# Patient Record
Sex: MALE | Race: WHITE | Employment: OTHER | URBAN - NONMETROPOLITAN AREA
[De-identification: names, ages, dates, MRNs, and addresses within clinical notes are randomized per-mention and may not be internally consistent; named-entity substitution may affect disease eponyms.]

---

## 2017-02-23 ENCOUNTER — PROCEDURE VISIT (OUTPATIENT)
Dept: CARDIOLOGY | Age: 57
End: 2017-02-23

## 2017-02-23 DIAGNOSIS — Z95.0 PACEMAKER: Primary | ICD-10-CM

## 2017-02-23 PROCEDURE — 93294 REM INTERROG EVL PM/LDLS PM: CPT | Performed by: INTERNAL MEDICINE

## 2017-02-23 PROCEDURE — 93296 REM INTERROG EVL PM/IDS: CPT | Performed by: INTERNAL MEDICINE

## 2017-05-26 ENCOUNTER — PROCEDURE VISIT (OUTPATIENT)
Dept: CARDIOLOGY | Age: 57
End: 2017-05-26

## 2017-05-26 DIAGNOSIS — Z95.0 PACEMAKER: Primary | ICD-10-CM

## 2017-05-26 PROCEDURE — 93294 REM INTERROG EVL PM/LDLS PM: CPT | Performed by: INTERNAL MEDICINE

## 2017-05-26 PROCEDURE — 93296 REM INTERROG EVL PM/IDS: CPT | Performed by: INTERNAL MEDICINE

## 2017-09-07 ENCOUNTER — PROCEDURE VISIT (OUTPATIENT)
Dept: CARDIOLOGY CLINIC | Age: 57
End: 2017-09-07
Payer: MEDICARE

## 2017-09-07 DIAGNOSIS — Z95.0 PACEMAKER: Primary | ICD-10-CM

## 2017-09-07 PROCEDURE — 93296 REM INTERROG EVL PM/IDS: CPT | Performed by: INTERNAL MEDICINE

## 2017-09-07 PROCEDURE — 93294 REM INTERROG EVL PM/LDLS PM: CPT | Performed by: INTERNAL MEDICINE

## 2017-09-21 ENCOUNTER — TELEPHONE (OUTPATIENT)
Dept: CARDIOLOGY CLINIC | Age: 57
End: 2017-09-21

## 2017-12-07 ENCOUNTER — TELEPHONE (OUTPATIENT)
Dept: CARDIOLOGY CLINIC | Age: 57
End: 2017-12-07

## 2017-12-07 NOTE — LETTER
[facility] [address] [facility] [address]nathy [address] [address]  Phone: [phone]  Fax: [phone]    Governor Toni DO        December 8, 2017    [address] [facility] [address] [address]      Dear Melissa De: Our records show that you missed your pacer apt scheduled for 12. 7.2017. I have rescheduled your apt to 12.28.2017 at 10am.   If this time does not work for you,  Please contact our office to reschedule. If you have any questions or concerns, please don't hesitate to call.     Sincerely,        Governor Toni DO/ liat

## 2017-12-28 ENCOUNTER — TELEPHONE (OUTPATIENT)
Dept: CARDIOLOGY CLINIC | Age: 57
End: 2017-12-28

## 2017-12-28 NOTE — TELEPHONE ENCOUNTER
Patient missed pacer apt on 12.28.2017 at 10am.   [facility] for patient to call back and reschedule.

## 2018-01-16 ENCOUNTER — TELEPHONE (OUTPATIENT)
Dept: CARDIOLOGY CLINIC | Age: 58
End: 2018-01-16

## 2018-01-16 NOTE — LETTER
[facility] [address] [facility] Nicole Abernathy [address]  [facility] [address]  Phone: [phone]  Fax: [phone]    Belia Casiano DO        January 22, 2018    [address] [facility],  [address] [address]      Dear Kye Griffin: Our records indicate that you have missed your appointment for your [facility] Scientific Device check on 1.16.2018. I have rescheduled this apt to follow your appointment with Dr. Bridger Pedro on 2. 1.2018    Dr. Bridger Pedro 2. 1.2018 at 1:15pm  [address] 2. 1.2018 at 2:00pm    If you have any questions or concerns, please don't hesitate to call.     Sincerely,    Belia Casiano DO/ ZOFIA

## 2018-02-01 ENCOUNTER — OFFICE VISIT (OUTPATIENT)
Dept: CARDIOLOGY CLINIC | Age: 58
End: 2018-02-01
Payer: MEDICARE

## 2018-02-01 ENCOUNTER — NURSE ONLY (OUTPATIENT)
Dept: CARDIOLOGY CLINIC | Age: 58
End: 2018-02-01
Payer: MEDICARE

## 2018-02-01 VITALS
BODY MASS INDEX: 33.77 KG/M2 | SYSTOLIC BLOOD PRESSURE: 132 MMHG | HEIGHT: 69 IN | HEART RATE: 60 BPM | WEIGHT: 228 LBS | DIASTOLIC BLOOD PRESSURE: 74 MMHG

## 2018-02-01 DIAGNOSIS — Z98.890 HX OF TRICUSPID VALVE REPAIR: Primary | ICD-10-CM

## 2018-02-01 DIAGNOSIS — Z95.0 PACEMAKER: Primary | ICD-10-CM

## 2018-02-01 PROCEDURE — 93281 PM DEVICE PROGR EVAL MULTI: CPT | Performed by: INTERNAL MEDICINE

## 2018-02-01 PROCEDURE — 4004F PT TOBACCO SCREEN RCVD TLK: CPT | Performed by: INTERNAL MEDICINE

## 2018-02-01 PROCEDURE — 93000 ELECTROCARDIOGRAM COMPLETE: CPT | Performed by: INTERNAL MEDICINE

## 2018-02-01 PROCEDURE — G8427 DOCREV CUR MEDS BY ELIG CLIN: HCPCS | Performed by: INTERNAL MEDICINE

## 2018-02-01 PROCEDURE — G8484 FLU IMMUNIZE NO ADMIN: HCPCS | Performed by: INTERNAL MEDICINE

## 2018-02-01 PROCEDURE — 99213 OFFICE O/P EST LOW 20 MIN: CPT | Performed by: INTERNAL MEDICINE

## 2018-02-01 PROCEDURE — G8417 CALC BMI ABV UP PARAM F/U: HCPCS | Performed by: INTERNAL MEDICINE

## 2018-02-01 PROCEDURE — 3017F COLORECTAL CA SCREEN DOC REV: CPT | Performed by: INTERNAL MEDICINE

## 2018-02-01 RX ORDER — POTASSIUM CHLORIDE 1.5 G/1.77G
20 POWDER, FOR SOLUTION ORAL 2 TIMES DAILY
Qty: 30 EACH | Refills: 3 | Status: SHIPPED | OUTPATIENT
Start: 2018-02-01 | End: 2020-01-06

## 2018-02-01 RX ORDER — BUMETANIDE 1 MG/1
1 TABLET ORAL 2 TIMES DAILY
Qty: 60 TABLET | Refills: 3 | Status: SHIPPED | OUTPATIENT
Start: 2018-02-01 | End: 2018-05-29 | Stop reason: SDUPTHER

## 2018-02-23 ENCOUNTER — HOSPITAL ENCOUNTER (OUTPATIENT)
Dept: NON INVASIVE DIAGNOSTICS | Age: 58
Discharge: HOME | End: 2018-02-23
Payer: MEDICARE

## 2018-02-23 DIAGNOSIS — Z98.890 HX OF TRICUSPID VALVE REPAIR: ICD-10-CM

## 2018-02-23 LAB
LV EF: 60 %
LVEF MODALITY: NORMAL

## 2018-02-23 PROCEDURE — 93306 TTE W/DOPPLER COMPLETE: CPT

## 2018-03-14 ENCOUNTER — PROCEDURE VISIT (OUTPATIENT)
Dept: CARDIOLOGY CLINIC | Age: 58
End: 2018-03-14

## 2018-03-14 DIAGNOSIS — Z95.0 PACEMAKER: Primary | ICD-10-CM

## 2018-03-16 ENCOUNTER — HOSPITAL ENCOUNTER (OUTPATIENT)
Age: 58
Discharge: HOME | End: 2018-03-16
Payer: MEDICARE

## 2018-03-16 ENCOUNTER — OFFICE VISIT (OUTPATIENT)
Dept: CARDIOLOGY CLINIC | Age: 58
End: 2018-03-16
Payer: MEDICARE

## 2018-03-16 VITALS
HEIGHT: 69 IN | DIASTOLIC BLOOD PRESSURE: 88 MMHG | SYSTOLIC BLOOD PRESSURE: 118 MMHG | HEART RATE: 64 BPM | WEIGHT: 231.6 LBS | BODY MASS INDEX: 34.3 KG/M2

## 2018-03-16 DIAGNOSIS — I50.32 CHRONIC DIASTOLIC CONGESTIVE HEART FAILURE (HCC): Primary | ICD-10-CM

## 2018-03-16 DIAGNOSIS — I50.32 CHRONIC DIASTOLIC CONGESTIVE HEART FAILURE (HCC): ICD-10-CM

## 2018-03-16 DIAGNOSIS — Z95.3 HISTORY OF TRICUSPID VALVE REPLACEMENT WITH BIOPROSTHETIC VALVE: ICD-10-CM

## 2018-03-16 DIAGNOSIS — Z95.0 PACEMAKER: ICD-10-CM

## 2018-03-16 LAB
ANION GAP SERPL CALCULATED.3IONS-SCNC: 13 MEQ/L (ref 8–16)
BUN BLDV-MCNC: 10 MG/DL (ref 7–22)
CALCIUM SERPL-MCNC: 9.6 MG/DL (ref 8.5–10.5)
CHLORIDE BLD-SCNC: 97 MEQ/L (ref 98–111)
CO2: 30 MEQ/L (ref 23–33)
CREAT SERPL-MCNC: 1.1 MG/DL (ref 0.4–1.2)
GFR SERPL CREATININE-BSD FRML MDRD: 69 ML/MIN/1.73M2
GLUCOSE BLD-MCNC: 104 MG/DL (ref 70–108)
MAGNESIUM: 2.2 MG/DL (ref 1.6–2.4)
POTASSIUM SERPL-SCNC: 4.1 MEQ/L (ref 3.5–5.2)
SODIUM BLD-SCNC: 140 MEQ/L (ref 135–145)

## 2018-03-16 PROCEDURE — 36415 COLL VENOUS BLD VENIPUNCTURE: CPT

## 2018-03-16 PROCEDURE — 4004F PT TOBACCO SCREEN RCVD TLK: CPT | Performed by: INTERNAL MEDICINE

## 2018-03-16 PROCEDURE — G8427 DOCREV CUR MEDS BY ELIG CLIN: HCPCS | Performed by: INTERNAL MEDICINE

## 2018-03-16 PROCEDURE — 83735 ASSAY OF MAGNESIUM: CPT

## 2018-03-16 PROCEDURE — G8417 CALC BMI ABV UP PARAM F/U: HCPCS | Performed by: INTERNAL MEDICINE

## 2018-03-16 PROCEDURE — 99214 OFFICE O/P EST MOD 30 MIN: CPT | Performed by: INTERNAL MEDICINE

## 2018-03-16 PROCEDURE — 3017F COLORECTAL CA SCREEN DOC REV: CPT | Performed by: INTERNAL MEDICINE

## 2018-03-16 PROCEDURE — G8484 FLU IMMUNIZE NO ADMIN: HCPCS | Performed by: INTERNAL MEDICINE

## 2018-03-16 PROCEDURE — 80048 BASIC METABOLIC PNL TOTAL CA: CPT

## 2018-03-16 NOTE — PROGRESS NOTES
Family History   Problem Relation Age of Onset    Diabetes Mother     Depression Mother     Arthritis Father     Heart Disease Father     Diabetes Brother     Depression Daughter         Social History     Social History    Marital status:      Spouse name: N/A    Number of children: 3    Years of education: 5     Occupational History    on disability      Social History Main Topics    Smoking status: Current Every Day Smoker     Packs/day: 1.00     Years: 37.00     Types: Cigarettes     Start date: 10/22/1976    Smokeless tobacco: Former User      Comment: pt has smoked 3 packs in last week    Alcohol use No      Comment: quit 9 years ago    Drug use: No    Sexual activity: Yes     Partners: Female     Other Topics Concern    Not on file     Social History Narrative    No narrative on file       Current Outpatient Prescriptions   Medication Sig Dispense Refill    metoprolol tartrate (LOPRESSOR) 25 MG tablet take 1 tablet by mouth twice a day 180 tablet 0    bumetanide (BUMEX) 1 MG tablet Take 1 tablet by mouth 2 times daily 60 tablet 3    potassium chloride (KLOR-CON) 20 MEQ packet Take 20 mEq by mouth 2 times daily 30 each 3    pantoprazole (PROTONIX) 40 MG tablet Take 40 mg by mouth daily      warfarin (COUMADIN) 5 MG tablet Take 5 mg by mouth LMH adjust coumadin      busPIRone (BUSPAR) 15 MG tablet Take 60 mg by mouth daily       vilazodone HCl (VIIBRYD) 40 MG TABS Take 40 mg by mouth daily       No current facility-administered medications for this visit. Review of Systems -     General ROS: negative  Psychological ROS: negative  Hematological and Lymphatic ROS: No history of blood clots or bleeding disorder.    Respiratory ROS: no cough, shortness of breath, or wheezing  Cardiovascular ROS: no chest pain or dyspnea on exertion  Gastrointestinal ROS: negative  Genito-Urinary ROS: no dysuria, trouble voiding, or hematuria  Musculoskeletal ROS: negative  Neurological ROS: PTINRWAR  HgBA1c:    Lab Results   Component Value Date    LABA1C 5.6 12/23/2012     FLP:    Lab Results   Component Value Date    TRIG 101 12/21/2012    HDL 20 12/21/2012    LDLCALC 74 12/21/2012     TSH:    Lab Results   Component Value Date    TSH 1.740 10/13/2016       Conclusions      Summary   Normal left ventricle size and systolic function. Ejection fraction was   estimated at 60 %. There were no regional left ventricular wall motion   abnormalities and wall thickness was within normal limits.   The left atrium is Mild to moderate dilated.   The aortic valve leaflets were not well visualized.   DOPPLER: Transaortic velocity was within the normal range with no evidence   of aortic stenosis. There was no evidence of aortic regurgitation.   Mild tricuspid regurgitation visualized.   Probable Normal functioning Bioprosthetic Tricuspid Valve Or Tricuspid   Valve repair   Mild tricuspid regurgitation visualized.   Right ventricular systolic pressure measures 45 mmhg.      Signature      ----------------------------------------------------------------   Electronically signed by Maria Teresa Crain MD (Interpreting   physician) on 02/23/2018 at 06:52 PM    Assessment  1. Chronic diastolic congestive heart failure ([address]     2. History of tricuspid valve replacement with bioprosthetic valve 2012     3. [place] [employer] BiV pacemaker           Plan   Continue the current treatment and with constant vigilance to changes in symptoms and also any potential side effects. Return for care or seek medical attention immediately if symptoms got worse and/or develop new symptoms.     Pacer interrogation reviewed and look good    Congestive heart failure: no evidence of fluid overload today, no recent hospitalization for CHF  On bumex  BMP and mg    RTC in 6 months        [facility]

## 2018-05-30 RX ORDER — BUMETANIDE 1 MG/1
1 TABLET ORAL 2 TIMES DAILY
Qty: 60 TABLET | Refills: 3 | Status: ON HOLD | OUTPATIENT
Start: 2018-05-30 | End: 2019-10-19 | Stop reason: HOSPADM

## 2018-06-21 ENCOUNTER — TELEPHONE (OUTPATIENT)
Dept: FAMILY MEDICINE CLINIC | Age: 58
End: 2018-06-21

## 2018-06-26 ENCOUNTER — PROCEDURE VISIT (OUTPATIENT)
Dept: CARDIOLOGY CLINIC | Age: 58
End: 2018-06-26
Payer: MEDICARE

## 2018-06-26 DIAGNOSIS — Z95.0 PACEMAKER: Primary | ICD-10-CM

## 2018-06-26 PROCEDURE — 93294 REM INTERROG EVL PM/LDLS PM: CPT | Performed by: INTERNAL MEDICINE

## 2018-06-26 PROCEDURE — 93296 REM INTERROG EVL PM/IDS: CPT | Performed by: INTERNAL MEDICINE

## 2018-09-24 ENCOUNTER — TELEPHONE (OUTPATIENT)
Dept: CARDIOLOGY CLINIC | Age: 58
End: 2018-09-24

## 2018-09-26 ENCOUNTER — PROCEDURE VISIT (OUTPATIENT)
Dept: CARDIOLOGY CLINIC | Age: 58
End: 2018-09-26

## 2018-09-26 DIAGNOSIS — Z95.0 PACEMAKER: Primary | ICD-10-CM

## 2018-09-26 NOTE — PROGRESS NOTES
DR MUÑOZ PT  [place] SCI BIV ICD   BATTERY 3.5 YRS REMAINING  ATRIAL IMPEDENCE 537  RV IMPEDENCE 483  LV IMPEDENCE 340  P WAVES 7.5  A PACED 15%  LV AND RV PACED 100%

## 2018-10-03 ENCOUNTER — OFFICE VISIT (OUTPATIENT)
Dept: CARDIOLOGY CLINIC | Age: 58
End: 2018-10-03
Payer: MEDICARE

## 2018-10-03 VITALS
BODY MASS INDEX: 35.76 KG/M2 | HEART RATE: 94 BPM | SYSTOLIC BLOOD PRESSURE: 148 MMHG | WEIGHT: 241.4 LBS | DIASTOLIC BLOOD PRESSURE: 70 MMHG | HEIGHT: 69 IN

## 2018-10-03 DIAGNOSIS — Z95.3 HISTORY OF TRICUSPID VALVE REPLACEMENT WITH BIOPROSTHETIC VALVE: ICD-10-CM

## 2018-10-03 DIAGNOSIS — R06.02 SOB (SHORTNESS OF BREATH): Primary | ICD-10-CM

## 2018-10-03 DIAGNOSIS — Z95.0 PACEMAKER: ICD-10-CM

## 2018-10-03 DIAGNOSIS — I50.32 CHRONIC DIASTOLIC CONGESTIVE HEART FAILURE (HCC): ICD-10-CM

## 2018-10-03 PROCEDURE — 4004F PT TOBACCO SCREEN RCVD TLK: CPT | Performed by: PHYSICIAN ASSISTANT

## 2018-10-03 PROCEDURE — G8484 FLU IMMUNIZE NO ADMIN: HCPCS | Performed by: PHYSICIAN ASSISTANT

## 2018-10-03 PROCEDURE — 99213 OFFICE O/P EST LOW 20 MIN: CPT | Performed by: PHYSICIAN ASSISTANT

## 2018-10-03 PROCEDURE — 3017F COLORECTAL CA SCREEN DOC REV: CPT | Performed by: PHYSICIAN ASSISTANT

## 2018-10-03 PROCEDURE — G8417 CALC BMI ABV UP PARAM F/U: HCPCS | Performed by: PHYSICIAN ASSISTANT

## 2018-10-03 PROCEDURE — G8427 DOCREV CUR MEDS BY ELIG CLIN: HCPCS | Performed by: PHYSICIAN ASSISTANT

## 2018-10-03 RX ORDER — PRAZOSIN HYDROCHLORIDE 5 MG/1
5 CAPSULE ORAL NIGHTLY
COMMUNITY
End: 2019-11-22 | Stop reason: ALTCHOICE

## 2018-10-03 RX ORDER — QUETIAPINE FUMARATE 300 MG/1
300 TABLET, FILM COATED ORAL NIGHTLY
Status: ON HOLD | COMMUNITY
End: 2019-10-19 | Stop reason: HOSPADM

## 2018-10-03 RX ORDER — BUPRENORPHINE HYDROCHLORIDE, NALOXONE HYDROCHLORIDE 8; 2 MG/1; MG/1
1 FILM, SOLUBLE BUCCAL; SUBLINGUAL DAILY
Status: ON HOLD | COMMUNITY
End: 2020-04-25 | Stop reason: SDUPTHER

## 2018-10-03 RX ORDER — AMITRIPTYLINE HYDROCHLORIDE 100 MG/1
100 TABLET, FILM COATED ORAL NIGHTLY
COMMUNITY
End: 2018-11-06 | Stop reason: ALTCHOICE

## 2018-10-03 RX ORDER — QUETIAPINE FUMARATE 50 MG/1
50 TABLET, EXTENDED RELEASE ORAL DAILY
Status: ON HOLD | COMMUNITY
End: 2019-10-19 | Stop reason: HOSPADM

## 2018-10-03 NOTE — PROGRESS NOTES
capsule Take 5 mg by mouth nightly      QUEtiapine (SEROQUEL) 300 MG tablet Take 300 mg by mouth 2 times daily      QUEtiapine (SEROQUEL XR) 50 MG extended release tablet Take 50 mg by mouth nightly      buprenorphine-naloxone (SUBOXONE) 8-2 MG FILM SL film Place 1 Film under the tongue daily. Abdulaziz Rowley metoprolol tartrate (LOPRESSOR) 25 MG tablet take 1 tablet by mouth twice a day 180 tablet 1    bumetanide (BUMEX) 1 MG tablet Take 1 tablet by mouth 2 times daily (Patient taking differently: Take 1 mg by mouth as needed ) 60 tablet 3    potassium chloride (KLOR-CON) 20 MEQ packet Take 20 mEq by mouth 2 times daily (Patient taking differently: Take 20 mEq by mouth as needed ) 30 each 3    pantoprazole (PROTONIX) 40 MG tablet Take 40 mg by mouth daily      warfarin (COUMADIN) 5 MG tablet Take 5 mg by mouth LMH adjust coumadin      busPIRone (BUSPAR) 15 MG tablet Take 60 mg by mouth daily       vilazodone HCl (VIIBRYD) 40 MG TABS Take 40 mg by mouth daily       No current facility-administered medications for this visit.         Social History     Social History    Marital status:      Spouse name: N/A    Number of children: 3    Years of education: 9     Occupational History    on disability      Social History Main Topics    Smoking status: Current Every Day Smoker     Packs/day: 1.00     Years: 37.00     Types: Cigarettes     Start date: 10/22/1976    Smokeless tobacco: Former User      Comment: pt has smoked 3 packs in last week    Alcohol use No      Comment: quit 9 years ago    Drug use: No    Sexual activity: Yes     Partners: Female     Other Topics Concern    None     Social History Narrative    None       Family History   Problem Relation Age of Onset    Diabetes Mother     Depression Mother     Arthritis Father     Heart Disease Father     Diabetes Brother     Depression Daughter        Blood pressure (!) 148/70, pulse 94, height 5' 9\" (1.753 m), weight 241 lb 6.4 oz (109.5 kg).    General:   Well developed, well nourished  Lungs:   Clear to auscultation  Heart:    Normal S1 S2, No murmur, rubs, or gallops  Abdomen:   Soft, non tender, no organomegalies, positive bowel sounds  Extremities:   No edema, no cyanosis, good peripheral pulses  Neurological:   Awake, alert, oriented. No obvious focal deficits  Musculoskeletal:  No obvious deformities         Diagnosis Orders   1. SOB (shortness of breath)  Stress test, lexiscan   2. [place] [employer] BiV pacemaker     3. Chronic diastolic congestive heart failure ([address]     4. History of tricuspid valve replacement with bioprosthetic valve 2012         Orders Placed This Encounter   Procedures    Stress test, lexiscan     To be done by Dr Colt Vivas     Standing Status:   Future     Standing Expiration Date:   10/3/2019     Order Specific Question:   Reason for Exam?     Answer:   Shortness of breath     Continue Dr Suzette Dennis current treatment plan    We'll check a Lexiscan stress test    Continue same current medications and with constant vigilance to changes in symptoms and also any potential side effects. Return for care if become worse or seek medical attention immediately. The patient is educated on symptoms of heart disease that include chest pain and passing out, dizziness, etc and to report them if there is any change or go to emergency room.        Follow up with Dr Colt Vivas as scheduled or sooner if needed  (Please note that portions of this note were completed with a voice recognition program.  Efforts were made to edit the dictation but occasionally words are mis-transcribed.)      Pedro Pablo Piña PA-C

## 2018-10-07 ENCOUNTER — APPOINTMENT (OUTPATIENT)
Dept: GENERAL RADIOLOGY | Age: 58
End: 2018-10-07
Payer: MEDICARE

## 2018-10-07 ENCOUNTER — APPOINTMENT (OUTPATIENT)
Dept: CT IMAGING | Age: 58
End: 2018-10-07
Payer: MEDICARE

## 2018-10-07 ENCOUNTER — HOSPITAL ENCOUNTER (EMERGENCY)
Age: 58
Discharge: HOME | End: 2018-10-08
Payer: MEDICARE

## 2018-10-07 VITALS
DIASTOLIC BLOOD PRESSURE: 88 MMHG | HEIGHT: 69 IN | OXYGEN SATURATION: 97 % | TEMPERATURE: 97.9 F | BODY MASS INDEX: 36.14 KG/M2 | RESPIRATION RATE: 18 BRPM | HEART RATE: 60 BPM | WEIGHT: 244 LBS | SYSTOLIC BLOOD PRESSURE: 149 MMHG

## 2018-10-07 DIAGNOSIS — R07.9 CHEST PAIN, UNSPECIFIED TYPE: Primary | ICD-10-CM

## 2018-10-07 LAB
ALBUMIN SERPL-MCNC: 4.2 G/DL (ref 3.5–5.1)
ALP BLD-CCNC: 69 U/L (ref 38–126)
ALT SERPL-CCNC: 26 U/L (ref 11–66)
AMPHETAMINE+METHAMPHETAMINE URINE SCREEN: NEGATIVE
ANION GAP SERPL CALCULATED.3IONS-SCNC: 13 MEQ/L (ref 8–16)
AST SERPL-CCNC: 29 U/L (ref 5–40)
BACTERIA: ABNORMAL /HPF
BARBITURATE QUANTITATIVE URINE: NEGATIVE
BASOPHILS # BLD: 0.3 %
BASOPHILS ABSOLUTE: 0 THOU/MM3 (ref 0–0.1)
BENZODIAZEPINE QUANTITATIVE URINE: NEGATIVE
BILIRUB SERPL-MCNC: 0.6 MG/DL (ref 0.3–1.2)
BILIRUBIN DIRECT: < 0.2 MG/DL (ref 0–0.3)
BILIRUBIN URINE: NEGATIVE
BLOOD, URINE: ABNORMAL
BUN BLDV-MCNC: 11 MG/DL (ref 7–22)
CALCIUM SERPL-MCNC: 9.1 MG/DL (ref 8.5–10.5)
CANNABINOID QUANTITATIVE URINE: POSITIVE
CASTS 2: ABNORMAL /LPF
CASTS UA: ABNORMAL /LPF
CHARACTER, URINE: CLEAR
CHLORIDE BLD-SCNC: 99 MEQ/L (ref 98–111)
CO2: 24 MEQ/L (ref 23–33)
COCAINE METABOLITE QUANTITATIVE URINE: NEGATIVE
COLOR: YELLOW
CREAT SERPL-MCNC: 0.9 MG/DL (ref 0.4–1.2)
CRYSTALS, UA: ABNORMAL
EKG ATRIAL RATE: 61 BPM
EKG P AXIS: 43 DEGREES
EKG P-R INTERVAL: 134 MS
EKG Q-T INTERVAL: 432 MS
EKG QRS DURATION: 106 MS
EKG QTC CALCULATION (BAZETT): 434 MS
EKG R AXIS: -40 DEGREES
EKG T AXIS: -56 DEGREES
EKG VENTRICULAR RATE: 61 BPM
EOSINOPHIL # BLD: 0.7 %
EOSINOPHILS ABSOLUTE: 0.1 THOU/MM3 (ref 0–0.4)
EPITHELIAL CELLS, UA: ABNORMAL /HPF
ERYTHROCYTE [DISTWIDTH] IN BLOOD BY AUTOMATED COUNT: 14.5 % (ref 11.5–14.5)
ERYTHROCYTE [DISTWIDTH] IN BLOOD BY AUTOMATED COUNT: 48.7 FL (ref 35–45)
GFR SERPL CREATININE-BSD FRML MDRD: 87 ML/MIN/1.73M2
GLUCOSE BLD-MCNC: 126 MG/DL (ref 70–108)
GLUCOSE URINE: NEGATIVE MG/DL
HCT VFR BLD CALC: 48.7 % (ref 42–52)
HEMOGLOBIN: 16.5 GM/DL (ref 14–18)
IMMATURE GRANS (ABS): 0.03 THOU/MM3 (ref 0–0.07)
IMMATURE GRANULOCYTES: 0.4 %
KETONES, URINE: NEGATIVE
LEUKOCYTE ESTERASE, URINE: NEGATIVE
LYMPHOCYTES # BLD: 30.9 %
LYMPHOCYTES ABSOLUTE: 2.3 THOU/MM3 (ref 1–4.8)
MCH RBC QN AUTO: 31 PG (ref 26–33)
MCHC RBC AUTO-ENTMCNC: 33.9 GM/DL (ref 32.2–35.5)
MCV RBC AUTO: 91.4 FL (ref 80–94)
MISCELLANEOUS 2: ABNORMAL
MONOCYTES # BLD: 12.8 %
MONOCYTES ABSOLUTE: 1 THOU/MM3 (ref 0.4–1.3)
NITRITE, URINE: NEGATIVE
NUCLEATED RED BLOOD CELLS: 0 /100 WBC
OPIATES, URINE: NEGATIVE
OSMOLALITY CALCULATION: 272.9 MOSMOL/KG (ref 275–300)
OXYCODONE: NEGATIVE
PH UA: 6.5
PHENCYCLIDINE QUANTITATIVE URINE: NEGATIVE
PLATELET # BLD: 162 THOU/MM3 (ref 130–400)
PMV BLD AUTO: 10.1 FL (ref 9.4–12.4)
POTASSIUM SERPL-SCNC: 3.9 MEQ/L (ref 3.5–5.2)
PRO-BNP: 703.2 PG/ML (ref 0–900)
PROTEIN UA: ABNORMAL
RBC # BLD: 5.33 MILL/MM3 (ref 4.7–6.1)
RBC URINE: ABNORMAL /HPF
RENAL EPITHELIAL, UA: ABNORMAL
SEG NEUTROPHILS: 54.9 %
SEGMENTED NEUTROPHILS ABSOLUTE COUNT: 4.2 THOU/MM3 (ref 1.8–7.7)
SODIUM BLD-SCNC: 136 MEQ/L (ref 135–145)
SPECIFIC GRAVITY, URINE: 1 (ref 1–1.03)
TOTAL PROTEIN: 8.6 G/DL (ref 6.1–8)
TROPONIN T: < 0.01 NG/ML
TROPONIN T: < 0.01 NG/ML
UROBILINOGEN, URINE: 1 EU/DL
WBC # BLD: 7.6 THOU/MM3 (ref 4.8–10.8)
WBC UA: ABNORMAL /HPF
YEAST: ABNORMAL

## 2018-10-07 PROCEDURE — 6370000000 HC RX 637 (ALT 250 FOR IP): Performed by: PHYSICIAN ASSISTANT

## 2018-10-07 PROCEDURE — 94640 AIRWAY INHALATION TREATMENT: CPT

## 2018-10-07 PROCEDURE — 82248 BILIRUBIN DIRECT: CPT

## 2018-10-07 PROCEDURE — 80307 DRUG TEST PRSMV CHEM ANLYZR: CPT

## 2018-10-07 PROCEDURE — 71275 CT ANGIOGRAPHY CHEST: CPT

## 2018-10-07 PROCEDURE — 93005 ELECTROCARDIOGRAM TRACING: CPT | Performed by: EMERGENCY MEDICINE

## 2018-10-07 PROCEDURE — 81001 URINALYSIS AUTO W/SCOPE: CPT

## 2018-10-07 PROCEDURE — 83880 ASSAY OF NATRIURETIC PEPTIDE: CPT

## 2018-10-07 PROCEDURE — 71045 X-RAY EXAM CHEST 1 VIEW: CPT

## 2018-10-07 PROCEDURE — 36415 COLL VENOUS BLD VENIPUNCTURE: CPT

## 2018-10-07 PROCEDURE — 2709999900 HC NON-CHARGEABLE SUPPLY

## 2018-10-07 PROCEDURE — 84484 ASSAY OF TROPONIN QUANT: CPT

## 2018-10-07 PROCEDURE — 6360000004 HC RX CONTRAST MEDICATION: Performed by: PHYSICIAN ASSISTANT

## 2018-10-07 PROCEDURE — 80053 COMPREHEN METABOLIC PANEL: CPT

## 2018-10-07 PROCEDURE — 85025 COMPLETE CBC W/AUTO DIFF WBC: CPT

## 2018-10-07 PROCEDURE — 99285 EMERGENCY DEPT VISIT HI MDM: CPT

## 2018-10-07 RX ORDER — IPRATROPIUM BROMIDE AND ALBUTEROL SULFATE 2.5; .5 MG/3ML; MG/3ML
1 SOLUTION RESPIRATORY (INHALATION) ONCE
Status: COMPLETED | OUTPATIENT
Start: 2018-10-07 | End: 2018-10-07

## 2018-10-07 RX ORDER — NITROGLYCERIN 0.4 MG/1
0.4 TABLET SUBLINGUAL EVERY 5 MIN PRN
Status: DISCONTINUED | OUTPATIENT
Start: 2018-10-07 | End: 2018-10-08 | Stop reason: HOSPADM

## 2018-10-07 RX ORDER — ASPIRIN 81 MG/1
324 TABLET, CHEWABLE ORAL ONCE
Status: COMPLETED | OUTPATIENT
Start: 2018-10-07 | End: 2018-10-07

## 2018-10-07 RX ADMIN — IPRATROPIUM BROMIDE AND ALBUTEROL SULFATE 1 AMPULE: .5; 3 SOLUTION RESPIRATORY (INHALATION) at 21:29

## 2018-10-07 RX ADMIN — ASPIRIN 81 MG CHEWABLE TABLET 324 MG: 81 TABLET CHEWABLE at 21:22

## 2018-10-07 RX ADMIN — IOPAMIDOL 80 ML: 755 INJECTION, SOLUTION INTRAVENOUS at 22:38

## 2018-10-07 ASSESSMENT — ENCOUNTER SYMPTOMS
ABDOMINAL PAIN: 0
WHEEZING: 0
BACK PAIN: 0
SHORTNESS OF BREATH: 1
SORE THROAT: 0
VOMITING: 0
RHINORRHEA: 0
NAUSEA: 0
EYE DISCHARGE: 0
COUGH: 1
COLOR CHANGE: 0
CONSTIPATION: 0
DIARRHEA: 0
EYE REDNESS: 0

## 2018-10-07 ASSESSMENT — PAIN SCALES - GENERAL
PAINLEVEL_OUTOF10: 1
PAINLEVEL_OUTOF10: 3
PAINLEVEL_OUTOF10: 7

## 2018-10-07 ASSESSMENT — PAIN DESCRIPTION - ORIENTATION
ORIENTATION: LEFT;MID

## 2018-10-07 ASSESSMENT — PAIN DESCRIPTION - PAIN TYPE
TYPE: ACUTE PAIN

## 2018-10-07 ASSESSMENT — PAIN DESCRIPTION - LOCATION
LOCATION: CHEST
LOCATION: HEAD

## 2018-10-07 ASSESSMENT — HEART SCORE: ECG: 1

## 2018-10-07 ASSESSMENT — PAIN DESCRIPTION - DESCRIPTORS
DESCRIPTORS: SHARP
DESCRIPTORS: DISCOMFORT
DESCRIPTORS: HEADACHE
DESCRIPTORS: DISCOMFORT

## 2018-10-07 ASSESSMENT — PAIN DESCRIPTION - FREQUENCY
FREQUENCY: INTERMITTENT
FREQUENCY: INTERMITTENT

## 2018-10-08 PROCEDURE — 93010 ELECTROCARDIOGRAM REPORT: CPT | Performed by: INTERNAL MEDICINE

## 2018-10-08 NOTE — ED PROVIDER NOTES
dailyHistorical Med      QUEtiapine (SEROQUEL XR) 50 MG extended release tablet Take 50 mg by mouth nightlyHistorical Med      buprenorphine-naloxone (SUBOXONE) 8-2 MG FILM SL film Place 1 Film under the tongue daily. Duncan Even Historical Med      metoprolol tartrate (LOPRESSOR) 25 MG tablet take 1 tablet by mouth twice a day, Disp-180 tablet, R-1Normal      bumetanide (BUMEX) 1 MG tablet Take 1 tablet by mouth 2 times daily, Disp-60 tablet, R-3Normal      potassium chloride (KLOR-CON) 20 MEQ packet Take 20 mEq by mouth 2 times daily, Disp-30 each, R-3Normal      pantoprazole (PROTONIX) 40 MG tablet Take 40 mg by mouth daily      warfarin (COUMADIN) 5 MG tablet Take 5 mg by mouth LMH adjust coumadin      busPIRone (BUSPAR) 15 MG tablet Take 60 mg by mouth daily Historical Med      vilazodone HCl (VIIBRYD) 40 MG TABS Take 40 mg by mouth daily             ALLERGIES     has No Known Allergies. FAMILY HISTORY     indicated that his mother is alive. He indicated that his father is alive. He indicated that the status of his brother is unknown. He indicated that the status of his daughter is unknown.    family history includes Arthritis in his father; Depression in his daughter and mother; Diabetes in his brother and mother; Heart Disease in his father. SOCIAL HISTORY      reports that he has been smoking Cigarettes. He started smoking about 41 years ago. He has a 37.00 pack-year smoking history. He has quit using smokeless tobacco. He reports that he does not drink alcohol or use drugs. PHYSICAL EXAM     INITIAL VITALS:  height is 5' 9\" (1.753 m) and weight is 244 lb (110.7 kg). His temperature is 97.9 °F (36.6 °C). His blood pressure is 149/88 (abnormal) and his pulse is 60. His respiration is 18 and oxygen saturation is 97%. Physical Exam   Constitutional: He is oriented to person, place, and time. He appears well-developed and well-nourished. No distress. HENT:   Head: Normocephalic and atraumatic.    Right Ear: External ear normal.   Left Ear: External ear normal.   Nose: Nose normal.   Mouth/Throat: Oropharynx is clear and moist.   Eyes: Pupils are equal, round, and reactive to light. Conjunctivae and EOM are normal. Right eye exhibits no discharge. Left eye exhibits no discharge. No scleral icterus. Neck: Normal range of motion. Neck supple. Cardiovascular: Normal rate, regular rhythm and normal heart sounds. Exam reveals no gallop and no friction rub. No murmur heard. Pulmonary/Chest: Effort normal. No respiratory distress. He has wheezes. He has no rales. He exhibits no tenderness. Abdominal: Soft. Bowel sounds are normal. He exhibits no distension and no mass. There is no tenderness. There is no rebound and no guarding. Musculoskeletal: Normal range of motion. He exhibits no edema. Lymphadenopathy:     He has no cervical adenopathy. Neurological: He is alert and oriented to person, place, and time. He exhibits normal muscle tone. Coordination normal. GCS eye subscore is 4. GCS verbal subscore is 5. GCS motor subscore is 6. Skin: Skin is warm and dry. No rash noted. He is not diaphoretic. No erythema. No pallor. Psychiatric: He has a normal mood and affect. His behavior is normal. Thought content normal.   Nursing note and vitals reviewed.     Heart Score for chest pain patients  History: Slightly Suspicious  ECG: Non-Specifc repolarization disturbance/LBTB/PM  Patient Age: > 39 and < 65 years  *Risk factors for Atherosclerotic disease: Hypertension, Coronary Artery Disease, Cigarette smoking  Risk Factors: > 3 Risk factors or history of atherosclerotic disease*  Troponin: < 1X normal limit  Heart Score Total: 4       DIFFERENTIAL DIAGNOSIS:   Includes but not limited to: Costochondritis, musculoskeletal pain, ACS, PE, anxiety, pneumonia, pleuritis, CHF exacerbation, pulmonary edema     DIAGNOSTIC RESULTS     EKG: All EKG's are interpreted by the Emergency Department Physician who either signs or software. It may contain minor errors which are inherent in voice recognition technology. **      Final report electronically signed by Dr. Nikos Carrasquillo on 10/7/2018 8:47 PM          LABS:   Labs Reviewed   CBC WITH AUTO DIFFERENTIAL - Abnormal; Notable for the following:        Result Value    RDW-SD 48.7 (*)     All other components within normal limits   BASIC METABOLIC PANEL - Abnormal; Notable for the following:     Glucose 126 (*)     All other components within normal limits   HEPATIC FUNCTION PANEL - Abnormal; Notable for the following: Total Protein 8.6 (*)     All other components within normal limits   GLOMERULAR FILTRATION RATE, ESTIMATED - Abnormal; Notable for the following:     Est, Glom Filt Rate 87 (*)     All other components within normal limits   OSMOLALITY - Abnormal; Notable for the following:     Osmolality Calc 272.9 (*)     All other components within normal limits   URINE WITH REFLEXED MICRO - Abnormal; Notable for the following:     Blood, Urine TRACE (*)     Protein, UA TRACE (*)     All other components within normal limits   BRAIN NATRIURETIC PEPTIDE   TROPONIN   URINE DRUG SCREEN   ANION GAP   TROPONIN       EMERGENCY DEPARTMENT COURSE:   Vitals:    Vitals:    10/07/18 2123 10/07/18 2129 10/07/18 2227 10/07/18 2351   BP: (!) 162/81  (!) 151/99 (!) 149/88   Pulse: 60  62 60   Resp: 15 12 15 18   Temp:       SpO2: 95% 93% 95% 97%   Weight:       Height:         The patient was seen and evaluated within the ED today with chest pain, SOB, and sweats. Within the department, I observed the patient's vital signs to be within acceptable range, and he was afebrile. On exam, I appreciated wheezing. Radiologic studies within the department revealed no acute intrapulmonary process, infiltrations, effusions, or consolidations. CTA of the chest revealed no PEs. Laboratory work was reassuring. BNP was 703.2. Serial troponin was negative. UDS was positive for cannabis.  Within the department, the patient was treated with 324 mg ASA and nitro. I observed the patient's condition to improve during the duration of the stay. I explained my proposed course of treatment to the patient and his family, who were amenable to my and Dr. Abiola Gallego treatment and discharge decisions. He was discharged home in stable condition, and the patient will return to the ED if the symptoms become more severe in nature or otherwise change. He will follow up with Cardiology on 10/11/18 at 1000. CRITICAL CARE:   None    CONSULTS:  Discussed the case at length with my attending physician in the Emergency Department, who agreed with my workup, treatment, and disposition decisions. He believes that because the patient has no EKG changes, negative serial troponin, and a HEART score of 4, he should be discharged to home with outpatient follow up. He does not advise admission at this time. PROCEDURES:  None    FINAL IMPRESSION      1. Chest pain, unspecified type          DISPOSITION/PLAN     1. Chest pain, unspecified type       I have given the patient strict written and verbal instructions about care at home, follow-up, and signs and symptoms of worsening of condition, and the patient did verbalize understanding of these instructions. Patient was discharged in stable condition. Will return if symptoms change or worsen, or for any sign or symptom deemed emergent by the patient or family members. Follow up as an outpatient or sooner if symptoms warrant. PATIENT REFERRED TO:  TAMMI Richey - CNP  [address] [place] [address] [address] [facility]  [phone]    Call in 1 day  As needed, If symptoms worsen    [facility] [address] [place] [address] [address]  Go on 10/11/2018  For chest pain re-check at 10:00 AM    [facility] EMERGENCY DEPT  hospitals [address] [place] [address],[address]  Go today  If symptoms worsen      DISCHARGE MEDICATIONS:  Discharge Medication List as of 10/8/2018 12:20

## 2018-10-08 NOTE — ED NOTES
In to reassess pt and medicate per order. No significant changes. Family at bedside. Will continue to monitor.      Ming Aguero RN  10/07/18 [id number]

## 2018-10-11 ENCOUNTER — OFFICE VISIT (OUTPATIENT)
Dept: CARDIOLOGY CLINIC | Age: 58
End: 2018-10-11
Payer: MEDICARE

## 2018-10-11 VITALS
HEART RATE: 79 BPM | WEIGHT: 239.6 LBS | HEIGHT: 69 IN | BODY MASS INDEX: 35.49 KG/M2 | DIASTOLIC BLOOD PRESSURE: 68 MMHG | SYSTOLIC BLOOD PRESSURE: 144 MMHG

## 2018-10-11 DIAGNOSIS — Z95.0 PACEMAKER: ICD-10-CM

## 2018-10-11 DIAGNOSIS — I50.32 CHRONIC DIASTOLIC CONGESTIVE HEART FAILURE (HCC): ICD-10-CM

## 2018-10-11 DIAGNOSIS — R07.89 CHEST PAIN, ATYPICAL: Primary | ICD-10-CM

## 2018-10-11 DIAGNOSIS — Z95.3 HISTORY OF TRICUSPID VALVE REPLACEMENT WITH BIOPROSTHETIC VALVE: ICD-10-CM

## 2018-10-11 DIAGNOSIS — Z98.890 HX OF TRICUSPID VALVE REPAIR: ICD-10-CM

## 2018-10-11 PROCEDURE — G8427 DOCREV CUR MEDS BY ELIG CLIN: HCPCS | Performed by: INTERNAL MEDICINE

## 2018-10-11 PROCEDURE — G8417 CALC BMI ABV UP PARAM F/U: HCPCS | Performed by: INTERNAL MEDICINE

## 2018-10-11 PROCEDURE — 99214 OFFICE O/P EST MOD 30 MIN: CPT | Performed by: INTERNAL MEDICINE

## 2018-10-11 PROCEDURE — 4004F PT TOBACCO SCREEN RCVD TLK: CPT | Performed by: INTERNAL MEDICINE

## 2018-10-11 PROCEDURE — 3017F COLORECTAL CA SCREEN DOC REV: CPT | Performed by: INTERNAL MEDICINE

## 2018-10-11 PROCEDURE — G8484 FLU IMMUNIZE NO ADMIN: HCPCS | Performed by: INTERNAL MEDICINE

## 2018-10-11 RX ORDER — ASPIRIN 81 MG/1
81 TABLET ORAL DAILY
Qty: 30 TABLET | Refills: 3 | Status: ON HOLD | COMMUNITY
Start: 2018-10-11 | End: 2020-09-19 | Stop reason: SDUPTHER

## 2018-10-15 ENCOUNTER — HOSPITAL ENCOUNTER (OUTPATIENT)
Dept: NON INVASIVE DIAGNOSTICS | Age: 58
Discharge: HOME | End: 2018-10-15
Payer: MEDICARE

## 2018-10-15 DIAGNOSIS — R06.02 SOB (SHORTNESS OF BREATH): ICD-10-CM

## 2018-10-15 PROCEDURE — A9500 TC99M SESTAMIBI: HCPCS | Performed by: PHYSICIAN ASSISTANT

## 2018-10-15 PROCEDURE — 3430000000 HC RX DIAGNOSTIC RADIOPHARMACEUTICAL: Performed by: PHYSICIAN ASSISTANT

## 2018-10-15 PROCEDURE — 78452 HT MUSCLE IMAGE SPECT MULT: CPT

## 2018-10-15 PROCEDURE — 6360000002 HC RX W HCPCS

## 2018-10-15 PROCEDURE — 93017 CV STRESS TEST TRACING ONLY: CPT | Performed by: INTERNAL MEDICINE

## 2018-10-15 PROCEDURE — 2709999900 HC NON-CHARGEABLE SUPPLY

## 2018-10-15 RX ADMIN — Medication 35 MILLICURIE: at 13:20

## 2018-10-15 RX ADMIN — Medication 9.6 MILLICURIE: at 12:30

## 2018-10-22 ENCOUNTER — TELEPHONE (OUTPATIENT)
Dept: CARDIOLOGY CLINIC | Age: 58
End: 2018-10-22

## 2018-11-01 ENCOUNTER — TELEPHONE (OUTPATIENT)
Dept: CARDIOLOGY CLINIC | Age: 58
End: 2018-11-01

## 2018-11-01 ENCOUNTER — OFFICE VISIT (OUTPATIENT)
Dept: CARDIOLOGY CLINIC | Age: 58
End: 2018-11-01
Payer: MEDICARE

## 2018-11-01 VITALS
HEART RATE: 80 BPM | HEIGHT: 69 IN | BODY MASS INDEX: 36.08 KG/M2 | SYSTOLIC BLOOD PRESSURE: 164 MMHG | DIASTOLIC BLOOD PRESSURE: 94 MMHG | WEIGHT: 243.6 LBS

## 2018-11-01 DIAGNOSIS — Z95.3 HISTORY OF TRICUSPID VALVE REPLACEMENT WITH BIOPROSTHETIC VALVE: ICD-10-CM

## 2018-11-01 DIAGNOSIS — Z95.0 PACEMAKER: ICD-10-CM

## 2018-11-01 DIAGNOSIS — J44.9 CHRONIC OBSTRUCTIVE PULMONARY DISEASE, UNSPECIFIED COPD TYPE (HCC): ICD-10-CM

## 2018-11-01 DIAGNOSIS — R07.9 CHEST PAIN IN ADULT: Primary | ICD-10-CM

## 2018-11-01 DIAGNOSIS — R06.02 SOB (SHORTNESS OF BREATH) ON EXERTION: ICD-10-CM

## 2018-11-01 DIAGNOSIS — Z98.890 HX OF TRICUSPID VALVE REPAIR: ICD-10-CM

## 2018-11-01 PROCEDURE — G8484 FLU IMMUNIZE NO ADMIN: HCPCS | Performed by: INTERNAL MEDICINE

## 2018-11-01 PROCEDURE — G8926 SPIRO NO PERF OR DOC: HCPCS | Performed by: INTERNAL MEDICINE

## 2018-11-01 PROCEDURE — 99214 OFFICE O/P EST MOD 30 MIN: CPT | Performed by: INTERNAL MEDICINE

## 2018-11-01 PROCEDURE — 3017F COLORECTAL CA SCREEN DOC REV: CPT | Performed by: INTERNAL MEDICINE

## 2018-11-01 PROCEDURE — G8417 CALC BMI ABV UP PARAM F/U: HCPCS | Performed by: INTERNAL MEDICINE

## 2018-11-01 PROCEDURE — G8427 DOCREV CUR MEDS BY ELIG CLIN: HCPCS | Performed by: INTERNAL MEDICINE

## 2018-11-01 PROCEDURE — 3023F SPIROM DOC REV: CPT | Performed by: INTERNAL MEDICINE

## 2018-11-01 PROCEDURE — 4004F PT TOBACCO SCREEN RCVD TLK: CPT | Performed by: INTERNAL MEDICINE

## 2018-11-01 RX ORDER — AMLODIPINE BESYLATE 5 MG/1
5 TABLET ORAL DAILY
Qty: 30 TABLET | Refills: 5 | Status: SHIPPED | OUTPATIENT
Start: 2018-11-01 | End: 2019-04-26 | Stop reason: SDUPTHER

## 2018-11-01 NOTE — TELEPHONE ENCOUNTER
Patient in to see Dr. June Barrera 11/1/2018. Dr. June Barrera is wondering why patient is on Coumadin? Dr. June Barrera has ordered a cath for patient. LM for Coumadin Clinic to call our office back:  -Why is patient on Coumadin  -Dr. June Barrera is wanting patient to stop Coumadin 4 days prior to cath and is wanting Coumadin Clinic to bridge with Lovenox. Per Dr. June Barrera no Lovenox the evening before or morning of procedure.

## 2018-11-05 NOTE — TELEPHONE ENCOUNTER
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  They understand  recommendations pertaining to holding Coumadin    As stated by coumadin clinic- reason for coumadin not related directly to cardiology  Pcp need to follow the coumadin RX      Pat need to be bridged with Lovenox when stop coumadin

## 2018-11-06 RX ORDER — BUSPIRONE HYDROCHLORIDE 30 MG/1
30 TABLET ORAL 2 TIMES DAILY
Status: ON HOLD | COMMUNITY
End: 2018-11-14 | Stop reason: SDUPTHER

## 2018-11-07 NOTE — TELEPHONE ENCOUNTER
Pat already with coumadin clinic. Meaning already referred there by someone before    Nonetheless -the [id number] his taking coumadin not purly cardiac and already under their care- I do not want take over care of coumadin. If the coumadin clinic want the order for lovenox and bridging - yes I can take care of that.  But do not stretch it more than that

## 2018-11-08 NOTE — TELEPHONE ENCOUNTER
Charles CALLED BACK AND THE PATIENT'S PCP IS GOING TO MANAGE THE COUMADIN. THE COUMADIN CLINIC WILL HAVE THE PATIENT HOLD COUMADIN FOR 4 DAYS AND BRIDGE WITH LOVANOX.

## 2018-11-13 ENCOUNTER — PREP FOR PROCEDURE (OUTPATIENT)
Dept: CARDIOLOGY | Age: 58
End: 2018-11-13

## 2018-11-13 RX ORDER — SODIUM CHLORIDE 0.9 % (FLUSH) 0.9 %
10 SYRINGE (ML) INJECTION EVERY 12 HOURS SCHEDULED
Status: CANCELLED | OUTPATIENT
Start: 2018-11-13

## 2018-11-13 RX ORDER — SODIUM CHLORIDE 0.9 % (FLUSH) 0.9 %
10 SYRINGE (ML) INJECTION PRN
Status: CANCELLED | OUTPATIENT
Start: 2018-11-13

## 2018-11-13 RX ORDER — SODIUM CHLORIDE 9 MG/ML
INJECTION, SOLUTION INTRAVENOUS CONTINUOUS
Status: CANCELLED | OUTPATIENT
Start: 2018-11-13

## 2018-11-13 RX ORDER — ASPIRIN 325 MG
325 TABLET ORAL ONCE
Status: CANCELLED | OUTPATIENT
Start: 2018-11-13 | End: 2018-11-13

## 2018-11-13 RX ORDER — NITROGLYCERIN 0.4 MG/1
0.4 TABLET SUBLINGUAL EVERY 5 MIN PRN
Status: CANCELLED | OUTPATIENT
Start: 2018-11-13

## 2018-11-14 ENCOUNTER — HOSPITAL ENCOUNTER (OUTPATIENT)
Dept: INPATIENT UNIT | Age: 58
Discharge: HOME | End: 2018-11-14
Attending: INTERNAL MEDICINE | Admitting: INTERNAL MEDICINE
Payer: MEDICARE

## 2018-11-14 VITALS
HEIGHT: 69 IN | BODY MASS INDEX: 37.03 KG/M2 | OXYGEN SATURATION: 96 % | WEIGHT: 250 LBS | DIASTOLIC BLOOD PRESSURE: 75 MMHG | TEMPERATURE: 98.2 F | RESPIRATION RATE: 16 BRPM | HEART RATE: 73 BPM | SYSTOLIC BLOOD PRESSURE: 136 MMHG

## 2018-11-14 LAB
ABO: NORMAL
ANION GAP SERPL CALCULATED.3IONS-SCNC: 10 MEQ/L (ref 8–16)
ANTIBODY SCREEN: NORMAL
APTT: 36.3 SECONDS (ref 22–38)
BUN BLDV-MCNC: 11 MG/DL (ref 7–22)
CALCIUM SERPL-MCNC: 9.3 MG/DL (ref 8.5–10.5)
CHLORIDE BLD-SCNC: 102 MEQ/L (ref 98–111)
CO2: 25 MEQ/L (ref 23–33)
CREAT SERPL-MCNC: 0.8 MG/DL (ref 0.4–1.2)
EKG ATRIAL RATE: 72 BPM
EKG P AXIS: 54 DEGREES
EKG P-R INTERVAL: 142 MS
EKG Q-T INTERVAL: 422 MS
EKG QRS DURATION: 116 MS
EKG QTC CALCULATION (BAZETT): 462 MS
EKG R AXIS: -75 DEGREES
EKG T AXIS: 38 DEGREES
EKG VENTRICULAR RATE: 72 BPM
ERYTHROCYTE [DISTWIDTH] IN BLOOD BY AUTOMATED COUNT: 14.3 % (ref 11.5–14.5)
ERYTHROCYTE [DISTWIDTH] IN BLOOD BY AUTOMATED COUNT: 49.6 FL (ref 35–45)
GFR SERPL CREATININE-BSD FRML MDRD: > 90 ML/MIN/1.73M2
GLUCOSE BLD-MCNC: 105 MG/DL (ref 70–108)
HCT VFR BLD CALC: 50.3 % (ref 42–52)
HEMOGLOBIN: 16.4 GM/DL (ref 14–18)
INR BLD: 1 (ref 0.85–1.13)
MCH RBC QN AUTO: 30.9 PG (ref 26–33)
MCHC RBC AUTO-ENTMCNC: 32.6 GM/DL (ref 32.2–35.5)
MCV RBC AUTO: 94.7 FL (ref 80–94)
PLATELET # BLD: 143 THOU/MM3 (ref 130–400)
PMV BLD AUTO: 10.1 FL (ref 9.4–12.4)
POTASSIUM REFLEX MAGNESIUM: 4.1 MEQ/L (ref 3.5–5.2)
RBC # BLD: 5.31 MILL/MM3 (ref 4.7–6.1)
RH FACTOR: NORMAL
SODIUM BLD-SCNC: 137 MEQ/L (ref 135–145)
WBC # BLD: 6.5 THOU/MM3 (ref 4.8–10.8)

## 2018-11-14 PROCEDURE — 85027 COMPLETE CBC AUTOMATED: CPT

## 2018-11-14 PROCEDURE — 86901 BLOOD TYPING SEROLOGIC RH(D): CPT

## 2018-11-14 PROCEDURE — 36415 COLL VENOUS BLD VENIPUNCTURE: CPT

## 2018-11-14 PROCEDURE — 86850 RBC ANTIBODY SCREEN: CPT

## 2018-11-14 PROCEDURE — 2580000003 HC RX 258: Performed by: PHYSICIAN ASSISTANT

## 2018-11-14 PROCEDURE — 85730 THROMBOPLASTIN TIME PARTIAL: CPT

## 2018-11-14 PROCEDURE — 2709999900 HC NON-CHARGEABLE SUPPLY

## 2018-11-14 PROCEDURE — 85610 PROTHROMBIN TIME: CPT

## 2018-11-14 PROCEDURE — 86900 BLOOD TYPING SEROLOGIC ABO: CPT

## 2018-11-14 PROCEDURE — 93005 ELECTROCARDIOGRAM TRACING: CPT | Performed by: PHYSICIAN ASSISTANT

## 2018-11-14 PROCEDURE — 80048 BASIC METABOLIC PNL TOTAL CA: CPT

## 2018-11-14 RX ORDER — DOXYCYCLINE HYCLATE 100 MG
100 TABLET ORAL EVERY 12 HOURS SCHEDULED
Qty: 20 TABLET | Refills: 0
Start: 2018-11-14 | End: 2018-11-24

## 2018-11-14 RX ORDER — ASPIRIN 325 MG
325 TABLET ORAL ONCE
Status: DISCONTINUED | OUTPATIENT
Start: 2018-11-14 | End: 2018-11-14 | Stop reason: HOSPADM

## 2018-11-14 RX ORDER — NITROGLYCERIN 0.4 MG/1
0.4 TABLET SUBLINGUAL EVERY 5 MIN PRN
Status: DISCONTINUED | OUTPATIENT
Start: 2018-11-14 | End: 2018-11-14 | Stop reason: HOSPADM

## 2018-11-14 RX ORDER — DOXYCYCLINE HYCLATE 100 MG
100 TABLET ORAL EVERY 12 HOURS SCHEDULED
Status: DISCONTINUED | OUTPATIENT
Start: 2018-11-14 | End: 2018-11-14 | Stop reason: HOSPADM

## 2018-11-14 RX ORDER — SODIUM CHLORIDE 9 MG/ML
INJECTION, SOLUTION INTRAVENOUS CONTINUOUS
Status: DISCONTINUED | OUTPATIENT
Start: 2018-11-14 | End: 2018-11-14 | Stop reason: HOSPADM

## 2018-11-14 RX ORDER — SODIUM CHLORIDE 0.9 % (FLUSH) 0.9 %
10 SYRINGE (ML) INJECTION PRN
Status: DISCONTINUED | OUTPATIENT
Start: 2018-11-14 | End: 2018-11-14 | Stop reason: HOSPADM

## 2018-11-14 RX ORDER — BUSPIRONE HYDROCHLORIDE 30 MG/1
30 TABLET ORAL 2 TIMES DAILY
Status: ON HOLD | COMMUNITY
End: 2020-09-15

## 2018-11-14 RX ORDER — SODIUM CHLORIDE 0.9 % (FLUSH) 0.9 %
10 SYRINGE (ML) INJECTION EVERY 12 HOURS SCHEDULED
Status: DISCONTINUED | OUTPATIENT
Start: 2018-11-14 | End: 2018-11-14 | Stop reason: HOSPADM

## 2018-11-14 RX ADMIN — SODIUM CHLORIDE: 9 INJECTION, SOLUTION INTRAVENOUS at 13:48

## 2018-11-14 ASSESSMENT — PAIN SCALES - GENERAL: PAINLEVEL_OUTOF10: 0

## 2018-11-14 NOTE — PROGRESS NOTES
Pt admitted to  2E15 ambulatory for heart cath. Pt NPO. Patient accompanied by his brother. Vital signs obtained. Assessment and data collection initiated. Oriented to room. Policies and procedures for 2E explained   All questions answered with no further questions at this time. Fall prevention and safety precautions discussed with patient.

## 2018-11-14 NOTE — PROGRESS NOTES
Home-rey given and discussed with patient and his brother. Both verbalized understanding of new medication (doxycycline, paper script given to patient) and of follow-up appointment next Wednesday at the [address] [place] [address] with Dr Sheryle Sax. Pt given Hibaclens soap and anti-fungal powder, as prescribed by Dr Sheryle Sax, for home use. Pt also aware that his heart cath will be resceduled once he is cleared with Infectious disease. Pt's IV removed. Pt ambulated out of unit accompanied by his brother.

## 2018-11-14 NOTE — CONSULTS
TOE AMPUTATION Bilateral 3/13/2013    1,2,3,4 on left and 2nd on right    UPPER GASTROINTESTINAL ENDOSCOPY  2012         MEDICATIONS PRIOR TO ADMISSION:       Scheduled Meds:   aspirin  325 mg Oral Once    sodium chloride flush  10 mL Intravenous 2 times per day    miconazole   Topical BID    doxycycline hyclate  100 mg Oral 2 times per day     Continuous Infusions:   sodium chloride 75 mL/hr at 11/14/18 1348     PRN Meds:nitroGLYCERIN, sodium chloride flush  Allergies:   ALLERGIES: Pcn [penicillins]           SOCIAL HISTORY:     TOBACCO:   reports that he has been smoking Cigarettes. He started smoking about 42 years ago. He has a 37.00 pack-year smoking history. He has quit using smokeless tobacco.     ETOH:   reports that he does not drink alcohol. FAMILY HISTORY:     Family History   Problem Relation Age of Onset    Diabetes Mother     Depression Mother     Arthritis Father     Heart Disease Father     Diabetes Brother     Depression Daughter        REVIEW OF SYSTEMS:     Constitutional: no fever, no night sweats, no fatigue. Head: no head ache , no head injury, no migranes. Eye: no blurring of vision, no double vision.   Ears: no hearing difficulty, no tinnitus  Mouth/throat: no ulceration, dental caries , dysphagia  Lungs: no cough no chest pain  CVS: no palpitation, no chest pain, no shortness of breath  GI: no abdominal pain, no nausea , no vomiting, no constipation  HONG: as noted in HPI  Musculoskeletal: no joint pain, swelling , stiffness,  Endocrine: no polyuria, polydipsia, no cold or heat intolerance  Hematology: no anemia, no easy brusing or bleeding, no hx of clotting disorder  Dermatology: no skin rash, no eczema, no pruritis,  Psychiatry: no depression, no anxiety,no panic attacks, no suicide ideation    PHYSICAL EXAM:     /75   Pulse 73   Temp 98.2 °F (36.8 °C) (Oral)   Resp 16   Ht 5' 9\" (1.753 m)   Wt 250 lb (113.4 kg)   SpO2 96%   BMI 36.92 kg/m²   General: K17.436    History of colonic polyps Z86.010    Second degree hemorrhoids K64.1    History of tricuspid valve replacement with bioprosthetic valve 2012 Z95.3    Chronic diastolic congestive heart failure (HCC) I50.32    Chest pain, atypical WORSE WITH COUGH R07.89    Chest pain in adult- recurrent R07.9    SOB (shortness of breath) on exertion R06.02           ASSESSMENT AND PLAN   folliculites of the scrotum and groin area: will place patient on oral doxycyline, hibiclens and antifungal powder  Follow up will be scheduled in one wk at the wound clinic  CAD    Thank you Pedro Pablo Bunn MD for allowing me to participate in this patient's care.     Kush Law MD,FACP 11/14/2018 3:12 PM

## 2018-11-15 ENCOUNTER — TELEPHONE (OUTPATIENT)
Dept: CARDIOLOGY CLINIC | Age: 58
End: 2018-11-15

## 2018-11-15 NOTE — TELEPHONE ENCOUNTER
Per eleazar, pt returned call, date time and instructons given. Instructions mailed to pt    Information given to roma to review coumadin/lovenox bridge.

## 2018-11-21 ENCOUNTER — HOSPITAL ENCOUNTER (OUTPATIENT)
Dept: WOUND CARE | Age: 58
Discharge: HOME | End: 2018-11-21
Payer: MEDICARE

## 2018-11-21 VITALS
BODY MASS INDEX: 36.58 KG/M2 | HEART RATE: 78 BPM | RESPIRATION RATE: 16 BRPM | DIASTOLIC BLOOD PRESSURE: 80 MMHG | SYSTOLIC BLOOD PRESSURE: 133 MMHG | OXYGEN SATURATION: 94 % | WEIGHT: 247.7 LBS | TEMPERATURE: 98.1 F

## 2018-11-21 DIAGNOSIS — L73.9 FOLLICULITIS OF PERINEUM: ICD-10-CM

## 2018-11-21 PROCEDURE — 99211 OFF/OP EST MAY X REQ PHY/QHP: CPT

## 2018-11-21 PROCEDURE — 99203 OFFICE O/P NEW LOW 30 MIN: CPT | Performed by: NURSE PRACTITIONER

## 2018-11-21 NOTE — PROGRESS NOTES
obstructive pulmonary disease) ([facility] [address] J44.9    Community acquired pneumonia J18.9    Hyponatremia E87.1    Suicidal ideation R45.851    SIRS (systemic inflammatory response syndrome) ([facility]) R65.10    Sepsis due to methicillin susceptible Staphylococcus aureus ([facility]) A41.01    Severe sepsis ([facility]) A41.9, R65.20    HCAP (healthcare-associated pneumonia) J18.9    Hyponatremia E87.1    Lung nodules R91.8    History of intravenous drug use in remission Z87.898    Malnutrition ([facility]) E46    Coagulopathy ([facility]) D68.9    Acute blood loss anemia D62    Complete heart block, post-surgical ([facility]) I97.89, I44.2    Endocarditis, bacterial, acute/subacute I33.0    WILMA (acute kidney injury) ([facility]) N17.9    Leukocytosis D72.829    Physical deconditioning R53.81    Cellulitis L03.90    Abdominal pain, acute R10.9    Microscopic hematuria R31.29    [employer] Bi[employer] pacemaker Z95.0    Abnormal CT of the abdomen R93.5    Generalized abdominal pain R10.84    GERD (gastroesophageal reflux disease) K21.9    Non-intractable vomiting with nausea R11.2    Hx of tricuspid valve repair Z98.890    History of colonic polyps Z86.010    Second degree hemorrhoids K64.1    History of tricuspid valve replacement with bioprosthetic valve 2012 Z95.3    Chronic diastolic congestive heart failure ([facility]) I50.32    Chest pain, atypical WORSE WITH COUGH R07.89    Chest pain in adult- recurrent R07.9    SOB (shortness of breath) on exertion A34.06    Folliculitis of perineum L73.9       Procedure Note  Indications:  Based on my examination of this patient's wound(s)/ulcer(s) today, debridement is not required to promote healing and evaluate the extent healing. Wound/Ulcer Descriptions are listed under Physical Exam above. Plan:     Patient examined and evaluated. Patient's folliculitis to the bilateral groin and inner thighs is resolving only a few small areas remain which are healing.   No signs of infection or pustules

## 2018-11-21 NOTE — PLAN OF CARE
Problem: Wound:  Goal: Will show signs of wound healing; wound closure and no evidence of infection  Will show signs of wound healing; wound closure and no evidence of infection  Outcome: Completed Date Met: 11/21/18  Pt presents to clinic f/u from hospital as seen by Dr. Maria Esther Rushing for perineal open areas. Scabbed/dry areas noted to right inner thigh. Pt to continue to wash with Heiba-clens wash. Continue oral doxycycline as prescribed by Dr. Maria Esther Rushing until completed. Pt d/c from wound clinic. Call as needed for any issues. Comments: Care plan reviewed with patient . Patient verbalize understanding of the plan of care and contribute to goal setting.

## 2018-12-04 ENCOUNTER — PREP FOR PROCEDURE (OUTPATIENT)
Dept: CARDIOLOGY | Age: 58
End: 2018-12-04

## 2018-12-04 RX ORDER — SODIUM CHLORIDE 0.9 % (FLUSH) 0.9 %
10 SYRINGE (ML) INJECTION EVERY 12 HOURS SCHEDULED
Status: CANCELLED | OUTPATIENT
Start: 2018-12-04

## 2018-12-04 RX ORDER — ASPIRIN 325 MG
325 TABLET ORAL ONCE
Status: CANCELLED | OUTPATIENT
Start: 2018-12-04 | End: 2018-12-04

## 2018-12-04 RX ORDER — SODIUM CHLORIDE 0.9 % (FLUSH) 0.9 %
10 SYRINGE (ML) INJECTION PRN
Status: CANCELLED | OUTPATIENT
Start: 2018-12-04

## 2018-12-04 RX ORDER — NITROGLYCERIN 0.4 MG/1
0.4 TABLET SUBLINGUAL EVERY 5 MIN PRN
Status: CANCELLED | OUTPATIENT
Start: 2018-12-04

## 2018-12-04 RX ORDER — SODIUM CHLORIDE 9 MG/ML
INJECTION, SOLUTION INTRAVENOUS CONTINUOUS
Status: CANCELLED | OUTPATIENT
Start: 2018-12-04

## 2018-12-04 RX ORDER — DIPHENHYDRAMINE HYDROCHLORIDE 50 MG/ML
50 INJECTION INTRAMUSCULAR; INTRAVENOUS ONCE
Status: CANCELLED | OUTPATIENT
Start: 2018-12-04 | End: 2018-12-04

## 2018-12-05 ENCOUNTER — HOSPITAL ENCOUNTER (OUTPATIENT)
Dept: INPATIENT UNIT | Age: 58
Discharge: HOME | End: 2018-12-05
Attending: INTERNAL MEDICINE | Admitting: INTERNAL MEDICINE
Payer: MEDICARE

## 2018-12-05 VITALS
RESPIRATION RATE: 12 BRPM | OXYGEN SATURATION: 91 % | SYSTOLIC BLOOD PRESSURE: 129 MMHG | BODY MASS INDEX: 35.99 KG/M2 | TEMPERATURE: 98 F | DIASTOLIC BLOOD PRESSURE: 92 MMHG | WEIGHT: 243 LBS | HEIGHT: 69 IN | HEART RATE: 71 BPM

## 2018-12-05 PROBLEM — Z98.890 S/P CARDIAC CATH: Status: ACTIVE | Noted: 2018-12-05

## 2018-12-05 LAB
ABO: NORMAL
ANION GAP SERPL CALCULATED.3IONS-SCNC: 12 MEQ/L (ref 8–16)
ANTIBODY SCREEN: NORMAL
APTT: 34 SECONDS (ref 22–38)
BUN BLDV-MCNC: 14 MG/DL (ref 7–22)
CALCIUM SERPL-MCNC: 9.3 MG/DL (ref 8.5–10.5)
CHLORIDE BLD-SCNC: 103 MEQ/L (ref 98–111)
CO2: 26 MEQ/L (ref 23–33)
CREAT SERPL-MCNC: 0.9 MG/DL (ref 0.4–1.2)
EKG ATRIAL RATE: 69 BPM
EKG P AXIS: 59 DEGREES
EKG P-R INTERVAL: 142 MS
EKG Q-T INTERVAL: 436 MS
EKG QRS DURATION: 114 MS
EKG QTC CALCULATION (BAZETT): 467 MS
EKG R AXIS: -76 DEGREES
EKG T AXIS: 45 DEGREES
EKG VENTRICULAR RATE: 69 BPM
ERYTHROCYTE [DISTWIDTH] IN BLOOD BY AUTOMATED COUNT: 14.1 % (ref 11.5–14.5)
ERYTHROCYTE [DISTWIDTH] IN BLOOD BY AUTOMATED COUNT: 48.6 FL (ref 35–45)
GFR SERPL CREATININE-BSD FRML MDRD: 87 ML/MIN/1.73M2
GLUCOSE BLD-MCNC: 95 MG/DL (ref 70–108)
HCT VFR BLD CALC: 48.3 % (ref 42–52)
HEMOGLOBIN: 15.8 GM/DL (ref 14–18)
INR BLD: 0.98 (ref 0.85–1.13)
MCH RBC QN AUTO: 30.8 PG (ref 26–33)
MCHC RBC AUTO-ENTMCNC: 32.7 GM/DL (ref 32.2–35.5)
MCV RBC AUTO: 94.2 FL (ref 80–94)
PLATELET # BLD: 163 THOU/MM3 (ref 130–400)
PMV BLD AUTO: 10.2 FL (ref 9.4–12.4)
POTASSIUM REFLEX MAGNESIUM: 4.1 MEQ/L (ref 3.5–5.2)
RBC # BLD: 5.13 MILL/MM3 (ref 4.7–6.1)
RH FACTOR: NORMAL
SODIUM BLD-SCNC: 141 MEQ/L (ref 135–145)
WBC # BLD: 6.2 THOU/MM3 (ref 4.8–10.8)

## 2018-12-05 PROCEDURE — 2580000003 HC RX 258: Performed by: NURSE PRACTITIONER

## 2018-12-05 PROCEDURE — 86901 BLOOD TYPING SEROLOGIC RH(D): CPT

## 2018-12-05 PROCEDURE — 93005 ELECTROCARDIOGRAM TRACING: CPT | Performed by: NURSE PRACTITIONER

## 2018-12-05 PROCEDURE — 6360000004 HC RX CONTRAST MEDICATION: Performed by: INTERNAL MEDICINE

## 2018-12-05 PROCEDURE — 85610 PROTHROMBIN TIME: CPT

## 2018-12-05 PROCEDURE — 93458 L HRT ARTERY/VENTRICLE ANGIO: CPT | Performed by: INTERNAL MEDICINE

## 2018-12-05 PROCEDURE — 80048 BASIC METABOLIC PNL TOTAL CA: CPT

## 2018-12-05 PROCEDURE — 6360000002 HC RX W HCPCS

## 2018-12-05 PROCEDURE — 2709999900 HC NON-CHARGEABLE SUPPLY

## 2018-12-05 PROCEDURE — 85730 THROMBOPLASTIN TIME PARTIAL: CPT

## 2018-12-05 PROCEDURE — C1894 INTRO/SHEATH, NON-LASER: HCPCS

## 2018-12-05 PROCEDURE — C1769 GUIDE WIRE: HCPCS

## 2018-12-05 PROCEDURE — 36415 COLL VENOUS BLD VENIPUNCTURE: CPT

## 2018-12-05 PROCEDURE — 86900 BLOOD TYPING SEROLOGIC ABO: CPT

## 2018-12-05 PROCEDURE — 85027 COMPLETE CBC AUTOMATED: CPT

## 2018-12-05 PROCEDURE — 86850 RBC ANTIBODY SCREEN: CPT

## 2018-12-05 PROCEDURE — 93010 ELECTROCARDIOGRAM REPORT: CPT | Performed by: NUCLEAR MEDICINE

## 2018-12-05 RX ORDER — ONDANSETRON 2 MG/ML
4 INJECTION INTRAMUSCULAR; INTRAVENOUS EVERY 6 HOURS PRN
Status: DISCONTINUED | OUTPATIENT
Start: 2018-12-05 | End: 2018-12-05 | Stop reason: HOSPADM

## 2018-12-05 RX ORDER — SODIUM CHLORIDE 0.9 % (FLUSH) 0.9 %
10 SYRINGE (ML) INJECTION PRN
Status: DISCONTINUED | OUTPATIENT
Start: 2018-12-05 | End: 2018-12-05

## 2018-12-05 RX ORDER — ACETAMINOPHEN 325 MG/1
650 TABLET ORAL EVERY 4 HOURS PRN
Status: DISCONTINUED | OUTPATIENT
Start: 2018-12-05 | End: 2018-12-05 | Stop reason: HOSPADM

## 2018-12-05 RX ORDER — DIPHENHYDRAMINE HYDROCHLORIDE 50 MG/ML
50 INJECTION INTRAMUSCULAR; INTRAVENOUS ONCE
Status: DISCONTINUED | OUTPATIENT
Start: 2018-12-05 | End: 2018-12-05 | Stop reason: HOSPADM

## 2018-12-05 RX ORDER — SODIUM CHLORIDE 0.9 % (FLUSH) 0.9 %
10 SYRINGE (ML) INJECTION EVERY 12 HOURS SCHEDULED
Status: DISCONTINUED | OUTPATIENT
Start: 2018-12-05 | End: 2018-12-05 | Stop reason: HOSPADM

## 2018-12-05 RX ORDER — SODIUM CHLORIDE 0.9 % (FLUSH) 0.9 %
10 SYRINGE (ML) INJECTION PRN
Status: DISCONTINUED | OUTPATIENT
Start: 2018-12-05 | End: 2018-12-05 | Stop reason: HOSPADM

## 2018-12-05 RX ORDER — NITROGLYCERIN 0.4 MG/1
0.4 TABLET SUBLINGUAL EVERY 5 MIN PRN
Status: DISCONTINUED | OUTPATIENT
Start: 2018-12-05 | End: 2018-12-05 | Stop reason: HOSPADM

## 2018-12-05 RX ORDER — SODIUM CHLORIDE 9 MG/ML
INJECTION, SOLUTION INTRAVENOUS CONTINUOUS
Status: DISCONTINUED | OUTPATIENT
Start: 2018-12-05 | End: 2018-12-05 | Stop reason: HOSPADM

## 2018-12-05 RX ORDER — ASPIRIN 325 MG
325 TABLET ORAL ONCE
Status: DISCONTINUED | OUTPATIENT
Start: 2018-12-05 | End: 2018-12-05 | Stop reason: HOSPADM

## 2018-12-05 RX ORDER — SODIUM CHLORIDE 0.9 % (FLUSH) 0.9 %
10 SYRINGE (ML) INJECTION EVERY 12 HOURS SCHEDULED
Status: DISCONTINUED | OUTPATIENT
Start: 2018-12-05 | End: 2018-12-05

## 2018-12-05 RX ADMIN — IOPAMIDOL 50 ML: 755 INJECTION, SOLUTION INTRAVENOUS at 14:34

## 2018-12-05 RX ADMIN — SODIUM CHLORIDE: 9 INJECTION, SOLUTION INTRAVENOUS at 11:30

## 2018-12-05 NOTE — PRE SEDATION
Use Last 5 Days [x]No []Yes  Antiplatelet drug therapy use last 5 days  []No [x]Yes  Other anticoagulant use last 5 days  [x]No []Yes    Current Facility-Administered Medications:     0.9 % sodium chloride infusion, , Intravenous, Continuous, TAMMI Hoff CNP, Last Rate: 75 mL/hr at 12/05/18 1130    aspirin tablet 325 mg, 325 mg, Oral, Once, TAMMI Robert CNP    diphenhydrAMINE (BENADRYL) injection 50 mg, 50 mg, Intravenous, Once, TAMMI Robert CNP    hydrocortisone sodium succinate PF (SOLU-CORTEF) injection 200 mg, 200 mg, Intravenous, Once, TAMMI Robert CNP    nitroGLYCERIN (NITROSTAT) SL tablet 0.4 mg, 0.4 mg, Sublingual, Q5 Min PRN, TAMMI Hoff CNP    sodium chloride flush 0.9 % injection 10 mL, 10 mL, Intravenous, 2 times per day, TAMMI Hoff CNP    sodium chloride flush 0.9 % injection 10 mL, 10 mL, Intravenous, PRN, TAMMI Hoff CNP  Prior to Admission medications    Medication Sig Start Date End Date Taking? Authorizing Provider   enoxaparin (LOVENOX) 120 MG/0.8ML injection Inject 1 mg/kg into the skin every 12 hours   Yes Historical Provider, MD   busPIRone (BUSPAR) 30 MG tablet Take 60 mg by mouth 2 times daily   Yes Historical Provider, MD   amLODIPine (NORVASC) 5 MG tablet Take 1 tablet by mouth daily 11/1/18  Yes Leonardo Greco MD   aspirin EC 81 MG EC tablet Take 1 tablet by mouth daily 10/11/18  Yes Leonardo Greco MD   prazosin (MINIPRESS) 5 MG capsule Take 5 mg by mouth nightly   Yes Historical Provider, MD   QUEtiapine (SEROQUEL) 300 MG tablet Take 300 mg by mouth nightly    Yes Historical Provider, MD   QUEtiapine (SEROQUEL XR) 50 MG extended release tablet Take 50 mg by mouth daily    Yes Historical Provider, MD   buprenorphine-naloxone (SUBOXONE) 8-2 MG FILM SL film Place 1 Film under the tongue daily. .   Yes Historical Provider, MD   metoprolol tartrate (LOPRESSOR) 25 MG

## 2018-12-14 ENCOUNTER — OFFICE VISIT (OUTPATIENT)
Dept: CARDIOLOGY CLINIC | Age: 58
End: 2018-12-14
Payer: MEDICARE

## 2018-12-14 VITALS
WEIGHT: 248.5 LBS | HEIGHT: 69 IN | BODY MASS INDEX: 36.81 KG/M2 | HEART RATE: 79 BPM | DIASTOLIC BLOOD PRESSURE: 76 MMHG | SYSTOLIC BLOOD PRESSURE: 120 MMHG

## 2018-12-14 DIAGNOSIS — J44.9 CHRONIC OBSTRUCTIVE PULMONARY DISEASE, UNSPECIFIED COPD TYPE (HCC): ICD-10-CM

## 2018-12-14 DIAGNOSIS — Z98.890 S/P CARDIAC CATH: Primary | ICD-10-CM

## 2018-12-14 DIAGNOSIS — I10 ESSENTIAL HYPERTENSION: ICD-10-CM

## 2018-12-14 DIAGNOSIS — Z95.0 PACEMAKER: ICD-10-CM

## 2018-12-14 DIAGNOSIS — R06.02 SOB (SHORTNESS OF BREATH) ON EXERTION: ICD-10-CM

## 2018-12-14 DIAGNOSIS — Z95.3 HISTORY OF TRICUSPID VALVE REPLACEMENT WITH BIOPROSTHETIC VALVE: ICD-10-CM

## 2018-12-14 DIAGNOSIS — I50.32 CHRONIC DIASTOLIC CONGESTIVE HEART FAILURE (HCC): ICD-10-CM

## 2018-12-14 PROBLEM — R07.9 CHEST PAIN IN ADULT: Status: RESOLVED | Noted: 2018-11-01 | Resolved: 2018-12-14

## 2018-12-14 PROCEDURE — 99213 OFFICE O/P EST LOW 20 MIN: CPT | Performed by: INTERNAL MEDICINE

## 2018-12-14 PROCEDURE — 3017F COLORECTAL CA SCREEN DOC REV: CPT | Performed by: INTERNAL MEDICINE

## 2018-12-14 PROCEDURE — G8484 FLU IMMUNIZE NO ADMIN: HCPCS | Performed by: INTERNAL MEDICINE

## 2018-12-14 PROCEDURE — 4004F PT TOBACCO SCREEN RCVD TLK: CPT | Performed by: INTERNAL MEDICINE

## 2018-12-14 PROCEDURE — 3023F SPIROM DOC REV: CPT | Performed by: INTERNAL MEDICINE

## 2018-12-14 PROCEDURE — G8926 SPIRO NO PERF OR DOC: HCPCS | Performed by: INTERNAL MEDICINE

## 2018-12-14 PROCEDURE — G8417 CALC BMI ABV UP PARAM F/U: HCPCS | Performed by: INTERNAL MEDICINE

## 2018-12-14 PROCEDURE — G8427 DOCREV CUR MEDS BY ELIG CLIN: HCPCS | Performed by: INTERNAL MEDICINE

## 2018-12-14 NOTE — PROGRESS NOTES
last 72 hours. CBC:   Lab Results   Component Value Date    WBC 6.2 12/05/2018    RBC 5.13 12/05/2018    HGB 15.8 12/05/2018    HCT 48.3 12/05/2018    MCV 94.2 12/05/2018    MCH 30.8 12/05/2018    MCHC 32.7 12/05/2018    RDW 15.2 10/13/2016     12/05/2018    MPV 10.2 12/05/2018     BMP:    Lab Results   Component Value Date     12/05/2018    K 4.1 12/05/2018     12/05/2018    CO2 26 12/05/2018    BUN 14 12/05/2018    LABALBU 4.2 10/07/2018    CREATININE 0.9 12/05/2018    CALCIUM 9.3 12/05/2018    LABGLOM 87 12/05/2018    GLUCOSE 95 12/05/2018     Hepatic Function Panel:    Lab Results   Component Value Date    ALKPHOS 69 10/07/2018    ALT 26 10/07/2018    AST 29 10/07/2018    PROT 8.6 10/07/2018    BILITOT 0.6 10/07/2018    BILIDIR <0.2 10/07/2018    LABALBU 4.2 10/07/2018     Magnesium:    Lab Results   Component Value Date    MG 2.2 03/16/2018     Warfarin PT/INR:  No components found for: PTPATWAR, PTINRWAR  HgBA1c:    Lab Results   Component Value Date    LABA1C 5.6 12/23/2012     FLP:    Lab Results   Component Value Date    TRIG 101 12/21/2012    HDL 20 12/21/2012    LDLCALC 74 12/21/2012     TSH:    Lab Results   Component Value Date    TSH 1.740 10/13/2016       Jan 2013  Procedures 1 : Tricuspid valve replacement with a 33 mm Dequan-Miles   ThermaFix pericardial valve. Insertion of 2 epicardial ventricular leads and 1 epicardial atrial   lead.     Procedures 2: Urgent mediastinal exploration and evacuation of the hematoma.    Insertion of a right femoral temporary dialysis catheter.     SURGEON: Dr. 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 There were no regional left ventricular wall motion   abnormalities and wall thickness was within normal limits.   The left atrium is Mild to moderate dilated.   The aortic valve leaflets were not well visualized.   DOPPLER: Transaortic velocity was within the normal range with no evidence   of aortic stenosis. There was no evidence of aortic regurgitation.   Mild tricuspid regurgitation visualized.   Probable Normal functioning Bioprosthetic Tricuspid Valve Or Tricuspid   Valve repair   Mild tricuspid regurgitation visualized.   Right ventricular systolic pressure measures 45 mmhg.      Signature      ----------------------------------------------------------------   Electronically signed by Chon Tran MD (Interpreting   physician) on 02/23/2018 at 06:52 PM    CONCLUSION:       1. Essentially there is no obstructions lesion noted in the large             caliber vessels such as the circumflex, a large caliber vessel             that is widely patent. 2.   OM 1 large caliber vessel and was widely patent. 3.   The left main is a large caliber widely patent. 4.   The left anterior descending artery is a large caliber vessel             widely patent. 5.   The right coronary artery is a moderately large caliber vessel and             is widely patent. 6.   The dominance is through the circumflex system. 7    Left ventriculography was not performed. However, we obtained             hemodynamics due to the need to decrease the quantity of the dye             since the patient has had right heart failure also. IMPRESSION:  There is no evidence of occlusive noted on all the large cardiac  vessels.      Ever Klein D.O.        D: 12/21/2012 17:53                                    T: 12/21/2012 22:27  js     Conclusions      Summary   Nonspecific ST-T wave changes.   Lexiscan EKG stress test is not suggestive for ischemia.   The nuclear images is not

## 2018-12-19 ENCOUNTER — TELEPHONE (OUTPATIENT)
Dept: CARDIOLOGY CLINIC | Age: 58
End: 2018-12-19

## 2018-12-28 ENCOUNTER — PROCEDURE VISIT (OUTPATIENT)
Dept: CARDIOLOGY CLINIC | Age: 58
End: 2018-12-28
Payer: MEDICARE

## 2018-12-28 DIAGNOSIS — Z95.0 PACEMAKER: Primary | ICD-10-CM

## 2018-12-28 PROCEDURE — 93296 REM INTERROG EVL PM/IDS: CPT | Performed by: INTERNAL MEDICINE

## 2018-12-28 PROCEDURE — 93294 REM INTERROG EVL PM/LDLS PM: CPT | Performed by: INTERNAL MEDICINE

## 2019-02-14 ENCOUNTER — NURSE ONLY (OUTPATIENT)
Dept: CARDIOLOGY CLINIC | Age: 59
End: 2019-02-14
Payer: MEDICARE

## 2019-02-14 DIAGNOSIS — Z95.0 PACEMAKER: Primary | ICD-10-CM

## 2019-02-14 PROCEDURE — 93281 PM DEVICE PROGR EVAL MULTI: CPT | Performed by: INTERNAL MEDICINE

## 2019-04-26 RX ORDER — AMLODIPINE BESYLATE 5 MG/1
5 TABLET ORAL DAILY
Qty: 30 TABLET | Refills: 2 | Status: SHIPPED | OUTPATIENT
Start: 2019-04-26 | End: 2019-08-16 | Stop reason: SDUPTHER

## 2019-05-28 ENCOUNTER — PROCEDURE VISIT (OUTPATIENT)
Dept: CARDIOLOGY CLINIC | Age: 59
End: 2019-05-28
Payer: MEDICARE

## 2019-05-28 DIAGNOSIS — Z95.0 PACEMAKER: Primary | ICD-10-CM

## 2019-05-28 PROCEDURE — 93294 REM INTERROG EVL PM/LDLS PM: CPT | Performed by: INTERNAL MEDICINE

## 2019-05-28 PROCEDURE — 93296 REM INTERROG EVL PM/IDS: CPT | Performed by: INTERNAL MEDICINE

## 2019-05-28 NOTE — PROGRESS NOTES
Gradient X Bi V  Pacemaker  Battery 2.5 yrs  A paced 13%  V paced 100%  p wave 6.9mV  r wave not recorded  Atrial impedance 546 ohms  Ventricle impedance 483 ohms  Left ventricle impedance 344 ohms  A fib burden <1%  Episodes of v tach fastest being on May 19th 183 bpm, 6 beats

## 2019-06-11 ENCOUNTER — TELEPHONE (OUTPATIENT)
Dept: CARDIOLOGY CLINIC | Age: 59
End: 2019-06-11

## 2019-08-16 NOTE — TELEPHONE ENCOUNTER
Yohan Davis called requesting a refill on the following medications:  Requested Prescriptions     Pending Prescriptions Disp Refills    amLODIPine (NORVASC) 5 MG tablet 30 tablet 2     Sig: Take 1 tablet by mouth daily     [facility]      Date of last visit:  12/14/2018  Date of next visit (if applicable): 0/58/0571

## 2019-08-17 RX ORDER — AMLODIPINE BESYLATE 5 MG/1
5 TABLET ORAL DAILY
Qty: 30 TABLET | Refills: 0 | Status: SHIPPED | OUTPATIENT
Start: 2019-08-17 | End: 2019-09-23 | Stop reason: SDUPTHER

## 2019-09-12 ENCOUNTER — PROCEDURE VISIT (OUTPATIENT)
Dept: CARDIOLOGY CLINIC | Age: 59
End: 2019-09-12
Payer: MEDICARE

## 2019-09-12 DIAGNOSIS — Z95.0 PACEMAKER: Primary | ICD-10-CM

## 2019-09-12 PROCEDURE — 93296 REM INTERROG EVL PM/IDS: CPT | Performed by: INTERNAL MEDICINE

## 2019-09-12 PROCEDURE — 93294 REM INTERROG EVL PM/LDLS PM: CPT | Performed by: INTERNAL MEDICINE

## 2019-09-12 NOTE — PROGRESS NOTES
sarah sci biv pacer     . Jyothi Vaughn Battery longevity:2  Atrial impedence 524  RV impedance 449  LM impedance  329    P wave sensing not measured  R wave sensing not measured  LV sensing not measured     19 % atrial paced  100 % RV paced   100% LV paced     6 mode switches, all under 1 minute

## 2019-09-13 ENCOUNTER — TELEPHONE (OUTPATIENT)
Dept: CARDIOLOGY CLINIC | Age: 59
End: 2019-09-13

## 2019-09-23 RX ORDER — AMLODIPINE BESYLATE 5 MG/1
5 TABLET ORAL DAILY
Qty: 30 TABLET | Refills: 3 | Status: ON HOLD | OUTPATIENT
Start: 2019-09-23 | End: 2019-10-19 | Stop reason: HOSPADM

## 2019-09-23 NOTE — TELEPHONE ENCOUNTER
Tien Groves called requesting a refill on the following medications:  Requested Prescriptions      No prescriptions requested or ordered in this encounter     Pharmacy verified:  [facility]      Date of last visit: 12/14/18  Date of next visit (if applicable): 75/2/4032

## 2019-09-27 ENCOUNTER — HOSPITAL ENCOUNTER (INPATIENT)
Age: 59
LOS: 22 days | Discharge: NURSING, REHAB OR LONG-TERM CARE | DRG: 004 | End: 2019-10-19
Attending: FAMILY MEDICINE | Admitting: INTERNAL MEDICINE
Payer: MEDICARE

## 2019-09-27 ENCOUNTER — APPOINTMENT (OUTPATIENT)
Dept: CT IMAGING | Age: 59
DRG: 004 | End: 2019-09-27
Payer: MEDICARE

## 2019-09-27 ENCOUNTER — APPOINTMENT (OUTPATIENT)
Dept: GENERAL RADIOLOGY | Age: 59
DRG: 004 | End: 2019-09-27
Payer: MEDICARE

## 2019-09-27 DIAGNOSIS — J44.1 COPD EXACERBATION (HCC): ICD-10-CM

## 2019-09-27 DIAGNOSIS — J96.02 ACUTE RESPIRATORY FAILURE WITH HYPOXIA AND HYPERCAPNIA (HCC): Primary | ICD-10-CM

## 2019-09-27 DIAGNOSIS — J96.01 ACUTE RESPIRATORY FAILURE WITH HYPOXIA AND HYPERCAPNIA (HCC): Primary | ICD-10-CM

## 2019-09-27 DIAGNOSIS — R04.2 MASSIVE HEMOPTYSIS: ICD-10-CM

## 2019-09-27 LAB
ALBUMIN SERPL-MCNC: 4.2 G/DL (ref 3.5–5.1)
ALLEN TEST: POSITIVE
ALP BLD-CCNC: 67 U/L (ref 38–126)
ALT SERPL-CCNC: 60 U/L (ref 11–66)
ANION GAP SERPL CALCULATED.3IONS-SCNC: 12 MEQ/L (ref 8–16)
AST SERPL-CCNC: 66 U/L (ref 5–40)
BASE EXCESS (CALCULATED): 2.5 MMOL/L (ref -2.5–2.5)
BASOPHILS # BLD: 0.5 %
BASOPHILS ABSOLUTE: 0 THOU/MM3 (ref 0–0.1)
BILIRUB SERPL-MCNC: 0.6 MG/DL (ref 0.3–1.2)
BUN BLDV-MCNC: 11 MG/DL (ref 7–22)
CALCIUM SERPL-MCNC: 9.3 MG/DL (ref 8.5–10.5)
CHLORIDE BLD-SCNC: 99 MEQ/L (ref 98–111)
CO2: 25 MEQ/L (ref 23–33)
COLLECTED BY:: ABNORMAL
CREAT SERPL-MCNC: 1 MG/DL (ref 0.4–1.2)
DEVICE: ABNORMAL
EKG ATRIAL RATE: 75 BPM
EKG P AXIS: 55 DEGREES
EKG P-R INTERVAL: 152 MS
EKG Q-T INTERVAL: 414 MS
EKG QRS DURATION: 122 MS
EKG QTC CALCULATION (BAZETT): 462 MS
EKG R AXIS: -81 DEGREES
EKG T AXIS: 70 DEGREES
EKG VENTRICULAR RATE: 75 BPM
EOSINOPHIL # BLD: 1.4 %
EOSINOPHILS ABSOLUTE: 0.1 THOU/MM3 (ref 0–0.4)
ERYTHROCYTE [DISTWIDTH] IN BLOOD BY AUTOMATED COUNT: 15.2 % (ref 11.5–14.5)
ERYTHROCYTE [DISTWIDTH] IN BLOOD BY AUTOMATED COUNT: 53.7 FL (ref 35–45)
FLU A ANTIGEN: NEGATIVE
FLU B ANTIGEN: NEGATIVE
GFR SERPL CREATININE-BSD FRML MDRD: 77 ML/MIN/1.73M2
GLUCOSE BLD-MCNC: 115 MG/DL (ref 70–108)
HCO3: 31 MMOL/L (ref 23–28)
HCT VFR BLD CALC: 52.1 % (ref 42–52)
HEMOGLOBIN: 16.4 GM/DL (ref 14–18)
IFIO2: 6
IMMATURE GRANS (ABS): 0.02 THOU/MM3 (ref 0–0.07)
IMMATURE GRANULOCYTES: 0.3 %
INR BLD: 3.72 (ref 0.85–1.13)
LYMPHOCYTES # BLD: 40.6 %
LYMPHOCYTES ABSOLUTE: 2.3 THOU/MM3 (ref 1–4.8)
MCH RBC QN AUTO: 29.9 PG (ref 26–33)
MCHC RBC AUTO-ENTMCNC: 31.5 GM/DL (ref 32.2–35.5)
MCV RBC AUTO: 95.1 FL (ref 80–94)
MONOCYTES # BLD: 11.1 %
MONOCYTES ABSOLUTE: 0.6 THOU/MM3 (ref 0.4–1.3)
NUCLEATED RED BLOOD CELLS: 0 /100 WBC
O2 SATURATION: 89 %
OSMOLALITY CALCULATION: 272.3 MOSMOL/KG (ref 275–300)
PCO2: 61 MMHG (ref 35–45)
PH BLOOD GAS: 7.31 (ref 7.35–7.45)
PLATELET # BLD: 135 THOU/MM3 (ref 130–400)
PMV BLD AUTO: 9.9 FL (ref 9.4–12.4)
PO2: 63 MMHG (ref 71–104)
POTASSIUM SERPL-SCNC: 4 MEQ/L (ref 3.5–5.2)
PRO-BNP: 334.2 PG/ML (ref 0–900)
RBC # BLD: 5.48 MILL/MM3 (ref 4.7–6.1)
SEG NEUTROPHILS: 46.1 %
SEGMENTED NEUTROPHILS ABSOLUTE COUNT: 2.6 THOU/MM3 (ref 1.8–7.7)
SODIUM BLD-SCNC: 136 MEQ/L (ref 135–145)
SOURCE, BLOOD GAS: ABNORMAL
TOTAL PROTEIN: 9.2 G/DL (ref 6.1–8)
TROPONIN T: < 0.01 NG/ML
WBC # BLD: 5.7 THOU/MM3 (ref 4.8–10.8)

## 2019-09-27 PROCEDURE — 93005 ELECTROCARDIOGRAM TRACING: CPT | Performed by: FAMILY MEDICINE

## 2019-09-27 PROCEDURE — 2709999900 HC NON-CHARGEABLE SUPPLY

## 2019-09-27 PROCEDURE — 2060000000 HC ICU INTERMEDIATE R&B

## 2019-09-27 PROCEDURE — 2700000000 HC OXYGEN THERAPY PER DAY

## 2019-09-27 PROCEDURE — 6360000004 HC RX CONTRAST MEDICATION: Performed by: FAMILY MEDICINE

## 2019-09-27 PROCEDURE — 6360000002 HC RX W HCPCS: Performed by: FAMILY MEDICINE

## 2019-09-27 PROCEDURE — 85610 PROTHROMBIN TIME: CPT

## 2019-09-27 PROCEDURE — 96374 THER/PROPH/DIAG INJ IV PUSH: CPT

## 2019-09-27 PROCEDURE — 99285 EMERGENCY DEPT VISIT HI MDM: CPT

## 2019-09-27 PROCEDURE — 84484 ASSAY OF TROPONIN QUANT: CPT

## 2019-09-27 PROCEDURE — 94640 AIRWAY INHALATION TREATMENT: CPT

## 2019-09-27 PROCEDURE — 6370000000 HC RX 637 (ALT 250 FOR IP): Performed by: FAMILY MEDICINE

## 2019-09-27 PROCEDURE — 36415 COLL VENOUS BLD VENIPUNCTURE: CPT

## 2019-09-27 PROCEDURE — 71275 CT ANGIOGRAPHY CHEST: CPT

## 2019-09-27 PROCEDURE — 82803 BLOOD GASES ANY COMBINATION: CPT

## 2019-09-27 PROCEDURE — 83880 ASSAY OF NATRIURETIC PEPTIDE: CPT

## 2019-09-27 PROCEDURE — 80053 COMPREHEN METABOLIC PANEL: CPT

## 2019-09-27 PROCEDURE — 87804 INFLUENZA ASSAY W/OPTIC: CPT

## 2019-09-27 PROCEDURE — 94761 N-INVAS EAR/PLS OXIMETRY MLT: CPT

## 2019-09-27 PROCEDURE — 36600 WITHDRAWAL OF ARTERIAL BLOOD: CPT

## 2019-09-27 PROCEDURE — 2500000003 HC RX 250 WO HCPCS

## 2019-09-27 PROCEDURE — 6370000000 HC RX 637 (ALT 250 FOR IP): Performed by: INTERNAL MEDICINE

## 2019-09-27 PROCEDURE — 85025 COMPLETE CBC W/AUTO DIFF WBC: CPT

## 2019-09-27 RX ORDER — AZITHROMYCIN 250 MG/1
500 TABLET, FILM COATED ORAL NIGHTLY
Status: COMPLETED | OUTPATIENT
Start: 2019-09-27 | End: 2019-09-27

## 2019-09-27 RX ORDER — PANTOPRAZOLE SODIUM 40 MG/1
40 TABLET, DELAYED RELEASE ORAL
Status: DISCONTINUED | OUTPATIENT
Start: 2019-09-28 | End: 2019-09-28

## 2019-09-27 RX ORDER — VILAZODONE HYDROCHLORIDE 40 MG/1
40 TABLET ORAL DAILY
Status: DISCONTINUED | OUTPATIENT
Start: 2019-09-28 | End: 2019-09-27 | Stop reason: ALTCHOICE

## 2019-09-27 RX ORDER — ACETAMINOPHEN 325 MG/1
650 TABLET ORAL EVERY 4 HOURS PRN
Status: DISCONTINUED | OUTPATIENT
Start: 2019-09-27 | End: 2019-10-10

## 2019-09-27 RX ORDER — PRAZOSIN HYDROCHLORIDE 5 MG/1
5 CAPSULE ORAL NIGHTLY
Status: DISCONTINUED | OUTPATIENT
Start: 2019-09-27 | End: 2019-09-27 | Stop reason: ALTCHOICE

## 2019-09-27 RX ORDER — WARFARIN SODIUM 5 MG/1
5 TABLET ORAL DAILY
Status: CANCELLED | OUTPATIENT
Start: 2019-09-27

## 2019-09-27 RX ORDER — METHYLPREDNISOLONE SODIUM SUCCINATE 125 MG/2ML
125 INJECTION, POWDER, LYOPHILIZED, FOR SOLUTION INTRAMUSCULAR; INTRAVENOUS ONCE
Status: COMPLETED | OUTPATIENT
Start: 2019-09-27 | End: 2019-09-27

## 2019-09-27 RX ORDER — BUSPIRONE HYDROCHLORIDE 30 MG/1
60 TABLET ORAL 2 TIMES DAILY
Status: DISCONTINUED | OUTPATIENT
Start: 2019-09-27 | End: 2019-09-27

## 2019-09-27 RX ORDER — QUETIAPINE FUMARATE 100 MG/1
300 TABLET, FILM COATED ORAL NIGHTLY
Status: DISCONTINUED | OUTPATIENT
Start: 2019-09-27 | End: 2019-09-27

## 2019-09-27 RX ORDER — IPRATROPIUM BROMIDE AND ALBUTEROL SULFATE 2.5; .5 MG/3ML; MG/3ML
1 SOLUTION RESPIRATORY (INHALATION)
Status: DISCONTINUED | OUTPATIENT
Start: 2019-09-28 | End: 2019-09-28

## 2019-09-27 RX ORDER — ONDANSETRON 2 MG/ML
4 INJECTION INTRAMUSCULAR; INTRAVENOUS EVERY 6 HOURS PRN
Status: DISCONTINUED | OUTPATIENT
Start: 2019-09-27 | End: 2019-10-19 | Stop reason: HOSPADM

## 2019-09-27 RX ORDER — BUSPIRONE HYDROCHLORIDE 10 MG/1
30 TABLET ORAL 2 TIMES DAILY
Status: DISCONTINUED | OUTPATIENT
Start: 2019-09-28 | End: 2019-10-02

## 2019-09-27 RX ORDER — SODIUM CHLORIDE 0.9 % (FLUSH) 0.9 %
10 SYRINGE (ML) INJECTION PRN
Status: DISCONTINUED | OUTPATIENT
Start: 2019-09-27 | End: 2019-10-09

## 2019-09-27 RX ORDER — IPRATROPIUM BROMIDE AND ALBUTEROL SULFATE 2.5; .5 MG/3ML; MG/3ML
1 SOLUTION RESPIRATORY (INHALATION) ONCE
Status: COMPLETED | OUTPATIENT
Start: 2019-09-27 | End: 2019-09-27

## 2019-09-27 RX ORDER — PREDNISONE 20 MG/1
40 TABLET ORAL DAILY
Status: DISCONTINUED | OUTPATIENT
Start: 2019-09-28 | End: 2019-09-28

## 2019-09-27 RX ORDER — BUMETANIDE 1 MG/1
1 TABLET ORAL DAILY
Status: DISCONTINUED | OUTPATIENT
Start: 2019-09-28 | End: 2019-09-27 | Stop reason: ALTCHOICE

## 2019-09-27 RX ORDER — AZITHROMYCIN 250 MG/1
250 TABLET, FILM COATED ORAL NIGHTLY
Status: DISCONTINUED | OUTPATIENT
Start: 2019-09-28 | End: 2019-09-28

## 2019-09-27 RX ORDER — SODIUM CHLORIDE 0.9 % (FLUSH) 0.9 %
10 SYRINGE (ML) INJECTION EVERY 12 HOURS SCHEDULED
Status: DISCONTINUED | OUTPATIENT
Start: 2019-09-27 | End: 2019-10-02

## 2019-09-27 RX ORDER — QUETIAPINE FUMARATE 50 MG/1
50 TABLET, EXTENDED RELEASE ORAL DAILY
Status: DISCONTINUED | OUTPATIENT
Start: 2019-09-28 | End: 2019-09-27 | Stop reason: ALTCHOICE

## 2019-09-27 RX ORDER — ASPIRIN 81 MG/1
81 TABLET ORAL DAILY
Status: DISCONTINUED | OUTPATIENT
Start: 2019-09-28 | End: 2019-09-28

## 2019-09-27 RX ORDER — AMLODIPINE BESYLATE 5 MG/1
5 TABLET ORAL DAILY
Status: DISCONTINUED | OUTPATIENT
Start: 2019-09-28 | End: 2019-09-28

## 2019-09-27 RX ORDER — BUPRENORPHINE AND NALOXONE 8; 2 MG/1; MG/1
1 FILM, SOLUBLE BUCCAL; SUBLINGUAL DAILY
Status: DISCONTINUED | OUTPATIENT
Start: 2019-09-28 | End: 2019-09-28

## 2019-09-27 RX ORDER — POTASSIUM CHLORIDE 1.5 G/1.77G
20 POWDER, FOR SOLUTION ORAL DAILY
Status: DISCONTINUED | OUTPATIENT
Start: 2019-09-28 | End: 2019-09-27 | Stop reason: ALTCHOICE

## 2019-09-27 RX ADMIN — BUSPIRONE HYDROCHLORIDE 30 MG: 10 TABLET ORAL at 23:47

## 2019-09-27 RX ADMIN — AZITHROMYCIN MONOHYDRATE 500 MG: 250 TABLET ORAL at 23:34

## 2019-09-27 RX ADMIN — METOPROLOL TARTRATE 25 MG: 25 TABLET ORAL at 23:34

## 2019-09-27 RX ADMIN — IOPAMIDOL 80 ML: 755 INJECTION, SOLUTION INTRAVENOUS at 19:21

## 2019-09-27 RX ADMIN — METHYLPREDNISOLONE SODIUM SUCCINATE 125 MG: 125 INJECTION, POWDER, FOR SOLUTION INTRAMUSCULAR; INTRAVENOUS at 21:04

## 2019-09-27 RX ADMIN — IPRATROPIUM BROMIDE AND ALBUTEROL SULFATE 1 AMPULE: .5; 3 SOLUTION RESPIRATORY (INHALATION) at 20:29

## 2019-09-27 ASSESSMENT — ENCOUNTER SYMPTOMS
RHINORRHEA: 0
ABDOMINAL PAIN: 0
NAUSEA: 0
EYE REDNESS: 0
WHEEZING: 0
VOMITING: 0
SORE THROAT: 0
SHORTNESS OF BREATH: 1
BACK PAIN: 0
DIARRHEA: 0
COUGH: 1
EYE DISCHARGE: 0

## 2019-09-27 ASSESSMENT — PAIN SCALES - GENERAL: PAINLEVEL_OUTOF10: 0

## 2019-09-28 ENCOUNTER — APPOINTMENT (OUTPATIENT)
Dept: GENERAL RADIOLOGY | Age: 59
DRG: 004 | End: 2019-09-28
Payer: MEDICARE

## 2019-09-28 ENCOUNTER — APPOINTMENT (OUTPATIENT)
Dept: CT IMAGING | Age: 59
DRG: 004 | End: 2019-09-28
Payer: MEDICARE

## 2019-09-28 ENCOUNTER — APPOINTMENT (OUTPATIENT)
Dept: INTERVENTIONAL RADIOLOGY/VASCULAR | Age: 59
DRG: 004 | End: 2019-09-28
Payer: MEDICARE

## 2019-09-28 LAB
ABO: NORMAL
ALBUMIN SERPL-MCNC: 4 G/DL (ref 3.5–5.1)
ALP BLD-CCNC: 55 U/L (ref 38–126)
ALT SERPL-CCNC: 46 U/L (ref 11–66)
ANION GAP SERPL CALCULATED.3IONS-SCNC: 11 MEQ/L (ref 8–16)
ANION GAP SERPL CALCULATED.3IONS-SCNC: 12 MEQ/L (ref 8–16)
ANTIBODY SCREEN: NORMAL
APTT: 37.8 SECONDS (ref 22–38)
AST SERPL-CCNC: 39 U/L (ref 5–40)
BASE EXCESS (CALCULATED): 1.2 MMOL/L (ref -2.5–2.5)
BASOPHILS # BLD: 0.1 %
BASOPHILS ABSOLUTE: 0 THOU/MM3 (ref 0–0.1)
BILIRUB SERPL-MCNC: 1.2 MG/DL (ref 0.3–1.2)
BUN BLDV-MCNC: 11 MG/DL (ref 7–22)
BUN BLDV-MCNC: 13 MG/DL (ref 7–22)
CALCIUM SERPL-MCNC: 8.5 MG/DL (ref 8.5–10.5)
CALCIUM SERPL-MCNC: 8.7 MG/DL (ref 8.5–10.5)
CHLORIDE BLD-SCNC: 101 MEQ/L (ref 98–111)
CHLORIDE BLD-SCNC: 96 MEQ/L (ref 98–111)
CO2: 26 MEQ/L (ref 23–33)
CO2: 27 MEQ/L (ref 23–33)
COLLECTED BY:: ABNORMAL
CREAT SERPL-MCNC: 0.8 MG/DL (ref 0.4–1.2)
CREAT SERPL-MCNC: 0.8 MG/DL (ref 0.4–1.2)
DEVICE: ABNORMAL
EOSINOPHIL # BLD: 0 %
EOSINOPHILS ABSOLUTE: 0 THOU/MM3 (ref 0–0.4)
ERYTHROCYTE [DISTWIDTH] IN BLOOD BY AUTOMATED COUNT: 15.1 % (ref 11.5–14.5)
ERYTHROCYTE [DISTWIDTH] IN BLOOD BY AUTOMATED COUNT: 15.3 % (ref 11.5–14.5)
ERYTHROCYTE [DISTWIDTH] IN BLOOD BY AUTOMATED COUNT: 52.5 FL (ref 35–45)
ERYTHROCYTE [DISTWIDTH] IN BLOOD BY AUTOMATED COUNT: 54.3 FL (ref 35–45)
GFR SERPL CREATININE-BSD FRML MDRD: > 90 ML/MIN/1.73M2
GFR SERPL CREATININE-BSD FRML MDRD: > 90 ML/MIN/1.73M2
GLUCOSE BLD-MCNC: 139 MG/DL (ref 70–108)
GLUCOSE BLD-MCNC: 174 MG/DL (ref 70–108)
HCO3: 30 MMOL/L (ref 23–28)
HCT VFR BLD CALC: 45.9 % (ref 42–52)
HCT VFR BLD CALC: 46.9 % (ref 42–52)
HCT VFR BLD CALC: 49.3 % (ref 42–52)
HCT VFR BLD CALC: 49.6 % (ref 42–52)
HCT VFR BLD CALC: 52.1 % (ref 42–52)
HEMOGLOBIN: 14.1 GM/DL (ref 14–18)
HEMOGLOBIN: 14.5 GM/DL (ref 14–18)
HEMOGLOBIN: 15.3 GM/DL (ref 14–18)
HEMOGLOBIN: 15.4 GM/DL (ref 14–18)
HEMOGLOBIN: 16.1 GM/DL (ref 14–18)
IFIO2: 100
IMMATURE GRANS (ABS): 0.06 THOU/MM3 (ref 0–0.07)
IMMATURE GRANULOCYTES: 0.6 %
INR BLD: 1.32 (ref 0.85–1.13)
INR BLD: 3.23 (ref 0.85–1.13)
LV EF: 53 %
LVEF MODALITY: NORMAL
LYMPHOCYTES # BLD: 9.2 %
LYMPHOCYTES ABSOLUTE: 0.9 THOU/MM3 (ref 1–4.8)
MAGNESIUM: 2 MG/DL (ref 1.6–2.4)
MCH RBC QN AUTO: 29.6 PG (ref 26–33)
MCH RBC QN AUTO: 29.7 PG (ref 26–33)
MCHC RBC AUTO-ENTMCNC: 30.9 GM/DL (ref 32.2–35.5)
MCHC RBC AUTO-ENTMCNC: 31 GM/DL (ref 32.2–35.5)
MCV RBC AUTO: 95.2 FL (ref 80–94)
MCV RBC AUTO: 96.1 FL (ref 80–94)
MODE: AC
MONOCYTES # BLD: 2.3 %
MONOCYTES ABSOLUTE: 0.2 THOU/MM3 (ref 0.4–1.3)
MRSA SCREEN RT-PCR: NEGATIVE
NUCLEATED RED BLOOD CELLS: 0 /100 WBC
O2 SATURATION: 86 %
PCO2: 62 MMHG (ref 35–45)
PH BLOOD GAS: 7.29 (ref 7.35–7.45)
PHOSPHORUS: 2.1 MG/DL (ref 2.4–4.7)
PLATELET # BLD: 131 THOU/MM3 (ref 130–400)
PLATELET # BLD: 133 THOU/MM3 (ref 130–400)
PMV BLD AUTO: 10.1 FL (ref 9.4–12.4)
PMV BLD AUTO: 10.2 FL (ref 9.4–12.4)
PO2: 60 MMHG (ref 71–104)
POTASSIUM REFLEX MAGNESIUM: 4.1 MEQ/L (ref 3.5–5.2)
POTASSIUM SERPL-SCNC: 4.3 MEQ/L (ref 3.5–5.2)
RBC # BLD: 4.88 MILL/MM3 (ref 4.7–6.1)
RBC # BLD: 5.21 MILL/MM3 (ref 4.7–6.1)
RH FACTOR: NORMAL
SEG NEUTROPHILS: 87.8 %
SEGMENTED NEUTROPHILS ABSOLUTE COUNT: 8.3 THOU/MM3 (ref 1.8–7.7)
SET PEEP: 7 MMHG
SET RESPIRATORY RATE: 24 BPM
SET TIDAL VOLUME: 350 ML
SODIUM BLD-SCNC: 134 MEQ/L (ref 135–145)
SODIUM BLD-SCNC: 139 MEQ/L (ref 135–145)
SOURCE, BLOOD GAS: ABNORMAL
TOTAL PROTEIN: 8.6 G/DL (ref 6.1–8)
VANCOMYCIN RESISTANT ENTEROCOCCUS: NEGATIVE
WBC # BLD: 7 THOU/MM3 (ref 4.8–10.8)
WBC # BLD: 9.5 THOU/MM3 (ref 4.8–10.8)

## 2019-09-28 PROCEDURE — 6360000002 HC RX W HCPCS: Performed by: INTERNAL MEDICINE

## 2019-09-28 PROCEDURE — C1894 INTRO/SHEATH, NON-LASER: HCPCS

## 2019-09-28 PROCEDURE — 93010 ELECTROCARDIOGRAM REPORT: CPT | Performed by: INTERNAL MEDICINE

## 2019-09-28 PROCEDURE — 84100 ASSAY OF PHOSPHORUS: CPT

## 2019-09-28 PROCEDURE — 2000000000 HC ICU R&B

## 2019-09-28 PROCEDURE — 99223 1ST HOSP IP/OBS HIGH 75: CPT | Performed by: INTERNAL MEDICINE

## 2019-09-28 PROCEDURE — 85018 HEMOGLOBIN: CPT

## 2019-09-28 PROCEDURE — 2709999900 HC NON-CHARGEABLE SUPPLY

## 2019-09-28 PROCEDURE — 83735 ASSAY OF MAGNESIUM: CPT

## 2019-09-28 PROCEDURE — 87899 AGENT NOS ASSAY W/OPTIC: CPT

## 2019-09-28 PROCEDURE — 6370000000 HC RX 637 (ALT 250 FOR IP): Performed by: INTERNAL MEDICINE

## 2019-09-28 PROCEDURE — 99233 SBSQ HOSP IP/OBS HIGH 50: CPT | Performed by: FAMILY MEDICINE

## 2019-09-28 PROCEDURE — 85027 COMPLETE CBC AUTOMATED: CPT

## 2019-09-28 PROCEDURE — 36556 INSERT NON-TUNNEL CV CATH: CPT

## 2019-09-28 PROCEDURE — 85610 PROTHROMBIN TIME: CPT

## 2019-09-28 PROCEDURE — 2500000003 HC RX 250 WO HCPCS: Performed by: INTERNAL MEDICINE

## 2019-09-28 PROCEDURE — 89220 SPUTUM SPECIMEN COLLECTION: CPT

## 2019-09-28 PROCEDURE — 85025 COMPLETE CBC W/AUTO DIFF WBC: CPT

## 2019-09-28 PROCEDURE — 30283B1 TRANSFUSION OF NONAUTOLOGOUS 4-FACTOR PROTHROMBIN COMPLEX CONCENTRATE INTO VEIN, PERCUTANEOUS APPROACH: ICD-10-PCS | Performed by: INTERNAL MEDICINE

## 2019-09-28 PROCEDURE — 5A1945Z RESPIRATORY VENTILATION, 24-96 CONSECUTIVE HOURS: ICD-10-PCS | Performed by: INTERNAL MEDICINE

## 2019-09-28 PROCEDURE — 0B9J8ZX DRAINAGE OF LEFT LOWER LUNG LOBE, VIA NATURAL OR ARTIFICIAL OPENING ENDOSCOPIC, DIAGNOSTIC: ICD-10-PCS | Performed by: INTERNAL MEDICINE

## 2019-09-28 PROCEDURE — 82803 BLOOD GASES ANY COMBINATION: CPT

## 2019-09-28 PROCEDURE — 94760 N-INVAS EAR/PLS OXIMETRY 1: CPT

## 2019-09-28 PROCEDURE — 99291 CRITICAL CARE FIRST HOUR: CPT | Performed by: INTERNAL MEDICINE

## 2019-09-28 PROCEDURE — 05HM33Z INSERTION OF INFUSION DEVICE INTO RIGHT INTERNAL JUGULAR VEIN, PERCUTANEOUS APPROACH: ICD-10-PCS | Performed by: INTERNAL MEDICINE

## 2019-09-28 PROCEDURE — 86850 RBC ANTIBODY SCREEN: CPT

## 2019-09-28 PROCEDURE — 87641 MR-STAPH DNA AMP PROBE: CPT

## 2019-09-28 PROCEDURE — 0BH17EZ INSERTION OF ENDOTRACHEAL AIRWAY INTO TRACHEA, VIA NATURAL OR ARTIFICIAL OPENING: ICD-10-PCS | Performed by: INTERNAL MEDICINE

## 2019-09-28 PROCEDURE — 6360000002 HC RX W HCPCS

## 2019-09-28 PROCEDURE — 99292 CRITICAL CARE ADDL 30 MIN: CPT | Performed by: INTERNAL MEDICINE

## 2019-09-28 PROCEDURE — 87640 STAPH A DNA AMP PROBE: CPT

## 2019-09-28 PROCEDURE — 3609027000 HC BRONCHOSCOPY: Performed by: INTERNAL MEDICINE

## 2019-09-28 PROCEDURE — 2580000003 HC RX 258: Performed by: INTERNAL MEDICINE

## 2019-09-28 PROCEDURE — 80048 BASIC METABOLIC PNL TOTAL CA: CPT

## 2019-09-28 PROCEDURE — 87081 CULTURE SCREEN ONLY: CPT

## 2019-09-28 PROCEDURE — 85014 HEMATOCRIT: CPT

## 2019-09-28 PROCEDURE — 94002 VENT MGMT INPAT INIT DAY: CPT

## 2019-09-28 PROCEDURE — 87070 CULTURE OTHR SPECIMN AEROBIC: CPT

## 2019-09-28 PROCEDURE — 80053 COMPREHEN METABOLIC PANEL: CPT

## 2019-09-28 PROCEDURE — 85730 THROMBOPLASTIN TIME PARTIAL: CPT

## 2019-09-28 PROCEDURE — 87086 URINE CULTURE/COLONY COUNT: CPT

## 2019-09-28 PROCEDURE — 86038 ANTINUCLEAR ANTIBODIES: CPT

## 2019-09-28 PROCEDURE — 86900 BLOOD TYPING SEROLOGIC ABO: CPT

## 2019-09-28 PROCEDURE — 2700000000 HC OXYGEN THERAPY PER DAY

## 2019-09-28 PROCEDURE — 93970 EXTREMITY STUDY: CPT

## 2019-09-28 PROCEDURE — 2709999900 HC NON-CHARGEABLE SUPPLY: Performed by: INTERNAL MEDICINE

## 2019-09-28 PROCEDURE — 93306 TTE W/DOPPLER COMPLETE: CPT

## 2019-09-28 PROCEDURE — 86901 BLOOD TYPING SEROLOGIC RH(D): CPT

## 2019-09-28 PROCEDURE — C9132 KCENTRA, PER I.U.: HCPCS | Performed by: INTERNAL MEDICINE

## 2019-09-28 PROCEDURE — 87500 VANOMYCIN DNA AMP PROBE: CPT

## 2019-09-28 PROCEDURE — 6370000000 HC RX 637 (ALT 250 FOR IP): Performed by: PHYSICIAN ASSISTANT

## 2019-09-28 PROCEDURE — 36415 COLL VENOUS BLD VENIPUNCTURE: CPT

## 2019-09-28 PROCEDURE — C9113 INJ PANTOPRAZOLE SODIUM, VIA: HCPCS | Performed by: FAMILY MEDICINE

## 2019-09-28 PROCEDURE — 87205 SMEAR GRAM STAIN: CPT

## 2019-09-28 PROCEDURE — 6360000002 HC RX W HCPCS: Performed by: FAMILY MEDICINE

## 2019-09-28 PROCEDURE — 94640 AIRWAY INHALATION TREATMENT: CPT

## 2019-09-28 PROCEDURE — L4396 STATIC OR DYNAMI AFO PRE CST: HCPCS

## 2019-09-28 PROCEDURE — 71045 X-RAY EXAM CHEST 1 VIEW: CPT

## 2019-09-28 PROCEDURE — 87449 NOS EACH ORGANISM AG IA: CPT

## 2019-09-28 PROCEDURE — 36600 WITHDRAWAL OF ARTERIAL BLOOD: CPT

## 2019-09-28 PROCEDURE — 71250 CT THORAX DX C-: CPT

## 2019-09-28 PROCEDURE — C1751 CATH, INF, PER/CENT/MIDLINE: HCPCS

## 2019-09-28 RX ORDER — HYDRALAZINE HYDROCHLORIDE 20 MG/ML
10 INJECTION INTRAMUSCULAR; INTRAVENOUS EVERY 4 HOURS PRN
Status: DISCONTINUED | OUTPATIENT
Start: 2019-09-28 | End: 2019-10-19 | Stop reason: HOSPADM

## 2019-09-28 RX ORDER — METHYLPREDNISOLONE SODIUM SUCCINATE 40 MG/ML
40 INJECTION, POWDER, LYOPHILIZED, FOR SOLUTION INTRAMUSCULAR; INTRAVENOUS EVERY 12 HOURS
Status: DISCONTINUED | OUTPATIENT
Start: 2019-09-28 | End: 2019-09-28

## 2019-09-28 RX ORDER — METHYLPREDNISOLONE SODIUM SUCCINATE 125 MG/2ML
125 INJECTION, POWDER, LYOPHILIZED, FOR SOLUTION INTRAMUSCULAR; INTRAVENOUS EVERY 8 HOURS
Status: DISCONTINUED | OUTPATIENT
Start: 2019-09-28 | End: 2019-09-28

## 2019-09-28 RX ORDER — FENTANYL CITRATE 50 UG/ML
INJECTION, SOLUTION INTRAMUSCULAR; INTRAVENOUS
Status: COMPLETED
Start: 2019-09-28 | End: 2019-09-28

## 2019-09-28 RX ORDER — METHYLPREDNISOLONE SODIUM SUCCINATE 125 MG/2ML
125 INJECTION, POWDER, LYOPHILIZED, FOR SOLUTION INTRAMUSCULAR; INTRAVENOUS ONCE
Status: COMPLETED | OUTPATIENT
Start: 2019-09-28 | End: 2019-09-28

## 2019-09-28 RX ORDER — PROPOFOL 10 MG/ML
10 INJECTION, EMULSION INTRAVENOUS
Status: DISCONTINUED | OUTPATIENT
Start: 2019-09-28 | End: 2019-10-02

## 2019-09-28 RX ORDER — SODIUM CHLORIDE 9 MG/ML
INJECTION, SOLUTION INTRAVENOUS CONTINUOUS
Status: DISCONTINUED | OUTPATIENT
Start: 2019-09-28 | End: 2019-10-03

## 2019-09-28 RX ORDER — PROPOFOL 10 MG/ML
INJECTION, EMULSION INTRAVENOUS
Status: COMPLETED
Start: 2019-09-28 | End: 2019-09-28

## 2019-09-28 RX ORDER — MIDAZOLAM HYDROCHLORIDE 1 MG/ML
INJECTION INTRAMUSCULAR; INTRAVENOUS
Status: COMPLETED
Start: 2019-09-28 | End: 2019-09-28

## 2019-09-28 RX ORDER — NICOTINE 21 MG/24HR
1 PATCH, TRANSDERMAL 24 HOURS TRANSDERMAL DAILY
Status: DISCONTINUED | OUTPATIENT
Start: 2019-09-28 | End: 2019-09-30

## 2019-09-28 RX ORDER — FENTANYL CITRATE 50 UG/ML
INJECTION, SOLUTION INTRAMUSCULAR; INTRAVENOUS
Status: DISPENSED
Start: 2019-09-28 | End: 2019-09-29

## 2019-09-28 RX ORDER — PANTOPRAZOLE SODIUM 40 MG/10ML
40 INJECTION, POWDER, LYOPHILIZED, FOR SOLUTION INTRAVENOUS DAILY
Status: DISCONTINUED | OUTPATIENT
Start: 2019-09-28 | End: 2019-10-08

## 2019-09-28 RX ORDER — VECURONIUM BROMIDE 1 MG/ML
10 INJECTION, POWDER, LYOPHILIZED, FOR SOLUTION INTRAVENOUS ONCE
Status: COMPLETED | OUTPATIENT
Start: 2019-09-28 | End: 2019-09-28

## 2019-09-28 RX ORDER — METHYLPREDNISOLONE SODIUM SUCCINATE 125 MG/2ML
60 INJECTION, POWDER, LYOPHILIZED, FOR SOLUTION INTRAMUSCULAR; INTRAVENOUS EVERY 6 HOURS
Status: DISCONTINUED | OUTPATIENT
Start: 2019-09-28 | End: 2019-10-01

## 2019-09-28 RX ADMIN — Medication 10 ML: at 21:05

## 2019-09-28 RX ADMIN — SODIUM CHLORIDE 2 MCG/KG/MIN: 9 INJECTION, SOLUTION INTRAVENOUS at 18:15

## 2019-09-28 RX ADMIN — ACETAMINOPHEN 650 MG: 325 TABLET ORAL at 08:46

## 2019-09-28 RX ADMIN — CEFEPIME HYDROCHLORIDE 2 G: 2 INJECTION, POWDER, FOR SOLUTION INTRAVENOUS at 15:26

## 2019-09-28 RX ADMIN — IPRATROPIUM BROMIDE AND ALBUTEROL SULFATE 1 AMPULE: .5; 3 SOLUTION RESPIRATORY (INHALATION) at 09:27

## 2019-09-28 RX ADMIN — FENTANYL CITRATE 100 MCG: 50 INJECTION INTRAMUSCULAR; INTRAVENOUS at 15:50

## 2019-09-28 RX ADMIN — PROPOFOL 20 MCG/KG/MIN: 10 INJECTION, EMULSION INTRAVENOUS at 11:30

## 2019-09-28 RX ADMIN — METHYLPREDNISOLONE SODIUM SUCCINATE 60 MG: 125 INJECTION, POWDER, FOR SOLUTION INTRAMUSCULAR; INTRAVENOUS at 21:05

## 2019-09-28 RX ADMIN — MIDAZOLAM 4 MG: 1 INJECTION INTRAMUSCULAR; INTRAVENOUS at 15:51

## 2019-09-28 RX ADMIN — VANCOMYCIN HYDROCHLORIDE 1750 MG: 5 INJECTION, POWDER, LYOPHILIZED, FOR SOLUTION INTRAVENOUS at 15:32

## 2019-09-28 RX ADMIN — Medication 10 ML: at 08:47

## 2019-09-28 RX ADMIN — AMLODIPINE BESYLATE 5 MG: 5 TABLET ORAL at 08:47

## 2019-09-28 RX ADMIN — SODIUM CHLORIDE: 9 INJECTION, SOLUTION INTRAVENOUS at 21:19

## 2019-09-28 RX ADMIN — PROPOFOL 20 MCG/KG/MIN: 10 INJECTION, EMULSION INTRAVENOUS at 20:59

## 2019-09-28 RX ADMIN — BUSPIRONE HYDROCHLORIDE 30 MG: 10 TABLET ORAL at 21:04

## 2019-09-28 RX ADMIN — PROTHROMBIN, COAGULATION FACTOR VII HUMAN, COAGULATION FACTOR IX HUMAN, COAGULATION FACTOR X HUMAN, PROTEIN C, PROTEIN S HUMAN, AND WATER 2500 UNITS: KIT at 12:15

## 2019-09-28 RX ADMIN — METOPROLOL TARTRATE 25 MG: 25 TABLET ORAL at 08:47

## 2019-09-28 RX ADMIN — VECURONIUM BROMIDE 10 MG: 1 INJECTION, POWDER, LYOPHILIZED, FOR SOLUTION INTRAVENOUS at 15:44

## 2019-09-28 RX ADMIN — PROPOFOL 1000 MG: 10 INJECTION, EMULSION INTRAVENOUS at 15:39

## 2019-09-28 RX ADMIN — PREDNISONE 40 MG: 20 TABLET ORAL at 08:47

## 2019-09-28 RX ADMIN — HYDRALAZINE HYDROCHLORIDE 10 MG: 20 INJECTION INTRAMUSCULAR; INTRAVENOUS at 22:26

## 2019-09-28 RX ADMIN — BUPRENORPHINE HYDROCHLORIDE, NALOXONE HYDROCHLORIDE 1 FILM: 8; 2 FILM, SOLUBLE BUCCAL; SUBLINGUAL at 08:47

## 2019-09-28 RX ADMIN — METHYLPREDNISOLONE SODIUM SUCCINATE 125 MG: 125 INJECTION, POWDER, FOR SOLUTION INTRAMUSCULAR; INTRAVENOUS at 15:29

## 2019-09-28 RX ADMIN — PANTOPRAZOLE SODIUM 40 MG: 40 INJECTION, POWDER, FOR SOLUTION INTRAVENOUS at 15:41

## 2019-09-28 RX ADMIN — ASPIRIN 81 MG: 81 TABLET ORAL at 08:47

## 2019-09-28 RX ADMIN — BUSPIRONE HYDROCHLORIDE 30 MG: 10 TABLET ORAL at 08:47

## 2019-09-28 RX ADMIN — FENTANYL CITRATE 50 MCG/HR: 50 INJECTION INTRAMUSCULAR; INTRAVENOUS at 15:41

## 2019-09-28 RX ADMIN — PANTOPRAZOLE SODIUM 40 MG: 40 TABLET, DELAYED RELEASE ORAL at 05:23

## 2019-09-28 RX ADMIN — PHYTONADIONE 10 MG: 10 INJECTION, EMULSION INTRAMUSCULAR; INTRAVENOUS; SUBCUTANEOUS at 09:42

## 2019-09-28 ASSESSMENT — PULMONARY FUNCTION TESTS
PIF_VALUE: 27
PIF_VALUE: 36
PIF_VALUE: 37
PIF_VALUE: 30
PIF_VALUE: 29

## 2019-09-28 ASSESSMENT — PAIN SCALES - GENERAL
PAINLEVEL_OUTOF10: 0
PAINLEVEL_OUTOF10: 3

## 2019-09-29 ENCOUNTER — APPOINTMENT (OUTPATIENT)
Dept: GENERAL RADIOLOGY | Age: 59
DRG: 004 | End: 2019-09-29
Payer: MEDICARE

## 2019-09-29 LAB
ALBUMIN SERPL-MCNC: 3.6 G/DL (ref 3.5–5.1)
ALLEN TEST: POSITIVE
ALP BLD-CCNC: 49 U/L (ref 38–126)
ALT SERPL-CCNC: 38 U/L (ref 11–66)
ANION GAP SERPL CALCULATED.3IONS-SCNC: 10 MEQ/L (ref 8–16)
AST SERPL-CCNC: 28 U/L (ref 5–40)
BASE EXCESS (CALCULATED): -0.2 MMOL/L (ref -2.5–2.5)
BILIRUB SERPL-MCNC: 1 MG/DL (ref 0.3–1.2)
BUN BLDV-MCNC: 13 MG/DL (ref 7–22)
CALCIUM SERPL-MCNC: 8.3 MG/DL (ref 8.5–10.5)
CHLORIDE BLD-SCNC: 97 MEQ/L (ref 98–111)
CO2: 27 MEQ/L (ref 23–33)
COLLECTED BY:: ABNORMAL
CREAT SERPL-MCNC: 0.7 MG/DL (ref 0.4–1.2)
DEVICE: ABNORMAL
ERYTHROCYTE [DISTWIDTH] IN BLOOD BY AUTOMATED COUNT: 15.2 % (ref 11.5–14.5)
ERYTHROCYTE [DISTWIDTH] IN BLOOD BY AUTOMATED COUNT: 52.7 FL (ref 35–45)
GFR SERPL CREATININE-BSD FRML MDRD: > 90 ML/MIN/1.73M2
GLUCOSE BLD-MCNC: 193 MG/DL (ref 70–108)
GLUCOSE BLD-MCNC: 213 MG/DL (ref 70–108)
HCO3: 29 MMOL/L (ref 23–28)
HCT VFR BLD CALC: 46 % (ref 42–52)
HCT VFR BLD CALC: 46.6 % (ref 42–52)
HEMOGLOBIN: 14.5 GM/DL (ref 14–18)
HEMOGLOBIN: 14.6 GM/DL (ref 14–18)
IFIO2: 80
INR BLD: 1.28 (ref 0.85–1.13)
LACTIC ACID: 1.4 MMOL/L (ref 0.5–2.2)
MAGNESIUM: 1.9 MG/DL (ref 1.6–2.4)
MCH RBC QN AUTO: 29.5 PG (ref 26–33)
MCHC RBC AUTO-ENTMCNC: 31.3 GM/DL (ref 32.2–35.5)
MCV RBC AUTO: 94.1 FL (ref 80–94)
MODE: AC
MRSA NASAL SCREEN RT-PCR: NEGATIVE
O2 SATURATION: 96 %
PCO2: 67 MMHG (ref 35–45)
PH BLOOD GAS: 7.25 (ref 7.35–7.45)
PHOSPHORUS: 1.8 MG/DL (ref 2.4–4.7)
PLATELET # BLD: 141 THOU/MM3 (ref 130–400)
PMV BLD AUTO: 10.7 FL (ref 9.4–12.4)
PO2: 101 MMHG (ref 71–104)
POTASSIUM REFLEX MAGNESIUM: 4.2 MEQ/L (ref 3.5–5.2)
POTASSIUM SERPL-SCNC: 4.2 MEQ/L (ref 3.5–5.2)
RBC # BLD: 4.95 MILL/MM3 (ref 4.7–6.1)
RSV COMMENT: NORMAL
SET PEEP: 10 MMHG
SET RESPIRATORY RATE: 24 BPM
SET TIDAL VOLUME: 350 ML
SODIUM BLD-SCNC: 134 MEQ/L (ref 135–145)
SOURCE, BLOOD GAS: ABNORMAL
STAPH AUREUS SCREEN RT-PCR: NEGATIVE
TOTAL PROTEIN: 7.8 G/DL (ref 6.1–8)
WBC # BLD: 10.1 THOU/MM3 (ref 4.8–10.8)

## 2019-09-29 PROCEDURE — 2709999900 HC NON-CHARGEABLE SUPPLY

## 2019-09-29 PROCEDURE — 94640 AIRWAY INHALATION TREATMENT: CPT

## 2019-09-29 PROCEDURE — 87077 CULTURE AEROBIC IDENTIFY: CPT

## 2019-09-29 PROCEDURE — 2000000000 HC ICU R&B

## 2019-09-29 PROCEDURE — 36415 COLL VENOUS BLD VENIPUNCTURE: CPT

## 2019-09-29 PROCEDURE — 83735 ASSAY OF MAGNESIUM: CPT

## 2019-09-29 PROCEDURE — 89051 BODY FLUID CELL COUNT: CPT

## 2019-09-29 PROCEDURE — 87070 CULTURE OTHR SPECIMN AEROBIC: CPT

## 2019-09-29 PROCEDURE — 3609027000 HC BRONCHOSCOPY: Performed by: INTERNAL MEDICINE

## 2019-09-29 PROCEDURE — 87186 SC STD MICRODIL/AGAR DIL: CPT

## 2019-09-29 PROCEDURE — 2709999900 HC NON-CHARGEABLE SUPPLY: Performed by: INTERNAL MEDICINE

## 2019-09-29 PROCEDURE — 80053 COMPREHEN METABOLIC PANEL: CPT

## 2019-09-29 PROCEDURE — 84100 ASSAY OF PHOSPHORUS: CPT

## 2019-09-29 PROCEDURE — 87081 CULTURE SCREEN ONLY: CPT

## 2019-09-29 PROCEDURE — 85610 PROTHROMBIN TIME: CPT

## 2019-09-29 PROCEDURE — 83605 ASSAY OF LACTIC ACID: CPT

## 2019-09-29 PROCEDURE — 71045 X-RAY EXAM CHEST 1 VIEW: CPT

## 2019-09-29 PROCEDURE — 3609011300 HC BRONCHOSCOPY BRONCHIAL/ENDOBRNCL BX 1+ SITES: Performed by: INTERNAL MEDICINE

## 2019-09-29 PROCEDURE — 2580000003 HC RX 258: Performed by: PHYSICIAN ASSISTANT

## 2019-09-29 PROCEDURE — 87116 MYCOBACTERIA CULTURE: CPT

## 2019-09-29 PROCEDURE — 6360000002 HC RX W HCPCS: Performed by: INTERNAL MEDICINE

## 2019-09-29 PROCEDURE — 6370000000 HC RX 637 (ALT 250 FOR IP): Performed by: INTERNAL MEDICINE

## 2019-09-29 PROCEDURE — 0B9J7ZX DRAINAGE OF LEFT LOWER LUNG LOBE, VIA NATURAL OR ARTIFICIAL OPENING, DIAGNOSTIC: ICD-10-PCS | Performed by: INTERNAL MEDICINE

## 2019-09-29 PROCEDURE — 88112 CYTOPATH CELL ENHANCE TECH: CPT

## 2019-09-29 PROCEDURE — 0B9G7ZX DRAINAGE OF LEFT UPPER LUNG LOBE, VIA NATURAL OR ARTIFICIAL OPENING, DIAGNOSTIC: ICD-10-PCS | Performed by: INTERNAL MEDICINE

## 2019-09-29 PROCEDURE — 99232 SBSQ HOSP IP/OBS MODERATE 35: CPT | Performed by: NURSE PRACTITIONER

## 2019-09-29 PROCEDURE — 87102 FUNGUS ISOLATION CULTURE: CPT

## 2019-09-29 PROCEDURE — 2580000003 HC RX 258: Performed by: INTERNAL MEDICINE

## 2019-09-29 PROCEDURE — 6370000000 HC RX 637 (ALT 250 FOR IP): Performed by: PHYSICIAN ASSISTANT

## 2019-09-29 PROCEDURE — 2500000003 HC RX 250 WO HCPCS: Performed by: PHYSICIAN ASSISTANT

## 2019-09-29 PROCEDURE — 94003 VENT MGMT INPAT SUBQ DAY: CPT

## 2019-09-29 PROCEDURE — 36600 WITHDRAWAL OF ARTERIAL BLOOD: CPT

## 2019-09-29 PROCEDURE — 80048 BASIC METABOLIC PNL TOTAL CA: CPT

## 2019-09-29 PROCEDURE — 94761 N-INVAS EAR/PLS OXIMETRY MLT: CPT

## 2019-09-29 PROCEDURE — 85027 COMPLETE CBC AUTOMATED: CPT

## 2019-09-29 PROCEDURE — 82948 REAGENT STRIP/BLOOD GLUCOSE: CPT

## 2019-09-29 PROCEDURE — 2700000000 HC OXYGEN THERAPY PER DAY

## 2019-09-29 PROCEDURE — 2500000003 HC RX 250 WO HCPCS: Performed by: INTERNAL MEDICINE

## 2019-09-29 PROCEDURE — 36592 COLLECT BLOOD FROM PICC: CPT

## 2019-09-29 PROCEDURE — 87255 GENET VIRUS ISOLATE HSV: CPT

## 2019-09-29 PROCEDURE — 6360000002 HC RX W HCPCS: Performed by: FAMILY MEDICINE

## 2019-09-29 PROCEDURE — C9113 INJ PANTOPRAZOLE SODIUM, VIA: HCPCS | Performed by: FAMILY MEDICINE

## 2019-09-29 PROCEDURE — C1751 CATH, INF, PER/CENT/MIDLINE: HCPCS

## 2019-09-29 PROCEDURE — 82803 BLOOD GASES ANY COMBINATION: CPT

## 2019-09-29 PROCEDURE — 99291 CRITICAL CARE FIRST HOUR: CPT | Performed by: INTERNAL MEDICINE

## 2019-09-29 PROCEDURE — 85018 HEMOGLOBIN: CPT

## 2019-09-29 PROCEDURE — 86356 MONONUCLEAR CELL ANTIGEN: CPT

## 2019-09-29 PROCEDURE — 85014 HEMATOCRIT: CPT

## 2019-09-29 PROCEDURE — 88305 TISSUE EXAM BY PATHOLOGIST: CPT

## 2019-09-29 PROCEDURE — 87205 SMEAR GRAM STAIN: CPT

## 2019-09-29 RX ORDER — SODIUM CHLORIDE 0.9 % (FLUSH) 0.9 %
10 SYRINGE (ML) INJECTION EVERY 12 HOURS SCHEDULED
Status: DISCONTINUED | OUTPATIENT
Start: 2019-09-29 | End: 2019-10-09

## 2019-09-29 RX ORDER — ACETYLCYSTEINE 200 MG/ML
600 SOLUTION ORAL; RESPIRATORY (INHALATION) ONCE
Status: COMPLETED | OUTPATIENT
Start: 2019-09-29 | End: 2019-09-29

## 2019-09-29 RX ORDER — SODIUM CHLORIDE 0.9 % (FLUSH) 0.9 %
10 SYRINGE (ML) INJECTION EVERY 12 HOURS SCHEDULED
Status: DISCONTINUED | OUTPATIENT
Start: 2019-09-29 | End: 2019-10-02

## 2019-09-29 RX ORDER — IPRATROPIUM BROMIDE AND ALBUTEROL SULFATE 2.5; .5 MG/3ML; MG/3ML
1 SOLUTION RESPIRATORY (INHALATION) EVERY 4 HOURS
Status: DISCONTINUED | OUTPATIENT
Start: 2019-09-29 | End: 2019-10-02

## 2019-09-29 RX ORDER — ACETYLCYSTEINE 200 MG/ML
400 SOLUTION ORAL; RESPIRATORY (INHALATION) EVERY 4 HOURS
Status: DISCONTINUED | OUTPATIENT
Start: 2019-09-29 | End: 2019-10-01

## 2019-09-29 RX ORDER — 0.9 % SODIUM CHLORIDE 0.9 %
250 INTRAVENOUS SOLUTION INTRAVENOUS ONCE
Status: COMPLETED | OUTPATIENT
Start: 2019-09-29 | End: 2019-09-29

## 2019-09-29 RX ORDER — ACETYLCYSTEINE 200 MG/ML
600 SOLUTION ORAL; RESPIRATORY (INHALATION) 2 TIMES DAILY
Status: DISCONTINUED | OUTPATIENT
Start: 2019-09-29 | End: 2019-09-29

## 2019-09-29 RX ORDER — SODIUM CHLORIDE 0.9 % (FLUSH) 0.9 %
10 SYRINGE (ML) INJECTION PRN
Status: DISCONTINUED | OUTPATIENT
Start: 2019-09-29 | End: 2019-10-02

## 2019-09-29 RX ORDER — ACETYLCYSTEINE 200 MG/ML
600 SOLUTION ORAL; RESPIRATORY (INHALATION) PRN
Status: DISCONTINUED | OUTPATIENT
Start: 2019-09-29 | End: 2019-10-01

## 2019-09-29 RX ADMIN — PROPOFOL 40 MCG/KG/MIN: 10 INJECTION, EMULSION INTRAVENOUS at 12:12

## 2019-09-29 RX ADMIN — ACETYLCYSTEINE 400 MG: 200 SOLUTION ORAL; RESPIRATORY (INHALATION) at 13:20

## 2019-09-29 RX ADMIN — PROPOFOL 40 MCG/KG/MIN: 10 INJECTION, EMULSION INTRAVENOUS at 04:36

## 2019-09-29 RX ADMIN — PANTOPRAZOLE SODIUM 40 MG: 40 INJECTION, POWDER, FOR SOLUTION INTRAVENOUS at 08:59

## 2019-09-29 RX ADMIN — IPRATROPIUM BROMIDE AND ALBUTEROL SULFATE 1 AMPULE: .5; 3 SOLUTION RESPIRATORY (INHALATION) at 21:22

## 2019-09-29 RX ADMIN — METHYLPREDNISOLONE SODIUM SUCCINATE 60 MG: 125 INJECTION, POWDER, FOR SOLUTION INTRAMUSCULAR; INTRAVENOUS at 20:11

## 2019-09-29 RX ADMIN — PROPOFOL 50 MCG/KG/MIN: 10 INJECTION, EMULSION INTRAVENOUS at 18:45

## 2019-09-29 RX ADMIN — SODIUM PHOSPHATE, MONOBASIC, MONOHYDRATE 18 MMOL: 276; 142 INJECTION, SOLUTION INTRAVENOUS at 14:57

## 2019-09-29 RX ADMIN — VANCOMYCIN HYDROCHLORIDE 1750 MG: 5 INJECTION, POWDER, LYOPHILIZED, FOR SOLUTION INTRAVENOUS at 04:37

## 2019-09-29 RX ADMIN — SODIUM CHLORIDE 1.5 MCG/KG/MIN: 9 INJECTION, SOLUTION INTRAVENOUS at 12:12

## 2019-09-29 RX ADMIN — PROPOFOL 50 MCG/KG/MIN: 10 INJECTION, EMULSION INTRAVENOUS at 09:28

## 2019-09-29 RX ADMIN — SODIUM CHLORIDE: 9 INJECTION, SOLUTION INTRAVENOUS at 02:55

## 2019-09-29 RX ADMIN — SODIUM CHLORIDE 250 ML: 9 INJECTION, SOLUTION INTRAVENOUS at 19:57

## 2019-09-29 RX ADMIN — ENOXAPARIN SODIUM 40 MG: 40 INJECTION SUBCUTANEOUS at 17:17

## 2019-09-29 RX ADMIN — Medication 10 ML: at 20:11

## 2019-09-29 RX ADMIN — FENTANYL CITRATE 125 MCG/HR: 50 INJECTION INTRAMUSCULAR; INTRAVENOUS at 09:03

## 2019-09-29 RX ADMIN — ACETYLCYSTEINE 400 MG: 200 SOLUTION ORAL; RESPIRATORY (INHALATION) at 21:26

## 2019-09-29 RX ADMIN — PROPOFOL 40 MCG/KG/MIN: 10 INJECTION, EMULSION INTRAVENOUS at 01:00

## 2019-09-29 RX ADMIN — ACETYLCYSTEINE 400 MG: 200 SOLUTION ORAL; RESPIRATORY (INHALATION) at 16:10

## 2019-09-29 RX ADMIN — METHYLPREDNISOLONE SODIUM SUCCINATE 60 MG: 125 INJECTION, POWDER, FOR SOLUTION INTRAMUSCULAR; INTRAVENOUS at 14:58

## 2019-09-29 RX ADMIN — METHYLPREDNISOLONE SODIUM SUCCINATE 60 MG: 125 INJECTION, POWDER, FOR SOLUTION INTRAMUSCULAR; INTRAVENOUS at 08:58

## 2019-09-29 RX ADMIN — PROPOFOL 35 MCG/KG/MIN: 10 INJECTION, EMULSION INTRAVENOUS at 15:51

## 2019-09-29 RX ADMIN — Medication 2 MCG/MIN: at 21:24

## 2019-09-29 RX ADMIN — CEFEPIME HYDROCHLORIDE 2 G: 2 INJECTION, POWDER, FOR SOLUTION INTRAVENOUS at 14:58

## 2019-09-29 RX ADMIN — ACETYLCYSTEINE 600 MG: 200 INHALANT RESPIRATORY (INHALATION) at 11:26

## 2019-09-29 RX ADMIN — FENTANYL CITRATE 100 MCG/HR: 50 INJECTION INTRAMUSCULAR; INTRAVENOUS at 00:19

## 2019-09-29 RX ADMIN — Medication 1 DROP: at 08:58

## 2019-09-29 RX ADMIN — CEFEPIME HYDROCHLORIDE 2 G: 2 INJECTION, POWDER, FOR SOLUTION INTRAVENOUS at 02:57

## 2019-09-29 RX ADMIN — Medication 10 ML: at 08:58

## 2019-09-29 RX ADMIN — PROPOFOL 40 MCG/KG/MIN: 10 INJECTION, EMULSION INTRAVENOUS at 21:33

## 2019-09-29 RX ADMIN — FENTANYL CITRATE 125 MCG/HR: 50 INJECTION INTRAMUSCULAR; INTRAVENOUS at 04:34

## 2019-09-29 RX ADMIN — ACETYLCYSTEINE 600 MG: 200 SOLUTION ORAL; RESPIRATORY (INHALATION) at 10:38

## 2019-09-29 RX ADMIN — ACETYLCYSTEINE 600 MG: 200 SOLUTION ORAL; RESPIRATORY (INHALATION) at 10:14

## 2019-09-29 RX ADMIN — Medication 1 DROP: at 04:37

## 2019-09-29 RX ADMIN — BUSPIRONE HYDROCHLORIDE 30 MG: 10 TABLET ORAL at 08:57

## 2019-09-29 RX ADMIN — VANCOMYCIN HYDROCHLORIDE 1750 MG: 5 INJECTION, POWDER, LYOPHILIZED, FOR SOLUTION INTRAVENOUS at 16:33

## 2019-09-29 RX ADMIN — METHYLPREDNISOLONE SODIUM SUCCINATE 60 MG: 125 INJECTION, POWDER, FOR SOLUTION INTRAMUSCULAR; INTRAVENOUS at 02:59

## 2019-09-29 RX ADMIN — BUSPIRONE HYDROCHLORIDE 30 MG: 10 TABLET ORAL at 20:11

## 2019-09-29 RX ADMIN — FENTANYL CITRATE 125 MCG/HR: 50 INJECTION INTRAMUSCULAR; INTRAVENOUS at 13:16

## 2019-09-29 RX ADMIN — FENTANYL CITRATE 125 MCG/HR: 50 INJECTION INTRAMUSCULAR; INTRAVENOUS at 17:17

## 2019-09-29 RX ADMIN — FENTANYL CITRATE 125 MCG/HR: 50 INJECTION INTRAMUSCULAR; INTRAVENOUS at 21:28

## 2019-09-29 ASSESSMENT — PULMONARY FUNCTION TESTS
PIF_VALUE: 26
PIF_VALUE: 27
PIF_VALUE: 31
PIF_VALUE: 26
PIF_VALUE: 26
PIF_VALUE: 31

## 2019-09-30 ENCOUNTER — APPOINTMENT (OUTPATIENT)
Dept: GENERAL RADIOLOGY | Age: 59
DRG: 004 | End: 2019-09-30
Payer: MEDICARE

## 2019-09-30 LAB
ALBUMIN SERPL-MCNC: 3.4 G/DL (ref 3.5–5.1)
ALP BLD-CCNC: 39 U/L (ref 38–126)
ALT SERPL-CCNC: 27 U/L (ref 11–66)
ANA SCREEN: NORMAL
ANION GAP SERPL CALCULATED.3IONS-SCNC: 14 MEQ/L (ref 8–16)
APTT: 27.6 SECONDS (ref 22–38)
APTT: 31.5 SECONDS (ref 22–38)
AST SERPL-CCNC: 14 U/L (ref 5–40)
BAL CHARACTER: ABNORMAL
BAL COLLECTION SITE: ABNORMAL
BAL COLOR: ABNORMAL
BASOPHILS # BLD: 0.1 %
BASOPHILS ABSOLUTE: 0 THOU/MM3 (ref 0–0.1)
BILIRUB SERPL-MCNC: 0.6 MG/DL (ref 0.3–1.2)
BUN BLDV-MCNC: 26 MG/DL (ref 7–22)
CALCIUM SERPL-MCNC: 7.8 MG/DL (ref 8.5–10.5)
CHLORIDE BLD-SCNC: 98 MEQ/L (ref 98–111)
CILIATED/EPITHELIAL CELLS BAL: 11 % (ref 0–5)
CO2: 21 MEQ/L (ref 23–33)
CREAT SERPL-MCNC: 1.2 MG/DL (ref 0.4–1.2)
EOSINOPHIL # BLD: 0 %
EOSINOPHILS ABSOLUTE: 0 THOU/MM3 (ref 0–0.4)
ERYTHROCYTE [DISTWIDTH] IN BLOOD BY AUTOMATED COUNT: 15.1 % (ref 11.5–14.5)
ERYTHROCYTE [DISTWIDTH] IN BLOOD BY AUTOMATED COUNT: 15.2 % (ref 11.5–14.5)
ERYTHROCYTE [DISTWIDTH] IN BLOOD BY AUTOMATED COUNT: 52.2 FL (ref 35–45)
ERYTHROCYTE [DISTWIDTH] IN BLOOD BY AUTOMATED COUNT: 52.6 FL (ref 35–45)
GFR SERPL CREATININE-BSD FRML MDRD: 62 ML/MIN/1.73M2
GLUCOSE BLD-MCNC: 210 MG/DL (ref 70–108)
GRAM STAIN RESULT: NORMAL
HCT VFR BLD CALC: 40.4 % (ref 42–52)
HCT VFR BLD CALC: 42 % (ref 42–52)
HEMOGLOBIN: 12.5 GM/DL (ref 14–18)
HEMOGLOBIN: 12.9 GM/DL (ref 14–18)
IMMATURE GRANS (ABS): 0.1 THOU/MM3 (ref 0–0.07)
IMMATURE GRANULOCYTES: 0.9 %
INR BLD: 1.17 (ref 0.85–1.13)
LYMPHOCYTES # BLD: 4.3 %
LYMPHOCYTES ABSOLUTE: 0.5 THOU/MM3 (ref 1–4.8)
LYMPHOCYTES, BAL: 67 % (ref 10–15)
MACROPHAGE/MONOCYTE BAL: 14 % (ref 86–100)
MAGNESIUM: 1.8 MG/DL (ref 1.6–2.4)
MCH RBC QN AUTO: 29.1 PG (ref 26–33)
MCH RBC QN AUTO: 29.3 PG (ref 26–33)
MCHC RBC AUTO-ENTMCNC: 30.7 GM/DL (ref 32.2–35.5)
MCHC RBC AUTO-ENTMCNC: 30.9 GM/DL (ref 32.2–35.5)
MCV RBC AUTO: 94.6 FL (ref 80–94)
MCV RBC AUTO: 94.8 FL (ref 80–94)
MONOCYTES # BLD: 5.8 %
MONOCYTES ABSOLUTE: 0.6 THOU/MM3 (ref 0.4–1.3)
MRSA SCREEN: NORMAL
NUCLEATED RED BLOOD CELLS: 0 /100 WBC
PATHOLOGIST REVIEW: ABNORMAL
PHOSPHORUS: 2.7 MG/DL (ref 2.4–4.7)
PLATELET # BLD: 125 THOU/MM3 (ref 130–400)
PLATELET # BLD: 126 THOU/MM3 (ref 130–400)
PMV BLD AUTO: 10.3 FL (ref 9.4–12.4)
PMV BLD AUTO: 10.7 FL (ref 9.4–12.4)
POTASSIUM REFLEX MAGNESIUM: 3.8 MEQ/L (ref 3.5–5.2)
POTASSIUM SERPL-SCNC: 3.8 MEQ/L (ref 3.5–5.2)
RBC # BLD: 4.27 MILL/MM3 (ref 4.7–6.1)
RBC # BLD: 4.43 MILL/MM3 (ref 4.7–6.1)
RBC BAL: 4 /CUMM
RESPIRATORY CULTURE: NORMAL
SEG NEUTROPHILS: 88.9 %
SEGMENTED NEUTROPHILS ABSOLUTE COUNT: 9.9 THOU/MM3 (ref 1.8–7.7)
SEGMENTED NEUTROPHILS, BAL: 8 % (ref 0–3)
SODIUM BLD-SCNC: 133 MEQ/L (ref 135–145)
TOTAL NUCLEATED CELLS BAL: 259 /CUMM
TOTAL PROTEIN: 7 G/DL (ref 6.1–8)
TOTAL VOLUME RECEIVED BAL: 90 ML
URINE CULTURE, ROUTINE: NORMAL
VANCOMYCIN TROUGH: 11.7 UG/ML (ref 5–15)
VANCOMYCIN TROUGH: 23.9 UG/ML (ref 5–15)
WBC # BLD: 11.1 THOU/MM3 (ref 4.8–10.8)
WBC # BLD: 9 THOU/MM3 (ref 4.8–10.8)

## 2019-09-30 PROCEDURE — 80048 BASIC METABOLIC PNL TOTAL CA: CPT

## 2019-09-30 PROCEDURE — 0B948ZZ DRAINAGE OF RIGHT UPPER LOBE BRONCHUS, VIA NATURAL OR ARTIFICIAL OPENING ENDOSCOPIC: ICD-10-PCS | Performed by: INTERNAL MEDICINE

## 2019-09-30 PROCEDURE — 36415 COLL VENOUS BLD VENIPUNCTURE: CPT

## 2019-09-30 PROCEDURE — 0B9B8ZZ DRAINAGE OF LEFT LOWER LOBE BRONCHUS, VIA NATURAL OR ARTIFICIAL OPENING ENDOSCOPIC: ICD-10-PCS | Performed by: INTERNAL MEDICINE

## 2019-09-30 PROCEDURE — 80202 ASSAY OF VANCOMYCIN: CPT

## 2019-09-30 PROCEDURE — 71045 X-RAY EXAM CHEST 1 VIEW: CPT

## 2019-09-30 PROCEDURE — 6360000002 HC RX W HCPCS: Performed by: INTERNAL MEDICINE

## 2019-09-30 PROCEDURE — 99233 SBSQ HOSP IP/OBS HIGH 50: CPT | Performed by: INTERNAL MEDICINE

## 2019-09-30 PROCEDURE — 85610 PROTHROMBIN TIME: CPT

## 2019-09-30 PROCEDURE — 83735 ASSAY OF MAGNESIUM: CPT

## 2019-09-30 PROCEDURE — 94640 AIRWAY INHALATION TREATMENT: CPT

## 2019-09-30 PROCEDURE — 2709999900 HC NON-CHARGEABLE SUPPLY

## 2019-09-30 PROCEDURE — 85027 COMPLETE CBC AUTOMATED: CPT

## 2019-09-30 PROCEDURE — 2700000000 HC OXYGEN THERAPY PER DAY

## 2019-09-30 PROCEDURE — 36592 COLLECT BLOOD FROM PICC: CPT

## 2019-09-30 PROCEDURE — 2000000000 HC ICU R&B

## 2019-09-30 PROCEDURE — 31622 DX BRONCHOSCOPE/WASH: CPT | Performed by: INTERNAL MEDICINE

## 2019-09-30 PROCEDURE — 0B968ZZ DRAINAGE OF RIGHT LOWER LOBE BRONCHUS, VIA NATURAL OR ARTIFICIAL OPENING ENDOSCOPIC: ICD-10-PCS | Performed by: INTERNAL MEDICINE

## 2019-09-30 PROCEDURE — 0B988ZZ DRAINAGE OF LEFT UPPER LOBE BRONCHUS, VIA NATURAL OR ARTIFICIAL OPENING ENDOSCOPIC: ICD-10-PCS | Performed by: INTERNAL MEDICINE

## 2019-09-30 PROCEDURE — 6370000000 HC RX 637 (ALT 250 FOR IP): Performed by: INTERNAL MEDICINE

## 2019-09-30 PROCEDURE — 0BBG8ZX EXCISION OF LEFT UPPER LUNG LOBE, VIA NATURAL OR ARTIFICIAL OPENING ENDOSCOPIC, DIAGNOSTIC: ICD-10-PCS | Performed by: INTERNAL MEDICINE

## 2019-09-30 PROCEDURE — 6370000000 HC RX 637 (ALT 250 FOR IP): Performed by: PHYSICIAN ASSISTANT

## 2019-09-30 PROCEDURE — 85730 THROMBOPLASTIN TIME PARTIAL: CPT

## 2019-09-30 PROCEDURE — 3609010800 HC BRONCHOSCOPY ALVEOLAR LAVAGE: Performed by: INTERNAL MEDICINE

## 2019-09-30 PROCEDURE — 87205 SMEAR GRAM STAIN: CPT

## 2019-09-30 PROCEDURE — C9113 INJ PANTOPRAZOLE SODIUM, VIA: HCPCS | Performed by: FAMILY MEDICINE

## 2019-09-30 PROCEDURE — 99232 SBSQ HOSP IP/OBS MODERATE 35: CPT | Performed by: NURSE PRACTITIONER

## 2019-09-30 PROCEDURE — 2500000003 HC RX 250 WO HCPCS

## 2019-09-30 PROCEDURE — 94003 VENT MGMT INPAT SUBQ DAY: CPT

## 2019-09-30 PROCEDURE — 2580000003 HC RX 258: Performed by: INTERNAL MEDICINE

## 2019-09-30 PROCEDURE — 87070 CULTURE OTHR SPECIMN AEROBIC: CPT

## 2019-09-30 PROCEDURE — 0B9G8ZX DRAINAGE OF LEFT UPPER LUNG LOBE, VIA NATURAL OR ARTIFICIAL OPENING ENDOSCOPIC, DIAGNOSTIC: ICD-10-PCS | Performed by: INTERNAL MEDICINE

## 2019-09-30 PROCEDURE — 80053 COMPREHEN METABOLIC PANEL: CPT

## 2019-09-30 PROCEDURE — 6360000002 HC RX W HCPCS: Performed by: FAMILY MEDICINE

## 2019-09-30 PROCEDURE — 84100 ASSAY OF PHOSPHORUS: CPT

## 2019-09-30 PROCEDURE — 94761 N-INVAS EAR/PLS OXIMETRY MLT: CPT

## 2019-09-30 PROCEDURE — 85025 COMPLETE CBC W/AUTO DIFF WBC: CPT

## 2019-09-30 PROCEDURE — 94770 HC ETCO2 MONITOR DAILY: CPT

## 2019-09-30 PROCEDURE — 2709999900 HC NON-CHARGEABLE SUPPLY: Performed by: INTERNAL MEDICINE

## 2019-09-30 RX ORDER — KETAMINE HCL IN NACL, ISO-OSM 100MG/10ML
SYRINGE (ML) INJECTION
Status: COMPLETED
Start: 2019-09-30 | End: 2019-09-30

## 2019-09-30 RX ORDER — WARFARIN SODIUM 5 MG/1
5 TABLET ORAL ONCE
Status: COMPLETED | OUTPATIENT
Start: 2019-09-30 | End: 2019-09-30

## 2019-09-30 RX ADMIN — PROPOFOL 50 MCG/KG/MIN: 10 INJECTION, EMULSION INTRAVENOUS at 00:47

## 2019-09-30 RX ADMIN — ACETYLCYSTEINE 400 MG: 200 SOLUTION ORAL; RESPIRATORY (INHALATION) at 15:42

## 2019-09-30 RX ADMIN — IPRATROPIUM BROMIDE AND ALBUTEROL SULFATE 1 AMPULE: .5; 3 SOLUTION RESPIRATORY (INHALATION) at 08:38

## 2019-09-30 RX ADMIN — PROPOFOL 50 MCG/KG/MIN: 10 INJECTION, EMULSION INTRAVENOUS at 18:04

## 2019-09-30 RX ADMIN — ENOXAPARIN SODIUM 120 MG: 120 INJECTION SUBCUTANEOUS at 16:22

## 2019-09-30 RX ADMIN — CEFEPIME HYDROCHLORIDE 2 G: 2 INJECTION, POWDER, FOR SOLUTION INTRAVENOUS at 02:09

## 2019-09-30 RX ADMIN — FENTANYL CITRATE 175 MCG/HR: 50 INJECTION INTRAMUSCULAR; INTRAVENOUS at 05:30

## 2019-09-30 RX ADMIN — CEFEPIME HYDROCHLORIDE 2 G: 2 INJECTION, POWDER, FOR SOLUTION INTRAVENOUS at 14:08

## 2019-09-30 RX ADMIN — FENTANYL CITRATE 150 MCG/HR: 50 INJECTION INTRAMUSCULAR; INTRAVENOUS at 18:39

## 2019-09-30 RX ADMIN — METHYLPREDNISOLONE SODIUM SUCCINATE 60 MG: 125 INJECTION, POWDER, FOR SOLUTION INTRAMUSCULAR; INTRAVENOUS at 07:59

## 2019-09-30 RX ADMIN — PROPOFOL 50 MCG/KG/MIN: 10 INJECTION, EMULSION INTRAVENOUS at 03:22

## 2019-09-30 RX ADMIN — FENTANYL CITRATE 150 MCG/HR: 50 INJECTION INTRAMUSCULAR; INTRAVENOUS at 15:40

## 2019-09-30 RX ADMIN — METHYLPREDNISOLONE SODIUM SUCCINATE 60 MG: 125 INJECTION, POWDER, FOR SOLUTION INTRAMUSCULAR; INTRAVENOUS at 02:13

## 2019-09-30 RX ADMIN — VANCOMYCIN HYDROCHLORIDE 1750 MG: 5 INJECTION, POWDER, LYOPHILIZED, FOR SOLUTION INTRAVENOUS at 04:12

## 2019-09-30 RX ADMIN — BUSPIRONE HYDROCHLORIDE 30 MG: 10 TABLET ORAL at 20:16

## 2019-09-30 RX ADMIN — Medication 10 ML: at 08:06

## 2019-09-30 RX ADMIN — IPRATROPIUM BROMIDE AND ALBUTEROL SULFATE 1 AMPULE: .5; 3 SOLUTION RESPIRATORY (INHALATION) at 05:43

## 2019-09-30 RX ADMIN — BUSPIRONE HYDROCHLORIDE 30 MG: 10 TABLET ORAL at 08:13

## 2019-09-30 RX ADMIN — PROPOFOL 45 MCG/KG/MIN: 10 INJECTION, EMULSION INTRAVENOUS at 06:09

## 2019-09-30 RX ADMIN — ACETYLCYSTEINE 400 MG: 200 SOLUTION ORAL; RESPIRATORY (INHALATION) at 19:33

## 2019-09-30 RX ADMIN — FENTANYL CITRATE 125 MCG/HR: 50 INJECTION INTRAMUSCULAR; INTRAVENOUS at 01:47

## 2019-09-30 RX ADMIN — IPRATROPIUM BROMIDE AND ALBUTEROL SULFATE 1 AMPULE: .5; 3 SOLUTION RESPIRATORY (INHALATION) at 19:33

## 2019-09-30 RX ADMIN — ACETYLCYSTEINE 400 MG: 200 SOLUTION ORAL; RESPIRATORY (INHALATION) at 08:38

## 2019-09-30 RX ADMIN — ACETYLCYSTEINE 400 MG: 200 SOLUTION ORAL; RESPIRATORY (INHALATION) at 01:00

## 2019-09-30 RX ADMIN — Medication 10 ML: at 20:17

## 2019-09-30 RX ADMIN — FENTANYL CITRATE 150 MCG/HR: 50 INJECTION INTRAMUSCULAR; INTRAVENOUS at 08:27

## 2019-09-30 RX ADMIN — Medication 10 ML: at 08:14

## 2019-09-30 RX ADMIN — IPRATROPIUM BROMIDE AND ALBUTEROL SULFATE 1 AMPULE: .5; 3 SOLUTION RESPIRATORY (INHALATION) at 15:41

## 2019-09-30 RX ADMIN — PROPOFOL 50 MCG/KG/MIN: 10 INJECTION, EMULSION INTRAVENOUS at 23:50

## 2019-09-30 RX ADMIN — METHYLPREDNISOLONE SODIUM SUCCINATE 60 MG: 125 INJECTION, POWDER, FOR SOLUTION INTRAMUSCULAR; INTRAVENOUS at 20:16

## 2019-09-30 RX ADMIN — FENTANYL CITRATE 150 MCG/HR: 50 INJECTION INTRAMUSCULAR; INTRAVENOUS at 12:16

## 2019-09-30 RX ADMIN — PANTOPRAZOLE SODIUM 40 MG: 40 INJECTION, POWDER, FOR SOLUTION INTRAVENOUS at 08:08

## 2019-09-30 RX ADMIN — PROPOFOL 50 MCG/KG/MIN: 10 INJECTION, EMULSION INTRAVENOUS at 20:49

## 2019-09-30 RX ADMIN — SODIUM CHLORIDE: 9 INJECTION, SOLUTION INTRAVENOUS at 04:31

## 2019-09-30 RX ADMIN — SODIUM CHLORIDE: 9 INJECTION, SOLUTION INTRAVENOUS at 20:16

## 2019-09-30 RX ADMIN — Medication 10 ML: at 08:08

## 2019-09-30 RX ADMIN — IPRATROPIUM BROMIDE AND ALBUTEROL SULFATE 1 AMPULE: .5; 3 SOLUTION RESPIRATORY (INHALATION) at 00:55

## 2019-09-30 RX ADMIN — VANCOMYCIN HYDROCHLORIDE 1750 MG: 5 INJECTION, POWDER, LYOPHILIZED, FOR SOLUTION INTRAVENOUS at 23:08

## 2019-09-30 RX ADMIN — METHYLPREDNISOLONE SODIUM SUCCINATE 60 MG: 125 INJECTION, POWDER, FOR SOLUTION INTRAMUSCULAR; INTRAVENOUS at 14:08

## 2019-09-30 RX ADMIN — ACETYLCYSTEINE 400 MG: 200 SOLUTION ORAL; RESPIRATORY (INHALATION) at 11:24

## 2019-09-30 RX ADMIN — PROPOFOL 45 MCG/KG/MIN: 10 INJECTION, EMULSION INTRAVENOUS at 15:12

## 2019-09-30 RX ADMIN — ACETYLCYSTEINE 400 MG: 200 SOLUTION ORAL; RESPIRATORY (INHALATION) at 05:54

## 2019-09-30 RX ADMIN — Medication 100 MG: at 14:29

## 2019-09-30 RX ADMIN — WARFARIN SODIUM 5 MG: 5 TABLET ORAL at 16:22

## 2019-09-30 RX ADMIN — IPRATROPIUM BROMIDE AND ALBUTEROL SULFATE 1 AMPULE: .5; 3 SOLUTION RESPIRATORY (INHALATION) at 11:24

## 2019-09-30 RX ADMIN — FENTANYL CITRATE 175 MCG/HR: 50 INJECTION INTRAMUSCULAR; INTRAVENOUS at 22:01

## 2019-09-30 ASSESSMENT — PULMONARY FUNCTION TESTS
PEFR_L/MIN: 0
PIF_VALUE: 22
PIF_VALUE: 22
PIF_VALUE: 26
PIF_VALUE: 23
PIF_VALUE: 23
PIF_VALUE: 30

## 2019-09-30 ASSESSMENT — PAIN SCALES - GENERAL: PAINLEVEL_OUTOF10: 0

## 2019-10-01 ENCOUNTER — APPOINTMENT (OUTPATIENT)
Dept: GENERAL RADIOLOGY | Age: 59
DRG: 004 | End: 2019-10-01
Payer: MEDICARE

## 2019-10-01 LAB
ALBUMIN SERPL-MCNC: 3.4 G/DL (ref 3.5–5.1)
ALP BLD-CCNC: 31 U/L (ref 38–126)
ALT SERPL-CCNC: 20 U/L (ref 11–66)
ANION GAP SERPL CALCULATED.3IONS-SCNC: 11 MEQ/L (ref 8–16)
APTT: 29.2 SECONDS (ref 22–38)
APTT: 35 SECONDS (ref 22–38)
AST SERPL-CCNC: 12 U/L (ref 5–40)
BASOPHILS # BLD: 0 %
BASOPHILS ABSOLUTE: 0 THOU/MM3 (ref 0–0.1)
BILIRUB SERPL-MCNC: 0.6 MG/DL (ref 0.3–1.2)
BUN BLDV-MCNC: 32 MG/DL (ref 7–22)
CALCIUM IONIZED: 1.07 MMOL/L (ref 1.12–1.32)
CALCIUM SERPL-MCNC: 7.8 MG/DL (ref 8.5–10.5)
CHLORIDE BLD-SCNC: 102 MEQ/L (ref 98–111)
CO2: 22 MEQ/L (ref 23–33)
CREAT SERPL-MCNC: 0.9 MG/DL (ref 0.4–1.2)
EKG ATRIAL RATE: 214 BPM
EKG Q-T INTERVAL: 328 MS
EKG QRS DURATION: 102 MS
EKG QTC CALCULATION (BAZETT): 519 MS
EKG R AXIS: -26 DEGREES
EKG T AXIS: -50 DEGREES
EKG VENTRICULAR RATE: 151 BPM
EOSINOPHIL # BLD: 0 %
EOSINOPHILS ABSOLUTE: 0 THOU/MM3 (ref 0–0.4)
ERYTHROCYTE [DISTWIDTH] IN BLOOD BY AUTOMATED COUNT: 15.4 % (ref 11.5–14.5)
ERYTHROCYTE [DISTWIDTH] IN BLOOD BY AUTOMATED COUNT: 53.7 FL (ref 35–45)
GFR SERPL CREATININE-BSD FRML MDRD: 87 ML/MIN/1.73M2
GLUCOSE BLD-MCNC: 243 MG/DL (ref 70–108)
GRAM STAIN RESULT: ABNORMAL
HCT VFR BLD CALC: 38.1 % (ref 42–52)
HEMOGLOBIN: 12.2 GM/DL (ref 14–18)
IMMATURE GRANS (ABS): 0.05 THOU/MM3 (ref 0–0.07)
IMMATURE GRANULOCYTES: 0.9 %
INR BLD: 1.17 (ref 0.85–1.13)
LEGIONELLA URINARY AG: NEGATIVE
LYMPHOCYTES # BLD: 7 %
LYMPHOCYTES ABSOLUTE: 0.4 THOU/MM3 (ref 1–4.8)
MAGNESIUM: 1.9 MG/DL (ref 1.6–2.4)
MCH RBC QN AUTO: 30.2 PG (ref 26–33)
MCHC RBC AUTO-ENTMCNC: 32 GM/DL (ref 32.2–35.5)
MCV RBC AUTO: 94.3 FL (ref 80–94)
MONOCYTES # BLD: 5.6 %
MONOCYTES ABSOLUTE: 0.3 THOU/MM3 (ref 0.4–1.3)
NUCLEATED RED BLOOD CELLS: 0 /100 WBC
ORGANISM: ABNORMAL
PHOSPHORUS: 2.6 MG/DL (ref 2.4–4.7)
PLATELET # BLD: 117 THOU/MM3 (ref 130–400)
PMV BLD AUTO: 10.2 FL (ref 9.4–12.4)
POTASSIUM SERPL-SCNC: 3.7 MEQ/L (ref 3.5–5.2)
RBC # BLD: 4.04 MILL/MM3 (ref 4.7–6.1)
RESPIRATORY CULTURE: ABNORMAL
RESPIRATORY CULTURE: ABNORMAL
SEG NEUTROPHILS: 86.5 %
SEGMENTED NEUTROPHILS ABSOLUTE COUNT: 5.1 THOU/MM3 (ref 1.8–7.7)
SODIUM BLD-SCNC: 135 MEQ/L (ref 135–145)
STREP PNEUMO AG, UR: NEGATIVE
TOTAL PROTEIN: 6.9 G/DL (ref 6.1–8)
WBC # BLD: 5.9 THOU/MM3 (ref 4.8–10.8)

## 2019-10-01 PROCEDURE — 2709999900 HC NON-CHARGEABLE SUPPLY

## 2019-10-01 PROCEDURE — 94761 N-INVAS EAR/PLS OXIMETRY MLT: CPT

## 2019-10-01 PROCEDURE — 82330 ASSAY OF CALCIUM: CPT

## 2019-10-01 PROCEDURE — 2700000000 HC OXYGEN THERAPY PER DAY

## 2019-10-01 PROCEDURE — 6360000002 HC RX W HCPCS: Performed by: INTERNAL MEDICINE

## 2019-10-01 PROCEDURE — 85730 THROMBOPLASTIN TIME PARTIAL: CPT

## 2019-10-01 PROCEDURE — 84100 ASSAY OF PHOSPHORUS: CPT

## 2019-10-01 PROCEDURE — 2580000003 HC RX 258: Performed by: NURSE PRACTITIONER

## 2019-10-01 PROCEDURE — 6370000000 HC RX 637 (ALT 250 FOR IP): Performed by: PHARMACIST

## 2019-10-01 PROCEDURE — 94770 HC ETCO2 MONITOR DAILY: CPT

## 2019-10-01 PROCEDURE — 80053 COMPREHEN METABOLIC PANEL: CPT

## 2019-10-01 PROCEDURE — 83735 ASSAY OF MAGNESIUM: CPT

## 2019-10-01 PROCEDURE — C9113 INJ PANTOPRAZOLE SODIUM, VIA: HCPCS | Performed by: FAMILY MEDICINE

## 2019-10-01 PROCEDURE — 6360000002 HC RX W HCPCS: Performed by: STUDENT IN AN ORGANIZED HEALTH CARE EDUCATION/TRAINING PROGRAM

## 2019-10-01 PROCEDURE — 94003 VENT MGMT INPAT SUBQ DAY: CPT

## 2019-10-01 PROCEDURE — 93005 ELECTROCARDIOGRAM TRACING: CPT | Performed by: INTERNAL MEDICINE

## 2019-10-01 PROCEDURE — 6370000000 HC RX 637 (ALT 250 FOR IP): Performed by: INTERNAL MEDICINE

## 2019-10-01 PROCEDURE — 2500000003 HC RX 250 WO HCPCS: Performed by: INTERNAL MEDICINE

## 2019-10-01 PROCEDURE — 85610 PROTHROMBIN TIME: CPT

## 2019-10-01 PROCEDURE — 36415 COLL VENOUS BLD VENIPUNCTURE: CPT

## 2019-10-01 PROCEDURE — 2000000000 HC ICU R&B

## 2019-10-01 PROCEDURE — 2500000003 HC RX 250 WO HCPCS: Performed by: NURSE PRACTITIONER

## 2019-10-01 PROCEDURE — 93010 ELECTROCARDIOGRAM REPORT: CPT | Performed by: INTERNAL MEDICINE

## 2019-10-01 PROCEDURE — 2580000003 HC RX 258: Performed by: STUDENT IN AN ORGANIZED HEALTH CARE EDUCATION/TRAINING PROGRAM

## 2019-10-01 PROCEDURE — 2580000003 HC RX 258: Performed by: INTERNAL MEDICINE

## 2019-10-01 PROCEDURE — 94640 AIRWAY INHALATION TREATMENT: CPT

## 2019-10-01 PROCEDURE — 85025 COMPLETE CBC W/AUTO DIFF WBC: CPT

## 2019-10-01 PROCEDURE — 99291 CRITICAL CARE FIRST HOUR: CPT | Performed by: INTERNAL MEDICINE

## 2019-10-01 PROCEDURE — 6360000002 HC RX W HCPCS: Performed by: FAMILY MEDICINE

## 2019-10-01 PROCEDURE — 71045 X-RAY EXAM CHEST 1 VIEW: CPT

## 2019-10-01 RX ORDER — METOPROLOL TARTRATE 5 MG/5ML
5 INJECTION INTRAVENOUS ONCE
Status: COMPLETED | OUTPATIENT
Start: 2019-10-01 | End: 2019-10-01

## 2019-10-01 RX ORDER — DILTIAZEM HYDROCHLORIDE 5 MG/ML
10 INJECTION INTRAVENOUS ONCE
Status: COMPLETED | OUTPATIENT
Start: 2019-10-01 | End: 2019-10-01

## 2019-10-01 RX ORDER — DILTIAZEM HYDROCHLORIDE 5 MG/ML
INJECTION INTRAVENOUS
Status: DISPENSED
Start: 2019-10-01 | End: 2019-10-01

## 2019-10-01 RX ORDER — METOPROLOL TARTRATE 5 MG/5ML
INJECTION INTRAVENOUS
Status: DISPENSED
Start: 2019-10-01 | End: 2019-10-01

## 2019-10-01 RX ORDER — WARFARIN SODIUM 5 MG/1
5 TABLET ORAL ONCE
Status: COMPLETED | OUTPATIENT
Start: 2019-10-01 | End: 2019-10-01

## 2019-10-01 RX ORDER — METHYLPREDNISOLONE SODIUM SUCCINATE 125 MG/2ML
60 INJECTION, POWDER, LYOPHILIZED, FOR SOLUTION INTRAMUSCULAR; INTRAVENOUS EVERY 12 HOURS
Status: DISCONTINUED | OUTPATIENT
Start: 2019-10-01 | End: 2019-10-02

## 2019-10-01 RX ORDER — KETAMINE HCL IN NACL, ISO-OSM 100MG/10ML
50 SYRINGE (ML) INJECTION ONCE
Status: COMPLETED | OUTPATIENT
Start: 2019-10-01 | End: 2019-10-01

## 2019-10-01 RX ORDER — KETAMINE HCL IN NACL, ISO-OSM 100MG/10ML
SYRINGE (ML) INJECTION
Status: DISCONTINUED
Start: 2019-10-01 | End: 2019-10-02

## 2019-10-01 RX ORDER — 0.9 % SODIUM CHLORIDE 0.9 %
1000 INTRAVENOUS SOLUTION INTRAVENOUS ONCE
Status: COMPLETED | OUTPATIENT
Start: 2019-10-01 | End: 2019-10-01

## 2019-10-01 RX ADMIN — FENTANYL CITRATE 175 MCG/HR: 50 INJECTION, SOLUTION INTRAMUSCULAR; INTRAVENOUS at 17:46

## 2019-10-01 RX ADMIN — ACETYLCYSTEINE 400 MG: 200 SOLUTION ORAL; RESPIRATORY (INHALATION) at 04:51

## 2019-10-01 RX ADMIN — FENTANYL CITRATE 175 MCG/HR: 50 INJECTION INTRAMUSCULAR; INTRAVENOUS at 06:28

## 2019-10-01 RX ADMIN — PROPOFOL 50 MCG/KG/MIN: 10 INJECTION, EMULSION INTRAVENOUS at 14:22

## 2019-10-01 RX ADMIN — PROPOFOL 50 MCG/KG/MIN: 10 INJECTION, EMULSION INTRAVENOUS at 19:35

## 2019-10-01 RX ADMIN — IPRATROPIUM BROMIDE AND ALBUTEROL SULFATE 1 AMPULE: .5; 3 SOLUTION RESPIRATORY (INHALATION) at 13:05

## 2019-10-01 RX ADMIN — CEFEPIME HYDROCHLORIDE 2 G: 2 INJECTION, POWDER, FOR SOLUTION INTRAVENOUS at 02:26

## 2019-10-01 RX ADMIN — FENTANYL CITRATE 175 MCG/HR: 50 INJECTION INTRAMUSCULAR; INTRAVENOUS at 12:03

## 2019-10-01 RX ADMIN — CEFEPIME HYDROCHLORIDE 2 G: 2 INJECTION, POWDER, FOR SOLUTION INTRAVENOUS at 14:28

## 2019-10-01 RX ADMIN — AMIODARONE HYDROCHLORIDE 0.5 MG/MIN: 1.8 INJECTION, SOLUTION INTRAVENOUS at 16:04

## 2019-10-01 RX ADMIN — Medication 10 ML: at 21:02

## 2019-10-01 RX ADMIN — BUSPIRONE HYDROCHLORIDE 30 MG: 10 TABLET ORAL at 08:02

## 2019-10-01 RX ADMIN — PANTOPRAZOLE SODIUM 40 MG: 40 INJECTION, POWDER, FOR SOLUTION INTRAVENOUS at 08:02

## 2019-10-01 RX ADMIN — SODIUM CHLORIDE: 9 INJECTION, SOLUTION INTRAVENOUS at 07:50

## 2019-10-01 RX ADMIN — ENOXAPARIN SODIUM 120 MG: 120 INJECTION SUBCUTANEOUS at 16:06

## 2019-10-01 RX ADMIN — ENOXAPARIN SODIUM 120 MG: 120 INJECTION SUBCUTANEOUS at 03:49

## 2019-10-01 RX ADMIN — AMIODARONE HYDROCHLORIDE 1 MG/MIN: 1.8 INJECTION, SOLUTION INTRAVENOUS at 10:36

## 2019-10-01 RX ADMIN — PROPOFOL 50 MCG/KG/MIN: 10 INJECTION, EMULSION INTRAVENOUS at 04:54

## 2019-10-01 RX ADMIN — PROPOFOL 50 MCG/KG/MIN: 10 INJECTION, EMULSION INTRAVENOUS at 12:01

## 2019-10-01 RX ADMIN — Medication 50 MG: at 14:42

## 2019-10-01 RX ADMIN — METOPROLOL TARTRATE 5 MG: 5 INJECTION INTRAVENOUS at 09:43

## 2019-10-01 RX ADMIN — DILTIAZEM HYDROCHLORIDE 10 MG/HR: 5 INJECTION INTRAVENOUS at 21:30

## 2019-10-01 RX ADMIN — SODIUM CHLORIDE 1000 ML: 9 INJECTION, SOLUTION INTRAVENOUS at 13:10

## 2019-10-01 RX ADMIN — FENTANYL CITRATE 175 MCG/HR: 50 INJECTION INTRAMUSCULAR; INTRAVENOUS at 14:43

## 2019-10-01 RX ADMIN — DEXMEDETOMIDINE 0.2 MCG/KG/HR: 100 INJECTION, SOLUTION, CONCENTRATE INTRAVENOUS at 20:48

## 2019-10-01 RX ADMIN — PROPOFOL 50 MCG/KG/MIN: 10 INJECTION, EMULSION INTRAVENOUS at 06:28

## 2019-10-01 RX ADMIN — IPRATROPIUM BROMIDE AND ALBUTEROL SULFATE 1 AMPULE: .5; 3 SOLUTION RESPIRATORY (INHALATION) at 00:47

## 2019-10-01 RX ADMIN — PROPOFOL 50 MCG/KG/MIN: 10 INJECTION, EMULSION INTRAVENOUS at 02:26

## 2019-10-01 RX ADMIN — IPRATROPIUM BROMIDE AND ALBUTEROL SULFATE 1 AMPULE: .5; 3 SOLUTION RESPIRATORY (INHALATION) at 08:06

## 2019-10-01 RX ADMIN — IPRATROPIUM BROMIDE AND ALBUTEROL SULFATE 1 AMPULE: .5; 3 SOLUTION RESPIRATORY (INHALATION) at 04:51

## 2019-10-01 RX ADMIN — AMIODARONE HYDROCHLORIDE 300 MG: 50 INJECTION, SOLUTION INTRAVENOUS at 10:23

## 2019-10-01 RX ADMIN — METOPROLOL TARTRATE 5 MG: 5 INJECTION INTRAVENOUS at 09:38

## 2019-10-01 RX ADMIN — METHYLPREDNISOLONE SODIUM SUCCINATE 60 MG: 125 INJECTION, POWDER, FOR SOLUTION INTRAMUSCULAR; INTRAVENOUS at 02:30

## 2019-10-01 RX ADMIN — WARFARIN SODIUM 5 MG: 5 TABLET ORAL at 18:07

## 2019-10-01 RX ADMIN — BUSPIRONE HYDROCHLORIDE 30 MG: 10 TABLET ORAL at 20:58

## 2019-10-01 RX ADMIN — METHYLPREDNISOLONE SODIUM SUCCINATE 60 MG: 125 INJECTION, POWDER, FOR SOLUTION INTRAMUSCULAR; INTRAVENOUS at 08:01

## 2019-10-01 RX ADMIN — IPRATROPIUM BROMIDE AND ALBUTEROL SULFATE 1 AMPULE: .5; 3 SOLUTION RESPIRATORY (INHALATION) at 16:27

## 2019-10-01 RX ADMIN — METHYLPREDNISOLONE SODIUM SUCCINATE 60 MG: 125 INJECTION, POWDER, FOR SOLUTION INTRAMUSCULAR; INTRAVENOUS at 20:58

## 2019-10-01 RX ADMIN — ACETYLCYSTEINE 400 MG: 200 SOLUTION ORAL; RESPIRATORY (INHALATION) at 00:48

## 2019-10-01 RX ADMIN — IPRATROPIUM BROMIDE AND ALBUTEROL SULFATE 1 AMPULE: .5; 3 SOLUTION RESPIRATORY (INHALATION) at 21:03

## 2019-10-01 RX ADMIN — DILTIAZEM HYDROCHLORIDE 10 MG: 5 INJECTION INTRAVENOUS at 09:47

## 2019-10-01 RX ADMIN — FENTANYL CITRATE 175 MCG/HR: 50 INJECTION INTRAMUSCULAR; INTRAVENOUS at 09:25

## 2019-10-01 RX ADMIN — DILTIAZEM HYDROCHLORIDE 10 MG: 5 INJECTION INTRAVENOUS at 09:52

## 2019-10-01 RX ADMIN — Medication 10 ML: at 08:02

## 2019-10-01 RX ADMIN — PROPOFOL 50 MCG/KG/MIN: 10 INJECTION, EMULSION INTRAVENOUS at 22:35

## 2019-10-01 RX ADMIN — FENTANYL CITRATE 175 MCG/HR: 50 INJECTION INTRAMUSCULAR; INTRAVENOUS at 01:05

## 2019-10-01 RX ADMIN — FENTANYL CITRATE 175 MCG/HR: 50 INJECTION INTRAMUSCULAR; INTRAVENOUS at 03:49

## 2019-10-01 RX ADMIN — DILTIAZEM HYDROCHLORIDE 5 MG/HR: 5 INJECTION INTRAVENOUS at 13:09

## 2019-10-01 RX ADMIN — PROPOFOL 50 MCG/KG/MIN: 10 INJECTION, EMULSION INTRAVENOUS at 09:28

## 2019-10-01 RX ADMIN — PROPOFOL 50 MCG/KG/MIN: 10 INJECTION, EMULSION INTRAVENOUS at 16:42

## 2019-10-01 ASSESSMENT — PULMONARY FUNCTION TESTS
PIF_VALUE: 20
PIF_VALUE: 24
PIF_VALUE: 22
PIF_VALUE: 22

## 2019-10-02 ENCOUNTER — APPOINTMENT (OUTPATIENT)
Dept: GENERAL RADIOLOGY | Age: 59
DRG: 004 | End: 2019-10-02
Payer: MEDICARE

## 2019-10-02 LAB
ALBUMIN SERPL-MCNC: 3.3 G/DL (ref 3.5–5.1)
ALP BLD-CCNC: 29 U/L (ref 38–126)
ALT SERPL-CCNC: 18 U/L (ref 11–66)
ANION GAP SERPL CALCULATED.3IONS-SCNC: 10 MEQ/L (ref 8–16)
ANION GAP SERPL CALCULATED.3IONS-SCNC: 12 MEQ/L (ref 8–16)
AST SERPL-CCNC: 14 U/L (ref 5–40)
BASOPHILS # BLD: 0 %
BASOPHILS # BLD: 0.2 %
BASOPHILS ABSOLUTE: 0 THOU/MM3 (ref 0–0.1)
BASOPHILS ABSOLUTE: 0 THOU/MM3 (ref 0–0.1)
BILIRUB SERPL-MCNC: 0.7 MG/DL (ref 0.3–1.2)
BUN BLDV-MCNC: 32 MG/DL (ref 7–22)
BUN BLDV-MCNC: 33 MG/DL (ref 7–22)
CALCIUM SERPL-MCNC: 7.9 MG/DL (ref 8.5–10.5)
CALCIUM SERPL-MCNC: 8.1 MG/DL (ref 8.5–10.5)
CHLORIDE BLD-SCNC: 106 MEQ/L (ref 98–111)
CHLORIDE BLD-SCNC: 107 MEQ/L (ref 98–111)
CO2: 21 MEQ/L (ref 23–33)
CO2: 23 MEQ/L (ref 23–33)
CREAT SERPL-MCNC: 0.8 MG/DL (ref 0.4–1.2)
CREAT SERPL-MCNC: 0.9 MG/DL (ref 0.4–1.2)
EOSINOPHIL # BLD: 0 %
EOSINOPHIL # BLD: 0 %
EOSINOPHILS ABSOLUTE: 0 THOU/MM3 (ref 0–0.4)
EOSINOPHILS ABSOLUTE: 0 THOU/MM3 (ref 0–0.4)
ERYTHROCYTE [DISTWIDTH] IN BLOOD BY AUTOMATED COUNT: 15.8 % (ref 11.5–14.5)
ERYTHROCYTE [DISTWIDTH] IN BLOOD BY AUTOMATED COUNT: 15.9 % (ref 11.5–14.5)
ERYTHROCYTE [DISTWIDTH] IN BLOOD BY AUTOMATED COUNT: 53.5 FL (ref 35–45)
ERYTHROCYTE [DISTWIDTH] IN BLOOD BY AUTOMATED COUNT: 54 FL (ref 35–45)
GFR SERPL CREATININE-BSD FRML MDRD: 87 ML/MIN/1.73M2
GFR SERPL CREATININE-BSD FRML MDRD: > 90 ML/MIN/1.73M2
GLUCOSE BLD-MCNC: 200 MG/DL (ref 70–108)
GLUCOSE BLD-MCNC: 204 MG/DL (ref 70–108)
GRAM STAIN RESULT: NORMAL
HCT VFR BLD CALC: 40.2 % (ref 42–52)
HCT VFR BLD CALC: 42.5 % (ref 42–52)
HEMOGLOBIN: 12.7 GM/DL (ref 14–18)
HEMOGLOBIN: 13.6 GM/DL (ref 14–18)
IMMATURE GRANS (ABS): 0.04 THOU/MM3 (ref 0–0.07)
IMMATURE GRANS (ABS): 0.04 THOU/MM3 (ref 0–0.07)
IMMATURE GRANULOCYTES: 0.8 %
IMMATURE GRANULOCYTES: 0.8 %
INR BLD: 1.26 (ref 0.85–1.13)
LYMPHOCYTES # BLD: 6.1 %
LYMPHOCYTES # BLD: 6.6 %
LYMPHOCYTES ABSOLUTE: 0.3 THOU/MM3 (ref 1–4.8)
LYMPHOCYTES ABSOLUTE: 0.3 THOU/MM3 (ref 1–4.8)
MAGNESIUM: 2.3 MG/DL (ref 1.6–2.4)
MCH RBC QN AUTO: 29.5 PG (ref 26–33)
MCH RBC QN AUTO: 29.6 PG (ref 26–33)
MCHC RBC AUTO-ENTMCNC: 31.6 GM/DL (ref 32.2–35.5)
MCHC RBC AUTO-ENTMCNC: 32 GM/DL (ref 32.2–35.5)
MCV RBC AUTO: 92.6 FL (ref 80–94)
MCV RBC AUTO: 93.5 FL (ref 80–94)
MONOCYTES # BLD: 6.8 %
MONOCYTES # BLD: 7.1 %
MONOCYTES ABSOLUTE: 0.4 THOU/MM3 (ref 0.4–1.3)
MONOCYTES ABSOLUTE: 0.4 THOU/MM3 (ref 0.4–1.3)
NUCLEATED RED BLOOD CELLS: 0 /100 WBC
NUCLEATED RED BLOOD CELLS: 0 /100 WBC
PHOSPHORUS: 2.4 MG/DL (ref 2.4–4.7)
PLATELET # BLD: 127 THOU/MM3 (ref 130–400)
PLATELET # BLD: 136 THOU/MM3 (ref 130–400)
PLATELET ESTIMATE: ADEQUATE
PMV BLD AUTO: 9.6 FL (ref 9.4–12.4)
PMV BLD AUTO: 9.8 FL (ref 9.4–12.4)
POTASSIUM SERPL-SCNC: 3.5 MEQ/L (ref 3.5–5.2)
POTASSIUM SERPL-SCNC: 3.6 MEQ/L (ref 3.5–5.2)
RBC # BLD: 4.3 MILL/MM3 (ref 4.7–6.1)
RBC # BLD: 4.59 MILL/MM3 (ref 4.7–6.1)
RESPIRATORY CULTURE: NORMAL
SCAN OF BLOOD SMEAR: NORMAL
SEG NEUTROPHILS: 85.8 %
SEG NEUTROPHILS: 85.8 %
SEGMENTED NEUTROPHILS ABSOLUTE COUNT: 4.4 THOU/MM3 (ref 1.8–7.7)
SEGMENTED NEUTROPHILS ABSOLUTE COUNT: 4.5 THOU/MM3 (ref 1.8–7.7)
SODIUM BLD-SCNC: 139 MEQ/L (ref 135–145)
SODIUM BLD-SCNC: 140 MEQ/L (ref 135–145)
TOTAL PROTEIN: 6.5 G/DL (ref 6.1–8)
WBC # BLD: 5.1 THOU/MM3 (ref 4.8–10.8)
WBC # BLD: 5.3 THOU/MM3 (ref 4.8–10.8)

## 2019-10-02 PROCEDURE — 6360000002 HC RX W HCPCS: Performed by: INTERNAL MEDICINE

## 2019-10-02 PROCEDURE — 99292 CRITICAL CARE ADDL 30 MIN: CPT | Performed by: INTERNAL MEDICINE

## 2019-10-02 PROCEDURE — 36415 COLL VENOUS BLD VENIPUNCTURE: CPT

## 2019-10-02 PROCEDURE — 83735 ASSAY OF MAGNESIUM: CPT

## 2019-10-02 PROCEDURE — 2500000003 HC RX 250 WO HCPCS: Performed by: NURSE PRACTITIONER

## 2019-10-02 PROCEDURE — 80053 COMPREHEN METABOLIC PANEL: CPT

## 2019-10-02 PROCEDURE — 2000000000 HC ICU R&B

## 2019-10-02 PROCEDURE — 6360000002 HC RX W HCPCS: Performed by: STUDENT IN AN ORGANIZED HEALTH CARE EDUCATION/TRAINING PROGRAM

## 2019-10-02 PROCEDURE — 2580000003 HC RX 258: Performed by: STUDENT IN AN ORGANIZED HEALTH CARE EDUCATION/TRAINING PROGRAM

## 2019-10-02 PROCEDURE — 94003 VENT MGMT INPAT SUBQ DAY: CPT

## 2019-10-02 PROCEDURE — C9113 INJ PANTOPRAZOLE SODIUM, VIA: HCPCS | Performed by: FAMILY MEDICINE

## 2019-10-02 PROCEDURE — 6360000002 HC RX W HCPCS

## 2019-10-02 PROCEDURE — 71045 X-RAY EXAM CHEST 1 VIEW: CPT

## 2019-10-02 PROCEDURE — 2580000003 HC RX 258: Performed by: INTERNAL MEDICINE

## 2019-10-02 PROCEDURE — 2580000003 HC RX 258: Performed by: NURSE PRACTITIONER

## 2019-10-02 PROCEDURE — 93005 ELECTROCARDIOGRAM TRACING: CPT | Performed by: NURSE PRACTITIONER

## 2019-10-02 PROCEDURE — 85610 PROTHROMBIN TIME: CPT

## 2019-10-02 PROCEDURE — 2500000003 HC RX 250 WO HCPCS: Performed by: INTERNAL MEDICINE

## 2019-10-02 PROCEDURE — 2709999900 HC NON-CHARGEABLE SUPPLY

## 2019-10-02 PROCEDURE — 6360000002 HC RX W HCPCS: Performed by: FAMILY MEDICINE

## 2019-10-02 PROCEDURE — 80048 BASIC METABOLIC PNL TOTAL CA: CPT

## 2019-10-02 PROCEDURE — 85025 COMPLETE CBC W/AUTO DIFF WBC: CPT

## 2019-10-02 PROCEDURE — 94770 HC ETCO2 MONITOR DAILY: CPT

## 2019-10-02 PROCEDURE — 6370000000 HC RX 637 (ALT 250 FOR IP): Performed by: INTERNAL MEDICINE

## 2019-10-02 PROCEDURE — 74018 RADEX ABDOMEN 1 VIEW: CPT

## 2019-10-02 PROCEDURE — 84100 ASSAY OF PHOSPHORUS: CPT

## 2019-10-02 PROCEDURE — 94761 N-INVAS EAR/PLS OXIMETRY MLT: CPT

## 2019-10-02 PROCEDURE — 94640 AIRWAY INHALATION TREATMENT: CPT

## 2019-10-02 PROCEDURE — 2700000000 HC OXYGEN THERAPY PER DAY

## 2019-10-02 PROCEDURE — 99291 CRITICAL CARE FIRST HOUR: CPT | Performed by: INTERNAL MEDICINE

## 2019-10-02 RX ORDER — FORMOTEROL FUMARATE 20 UG/2ML
20 SOLUTION RESPIRATORY (INHALATION) 2 TIMES DAILY
Status: DISCONTINUED | OUTPATIENT
Start: 2019-10-02 | End: 2019-10-11

## 2019-10-02 RX ORDER — ZIPRASIDONE MESYLATE 20 MG/ML
20 INJECTION, POWDER, LYOPHILIZED, FOR SOLUTION INTRAMUSCULAR EVERY 12 HOURS PRN
Status: DISCONTINUED | OUTPATIENT
Start: 2019-10-02 | End: 2019-10-10

## 2019-10-02 RX ORDER — ZIPRASIDONE MESYLATE 20 MG/ML
INJECTION, POWDER, LYOPHILIZED, FOR SOLUTION INTRAMUSCULAR
Status: COMPLETED
Start: 2019-10-02 | End: 2019-10-02

## 2019-10-02 RX ORDER — PHENOBARBITAL 32.4 MG/1
32.4 TABLET ORAL 2 TIMES DAILY
Status: DISCONTINUED | OUTPATIENT
Start: 2019-10-04 | End: 2019-10-05

## 2019-10-02 RX ORDER — DEXAMETHASONE SODIUM PHOSPHATE 4 MG/ML
10 INJECTION, SOLUTION INTRA-ARTICULAR; INTRALESIONAL; INTRAMUSCULAR; INTRAVENOUS; SOFT TISSUE ONCE
Status: COMPLETED | OUTPATIENT
Start: 2019-10-02 | End: 2019-10-02

## 2019-10-02 RX ORDER — ALBUTEROL SULFATE 2.5 MG/3ML
5 SOLUTION RESPIRATORY (INHALATION) EVERY 4 HOURS PRN
Status: DISCONTINUED | OUTPATIENT
Start: 2019-10-02 | End: 2019-10-19 | Stop reason: HOSPADM

## 2019-10-02 RX ORDER — ZIPRASIDONE MESYLATE 20 MG/ML
10 INJECTION, POWDER, LYOPHILIZED, FOR SOLUTION INTRAMUSCULAR ONCE
Status: COMPLETED | OUTPATIENT
Start: 2019-10-02 | End: 2019-10-02

## 2019-10-02 RX ORDER — PHENOBARBITAL 32.4 MG/1
64.8 TABLET ORAL 2 TIMES DAILY
Status: COMPLETED | OUTPATIENT
Start: 2019-10-03 | End: 2019-10-03

## 2019-10-02 RX ORDER — BUPRENORPHINE AND NALOXONE 8; 2 MG/1; MG/1
1 FILM, SOLUBLE BUCCAL; SUBLINGUAL DAILY
Status: DISCONTINUED | OUTPATIENT
Start: 2019-10-02 | End: 2019-10-02

## 2019-10-02 RX ADMIN — DEXMEDETOMIDINE 0.8 MCG/KG/HR: 100 INJECTION, SOLUTION, CONCENTRATE INTRAVENOUS at 16:44

## 2019-10-02 RX ADMIN — PANTOPRAZOLE SODIUM 40 MG: 40 INJECTION, POWDER, FOR SOLUTION INTRAVENOUS at 08:00

## 2019-10-02 RX ADMIN — DEXMEDETOMIDINE 60 MCG: 100 INJECTION, SOLUTION, CONCENTRATE INTRAVENOUS at 01:31

## 2019-10-02 RX ADMIN — DEXMEDETOMIDINE 0.9 MCG/KG/HR: 100 INJECTION, SOLUTION, CONCENTRATE INTRAVENOUS at 21:05

## 2019-10-02 RX ADMIN — PROPOFOL 40 MCG/KG/MIN: 10 INJECTION, EMULSION INTRAVENOUS at 06:10

## 2019-10-02 RX ADMIN — AMIODARONE HYDROCHLORIDE 0.5 MG/MIN: 1.8 INJECTION, SOLUTION INTRAVENOUS at 04:09

## 2019-10-02 RX ADMIN — WATER 1.2 ML: 1 INJECTION INTRAMUSCULAR; INTRAVENOUS; SUBCUTANEOUS at 20:59

## 2019-10-02 RX ADMIN — FENTANYL CITRATE 75 MCG/HR: 50 INJECTION, SOLUTION INTRAMUSCULAR; INTRAVENOUS at 01:44

## 2019-10-02 RX ADMIN — DEXAMETHASONE SODIUM PHOSPHATE 10 MG: 4 INJECTION, SOLUTION INTRAMUSCULAR; INTRAVENOUS at 08:51

## 2019-10-02 RX ADMIN — IPRATROPIUM BROMIDE AND ALBUTEROL SULFATE 1 AMPULE: .5; 3 SOLUTION RESPIRATORY (INHALATION) at 00:30

## 2019-10-02 RX ADMIN — PHENOBARBITAL SODIUM 338.65 MG: 65 INJECTION INTRAMUSCULAR; INTRAVENOUS at 14:44

## 2019-10-02 RX ADMIN — METHYLPREDNISOLONE SODIUM SUCCINATE 60 MG: 125 INJECTION, POWDER, FOR SOLUTION INTRAMUSCULAR; INTRAVENOUS at 08:00

## 2019-10-02 RX ADMIN — ENOXAPARIN SODIUM 120 MG: 120 INJECTION SUBCUTANEOUS at 04:40

## 2019-10-02 RX ADMIN — FORMOTEROL FUMARATE DIHYDRATE 20 MCG: 20 SOLUTION RESPIRATORY (INHALATION) at 17:20

## 2019-10-02 RX ADMIN — BUPRENORPHINE HYDROCHLORIDE, NALOXONE HYDROCHLORIDE 1 FILM: 8; 2 FILM, SOLUBLE BUCCAL; SUBLINGUAL at 12:43

## 2019-10-02 RX ADMIN — DILTIAZEM HYDROCHLORIDE 15 MG/HR: 5 INJECTION INTRAVENOUS at 09:40

## 2019-10-02 RX ADMIN — PHENOBARBITAL SODIUM 338.65 MG: 65 INJECTION INTRAMUSCULAR; INTRAVENOUS at 19:22

## 2019-10-02 RX ADMIN — DEXMEDETOMIDINE 0.8 MCG/KG/HR: 100 INJECTION, SOLUTION, CONCENTRATE INTRAVENOUS at 11:52

## 2019-10-02 RX ADMIN — Medication 10 ML: at 08:00

## 2019-10-02 RX ADMIN — DILTIAZEM HYDROCHLORIDE 15 MG/HR: 5 INJECTION INTRAVENOUS at 17:54

## 2019-10-02 RX ADMIN — ZIPRASIDONE MESYLATE 10 MG: 20 INJECTION, POWDER, LYOPHILIZED, FOR SOLUTION INTRAMUSCULAR at 08:51

## 2019-10-02 RX ADMIN — Medication 10 ML: at 20:41

## 2019-10-02 RX ADMIN — PHENOBARBITAL SODIUM 338.65 MG: 65 INJECTION INTRAMUSCULAR; INTRAVENOUS at 10:58

## 2019-10-02 RX ADMIN — SODIUM CHLORIDE: 9 INJECTION, SOLUTION INTRAVENOUS at 23:54

## 2019-10-02 RX ADMIN — CEFEPIME HYDROCHLORIDE 2 G: 2 INJECTION, POWDER, FOR SOLUTION INTRAVENOUS at 03:30

## 2019-10-02 RX ADMIN — IPRATROPIUM BROMIDE AND ALBUTEROL SULFATE 1 AMPULE: .5; 3 SOLUTION RESPIRATORY (INHALATION) at 05:00

## 2019-10-02 RX ADMIN — DEXMEDETOMIDINE 0.4 MCG/KG/HR: 100 INJECTION, SOLUTION, CONCENTRATE INTRAVENOUS at 08:09

## 2019-10-02 RX ADMIN — PROPOFOL 40 MCG/KG/MIN: 10 INJECTION, EMULSION INTRAVENOUS at 02:06

## 2019-10-02 RX ADMIN — BUSPIRONE HYDROCHLORIDE 30 MG: 10 TABLET ORAL at 08:08

## 2019-10-02 RX ADMIN — WATER 1.2 ML: 1 INJECTION INTRAMUSCULAR; INTRAVENOUS; SUBCUTANEOUS at 08:52

## 2019-10-02 RX ADMIN — AMIODARONE HYDROCHLORIDE 0.5 MG/MIN: 1.8 INJECTION, SOLUTION INTRAVENOUS at 16:06

## 2019-10-02 RX ADMIN — SODIUM CHLORIDE: 9 INJECTION, SOLUTION INTRAVENOUS at 10:10

## 2019-10-02 RX ADMIN — ZIPRASIDONE MESYLATE 20 MG: 20 INJECTION, POWDER, LYOPHILIZED, FOR SOLUTION INTRAMUSCULAR at 20:58

## 2019-10-02 ASSESSMENT — PAIN SCALES - WONG BAKER
WONGBAKER_NUMERICALRESPONSE: 8
WONGBAKER_NUMERICALRESPONSE: 4
WONGBAKER_NUMERICALRESPONSE: 2
WONGBAKER_NUMERICALRESPONSE: 2
WONGBAKER_NUMERICALRESPONSE: 4
WONGBAKER_NUMERICALRESPONSE: 0
WONGBAKER_NUMERICALRESPONSE: 2
WONGBAKER_NUMERICALRESPONSE: 0
WONGBAKER_NUMERICALRESPONSE: 0
WONGBAKER_NUMERICALRESPONSE: 4
WONGBAKER_NUMERICALRESPONSE: 0
WONGBAKER_NUMERICALRESPONSE: 0

## 2019-10-02 ASSESSMENT — PULMONARY FUNCTION TESTS
PIF_VALUE: 25
PIF_VALUE: 25

## 2019-10-03 ENCOUNTER — APPOINTMENT (OUTPATIENT)
Dept: GENERAL RADIOLOGY | Age: 59
DRG: 004 | End: 2019-10-03
Payer: MEDICARE

## 2019-10-03 LAB
EKG ATRIAL RATE: 312 BPM
EKG P AXIS: 58 DEGREES
EKG P-R INTERVAL: 196 MS
EKG Q-T INTERVAL: 428 MS
EKG QRS DURATION: 106 MS
EKG QTC CALCULATION (BAZETT): 477 MS
EKG R AXIS: 70 DEGREES
EKG T AXIS: 46 DEGREES
EKG VENTRICULAR RATE: 75 BPM
MISC. #1 REFERENCE GROUP TEST: NORMAL

## 2019-10-03 PROCEDURE — 2000000000 HC ICU R&B

## 2019-10-03 PROCEDURE — 93010 ELECTROCARDIOGRAM REPORT: CPT | Performed by: INTERNAL MEDICINE

## 2019-10-03 PROCEDURE — 6360000002 HC RX W HCPCS: Performed by: NURSE PRACTITIONER

## 2019-10-03 PROCEDURE — 2580000003 HC RX 258: Performed by: STUDENT IN AN ORGANIZED HEALTH CARE EDUCATION/TRAINING PROGRAM

## 2019-10-03 PROCEDURE — 2700000000 HC OXYGEN THERAPY PER DAY

## 2019-10-03 PROCEDURE — 6360000002 HC RX W HCPCS: Performed by: INTERNAL MEDICINE

## 2019-10-03 PROCEDURE — 2580000003 HC RX 258: Performed by: NURSE PRACTITIONER

## 2019-10-03 PROCEDURE — 71045 X-RAY EXAM CHEST 1 VIEW: CPT

## 2019-10-03 PROCEDURE — 2580000003 HC RX 258: Performed by: INTERNAL MEDICINE

## 2019-10-03 PROCEDURE — 2500000003 HC RX 250 WO HCPCS: Performed by: INTERNAL MEDICINE

## 2019-10-03 PROCEDURE — 6370000000 HC RX 637 (ALT 250 FOR IP): Performed by: INTERNAL MEDICINE

## 2019-10-03 PROCEDURE — 2709999900 HC NON-CHARGEABLE SUPPLY

## 2019-10-03 PROCEDURE — 99291 CRITICAL CARE FIRST HOUR: CPT | Performed by: INTERNAL MEDICINE

## 2019-10-03 PROCEDURE — 6360000002 HC RX W HCPCS: Performed by: FAMILY MEDICINE

## 2019-10-03 PROCEDURE — 94761 N-INVAS EAR/PLS OXIMETRY MLT: CPT

## 2019-10-03 PROCEDURE — 2500000003 HC RX 250 WO HCPCS: Performed by: NURSE PRACTITIONER

## 2019-10-03 PROCEDURE — C9113 INJ PANTOPRAZOLE SODIUM, VIA: HCPCS | Performed by: FAMILY MEDICINE

## 2019-10-03 PROCEDURE — 94640 AIRWAY INHALATION TREATMENT: CPT

## 2019-10-03 RX ORDER — ZIPRASIDONE MESYLATE 20 MG/ML
10 INJECTION, POWDER, LYOPHILIZED, FOR SOLUTION INTRAMUSCULAR ONCE
Status: COMPLETED | OUTPATIENT
Start: 2019-10-04 | End: 2019-10-03

## 2019-10-03 RX ORDER — POTASSIUM CHLORIDE 29.8 MG/ML
20 INJECTION INTRAVENOUS DAILY
Status: DISCONTINUED | OUTPATIENT
Start: 2019-10-03 | End: 2019-10-08

## 2019-10-03 RX ORDER — ZIPRASIDONE MESYLATE 20 MG/ML
10 INJECTION, POWDER, LYOPHILIZED, FOR SOLUTION INTRAMUSCULAR ONCE
Status: COMPLETED | OUTPATIENT
Start: 2019-10-03 | End: 2019-10-03

## 2019-10-03 RX ORDER — QUETIAPINE FUMARATE 25 MG/1
50 TABLET, FILM COATED ORAL ONCE
Status: COMPLETED | OUTPATIENT
Start: 2019-10-03 | End: 2019-10-03

## 2019-10-03 RX ORDER — FUROSEMIDE 10 MG/ML
40 INJECTION INTRAMUSCULAR; INTRAVENOUS ONCE
Status: COMPLETED | OUTPATIENT
Start: 2019-10-03 | End: 2019-10-03

## 2019-10-03 RX ADMIN — POTASSIUM CHLORIDE 20 MEQ: 29.8 INJECTION, SOLUTION INTRAVENOUS at 16:33

## 2019-10-03 RX ADMIN — FORMOTEROL FUMARATE DIHYDRATE 20 MCG: 20 SOLUTION RESPIRATORY (INHALATION) at 08:25

## 2019-10-03 RX ADMIN — QUETIAPINE FUMARATE 50 MG: 25 TABLET ORAL at 20:14

## 2019-10-03 RX ADMIN — PHENOBARBITAL 64.8 MG: 32.4 TABLET ORAL at 09:34

## 2019-10-03 RX ADMIN — ZIPRASIDONE MESYLATE 10 MG: 20 INJECTION, POWDER, LYOPHILIZED, FOR SOLUTION INTRAMUSCULAR at 20:08

## 2019-10-03 RX ADMIN — DEXMEDETOMIDINE 1.4 MCG/KG/HR: 100 INJECTION, SOLUTION, CONCENTRATE INTRAVENOUS at 23:13

## 2019-10-03 RX ADMIN — DEXMEDETOMIDINE 0.9 MCG/KG/HR: 100 INJECTION, SOLUTION, CONCENTRATE INTRAVENOUS at 04:05

## 2019-10-03 RX ADMIN — DEXMEDETOMIDINE 1.2 MCG/KG/HR: 100 INJECTION, SOLUTION, CONCENTRATE INTRAVENOUS at 13:38

## 2019-10-03 RX ADMIN — DEXMEDETOMIDINE 1.2 MCG/KG/HR: 100 INJECTION, SOLUTION, CONCENTRATE INTRAVENOUS at 07:25

## 2019-10-03 RX ADMIN — DILTIAZEM HYDROCHLORIDE 12.5 MG/HR: 5 INJECTION INTRAVENOUS at 13:42

## 2019-10-03 RX ADMIN — AMIODARONE HYDROCHLORIDE 0.5 MG/MIN: 1.8 INJECTION, SOLUTION INTRAVENOUS at 14:03

## 2019-10-03 RX ADMIN — WATER 1.2 ML: 1 INJECTION INTRAMUSCULAR; INTRAVENOUS; SUBCUTANEOUS at 11:17

## 2019-10-03 RX ADMIN — ENOXAPARIN SODIUM 120 MG: 120 INJECTION SUBCUTANEOUS at 04:11

## 2019-10-03 RX ADMIN — PHENOBARBITAL 64.8 MG: 32.4 TABLET ORAL at 20:12

## 2019-10-03 RX ADMIN — FUROSEMIDE 40 MG: 10 INJECTION, SOLUTION INTRAMUSCULAR; INTRAVENOUS at 16:27

## 2019-10-03 RX ADMIN — DEXMEDETOMIDINE 119 MCG: 100 INJECTION, SOLUTION, CONCENTRATE INTRAVENOUS at 17:48

## 2019-10-03 RX ADMIN — Medication 10 ML: at 09:40

## 2019-10-03 RX ADMIN — DEXMEDETOMIDINE 1.2 MCG/KG/HR: 100 INJECTION, SOLUTION, CONCENTRATE INTRAVENOUS at 10:39

## 2019-10-03 RX ADMIN — PANTOPRAZOLE SODIUM 40 MG: 40 INJECTION, POWDER, FOR SOLUTION INTRAVENOUS at 09:40

## 2019-10-03 RX ADMIN — Medication 10 ML: at 20:21

## 2019-10-03 RX ADMIN — WATER 1.2 ML: 1 INJECTION INTRAMUSCULAR; INTRAVENOUS; SUBCUTANEOUS at 20:08

## 2019-10-03 RX ADMIN — TIOTROPIUM BROMIDE 18 MCG: 18 CAPSULE ORAL; RESPIRATORY (INHALATION) at 08:25

## 2019-10-03 RX ADMIN — ZIPRASIDONE MESYLATE 20 MG: 20 INJECTION, POWDER, LYOPHILIZED, FOR SOLUTION INTRAMUSCULAR at 11:17

## 2019-10-03 RX ADMIN — ZIPRASIDONE MESYLATE 10 MG: 20 INJECTION, POWDER, LYOPHILIZED, FOR SOLUTION INTRAMUSCULAR at 23:58

## 2019-10-03 RX ADMIN — DILTIAZEM HYDROCHLORIDE 12.5 MG/HR: 5 INJECTION INTRAVENOUS at 23:15

## 2019-10-03 RX ADMIN — DILTIAZEM HYDROCHLORIDE 12.5 MG/HR: 5 INJECTION INTRAVENOUS at 03:11

## 2019-10-03 RX ADMIN — DEXMEDETOMIDINE 0.8 MCG/KG/HR: 100 INJECTION, SOLUTION, CONCENTRATE INTRAVENOUS at 00:53

## 2019-10-03 RX ADMIN — DEXMEDETOMIDINE 1.4 MCG/KG/HR: 100 INJECTION, SOLUTION, CONCENTRATE INTRAVENOUS at 21:47

## 2019-10-03 RX ADMIN — SODIUM CHLORIDE: 9 INJECTION, SOLUTION INTRAVENOUS at 13:44

## 2019-10-03 RX ADMIN — AMIODARONE HYDROCHLORIDE 0.5 MG/MIN: 1.8 INJECTION, SOLUTION INTRAVENOUS at 03:13

## 2019-10-03 RX ADMIN — DEXMEDETOMIDINE 1.4 MCG/KG/HR: 100 INJECTION, SOLUTION, CONCENTRATE INTRAVENOUS at 18:53

## 2019-10-03 RX ADMIN — DEXMEDETOMIDINE 1.2 MCG/KG/HR: 100 INJECTION, SOLUTION, CONCENTRATE INTRAVENOUS at 16:22

## 2019-10-03 RX ADMIN — WATER 1.2 ML: 1 INJECTION INTRAMUSCULAR; INTRAVENOUS; SUBCUTANEOUS at 23:58

## 2019-10-03 ASSESSMENT — PAIN SCALES - GENERAL
PAINLEVEL_OUTOF10: 0
PAINLEVEL_OUTOF10: 0

## 2019-10-03 ASSESSMENT — PAIN SCALES - WONG BAKER
WONGBAKER_NUMERICALRESPONSE: 0

## 2019-10-04 ENCOUNTER — APPOINTMENT (OUTPATIENT)
Dept: GENERAL RADIOLOGY | Age: 59
DRG: 004 | End: 2019-10-04
Payer: MEDICARE

## 2019-10-04 LAB
ANION GAP SERPL CALCULATED.3IONS-SCNC: 10 MEQ/L (ref 8–16)
BASOPHILS # BLD: 0.2 %
BASOPHILS ABSOLUTE: 0 THOU/MM3 (ref 0–0.1)
BUN BLDV-MCNC: 38 MG/DL (ref 7–22)
CALCIUM SERPL-MCNC: 8 MG/DL (ref 8.5–10.5)
CHLORIDE BLD-SCNC: 109 MEQ/L (ref 98–111)
CO2: 27 MEQ/L (ref 23–33)
CREAT SERPL-MCNC: 0.7 MG/DL (ref 0.4–1.2)
EOSINOPHIL # BLD: 0 %
EOSINOPHILS ABSOLUTE: 0 THOU/MM3 (ref 0–0.4)
ERYTHROCYTE [DISTWIDTH] IN BLOOD BY AUTOMATED COUNT: 15.6 % (ref 11.5–14.5)
ERYTHROCYTE [DISTWIDTH] IN BLOOD BY AUTOMATED COUNT: 53.3 FL (ref 35–45)
GFR SERPL CREATININE-BSD FRML MDRD: > 90 ML/MIN/1.73M2
GLUCOSE BLD-MCNC: 169 MG/DL (ref 70–108)
HCT VFR BLD CALC: 42.2 % (ref 42–52)
HEMOGLOBIN: 13.5 GM/DL (ref 14–18)
IMMATURE GRANS (ABS): 0.06 THOU/MM3 (ref 0–0.07)
IMMATURE GRANULOCYTES: 1.1 %
LYMPHOCYTES # BLD: 16.1 %
LYMPHOCYTES ABSOLUTE: 0.9 THOU/MM3 (ref 1–4.8)
MCH RBC QN AUTO: 29.8 PG (ref 26–33)
MCHC RBC AUTO-ENTMCNC: 32 GM/DL (ref 32.2–35.5)
MCV RBC AUTO: 93.2 FL (ref 80–94)
MONOCYTES # BLD: 12.1 %
MONOCYTES ABSOLUTE: 0.7 THOU/MM3 (ref 0.4–1.3)
NUCLEATED RED BLOOD CELLS: 0 /100 WBC
PLATELET # BLD: 150 THOU/MM3 (ref 130–400)
PMV BLD AUTO: 9.7 FL (ref 9.4–12.4)
POTASSIUM SERPL-SCNC: 3.7 MEQ/L (ref 3.5–5.2)
RBC # BLD: 4.53 MILL/MM3 (ref 4.7–6.1)
SCAN OF BLOOD SMEAR: NORMAL
SEG NEUTROPHILS: 70.5 %
SEGMENTED NEUTROPHILS ABSOLUTE COUNT: 3.9 THOU/MM3 (ref 1.8–7.7)
SODIUM BLD-SCNC: 146 MEQ/L (ref 135–145)
WBC # BLD: 5.5 THOU/MM3 (ref 4.8–10.8)

## 2019-10-04 PROCEDURE — 2000000000 HC ICU R&B

## 2019-10-04 PROCEDURE — 6360000002 HC RX W HCPCS: Performed by: INTERNAL MEDICINE

## 2019-10-04 PROCEDURE — 71045 X-RAY EXAM CHEST 1 VIEW: CPT

## 2019-10-04 PROCEDURE — 6360000002 HC RX W HCPCS: Performed by: FAMILY MEDICINE

## 2019-10-04 PROCEDURE — C9113 INJ PANTOPRAZOLE SODIUM, VIA: HCPCS | Performed by: FAMILY MEDICINE

## 2019-10-04 PROCEDURE — 6370000000 HC RX 637 (ALT 250 FOR IP): Performed by: INTERNAL MEDICINE

## 2019-10-04 PROCEDURE — 85025 COMPLETE CBC W/AUTO DIFF WBC: CPT

## 2019-10-04 PROCEDURE — 2580000003 HC RX 258: Performed by: INTERNAL MEDICINE

## 2019-10-04 PROCEDURE — 2580000003 HC RX 258: Performed by: NURSE PRACTITIONER

## 2019-10-04 PROCEDURE — 36415 COLL VENOUS BLD VENIPUNCTURE: CPT

## 2019-10-04 PROCEDURE — 99291 CRITICAL CARE FIRST HOUR: CPT | Performed by: INTERNAL MEDICINE

## 2019-10-04 PROCEDURE — 94761 N-INVAS EAR/PLS OXIMETRY MLT: CPT

## 2019-10-04 PROCEDURE — 2500000003 HC RX 250 WO HCPCS: Performed by: INTERNAL MEDICINE

## 2019-10-04 PROCEDURE — 2709999900 HC NON-CHARGEABLE SUPPLY

## 2019-10-04 PROCEDURE — 2700000000 HC OXYGEN THERAPY PER DAY

## 2019-10-04 PROCEDURE — 2500000003 HC RX 250 WO HCPCS: Performed by: NURSE PRACTITIONER

## 2019-10-04 PROCEDURE — 94640 AIRWAY INHALATION TREATMENT: CPT

## 2019-10-04 PROCEDURE — 80048 BASIC METABOLIC PNL TOTAL CA: CPT

## 2019-10-04 RX ORDER — DEXTROSE MONOHYDRATE 50 MG/ML
INJECTION, SOLUTION INTRAVENOUS CONTINUOUS
Status: DISCONTINUED | OUTPATIENT
Start: 2019-10-04 | End: 2019-10-06

## 2019-10-04 RX ORDER — QUETIAPINE FUMARATE 25 MG/1
75 TABLET, FILM COATED ORAL NIGHTLY
Status: DISCONTINUED | OUTPATIENT
Start: 2019-10-04 | End: 2019-10-07

## 2019-10-04 RX ADMIN — DEXMEDETOMIDINE 1.4 MCG/KG/HR: 100 INJECTION, SOLUTION, CONCENTRATE INTRAVENOUS at 06:47

## 2019-10-04 RX ADMIN — QUETIAPINE FUMARATE 75 MG: 25 TABLET ORAL at 20:00

## 2019-10-04 RX ADMIN — FORMOTEROL FUMARATE DIHYDRATE 20 MCG: 20 SOLUTION RESPIRATORY (INHALATION) at 08:15

## 2019-10-04 RX ADMIN — DEXMEDETOMIDINE 1.4 MCG/KG/HR: 100 INJECTION, SOLUTION, CONCENTRATE INTRAVENOUS at 02:02

## 2019-10-04 RX ADMIN — PHENOBARBITAL 32.4 MG: 32.4 TABLET ORAL at 20:00

## 2019-10-04 RX ADMIN — ZIPRASIDONE MESYLATE 20 MG: 20 INJECTION, POWDER, LYOPHILIZED, FOR SOLUTION INTRAMUSCULAR at 06:31

## 2019-10-04 RX ADMIN — PANTOPRAZOLE SODIUM 40 MG: 40 INJECTION, POWDER, FOR SOLUTION INTRAVENOUS at 09:48

## 2019-10-04 RX ADMIN — DEXMEDETOMIDINE 1.3 MCG/KG/HR: 100 INJECTION, SOLUTION, CONCENTRATE INTRAVENOUS at 15:51

## 2019-10-04 RX ADMIN — DEXTROSE MONOHYDRATE: 50 INJECTION, SOLUTION INTRAVENOUS at 17:04

## 2019-10-04 RX ADMIN — PHENOBARBITAL SODIUM 130 MG: 65 INJECTION INTRAMUSCULAR; INTRAVENOUS at 22:46

## 2019-10-04 RX ADMIN — DEXMEDETOMIDINE 1.4 MCG/KG/HR: 100 INJECTION, SOLUTION, CONCENTRATE INTRAVENOUS at 04:07

## 2019-10-04 RX ADMIN — Medication 10 ML: at 09:49

## 2019-10-04 RX ADMIN — PHENOBARBITAL 32.4 MG: 32.4 TABLET ORAL at 10:49

## 2019-10-04 RX ADMIN — ZIPRASIDONE MESYLATE 20 MG: 20 INJECTION, POWDER, LYOPHILIZED, FOR SOLUTION INTRAMUSCULAR at 22:58

## 2019-10-04 RX ADMIN — DEXMEDETOMIDINE 1.3 MCG/KG/HR: 100 INJECTION, SOLUTION, CONCENTRATE INTRAVENOUS at 12:55

## 2019-10-04 RX ADMIN — TIOTROPIUM BROMIDE 18 MCG: 18 CAPSULE ORAL; RESPIRATORY (INHALATION) at 08:14

## 2019-10-04 RX ADMIN — Medication 10 ML: at 20:00

## 2019-10-04 RX ADMIN — WATER 1.2 ML: 1 INJECTION INTRAMUSCULAR; INTRAVENOUS; SUBCUTANEOUS at 06:31

## 2019-10-04 RX ADMIN — DEXMEDETOMIDINE 1.4 MCG/KG/HR: 100 INJECTION, SOLUTION, CONCENTRATE INTRAVENOUS at 09:45

## 2019-10-04 RX ADMIN — DILTIAZEM HYDROCHLORIDE 12.5 MG/HR: 5 INJECTION INTRAVENOUS at 20:41

## 2019-10-04 RX ADMIN — Medication 10 ML: at 09:50

## 2019-10-04 RX ADMIN — AMIODARONE HYDROCHLORIDE 0.5 MG/MIN: 1.8 INJECTION, SOLUTION INTRAVENOUS at 23:05

## 2019-10-04 RX ADMIN — DEXMEDETOMIDINE 1.4 MCG/KG/HR: 100 INJECTION, SOLUTION, CONCENTRATE INTRAVENOUS at 21:31

## 2019-10-04 RX ADMIN — DEXMEDETOMIDINE 1.3 MCG/KG/HR: 100 INJECTION, SOLUTION, CONCENTRATE INTRAVENOUS at 18:33

## 2019-10-04 RX ADMIN — AMIODARONE HYDROCHLORIDE 0.5 MG/MIN: 1.8 INJECTION, SOLUTION INTRAVENOUS at 12:23

## 2019-10-04 RX ADMIN — POTASSIUM CHLORIDE 20 MEQ: 29.8 INJECTION, SOLUTION INTRAVENOUS at 09:52

## 2019-10-04 RX ADMIN — PHENOBARBITAL SODIUM 130 MG: 65 INJECTION INTRAMUSCULAR; INTRAVENOUS at 17:20

## 2019-10-04 RX ADMIN — AMIODARONE HYDROCHLORIDE 0.5 MG/MIN: 1.8 INJECTION, SOLUTION INTRAVENOUS at 00:05

## 2019-10-04 RX ADMIN — PHENOBARBITAL SODIUM 130 MG: 65 INJECTION INTRAMUSCULAR; INTRAVENOUS at 18:35

## 2019-10-04 RX ADMIN — DILTIAZEM HYDROCHLORIDE 12.5 MG/HR: 5 INJECTION INTRAVENOUS at 09:48

## 2019-10-04 ASSESSMENT — PAIN SCALES - WONG BAKER
WONGBAKER_NUMERICALRESPONSE: 0
WONGBAKER_NUMERICALRESPONSE: 0

## 2019-10-04 ASSESSMENT — PAIN SCALES - GENERAL
PAINLEVEL_OUTOF10: 0

## 2019-10-05 ENCOUNTER — APPOINTMENT (OUTPATIENT)
Dept: GENERAL RADIOLOGY | Age: 59
DRG: 004 | End: 2019-10-05
Payer: MEDICARE

## 2019-10-05 LAB
ANION GAP SERPL CALCULATED.3IONS-SCNC: 8 MEQ/L (ref 8–16)
BASOPHILS # BLD: 0.2 %
BASOPHILS ABSOLUTE: 0 THOU/MM3 (ref 0–0.1)
BUN BLDV-MCNC: 29 MG/DL (ref 7–22)
CALCIUM SERPL-MCNC: 8 MG/DL (ref 8.5–10.5)
CHLORIDE BLD-SCNC: 103 MEQ/L (ref 98–111)
CO2: 27 MEQ/L (ref 23–33)
CREAT SERPL-MCNC: 0.6 MG/DL (ref 0.4–1.2)
EOSINOPHIL # BLD: 0.6 %
EOSINOPHILS ABSOLUTE: 0 THOU/MM3 (ref 0–0.4)
ERYTHROCYTE [DISTWIDTH] IN BLOOD BY AUTOMATED COUNT: 15.4 % (ref 11.5–14.5)
ERYTHROCYTE [DISTWIDTH] IN BLOOD BY AUTOMATED COUNT: 53.1 FL (ref 35–45)
GFR SERPL CREATININE-BSD FRML MDRD: > 90 ML/MIN/1.73M2
GLUCOSE BLD-MCNC: 162 MG/DL (ref 70–108)
GLUCOSE BLD-MCNC: 174 MG/DL (ref 70–108)
HCT VFR BLD CALC: 40.3 % (ref 42–52)
HEMOGLOBIN: 12.7 GM/DL (ref 14–18)
IMMATURE GRANS (ABS): 0.05 THOU/MM3 (ref 0–0.07)
IMMATURE GRANULOCYTES: 0.8 %
LYMPHOCYTES # BLD: 16 %
LYMPHOCYTES ABSOLUTE: 1 THOU/MM3 (ref 1–4.8)
MCH RBC QN AUTO: 29.3 PG (ref 26–33)
MCHC RBC AUTO-ENTMCNC: 31.5 GM/DL (ref 32.2–35.5)
MCV RBC AUTO: 93.1 FL (ref 80–94)
MONOCYTES # BLD: 9.8 %
MONOCYTES ABSOLUTE: 0.6 THOU/MM3 (ref 0.4–1.3)
NUCLEATED RED BLOOD CELLS: 0 /100 WBC
PLATELET # BLD: 140 THOU/MM3 (ref 130–400)
PMV BLD AUTO: 9.9 FL (ref 9.4–12.4)
POTASSIUM SERPL-SCNC: 3.6 MEQ/L (ref 3.5–5.2)
RBC # BLD: 4.33 MILL/MM3 (ref 4.7–6.1)
SEG NEUTROPHILS: 72.6 %
SEGMENTED NEUTROPHILS ABSOLUTE COUNT: 4.7 THOU/MM3 (ref 1.8–7.7)
SODIUM BLD-SCNC: 138 MEQ/L (ref 135–145)
WBC # BLD: 6.5 THOU/MM3 (ref 4.8–10.8)

## 2019-10-05 PROCEDURE — 94761 N-INVAS EAR/PLS OXIMETRY MLT: CPT

## 2019-10-05 PROCEDURE — 6360000002 HC RX W HCPCS: Performed by: FAMILY MEDICINE

## 2019-10-05 PROCEDURE — 82948 REAGENT STRIP/BLOOD GLUCOSE: CPT

## 2019-10-05 PROCEDURE — 2500000003 HC RX 250 WO HCPCS: Performed by: INTERNAL MEDICINE

## 2019-10-05 PROCEDURE — 6360000002 HC RX W HCPCS: Performed by: INTERNAL MEDICINE

## 2019-10-05 PROCEDURE — 2709999900 HC NON-CHARGEABLE SUPPLY

## 2019-10-05 PROCEDURE — 85025 COMPLETE CBC W/AUTO DIFF WBC: CPT

## 2019-10-05 PROCEDURE — 36415 COLL VENOUS BLD VENIPUNCTURE: CPT

## 2019-10-05 PROCEDURE — 2580000003 HC RX 258: Performed by: INTERNAL MEDICINE

## 2019-10-05 PROCEDURE — 71045 X-RAY EXAM CHEST 1 VIEW: CPT

## 2019-10-05 PROCEDURE — C9113 INJ PANTOPRAZOLE SODIUM, VIA: HCPCS | Performed by: FAMILY MEDICINE

## 2019-10-05 PROCEDURE — 99233 SBSQ HOSP IP/OBS HIGH 50: CPT | Performed by: INTERNAL MEDICINE

## 2019-10-05 PROCEDURE — G0008 ADMIN INFLUENZA VIRUS VAC: HCPCS | Performed by: INTERNAL MEDICINE

## 2019-10-05 PROCEDURE — 94640 AIRWAY INHALATION TREATMENT: CPT

## 2019-10-05 PROCEDURE — 2700000000 HC OXYGEN THERAPY PER DAY

## 2019-10-05 PROCEDURE — 6370000000 HC RX 637 (ALT 250 FOR IP): Performed by: INTERNAL MEDICINE

## 2019-10-05 PROCEDURE — 90686 IIV4 VACC NO PRSV 0.5 ML IM: CPT | Performed by: INTERNAL MEDICINE

## 2019-10-05 PROCEDURE — 2000000000 HC ICU R&B

## 2019-10-05 PROCEDURE — 80048 BASIC METABOLIC PNL TOTAL CA: CPT

## 2019-10-05 PROCEDURE — 2580000003 HC RX 258: Performed by: NURSE PRACTITIONER

## 2019-10-05 PROCEDURE — 2500000003 HC RX 250 WO HCPCS: Performed by: NURSE PRACTITIONER

## 2019-10-05 RX ORDER — DILTIAZEM HYDROCHLORIDE 5 MG/ML
10 INJECTION INTRAVENOUS ONCE
Status: COMPLETED | OUTPATIENT
Start: 2019-10-05 | End: 2019-10-05

## 2019-10-05 RX ADMIN — DILTIAZEM HYDROCHLORIDE 10 MG: 5 INJECTION INTRAVENOUS at 15:47

## 2019-10-05 RX ADMIN — POTASSIUM CHLORIDE 20 MEQ: 29.8 INJECTION, SOLUTION INTRAVENOUS at 08:47

## 2019-10-05 RX ADMIN — DEXTROSE MONOHYDRATE: 50 INJECTION, SOLUTION INTRAVENOUS at 15:26

## 2019-10-05 RX ADMIN — Medication 10 ML: at 20:49

## 2019-10-05 RX ADMIN — DEXMEDETOMIDINE 1.3 MCG/KG/HR: 100 INJECTION, SOLUTION, CONCENTRATE INTRAVENOUS at 22:08

## 2019-10-05 RX ADMIN — AMIODARONE HYDROCHLORIDE 0.5 MG/MIN: 1.8 INJECTION, SOLUTION INTRAVENOUS at 12:02

## 2019-10-05 RX ADMIN — DEXMEDETOMIDINE 1.3 MCG/KG/HR: 100 INJECTION, SOLUTION, CONCENTRATE INTRAVENOUS at 06:27

## 2019-10-05 RX ADMIN — Medication 10 ML: at 08:49

## 2019-10-05 RX ADMIN — FORMOTEROL FUMARATE DIHYDRATE 20 MCG: 20 SOLUTION RESPIRATORY (INHALATION) at 09:15

## 2019-10-05 RX ADMIN — DEXMEDETOMIDINE 0.9 MCG/KG/HR: 100 INJECTION, SOLUTION, CONCENTRATE INTRAVENOUS at 13:08

## 2019-10-05 RX ADMIN — QUETIAPINE FUMARATE 75 MG: 25 TABLET ORAL at 20:49

## 2019-10-05 RX ADMIN — DEXTROSE MONOHYDRATE: 50 INJECTION, SOLUTION INTRAVENOUS at 03:33

## 2019-10-05 RX ADMIN — INFLUENZA A VIRUS A/BRISBANE/02/2018 IVR-190 (H1N1) ANTIGEN (PROPIOLACTONE INACTIVATED), INFLUENZA A VIRUS A/KANSAS/14/2017 X-327 (H3N2) ANTIGEN (PROPIOLACTONE INACTIVATED), INFLUENZA B VIRUS B/MARYLAND/15/2016 ANTIGEN (PROPIOLACTONE INACTIVATED), INFLUENZA B VIRUS B/PHUKET/3073/2013 BVR-1B ANTIGEN (PROPIOLACTONE INACTIVATED) 0.5 ML: 15; 15; 15; 15 INJECTION, SUSPENSION INTRAMUSCULAR at 08:48

## 2019-10-05 RX ADMIN — DEXMEDETOMIDINE 1.3 MCG/KG/HR: 100 INJECTION, SOLUTION, CONCENTRATE INTRAVENOUS at 16:01

## 2019-10-05 RX ADMIN — AMIODARONE HYDROCHLORIDE 0.5 MG/MIN: 1.8 INJECTION, SOLUTION INTRAVENOUS at 21:12

## 2019-10-05 RX ADMIN — PANTOPRAZOLE SODIUM 40 MG: 40 INJECTION, POWDER, FOR SOLUTION INTRAVENOUS at 08:47

## 2019-10-05 RX ADMIN — ZIPRASIDONE MESYLATE 20 MG: 20 INJECTION, POWDER, LYOPHILIZED, FOR SOLUTION INTRAMUSCULAR at 14:44

## 2019-10-05 RX ADMIN — DEXMEDETOMIDINE 1.2 MCG/KG/HR: 100 INJECTION, SOLUTION, CONCENTRATE INTRAVENOUS at 09:08

## 2019-10-05 RX ADMIN — DEXMEDETOMIDINE 1.3 MCG/KG/HR: 100 INJECTION, SOLUTION, CONCENTRATE INTRAVENOUS at 00:22

## 2019-10-05 RX ADMIN — DEXMEDETOMIDINE 1.3 MCG/KG/HR: 100 INJECTION, SOLUTION, CONCENTRATE INTRAVENOUS at 03:18

## 2019-10-05 RX ADMIN — DILTIAZEM HYDROCHLORIDE 10 MG/ML: 5 INJECTION INTRAVENOUS at 16:11

## 2019-10-05 RX ADMIN — DEXMEDETOMIDINE 1.3 MCG/KG/HR: 100 INJECTION, SOLUTION, CONCENTRATE INTRAVENOUS at 19:10

## 2019-10-05 RX ADMIN — WATER 1.2 ML: 1 INJECTION INTRAMUSCULAR; INTRAVENOUS; SUBCUTANEOUS at 14:44

## 2019-10-05 RX ADMIN — FORMOTEROL FUMARATE DIHYDRATE 20 MCG: 20 SOLUTION RESPIRATORY (INHALATION) at 18:32

## 2019-10-05 ASSESSMENT — PAIN SCALES - GENERAL
PAINLEVEL_OUTOF10: 0

## 2019-10-06 ENCOUNTER — APPOINTMENT (OUTPATIENT)
Dept: GENERAL RADIOLOGY | Age: 59
DRG: 004 | End: 2019-10-06
Payer: MEDICARE

## 2019-10-06 LAB
PLATELET # BLD: 128 THOU/MM3 (ref 130–400)
VIRAL CULTURE,RAPID,RESPIRATOR: NORMAL

## 2019-10-06 PROCEDURE — 6360000002 HC RX W HCPCS: Performed by: FAMILY MEDICINE

## 2019-10-06 PROCEDURE — 99233 SBSQ HOSP IP/OBS HIGH 50: CPT | Performed by: INTERNAL MEDICINE

## 2019-10-06 PROCEDURE — 2580000003 HC RX 258: Performed by: INTERNAL MEDICINE

## 2019-10-06 PROCEDURE — 2500000003 HC RX 250 WO HCPCS: Performed by: NURSE PRACTITIONER

## 2019-10-06 PROCEDURE — 2000000000 HC ICU R&B

## 2019-10-06 PROCEDURE — 94640 AIRWAY INHALATION TREATMENT: CPT

## 2019-10-06 PROCEDURE — 2500000003 HC RX 250 WO HCPCS: Performed by: INTERNAL MEDICINE

## 2019-10-06 PROCEDURE — 2700000000 HC OXYGEN THERAPY PER DAY

## 2019-10-06 PROCEDURE — 36592 COLLECT BLOOD FROM PICC: CPT

## 2019-10-06 PROCEDURE — 94761 N-INVAS EAR/PLS OXIMETRY MLT: CPT

## 2019-10-06 PROCEDURE — 6370000000 HC RX 637 (ALT 250 FOR IP): Performed by: INTERNAL MEDICINE

## 2019-10-06 PROCEDURE — 36415 COLL VENOUS BLD VENIPUNCTURE: CPT

## 2019-10-06 PROCEDURE — 71045 X-RAY EXAM CHEST 1 VIEW: CPT

## 2019-10-06 PROCEDURE — C9113 INJ PANTOPRAZOLE SODIUM, VIA: HCPCS | Performed by: FAMILY MEDICINE

## 2019-10-06 PROCEDURE — 6360000002 HC RX W HCPCS: Performed by: INTERNAL MEDICINE

## 2019-10-06 PROCEDURE — 2580000003 HC RX 258: Performed by: NURSE PRACTITIONER

## 2019-10-06 PROCEDURE — 85049 AUTOMATED PLATELET COUNT: CPT

## 2019-10-06 PROCEDURE — 2709999900 HC NON-CHARGEABLE SUPPLY

## 2019-10-06 RX ORDER — FENTANYL CITRATE 50 UG/ML
50 INJECTION, SOLUTION INTRAMUSCULAR; INTRAVENOUS
Status: DISCONTINUED | OUTPATIENT
Start: 2019-10-06 | End: 2019-10-07

## 2019-10-06 RX ORDER — FUROSEMIDE 10 MG/ML
40 INJECTION INTRAMUSCULAR; INTRAVENOUS ONCE
Status: COMPLETED | OUTPATIENT
Start: 2019-10-06 | End: 2019-10-06

## 2019-10-06 RX ORDER — AMIODARONE HYDROCHLORIDE 200 MG/1
400 TABLET ORAL 2 TIMES DAILY
Status: DISCONTINUED | OUTPATIENT
Start: 2019-10-06 | End: 2019-10-10

## 2019-10-06 RX ORDER — CHLORDIAZEPOXIDE HYDROCHLORIDE 25 MG/1
50 CAPSULE, GELATIN COATED ORAL 4 TIMES DAILY
Status: DISCONTINUED | OUTPATIENT
Start: 2019-10-06 | End: 2019-10-07

## 2019-10-06 RX ORDER — CHLORDIAZEPOXIDE HYDROCHLORIDE 25 MG/1
25 CAPSULE, GELATIN COATED ORAL 4 TIMES DAILY
Status: DISCONTINUED | OUTPATIENT
Start: 2019-10-06 | End: 2019-10-06

## 2019-10-06 RX ADMIN — AMIODARONE HYDROCHLORIDE 400 MG: 200 TABLET ORAL at 13:05

## 2019-10-06 RX ADMIN — FORMOTEROL FUMARATE DIHYDRATE 20 MCG: 20 SOLUTION RESPIRATORY (INHALATION) at 08:27

## 2019-10-06 RX ADMIN — DEXMEDETOMIDINE 1.2 MCG/KG/HR: 100 INJECTION, SOLUTION, CONCENTRATE INTRAVENOUS at 13:00

## 2019-10-06 RX ADMIN — CHLORDIAZEPOXIDE HYDROCHLORIDE 50 MG: 25 CAPSULE ORAL at 18:27

## 2019-10-06 RX ADMIN — TIOTROPIUM BROMIDE 18 MCG: 18 CAPSULE ORAL; RESPIRATORY (INHALATION) at 08:27

## 2019-10-06 RX ADMIN — DEXMEDETOMIDINE 1.3 MCG/KG/HR: 100 INJECTION, SOLUTION, CONCENTRATE INTRAVENOUS at 08:34

## 2019-10-06 RX ADMIN — DEXMEDETOMIDINE 1.3 MCG/KG/HR: 100 INJECTION, SOLUTION, CONCENTRATE INTRAVENOUS at 19:23

## 2019-10-06 RX ADMIN — CHLORDIAZEPOXIDE HYDROCHLORIDE 25 MG: 25 CAPSULE ORAL at 13:05

## 2019-10-06 RX ADMIN — DEXMEDETOMIDINE 1.3 MCG/KG/HR: 100 INJECTION, SOLUTION, CONCENTRATE INTRAVENOUS at 22:13

## 2019-10-06 RX ADMIN — QUETIAPINE FUMARATE 75 MG: 25 TABLET ORAL at 19:40

## 2019-10-06 RX ADMIN — POTASSIUM CHLORIDE 20 MEQ: 29.8 INJECTION, SOLUTION INTRAVENOUS at 08:34

## 2019-10-06 RX ADMIN — Medication 10 ML: at 08:35

## 2019-10-06 RX ADMIN — ZIPRASIDONE MESYLATE 20 MG: 20 INJECTION, POWDER, LYOPHILIZED, FOR SOLUTION INTRAMUSCULAR at 18:45

## 2019-10-06 RX ADMIN — AMIODARONE HYDROCHLORIDE 400 MG: 200 TABLET ORAL at 19:40

## 2019-10-06 RX ADMIN — DEXMEDETOMIDINE 1.2 MCG/HR: 100 INJECTION, SOLUTION, CONCENTRATE INTRAVENOUS at 16:17

## 2019-10-06 RX ADMIN — FENTANYL CITRATE 50 MCG: 50 INJECTION INTRAMUSCULAR; INTRAVENOUS at 18:27

## 2019-10-06 RX ADMIN — DEXMEDETOMIDINE 1.3 MCG/KG/HR: 100 INJECTION, SOLUTION, CONCENTRATE INTRAVENOUS at 06:54

## 2019-10-06 RX ADMIN — Medication 10 ML: at 19:40

## 2019-10-06 RX ADMIN — FUROSEMIDE 40 MG: 10 INJECTION, SOLUTION INTRAMUSCULAR; INTRAVENOUS at 13:05

## 2019-10-06 RX ADMIN — DEXMEDETOMIDINE 1.3 MCG/KG/HR: 100 INJECTION, SOLUTION, CONCENTRATE INTRAVENOUS at 04:00

## 2019-10-06 RX ADMIN — AMIODARONE HYDROCHLORIDE 0.5 MG/MIN: 1.8 INJECTION, SOLUTION INTRAVENOUS at 09:28

## 2019-10-06 RX ADMIN — PANTOPRAZOLE SODIUM 40 MG: 40 INJECTION, POWDER, FOR SOLUTION INTRAVENOUS at 08:34

## 2019-10-06 RX ADMIN — DILTIAZEM HYDROCHLORIDE 7.5 MG/HR: 5 INJECTION INTRAVENOUS at 20:25

## 2019-10-06 ASSESSMENT — PAIN SCALES - GENERAL
PAINLEVEL_OUTOF10: 0
PAINLEVEL_OUTOF10: 0
PAINLEVEL_OUTOF10: 5
PAINLEVEL_OUTOF10: 0

## 2019-10-07 LAB
ANION GAP SERPL CALCULATED.3IONS-SCNC: 9 MEQ/L (ref 8–16)
APTT: 122.4 SECONDS (ref 22–38)
APTT: 29.1 SECONDS (ref 22–38)
BUN BLDV-MCNC: 13 MG/DL (ref 7–22)
CALCIUM IONIZED: 1.09 MMOL/L (ref 1.12–1.32)
CALCIUM SERPL-MCNC: 8.8 MG/DL (ref 8.5–10.5)
CHLORIDE BLD-SCNC: 95 MEQ/L (ref 98–111)
CO2: 33 MEQ/L (ref 23–33)
CREAT SERPL-MCNC: 0.6 MG/DL (ref 0.4–1.2)
ERYTHROCYTE [DISTWIDTH] IN BLOOD BY AUTOMATED COUNT: 15.2 % (ref 11.5–14.5)
ERYTHROCYTE [DISTWIDTH] IN BLOOD BY AUTOMATED COUNT: 52.3 FL (ref 35–45)
GFR SERPL CREATININE-BSD FRML MDRD: > 90 ML/MIN/1.73M2
GLUCOSE BLD-MCNC: 187 MG/DL (ref 70–108)
HCT VFR BLD CALC: 38.9 % (ref 42–52)
HCT VFR BLD CALC: 40.6 % (ref 42–52)
HEMOGLOBIN: 12.3 GM/DL (ref 14–18)
HEMOGLOBIN: 13.2 GM/DL (ref 14–18)
MAGNESIUM: 1.8 MG/DL (ref 1.6–2.4)
MCH RBC QN AUTO: 29.5 PG (ref 26–33)
MCHC RBC AUTO-ENTMCNC: 31.6 GM/DL (ref 32.2–35.5)
MCV RBC AUTO: 93.3 FL (ref 80–94)
PLATELET # BLD: 129 THOU/MM3 (ref 130–400)
PMV BLD AUTO: 10.2 FL (ref 9.4–12.4)
POTASSIUM SERPL-SCNC: 3.4 MEQ/L (ref 3.5–5.2)
POTASSIUM SERPL-SCNC: 3.9 MEQ/L (ref 3.5–5.2)
RBC # BLD: 4.17 MILL/MM3 (ref 4.7–6.1)
SODIUM BLD-SCNC: 137 MEQ/L (ref 135–145)
WBC # BLD: 6.3 THOU/MM3 (ref 4.8–10.8)

## 2019-10-07 PROCEDURE — 85018 HEMOGLOBIN: CPT

## 2019-10-07 PROCEDURE — 2000000000 HC ICU R&B

## 2019-10-07 PROCEDURE — 36415 COLL VENOUS BLD VENIPUNCTURE: CPT

## 2019-10-07 PROCEDURE — 2500000003 HC RX 250 WO HCPCS: Performed by: NURSE PRACTITIONER

## 2019-10-07 PROCEDURE — 2580000003 HC RX 258: Performed by: NURSE PRACTITIONER

## 2019-10-07 PROCEDURE — C9113 INJ PANTOPRAZOLE SODIUM, VIA: HCPCS | Performed by: FAMILY MEDICINE

## 2019-10-07 PROCEDURE — 85027 COMPLETE CBC AUTOMATED: CPT

## 2019-10-07 PROCEDURE — 83735 ASSAY OF MAGNESIUM: CPT

## 2019-10-07 PROCEDURE — 82330 ASSAY OF CALCIUM: CPT

## 2019-10-07 PROCEDURE — 6360000002 HC RX W HCPCS: Performed by: FAMILY MEDICINE

## 2019-10-07 PROCEDURE — 93005 ELECTROCARDIOGRAM TRACING: CPT | Performed by: NURSE PRACTITIONER

## 2019-10-07 PROCEDURE — 80048 BASIC METABOLIC PNL TOTAL CA: CPT

## 2019-10-07 PROCEDURE — 2580000003 HC RX 258: Performed by: INTERNAL MEDICINE

## 2019-10-07 PROCEDURE — 84132 ASSAY OF SERUM POTASSIUM: CPT

## 2019-10-07 PROCEDURE — 99233 SBSQ HOSP IP/OBS HIGH 50: CPT | Performed by: INTERNAL MEDICINE

## 2019-10-07 PROCEDURE — 6360000002 HC RX W HCPCS: Performed by: INTERNAL MEDICINE

## 2019-10-07 PROCEDURE — 2709999900 HC NON-CHARGEABLE SUPPLY

## 2019-10-07 PROCEDURE — 85014 HEMATOCRIT: CPT

## 2019-10-07 PROCEDURE — 2700000000 HC OXYGEN THERAPY PER DAY

## 2019-10-07 PROCEDURE — 85730 THROMBOPLASTIN TIME PARTIAL: CPT

## 2019-10-07 PROCEDURE — 94640 AIRWAY INHALATION TREATMENT: CPT

## 2019-10-07 PROCEDURE — 94761 N-INVAS EAR/PLS OXIMETRY MLT: CPT

## 2019-10-07 PROCEDURE — 6370000000 HC RX 637 (ALT 250 FOR IP): Performed by: INTERNAL MEDICINE

## 2019-10-07 RX ORDER — HEPARIN SODIUM 1000 [USP'U]/ML
78 INJECTION, SOLUTION INTRAVENOUS; SUBCUTANEOUS PRN
Status: DISCONTINUED | OUTPATIENT
Start: 2019-10-07 | End: 2019-10-10

## 2019-10-07 RX ORDER — HEPARIN SODIUM 1000 [USP'U]/ML
39 INJECTION, SOLUTION INTRAVENOUS; SUBCUTANEOUS PRN
Status: DISCONTINUED | OUTPATIENT
Start: 2019-10-07 | End: 2019-10-10

## 2019-10-07 RX ORDER — BUPRENORPHINE AND NALOXONE 8; 2 MG/1; MG/1
1 FILM, SOLUBLE BUCCAL; SUBLINGUAL DAILY
Status: DISCONTINUED | OUTPATIENT
Start: 2019-10-07 | End: 2019-10-10

## 2019-10-07 RX ORDER — FUROSEMIDE 10 MG/ML
40 INJECTION INTRAMUSCULAR; INTRAVENOUS ONCE
Status: COMPLETED | OUTPATIENT
Start: 2019-10-07 | End: 2019-10-07

## 2019-10-07 RX ORDER — POTASSIUM CHLORIDE 29.8 MG/ML
20 INJECTION INTRAVENOUS
Status: COMPLETED | OUTPATIENT
Start: 2019-10-08 | End: 2019-10-08

## 2019-10-07 RX ORDER — METOPROLOL TARTRATE 5 MG/5ML
5 INJECTION INTRAVENOUS EVERY 6 HOURS
Status: DISCONTINUED | OUTPATIENT
Start: 2019-10-07 | End: 2019-10-08

## 2019-10-07 RX ORDER — DILTIAZEM HYDROCHLORIDE 60 MG/1
60 TABLET, FILM COATED ORAL EVERY 6 HOURS SCHEDULED
Status: DISCONTINUED | OUTPATIENT
Start: 2019-10-07 | End: 2019-10-10

## 2019-10-07 RX ORDER — CHLORDIAZEPOXIDE HYDROCHLORIDE 25 MG/1
100 CAPSULE, GELATIN COATED ORAL 3 TIMES DAILY
Status: DISCONTINUED | OUTPATIENT
Start: 2019-10-07 | End: 2019-10-09

## 2019-10-07 RX ORDER — HEPARIN SODIUM 10000 [USP'U]/100ML
13 INJECTION, SOLUTION INTRAVENOUS CONTINUOUS
Status: DISCONTINUED | OUTPATIENT
Start: 2019-10-07 | End: 2019-10-15

## 2019-10-07 RX ORDER — METOPROLOL TARTRATE 5 MG/5ML
2.5 INJECTION INTRAVENOUS ONCE
Status: COMPLETED | OUTPATIENT
Start: 2019-10-07 | End: 2019-10-07

## 2019-10-07 RX ADMIN — AMIODARONE HYDROCHLORIDE 400 MG: 200 TABLET ORAL at 09:03

## 2019-10-07 RX ADMIN — FORMOTEROL FUMARATE DIHYDRATE 20 MCG: 20 SOLUTION RESPIRATORY (INHALATION) at 08:48

## 2019-10-07 RX ADMIN — HEPARIN SODIUM 16 UNITS/KG/HR: 10000 INJECTION, SOLUTION INTRAVENOUS at 13:11

## 2019-10-07 RX ADMIN — DILTIAZEM HYDROCHLORIDE 60 MG: 60 TABLET, FILM COATED ORAL at 18:05

## 2019-10-07 RX ADMIN — AMIODARONE HYDROCHLORIDE 400 MG: 200 TABLET ORAL at 20:11

## 2019-10-07 RX ADMIN — DEXMEDETOMIDINE 1.3 MCG/KG/HR: 100 INJECTION, SOLUTION, CONCENTRATE INTRAVENOUS at 03:41

## 2019-10-07 RX ADMIN — METOPROLOL TARTRATE 5 MG: 5 INJECTION INTRAVENOUS at 20:58

## 2019-10-07 RX ADMIN — CHLORDIAZEPOXIDE HYDROCHLORIDE 50 MG: 25 CAPSULE ORAL at 09:09

## 2019-10-07 RX ADMIN — DEXMEDETOMIDINE 0.4 MCG/KG/HR: 100 INJECTION, SOLUTION, CONCENTRATE INTRAVENOUS at 20:18

## 2019-10-07 RX ADMIN — PANTOPRAZOLE SODIUM 40 MG: 40 INJECTION, POWDER, FOR SOLUTION INTRAVENOUS at 08:25

## 2019-10-07 RX ADMIN — BUPRENORPHINE HYDROCHLORIDE, NALOXONE HYDROCHLORIDE 1 FILM: 8; 2 FILM, SOLUBLE BUCCAL; SUBLINGUAL at 13:32

## 2019-10-07 RX ADMIN — FUROSEMIDE 40 MG: 10 INJECTION, SOLUTION INTRAMUSCULAR; INTRAVENOUS at 12:02

## 2019-10-07 RX ADMIN — QUETIAPINE FUMARATE 150 MG: 100 TABLET ORAL at 20:11

## 2019-10-07 RX ADMIN — CHLORDIAZEPOXIDE HYDROCHLORIDE 100 MG: 25 CAPSULE ORAL at 14:39

## 2019-10-07 RX ADMIN — DEXMEDETOMIDINE 0.8 MCG/KG/HR: 100 INJECTION, SOLUTION, CONCENTRATE INTRAVENOUS at 13:14

## 2019-10-07 RX ADMIN — DILTIAZEM HYDROCHLORIDE 30 MG: 30 TABLET, FILM COATED ORAL at 12:10

## 2019-10-07 RX ADMIN — METOPROLOL TARTRATE 2.5 MG: 5 INJECTION INTRAVENOUS at 23:03

## 2019-10-07 RX ADMIN — Medication 10 ML: at 08:25

## 2019-10-07 RX ADMIN — TIOTROPIUM BROMIDE 18 MCG: 18 CAPSULE ORAL; RESPIRATORY (INHALATION) at 08:58

## 2019-10-07 RX ADMIN — DEXMEDETOMIDINE 1.3 MCG/KG/HR: 100 INJECTION, SOLUTION, CONCENTRATE INTRAVENOUS at 01:07

## 2019-10-07 RX ADMIN — CHLORDIAZEPOXIDE HYDROCHLORIDE 100 MG: 25 CAPSULE ORAL at 20:11

## 2019-10-07 RX ADMIN — POTASSIUM CHLORIDE 20 MEQ: 29.8 INJECTION, SOLUTION INTRAVENOUS at 08:35

## 2019-10-07 RX ADMIN — DEXMEDETOMIDINE 1.2 MCG/KG/HR: 100 INJECTION, SOLUTION, CONCENTRATE INTRAVENOUS at 09:36

## 2019-10-07 RX ADMIN — FORMOTEROL FUMARATE DIHYDRATE 20 MCG: 20 SOLUTION RESPIRATORY (INHALATION) at 20:33

## 2019-10-07 RX ADMIN — DEXMEDETOMIDINE 1.3 MCG/KG/HR: 100 INJECTION, SOLUTION, CONCENTRATE INTRAVENOUS at 06:32

## 2019-10-07 RX ADMIN — DILTIAZEM HYDROCHLORIDE 30 MG: 30 TABLET, FILM COATED ORAL at 13:45

## 2019-10-07 RX ADMIN — Medication 10 ML: at 20:11

## 2019-10-07 ASSESSMENT — PAIN SCALES - GENERAL
PAINLEVEL_OUTOF10: 0

## 2019-10-07 ASSESSMENT — PAIN SCALES - WONG BAKER
WONGBAKER_NUMERICALRESPONSE: 0

## 2019-10-08 LAB
APTT: 103.4 SECONDS (ref 22–38)
APTT: 74.6 SECONDS (ref 22–38)
APTT: 77.1 SECONDS (ref 22–38)
EKG ATRIAL RATE: 312 BPM
EKG Q-T INTERVAL: 284 MS
EKG QRS DURATION: 90 MS
EKG QTC CALCULATION (BAZETT): 454 MS
EKG R AXIS: -168 DEGREES
EKG T AXIS: -10 DEGREES
EKG VENTRICULAR RATE: 154 BPM
PLATELET # BLD: 128 THOU/MM3 (ref 130–400)
POTASSIUM SERPL-SCNC: 3.9 MEQ/L (ref 3.5–5.2)

## 2019-10-08 PROCEDURE — 2500000003 HC RX 250 WO HCPCS: Performed by: NURSE PRACTITIONER

## 2019-10-08 PROCEDURE — 97110 THERAPEUTIC EXERCISES: CPT

## 2019-10-08 PROCEDURE — 84132 ASSAY OF SERUM POTASSIUM: CPT

## 2019-10-08 PROCEDURE — C9113 INJ PANTOPRAZOLE SODIUM, VIA: HCPCS | Performed by: FAMILY MEDICINE

## 2019-10-08 PROCEDURE — 2580000003 HC RX 258: Performed by: NURSE PRACTITIONER

## 2019-10-08 PROCEDURE — 6360000002 HC RX W HCPCS: Performed by: INTERNAL MEDICINE

## 2019-10-08 PROCEDURE — 6360000002 HC RX W HCPCS: Performed by: FAMILY MEDICINE

## 2019-10-08 PROCEDURE — 6370000000 HC RX 637 (ALT 250 FOR IP): Performed by: NURSE PRACTITIONER

## 2019-10-08 PROCEDURE — 36592 COLLECT BLOOD FROM PICC: CPT

## 2019-10-08 PROCEDURE — 94761 N-INVAS EAR/PLS OXIMETRY MLT: CPT

## 2019-10-08 PROCEDURE — 2709999900 HC NON-CHARGEABLE SUPPLY

## 2019-10-08 PROCEDURE — 6370000000 HC RX 637 (ALT 250 FOR IP): Performed by: INTERNAL MEDICINE

## 2019-10-08 PROCEDURE — 85049 AUTOMATED PLATELET COUNT: CPT

## 2019-10-08 PROCEDURE — 94640 AIRWAY INHALATION TREATMENT: CPT

## 2019-10-08 PROCEDURE — 97167 OT EVAL HIGH COMPLEX 60 MIN: CPT

## 2019-10-08 PROCEDURE — 2060000000 HC ICU INTERMEDIATE R&B

## 2019-10-08 PROCEDURE — 85730 THROMBOPLASTIN TIME PARTIAL: CPT

## 2019-10-08 PROCEDURE — 36415 COLL VENOUS BLD VENIPUNCTURE: CPT

## 2019-10-08 PROCEDURE — 2580000003 HC RX 258: Performed by: INTERNAL MEDICINE

## 2019-10-08 PROCEDURE — 6360000002 HC RX W HCPCS: Performed by: NURSE PRACTITIONER

## 2019-10-08 PROCEDURE — 93010 ELECTROCARDIOGRAM REPORT: CPT | Performed by: NUCLEAR MEDICINE

## 2019-10-08 PROCEDURE — 99233 SBSQ HOSP IP/OBS HIGH 50: CPT | Performed by: INTERNAL MEDICINE

## 2019-10-08 RX ORDER — QUETIAPINE FUMARATE 100 MG/1
300 TABLET, FILM COATED ORAL NIGHTLY
Status: DISCONTINUED | OUTPATIENT
Start: 2019-10-08 | End: 2019-10-10

## 2019-10-08 RX ORDER — METOPROLOL TARTRATE 50 MG/1
50 TABLET, FILM COATED ORAL 2 TIMES DAILY
Status: DISCONTINUED | OUTPATIENT
Start: 2019-10-08 | End: 2019-10-10

## 2019-10-08 RX ORDER — POTASSIUM CHLORIDE 750 MG/1
20 TABLET, FILM COATED, EXTENDED RELEASE ORAL DAILY
Status: DISCONTINUED | OUTPATIENT
Start: 2019-10-08 | End: 2019-10-12 | Stop reason: ALTCHOICE

## 2019-10-08 RX ORDER — PANTOPRAZOLE SODIUM 40 MG/1
40 TABLET, DELAYED RELEASE ORAL
Status: DISCONTINUED | OUTPATIENT
Start: 2019-10-09 | End: 2019-10-10

## 2019-10-08 RX ADMIN — METOPROLOL TARTRATE 5 MG: 5 INJECTION INTRAVENOUS at 08:28

## 2019-10-08 RX ADMIN — ACETAMINOPHEN 650 MG: 325 TABLET ORAL at 21:38

## 2019-10-08 RX ADMIN — POTASSIUM CHLORIDE 20 MEQ: 400 INJECTION, SOLUTION INTRAVENOUS at 01:14

## 2019-10-08 RX ADMIN — POTASSIUM CHLORIDE 20 MEQ: 29.8 INJECTION, SOLUTION INTRAVENOUS at 02:16

## 2019-10-08 RX ADMIN — Medication 10 ML: at 08:29

## 2019-10-08 RX ADMIN — CHLORDIAZEPOXIDE HYDROCHLORIDE 100 MG: 25 CAPSULE ORAL at 08:28

## 2019-10-08 RX ADMIN — CHLORDIAZEPOXIDE HYDROCHLORIDE 100 MG: 25 CAPSULE ORAL at 21:38

## 2019-10-08 RX ADMIN — DILTIAZEM HYDROCHLORIDE 60 MG: 60 TABLET, FILM COATED ORAL at 05:58

## 2019-10-08 RX ADMIN — PANTOPRAZOLE SODIUM 40 MG: 40 INJECTION, POWDER, FOR SOLUTION INTRAVENOUS at 08:28

## 2019-10-08 RX ADMIN — AMIODARONE HYDROCHLORIDE 400 MG: 200 TABLET ORAL at 08:28

## 2019-10-08 RX ADMIN — DILTIAZEM HYDROCHLORIDE 60 MG: 60 TABLET, FILM COATED ORAL at 01:15

## 2019-10-08 RX ADMIN — BUPRENORPHINE HYDROCHLORIDE, NALOXONE HYDROCHLORIDE 1 FILM: 8; 2 FILM, SOLUBLE BUCCAL; SUBLINGUAL at 08:28

## 2019-10-08 RX ADMIN — Medication 10 ML: at 21:38

## 2019-10-08 RX ADMIN — POTASSIUM CHLORIDE 20 MEQ: 400 INJECTION, SOLUTION INTRAVENOUS at 02:17

## 2019-10-08 RX ADMIN — METOPROLOL TARTRATE 5 MG: 5 INJECTION INTRAVENOUS at 03:36

## 2019-10-08 RX ADMIN — FORMOTEROL FUMARATE DIHYDRATE 20 MCG: 20 SOLUTION RESPIRATORY (INHALATION) at 22:03

## 2019-10-08 RX ADMIN — TIOTROPIUM BROMIDE 18 MCG: 18 CAPSULE ORAL; RESPIRATORY (INHALATION) at 09:17

## 2019-10-08 RX ADMIN — FORMOTEROL FUMARATE DIHYDRATE 20 MCG: 20 SOLUTION RESPIRATORY (INHALATION) at 09:19

## 2019-10-08 RX ADMIN — HEPARIN SODIUM 13 UNITS/KG/HR: 10000 INJECTION, SOLUTION INTRAVENOUS at 02:52

## 2019-10-08 RX ADMIN — DILTIAZEM HYDROCHLORIDE 60 MG: 60 TABLET, FILM COATED ORAL at 21:38

## 2019-10-08 RX ADMIN — METOPROLOL TARTRATE 50 MG: 50 TABLET, FILM COATED ORAL at 12:40

## 2019-10-08 RX ADMIN — AMIODARONE HYDROCHLORIDE 400 MG: 200 TABLET ORAL at 21:38

## 2019-10-08 RX ADMIN — POTASSIUM CHLORIDE 20 MEQ: 750 TABLET, FILM COATED, EXTENDED RELEASE ORAL at 16:35

## 2019-10-08 RX ADMIN — CHLORDIAZEPOXIDE HYDROCHLORIDE 100 MG: 25 CAPSULE ORAL at 13:42

## 2019-10-08 RX ADMIN — DEXMEDETOMIDINE 0.4 MCG/KG/HR: 100 INJECTION, SOLUTION, CONCENTRATE INTRAVENOUS at 05:54

## 2019-10-08 RX ADMIN — DILTIAZEM HYDROCHLORIDE 60 MG: 60 TABLET, FILM COATED ORAL at 12:40

## 2019-10-08 RX ADMIN — HEPARIN SODIUM 11 UNITS/KG/HR: 10000 INJECTION, SOLUTION INTRAVENOUS at 19:38

## 2019-10-08 RX ADMIN — POTASSIUM CHLORIDE 20 MEQ: 29.8 INJECTION, SOLUTION INTRAVENOUS at 09:39

## 2019-10-08 ASSESSMENT — PAIN SCALES - GENERAL
PAINLEVEL_OUTOF10: 0

## 2019-10-08 ASSESSMENT — PAIN SCALES - WONG BAKER: WONGBAKER_NUMERICALRESPONSE: 0

## 2019-10-09 LAB
ANION GAP SERPL CALCULATED.3IONS-SCNC: 9 MEQ/L (ref 8–16)
APTT: 65.7 SECONDS (ref 22–38)
APTT: 74.4 SECONDS (ref 22–38)
BUN BLDV-MCNC: 10 MG/DL (ref 7–22)
CALCIUM SERPL-MCNC: 8.8 MG/DL (ref 8.5–10.5)
CHLORIDE BLD-SCNC: 98 MEQ/L (ref 98–111)
CO2: 34 MEQ/L (ref 23–33)
CREAT SERPL-MCNC: 0.7 MG/DL (ref 0.4–1.2)
ERYTHROCYTE [DISTWIDTH] IN BLOOD BY AUTOMATED COUNT: 15.3 % (ref 11.5–14.5)
ERYTHROCYTE [DISTWIDTH] IN BLOOD BY AUTOMATED COUNT: 52.6 FL (ref 35–45)
GFR SERPL CREATININE-BSD FRML MDRD: > 90 ML/MIN/1.73M2
GLUCOSE BLD-MCNC: 142 MG/DL (ref 70–108)
HCT VFR BLD CALC: 38.8 % (ref 42–52)
HEMOGLOBIN: 12.2 GM/DL (ref 14–18)
LEGIONELLA SPECIES CULTURE: NORMAL
MCH RBC QN AUTO: 29.8 PG (ref 26–33)
MCHC RBC AUTO-ENTMCNC: 31.4 GM/DL (ref 32.2–35.5)
MCV RBC AUTO: 94.6 FL (ref 80–94)
PLATELET # BLD: 142 THOU/MM3 (ref 130–400)
PMV BLD AUTO: 9.7 FL (ref 9.4–12.4)
POTASSIUM SERPL-SCNC: 3.9 MEQ/L (ref 3.5–5.2)
RBC # BLD: 4.1 MILL/MM3 (ref 4.7–6.1)
SODIUM BLD-SCNC: 141 MEQ/L (ref 135–145)
WBC # BLD: 9.7 THOU/MM3 (ref 4.8–10.8)

## 2019-10-09 PROCEDURE — 6370000000 HC RX 637 (ALT 250 FOR IP): Performed by: NURSE PRACTITIONER

## 2019-10-09 PROCEDURE — 6370000000 HC RX 637 (ALT 250 FOR IP): Performed by: PHARMACIST

## 2019-10-09 PROCEDURE — 94640 AIRWAY INHALATION TREATMENT: CPT

## 2019-10-09 PROCEDURE — 36415 COLL VENOUS BLD VENIPUNCTURE: CPT

## 2019-10-09 PROCEDURE — 2580000003 HC RX 258: Performed by: NURSE PRACTITIONER

## 2019-10-09 PROCEDURE — 2700000000 HC OXYGEN THERAPY PER DAY

## 2019-10-09 PROCEDURE — 6370000000 HC RX 637 (ALT 250 FOR IP): Performed by: INTERNAL MEDICINE

## 2019-10-09 PROCEDURE — 6360000002 HC RX W HCPCS: Performed by: INTERNAL MEDICINE

## 2019-10-09 PROCEDURE — 97162 PT EVAL MOD COMPLEX 30 MIN: CPT

## 2019-10-09 PROCEDURE — 99221 1ST HOSP IP/OBS SF/LOW 40: CPT | Performed by: INTERNAL MEDICINE

## 2019-10-09 PROCEDURE — APPSS30 APP SPLIT SHARED TIME 16-30 MINUTES: Performed by: NURSE PRACTITIONER

## 2019-10-09 PROCEDURE — 6360000002 HC RX W HCPCS: Performed by: NURSE PRACTITIONER

## 2019-10-09 PROCEDURE — 80048 BASIC METABOLIC PNL TOTAL CA: CPT

## 2019-10-09 PROCEDURE — 85730 THROMBOPLASTIN TIME PARTIAL: CPT

## 2019-10-09 PROCEDURE — 99233 SBSQ HOSP IP/OBS HIGH 50: CPT | Performed by: INTERNAL MEDICINE

## 2019-10-09 PROCEDURE — 99233 SBSQ HOSP IP/OBS HIGH 50: CPT | Performed by: NURSE PRACTITIONER

## 2019-10-09 PROCEDURE — 94760 N-INVAS EAR/PLS OXIMETRY 1: CPT

## 2019-10-09 PROCEDURE — 2060000000 HC ICU INTERMEDIATE R&B

## 2019-10-09 PROCEDURE — 2709999900 HC NON-CHARGEABLE SUPPLY

## 2019-10-09 PROCEDURE — 85027 COMPLETE CBC AUTOMATED: CPT

## 2019-10-09 RX ORDER — BUSPIRONE HYDROCHLORIDE 10 MG/1
30 TABLET ORAL 2 TIMES DAILY
Status: DISCONTINUED | OUTPATIENT
Start: 2019-10-09 | End: 2019-10-10

## 2019-10-09 RX ORDER — FUROSEMIDE 10 MG/ML
40 INJECTION INTRAMUSCULAR; INTRAVENOUS ONCE
Status: COMPLETED | OUTPATIENT
Start: 2019-10-09 | End: 2019-10-09

## 2019-10-09 RX ORDER — SODIUM CHLORIDE 0.9 % (FLUSH) 0.9 %
10 SYRINGE (ML) INJECTION PRN
Status: DISCONTINUED | OUTPATIENT
Start: 2019-10-09 | End: 2019-10-16 | Stop reason: SDUPTHER

## 2019-10-09 RX ORDER — SODIUM CHLORIDE 0.9 % (FLUSH) 0.9 %
10 SYRINGE (ML) INJECTION EVERY 12 HOURS SCHEDULED
Status: DISCONTINUED | OUTPATIENT
Start: 2019-10-09 | End: 2019-10-16 | Stop reason: SDUPTHER

## 2019-10-09 RX ORDER — QUETIAPINE FUMARATE 50 MG/1
50 TABLET, EXTENDED RELEASE ORAL DAILY
Status: DISCONTINUED | OUTPATIENT
Start: 2019-10-10 | End: 2019-10-10

## 2019-10-09 RX ORDER — CHLORDIAZEPOXIDE HYDROCHLORIDE 25 MG/1
25 CAPSULE, GELATIN COATED ORAL 3 TIMES DAILY
Status: DISCONTINUED | OUTPATIENT
Start: 2019-10-09 | End: 2019-10-09

## 2019-10-09 RX ORDER — LIDOCAINE HYDROCHLORIDE 10 MG/ML
5 INJECTION, SOLUTION EPIDURAL; INFILTRATION; INTRACAUDAL; PERINEURAL ONCE
Status: DISCONTINUED | OUTPATIENT
Start: 2019-10-09 | End: 2019-10-19 | Stop reason: HOSPADM

## 2019-10-09 RX ORDER — VILAZODONE HYDROCHLORIDE 40 MG/1
40 TABLET ORAL DAILY
Status: DISCONTINUED | OUTPATIENT
Start: 2019-10-10 | End: 2019-10-10

## 2019-10-09 RX ADMIN — ALBUTEROL SULFATE 5 MG: 2.5 SOLUTION RESPIRATORY (INHALATION) at 07:55

## 2019-10-09 RX ADMIN — CHLORDIAZEPOXIDE HYDROCHLORIDE 25 MG: 25 CAPSULE ORAL at 15:17

## 2019-10-09 RX ADMIN — CHLORDIAZEPOXIDE HYDROCHLORIDE 100 MG: 25 CAPSULE ORAL at 10:14

## 2019-10-09 RX ADMIN — AMIODARONE HYDROCHLORIDE 400 MG: 200 TABLET ORAL at 22:51

## 2019-10-09 RX ADMIN — IPRATROPIUM BROMIDE 0.5 MG: 0.5 SOLUTION RESPIRATORY (INHALATION) at 13:07

## 2019-10-09 RX ADMIN — TIOTROPIUM BROMIDE 18 MCG: 18 CAPSULE ORAL; RESPIRATORY (INHALATION) at 08:42

## 2019-10-09 RX ADMIN — DILTIAZEM HYDROCHLORIDE 60 MG: 60 TABLET, FILM COATED ORAL at 16:30

## 2019-10-09 RX ADMIN — METOPROLOL TARTRATE 50 MG: 50 TABLET, FILM COATED ORAL at 22:49

## 2019-10-09 RX ADMIN — FUROSEMIDE 40 MG: 10 INJECTION, SOLUTION INTRAMUSCULAR; INTRAVENOUS at 13:07

## 2019-10-09 RX ADMIN — Medication 10 ML: at 22:52

## 2019-10-09 RX ADMIN — DILTIAZEM HYDROCHLORIDE 60 MG: 60 TABLET, FILM COATED ORAL at 03:38

## 2019-10-09 RX ADMIN — AMIODARONE HYDROCHLORIDE 400 MG: 200 TABLET ORAL at 10:14

## 2019-10-09 RX ADMIN — PANTOPRAZOLE SODIUM 40 MG: 40 TABLET, DELAYED RELEASE ORAL at 06:45

## 2019-10-09 RX ADMIN — FORMOTEROL FUMARATE DIHYDRATE 20 MCG: 20 SOLUTION RESPIRATORY (INHALATION) at 07:43

## 2019-10-09 RX ADMIN — BUPRENORPHINE HYDROCHLORIDE, NALOXONE HYDROCHLORIDE 1 FILM: 8; 2 FILM, SOLUBLE BUCCAL; SUBLINGUAL at 10:16

## 2019-10-09 RX ADMIN — POTASSIUM CHLORIDE 20 MEQ: 750 TABLET, FILM COATED, EXTENDED RELEASE ORAL at 17:22

## 2019-10-09 RX ADMIN — FORMOTEROL FUMARATE DIHYDRATE 20 MCG: 20 SOLUTION RESPIRATORY (INHALATION) at 16:45

## 2019-10-09 RX ADMIN — HEPARIN SODIUM 11 UNITS/KG/HR: 10000 INJECTION, SOLUTION INTRAVENOUS at 14:19

## 2019-10-09 RX ADMIN — QUETIAPINE FUMARATE 300 MG: 100 TABLET ORAL at 22:50

## 2019-10-09 RX ADMIN — DILTIAZEM HYDROCHLORIDE 60 MG: 60 TABLET, FILM COATED ORAL at 22:50

## 2019-10-09 RX ADMIN — METOPROLOL TARTRATE 50 MG: 50 TABLET, FILM COATED ORAL at 10:16

## 2019-10-09 RX ADMIN — DILTIAZEM HYDROCHLORIDE 60 MG: 60 TABLET, FILM COATED ORAL at 10:16

## 2019-10-09 RX ADMIN — METOPROLOL TARTRATE 50 MG: 50 TABLET, FILM COATED ORAL at 00:23

## 2019-10-09 RX ADMIN — BUSPIRONE HYDROCHLORIDE 30 MG: 10 TABLET ORAL at 22:51

## 2019-10-09 ASSESSMENT — PAIN SCALES - WONG BAKER
WONGBAKER_NUMERICALRESPONSE: 0

## 2019-10-09 ASSESSMENT — PAIN SCALES - GENERAL
PAINLEVEL_OUTOF10: 0

## 2019-10-10 ENCOUNTER — APPOINTMENT (OUTPATIENT)
Dept: CT IMAGING | Age: 59
DRG: 004 | End: 2019-10-10
Payer: MEDICARE

## 2019-10-10 ENCOUNTER — APPOINTMENT (OUTPATIENT)
Dept: GENERAL RADIOLOGY | Age: 59
DRG: 004 | End: 2019-10-10
Payer: MEDICARE

## 2019-10-10 LAB
ALLEN TEST: ABNORMAL
ANION GAP SERPL CALCULATED.3IONS-SCNC: 7 MEQ/L (ref 8–16)
ANION GAP SERPL CALCULATED.3IONS-SCNC: 9 MEQ/L (ref 8–16)
APTT: 56.3 SECONDS (ref 22–38)
APTT: 73.2 SECONDS (ref 22–38)
APTT: 97.7 SECONDS (ref 22–38)
BASE EXCESS (CALCULATED): 10.2 MMOL/L (ref -2.5–2.5)
BASE EXCESS (CALCULATED): 8.2 MMOL/L (ref -2.5–2.5)
BUN BLDV-MCNC: 13 MG/DL (ref 7–22)
BUN BLDV-MCNC: 14 MG/DL (ref 7–22)
CALCIUM IONIZED: 1.09 MMOL/L (ref 1.12–1.32)
CALCIUM SERPL-MCNC: 8.9 MG/DL (ref 8.5–10.5)
CALCIUM SERPL-MCNC: 8.9 MG/DL (ref 8.5–10.5)
CHLORIDE BLD-SCNC: 95 MEQ/L (ref 98–111)
CHLORIDE BLD-SCNC: 95 MEQ/L (ref 98–111)
CO2: 36 MEQ/L (ref 23–33)
CO2: 38 MEQ/L (ref 23–33)
COLLECTED BY:: ABNORMAL
COLLECTED BY:: ABNORMAL
CREAT SERPL-MCNC: 1 MG/DL (ref 0.4–1.2)
CREAT SERPL-MCNC: 1.1 MG/DL (ref 0.4–1.2)
DEVICE: ABNORMAL
DEVICE: ABNORMAL
EKG ATRIAL RATE: 314 BPM
EKG P AXIS: -135 DEGREES
EKG Q-T INTERVAL: 296 MS
EKG QRS DURATION: 90 MS
EKG QTC CALCULATION (BAZETT): 478 MS
EKG R AXIS: -146 DEGREES
EKG T AXIS: -59 DEGREES
EKG VENTRICULAR RATE: 157 BPM
ERYTHROCYTE [DISTWIDTH] IN BLOOD BY AUTOMATED COUNT: 15.7 % (ref 11.5–14.5)
ERYTHROCYTE [DISTWIDTH] IN BLOOD BY AUTOMATED COUNT: 55.2 FL (ref 35–45)
GFR SERPL CREATININE-BSD FRML MDRD: 69 ML/MIN/1.73M2
GFR SERPL CREATININE-BSD FRML MDRD: 77 ML/MIN/1.73M2
GLUCOSE BLD-MCNC: 143 MG/DL (ref 70–108)
GLUCOSE BLD-MCNC: 162 MG/DL (ref 70–108)
HCO3: 36 MMOL/L (ref 23–28)
HCO3: 38 MMOL/L (ref 23–28)
HCT VFR BLD CALC: 36.8 % (ref 42–52)
HCT VFR BLD CALC: 38.4 % (ref 42–52)
HEMOGLOBIN: 11.3 GM/DL (ref 14–18)
HEMOGLOBIN: 11.7 GM/DL (ref 14–18)
IFIO2: 2
IFIO2: 60
MAGNESIUM: 2 MG/DL (ref 1.6–2.4)
MCH RBC QN AUTO: 29.7 PG (ref 26–33)
MCHC RBC AUTO-ENTMCNC: 30.7 GM/DL (ref 32.2–35.5)
MCV RBC AUTO: 96.8 FL (ref 80–94)
O2 SATURATION: 90 %
O2 SATURATION: 94 %
PCO2: 57 MMHG (ref 35–45)
PCO2: 64 MMHG (ref 35–45)
PH BLOOD GAS: 7.38 (ref 7.35–7.45)
PH BLOOD GAS: 7.41 (ref 7.35–7.45)
PLATELET # BLD: 129 THOU/MM3 (ref 130–400)
PMV BLD AUTO: 10 FL (ref 9.4–12.4)
PO2: 63 MMHG (ref 71–104)
PO2: 70 MMHG (ref 71–104)
POTASSIUM SERPL-SCNC: 3.9 MEQ/L (ref 3.5–5.2)
POTASSIUM SERPL-SCNC: 4.2 MEQ/L (ref 3.5–5.2)
PROLACTIN: 14.1 NG/ML
RBC # BLD: 3.8 MILL/MM3 (ref 4.7–6.1)
SET PEEP: 6 MMHG
SET PRESS SUPP: 18 CMH2O
SET RESPIRATORY RATE: 12 BPM
SODIUM BLD-SCNC: 140 MEQ/L (ref 135–145)
SODIUM BLD-SCNC: 140 MEQ/L (ref 135–145)
SOURCE, BLOOD GAS: ABNORMAL
SOURCE, BLOOD GAS: ABNORMAL
WBC # BLD: 9 THOU/MM3 (ref 4.8–10.8)

## 2019-10-10 PROCEDURE — 82330 ASSAY OF CALCIUM: CPT

## 2019-10-10 PROCEDURE — 6360000002 HC RX W HCPCS: Performed by: NURSE PRACTITIONER

## 2019-10-10 PROCEDURE — 82803 BLOOD GASES ANY COMBINATION: CPT

## 2019-10-10 PROCEDURE — 94640 AIRWAY INHALATION TREATMENT: CPT

## 2019-10-10 PROCEDURE — 2709999900 HC NON-CHARGEABLE SUPPLY

## 2019-10-10 PROCEDURE — 93010 ELECTROCARDIOGRAM REPORT: CPT | Performed by: NUCLEAR MEDICINE

## 2019-10-10 PROCEDURE — 99233 SBSQ HOSP IP/OBS HIGH 50: CPT | Performed by: NURSE PRACTITIONER

## 2019-10-10 PROCEDURE — 2500000003 HC RX 250 WO HCPCS: Performed by: NURSE PRACTITIONER

## 2019-10-10 PROCEDURE — 85018 HEMOGLOBIN: CPT

## 2019-10-10 PROCEDURE — 85027 COMPLETE CBC AUTOMATED: CPT

## 2019-10-10 PROCEDURE — 6360000002 HC RX W HCPCS

## 2019-10-10 PROCEDURE — 87205 SMEAR GRAM STAIN: CPT

## 2019-10-10 PROCEDURE — 87077 CULTURE AEROBIC IDENTIFY: CPT

## 2019-10-10 PROCEDURE — 94003 VENT MGMT INPAT SUBQ DAY: CPT

## 2019-10-10 PROCEDURE — 2580000003 HC RX 258: Performed by: NURSE PRACTITIONER

## 2019-10-10 PROCEDURE — 83735 ASSAY OF MAGNESIUM: CPT

## 2019-10-10 PROCEDURE — 80048 BASIC METABOLIC PNL TOTAL CA: CPT

## 2019-10-10 PROCEDURE — 0BH17EZ INSERTION OF ENDOTRACHEAL AIRWAY INTO TRACHEA, VIA NATURAL OR ARTIFICIAL OPENING: ICD-10-PCS | Performed by: INTERNAL MEDICINE

## 2019-10-10 PROCEDURE — 71045 X-RAY EXAM CHEST 1 VIEW: CPT

## 2019-10-10 PROCEDURE — 36415 COLL VENOUS BLD VENIPUNCTURE: CPT

## 2019-10-10 PROCEDURE — 84146 ASSAY OF PROLACTIN: CPT

## 2019-10-10 PROCEDURE — 87086 URINE CULTURE/COLONY COUNT: CPT

## 2019-10-10 PROCEDURE — 2700000000 HC OXYGEN THERAPY PER DAY

## 2019-10-10 PROCEDURE — 94760 N-INVAS EAR/PLS OXIMETRY 1: CPT

## 2019-10-10 PROCEDURE — 87070 CULTURE OTHR SPECIMN AEROBIC: CPT

## 2019-10-10 PROCEDURE — APPSS30 APP SPLIT SHARED TIME 16-30 MINUTES: Performed by: NURSE PRACTITIONER

## 2019-10-10 PROCEDURE — 36600 WITHDRAWAL OF ARTERIAL BLOOD: CPT

## 2019-10-10 PROCEDURE — 99232 SBSQ HOSP IP/OBS MODERATE 35: CPT | Performed by: INTERNAL MEDICINE

## 2019-10-10 PROCEDURE — 31500 INSERT EMERGENCY AIRWAY: CPT

## 2019-10-10 PROCEDURE — 93005 ELECTROCARDIOGRAM TRACING: CPT | Performed by: NURSE PRACTITIONER

## 2019-10-10 PROCEDURE — 5A1945Z RESPIRATORY VENTILATION, 24-96 CONSECUTIVE HOURS: ICD-10-PCS | Performed by: INTERNAL MEDICINE

## 2019-10-10 PROCEDURE — 6360000002 HC RX W HCPCS: Performed by: INTERNAL MEDICINE

## 2019-10-10 PROCEDURE — 85730 THROMBOPLASTIN TIME PARTIAL: CPT

## 2019-10-10 PROCEDURE — 87186 SC STD MICRODIL/AGAR DIL: CPT

## 2019-10-10 PROCEDURE — 85014 HEMATOCRIT: CPT

## 2019-10-10 PROCEDURE — 2000000000 HC ICU R&B

## 2019-10-10 PROCEDURE — 70450 CT HEAD/BRAIN W/O DYE: CPT

## 2019-10-10 PROCEDURE — 2500000003 HC RX 250 WO HCPCS

## 2019-10-10 PROCEDURE — 94770 HC ETCO2 MONITOR DAILY: CPT

## 2019-10-10 PROCEDURE — 6370000000 HC RX 637 (ALT 250 FOR IP): Performed by: NURSE PRACTITIONER

## 2019-10-10 RX ORDER — METOPROLOL TARTRATE 50 MG/1
50 TABLET, FILM COATED ORAL EVERY 8 HOURS
Status: DISCONTINUED | OUTPATIENT
Start: 2019-10-11 | End: 2019-10-11

## 2019-10-10 RX ORDER — FUROSEMIDE 10 MG/ML
40 INJECTION INTRAMUSCULAR; INTRAVENOUS 2 TIMES DAILY
Status: DISCONTINUED | OUTPATIENT
Start: 2019-10-10 | End: 2019-10-10

## 2019-10-10 RX ORDER — ACETAMINOPHEN 325 MG/1
650 TABLET ORAL EVERY 4 HOURS PRN
Status: DISCONTINUED | OUTPATIENT
Start: 2019-10-10 | End: 2019-10-19 | Stop reason: HOSPADM

## 2019-10-10 RX ORDER — NALOXONE HYDROCHLORIDE 0.4 MG/ML
0.8 INJECTION, SOLUTION INTRAMUSCULAR; INTRAVENOUS; SUBCUTANEOUS ONCE
Status: DISCONTINUED | OUTPATIENT
Start: 2019-10-10 | End: 2019-10-10

## 2019-10-10 RX ORDER — DILTIAZEM HYDROCHLORIDE 5 MG/ML
10 INJECTION INTRAVENOUS ONCE
Status: COMPLETED | OUTPATIENT
Start: 2019-10-10 | End: 2019-10-10

## 2019-10-10 RX ORDER — ACETAMINOPHEN 650 MG/1
650 SUPPOSITORY RECTAL EVERY 4 HOURS PRN
Status: DISCONTINUED | OUTPATIENT
Start: 2019-10-10 | End: 2019-10-19 | Stop reason: HOSPADM

## 2019-10-10 RX ORDER — FAMOTIDINE 20 MG/1
20 TABLET, FILM COATED ORAL 2 TIMES DAILY
Status: DISCONTINUED | OUTPATIENT
Start: 2019-10-10 | End: 2019-10-15

## 2019-10-10 RX ORDER — NALOXONE HYDROCHLORIDE 0.4 MG/ML
INJECTION, SOLUTION INTRAMUSCULAR; INTRAVENOUS; SUBCUTANEOUS
Status: COMPLETED
Start: 2019-10-10 | End: 2019-10-10

## 2019-10-10 RX ORDER — METOPROLOL TARTRATE 50 MG/1
50 TABLET, FILM COATED ORAL EVERY 8 HOURS
Status: DISCONTINUED | OUTPATIENT
Start: 2019-10-10 | End: 2019-10-10

## 2019-10-10 RX ORDER — QUETIAPINE FUMARATE 100 MG/1
200 TABLET, FILM COATED ORAL NIGHTLY
Status: DISCONTINUED | OUTPATIENT
Start: 2019-10-10 | End: 2019-10-15

## 2019-10-10 RX ORDER — DIGOXIN 0.25 MG/ML
INJECTION INTRAMUSCULAR; INTRAVENOUS
Status: COMPLETED
Start: 2019-10-10 | End: 2019-10-10

## 2019-10-10 RX ORDER — TOBRAMYCIN INHALATION SOLUTION 300 MG/5ML
300 INHALANT RESPIRATORY (INHALATION) EVERY 12 HOURS
Status: DISCONTINUED | OUTPATIENT
Start: 2019-10-10 | End: 2019-10-10

## 2019-10-10 RX ORDER — BUSPIRONE HYDROCHLORIDE 5 MG/1
15 TABLET ORAL 2 TIMES DAILY
Status: DISCONTINUED | OUTPATIENT
Start: 2019-10-10 | End: 2019-10-11

## 2019-10-10 RX ORDER — KETAMINE HCL IN NACL, ISO-OSM 100MG/10ML
SYRINGE (ML) INJECTION
Status: COMPLETED
Start: 2019-10-10 | End: 2019-10-10

## 2019-10-10 RX ORDER — QUETIAPINE FUMARATE 100 MG/1
200 TABLET, FILM COATED ORAL NIGHTLY
Status: DISCONTINUED | OUTPATIENT
Start: 2019-10-10 | End: 2019-10-10

## 2019-10-10 RX ORDER — PROPOFOL 10 MG/ML
10 INJECTION, EMULSION INTRAVENOUS
Status: DISCONTINUED | OUTPATIENT
Start: 2019-10-10 | End: 2019-10-14

## 2019-10-10 RX ORDER — FENTANYL CITRATE 50 UG/ML
25 INJECTION, SOLUTION INTRAMUSCULAR; INTRAVENOUS
Status: DISCONTINUED | OUTPATIENT
Start: 2019-10-10 | End: 2019-10-15

## 2019-10-10 RX ORDER — CHLORHEXIDINE GLUCONATE 0.12 MG/ML
15 RINSE ORAL 2 TIMES DAILY
Status: DISCONTINUED | OUTPATIENT
Start: 2019-10-10 | End: 2019-10-13

## 2019-10-10 RX ORDER — DIGOXIN 0.25 MG/ML
125 INJECTION INTRAMUSCULAR; INTRAVENOUS ONCE
Status: DISCONTINUED | OUTPATIENT
Start: 2019-10-10 | End: 2019-10-14

## 2019-10-10 RX ORDER — METOPROLOL TARTRATE 5 MG/5ML
5 INJECTION INTRAVENOUS ONCE
Status: COMPLETED | OUTPATIENT
Start: 2019-10-10 | End: 2019-10-10

## 2019-10-10 RX ORDER — PROPOFOL 10 MG/ML
INJECTION, EMULSION INTRAVENOUS
Status: COMPLETED
Start: 2019-10-10 | End: 2019-10-10

## 2019-10-10 RX ADMIN — AMIODARONE HYDROCHLORIDE 1 MG/MIN: 1.8 INJECTION, SOLUTION INTRAVENOUS at 21:45

## 2019-10-10 RX ADMIN — Medication 15 ML: at 20:36

## 2019-10-10 RX ADMIN — NALOXONE HYDROCHLORIDE 0.8 MG: 0.4 INJECTION, SOLUTION INTRAMUSCULAR; INTRAVENOUS; SUBCUTANEOUS at 17:48

## 2019-10-10 RX ADMIN — FORMOTEROL FUMARATE DIHYDRATE 20 MCG: 20 SOLUTION RESPIRATORY (INHALATION) at 18:32

## 2019-10-10 RX ADMIN — Medication 10 ML: at 10:25

## 2019-10-10 RX ADMIN — Medication 10 ML: at 20:36

## 2019-10-10 RX ADMIN — MEROPENEM 1 G: 1 INJECTION, POWDER, FOR SOLUTION INTRAVENOUS at 20:36

## 2019-10-10 RX ADMIN — BUSPIRONE HYDROCHLORIDE 15 MG: 10 TABLET ORAL at 20:35

## 2019-10-10 RX ADMIN — METOPROLOL TARTRATE 5 MG: 5 INJECTION INTRAVENOUS at 21:53

## 2019-10-10 RX ADMIN — QUETIAPINE FUMARATE 200 MG: 100 TABLET ORAL at 20:35

## 2019-10-10 RX ADMIN — DILTIAZEM HYDROCHLORIDE 5 MG/HR: 5 INJECTION INTRAVENOUS at 10:13

## 2019-10-10 RX ADMIN — DIGOXIN: 0.25 INJECTION INTRAMUSCULAR; INTRAVENOUS at 10:39

## 2019-10-10 RX ADMIN — FAMOTIDINE 20 MG: 20 TABLET ORAL at 20:35

## 2019-10-10 RX ADMIN — IPRATROPIUM BROMIDE 0.5 MG: 0.5 SOLUTION RESPIRATORY (INHALATION) at 09:15

## 2019-10-10 RX ADMIN — IPRATROPIUM BROMIDE 0.5 MG: 0.5 SOLUTION RESPIRATORY (INHALATION) at 18:32

## 2019-10-10 RX ADMIN — IPRATROPIUM BROMIDE 0.5 MG: 0.5 SOLUTION RESPIRATORY (INHALATION) at 13:28

## 2019-10-10 RX ADMIN — PROPOFOL 1000 MG: 10 INJECTION, EMULSION INTRAVENOUS at 18:12

## 2019-10-10 RX ADMIN — AMIODARONE HYDROCHLORIDE 150 MG: 1.5 INJECTION, SOLUTION INTRAVENOUS at 21:33

## 2019-10-10 RX ADMIN — FORMOTEROL FUMARATE DIHYDRATE 20 MCG: 20 SOLUTION RESPIRATORY (INHALATION) at 09:15

## 2019-10-10 RX ADMIN — HEPARIN SODIUM 13 UNITS/KG/HR: 10000 INJECTION, SOLUTION INTRAVENOUS at 09:50

## 2019-10-10 RX ADMIN — Medication 100 MG: at 18:09

## 2019-10-10 RX ADMIN — DILTIAZEM HYDROCHLORIDE 10 MG: 5 INJECTION INTRAVENOUS at 10:06

## 2019-10-10 RX ADMIN — PROPOFOL 10 MCG/KG/MIN: 10 INJECTION, EMULSION INTRAVENOUS at 23:49

## 2019-10-10 RX ADMIN — ACETAMINOPHEN 650 MG: 325 TABLET ORAL at 23:48

## 2019-10-10 RX ADMIN — DILTIAZEM HYDROCHLORIDE 15 MG/HR: 5 INJECTION INTRAVENOUS at 23:53

## 2019-10-10 ASSESSMENT — PAIN SCALES - WONG BAKER
WONGBAKER_NUMERICALRESPONSE: 0

## 2019-10-10 ASSESSMENT — PAIN SCALES - GENERAL
PAINLEVEL_OUTOF10: 0

## 2019-10-10 ASSESSMENT — PULMONARY FUNCTION TESTS: PIF_VALUE: 26

## 2019-10-11 ENCOUNTER — APPOINTMENT (OUTPATIENT)
Dept: GENERAL RADIOLOGY | Age: 59
DRG: 004 | End: 2019-10-11
Payer: MEDICARE

## 2019-10-11 LAB
ALBUMIN SERPL-MCNC: 3.2 G/DL (ref 3.5–5.1)
ALP BLD-CCNC: 36 U/L (ref 38–126)
ALT SERPL-CCNC: 24 U/L (ref 11–66)
AMMONIA: 31 UMOL/L (ref 11–60)
ANION GAP SERPL CALCULATED.3IONS-SCNC: 11 MEQ/L (ref 8–16)
APTT: 59.2 SECONDS (ref 22–38)
APTT: 69.3 SECONDS (ref 22–38)
APTT: 69.7 SECONDS (ref 22–38)
APTT: 76.9 SECONDS (ref 22–38)
AST SERPL-CCNC: 20 U/L (ref 5–40)
BILIRUB SERPL-MCNC: 0.5 MG/DL (ref 0.3–1.2)
BILIRUBIN DIRECT: < 0.2 MG/DL (ref 0–0.3)
BUN BLDV-MCNC: 13 MG/DL (ref 7–22)
CALCIUM SERPL-MCNC: 8.9 MG/DL (ref 8.5–10.5)
CHLORIDE BLD-SCNC: 99 MEQ/L (ref 98–111)
CO2: 34 MEQ/L (ref 23–33)
CREAT SERPL-MCNC: 1.1 MG/DL (ref 0.4–1.2)
EKG ATRIAL RATE: 296 BPM
EKG P AXIS: 21 DEGREES
EKG Q-T INTERVAL: 404 MS
EKG QRS DURATION: 96 MS
EKG QTC CALCULATION (BAZETT): 448 MS
EKG R AXIS: -11 DEGREES
EKG T AXIS: -80 DEGREES
EKG VENTRICULAR RATE: 74 BPM
ERYTHROCYTE [DISTWIDTH] IN BLOOD BY AUTOMATED COUNT: 15.8 % (ref 11.5–14.5)
ERYTHROCYTE [DISTWIDTH] IN BLOOD BY AUTOMATED COUNT: 55.4 FL (ref 35–45)
GFR SERPL CREATININE-BSD FRML MDRD: 69 ML/MIN/1.73M2
GLUCOSE BLD-MCNC: 125 MG/DL (ref 70–108)
HCT VFR BLD CALC: 37.4 % (ref 42–52)
HEMOGLOBIN: 11.5 GM/DL (ref 14–18)
MCH RBC QN AUTO: 29.9 PG (ref 26–33)
MCHC RBC AUTO-ENTMCNC: 30.7 GM/DL (ref 32.2–35.5)
MCV RBC AUTO: 97.1 FL (ref 80–94)
PLATELET # BLD: 127 THOU/MM3 (ref 130–400)
PMV BLD AUTO: 9.7 FL (ref 9.4–12.4)
POTASSIUM SERPL-SCNC: 3.9 MEQ/L (ref 3.5–5.2)
PROCALCITONIN: 0.07 NG/ML (ref 0.01–0.09)
RBC # BLD: 3.85 MILL/MM3 (ref 4.7–6.1)
SODIUM BLD-SCNC: 144 MEQ/L (ref 135–145)
T4 FREE: 1.03 NG/DL (ref 0.93–1.76)
TOTAL PROTEIN: 6.2 G/DL (ref 6.1–8)
TSH SERPL DL<=0.05 MIU/L-ACNC: 8.87 UIU/ML (ref 0.4–4.2)
WBC # BLD: 10.2 THOU/MM3 (ref 4.8–10.8)

## 2019-10-11 PROCEDURE — 6370000000 HC RX 637 (ALT 250 FOR IP): Performed by: NURSE PRACTITIONER

## 2019-10-11 PROCEDURE — 85730 THROMBOPLASTIN TIME PARTIAL: CPT

## 2019-10-11 PROCEDURE — 82248 BILIRUBIN DIRECT: CPT

## 2019-10-11 PROCEDURE — 6360000002 HC RX W HCPCS: Performed by: NURSE PRACTITIONER

## 2019-10-11 PROCEDURE — 94003 VENT MGMT INPAT SUBQ DAY: CPT

## 2019-10-11 PROCEDURE — 93005 ELECTROCARDIOGRAM TRACING: CPT | Performed by: PHYSICIAN ASSISTANT

## 2019-10-11 PROCEDURE — 93010 ELECTROCARDIOGRAM REPORT: CPT | Performed by: NUCLEAR MEDICINE

## 2019-10-11 PROCEDURE — 94770 HC ETCO2 MONITOR DAILY: CPT

## 2019-10-11 PROCEDURE — 85027 COMPLETE CBC AUTOMATED: CPT

## 2019-10-11 PROCEDURE — 2580000003 HC RX 258: Performed by: NURSE PRACTITIONER

## 2019-10-11 PROCEDURE — 36556 INSERT NON-TUNNEL CV CATH: CPT | Performed by: INTERNAL MEDICINE

## 2019-10-11 PROCEDURE — 94761 N-INVAS EAR/PLS OXIMETRY MLT: CPT

## 2019-10-11 PROCEDURE — C1751 CATH, INF, PER/CENT/MIDLINE: HCPCS

## 2019-10-11 PROCEDURE — 95819 EEG AWAKE AND ASLEEP: CPT

## 2019-10-11 PROCEDURE — 2500000003 HC RX 250 WO HCPCS: Performed by: NURSE PRACTITIONER

## 2019-10-11 PROCEDURE — 99232 SBSQ HOSP IP/OBS MODERATE 35: CPT | Performed by: PHYSICIAN ASSISTANT

## 2019-10-11 PROCEDURE — 6370000000 HC RX 637 (ALT 250 FOR IP): Performed by: PHYSICIAN ASSISTANT

## 2019-10-11 PROCEDURE — 84145 PROCALCITONIN (PCT): CPT

## 2019-10-11 PROCEDURE — 84439 ASSAY OF FREE THYROXINE: CPT

## 2019-10-11 PROCEDURE — 6360000002 HC RX W HCPCS: Performed by: INTERNAL MEDICINE

## 2019-10-11 PROCEDURE — 99291 CRITICAL CARE FIRST HOUR: CPT | Performed by: INTERNAL MEDICINE

## 2019-10-11 PROCEDURE — 2709999900 HC NON-CHARGEABLE SUPPLY

## 2019-10-11 PROCEDURE — 2700000000 HC OXYGEN THERAPY PER DAY

## 2019-10-11 PROCEDURE — 84443 ASSAY THYROID STIM HORMONE: CPT

## 2019-10-11 PROCEDURE — 36592 COLLECT BLOOD FROM PICC: CPT

## 2019-10-11 PROCEDURE — 05H533Z INSERTION OF INFUSION DEVICE INTO RIGHT SUBCLAVIAN VEIN, PERCUTANEOUS APPROACH: ICD-10-PCS | Performed by: INTERNAL MEDICINE

## 2019-10-11 PROCEDURE — 71045 X-RAY EXAM CHEST 1 VIEW: CPT

## 2019-10-11 PROCEDURE — 6370000000 HC RX 637 (ALT 250 FOR IP): Performed by: INTERNAL MEDICINE

## 2019-10-11 PROCEDURE — 94640 AIRWAY INHALATION TREATMENT: CPT

## 2019-10-11 PROCEDURE — 95816 EEG AWAKE AND DROWSY: CPT | Performed by: PSYCHIATRY & NEUROLOGY

## 2019-10-11 PROCEDURE — 80053 COMPREHEN METABOLIC PANEL: CPT

## 2019-10-11 PROCEDURE — 82140 ASSAY OF AMMONIA: CPT

## 2019-10-11 PROCEDURE — 2000000000 HC ICU R&B

## 2019-10-11 PROCEDURE — 36415 COLL VENOUS BLD VENIPUNCTURE: CPT

## 2019-10-11 RX ORDER — HEPARIN SODIUM 1000 [USP'U]/ML
39 INJECTION, SOLUTION INTRAVENOUS; SUBCUTANEOUS PRN
Status: DISCONTINUED | OUTPATIENT
Start: 2019-10-11 | End: 2019-10-16 | Stop reason: ALTCHOICE

## 2019-10-11 RX ORDER — HEPARIN SODIUM 1000 [USP'U]/ML
78 INJECTION, SOLUTION INTRAVENOUS; SUBCUTANEOUS PRN
Status: DISCONTINUED | OUTPATIENT
Start: 2019-10-11 | End: 2019-10-16 | Stop reason: ALTCHOICE

## 2019-10-11 RX ORDER — THIAMINE HYDROCHLORIDE 100 MG/ML
100 INJECTION, SOLUTION INTRAMUSCULAR; INTRAVENOUS 2 TIMES DAILY
Status: DISCONTINUED | OUTPATIENT
Start: 2019-10-11 | End: 2019-10-11

## 2019-10-11 RX ADMIN — DEXMEDETOMIDINE HYDROCHLORIDE 1 MCG/KG/HR: 100 INJECTION, SOLUTION INTRAVENOUS at 18:38

## 2019-10-11 RX ADMIN — MEROPENEM 1 G: 1 INJECTION, POWDER, FOR SOLUTION INTRAVENOUS at 10:46

## 2019-10-11 RX ADMIN — AMIODARONE HYDROCHLORIDE 0.5 MG/MIN: 1.8 INJECTION, SOLUTION INTRAVENOUS at 02:50

## 2019-10-11 RX ADMIN — DILTIAZEM HYDROCHLORIDE 12.5 MG/HR: 5 INJECTION INTRAVENOUS at 16:10

## 2019-10-11 RX ADMIN — IPRATROPIUM BROMIDE 0.5 MG: 0.5 SOLUTION RESPIRATORY (INHALATION) at 11:11

## 2019-10-11 RX ADMIN — Medication 10 ML: at 20:27

## 2019-10-11 RX ADMIN — GLYCOPYRROLATE AND FORMOTEROL FUMARATE 2 PUFF: 9; 4.8 AEROSOL, METERED RESPIRATORY (INHALATION) at 18:10

## 2019-10-11 RX ADMIN — Medication 15 ML: at 20:26

## 2019-10-11 RX ADMIN — AMIODARONE HYDROCHLORIDE 0.5 MG/MIN: 1.8 INJECTION, SOLUTION INTRAVENOUS at 15:30

## 2019-10-11 RX ADMIN — DEXMEDETOMIDINE HYDROCHLORIDE 1 MCG/KG/HR: 100 INJECTION, SOLUTION INTRAVENOUS at 15:30

## 2019-10-11 RX ADMIN — Medication 15 ML: at 08:40

## 2019-10-11 RX ADMIN — FAMOTIDINE 20 MG: 20 TABLET ORAL at 20:26

## 2019-10-11 RX ADMIN — MEROPENEM 1 G: 1 INJECTION, POWDER, FOR SOLUTION INTRAVENOUS at 20:26

## 2019-10-11 RX ADMIN — DEXMEDETOMIDINE HYDROCHLORIDE 1 MCG/KG/HR: 100 INJECTION, SOLUTION INTRAVENOUS at 22:18

## 2019-10-11 RX ADMIN — AMIODARONE HYDROCHLORIDE 0.5 MG/MIN: 1.8 INJECTION, SOLUTION INTRAVENOUS at 13:20

## 2019-10-11 RX ADMIN — BUSPIRONE HYDROCHLORIDE 15 MG: 10 TABLET ORAL at 08:41

## 2019-10-11 RX ADMIN — DEXMEDETOMIDINE HYDROCHLORIDE 0.2 MCG/KG/HR: 100 INJECTION, SOLUTION INTRAVENOUS at 05:03

## 2019-10-11 RX ADMIN — FAMOTIDINE 20 MG: 20 TABLET ORAL at 08:40

## 2019-10-11 RX ADMIN — IPRATROPIUM BROMIDE 0.5 MG: 0.5 SOLUTION RESPIRATORY (INHALATION) at 07:40

## 2019-10-11 RX ADMIN — METOPROLOL TARTRATE 50 MG: 50 TABLET, FILM COATED ORAL at 08:41

## 2019-10-11 RX ADMIN — DILTIAZEM HYDROCHLORIDE 15 MG/HR: 5 INJECTION INTRAVENOUS at 08:36

## 2019-10-11 RX ADMIN — FENTANYL CITRATE 25 MCG: 50 INJECTION INTRAMUSCULAR; INTRAVENOUS at 11:43

## 2019-10-11 RX ADMIN — FORMOTEROL FUMARATE DIHYDRATE 20 MCG: 20 SOLUTION RESPIRATORY (INHALATION) at 07:40

## 2019-10-11 RX ADMIN — HEPARIN SODIUM 11 UNITS/KG/HR: 10000 INJECTION, SOLUTION INTRAVENOUS at 02:53

## 2019-10-11 RX ADMIN — METOPROLOL TARTRATE 25 MG: 25 TABLET ORAL at 15:15

## 2019-10-11 RX ADMIN — Medication 10 ML: at 08:42

## 2019-10-11 RX ADMIN — QUETIAPINE FUMARATE 200 MG: 100 TABLET ORAL at 20:27

## 2019-10-11 RX ADMIN — HEPARIN SODIUM 13 UNITS/KG/HR: 10000 INJECTION, SOLUTION INTRAVENOUS at 17:37

## 2019-10-11 RX ADMIN — DEXMEDETOMIDINE HYDROCHLORIDE 0.7 MCG/KG/HR: 100 INJECTION, SOLUTION INTRAVENOUS at 13:46

## 2019-10-11 RX ADMIN — MEROPENEM 1 G: 1 INJECTION, POWDER, FOR SOLUTION INTRAVENOUS at 02:49

## 2019-10-11 RX ADMIN — PHENYLEPHRINE HYDROCHLORIDE 50 MCG/MIN: 10 INJECTION INTRAVENOUS at 00:19

## 2019-10-11 ASSESSMENT — PULMONARY FUNCTION TESTS
PIF_VALUE: 29
PIF_VALUE: 29
PIF_VALUE: 28
PIF_VALUE: 28
PIF_VALUE: 29
PIF_VALUE: 27
PIF_VALUE: 29
PIF_VALUE: 27

## 2019-10-11 ASSESSMENT — PAIN SCALES - GENERAL
PAINLEVEL_OUTOF10: 0
PAINLEVEL_OUTOF10: 0
PAINLEVEL_OUTOF10: 10

## 2019-10-12 LAB
ANION GAP SERPL CALCULATED.3IONS-SCNC: 10 MEQ/L (ref 8–16)
APTT: 81 SECONDS (ref 22–38)
APTT: 87.9 SECONDS (ref 22–38)
BUN BLDV-MCNC: 12 MG/DL (ref 7–22)
CALCIUM SERPL-MCNC: 8.6 MG/DL (ref 8.5–10.5)
CHLORIDE BLD-SCNC: 97 MEQ/L (ref 98–111)
CO2: 33 MEQ/L (ref 23–33)
CREAT SERPL-MCNC: 0.9 MG/DL (ref 0.4–1.2)
ERYTHROCYTE [DISTWIDTH] IN BLOOD BY AUTOMATED COUNT: 15.3 % (ref 11.5–14.5)
ERYTHROCYTE [DISTWIDTH] IN BLOOD BY AUTOMATED COUNT: 52.4 FL (ref 35–45)
GFR SERPL CREATININE-BSD FRML MDRD: 87 ML/MIN/1.73M2
GLUCOSE BLD-MCNC: 149 MG/DL (ref 70–108)
HCT VFR BLD CALC: 33.6 % (ref 42–52)
HEMOGLOBIN: 10.7 GM/DL (ref 14–18)
MCH RBC QN AUTO: 29.9 PG (ref 26–33)
MCHC RBC AUTO-ENTMCNC: 31.8 GM/DL (ref 32.2–35.5)
MCV RBC AUTO: 93.9 FL (ref 80–94)
PLATELET # BLD: 111 THOU/MM3 (ref 130–400)
PMV BLD AUTO: 10.2 FL (ref 9.4–12.4)
POTASSIUM SERPL-SCNC: 3.4 MEQ/L (ref 3.5–5.2)
POTASSIUM SERPL-SCNC: 4 MEQ/L (ref 3.5–5.2)
RBC # BLD: 3.58 MILL/MM3 (ref 4.7–6.1)
SODIUM BLD-SCNC: 140 MEQ/L (ref 135–145)
WBC # BLD: 7.1 THOU/MM3 (ref 4.8–10.8)

## 2019-10-12 PROCEDURE — 2500000003 HC RX 250 WO HCPCS: Performed by: NURSE PRACTITIONER

## 2019-10-12 PROCEDURE — 6360000002 HC RX W HCPCS: Performed by: NURSE PRACTITIONER

## 2019-10-12 PROCEDURE — 2580000003 HC RX 258: Performed by: NURSE PRACTITIONER

## 2019-10-12 PROCEDURE — 84132 ASSAY OF SERUM POTASSIUM: CPT

## 2019-10-12 PROCEDURE — 2700000000 HC OXYGEN THERAPY PER DAY

## 2019-10-12 PROCEDURE — 2000000000 HC ICU R&B

## 2019-10-12 PROCEDURE — 6360000002 HC RX W HCPCS: Performed by: INTERNAL MEDICINE

## 2019-10-12 PROCEDURE — 85027 COMPLETE CBC AUTOMATED: CPT

## 2019-10-12 PROCEDURE — 94003 VENT MGMT INPAT SUBQ DAY: CPT

## 2019-10-12 PROCEDURE — 99291 CRITICAL CARE FIRST HOUR: CPT | Performed by: INTERNAL MEDICINE

## 2019-10-12 PROCEDURE — 85730 THROMBOPLASTIN TIME PARTIAL: CPT

## 2019-10-12 PROCEDURE — 94761 N-INVAS EAR/PLS OXIMETRY MLT: CPT

## 2019-10-12 PROCEDURE — 80048 BASIC METABOLIC PNL TOTAL CA: CPT

## 2019-10-12 PROCEDURE — 94640 AIRWAY INHALATION TREATMENT: CPT

## 2019-10-12 PROCEDURE — 6370000000 HC RX 637 (ALT 250 FOR IP): Performed by: NURSE PRACTITIONER

## 2019-10-12 PROCEDURE — 36592 COLLECT BLOOD FROM PICC: CPT

## 2019-10-12 PROCEDURE — 94770 HC ETCO2 MONITOR DAILY: CPT

## 2019-10-12 PROCEDURE — 36415 COLL VENOUS BLD VENIPUNCTURE: CPT

## 2019-10-12 PROCEDURE — 2709999900 HC NON-CHARGEABLE SUPPLY

## 2019-10-12 RX ORDER — POTASSIUM CHLORIDE 29.8 MG/ML
40 INJECTION INTRAVENOUS ONCE
Status: COMPLETED | OUTPATIENT
Start: 2019-10-12 | End: 2019-10-12

## 2019-10-12 RX ORDER — FUROSEMIDE 10 MG/ML
40 INJECTION INTRAMUSCULAR; INTRAVENOUS 2 TIMES DAILY
Status: DISPENSED | OUTPATIENT
Start: 2019-10-12 | End: 2019-10-13

## 2019-10-12 RX ADMIN — DEXMEDETOMIDINE HYDROCHLORIDE 1 MCG/KG/HR: 100 INJECTION, SOLUTION INTRAVENOUS at 02:11

## 2019-10-12 RX ADMIN — DEXMEDETOMIDINE HYDROCHLORIDE 1 MCG/KG/HR: 100 INJECTION, SOLUTION INTRAVENOUS at 06:16

## 2019-10-12 RX ADMIN — MEROPENEM 1 G: 1 INJECTION, POWDER, FOR SOLUTION INTRAVENOUS at 03:08

## 2019-10-12 RX ADMIN — GLYCOPYRROLATE AND FORMOTEROL FUMARATE 2 PUFF: 9; 4.8 AEROSOL, METERED RESPIRATORY (INHALATION) at 21:37

## 2019-10-12 RX ADMIN — MEROPENEM 1 G: 1 INJECTION, POWDER, FOR SOLUTION INTRAVENOUS at 10:46

## 2019-10-12 RX ADMIN — Medication 10 ML: at 20:12

## 2019-10-12 RX ADMIN — PHENYLEPHRINE HYDROCHLORIDE 50 MCG/MIN: 10 INJECTION INTRAVENOUS at 20:55

## 2019-10-12 RX ADMIN — POTASSIUM CHLORIDE 40 MEQ: 29.8 INJECTION, SOLUTION INTRAVENOUS at 14:57

## 2019-10-12 RX ADMIN — DEXMEDETOMIDINE HYDROCHLORIDE 1 MCG/KG/HR: 100 INJECTION, SOLUTION INTRAVENOUS at 13:49

## 2019-10-12 RX ADMIN — DEXMEDETOMIDINE HYDROCHLORIDE 1 MCG/KG/HR: 100 INJECTION, SOLUTION INTRAVENOUS at 18:25

## 2019-10-12 RX ADMIN — FAMOTIDINE 20 MG: 20 TABLET ORAL at 20:12

## 2019-10-12 RX ADMIN — DEXMEDETOMIDINE HYDROCHLORIDE 1 MCG/KG/HR: 100 INJECTION, SOLUTION INTRAVENOUS at 10:08

## 2019-10-12 RX ADMIN — AMIODARONE HYDROCHLORIDE 0.5 MG/MIN: 1.8 INJECTION, SOLUTION INTRAVENOUS at 02:11

## 2019-10-12 RX ADMIN — AMIODARONE HYDROCHLORIDE 0.5 MG/MIN: 1.8 INJECTION, SOLUTION INTRAVENOUS at 14:43

## 2019-10-12 RX ADMIN — QUETIAPINE FUMARATE 200 MG: 100 TABLET ORAL at 20:12

## 2019-10-12 RX ADMIN — FAMOTIDINE 20 MG: 20 TABLET ORAL at 09:29

## 2019-10-12 RX ADMIN — HEPARIN SODIUM 13 UNITS/KG/HR: 10000 INJECTION, SOLUTION INTRAVENOUS at 10:11

## 2019-10-12 RX ADMIN — METHYLNALTREXONE BROMIDE 12 MG: 12 INJECTION, SOLUTION SUBCUTANEOUS at 09:29

## 2019-10-12 RX ADMIN — Medication 10 ML: at 09:30

## 2019-10-12 RX ADMIN — GLYCOPYRROLATE AND FORMOTEROL FUMARATE 2 PUFF: 9; 4.8 AEROSOL, METERED RESPIRATORY (INHALATION) at 10:49

## 2019-10-12 RX ADMIN — MEROPENEM 1 G: 1 INJECTION, POWDER, FOR SOLUTION INTRAVENOUS at 18:31

## 2019-10-12 RX ADMIN — DEXMEDETOMIDINE HYDROCHLORIDE 1 MCG/KG/HR: 100 INJECTION, SOLUTION INTRAVENOUS at 22:14

## 2019-10-12 RX ADMIN — Medication 15 ML: at 09:29

## 2019-10-12 RX ADMIN — DILTIAZEM HYDROCHLORIDE 2.5 MG/HR: 5 INJECTION INTRAVENOUS at 10:14

## 2019-10-12 RX ADMIN — Medication 15 ML: at 20:12

## 2019-10-12 ASSESSMENT — PULMONARY FUNCTION TESTS
PIF_VALUE: 25
PIF_VALUE: 23
PIF_VALUE: 24
PIF_VALUE: 23

## 2019-10-12 ASSESSMENT — PAIN SCALES - GENERAL
PAINLEVEL_OUTOF10: 0

## 2019-10-12 ASSESSMENT — PAIN SCALES - WONG BAKER: WONGBAKER_NUMERICALRESPONSE: 0

## 2019-10-13 LAB
APTT: 74.7 SECONDS (ref 22–38)
APTT: 83.9 SECONDS (ref 22–38)
GRAM STAIN RESULT: ABNORMAL
ORGANISM: ABNORMAL
PLATELET # BLD: 118 THOU/MM3 (ref 130–400)
RESPIRATORY CULTURE: ABNORMAL
URINE CULTURE, ROUTINE: ABNORMAL
URINE CULTURE, ROUTINE: ABNORMAL

## 2019-10-13 PROCEDURE — 94640 AIRWAY INHALATION TREATMENT: CPT

## 2019-10-13 PROCEDURE — 6360000002 HC RX W HCPCS: Performed by: NURSE PRACTITIONER

## 2019-10-13 PROCEDURE — 94003 VENT MGMT INPAT SUBQ DAY: CPT

## 2019-10-13 PROCEDURE — 6370000000 HC RX 637 (ALT 250 FOR IP): Performed by: NURSE PRACTITIONER

## 2019-10-13 PROCEDURE — 85730 THROMBOPLASTIN TIME PARTIAL: CPT

## 2019-10-13 PROCEDURE — 36415 COLL VENOUS BLD VENIPUNCTURE: CPT

## 2019-10-13 PROCEDURE — 94770 HC ETCO2 MONITOR DAILY: CPT

## 2019-10-13 PROCEDURE — 2000000000 HC ICU R&B

## 2019-10-13 PROCEDURE — 6360000002 HC RX W HCPCS: Performed by: INTERNAL MEDICINE

## 2019-10-13 PROCEDURE — 2580000003 HC RX 258: Performed by: NURSE PRACTITIONER

## 2019-10-13 PROCEDURE — 2700000000 HC OXYGEN THERAPY PER DAY

## 2019-10-13 PROCEDURE — 99291 CRITICAL CARE FIRST HOUR: CPT | Performed by: INTERNAL MEDICINE

## 2019-10-13 PROCEDURE — 36592 COLLECT BLOOD FROM PICC: CPT

## 2019-10-13 PROCEDURE — 2500000003 HC RX 250 WO HCPCS: Performed by: NURSE PRACTITIONER

## 2019-10-13 PROCEDURE — 94761 N-INVAS EAR/PLS OXIMETRY MLT: CPT

## 2019-10-13 PROCEDURE — 85049 AUTOMATED PLATELET COUNT: CPT

## 2019-10-13 PROCEDURE — 2709999900 HC NON-CHARGEABLE SUPPLY

## 2019-10-13 RX ADMIN — FUROSEMIDE 40 MG: 10 INJECTION, SOLUTION INTRAMUSCULAR; INTRAVENOUS at 08:10

## 2019-10-13 RX ADMIN — GLYCOPYRROLATE AND FORMOTEROL FUMARATE 2 PUFF: 9; 4.8 AEROSOL, METERED RESPIRATORY (INHALATION) at 09:22

## 2019-10-13 RX ADMIN — DEXMEDETOMIDINE HYDROCHLORIDE 1 MCG/KG/HR: 100 INJECTION, SOLUTION INTRAVENOUS at 17:35

## 2019-10-13 RX ADMIN — FAMOTIDINE 20 MG: 20 TABLET ORAL at 08:10

## 2019-10-13 RX ADMIN — DEXMEDETOMIDINE HYDROCHLORIDE 1 MCG/KG/HR: 100 INJECTION, SOLUTION INTRAVENOUS at 21:16

## 2019-10-13 RX ADMIN — DEXMEDETOMIDINE HYDROCHLORIDE 1 MCG/KG/HR: 100 INJECTION, SOLUTION INTRAVENOUS at 09:31

## 2019-10-13 RX ADMIN — GLYCOPYRROLATE AND FORMOTEROL FUMARATE 2 PUFF: 9; 4.8 AEROSOL, METERED RESPIRATORY (INHALATION) at 21:25

## 2019-10-13 RX ADMIN — MEROPENEM 1 G: 1 INJECTION, POWDER, FOR SOLUTION INTRAVENOUS at 18:26

## 2019-10-13 RX ADMIN — QUETIAPINE FUMARATE 200 MG: 100 TABLET ORAL at 20:16

## 2019-10-13 RX ADMIN — Medication 15 ML: at 08:10

## 2019-10-13 RX ADMIN — Medication 10 ML: at 08:10

## 2019-10-13 RX ADMIN — FAMOTIDINE 20 MG: 20 TABLET ORAL at 20:16

## 2019-10-13 RX ADMIN — HEPARIN SODIUM 13 UNITS/KG/HR: 10000 INJECTION, SOLUTION INTRAVENOUS at 19:16

## 2019-10-13 RX ADMIN — MEROPENEM 1 G: 1 INJECTION, POWDER, FOR SOLUTION INTRAVENOUS at 10:46

## 2019-10-13 RX ADMIN — AMIODARONE HYDROCHLORIDE 0.5 MG/MIN: 1.8 INJECTION, SOLUTION INTRAVENOUS at 02:09

## 2019-10-13 RX ADMIN — Medication 10 ML: at 20:16

## 2019-10-13 RX ADMIN — DEXMEDETOMIDINE HYDROCHLORIDE 1 MCG/KG/HR: 100 INJECTION, SOLUTION INTRAVENOUS at 01:55

## 2019-10-13 RX ADMIN — DEXMEDETOMIDINE HYDROCHLORIDE 1 MCG/KG/HR: 100 INJECTION, SOLUTION INTRAVENOUS at 13:38

## 2019-10-13 RX ADMIN — HEPARIN SODIUM 13 UNITS/KG/HR: 10000 INJECTION, SOLUTION INTRAVENOUS at 02:44

## 2019-10-13 RX ADMIN — DEXMEDETOMIDINE HYDROCHLORIDE 1 MCG/KG/HR: 100 INJECTION, SOLUTION INTRAVENOUS at 05:41

## 2019-10-13 RX ADMIN — MEROPENEM 1 G: 1 INJECTION, POWDER, FOR SOLUTION INTRAVENOUS at 03:07

## 2019-10-13 RX ADMIN — AMIODARONE HYDROCHLORIDE 0.5 MG/MIN: 1.8 INJECTION, SOLUTION INTRAVENOUS at 15:20

## 2019-10-13 RX ADMIN — POTASSIUM BICARBONATE 20 MEQ: 782 TABLET, EFFERVESCENT ORAL at 08:10

## 2019-10-13 ASSESSMENT — PAIN SCALES - GENERAL
PAINLEVEL_OUTOF10: 0

## 2019-10-13 ASSESSMENT — PULMONARY FUNCTION TESTS
PIF_VALUE: 23
PIF_VALUE: 23
PIF_VALUE: 20

## 2019-10-14 ENCOUNTER — APPOINTMENT (OUTPATIENT)
Dept: GENERAL RADIOLOGY | Age: 59
DRG: 004 | End: 2019-10-14
Payer: MEDICARE

## 2019-10-14 LAB
APTT: 113.3 SECONDS (ref 22–38)
APTT: 71.4 SECONDS (ref 22–38)
APTT: 76.1 SECONDS (ref 22–38)
INR BLD: 1.2 (ref 0.85–1.13)
POTASSIUM SERPL-SCNC: 3.4 MEQ/L (ref 3.5–5.2)
REASON FOR REJECTION: NORMAL
REJECTED TEST: NORMAL

## 2019-10-14 PROCEDURE — 2500000003 HC RX 250 WO HCPCS: Performed by: INTERNAL MEDICINE

## 2019-10-14 PROCEDURE — 2000000000 HC ICU R&B

## 2019-10-14 PROCEDURE — 6370000000 HC RX 637 (ALT 250 FOR IP): Performed by: NURSE PRACTITIONER

## 2019-10-14 PROCEDURE — 6360000002 HC RX W HCPCS: Performed by: INTERNAL MEDICINE

## 2019-10-14 PROCEDURE — 94640 AIRWAY INHALATION TREATMENT: CPT

## 2019-10-14 PROCEDURE — 94761 N-INVAS EAR/PLS OXIMETRY MLT: CPT

## 2019-10-14 PROCEDURE — 74018 RADEX ABDOMEN 1 VIEW: CPT

## 2019-10-14 PROCEDURE — 36415 COLL VENOUS BLD VENIPUNCTURE: CPT

## 2019-10-14 PROCEDURE — 51701 INSERT BLADDER CATHETER: CPT

## 2019-10-14 PROCEDURE — 85730 THROMBOPLASTIN TIME PARTIAL: CPT

## 2019-10-14 PROCEDURE — 99233 SBSQ HOSP IP/OBS HIGH 50: CPT | Performed by: INTERNAL MEDICINE

## 2019-10-14 PROCEDURE — 2500000003 HC RX 250 WO HCPCS: Performed by: NURSE PRACTITIONER

## 2019-10-14 PROCEDURE — 6360000002 HC RX W HCPCS: Performed by: NURSE PRACTITIONER

## 2019-10-14 PROCEDURE — 2580000003 HC RX 258: Performed by: NURSE PRACTITIONER

## 2019-10-14 PROCEDURE — 51798 US URINE CAPACITY MEASURE: CPT

## 2019-10-14 PROCEDURE — 99232 SBSQ HOSP IP/OBS MODERATE 35: CPT | Performed by: NURSE PRACTITIONER

## 2019-10-14 PROCEDURE — 93005 ELECTROCARDIOGRAM TRACING: CPT | Performed by: NURSE PRACTITIONER

## 2019-10-14 PROCEDURE — 85610 PROTHROMBIN TIME: CPT

## 2019-10-14 PROCEDURE — 84132 ASSAY OF SERUM POTASSIUM: CPT

## 2019-10-14 PROCEDURE — 2709999900 HC NON-CHARGEABLE SUPPLY

## 2019-10-14 PROCEDURE — 2700000000 HC OXYGEN THERAPY PER DAY

## 2019-10-14 PROCEDURE — 2580000003 HC RX 258: Performed by: INTERNAL MEDICINE

## 2019-10-14 PROCEDURE — 6370000000 HC RX 637 (ALT 250 FOR IP): Performed by: PHYSICIAN ASSISTANT

## 2019-10-14 PROCEDURE — 6370000000 HC RX 637 (ALT 250 FOR IP): Performed by: INTERNAL MEDICINE

## 2019-10-14 RX ORDER — WARFARIN SODIUM 5 MG/1
5 TABLET ORAL DAILY
Status: DISCONTINUED | OUTPATIENT
Start: 2019-10-14 | End: 2019-10-14 | Stop reason: DRUGHIGH

## 2019-10-14 RX ORDER — POTASSIUM CHLORIDE 20 MEQ/1
15 TABLET, EXTENDED RELEASE ORAL 2 TIMES DAILY WITH MEALS
Status: DISCONTINUED | OUTPATIENT
Start: 2019-10-14 | End: 2019-10-14

## 2019-10-14 RX ORDER — AMIODARONE HYDROCHLORIDE 200 MG/1
200 TABLET ORAL DAILY
Status: DISCONTINUED | OUTPATIENT
Start: 2019-10-14 | End: 2019-10-19 | Stop reason: HOSPADM

## 2019-10-14 RX ORDER — POTASSIUM CHLORIDE 20 MEQ/1
40 TABLET, EXTENDED RELEASE ORAL ONCE
Status: COMPLETED | OUTPATIENT
Start: 2019-10-14 | End: 2019-10-14

## 2019-10-14 RX ORDER — METOPROLOL TARTRATE 50 MG/1
50 TABLET, FILM COATED ORAL EVERY 6 HOURS
Status: DISCONTINUED | OUTPATIENT
Start: 2019-10-14 | End: 2019-10-19 | Stop reason: HOSPADM

## 2019-10-14 RX ORDER — SENNA PLUS 8.6 MG/1
1 TABLET ORAL NIGHTLY PRN
Status: DISCONTINUED | OUTPATIENT
Start: 2019-10-14 | End: 2019-10-19 | Stop reason: HOSPADM

## 2019-10-14 RX ORDER — METOPROLOL TARTRATE 5 MG/5ML
5 INJECTION INTRAVENOUS ONCE
Status: COMPLETED | OUTPATIENT
Start: 2019-10-14 | End: 2019-10-14

## 2019-10-14 RX ORDER — DOCUSATE SODIUM 100 MG/1
100 CAPSULE, LIQUID FILLED ORAL 2 TIMES DAILY PRN
Status: DISCONTINUED | OUTPATIENT
Start: 2019-10-14 | End: 2019-10-19 | Stop reason: HOSPADM

## 2019-10-14 RX ORDER — DILTIAZEM HYDROCHLORIDE 5 MG/ML
20 INJECTION INTRAVENOUS ONCE
Status: COMPLETED | OUTPATIENT
Start: 2019-10-14 | End: 2019-10-14

## 2019-10-14 RX ORDER — FUROSEMIDE 40 MG/1
40 TABLET ORAL DAILY
Status: DISCONTINUED | OUTPATIENT
Start: 2019-10-14 | End: 2019-10-19 | Stop reason: HOSPADM

## 2019-10-14 RX ORDER — POLYETHYLENE GLYCOL 3350 17 G/17G
17 POWDER, FOR SOLUTION ORAL DAILY
Status: DISCONTINUED | OUTPATIENT
Start: 2019-10-14 | End: 2019-10-19 | Stop reason: HOSPADM

## 2019-10-14 RX ORDER — BISACODYL 10 MG
10 SUPPOSITORY, RECTAL RECTAL PRN
Status: DISCONTINUED | OUTPATIENT
Start: 2019-10-14 | End: 2019-10-19 | Stop reason: HOSPADM

## 2019-10-14 RX ORDER — POTASSIUM CHLORIDE 600 MG/1
16 TABLET, FILM COATED, EXTENDED RELEASE ORAL 2 TIMES DAILY WITH MEALS
Status: DISCONTINUED | OUTPATIENT
Start: 2019-10-14 | End: 2019-10-16

## 2019-10-14 RX ORDER — WARFARIN SODIUM 5 MG/1
5 TABLET ORAL ONCE
Status: COMPLETED | OUTPATIENT
Start: 2019-10-14 | End: 2019-10-14

## 2019-10-14 RX ORDER — BUPRENORPHINE AND NALOXONE 8; 2 MG/1; MG/1
1 FILM, SOLUBLE BUCCAL; SUBLINGUAL DAILY
Status: DISCONTINUED | OUTPATIENT
Start: 2019-10-14 | End: 2019-10-19 | Stop reason: HOSPADM

## 2019-10-14 RX ADMIN — WARFARIN SODIUM 5 MG: 5 TABLET ORAL at 17:24

## 2019-10-14 RX ADMIN — QUETIAPINE FUMARATE 200 MG: 100 TABLET ORAL at 20:31

## 2019-10-14 RX ADMIN — FUROSEMIDE 40 MG: 40 TABLET ORAL at 09:51

## 2019-10-14 RX ADMIN — METOPROLOL TARTRATE 5 MG: 5 INJECTION INTRAVENOUS at 14:09

## 2019-10-14 RX ADMIN — ONDANSETRON 4 MG: 2 INJECTION INTRAMUSCULAR; INTRAVENOUS at 20:23

## 2019-10-14 RX ADMIN — ALBUTEROL SULFATE 5 MG: 2.5 SOLUTION RESPIRATORY (INHALATION) at 20:29

## 2019-10-14 RX ADMIN — MEROPENEM 1 G: 1 INJECTION, POWDER, FOR SOLUTION INTRAVENOUS at 02:54

## 2019-10-14 RX ADMIN — GLYCOPYRROLATE AND FORMOTEROL FUMARATE 2 PUFF: 9; 4.8 AEROSOL, METERED RESPIRATORY (INHALATION) at 18:16

## 2019-10-14 RX ADMIN — DOCUSATE SODIUM 100 MG: 100 CAPSULE, LIQUID FILLED ORAL at 20:31

## 2019-10-14 RX ADMIN — FAMOTIDINE 20 MG: 20 TABLET ORAL at 09:51

## 2019-10-14 RX ADMIN — AMIODARONE HYDROCHLORIDE 0.5 MG/MIN: 1.8 INJECTION, SOLUTION INTRAVENOUS at 02:55

## 2019-10-14 RX ADMIN — BUPRENORPHINE HYDROCHLORIDE, NALOXONE HYDROCHLORIDE 1 FILM: 8; 2 FILM, SOLUBLE BUCCAL; SUBLINGUAL at 17:24

## 2019-10-14 RX ADMIN — GLYCOPYRROLATE AND FORMOTEROL FUMARATE 2 PUFF: 9; 4.8 AEROSOL, METERED RESPIRATORY (INHALATION) at 08:20

## 2019-10-14 RX ADMIN — AMIODARONE HYDROCHLORIDE 200 MG: 200 TABLET ORAL at 11:23

## 2019-10-14 RX ADMIN — POTASSIUM CHLORIDE 16 MEQ: 600 TABLET, FILM COATED, EXTENDED RELEASE ORAL at 09:51

## 2019-10-14 RX ADMIN — FAMOTIDINE 20 MG: 20 TABLET ORAL at 20:31

## 2019-10-14 RX ADMIN — PIPERACILLIN AND TAZOBACTAM 3.38 G: 3; .375 INJECTION, POWDER, LYOPHILIZED, FOR SOLUTION INTRAVENOUS at 11:26

## 2019-10-14 RX ADMIN — DEXMEDETOMIDINE HYDROCHLORIDE 1 MCG/KG/HR: 100 INJECTION, SOLUTION INTRAVENOUS at 01:19

## 2019-10-14 RX ADMIN — Medication 10 ML: at 09:53

## 2019-10-14 RX ADMIN — HEPARIN SODIUM 11 UNITS/KG/HR: 10000 INJECTION, SOLUTION INTRAVENOUS at 13:18

## 2019-10-14 RX ADMIN — POTASSIUM CHLORIDE 16 MEQ: 600 TABLET, FILM COATED, EXTENDED RELEASE ORAL at 17:24

## 2019-10-14 RX ADMIN — METOPROLOL TARTRATE 25 MG: 25 TABLET ORAL at 13:32

## 2019-10-14 RX ADMIN — METOPROLOL TARTRATE 25 MG: 25 TABLET ORAL at 15:15

## 2019-10-14 RX ADMIN — BISACODYL 10 MG: 10 SUPPOSITORY RECTAL at 21:18

## 2019-10-14 RX ADMIN — PIPERACILLIN AND TAZOBACTAM 3.38 G: 3; .375 INJECTION, POWDER, LYOPHILIZED, FOR SOLUTION INTRAVENOUS at 20:26

## 2019-10-14 RX ADMIN — DILTIAZEM HYDROCHLORIDE 20 MG: 5 INJECTION INTRAVENOUS at 16:25

## 2019-10-14 RX ADMIN — POTASSIUM CHLORIDE 40 MEQ: 1500 TABLET, EXTENDED RELEASE ORAL at 17:24

## 2019-10-14 RX ADMIN — DEXMEDETOMIDINE HYDROCHLORIDE 0.9 MCG/KG/HR: 100 INJECTION, SOLUTION INTRAVENOUS at 04:59

## 2019-10-14 RX ADMIN — METOPROLOL TARTRATE 50 MG: 50 TABLET, FILM COATED ORAL at 20:30

## 2019-10-14 ASSESSMENT — PAIN SCALES - GENERAL: PAINLEVEL_OUTOF10: 0

## 2019-10-15 ENCOUNTER — APPOINTMENT (OUTPATIENT)
Dept: GENERAL RADIOLOGY | Age: 59
DRG: 004 | End: 2019-10-15
Payer: MEDICARE

## 2019-10-15 ENCOUNTER — APPOINTMENT (OUTPATIENT)
Dept: CT IMAGING | Age: 59
DRG: 004 | End: 2019-10-15
Payer: MEDICARE

## 2019-10-15 LAB
APTT: 82.9 SECONDS (ref 22–38)
EKG ATRIAL RATE: 61 BPM
EKG P AXIS: 30 DEGREES
EKG P-R INTERVAL: 88 MS
EKG Q-T INTERVAL: 410 MS
EKG QRS DURATION: 128 MS
EKG QTC CALCULATION (BAZETT): 412 MS
EKG R AXIS: -71 DEGREES
EKG T AXIS: 38 DEGREES
EKG VENTRICULAR RATE: 61 BPM
FIBRINOGEN: 555 MG/100ML (ref 155–475)
HCT VFR BLD CALC: 36.2 % (ref 42–52)
HCT VFR BLD CALC: 36.6 % (ref 42–52)
HEMOGLOBIN: 10.9 GM/DL (ref 14–18)
HEMOGLOBIN: 11.2 GM/DL (ref 14–18)
INR BLD: 1.16 (ref 0.85–1.13)
PLATELET # BLD: 120 THOU/MM3 (ref 130–400)
POTASSIUM SERPL-SCNC: 4.5 MEQ/L (ref 3.5–5.2)

## 2019-10-15 PROCEDURE — 85014 HEMATOCRIT: CPT

## 2019-10-15 PROCEDURE — 2500000003 HC RX 250 WO HCPCS

## 2019-10-15 PROCEDURE — 94770 HC ETCO2 MONITOR DAILY: CPT

## 2019-10-15 PROCEDURE — 87070 CULTURE OTHR SPECIMN AEROBIC: CPT

## 2019-10-15 PROCEDURE — 92610 EVALUATE SWALLOWING FUNCTION: CPT

## 2019-10-15 PROCEDURE — 97168 OT RE-EVAL EST PLAN CARE: CPT

## 2019-10-15 PROCEDURE — 85018 HEMOGLOBIN: CPT

## 2019-10-15 PROCEDURE — 94003 VENT MGMT INPAT SUBQ DAY: CPT

## 2019-10-15 PROCEDURE — 71045 X-RAY EXAM CHEST 1 VIEW: CPT

## 2019-10-15 PROCEDURE — 0BH17EZ INSERTION OF ENDOTRACHEAL AIRWAY INTO TRACHEA, VIA NATURAL OR ARTIFICIAL OPENING: ICD-10-PCS | Performed by: INTERNAL MEDICINE

## 2019-10-15 PROCEDURE — 6360000002 HC RX W HCPCS

## 2019-10-15 PROCEDURE — 6360000002 HC RX W HCPCS: Performed by: INTERNAL MEDICINE

## 2019-10-15 PROCEDURE — 87147 CULTURE TYPE IMMUNOLOGIC: CPT

## 2019-10-15 PROCEDURE — 6370000000 HC RX 637 (ALT 250 FOR IP): Performed by: NURSE PRACTITIONER

## 2019-10-15 PROCEDURE — 93010 ELECTROCARDIOGRAM REPORT: CPT | Performed by: INTERNAL MEDICINE

## 2019-10-15 PROCEDURE — 6370000000 HC RX 637 (ALT 250 FOR IP): Performed by: INTERNAL MEDICINE

## 2019-10-15 PROCEDURE — 71260 CT THORAX DX C+: CPT

## 2019-10-15 PROCEDURE — 2580000003 HC RX 258: Performed by: NURSE PRACTITIONER

## 2019-10-15 PROCEDURE — 94761 N-INVAS EAR/PLS OXIMETRY MLT: CPT

## 2019-10-15 PROCEDURE — 6360000002 HC RX W HCPCS: Performed by: NURSE PRACTITIONER

## 2019-10-15 PROCEDURE — 2709999900 HC NON-CHARGEABLE SUPPLY

## 2019-10-15 PROCEDURE — 99232 SBSQ HOSP IP/OBS MODERATE 35: CPT | Performed by: NURSE PRACTITIONER

## 2019-10-15 PROCEDURE — 87077 CULTURE AEROBIC IDENTIFY: CPT

## 2019-10-15 PROCEDURE — 2700000000 HC OXYGEN THERAPY PER DAY

## 2019-10-15 PROCEDURE — 94640 AIRWAY INHALATION TREATMENT: CPT

## 2019-10-15 PROCEDURE — 5A1945Z RESPIRATORY VENTILATION, 24-96 CONSECUTIVE HOURS: ICD-10-PCS | Performed by: INTERNAL MEDICINE

## 2019-10-15 PROCEDURE — 2000000000 HC ICU R&B

## 2019-10-15 PROCEDURE — 97530 THERAPEUTIC ACTIVITIES: CPT

## 2019-10-15 PROCEDURE — 2580000003 HC RX 258: Performed by: INTERNAL MEDICINE

## 2019-10-15 PROCEDURE — 84132 ASSAY OF SERUM POTASSIUM: CPT

## 2019-10-15 PROCEDURE — C9113 INJ PANTOPRAZOLE SODIUM, VIA: HCPCS | Performed by: INTERNAL MEDICINE

## 2019-10-15 PROCEDURE — 36415 COLL VENOUS BLD VENIPUNCTURE: CPT

## 2019-10-15 PROCEDURE — 85049 AUTOMATED PLATELET COUNT: CPT

## 2019-10-15 PROCEDURE — 6360000004 HC RX CONTRAST MEDICATION: Performed by: INTERNAL MEDICINE

## 2019-10-15 PROCEDURE — 89220 SPUTUM SPECIMEN COLLECTION: CPT

## 2019-10-15 PROCEDURE — 31500 INSERT EMERGENCY AIRWAY: CPT | Performed by: PHYSICIAN ASSISTANT

## 2019-10-15 PROCEDURE — 85730 THROMBOPLASTIN TIME PARTIAL: CPT

## 2019-10-15 PROCEDURE — 87186 SC STD MICRODIL/AGAR DIL: CPT

## 2019-10-15 PROCEDURE — 99291 CRITICAL CARE FIRST HOUR: CPT | Performed by: PHYSICIAN ASSISTANT

## 2019-10-15 PROCEDURE — 36600 WITHDRAWAL OF ARTERIAL BLOOD: CPT

## 2019-10-15 PROCEDURE — 2500000003 HC RX 250 WO HCPCS: Performed by: NURSE PRACTITIONER

## 2019-10-15 PROCEDURE — 85385 FIBRINOGEN ANTIGEN: CPT

## 2019-10-15 PROCEDURE — 36592 COLLECT BLOOD FROM PICC: CPT

## 2019-10-15 PROCEDURE — 87205 SMEAR GRAM STAIN: CPT

## 2019-10-15 PROCEDURE — C9113 INJ PANTOPRAZOLE SODIUM, VIA: HCPCS | Performed by: NURSE PRACTITIONER

## 2019-10-15 PROCEDURE — 85610 PROTHROMBIN TIME: CPT

## 2019-10-15 PROCEDURE — 99233 SBSQ HOSP IP/OBS HIGH 50: CPT | Performed by: INTERNAL MEDICINE

## 2019-10-15 RX ORDER — PROPOFOL 10 MG/ML
INJECTION, EMULSION INTRAVENOUS
Status: COMPLETED
Start: 2019-10-15 | End: 2019-10-15

## 2019-10-15 RX ORDER — PROPOFOL 10 MG/ML
10 INJECTION, EMULSION INTRAVENOUS
Status: DISPENSED | OUTPATIENT
Start: 2019-10-15 | End: 2019-10-18

## 2019-10-15 RX ORDER — MIDAZOLAM HYDROCHLORIDE 1 MG/ML
1 INJECTION INTRAMUSCULAR; INTRAVENOUS ONCE
Status: COMPLETED | OUTPATIENT
Start: 2019-10-15 | End: 2019-10-15

## 2019-10-15 RX ORDER — PANTOPRAZOLE SODIUM 40 MG/10ML
40 INJECTION, POWDER, LYOPHILIZED, FOR SOLUTION INTRAVENOUS 2 TIMES DAILY
Status: DISCONTINUED | OUTPATIENT
Start: 2019-10-15 | End: 2019-10-15

## 2019-10-15 RX ORDER — QUETIAPINE FUMARATE 100 MG/1
100 TABLET, FILM COATED ORAL NIGHTLY
Status: DISCONTINUED | OUTPATIENT
Start: 2019-10-15 | End: 2019-10-19 | Stop reason: HOSPADM

## 2019-10-15 RX ORDER — ETOMIDATE 2 MG/ML
INJECTION INTRAVENOUS
Status: COMPLETED | OUTPATIENT
Start: 2019-10-15 | End: 2019-10-15

## 2019-10-15 RX ADMIN — METOPROLOL TARTRATE 50 MG: 50 TABLET, FILM COATED ORAL at 04:20

## 2019-10-15 RX ADMIN — PROPOFOL INJECTABLE EMULSION 10 MCG/KG/MIN: 10 INJECTION, EMULSION INTRAVENOUS at 20:45

## 2019-10-15 RX ADMIN — SODIUM CHLORIDE 8 MG/HR: 9 INJECTION, SOLUTION INTRAVENOUS at 12:25

## 2019-10-15 RX ADMIN — SODIUM CHLORIDE 8 MG/HR: 9 INJECTION, SOLUTION INTRAVENOUS at 21:04

## 2019-10-15 RX ADMIN — GLYCOPYRROLATE AND FORMOTEROL FUMARATE 2 PUFF: 9; 4.8 AEROSOL, METERED RESPIRATORY (INHALATION) at 08:45

## 2019-10-15 RX ADMIN — PIPERACILLIN AND TAZOBACTAM 3.38 G: 3; .375 INJECTION, POWDER, LYOPHILIZED, FOR SOLUTION INTRAVENOUS at 04:21

## 2019-10-15 RX ADMIN — MIDAZOLAM 1 MG: 1 INJECTION INTRAMUSCULAR; INTRAVENOUS at 20:15

## 2019-10-15 RX ADMIN — PIPERACILLIN AND TAZOBACTAM 3.38 G: 3; .375 INJECTION, POWDER, LYOPHILIZED, FOR SOLUTION INTRAVENOUS at 10:37

## 2019-10-15 RX ADMIN — PIPERACILLIN AND TAZOBACTAM 3.38 G: 3; .375 INJECTION, POWDER, LYOPHILIZED, FOR SOLUTION INTRAVENOUS at 21:46

## 2019-10-15 RX ADMIN — PANTOPRAZOLE SODIUM 40 MG: 40 INJECTION, POWDER, FOR SOLUTION INTRAVENOUS at 08:36

## 2019-10-15 RX ADMIN — GLYCOPYRROLATE AND FORMOTEROL FUMARATE 2 PUFF: 9; 4.8 AEROSOL, METERED RESPIRATORY (INHALATION) at 20:26

## 2019-10-15 RX ADMIN — POTASSIUM CHLORIDE 16 MEQ: 600 TABLET, FILM COATED, EXTENDED RELEASE ORAL at 08:35

## 2019-10-15 RX ADMIN — FUROSEMIDE 40 MG: 40 TABLET ORAL at 08:37

## 2019-10-15 RX ADMIN — DEXMEDETOMIDINE HYDROCHLORIDE 0.2 MCG/KG/HR: 100 INJECTION, SOLUTION INTRAVENOUS at 20:18

## 2019-10-15 RX ADMIN — ETOMIDATE 20 MG: 2 INJECTION INTRAVENOUS at 19:55

## 2019-10-15 RX ADMIN — METOPROLOL TARTRATE 50 MG: 50 TABLET, FILM COATED ORAL at 08:35

## 2019-10-15 RX ADMIN — IOPAMIDOL 80 ML: 755 INJECTION, SOLUTION INTRAVENOUS at 04:40

## 2019-10-15 RX ADMIN — BUPRENORPHINE HYDROCHLORIDE, NALOXONE HYDROCHLORIDE 1 FILM: 8; 2 FILM, SOLUBLE BUCCAL; SUBLINGUAL at 08:35

## 2019-10-15 RX ADMIN — PROPOFOL 10 MCG/KG/MIN: 10 INJECTION, EMULSION INTRAVENOUS at 20:45

## 2019-10-15 RX ADMIN — AMIODARONE HYDROCHLORIDE 200 MG: 200 TABLET ORAL at 08:35

## 2019-10-15 RX ADMIN — ACETAMINOPHEN 650 MG: 650 SUPPOSITORY RECTAL at 01:49

## 2019-10-15 RX ADMIN — Medication 10 ML: at 08:36

## 2019-10-15 RX ADMIN — SODIUM CHLORIDE 80 MG: 9 INJECTION, SOLUTION INTRAVENOUS at 11:49

## 2019-10-15 ASSESSMENT — PAIN SCALES - WONG BAKER

## 2019-10-15 ASSESSMENT — PAIN SCALES - GENERAL
PAINLEVEL_OUTOF10: 0

## 2019-10-15 ASSESSMENT — PULMONARY FUNCTION TESTS: PIF_VALUE: 28

## 2019-10-16 LAB
ANION GAP SERPL CALCULATED.3IONS-SCNC: 13 MEQ/L (ref 8–16)
BAL CHARACTER: ABNORMAL
BAL COLLECTION SITE: ABNORMAL
BAL COLOR: ABNORMAL
BASOPHILS, BAL FLUID: 1 % (ref 0–1)
BUN BLDV-MCNC: 12 MG/DL (ref 7–22)
CALCIUM IONIZED: 1.16 MMOL/L (ref 1.12–1.32)
CALCIUM SERPL-MCNC: 9.5 MG/DL (ref 8.5–10.5)
CHLORIDE BLD-SCNC: 101 MEQ/L (ref 98–111)
CO2: 33 MEQ/L (ref 23–33)
CREAT SERPL-MCNC: 0.8 MG/DL (ref 0.4–1.2)
EOSINOPHILS BAL: 1 % (ref 0–1)
ERYTHROCYTE [DISTWIDTH] IN BLOOD BY AUTOMATED COUNT: 15.2 % (ref 11.5–14.5)
ERYTHROCYTE [DISTWIDTH] IN BLOOD BY AUTOMATED COUNT: 55.5 FL (ref 35–45)
GFR SERPL CREATININE-BSD FRML MDRD: > 90 ML/MIN/1.73M2
GLUCOSE BLD-MCNC: 88 MG/DL (ref 70–108)
HCT VFR BLD CALC: 35.2 % (ref 42–52)
HEMOGLOBIN: 10.7 GM/DL (ref 14–18)
INR BLD: 1.39 (ref 0.85–1.13)
LYMPHOCYTES, BAL: 2 % (ref 10–15)
MACROPHAGE/MONOCYTE BAL: 1 % (ref 86–100)
MAGNESIUM: 1.9 MG/DL (ref 1.6–2.4)
MCH RBC QN AUTO: 30.2 PG (ref 26–33)
MCHC RBC AUTO-ENTMCNC: 30.4 GM/DL (ref 32.2–35.5)
MCV RBC AUTO: 99.4 FL (ref 80–94)
PATHOLOGIST REVIEW: ABNORMAL
PLATELET # BLD: 132 THOU/MM3 (ref 130–400)
PMV BLD AUTO: 10.3 FL (ref 9.4–12.4)
POTASSIUM SERPL-SCNC: 3.7 MEQ/L (ref 3.5–5.2)
RBC # BLD: 3.54 MILL/MM3 (ref 4.7–6.1)
RBC BAL: 6900 /CUMM
SEGMENTED NEUTROPHILS, BAL: 95 % (ref 0–3)
SODIUM BLD-SCNC: 147 MEQ/L (ref 135–145)
TOTAL NUCLEATED CELLS BAL: 1238 /CUMM
TOTAL VOLUME RECEIVED BAL: 20 ML
WBC # BLD: 6.4 THOU/MM3 (ref 4.8–10.8)

## 2019-10-16 PROCEDURE — 87147 CULTURE TYPE IMMUNOLOGIC: CPT

## 2019-10-16 PROCEDURE — 6360000002 HC RX W HCPCS: Performed by: INTERNAL MEDICINE

## 2019-10-16 PROCEDURE — 87070 CULTURE OTHR SPECIMN AEROBIC: CPT

## 2019-10-16 PROCEDURE — C9113 INJ PANTOPRAZOLE SODIUM, VIA: HCPCS | Performed by: INTERNAL MEDICINE

## 2019-10-16 PROCEDURE — 94761 N-INVAS EAR/PLS OXIMETRY MLT: CPT

## 2019-10-16 PROCEDURE — 87651 STREP A DNA AMP PROBE: CPT

## 2019-10-16 PROCEDURE — 82330 ASSAY OF CALCIUM: CPT

## 2019-10-16 PROCEDURE — 2580000003 HC RX 258: Performed by: NURSE PRACTITIONER

## 2019-10-16 PROCEDURE — 2709999900 HC NON-CHARGEABLE SUPPLY: Performed by: INTERNAL MEDICINE

## 2019-10-16 PROCEDURE — 80048 BASIC METABOLIC PNL TOTAL CA: CPT

## 2019-10-16 PROCEDURE — 85027 COMPLETE CBC AUTOMATED: CPT

## 2019-10-16 PROCEDURE — 36415 COLL VENOUS BLD VENIPUNCTURE: CPT

## 2019-10-16 PROCEDURE — 2500000003 HC RX 250 WO HCPCS: Performed by: NURSE PRACTITIONER

## 2019-10-16 PROCEDURE — 94003 VENT MGMT INPAT SUBQ DAY: CPT

## 2019-10-16 PROCEDURE — 36592 COLLECT BLOOD FROM PICC: CPT

## 2019-10-16 PROCEDURE — 87077 CULTURE AEROBIC IDENTIFY: CPT

## 2019-10-16 PROCEDURE — 83735 ASSAY OF MAGNESIUM: CPT

## 2019-10-16 PROCEDURE — 87633 RESP VIRUS 12-25 TARGETS: CPT

## 2019-10-16 PROCEDURE — 87541 LEGION PNEUMO DNA AMP PROB: CPT

## 2019-10-16 PROCEDURE — 87581 M.PNEUMON DNA AMP PROBE: CPT

## 2019-10-16 PROCEDURE — 6370000000 HC RX 637 (ALT 250 FOR IP): Performed by: NURSE PRACTITIONER

## 2019-10-16 PROCEDURE — 87641 MR-STAPH DNA AMP PROBE: CPT

## 2019-10-16 PROCEDURE — 6370000000 HC RX 637 (ALT 250 FOR IP): Performed by: INTERNAL MEDICINE

## 2019-10-16 PROCEDURE — 94770 HC ETCO2 MONITOR DAILY: CPT

## 2019-10-16 PROCEDURE — 2580000003 HC RX 258: Performed by: INTERNAL MEDICINE

## 2019-10-16 PROCEDURE — 87486 CHLMYD PNEUM DNA AMP PROBE: CPT

## 2019-10-16 PROCEDURE — 6360000002 HC RX W HCPCS: Performed by: NURSE PRACTITIONER

## 2019-10-16 PROCEDURE — 89051 BODY FLUID CELL COUNT: CPT

## 2019-10-16 PROCEDURE — 2000000000 HC ICU R&B

## 2019-10-16 PROCEDURE — 87205 SMEAR GRAM STAIN: CPT

## 2019-10-16 PROCEDURE — C9113 INJ PANTOPRAZOLE SODIUM, VIA: HCPCS | Performed by: NURSE PRACTITIONER

## 2019-10-16 PROCEDURE — 3609010800 HC BRONCHOSCOPY ALVEOLAR LAVAGE: Performed by: INTERNAL MEDICINE

## 2019-10-16 PROCEDURE — 31624 DX BRONCHOSCOPE/LAVAGE: CPT | Performed by: INTERNAL MEDICINE

## 2019-10-16 PROCEDURE — 0B9J8ZX DRAINAGE OF LEFT LOWER LUNG LOBE, VIA NATURAL OR ARTIFICIAL OPENING ENDOSCOPIC, DIAGNOSTIC: ICD-10-PCS | Performed by: INTERNAL MEDICINE

## 2019-10-16 PROCEDURE — 87116 MYCOBACTERIA CULTURE: CPT

## 2019-10-16 PROCEDURE — 87186 SC STD MICRODIL/AGAR DIL: CPT

## 2019-10-16 PROCEDURE — 94640 AIRWAY INHALATION TREATMENT: CPT

## 2019-10-16 PROCEDURE — 99291 CRITICAL CARE FIRST HOUR: CPT | Performed by: INTERNAL MEDICINE

## 2019-10-16 PROCEDURE — 2700000000 HC OXYGEN THERAPY PER DAY

## 2019-10-16 PROCEDURE — 2500000003 HC RX 250 WO HCPCS: Performed by: INTERNAL MEDICINE

## 2019-10-16 PROCEDURE — 2709999900 HC NON-CHARGEABLE SUPPLY

## 2019-10-16 PROCEDURE — 85610 PROTHROMBIN TIME: CPT

## 2019-10-16 PROCEDURE — 87798 DETECT AGENT NOS DNA AMP: CPT

## 2019-10-16 RX ORDER — PANTOPRAZOLE SODIUM 40 MG/10ML
40 INJECTION, POWDER, LYOPHILIZED, FOR SOLUTION INTRAVENOUS 2 TIMES DAILY
Status: DISCONTINUED | OUTPATIENT
Start: 2019-10-16 | End: 2019-10-19 | Stop reason: HOSPADM

## 2019-10-16 RX ORDER — 0.9 % SODIUM CHLORIDE 0.9 %
10 VIAL (ML) INJECTION 2 TIMES DAILY
Status: DISCONTINUED | OUTPATIENT
Start: 2019-10-16 | End: 2019-10-19 | Stop reason: HOSPADM

## 2019-10-16 RX ORDER — SODIUM CHLORIDE 0.9 % (FLUSH) 0.9 %
10 SYRINGE (ML) INJECTION EVERY 12 HOURS SCHEDULED
Status: DISCONTINUED | OUTPATIENT
Start: 2019-10-16 | End: 2019-10-19 | Stop reason: HOSPADM

## 2019-10-16 RX ORDER — KETAMINE HCL IN NACL, ISO-OSM 100MG/10ML
100 SYRINGE (ML) INJECTION ONCE
Status: COMPLETED | OUTPATIENT
Start: 2019-10-16 | End: 2019-10-16

## 2019-10-16 RX ORDER — LORAZEPAM 2 MG/ML
2 INJECTION INTRAMUSCULAR EVERY 4 HOURS PRN
Status: DISCONTINUED | OUTPATIENT
Start: 2019-10-16 | End: 2019-10-18

## 2019-10-16 RX ORDER — KETAMINE HYDROCHLORIDE 50 MG/ML
100 INJECTION, SOLUTION, CONCENTRATE INTRAMUSCULAR; INTRAVENOUS ONCE
Status: DISCONTINUED | OUTPATIENT
Start: 2019-10-16 | End: 2019-10-16 | Stop reason: SDUPTHER

## 2019-10-16 RX ORDER — SODIUM CHLORIDE 0.9 % (FLUSH) 0.9 %
10 SYRINGE (ML) INJECTION PRN
Status: DISCONTINUED | OUTPATIENT
Start: 2019-10-16 | End: 2019-10-19 | Stop reason: HOSPADM

## 2019-10-16 RX ADMIN — POTASSIUM BICARBONATE 20 MEQ: 782 TABLET, EFFERVESCENT ORAL at 20:09

## 2019-10-16 RX ADMIN — ALBUTEROL SULFATE 5 MG: 2.5 SOLUTION RESPIRATORY (INHALATION) at 13:50

## 2019-10-16 RX ADMIN — PIPERACILLIN AND TAZOBACTAM 3.38 G: 3; .375 INJECTION, POWDER, LYOPHILIZED, FOR SOLUTION INTRAVENOUS at 18:49

## 2019-10-16 RX ADMIN — POTASSIUM BICARBONATE 20 MEQ: 782 TABLET, EFFERVESCENT ORAL at 08:29

## 2019-10-16 RX ADMIN — GLYCOPYRROLATE AND FORMOTEROL FUMARATE 2 PUFF: 9; 4.8 AEROSOL, METERED RESPIRATORY (INHALATION) at 09:44

## 2019-10-16 RX ADMIN — ALBUTEROL SULFATE 5 MG: 2.5 SOLUTION RESPIRATORY (INHALATION) at 09:44

## 2019-10-16 RX ADMIN — QUETIAPINE FUMARATE 100 MG: 100 TABLET ORAL at 20:09

## 2019-10-16 RX ADMIN — ENOXAPARIN SODIUM 40 MG: 40 INJECTION SUBCUTANEOUS at 10:19

## 2019-10-16 RX ADMIN — GLYCOPYRROLATE AND FORMOTEROL FUMARATE 2 PUFF: 9; 4.8 AEROSOL, METERED RESPIRATORY (INHALATION) at 20:56

## 2019-10-16 RX ADMIN — Medication 10 ML: at 20:11

## 2019-10-16 RX ADMIN — PANTOPRAZOLE SODIUM 40 MG: 40 INJECTION, POWDER, FOR SOLUTION INTRAVENOUS at 20:10

## 2019-10-16 RX ADMIN — PROPOFOL 20 MCG/KG/MIN: 10 INJECTION, EMULSION INTRAVENOUS at 01:30

## 2019-10-16 RX ADMIN — PIPERACILLIN AND TAZOBACTAM 3.38 G: 3; .375 INJECTION, POWDER, LYOPHILIZED, FOR SOLUTION INTRAVENOUS at 10:18

## 2019-10-16 RX ADMIN — Medication 10 ML: at 08:29

## 2019-10-16 RX ADMIN — AMIODARONE HYDROCHLORIDE 200 MG: 200 TABLET ORAL at 08:29

## 2019-10-16 RX ADMIN — Medication 50 MG: at 09:19

## 2019-10-16 RX ADMIN — Medication 10 ML: at 10:30

## 2019-10-16 RX ADMIN — PROPOFOL 5 MCG/KG/MIN: 10 INJECTION, EMULSION INTRAVENOUS at 14:40

## 2019-10-16 RX ADMIN — SODIUM CHLORIDE 8 MG/HR: 9 INJECTION, SOLUTION INTRAVENOUS at 08:39

## 2019-10-16 RX ADMIN — METOPROLOL TARTRATE 50 MG: 50 TABLET, FILM COATED ORAL at 21:35

## 2019-10-16 RX ADMIN — DEXMEDETOMIDINE HYDROCHLORIDE 0.3 MCG/KG/HR: 100 INJECTION, SOLUTION INTRAVENOUS at 06:36

## 2019-10-16 RX ADMIN — Medication 10 ML: at 14:40

## 2019-10-16 RX ADMIN — PANTOPRAZOLE SODIUM 40 MG: 40 INJECTION, POWDER, FOR SOLUTION INTRAVENOUS at 14:40

## 2019-10-16 RX ADMIN — PIPERACILLIN AND TAZOBACTAM 3.38 G: 3; .375 INJECTION, POWDER, LYOPHILIZED, FOR SOLUTION INTRAVENOUS at 02:48

## 2019-10-16 RX ADMIN — ACETAMINOPHEN 650 MG: 325 TABLET ORAL at 00:23

## 2019-10-16 RX ADMIN — PROPOFOL 20 MCG/KG/MIN: 10 INJECTION, EMULSION INTRAVENOUS at 22:07

## 2019-10-16 RX ADMIN — METOPROLOL TARTRATE 50 MG: 50 TABLET, FILM COATED ORAL at 08:29

## 2019-10-16 RX ADMIN — FUROSEMIDE 40 MG: 40 TABLET ORAL at 08:29

## 2019-10-16 ASSESSMENT — PULMONARY FUNCTION TESTS
PIF_VALUE: 23
PIF_VALUE: 22
PIF_VALUE: 30
PIF_VALUE: 25
PIF_VALUE: 26
PIF_VALUE: 27

## 2019-10-17 LAB — INR BLD: 1.25 (ref 0.85–1.13)

## 2019-10-17 PROCEDURE — 6370000000 HC RX 637 (ALT 250 FOR IP): Performed by: INTERNAL MEDICINE

## 2019-10-17 PROCEDURE — 6360000002 HC RX W HCPCS: Performed by: NURSE PRACTITIONER

## 2019-10-17 PROCEDURE — 6360000002 HC RX W HCPCS: Performed by: INTERNAL MEDICINE

## 2019-10-17 PROCEDURE — 2580000003 HC RX 258: Performed by: NURSE PRACTITIONER

## 2019-10-17 PROCEDURE — C9113 INJ PANTOPRAZOLE SODIUM, VIA: HCPCS | Performed by: NURSE PRACTITIONER

## 2019-10-17 PROCEDURE — 85610 PROTHROMBIN TIME: CPT

## 2019-10-17 PROCEDURE — 94640 AIRWAY INHALATION TREATMENT: CPT

## 2019-10-17 PROCEDURE — 6370000000 HC RX 637 (ALT 250 FOR IP): Performed by: NURSE PRACTITIONER

## 2019-10-17 PROCEDURE — 2000000000 HC ICU R&B

## 2019-10-17 PROCEDURE — 2700000000 HC OXYGEN THERAPY PER DAY

## 2019-10-17 PROCEDURE — 2580000003 HC RX 258: Performed by: INTERNAL MEDICINE

## 2019-10-17 PROCEDURE — 36415 COLL VENOUS BLD VENIPUNCTURE: CPT

## 2019-10-17 PROCEDURE — 2709999900 HC NON-CHARGEABLE SUPPLY

## 2019-10-17 PROCEDURE — 99291 CRITICAL CARE FIRST HOUR: CPT | Performed by: INTERNAL MEDICINE

## 2019-10-17 PROCEDURE — 94761 N-INVAS EAR/PLS OXIMETRY MLT: CPT

## 2019-10-17 PROCEDURE — 94003 VENT MGMT INPAT SUBQ DAY: CPT

## 2019-10-17 PROCEDURE — 94770 HC ETCO2 MONITOR DAILY: CPT

## 2019-10-17 RX ORDER — 0.9 % SODIUM CHLORIDE 0.9 %
500 INTRAVENOUS SOLUTION INTRAVENOUS ONCE
Status: COMPLETED | OUTPATIENT
Start: 2019-10-17 | End: 2019-10-17

## 2019-10-17 RX ADMIN — ENOXAPARIN SODIUM 40 MG: 40 INJECTION SUBCUTANEOUS at 09:47

## 2019-10-17 RX ADMIN — POLYETHYLENE GLYCOL 3350 17 G: 17 POWDER, FOR SOLUTION ORAL at 09:51

## 2019-10-17 RX ADMIN — SODIUM CHLORIDE 500 ML: 9 INJECTION, SOLUTION INTRAVENOUS at 01:42

## 2019-10-17 RX ADMIN — METOPROLOL TARTRATE 50 MG: 50 TABLET, FILM COATED ORAL at 21:03

## 2019-10-17 RX ADMIN — PIPERACILLIN AND TAZOBACTAM 3.38 G: 3; .375 INJECTION, POWDER, LYOPHILIZED, FOR SOLUTION INTRAVENOUS at 05:21

## 2019-10-17 RX ADMIN — ACETAMINOPHEN 650 MG: 325 TABLET ORAL at 22:07

## 2019-10-17 RX ADMIN — PROPOFOL 15 MCG/KG/MIN: 10 INJECTION, EMULSION INTRAVENOUS at 20:44

## 2019-10-17 RX ADMIN — PROPOFOL 20 MCG/KG/MIN: 10 INJECTION, EMULSION INTRAVENOUS at 10:48

## 2019-10-17 RX ADMIN — METOPROLOL TARTRATE 50 MG: 50 TABLET, FILM COATED ORAL at 09:47

## 2019-10-17 RX ADMIN — GLYCOPYRROLATE AND FORMOTEROL FUMARATE 2 PUFF: 9; 4.8 AEROSOL, METERED RESPIRATORY (INHALATION) at 08:44

## 2019-10-17 RX ADMIN — VANCOMYCIN HYDROCHLORIDE 1750 MG: 5 INJECTION, POWDER, LYOPHILIZED, FOR SOLUTION INTRAVENOUS at 20:52

## 2019-10-17 RX ADMIN — GLYCOPYRROLATE AND FORMOTEROL FUMARATE 2 PUFF: 9; 4.8 AEROSOL, METERED RESPIRATORY (INHALATION) at 17:55

## 2019-10-17 RX ADMIN — AMIODARONE HYDROCHLORIDE 200 MG: 200 TABLET ORAL at 09:47

## 2019-10-17 RX ADMIN — QUETIAPINE FUMARATE 100 MG: 100 TABLET ORAL at 21:04

## 2019-10-17 RX ADMIN — PIPERACILLIN AND TAZOBACTAM 3.38 G: 3; .375 INJECTION, POWDER, LYOPHILIZED, FOR SOLUTION INTRAVENOUS at 14:03

## 2019-10-17 RX ADMIN — Medication 10 ML: at 09:58

## 2019-10-17 RX ADMIN — Medication 10 ML: at 21:05

## 2019-10-17 RX ADMIN — BUPRENORPHINE HYDROCHLORIDE, NALOXONE HYDROCHLORIDE 1 FILM: 8; 2 FILM, SOLUBLE BUCCAL; SUBLINGUAL at 10:06

## 2019-10-17 RX ADMIN — Medication 10 ML: at 09:46

## 2019-10-17 RX ADMIN — PIPERACILLIN AND TAZOBACTAM 3.38 G: 3; .375 INJECTION, POWDER, LYOPHILIZED, FOR SOLUTION INTRAVENOUS at 23:04

## 2019-10-17 RX ADMIN — POTASSIUM BICARBONATE 20 MEQ: 782 TABLET, EFFERVESCENT ORAL at 21:03

## 2019-10-17 RX ADMIN — PROPOFOL 20 MCG/KG/MIN: 10 INJECTION, EMULSION INTRAVENOUS at 03:29

## 2019-10-17 RX ADMIN — POTASSIUM BICARBONATE 20 MEQ: 782 TABLET, EFFERVESCENT ORAL at 09:47

## 2019-10-17 RX ADMIN — FUROSEMIDE 40 MG: 40 TABLET ORAL at 09:47

## 2019-10-17 RX ADMIN — PANTOPRAZOLE SODIUM 40 MG: 40 INJECTION, POWDER, FOR SOLUTION INTRAVENOUS at 21:04

## 2019-10-17 RX ADMIN — Medication 10 ML: at 21:04

## 2019-10-17 RX ADMIN — PANTOPRAZOLE SODIUM 40 MG: 40 INJECTION, POWDER, FOR SOLUTION INTRAVENOUS at 09:46

## 2019-10-17 ASSESSMENT — PULMONARY FUNCTION TESTS
PIF_VALUE: 23
PIF_VALUE: 24
PIF_VALUE: 22
PIF_VALUE: 20
PIF_VALUE: 19

## 2019-10-18 ENCOUNTER — APPOINTMENT (OUTPATIENT)
Dept: GENERAL RADIOLOGY | Age: 59
DRG: 004 | End: 2019-10-18
Payer: MEDICARE

## 2019-10-18 LAB
ANION GAP SERPL CALCULATED.3IONS-SCNC: 8 MEQ/L (ref 8–16)
BUN BLDV-MCNC: 15 MG/DL (ref 7–22)
CALCIUM SERPL-MCNC: 8.9 MG/DL (ref 8.5–10.5)
CHLORIDE BLD-SCNC: 95 MEQ/L (ref 98–111)
CO2: 36 MEQ/L (ref 23–33)
CREAT SERPL-MCNC: 0.9 MG/DL (ref 0.4–1.2)
EKG ATRIAL RATE: 111 BPM
EKG Q-T INTERVAL: 356 MS
EKG QRS DURATION: 104 MS
EKG QTC CALCULATION (BAZETT): 513 MS
EKG R AXIS: 44 DEGREES
EKG T AXIS: -56 DEGREES
EKG VENTRICULAR RATE: 125 BPM
ERYTHROCYTE [DISTWIDTH] IN BLOOD BY AUTOMATED COUNT: 15.3 % (ref 11.5–14.5)
ERYTHROCYTE [DISTWIDTH] IN BLOOD BY AUTOMATED COUNT: 54.4 FL (ref 35–45)
GFR SERPL CREATININE-BSD FRML MDRD: 87 ML/MIN/1.73M2
GLUCOSE BLD-MCNC: 147 MG/DL (ref 70–108)
GRAM STAIN RESULT: ABNORMAL
HCT VFR BLD CALC: 32.5 % (ref 42–52)
HEMOGLOBIN: 10 GM/DL (ref 14–18)
MCH RBC QN AUTO: 29.9 PG (ref 26–33)
MCHC RBC AUTO-ENTMCNC: 30.8 GM/DL (ref 32.2–35.5)
MCV RBC AUTO: 97 FL (ref 80–94)
MISC REFERENCE: NORMAL
ORGANISM: ABNORMAL
PLATELET # BLD: 170 THOU/MM3 (ref 130–400)
PMV BLD AUTO: 11.1 FL (ref 9.4–12.4)
POTASSIUM SERPL-SCNC: 3.6 MEQ/L (ref 3.5–5.2)
PROCALCITONIN: 0.09 NG/ML (ref 0.01–0.09)
RBC # BLD: 3.35 MILL/MM3 (ref 4.7–6.1)
RESPIRATORY CULTURE: ABNORMAL
RESPIRATORY CULTURE: ABNORMAL
SODIUM BLD-SCNC: 139 MEQ/L (ref 135–145)
WBC # BLD: 4.2 THOU/MM3 (ref 4.8–10.8)

## 2019-10-18 PROCEDURE — 2709999900 HC NON-CHARGEABLE SUPPLY: Performed by: INTERNAL MEDICINE

## 2019-10-18 PROCEDURE — 2000000000 HC ICU R&B

## 2019-10-18 PROCEDURE — 2580000003 HC RX 258: Performed by: NURSE PRACTITIONER

## 2019-10-18 PROCEDURE — 2709999900 HC NON-CHARGEABLE SUPPLY

## 2019-10-18 PROCEDURE — 2500000003 HC RX 250 WO HCPCS

## 2019-10-18 PROCEDURE — 2700000000 HC OXYGEN THERAPY PER DAY

## 2019-10-18 PROCEDURE — 0B113F4 BYPASS TRACHEA TO CUTANEOUS WITH TRACHEOSTOMY DEVICE, PERCUTANEOUS APPROACH: ICD-10-PCS | Performed by: INTERNAL MEDICINE

## 2019-10-18 PROCEDURE — 94003 VENT MGMT INPAT SUBQ DAY: CPT

## 2019-10-18 PROCEDURE — 31600 PLANNED TRACHEOSTOMY: CPT | Performed by: INTERNAL MEDICINE

## 2019-10-18 PROCEDURE — 6370000000 HC RX 637 (ALT 250 FOR IP): Performed by: INTERNAL MEDICINE

## 2019-10-18 PROCEDURE — C9113 INJ PANTOPRAZOLE SODIUM, VIA: HCPCS | Performed by: NURSE PRACTITIONER

## 2019-10-18 PROCEDURE — 84145 PROCALCITONIN (PCT): CPT

## 2019-10-18 PROCEDURE — 31600 PLANNED TRACHEOSTOMY: CPT

## 2019-10-18 PROCEDURE — 31622 DX BRONCHOSCOPE/WASH: CPT | Performed by: INTERNAL MEDICINE

## 2019-10-18 PROCEDURE — 99291 CRITICAL CARE FIRST HOUR: CPT | Performed by: INTERNAL MEDICINE

## 2019-10-18 PROCEDURE — 2500000003 HC RX 250 WO HCPCS: Performed by: PHYSICIAN ASSISTANT

## 2019-10-18 PROCEDURE — 71045 X-RAY EXAM CHEST 1 VIEW: CPT

## 2019-10-18 PROCEDURE — 6360000002 HC RX W HCPCS: Performed by: NURSE PRACTITIONER

## 2019-10-18 PROCEDURE — 2500000003 HC RX 250 WO HCPCS: Performed by: NURSE PRACTITIONER

## 2019-10-18 PROCEDURE — 94761 N-INVAS EAR/PLS OXIMETRY MLT: CPT

## 2019-10-18 PROCEDURE — 6360000002 HC RX W HCPCS: Performed by: INTERNAL MEDICINE

## 2019-10-18 PROCEDURE — 2580000003 HC RX 258: Performed by: PHYSICIAN ASSISTANT

## 2019-10-18 PROCEDURE — 0BJ08ZZ INSPECTION OF TRACHEOBRONCHIAL TREE, VIA NATURAL OR ARTIFICIAL OPENING ENDOSCOPIC: ICD-10-PCS | Performed by: INTERNAL MEDICINE

## 2019-10-18 PROCEDURE — 93005 ELECTROCARDIOGRAM TRACING: CPT | Performed by: INTERNAL MEDICINE

## 2019-10-18 PROCEDURE — 2580000003 HC RX 258: Performed by: INTERNAL MEDICINE

## 2019-10-18 PROCEDURE — 80048 BASIC METABOLIC PNL TOTAL CA: CPT

## 2019-10-18 PROCEDURE — 93010 ELECTROCARDIOGRAM REPORT: CPT | Performed by: INTERNAL MEDICINE

## 2019-10-18 PROCEDURE — 94770 HC ETCO2 MONITOR DAILY: CPT

## 2019-10-18 PROCEDURE — 85027 COMPLETE CBC AUTOMATED: CPT

## 2019-10-18 PROCEDURE — 6370000000 HC RX 637 (ALT 250 FOR IP): Performed by: NURSE PRACTITIONER

## 2019-10-18 PROCEDURE — 36415 COLL VENOUS BLD VENIPUNCTURE: CPT

## 2019-10-18 RX ORDER — CISATRACURIUM BESYLATE 2 MG/ML
10 INJECTION, SOLUTION INTRAVENOUS ONCE
Status: COMPLETED | OUTPATIENT
Start: 2019-10-18 | End: 2019-10-18

## 2019-10-18 RX ORDER — LORAZEPAM 2 MG/ML
4 INJECTION INTRAMUSCULAR ONCE
Status: COMPLETED | OUTPATIENT
Start: 2019-10-18 | End: 2019-10-18

## 2019-10-18 RX ORDER — 0.9 % SODIUM CHLORIDE 0.9 %
500 INTRAVENOUS SOLUTION INTRAVENOUS ONCE
Status: COMPLETED | OUTPATIENT
Start: 2019-10-18 | End: 2019-10-18

## 2019-10-18 RX ORDER — LIDOCAINE HYDROCHLORIDE AND EPINEPHRINE 10; 10 MG/ML; UG/ML
INJECTION, SOLUTION INFILTRATION; PERINEURAL
Status: COMPLETED
Start: 2019-10-18 | End: 2019-10-18

## 2019-10-18 RX ORDER — GABAPENTIN 100 MG/1
200 CAPSULE ORAL 3 TIMES DAILY
Status: DISCONTINUED | OUTPATIENT
Start: 2019-10-18 | End: 2019-10-19 | Stop reason: HOSPADM

## 2019-10-18 RX ORDER — LIDOCAINE HYDROCHLORIDE AND EPINEPHRINE 10; 10 MG/ML; UG/ML
20 INJECTION, SOLUTION INFILTRATION; PERINEURAL ONCE
Status: DISCONTINUED | OUTPATIENT
Start: 2019-10-18 | End: 2019-10-19 | Stop reason: HOSPADM

## 2019-10-18 RX ORDER — KETAMINE HCL IN NACL, ISO-OSM 100MG/10ML
100 SYRINGE (ML) INJECTION ONCE
Status: COMPLETED | OUTPATIENT
Start: 2019-10-18 | End: 2019-10-18

## 2019-10-18 RX ORDER — FENTANYL CITRATE 50 UG/ML
100 INJECTION, SOLUTION INTRAMUSCULAR; INTRAVENOUS ONCE
Status: COMPLETED | OUTPATIENT
Start: 2019-10-18 | End: 2019-10-18

## 2019-10-18 RX ORDER — HYDROCODONE BITARTRATE AND ACETAMINOPHEN 5; 325 MG/1; MG/1
1 TABLET ORAL EVERY 4 HOURS PRN
Status: DISCONTINUED | OUTPATIENT
Start: 2019-10-18 | End: 2019-10-19 | Stop reason: HOSPADM

## 2019-10-18 RX ADMIN — POTASSIUM BICARBONATE 20 MEQ: 782 TABLET, EFFERVESCENT ORAL at 19:58

## 2019-10-18 RX ADMIN — Medication 10 ML: at 08:33

## 2019-10-18 RX ADMIN — AMIODARONE HYDROCHLORIDE 200 MG: 200 TABLET ORAL at 08:31

## 2019-10-18 RX ADMIN — Medication 10 ML: at 19:59

## 2019-10-18 RX ADMIN — PANTOPRAZOLE SODIUM 40 MG: 40 INJECTION, POWDER, FOR SOLUTION INTRAVENOUS at 19:57

## 2019-10-18 RX ADMIN — PANTOPRAZOLE SODIUM 40 MG: 40 INJECTION, POWDER, FOR SOLUTION INTRAVENOUS at 08:32

## 2019-10-18 RX ADMIN — POTASSIUM BICARBONATE 20 MEQ: 782 TABLET, EFFERVESCENT ORAL at 08:32

## 2019-10-18 RX ADMIN — ENOXAPARIN SODIUM 40 MG: 40 INJECTION SUBCUTANEOUS at 16:29

## 2019-10-18 RX ADMIN — Medication 10 ML: at 19:58

## 2019-10-18 RX ADMIN — SODIUM CHLORIDE 500 ML: 9 INJECTION, SOLUTION INTRAVENOUS at 01:03

## 2019-10-18 RX ADMIN — VANCOMYCIN HYDROCHLORIDE 1750 MG: 5 INJECTION, POWDER, LYOPHILIZED, FOR SOLUTION INTRAVENOUS at 23:22

## 2019-10-18 RX ADMIN — QUETIAPINE FUMARATE 100 MG: 100 TABLET ORAL at 19:58

## 2019-10-18 RX ADMIN — VANCOMYCIN HYDROCHLORIDE 1750 MG: 5 INJECTION, POWDER, LYOPHILIZED, FOR SOLUTION INTRAVENOUS at 11:25

## 2019-10-18 RX ADMIN — Medication 2 MCG/MIN: at 02:48

## 2019-10-18 RX ADMIN — POLYETHYLENE GLYCOL 3350 17 G: 17 POWDER, FOR SOLUTION ORAL at 08:32

## 2019-10-18 RX ADMIN — FENTANYL CITRATE 100 MCG: 50 INJECTION INTRAMUSCULAR; INTRAVENOUS at 10:20

## 2019-10-18 RX ADMIN — LIDOCAINE HYDROCHLORIDE,EPINEPHRINE BITARTRATE 20 ML: 10; .01 INJECTION, SOLUTION INFILTRATION; PERINEURAL at 10:34

## 2019-10-18 RX ADMIN — GLYCOPYRROLATE AND FORMOTEROL FUMARATE 2 PUFF: 9; 4.8 AEROSOL, METERED RESPIRATORY (INHALATION) at 18:13

## 2019-10-18 RX ADMIN — PIPERACILLIN AND TAZOBACTAM 3.38 G: 3; .375 INJECTION, POWDER, LYOPHILIZED, FOR SOLUTION INTRAVENOUS at 05:21

## 2019-10-18 RX ADMIN — ACETAMINOPHEN 650 MG: 325 TABLET ORAL at 19:57

## 2019-10-18 RX ADMIN — Medication 100 MG: at 10:18

## 2019-10-18 RX ADMIN — FUROSEMIDE 40 MG: 40 TABLET ORAL at 08:32

## 2019-10-18 RX ADMIN — GABAPENTIN 200 MG: 100 CAPSULE ORAL at 18:25

## 2019-10-18 RX ADMIN — GABAPENTIN 200 MG: 100 CAPSULE ORAL at 19:58

## 2019-10-18 RX ADMIN — CISATRACURIUM BESYLATE 10 MG: 2 INJECTION INTRAVENOUS at 10:21

## 2019-10-18 RX ADMIN — LORAZEPAM 4 MG: 2 INJECTION, SOLUTION INTRAMUSCULAR; INTRAVENOUS at 10:20

## 2019-10-18 RX ADMIN — PROPOFOL 15 MCG/KG/MIN: 10 INJECTION, EMULSION INTRAVENOUS at 04:36

## 2019-10-18 RX ADMIN — SODIUM CHLORIDE 500 ML: 9 INJECTION, SOLUTION INTRAVENOUS at 02:17

## 2019-10-18 RX ADMIN — BUPRENORPHINE HYDROCHLORIDE, NALOXONE HYDROCHLORIDE 1 FILM: 8; 2 FILM, SOLUBLE BUCCAL; SUBLINGUAL at 08:32

## 2019-10-18 ASSESSMENT — PAIN DESCRIPTION - DESCRIPTORS: DESCRIPTORS: ACHING

## 2019-10-18 ASSESSMENT — PULMONARY FUNCTION TESTS
PIF_VALUE: 21
PIF_VALUE: 20
PIF_VALUE: 22
PIF_VALUE: 22
PIF_VALUE: 23
PIF_VALUE: 20

## 2019-10-18 ASSESSMENT — PAIN DESCRIPTION - LOCATION: LOCATION: THROAT

## 2019-10-18 ASSESSMENT — PAIN DESCRIPTION - ONSET: ONSET: ON-GOING

## 2019-10-18 ASSESSMENT — PAIN SCALES - GENERAL
PAINLEVEL_OUTOF10: 5
PAINLEVEL_OUTOF10: 0
PAINLEVEL_OUTOF10: 0

## 2019-10-18 ASSESSMENT — PAIN DESCRIPTION - ORIENTATION: ORIENTATION: MID

## 2019-10-18 ASSESSMENT — PAIN - FUNCTIONAL ASSESSMENT: PAIN_FUNCTIONAL_ASSESSMENT: PREVENTS OR INTERFERES SOME ACTIVE ACTIVITIES AND ADLS

## 2019-10-18 ASSESSMENT — PAIN DESCRIPTION - PROGRESSION: CLINICAL_PROGRESSION: GRADUALLY WORSENING

## 2019-10-18 ASSESSMENT — PAIN DESCRIPTION - FREQUENCY: FREQUENCY: CONTINUOUS

## 2019-10-18 ASSESSMENT — PAIN SCALES - WONG BAKER: WONGBAKER_NUMERICALRESPONSE: 6

## 2019-10-18 ASSESSMENT — PAIN DESCRIPTION - PAIN TYPE: TYPE: SURGICAL PAIN

## 2019-10-19 ENCOUNTER — HOSPITAL ENCOUNTER (OUTPATIENT)
Age: 59
Discharge: HOME | End: 2019-11-15
Attending: INTERNAL MEDICINE | Admitting: INTERNAL MEDICINE
Payer: COMMERCIAL

## 2019-10-19 VITALS
DIASTOLIC BLOOD PRESSURE: 68 MMHG | HEIGHT: 69 IN | TEMPERATURE: 98.8 F | WEIGHT: 253.53 LBS | SYSTOLIC BLOOD PRESSURE: 92 MMHG | RESPIRATION RATE: 17 BRPM | HEART RATE: 69 BPM | BODY MASS INDEX: 37.55 KG/M2 | OXYGEN SATURATION: 93 %

## 2019-10-19 DIAGNOSIS — R33.9 URINARY RETENTION: ICD-10-CM

## 2019-10-19 LAB
ANION GAP SERPL CALCULATED.3IONS-SCNC: 8 MEQ/L (ref 8–16)
BASOPHILS # BLD: 0.5 %
BASOPHILS ABSOLUTE: 0 THOU/MM3 (ref 0–0.1)
BUN BLDV-MCNC: 8 MG/DL (ref 7–22)
CALCIUM SERPL-MCNC: 9 MG/DL (ref 8.5–10.5)
CHLORIDE BLD-SCNC: 94 MEQ/L (ref 98–111)
CO2: 35 MEQ/L (ref 23–33)
CREAT SERPL-MCNC: 0.7 MG/DL (ref 0.4–1.2)
EOSINOPHIL # BLD: 4.6 %
EOSINOPHILS ABSOLUTE: 0.2 THOU/MM3 (ref 0–0.4)
ERYTHROCYTE [DISTWIDTH] IN BLOOD BY AUTOMATED COUNT: 15.2 % (ref 11.5–14.5)
ERYTHROCYTE [DISTWIDTH] IN BLOOD BY AUTOMATED COUNT: 54.4 FL (ref 35–45)
GFR SERPL CREATININE-BSD FRML MDRD: > 90 ML/MIN/1.73M2
GLUCOSE BLD-MCNC: 93 MG/DL (ref 70–108)
HCT VFR BLD CALC: 32.9 % (ref 42–52)
HEMOGLOBIN: 10.2 GM/DL (ref 14–18)
IMMATURE GRANS (ABS): 0.03 THOU/MM3 (ref 0–0.07)
IMMATURE GRANULOCYTES: 0.7 %
LYMPHOCYTES # BLD: 27.1 %
LYMPHOCYTES ABSOLUTE: 1.2 THOU/MM3 (ref 1–4.8)
MCH RBC QN AUTO: 30.2 PG (ref 26–33)
MCHC RBC AUTO-ENTMCNC: 31 GM/DL (ref 32.2–35.5)
MCV RBC AUTO: 97.3 FL (ref 80–94)
MONOCYTES # BLD: 13.3 %
MONOCYTES ABSOLUTE: 0.6 THOU/MM3 (ref 0.4–1.3)
NUCLEATED RED BLOOD CELLS: 0 /100 WBC
PLATELET # BLD: 175 THOU/MM3 (ref 130–400)
PMV BLD AUTO: 10.6 FL (ref 9.4–12.4)
POTASSIUM SERPL-SCNC: 3.6 MEQ/L (ref 3.5–5.2)
RBC # BLD: 3.38 MILL/MM3 (ref 4.7–6.1)
SEG NEUTROPHILS: 53.8 %
SEGMENTED NEUTROPHILS ABSOLUTE COUNT: 2.4 THOU/MM3 (ref 1.8–7.7)
SODIUM BLD-SCNC: 137 MEQ/L (ref 135–145)
VANCOMYCIN TROUGH: 20.2 UG/ML (ref 5–15)
WBC # BLD: 4.4 THOU/MM3 (ref 4.8–10.8)

## 2019-10-19 PROCEDURE — 94003 VENT MGMT INPAT SUBQ DAY: CPT

## 2019-10-19 PROCEDURE — 6370000000 HC RX 637 (ALT 250 FOR IP): Performed by: NURSE PRACTITIONER

## 2019-10-19 PROCEDURE — 2700000000 HC OXYGEN THERAPY PER DAY

## 2019-10-19 PROCEDURE — 85025 COMPLETE CBC W/AUTO DIFF WBC: CPT

## 2019-10-19 PROCEDURE — 6370000000 HC RX 637 (ALT 250 FOR IP): Performed by: INTERNAL MEDICINE

## 2019-10-19 PROCEDURE — 36592 COLLECT BLOOD FROM PICC: CPT

## 2019-10-19 PROCEDURE — 36415 COLL VENOUS BLD VENIPUNCTURE: CPT

## 2019-10-19 PROCEDURE — 80048 BASIC METABOLIC PNL TOTAL CA: CPT

## 2019-10-19 PROCEDURE — 92610 EVALUATE SWALLOWING FUNCTION: CPT

## 2019-10-19 PROCEDURE — 80202 ASSAY OF VANCOMYCIN: CPT

## 2019-10-19 PROCEDURE — 2580000003 HC RX 258: Performed by: NURSE PRACTITIONER

## 2019-10-19 PROCEDURE — C9113 INJ PANTOPRAZOLE SODIUM, VIA: HCPCS | Performed by: NURSE PRACTITIONER

## 2019-10-19 PROCEDURE — 94770 HC ETCO2 MONITOR DAILY: CPT

## 2019-10-19 PROCEDURE — 6360000002 HC RX W HCPCS: Performed by: NURSE PRACTITIONER

## 2019-10-19 PROCEDURE — 2580000003 HC RX 258: Performed by: INTERNAL MEDICINE

## 2019-10-19 PROCEDURE — 99239 HOSP IP/OBS DSCHRG MGMT >30: CPT | Performed by: INTERNAL MEDICINE

## 2019-10-19 PROCEDURE — 6360000002 HC RX W HCPCS: Performed by: INTERNAL MEDICINE

## 2019-10-19 PROCEDURE — 2709999900 HC NON-CHARGEABLE SUPPLY

## 2019-10-19 PROCEDURE — 94761 N-INVAS EAR/PLS OXIMETRY MLT: CPT

## 2019-10-19 PROCEDURE — 94640 AIRWAY INHALATION TREATMENT: CPT

## 2019-10-19 RX ORDER — QUETIAPINE FUMARATE 100 MG/1
100 TABLET, FILM COATED ORAL NIGHTLY
Qty: 60 TABLET | Refills: 3 | Status: ON HOLD | OUTPATIENT
Start: 2019-10-19 | End: 2020-09-15

## 2019-10-19 RX ORDER — HYDROCODONE BITARTRATE AND ACETAMINOPHEN 5; 325 MG/1; MG/1
1 TABLET ORAL EVERY 6 HOURS PRN
Qty: 12 TABLET | Refills: 0 | Status: CANCELLED | OUTPATIENT
Start: 2019-10-19 | End: 2019-10-22

## 2019-10-19 RX ORDER — HYDRALAZINE HYDROCHLORIDE 20 MG/ML
10 INJECTION INTRAMUSCULAR; INTRAVENOUS EVERY 4 HOURS PRN
Qty: 9 ML | Refills: 0 | Status: ON HOLD | OUTPATIENT
Start: 2019-10-19 | End: 2019-11-24 | Stop reason: HOSPADM

## 2019-10-19 RX ORDER — POLYETHYLENE GLYCOL 3350 17 G/17G
17 POWDER, FOR SOLUTION ORAL DAILY
Qty: 527 G | Refills: 1 | Status: SHIPPED | OUTPATIENT
Start: 2019-10-20 | End: 2019-11-19

## 2019-10-19 RX ORDER — GABAPENTIN 100 MG/1
200 CAPSULE ORAL 3 TIMES DAILY
Qty: 24 CAPSULE | Refills: 0 | Status: ON HOLD | OUTPATIENT
Start: 2019-10-19 | End: 2020-02-23 | Stop reason: HOSPADM

## 2019-10-19 RX ORDER — METOPROLOL TARTRATE 50 MG/1
50 TABLET, FILM COATED ORAL EVERY 6 HOURS
Qty: 60 TABLET | Refills: 3 | Status: ON HOLD | OUTPATIENT
Start: 2019-10-19 | End: 2019-11-24 | Stop reason: HOSPADM

## 2019-10-19 RX ORDER — AMIODARONE HYDROCHLORIDE 200 MG/1
200 TABLET ORAL DAILY
Qty: 30 TABLET | Refills: 3 | Status: SHIPPED | OUTPATIENT
Start: 2019-10-20 | End: 2019-12-03

## 2019-10-19 RX ORDER — PSEUDOEPHEDRINE HCL 30 MG
100 TABLET ORAL 2 TIMES DAILY PRN
Qty: 20 CAPSULE | Refills: 0 | Status: SHIPPED | OUTPATIENT
Start: 2019-10-19 | End: 2019-10-29

## 2019-10-19 RX ORDER — ALBUTEROL SULFATE 2.5 MG/3ML
5 SOLUTION RESPIRATORY (INHALATION) EVERY 4 HOURS PRN
Qty: 120 EACH | Refills: 3 | Status: SHIPPED | OUTPATIENT
Start: 2019-10-19 | End: 2021-04-26

## 2019-10-19 RX ORDER — BISACODYL 10 MG
10 SUPPOSITORY, RECTAL RECTAL DAILY
Qty: 30 SUPPOSITORY | Refills: 0 | Status: SHIPPED | OUTPATIENT
Start: 2019-10-19 | End: 2019-11-18

## 2019-10-19 RX ORDER — FUROSEMIDE 40 MG/1
40 TABLET ORAL DAILY
Qty: 60 TABLET | Refills: 3 | Status: SHIPPED | OUTPATIENT
Start: 2019-10-20

## 2019-10-19 RX ORDER — PANTOPRAZOLE SODIUM 40 MG/10ML
40 INJECTION, POWDER, LYOPHILIZED, FOR SOLUTION INTRAVENOUS 2 TIMES DAILY
Qty: 10 EACH | Refills: 0 | Status: SHIPPED | OUTPATIENT
Start: 2019-10-19 | End: 2019-11-25

## 2019-10-19 RX ADMIN — ACETAMINOPHEN 650 MG: 325 TABLET ORAL at 10:36

## 2019-10-19 RX ADMIN — METOPROLOL TARTRATE 50 MG: 50 TABLET, FILM COATED ORAL at 08:22

## 2019-10-19 RX ADMIN — GABAPENTIN 200 MG: 100 CAPSULE ORAL at 08:22

## 2019-10-19 RX ADMIN — POTASSIUM BICARBONATE 20 MEQ: 782 TABLET, EFFERVESCENT ORAL at 08:21

## 2019-10-19 RX ADMIN — ACETAMINOPHEN 650 MG: 325 TABLET ORAL at 15:09

## 2019-10-19 RX ADMIN — Medication 10 ML: at 08:23

## 2019-10-19 RX ADMIN — ACETAMINOPHEN 650 MG: 325 TABLET ORAL at 04:42

## 2019-10-19 RX ADMIN — VANCOMYCIN HYDROCHLORIDE 1750 MG: 5 INJECTION, POWDER, LYOPHILIZED, FOR SOLUTION INTRAVENOUS at 11:13

## 2019-10-19 RX ADMIN — GABAPENTIN 200 MG: 100 CAPSULE ORAL at 13:53

## 2019-10-19 RX ADMIN — BUPRENORPHINE HYDROCHLORIDE, NALOXONE HYDROCHLORIDE 1 FILM: 8; 2 FILM, SOLUBLE BUCCAL; SUBLINGUAL at 08:22

## 2019-10-19 RX ADMIN — DOCUSATE SODIUM 100 MG: 100 CAPSULE, LIQUID FILLED ORAL at 11:14

## 2019-10-19 RX ADMIN — GLYCOPYRROLATE AND FORMOTEROL FUMARATE 2 PUFF: 9; 4.8 AEROSOL, METERED RESPIRATORY (INHALATION) at 08:34

## 2019-10-19 RX ADMIN — Medication 10 ML: at 08:22

## 2019-10-19 RX ADMIN — AMIODARONE HYDROCHLORIDE 200 MG: 200 TABLET ORAL at 08:22

## 2019-10-19 RX ADMIN — FUROSEMIDE 40 MG: 40 TABLET ORAL at 08:22

## 2019-10-19 RX ADMIN — PANTOPRAZOLE SODIUM 40 MG: 40 INJECTION, POWDER, FOR SOLUTION INTRAVENOUS at 08:22

## 2019-10-19 RX ADMIN — ALBUTEROL SULFATE 5 MG: 2.5 SOLUTION RESPIRATORY (INHALATION) at 08:34

## 2019-10-19 RX ADMIN — ENOXAPARIN SODIUM 40 MG: 40 INJECTION SUBCUTANEOUS at 08:22

## 2019-10-19 RX ADMIN — POLYETHYLENE GLYCOL 3350 17 G: 17 POWDER, FOR SOLUTION ORAL at 08:21

## 2019-10-19 ASSESSMENT — PAIN DESCRIPTION - LOCATION
LOCATION: THROAT
LOCATION: THROAT

## 2019-10-19 ASSESSMENT — PAIN DESCRIPTION - PAIN TYPE
TYPE: SURGICAL PAIN
TYPE: SURGICAL PAIN

## 2019-10-19 ASSESSMENT — PULMONARY FUNCTION TESTS
PIF_VALUE: 22
PIF_VALUE: 23
PIF_VALUE: 23

## 2019-10-19 ASSESSMENT — PAIN SCALES - WONG BAKER
WONGBAKER_NUMERICALRESPONSE: 4
WONGBAKER_NUMERICALRESPONSE: 0

## 2019-10-19 ASSESSMENT — PAIN SCALES - GENERAL
PAINLEVEL_OUTOF10: 8
PAINLEVEL_OUTOF10: 8
PAINLEVEL_OUTOF10: 4
PAINLEVEL_OUTOF10: 3
PAINLEVEL_OUTOF10: 6

## 2019-10-20 ENCOUNTER — CARE COORDINATION (OUTPATIENT)
Dept: CASE MANAGEMENT | Age: 59
End: 2019-10-20

## 2019-10-20 LAB
ABSOLUTE BASO #: 0 /CMM (ref 0–200)
ABSOLUTE EOS #: 200 /CMM (ref 0–500)
ABSOLUTE LYMPH #: 900 /CMM (ref 1000–4800)
ABSOLUTE MONO #: 500 /CMM (ref 0–800)
ABSOLUTE NEUT #: 2100 /CMM (ref 1800–7700)
ALBUMIN SERPL-MCNC: 3.3 GM/DL (ref 3.5–5)
ANION GAP SERPL CALCULATED.3IONS-SCNC: 7 MMOL/L (ref 4–12)
BASOPHILS RELATIVE PERCENT: 0.7 % (ref 0–2)
BUN BLDV-MCNC: 9 MG/DL (ref 7–20)
CALCIUM SERPL-MCNC: 9.3 MG/DL (ref 8.8–10.5)
CHLORIDE BLD-SCNC: 98 MEQ/L (ref 101–111)
CO2: 35 MEQ/L (ref 21–32)
CREAT SERPL-MCNC: 0.75 MG/DL (ref 0.6–1.3)
CREATININE CLEARANCE: >60
EOSINOPHILS RELATIVE PERCENT: 4.7 % (ref 0–6)
GLUCOSE: 93 MG/DL (ref 70–110)
GRAM STAIN RESULT: ABNORMAL
HCT VFR BLD CALC: 32.3 % (ref 40–49)
HEMOGLOBIN: 10.5 GM/DL (ref 13.5–16.5)
LYMPHOCYTES RELATIVE PERCENT: 24.9 % (ref 15–45)
MCH RBC QN AUTO: 30 PG (ref 27.5–33)
MCHC RBC AUTO-ENTMCNC: 32.5 GM/DL (ref 33–36)
MCV RBC AUTO: 92.4 CU MIC (ref 80–97)
MONOCYTES RELATIVE PERCENT: 12.7 % (ref 2–10)
NEUTROPHILS RELATIVE PERCENT: 57 % (ref 40–70)
NUCLEATED RBCS: 0.1 /100 WBC
ORGANISM: ABNORMAL
PDW BLD-RTO: 15.9 % (ref 12–16)
PLATELET # BLD: 218 TH/CMM (ref 150–400)
POTASSIUM SERPL-SCNC: 3.9 MEQ/L (ref 3.6–5)
PREALBUMIN: 8 MG/DL (ref 16–38)
RBC # BLD: 3.5 MIL/CMM (ref 4.5–6)
RESPIRATORY CULTURE: ABNORMAL
RESPIRATORY CULTURE: ABNORMAL
SODIUM BLD-SCNC: 140 MEQ/L (ref 135–145)
TOTAL PROTEIN: 6.6 G/DL (ref 6.2–8)
VANCOMYCIN: 20.8 MCG/ML
WBC # BLD: 3.7 TH/CMM (ref 4.4–10.5)

## 2019-10-21 ENCOUNTER — APPOINTMENT (OUTPATIENT)
Dept: GENERAL RADIOLOGY | Age: 59
End: 2019-10-21
Attending: INTERNAL MEDICINE
Payer: COMMERCIAL

## 2019-10-21 ENCOUNTER — CARE COORDINATION (OUTPATIENT)
Dept: CASE MANAGEMENT | Age: 59
End: 2019-10-21

## 2019-10-21 PROCEDURE — 71045 X-RAY EXAM CHEST 1 VIEW: CPT

## 2019-10-22 ENCOUNTER — CARE COORDINATION (OUTPATIENT)
Dept: CASE MANAGEMENT | Age: 59
End: 2019-10-22

## 2019-10-22 ENCOUNTER — APPOINTMENT (OUTPATIENT)
Dept: GENERAL RADIOLOGY | Age: 59
End: 2019-10-22
Attending: INTERNAL MEDICINE
Payer: COMMERCIAL

## 2019-10-23 ENCOUNTER — APPOINTMENT (OUTPATIENT)
Dept: GENERAL RADIOLOGY | Age: 59
End: 2019-10-23
Attending: INTERNAL MEDICINE
Payer: COMMERCIAL

## 2019-10-23 PROCEDURE — 71045 X-RAY EXAM CHEST 1 VIEW: CPT

## 2019-10-24 ENCOUNTER — APPOINTMENT (OUTPATIENT)
Dept: GENERAL RADIOLOGY | Age: 59
End: 2019-10-24
Attending: INTERNAL MEDICINE
Payer: COMMERCIAL

## 2019-10-24 PROCEDURE — 2500000003 HC RX 250 WO HCPCS: Performed by: INTERNAL MEDICINE

## 2019-10-24 PROCEDURE — 92611 MOTION FLUOROSCOPY/SWALLOW: CPT

## 2019-10-24 PROCEDURE — 74230 X-RAY XM SWLNG FUNCJ C+: CPT

## 2019-10-24 RX ADMIN — BARIUM SULFATE 20 ML: 400 SUSPENSION ORAL at 09:20

## 2019-10-24 RX ADMIN — BARIUM SULFATE 60 ML: 0.81 POWDER, FOR SUSPENSION ORAL at 09:19

## 2019-10-24 RX ADMIN — BARIUM SULFATE 20 ML: 400 PASTE ORAL at 09:20

## 2019-10-30 ENCOUNTER — APPOINTMENT (OUTPATIENT)
Dept: ULTRASOUND IMAGING | Age: 59
End: 2019-10-30
Attending: INTERNAL MEDICINE
Payer: COMMERCIAL

## 2019-10-30 PROCEDURE — 76857 US EXAM PELVIC LIMITED: CPT

## 2019-11-04 LAB
FUNGUS IDENTIFIED: NORMAL
FUNGUS SMEAR: NORMAL

## 2019-11-18 ENCOUNTER — TELEPHONE (OUTPATIENT)
Dept: CARDIOLOGY CLINIC | Age: 59
End: 2019-11-18

## 2019-11-18 LAB — AFB CULTURE & SMEAR: NORMAL

## 2019-11-22 ENCOUNTER — OFFICE VISIT (OUTPATIENT)
Dept: CARDIOLOGY CLINIC | Age: 59
End: 2019-11-22
Payer: MEDICARE

## 2019-11-22 VITALS
BODY MASS INDEX: 35.96 KG/M2 | HEART RATE: 62 BPM | HEIGHT: 69 IN | DIASTOLIC BLOOD PRESSURE: 80 MMHG | SYSTOLIC BLOOD PRESSURE: 136 MMHG | WEIGHT: 242.8 LBS

## 2019-11-22 DIAGNOSIS — Z95.0 PACEMAKER: ICD-10-CM

## 2019-11-22 DIAGNOSIS — R06.02 SOB (SHORTNESS OF BREATH) ON EXERTION: ICD-10-CM

## 2019-11-22 DIAGNOSIS — I48.0 PAF (PAROXYSMAL ATRIAL FIBRILLATION) (HCC): Primary | ICD-10-CM

## 2019-11-22 DIAGNOSIS — I10 ESSENTIAL HYPERTENSION: ICD-10-CM

## 2019-11-22 DIAGNOSIS — Z98.890 S/P CARDIAC CATH: ICD-10-CM

## 2019-11-22 DIAGNOSIS — I50.32 CHRONIC DIASTOLIC CONGESTIVE HEART FAILURE (HCC): ICD-10-CM

## 2019-11-22 DIAGNOSIS — Z95.3 HISTORY OF TRICUSPID VALVE REPLACEMENT WITH BIOPROSTHETIC VALVE: ICD-10-CM

## 2019-11-22 PROCEDURE — 99214 OFFICE O/P EST MOD 30 MIN: CPT | Performed by: INTERNAL MEDICINE

## 2019-11-22 PROCEDURE — 93000 ELECTROCARDIOGRAM COMPLETE: CPT | Performed by: INTERNAL MEDICINE

## 2019-11-22 PROCEDURE — G8427 DOCREV CUR MEDS BY ELIG CLIN: HCPCS | Performed by: INTERNAL MEDICINE

## 2019-11-22 PROCEDURE — 1036F TOBACCO NON-USER: CPT | Performed by: INTERNAL MEDICINE

## 2019-11-22 PROCEDURE — 3017F COLORECTAL CA SCREEN DOC REV: CPT | Performed by: INTERNAL MEDICINE

## 2019-11-22 PROCEDURE — G8482 FLU IMMUNIZE ORDER/ADMIN: HCPCS | Performed by: INTERNAL MEDICINE

## 2019-11-22 PROCEDURE — G8417 CALC BMI ABV UP PARAM F/U: HCPCS | Performed by: INTERNAL MEDICINE

## 2019-11-22 RX ORDER — TAMSULOSIN HYDROCHLORIDE 0.4 MG/1
0.4 CAPSULE ORAL DAILY
COMMUNITY
End: 2020-10-12

## 2019-11-22 RX ORDER — BUMETANIDE 1 MG/1
1 TABLET ORAL DAILY
Status: ON HOLD | COMMUNITY
End: 2019-11-24 | Stop reason: ALTCHOICE

## 2019-11-22 RX ORDER — WARFARIN SODIUM 5 MG/1
TABLET ORAL
Status: ON HOLD | COMMUNITY
End: 2020-09-19 | Stop reason: SDUPTHER

## 2019-11-22 RX ORDER — PANTOPRAZOLE SODIUM 20 MG/1
20 TABLET, DELAYED RELEASE ORAL
COMMUNITY

## 2019-11-22 RX ORDER — VILAZODONE HYDROCHLORIDE 10 MG/1
40 TABLET ORAL DAILY
Status: ON HOLD | COMMUNITY
Start: 2019-11-22 | End: 2020-09-15

## 2019-11-23 ENCOUNTER — APPOINTMENT (OUTPATIENT)
Dept: GENERAL RADIOLOGY | Age: 59
End: 2019-11-23
Payer: MEDICARE

## 2019-11-23 ENCOUNTER — APPOINTMENT (OUTPATIENT)
Dept: CT IMAGING | Age: 59
End: 2019-11-23
Payer: MEDICARE

## 2019-11-23 ENCOUNTER — HOSPITAL ENCOUNTER (OUTPATIENT)
Age: 59
Setting detail: OBSERVATION
Discharge: HOME | End: 2019-11-24
Attending: EMERGENCY MEDICINE | Admitting: HOSPITALIST
Payer: MEDICARE

## 2019-11-23 DIAGNOSIS — M79.601 PARESTHESIA AND PAIN OF BOTH UPPER EXTREMITIES: ICD-10-CM

## 2019-11-23 DIAGNOSIS — R20.2 PARESTHESIA AND PAIN OF BOTH UPPER EXTREMITIES: ICD-10-CM

## 2019-11-23 DIAGNOSIS — M79.602 PARESTHESIA AND PAIN OF BOTH UPPER EXTREMITIES: ICD-10-CM

## 2019-11-23 DIAGNOSIS — R07.9 CHEST PAIN, UNSPECIFIED TYPE: Primary | ICD-10-CM

## 2019-11-23 DIAGNOSIS — R51.9 NONINTRACTABLE HEADACHE, UNSPECIFIED CHRONICITY PATTERN, UNSPECIFIED HEADACHE TYPE: ICD-10-CM

## 2019-11-23 PROBLEM — R07.89 CHEST WALL PAIN: Status: ACTIVE | Noted: 2018-11-01

## 2019-11-23 LAB
ANION GAP SERPL CALCULATED.3IONS-SCNC: 16 MEQ/L (ref 8–16)
BASOPHILS # BLD: 0.4 %
BASOPHILS ABSOLUTE: 0 THOU/MM3 (ref 0–0.1)
BUN BLDV-MCNC: 16 MG/DL (ref 7–22)
CALCIUM SERPL-MCNC: 8.9 MG/DL (ref 8.5–10.5)
CHLORIDE BLD-SCNC: 96 MEQ/L (ref 98–111)
CO2: 24 MEQ/L (ref 23–33)
CREAT SERPL-MCNC: 1.1 MG/DL (ref 0.4–1.2)
EKG ATRIAL RATE: 62 BPM
EKG P AXIS: 65 DEGREES
EKG P-R INTERVAL: 140 MS
EKG Q-T INTERVAL: 636 MS
EKG QRS DURATION: 134 MS
EKG QTC CALCULATION (BAZETT): 645 MS
EKG R AXIS: -84 DEGREES
EKG T AXIS: 48 DEGREES
EKG VENTRICULAR RATE: 62 BPM
EOSINOPHIL # BLD: 2.1 %
EOSINOPHILS ABSOLUTE: 0.1 THOU/MM3 (ref 0–0.4)
ERYTHROCYTE [DISTWIDTH] IN BLOOD BY AUTOMATED COUNT: 14.6 % (ref 11.5–14.5)
ERYTHROCYTE [DISTWIDTH] IN BLOOD BY AUTOMATED COUNT: 49.2 FL (ref 35–45)
GFR SERPL CREATININE-BSD FRML MDRD: 69 ML/MIN/1.73M2
GLUCOSE BLD-MCNC: 166 MG/DL (ref 70–108)
HCT VFR BLD CALC: 39.3 % (ref 42–52)
HEMOGLOBIN: 12.2 GM/DL (ref 14–18)
IMMATURE GRANS (ABS): 0.03 THOU/MM3 (ref 0–0.07)
IMMATURE GRANULOCYTES: 0.4 %
INR BLD: 1.91 (ref 0.85–1.13)
LYMPHOCYTES # BLD: 29.9 %
LYMPHOCYTES ABSOLUTE: 2 THOU/MM3 (ref 1–4.8)
MCH RBC QN AUTO: 28.6 PG (ref 26–33)
MCHC RBC AUTO-ENTMCNC: 31 GM/DL (ref 32.2–35.5)
MCV RBC AUTO: 92 FL (ref 80–94)
MONOCYTES # BLD: 7.9 %
MONOCYTES ABSOLUTE: 0.5 THOU/MM3 (ref 0.4–1.3)
NUCLEATED RED BLOOD CELLS: 0 /100 WBC
OSMOLALITY CALCULATION: 276.9 MOSMOL/KG (ref 275–300)
PLATELET # BLD: 200 THOU/MM3 (ref 130–400)
PMV BLD AUTO: 9.6 FL (ref 9.4–12.4)
POTASSIUM REFLEX MAGNESIUM: 3.8 MEQ/L (ref 3.5–5.2)
RBC # BLD: 4.27 MILL/MM3 (ref 4.7–6.1)
SEG NEUTROPHILS: 59.3 %
SEGMENTED NEUTROPHILS ABSOLUTE COUNT: 4 THOU/MM3 (ref 1.8–7.7)
SODIUM BLD-SCNC: 136 MEQ/L (ref 135–145)
TROPONIN T: < 0.01 NG/ML
TROPONIN T: < 0.01 NG/ML
WBC # BLD: 6.7 THOU/MM3 (ref 4.8–10.8)

## 2019-11-23 PROCEDURE — 70450 CT HEAD/BRAIN W/O DYE: CPT

## 2019-11-23 PROCEDURE — G0378 HOSPITAL OBSERVATION PER HR: HCPCS

## 2019-11-23 PROCEDURE — 99285 EMERGENCY DEPT VISIT HI MDM: CPT

## 2019-11-23 PROCEDURE — 84484 ASSAY OF TROPONIN QUANT: CPT

## 2019-11-23 PROCEDURE — 80048 BASIC METABOLIC PNL TOTAL CA: CPT

## 2019-11-23 PROCEDURE — 71275 CT ANGIOGRAPHY CHEST: CPT

## 2019-11-23 PROCEDURE — 71045 X-RAY EXAM CHEST 1 VIEW: CPT

## 2019-11-23 PROCEDURE — 96374 THER/PROPH/DIAG INJ IV PUSH: CPT

## 2019-11-23 PROCEDURE — 6360000004 HC RX CONTRAST MEDICATION: Performed by: EMERGENCY MEDICINE

## 2019-11-23 PROCEDURE — 93005 ELECTROCARDIOGRAM TRACING: CPT | Performed by: EMERGENCY MEDICINE

## 2019-11-23 PROCEDURE — 99219 PR INITIAL OBSERVATION CARE/DAY 50 MINUTES: CPT | Performed by: HOSPITALIST

## 2019-11-23 PROCEDURE — 6370000000 HC RX 637 (ALT 250 FOR IP): Performed by: EMERGENCY MEDICINE

## 2019-11-23 PROCEDURE — 6360000002 HC RX W HCPCS: Performed by: EMERGENCY MEDICINE

## 2019-11-23 PROCEDURE — 85610 PROTHROMBIN TIME: CPT

## 2019-11-23 PROCEDURE — 85025 COMPLETE CBC W/AUTO DIFF WBC: CPT

## 2019-11-23 PROCEDURE — 36415 COLL VENOUS BLD VENIPUNCTURE: CPT

## 2019-11-23 RX ORDER — NITROGLYCERIN 0.4 MG/1
0.4 TABLET SUBLINGUAL EVERY 5 MIN PRN
Status: DISCONTINUED | OUTPATIENT
Start: 2019-11-23 | End: 2019-11-24 | Stop reason: HOSPADM

## 2019-11-23 RX ORDER — ASPIRIN 81 MG/1
324 TABLET, CHEWABLE ORAL ONCE
Status: COMPLETED | OUTPATIENT
Start: 2019-11-23 | End: 2019-11-23

## 2019-11-23 RX ORDER — LORAZEPAM 2 MG/ML
0.5 INJECTION INTRAMUSCULAR ONCE
Status: COMPLETED | OUTPATIENT
Start: 2019-11-23 | End: 2019-11-23

## 2019-11-23 RX ADMIN — ASPIRIN 81 MG 324 MG: 81 TABLET ORAL at 18:18

## 2019-11-23 RX ADMIN — IOPAMIDOL 80 ML: 755 INJECTION, SOLUTION INTRAVENOUS at 21:29

## 2019-11-23 RX ADMIN — NITROGLYCERIN 0.4 MG: 0.4 TABLET, ORALLY DISINTEGRATING SUBLINGUAL at 18:19

## 2019-11-23 RX ADMIN — LORAZEPAM 0.5 MG: 2 INJECTION INTRAMUSCULAR; INTRAVENOUS at 18:19

## 2019-11-23 ASSESSMENT — PAIN DESCRIPTION - ONSET
ONSET: ON-GOING

## 2019-11-23 ASSESSMENT — ENCOUNTER SYMPTOMS
NAUSEA: 1
ABDOMINAL DISTENTION: 0
ABDOMINAL PAIN: 0

## 2019-11-23 ASSESSMENT — PAIN SCALES - GENERAL
PAINLEVEL_OUTOF10: 6
PAINLEVEL_OUTOF10: 6
PAINLEVEL_OUTOF10: 8
PAINLEVEL_OUTOF10: 6

## 2019-11-23 ASSESSMENT — PAIN DESCRIPTION - LOCATION
LOCATION: HEAD;CHEST
LOCATION: CHEST;HEAD

## 2019-11-23 ASSESSMENT — PAIN DESCRIPTION - PROGRESSION
CLINICAL_PROGRESSION: NOT CHANGED

## 2019-11-23 ASSESSMENT — PAIN DESCRIPTION - DESCRIPTORS
DESCRIPTORS: ACHING;TINGLING;NUMBNESS
DESCRIPTORS: ACHING;TINGLING

## 2019-11-23 ASSESSMENT — PAIN DESCRIPTION - PAIN TYPE
TYPE: ACUTE PAIN

## 2019-11-23 ASSESSMENT — PAIN DESCRIPTION - FREQUENCY
FREQUENCY: CONTINUOUS

## 2019-11-23 ASSESSMENT — PAIN - FUNCTIONAL ASSESSMENT: PAIN_FUNCTIONAL_ASSESSMENT: ACTIVITIES ARE NOT PREVENTED

## 2019-11-24 VITALS
WEIGHT: 248.1 LBS | HEIGHT: 69 IN | SYSTOLIC BLOOD PRESSURE: 98 MMHG | OXYGEN SATURATION: 95 % | TEMPERATURE: 97.9 F | DIASTOLIC BLOOD PRESSURE: 65 MMHG | BODY MASS INDEX: 36.75 KG/M2 | HEART RATE: 60 BPM | RESPIRATION RATE: 16 BRPM

## 2019-11-24 LAB
ALBUMIN SERPL-MCNC: 3.7 G/DL (ref 3.5–5.1)
ALP BLD-CCNC: 60 U/L (ref 38–126)
ALT SERPL-CCNC: 87 U/L (ref 11–66)
ANION GAP SERPL CALCULATED.3IONS-SCNC: 12 MEQ/L (ref 8–16)
AST SERPL-CCNC: 58 U/L (ref 5–40)
BILIRUB SERPL-MCNC: 0.4 MG/DL (ref 0.3–1.2)
BUN BLDV-MCNC: 15 MG/DL (ref 7–22)
CALCIUM SERPL-MCNC: 8.7 MG/DL (ref 8.5–10.5)
CHLORIDE BLD-SCNC: 99 MEQ/L (ref 98–111)
CO2: 27 MEQ/L (ref 23–33)
CREAT SERPL-MCNC: 1 MG/DL (ref 0.4–1.2)
ERYTHROCYTE [DISTWIDTH] IN BLOOD BY AUTOMATED COUNT: 14.7 % (ref 11.5–14.5)
ERYTHROCYTE [DISTWIDTH] IN BLOOD BY AUTOMATED COUNT: 50.5 FL (ref 35–45)
GFR SERPL CREATININE-BSD FRML MDRD: 77 ML/MIN/1.73M2
GLUCOSE BLD-MCNC: 168 MG/DL (ref 70–108)
HCT VFR BLD CALC: 36.6 % (ref 42–52)
HEMOGLOBIN: 11.5 GM/DL (ref 14–18)
INR BLD: 1.97 (ref 0.85–1.13)
MAGNESIUM: 2.4 MG/DL (ref 1.6–2.4)
MCH RBC QN AUTO: 29.3 PG (ref 26–33)
MCHC RBC AUTO-ENTMCNC: 31.4 GM/DL (ref 32.2–35.5)
MCV RBC AUTO: 93.4 FL (ref 80–94)
PLATELET # BLD: 175 THOU/MM3 (ref 130–400)
PMV BLD AUTO: 9.7 FL (ref 9.4–12.4)
POTASSIUM REFLEX MAGNESIUM: 3.4 MEQ/L (ref 3.5–5.2)
RBC # BLD: 3.92 MILL/MM3 (ref 4.7–6.1)
SODIUM BLD-SCNC: 138 MEQ/L (ref 135–145)
TOTAL PROTEIN: 7.4 G/DL (ref 6.1–8)
WBC # BLD: 6.5 THOU/MM3 (ref 4.8–10.8)

## 2019-11-24 PROCEDURE — 6360000002 HC RX W HCPCS: Performed by: HOSPITALIST

## 2019-11-24 PROCEDURE — 6370000000 HC RX 637 (ALT 250 FOR IP): Performed by: HOSPITALIST

## 2019-11-24 PROCEDURE — 85610 PROTHROMBIN TIME: CPT

## 2019-11-24 PROCEDURE — G0378 HOSPITAL OBSERVATION PER HR: HCPCS

## 2019-11-24 PROCEDURE — 2580000003 HC RX 258: Performed by: HOSPITALIST

## 2019-11-24 PROCEDURE — 83735 ASSAY OF MAGNESIUM: CPT

## 2019-11-24 PROCEDURE — 80053 COMPREHEN METABOLIC PANEL: CPT

## 2019-11-24 PROCEDURE — 99217 PR OBSERVATION CARE DISCHARGE MANAGEMENT: CPT | Performed by: OPHTHALMOLOGY

## 2019-11-24 PROCEDURE — 85027 COMPLETE CBC AUTOMATED: CPT

## 2019-11-24 PROCEDURE — 36415 COLL VENOUS BLD VENIPUNCTURE: CPT

## 2019-11-24 PROCEDURE — 2700000000 HC OXYGEN THERAPY PER DAY

## 2019-11-24 PROCEDURE — 94761 N-INVAS EAR/PLS OXIMETRY MLT: CPT

## 2019-11-24 RX ORDER — BUPRENORPHINE AND NALOXONE 8; 2 MG/1; MG/1
1 FILM, SOLUBLE BUCCAL; SUBLINGUAL DAILY
Status: DISCONTINUED | OUTPATIENT
Start: 2019-11-24 | End: 2019-11-24 | Stop reason: HOSPADM

## 2019-11-24 RX ORDER — ONDANSETRON 2 MG/ML
4 INJECTION INTRAMUSCULAR; INTRAVENOUS EVERY 6 HOURS PRN
Status: DISCONTINUED | OUTPATIENT
Start: 2019-11-24 | End: 2019-11-24 | Stop reason: HOSPADM

## 2019-11-24 RX ORDER — SODIUM CHLORIDE 0.9 % (FLUSH) 0.9 %
10 SYRINGE (ML) INJECTION EVERY 12 HOURS SCHEDULED
Status: DISCONTINUED | OUTPATIENT
Start: 2019-11-24 | End: 2019-11-24 | Stop reason: HOSPADM

## 2019-11-24 RX ORDER — BUSPIRONE HYDROCHLORIDE 10 MG/1
30 TABLET ORAL 2 TIMES DAILY
Status: DISCONTINUED | OUTPATIENT
Start: 2019-11-24 | End: 2019-11-24 | Stop reason: HOSPADM

## 2019-11-24 RX ORDER — SODIUM CHLORIDE 0.9 % (FLUSH) 0.9 %
10 SYRINGE (ML) INJECTION PRN
Status: DISCONTINUED | OUTPATIENT
Start: 2019-11-24 | End: 2019-11-24 | Stop reason: HOSPADM

## 2019-11-24 RX ORDER — ALBUTEROL SULFATE 2.5 MG/3ML
5 SOLUTION RESPIRATORY (INHALATION) EVERY 4 HOURS PRN
Status: DISCONTINUED | OUTPATIENT
Start: 2019-11-24 | End: 2019-11-24 | Stop reason: HOSPADM

## 2019-11-24 RX ORDER — POTASSIUM CHLORIDE 20 MEQ/1
40 TABLET, EXTENDED RELEASE ORAL ONCE
Status: COMPLETED | OUTPATIENT
Start: 2019-11-24 | End: 2019-11-24

## 2019-11-24 RX ORDER — TAMSULOSIN HYDROCHLORIDE 0.4 MG/1
0.4 CAPSULE ORAL DAILY
Status: DISCONTINUED | OUTPATIENT
Start: 2019-11-24 | End: 2019-11-24 | Stop reason: HOSPADM

## 2019-11-24 RX ORDER — POTASSIUM CHLORIDE 20 MEQ/1
40 TABLET, EXTENDED RELEASE ORAL PRN
Status: DISCONTINUED | OUTPATIENT
Start: 2019-11-24 | End: 2019-11-24 | Stop reason: HOSPADM

## 2019-11-24 RX ORDER — PANTOPRAZOLE SODIUM 40 MG/1
40 TABLET, DELAYED RELEASE ORAL
Status: DISCONTINUED | OUTPATIENT
Start: 2019-11-24 | End: 2019-11-24 | Stop reason: HOSPADM

## 2019-11-24 RX ORDER — HYDRALAZINE HYDROCHLORIDE 20 MG/ML
10 INJECTION INTRAMUSCULAR; INTRAVENOUS EVERY 4 HOURS PRN
Status: DISCONTINUED | OUTPATIENT
Start: 2019-11-24 | End: 2019-11-24 | Stop reason: HOSPADM

## 2019-11-24 RX ORDER — QUETIAPINE FUMARATE 100 MG/1
100 TABLET, FILM COATED ORAL NIGHTLY
Status: DISCONTINUED | OUTPATIENT
Start: 2019-11-24 | End: 2019-11-24 | Stop reason: HOSPADM

## 2019-11-24 RX ORDER — FUROSEMIDE 40 MG/1
40 TABLET ORAL DAILY
Status: DISCONTINUED | OUTPATIENT
Start: 2019-11-24 | End: 2019-11-24 | Stop reason: HOSPADM

## 2019-11-24 RX ORDER — POTASSIUM CHLORIDE 7.45 MG/ML
10 INJECTION INTRAVENOUS PRN
Status: DISCONTINUED | OUTPATIENT
Start: 2019-11-24 | End: 2019-11-24 | Stop reason: HOSPADM

## 2019-11-24 RX ORDER — ACETAMINOPHEN 325 MG/1
650 TABLET ORAL EVERY 6 HOURS PRN
Status: DISCONTINUED | OUTPATIENT
Start: 2019-11-24 | End: 2019-11-24 | Stop reason: HOSPADM

## 2019-11-24 RX ORDER — BUMETANIDE 1 MG/1
1 TABLET ORAL DAILY
Status: DISCONTINUED | OUTPATIENT
Start: 2019-11-24 | End: 2019-11-24

## 2019-11-24 RX ORDER — GABAPENTIN 100 MG/1
200 CAPSULE ORAL 3 TIMES DAILY
Status: DISCONTINUED | OUTPATIENT
Start: 2019-11-24 | End: 2019-11-24 | Stop reason: HOSPADM

## 2019-11-24 RX ORDER — METOPROLOL TARTRATE 50 MG/1
50 TABLET, FILM COATED ORAL EVERY 6 HOURS
Status: DISCONTINUED | OUTPATIENT
Start: 2019-11-24 | End: 2019-11-24 | Stop reason: HOSPADM

## 2019-11-24 RX ORDER — AMIODARONE HYDROCHLORIDE 200 MG/1
200 TABLET ORAL DAILY
Status: DISCONTINUED | OUTPATIENT
Start: 2019-11-24 | End: 2019-11-24 | Stop reason: HOSPADM

## 2019-11-24 RX ORDER — VILAZODONE HYDROCHLORIDE 10 MG/1
10 TABLET ORAL DAILY
Status: DISCONTINUED | OUTPATIENT
Start: 2019-11-24 | End: 2019-11-24 | Stop reason: HOSPADM

## 2019-11-24 RX ORDER — ASPIRIN 81 MG/1
81 TABLET ORAL DAILY
Status: DISCONTINUED | OUTPATIENT
Start: 2019-11-24 | End: 2019-11-24 | Stop reason: HOSPADM

## 2019-11-24 RX ORDER — WARFARIN SODIUM 5 MG/1
5 TABLET ORAL ONCE
Status: COMPLETED | OUTPATIENT
Start: 2019-11-24 | End: 2019-11-24

## 2019-11-24 RX ADMIN — FUROSEMIDE 40 MG: 40 TABLET ORAL at 08:51

## 2019-11-24 RX ADMIN — GABAPENTIN 200 MG: 100 CAPSULE ORAL at 08:51

## 2019-11-24 RX ADMIN — WARFARIN SODIUM 5 MG: 5 TABLET ORAL at 12:44

## 2019-11-24 RX ADMIN — Medication 10 ML: at 08:52

## 2019-11-24 RX ADMIN — BUPRENORPHINE HYDROCHLORIDE, NALOXONE HYDROCHLORIDE 1 FILM: 8; 2 FILM, SOLUBLE BUCCAL; SUBLINGUAL at 10:15

## 2019-11-24 RX ADMIN — POTASSIUM CHLORIDE 40 MEQ: 1500 TABLET, EXTENDED RELEASE ORAL at 08:51

## 2019-11-24 RX ADMIN — TAMSULOSIN HYDROCHLORIDE 0.4 MG: 0.4 CAPSULE ORAL at 08:51

## 2019-11-24 RX ADMIN — PANTOPRAZOLE SODIUM 40 MG: 40 TABLET, DELAYED RELEASE ORAL at 06:42

## 2019-11-24 RX ADMIN — METOPROLOL TARTRATE 50 MG: 50 TABLET, FILM COATED ORAL at 01:44

## 2019-11-24 RX ADMIN — ASPIRIN 81 MG: 81 TABLET ORAL at 08:51

## 2019-11-24 RX ADMIN — ACETAMINOPHEN 650 MG: 325 TABLET ORAL at 10:17

## 2019-11-24 RX ADMIN — BUSPIRONE HYDROCHLORIDE 30 MG: 10 TABLET ORAL at 08:51

## 2019-11-24 RX ADMIN — GABAPENTIN 200 MG: 100 CAPSULE ORAL at 16:44

## 2019-11-24 RX ADMIN — VILAZODONE HYDROCHLORIDE 10 MG: 10 TABLET ORAL at 08:51

## 2019-11-24 RX ADMIN — BUSPIRONE HYDROCHLORIDE 30 MG: 10 TABLET ORAL at 01:45

## 2019-11-24 RX ADMIN — QUETIAPINE 100 MG: 100 TABLET, FILM COATED ORAL at 01:45

## 2019-11-24 RX ADMIN — AMIODARONE HYDROCHLORIDE 200 MG: 200 TABLET ORAL at 08:51

## 2019-11-24 ASSESSMENT — PAIN DESCRIPTION - PROGRESSION: CLINICAL_PROGRESSION: NOT CHANGED

## 2019-11-24 ASSESSMENT — PAIN - FUNCTIONAL ASSESSMENT: PAIN_FUNCTIONAL_ASSESSMENT: ACTIVITIES ARE NOT PREVENTED

## 2019-11-24 ASSESSMENT — PAIN DESCRIPTION - DESCRIPTORS
DESCRIPTORS: ACHING
DESCRIPTORS: HEADACHE

## 2019-11-24 ASSESSMENT — PAIN SCALES - GENERAL
PAINLEVEL_OUTOF10: 0
PAINLEVEL_OUTOF10: 6
PAINLEVEL_OUTOF10: 3
PAINLEVEL_OUTOF10: 6

## 2019-11-24 ASSESSMENT — PAIN DESCRIPTION - LOCATION
LOCATION: CHEST
LOCATION: HEAD

## 2019-11-24 ASSESSMENT — PAIN DESCRIPTION - ONSET
ONSET: ON-GOING
ONSET: ON-GOING

## 2019-11-24 ASSESSMENT — PAIN DESCRIPTION - PAIN TYPE
TYPE: ACUTE PAIN
TYPE: ACUTE PAIN

## 2019-11-24 ASSESSMENT — PAIN DESCRIPTION - FREQUENCY
FREQUENCY: CONTINUOUS
FREQUENCY: CONTINUOUS

## 2019-11-25 ENCOUNTER — HOSPITAL ENCOUNTER (EMERGENCY)
Age: 59
Discharge: HOME | End: 2019-11-25
Attending: EMERGENCY MEDICINE
Payer: MEDICARE

## 2019-11-25 ENCOUNTER — APPOINTMENT (OUTPATIENT)
Dept: GENERAL RADIOLOGY | Age: 59
End: 2019-11-25
Payer: MEDICARE

## 2019-11-25 VITALS
WEIGHT: 248 LBS | HEART RATE: 66 BPM | SYSTOLIC BLOOD PRESSURE: 146 MMHG | RESPIRATION RATE: 17 BRPM | BODY MASS INDEX: 36.73 KG/M2 | OXYGEN SATURATION: 90 % | HEIGHT: 69 IN | DIASTOLIC BLOOD PRESSURE: 86 MMHG | TEMPERATURE: 97.8 F

## 2019-11-25 DIAGNOSIS — J44.9 CHRONIC OBSTRUCTIVE PULMONARY DISEASE, UNSPECIFIED COPD TYPE (HCC): ICD-10-CM

## 2019-11-25 DIAGNOSIS — Z79.01 CHRONIC ANTICOAGULATION: ICD-10-CM

## 2019-11-25 DIAGNOSIS — I10 ESSENTIAL HYPERTENSION: Primary | ICD-10-CM

## 2019-11-25 LAB
ANION GAP SERPL CALCULATED.3IONS-SCNC: 15 MEQ/L (ref 8–16)
BASOPHILS # BLD: 0.7 %
BASOPHILS ABSOLUTE: 0 THOU/MM3 (ref 0–0.1)
BUN BLDV-MCNC: 18 MG/DL (ref 7–22)
CALCIUM SERPL-MCNC: 9.1 MG/DL (ref 8.5–10.5)
CHLORIDE BLD-SCNC: 95 MEQ/L (ref 98–111)
CO2: 25 MEQ/L (ref 23–33)
CREAT SERPL-MCNC: 1.1 MG/DL (ref 0.4–1.2)
EKG ATRIAL RATE: 66 BPM
EKG P AXIS: 56 DEGREES
EKG P-R INTERVAL: 136 MS
EKG Q-T INTERVAL: 450 MS
EKG QRS DURATION: 124 MS
EKG QTC CALCULATION (BAZETT): 471 MS
EKG R AXIS: -83 DEGREES
EKG T AXIS: 46 DEGREES
EKG VENTRICULAR RATE: 66 BPM
EOSINOPHIL # BLD: 1.7 %
EOSINOPHILS ABSOLUTE: 0.1 THOU/MM3 (ref 0–0.4)
ERYTHROCYTE [DISTWIDTH] IN BLOOD BY AUTOMATED COUNT: 14.5 % (ref 11.5–14.5)
ERYTHROCYTE [DISTWIDTH] IN BLOOD BY AUTOMATED COUNT: 49.2 FL (ref 35–45)
GFR SERPL CREATININE-BSD FRML MDRD: 69 ML/MIN/1.73M2
GLUCOSE BLD-MCNC: 157 MG/DL (ref 70–108)
HCT VFR BLD CALC: 39.3 % (ref 42–52)
HEMOGLOBIN: 12.3 GM/DL (ref 14–18)
IMMATURE GRANS (ABS): 0.04 THOU/MM3 (ref 0–0.07)
IMMATURE GRANULOCYTES: 0.7 %
INR BLD: 2.05 (ref 0.85–1.13)
LYMPHOCYTES # BLD: 31.8 %
LYMPHOCYTES ABSOLUTE: 1.8 THOU/MM3 (ref 1–4.8)
MAGNESIUM: 2.2 MG/DL (ref 1.6–2.4)
MCH RBC QN AUTO: 29.1 PG (ref 26–33)
MCHC RBC AUTO-ENTMCNC: 31.3 GM/DL (ref 32.2–35.5)
MCV RBC AUTO: 92.9 FL (ref 80–94)
MONOCYTES # BLD: 9.8 %
MONOCYTES ABSOLUTE: 0.6 THOU/MM3 (ref 0.4–1.3)
NUCLEATED RED BLOOD CELLS: 0 /100 WBC
OSMOLALITY CALCULATION: 275.3 MOSMOL/KG (ref 275–300)
PLATELET # BLD: 193 THOU/MM3 (ref 130–400)
PMV BLD AUTO: 9.6 FL (ref 9.4–12.4)
POTASSIUM SERPL-SCNC: 3.8 MEQ/L (ref 3.5–5.2)
PRO-BNP: 639.7 PG/ML (ref 0–900)
RBC # BLD: 4.23 MILL/MM3 (ref 4.7–6.1)
SEG NEUTROPHILS: 55.3 %
SEGMENTED NEUTROPHILS ABSOLUTE COUNT: 3.2 THOU/MM3 (ref 1.8–7.7)
SODIUM BLD-SCNC: 135 MEQ/L (ref 135–145)
TROPONIN T: < 0.01 NG/ML
WBC # BLD: 5.8 THOU/MM3 (ref 4.8–10.8)

## 2019-11-25 PROCEDURE — 6370000000 HC RX 637 (ALT 250 FOR IP): Performed by: EMERGENCY MEDICINE

## 2019-11-25 PROCEDURE — 71046 X-RAY EXAM CHEST 2 VIEWS: CPT

## 2019-11-25 PROCEDURE — 83735 ASSAY OF MAGNESIUM: CPT

## 2019-11-25 PROCEDURE — 6370000000 HC RX 637 (ALT 250 FOR IP)

## 2019-11-25 PROCEDURE — 80048 BASIC METABOLIC PNL TOTAL CA: CPT

## 2019-11-25 PROCEDURE — 85025 COMPLETE CBC W/AUTO DIFF WBC: CPT

## 2019-11-25 PROCEDURE — 99283 EMERGENCY DEPT VISIT LOW MDM: CPT

## 2019-11-25 PROCEDURE — 94640 AIRWAY INHALATION TREATMENT: CPT

## 2019-11-25 PROCEDURE — 84484 ASSAY OF TROPONIN QUANT: CPT

## 2019-11-25 PROCEDURE — 85610 PROTHROMBIN TIME: CPT

## 2019-11-25 PROCEDURE — 36415 COLL VENOUS BLD VENIPUNCTURE: CPT

## 2019-11-25 PROCEDURE — 93005 ELECTROCARDIOGRAM TRACING: CPT | Performed by: EMERGENCY MEDICINE

## 2019-11-25 PROCEDURE — 83880 ASSAY OF NATRIURETIC PEPTIDE: CPT

## 2019-11-25 RX ORDER — ACETAMINOPHEN 325 MG/1
TABLET ORAL
Status: COMPLETED
Start: 2019-11-25 | End: 2019-11-25

## 2019-11-25 RX ORDER — IPRATROPIUM BROMIDE AND ALBUTEROL SULFATE 2.5; .5 MG/3ML; MG/3ML
1 SOLUTION RESPIRATORY (INHALATION) ONCE
Status: COMPLETED | OUTPATIENT
Start: 2019-11-25 | End: 2019-11-25

## 2019-11-25 RX ORDER — LISINOPRIL 2.5 MG/1
2.5 TABLET ORAL DAILY
Qty: 30 TABLET | Refills: 0 | Status: ON HOLD | OUTPATIENT
Start: 2019-11-25 | End: 2020-09-19 | Stop reason: HOSPADM

## 2019-11-25 RX ORDER — ACETAMINOPHEN 325 MG/1
650 TABLET ORAL ONCE
Status: COMPLETED | OUTPATIENT
Start: 2019-11-25 | End: 2019-11-25

## 2019-11-25 RX ADMIN — Medication 2 PUFF: at 16:33

## 2019-11-25 RX ADMIN — ACETAMINOPHEN 650 MG: 325 TABLET ORAL at 15:46

## 2019-11-25 RX ADMIN — IPRATROPIUM BROMIDE AND ALBUTEROL SULFATE 1 AMPULE: .5; 3 SOLUTION RESPIRATORY (INHALATION) at 16:33

## 2019-11-25 ASSESSMENT — ENCOUNTER SYMPTOMS
ABDOMINAL PAIN: 0
RHINORRHEA: 0
EYE DISCHARGE: 0
VOMITING: 0
SHORTNESS OF BREATH: 1
NAUSEA: 0
DIARRHEA: 0
BACK PAIN: 0
SORE THROAT: 0
EYE REDNESS: 0
WHEEZING: 0
COUGH: 0

## 2019-11-25 ASSESSMENT — PAIN DESCRIPTION - FREQUENCY
FREQUENCY: CONTINUOUS

## 2019-11-25 ASSESSMENT — PAIN SCALES - GENERAL
PAINLEVEL_OUTOF10: 7
PAINLEVEL_OUTOF10: 7
PAINLEVEL_OUTOF10: 9
PAINLEVEL_OUTOF10: 6
PAINLEVEL_OUTOF10: 8

## 2019-11-25 ASSESSMENT — PAIN DESCRIPTION - LOCATION
LOCATION: HEAD

## 2019-11-25 ASSESSMENT — PAIN DESCRIPTION - PAIN TYPE
TYPE: ACUTE PAIN

## 2019-11-25 ASSESSMENT — PAIN DESCRIPTION - DESCRIPTORS
DESCRIPTORS: PRESSURE

## 2019-11-26 ENCOUNTER — CARE COORDINATION (OUTPATIENT)
Dept: CASE MANAGEMENT | Age: 59
End: 2019-11-26

## 2019-11-27 ENCOUNTER — APPOINTMENT (OUTPATIENT)
Dept: GENERAL RADIOLOGY | Age: 59
End: 2019-11-27
Payer: MEDICARE

## 2019-11-27 ENCOUNTER — APPOINTMENT (OUTPATIENT)
Dept: CT IMAGING | Age: 59
End: 2019-11-27
Payer: MEDICARE

## 2019-11-27 ENCOUNTER — CARE COORDINATION (OUTPATIENT)
Dept: CASE MANAGEMENT | Age: 59
End: 2019-11-27

## 2019-11-27 ENCOUNTER — HOSPITAL ENCOUNTER (EMERGENCY)
Age: 59
Discharge: HOME | End: 2019-11-28
Attending: EMERGENCY MEDICINE
Payer: MEDICARE

## 2019-11-27 DIAGNOSIS — R07.89 ATYPICAL CHEST PAIN: Primary | ICD-10-CM

## 2019-11-27 DIAGNOSIS — R10.9 NONSPECIFIC ABDOMINAL PAIN: ICD-10-CM

## 2019-11-27 LAB
ALBUMIN SERPL-MCNC: 4.5 G/DL (ref 3.5–5.1)
ALLEN TEST: POSITIVE
ALP BLD-CCNC: 69 U/L (ref 38–126)
ALT SERPL-CCNC: 68 U/L (ref 11–66)
AMPHETAMINE+METHAMPHETAMINE URINE SCREEN: NEGATIVE
ANION GAP SERPL CALCULATED.3IONS-SCNC: 21 MEQ/L (ref 8–16)
APTT: 56.2 SECONDS (ref 22–38)
AST SERPL-CCNC: 48 U/L (ref 5–40)
BACTERIA: ABNORMAL /HPF
BARBITURATE QUANTITATIVE URINE: NEGATIVE
BASE EXCESS (CALCULATED): 1.8 MMOL/L (ref -2.5–2.5)
BASOPHILS # BLD: 0.4 %
BASOPHILS ABSOLUTE: 0 THOU/MM3 (ref 0–0.1)
BENZODIAZEPINE QUANTITATIVE URINE: NEGATIVE
BILIRUB SERPL-MCNC: 0.5 MG/DL (ref 0.3–1.2)
BILIRUBIN URINE: NEGATIVE
BLOOD, URINE: ABNORMAL
BUN BLDV-MCNC: 9 MG/DL (ref 7–22)
CALCIUM SERPL-MCNC: 9.8 MG/DL (ref 8.5–10.5)
CANNABINOID QUANTITATIVE URINE: POSITIVE
CASTS 2: ABNORMAL /LPF
CASTS UA: ABNORMAL /LPF
CHARACTER, URINE: CLEAR
CHLORIDE BLD-SCNC: 100 MEQ/L (ref 98–111)
CO2: 20 MEQ/L (ref 23–33)
COCAINE METABOLITE QUANTITATIVE URINE: NEGATIVE
COLLECTED BY:: NORMAL
COLOR: YELLOW
CREAT SERPL-MCNC: 0.8 MG/DL (ref 0.4–1.2)
CRYSTALS, UA: ABNORMAL
DEVICE: NORMAL
EKG ATRIAL RATE: 73 BPM
EKG P AXIS: 57 DEGREES
EKG P-R INTERVAL: 138 MS
EKG Q-T INTERVAL: 490 MS
EKG QRS DURATION: 118 MS
EKG QTC CALCULATION (BAZETT): 539 MS
EKG R AXIS: -75 DEGREES
EKG T AXIS: 41 DEGREES
EKG VENTRICULAR RATE: 73 BPM
EOSINOPHIL # BLD: 0.5 %
EOSINOPHILS ABSOLUTE: 0 THOU/MM3 (ref 0–0.4)
EPITHELIAL CELLS, UA: ABNORMAL /HPF
ERYTHROCYTE [DISTWIDTH] IN BLOOD BY AUTOMATED COUNT: 14.6 % (ref 11.5–14.5)
ERYTHROCYTE [DISTWIDTH] IN BLOOD BY AUTOMATED COUNT: 49.3 FL (ref 35–45)
GFR SERPL CREATININE-BSD FRML MDRD: > 90 ML/MIN/1.73M2
GLUCOSE BLD-MCNC: 146 MG/DL (ref 70–108)
GLUCOSE URINE: NEGATIVE MG/DL
HCO3: 27 MMOL/L (ref 23–28)
HCT VFR BLD CALC: 41.7 % (ref 42–52)
HEMOGLOBIN: 12.8 GM/DL (ref 14–18)
IMMATURE GRANS (ABS): 0.04 THOU/MM3 (ref 0–0.07)
IMMATURE GRANULOCYTES: 0.5 %
INR BLD: 2.53 (ref 0.85–1.13)
KETONES, URINE: NEGATIVE
LEUKOCYTE ESTERASE, URINE: NEGATIVE
LYMPHOCYTES # BLD: 28.3 %
LYMPHOCYTES ABSOLUTE: 2.1 THOU/MM3 (ref 1–4.8)
MAGNESIUM: 2.2 MG/DL (ref 1.6–2.4)
MCH RBC QN AUTO: 28.4 PG (ref 26–33)
MCHC RBC AUTO-ENTMCNC: 30.7 GM/DL (ref 32.2–35.5)
MCV RBC AUTO: 92.5 FL (ref 80–94)
MISCELLANEOUS 2: ABNORMAL
MONOCYTES # BLD: 8.5 %
MONOCYTES ABSOLUTE: 0.6 THOU/MM3 (ref 0.4–1.3)
NITRITE, URINE: NEGATIVE
NUCLEATED RED BLOOD CELLS: 0 /100 WBC
O2 SATURATION: 94 %
OPIATES, URINE: NEGATIVE
OSMOLALITY CALCULATION: 282.6 MOSMOL/KG (ref 275–300)
OXYCODONE: NEGATIVE
PCO2: 42 MMHG (ref 35–45)
PH BLOOD GAS: 7.41 (ref 7.35–7.45)
PH UA: 6.5 (ref 5–9)
PHENCYCLIDINE QUANTITATIVE URINE: NEGATIVE
PLATELET # BLD: 221 THOU/MM3 (ref 130–400)
PMV BLD AUTO: 9.8 FL (ref 9.4–12.4)
PO2: 71 MMHG (ref 71–104)
POTASSIUM SERPL-SCNC: 3.6 MEQ/L (ref 3.5–5.2)
PRO-BNP: 491.7 PG/ML (ref 0–900)
PROTEIN UA: NEGATIVE
RBC # BLD: 4.51 MILL/MM3 (ref 4.7–6.1)
RBC URINE: ABNORMAL /HPF
RENAL EPITHELIAL, UA: ABNORMAL
SEG NEUTROPHILS: 61.8 %
SEGMENTED NEUTROPHILS ABSOLUTE COUNT: 4.6 THOU/MM3 (ref 1.8–7.7)
SODIUM BLD-SCNC: 141 MEQ/L (ref 135–145)
SOURCE, BLOOD GAS: NORMAL
SPECIFIC GRAVITY, URINE: 1.01 (ref 1–1.03)
TOTAL PROTEIN: 8.7 G/DL (ref 6.1–8)
TROPONIN T: < 0.01 NG/ML
TSH SERPL DL<=0.05 MIU/L-ACNC: 11.49 UIU/ML (ref 0.4–4.2)
UROBILINOGEN, URINE: 1 EU/DL (ref 0–1)
WBC # BLD: 7.5 THOU/MM3 (ref 4.8–10.8)
WBC UA: ABNORMAL /HPF
YEAST: ABNORMAL

## 2019-11-27 PROCEDURE — 81001 URINALYSIS AUTO W/SCOPE: CPT

## 2019-11-27 PROCEDURE — 93005 ELECTROCARDIOGRAM TRACING: CPT | Performed by: EMERGENCY MEDICINE

## 2019-11-27 PROCEDURE — 99285 EMERGENCY DEPT VISIT HI MDM: CPT

## 2019-11-27 PROCEDURE — 82803 BLOOD GASES ANY COMBINATION: CPT

## 2019-11-27 PROCEDURE — 36600 WITHDRAWAL OF ARTERIAL BLOOD: CPT

## 2019-11-27 PROCEDURE — 84443 ASSAY THYROID STIM HORMONE: CPT

## 2019-11-27 PROCEDURE — 80053 COMPREHEN METABOLIC PANEL: CPT

## 2019-11-27 PROCEDURE — 94640 AIRWAY INHALATION TREATMENT: CPT

## 2019-11-27 PROCEDURE — 6370000000 HC RX 637 (ALT 250 FOR IP): Performed by: EMERGENCY MEDICINE

## 2019-11-27 PROCEDURE — 36415 COLL VENOUS BLD VENIPUNCTURE: CPT

## 2019-11-27 PROCEDURE — 72125 CT NECK SPINE W/O DYE: CPT

## 2019-11-27 PROCEDURE — 94761 N-INVAS EAR/PLS OXIMETRY MLT: CPT

## 2019-11-27 PROCEDURE — 83880 ASSAY OF NATRIURETIC PEPTIDE: CPT

## 2019-11-27 PROCEDURE — 85025 COMPLETE CBC W/AUTO DIFF WBC: CPT

## 2019-11-27 PROCEDURE — 70450 CT HEAD/BRAIN W/O DYE: CPT

## 2019-11-27 PROCEDURE — 85730 THROMBOPLASTIN TIME PARTIAL: CPT

## 2019-11-27 PROCEDURE — 71045 X-RAY EXAM CHEST 1 VIEW: CPT

## 2019-11-27 PROCEDURE — 85610 PROTHROMBIN TIME: CPT

## 2019-11-27 PROCEDURE — 84484 ASSAY OF TROPONIN QUANT: CPT

## 2019-11-27 PROCEDURE — 80307 DRUG TEST PRSMV CHEM ANLYZR: CPT

## 2019-11-27 PROCEDURE — 83735 ASSAY OF MAGNESIUM: CPT

## 2019-11-27 PROCEDURE — 74176 CT ABD & PELVIS W/O CONTRAST: CPT

## 2019-11-27 RX ORDER — ALPRAZOLAM 0.5 MG/1
0.25 TABLET ORAL ONCE
Status: COMPLETED | OUTPATIENT
Start: 2019-11-27 | End: 2019-11-27

## 2019-11-27 RX ORDER — IPRATROPIUM BROMIDE AND ALBUTEROL SULFATE 2.5; .5 MG/3ML; MG/3ML
1 SOLUTION RESPIRATORY (INHALATION) ONCE
Status: COMPLETED | OUTPATIENT
Start: 2019-11-27 | End: 2019-11-27

## 2019-11-27 RX ORDER — LEVOTHYROXINE SODIUM 0.05 MG/1
50 TABLET ORAL ONCE
Status: COMPLETED | OUTPATIENT
Start: 2019-11-27 | End: 2019-11-27

## 2019-11-27 RX ADMIN — LEVOTHYROXINE SODIUM 50 MCG: 50 TABLET ORAL at 23:23

## 2019-11-27 RX ADMIN — IPRATROPIUM BROMIDE AND ALBUTEROL SULFATE 1 AMPULE: .5; 3 SOLUTION RESPIRATORY (INHALATION) at 21:48

## 2019-11-27 RX ADMIN — IPRATROPIUM BROMIDE AND ALBUTEROL SULFATE 1 AMPULE: .5; 3 SOLUTION RESPIRATORY (INHALATION) at 21:49

## 2019-11-27 RX ADMIN — ALPRAZOLAM 0.25 MG: 0.5 TABLET ORAL at 23:23

## 2019-11-27 ASSESSMENT — PAIN DESCRIPTION - PAIN TYPE
TYPE: ACUTE PAIN
TYPE: ACUTE PAIN

## 2019-11-27 ASSESSMENT — ENCOUNTER SYMPTOMS
VOMITING: 0
WHEEZING: 1
PHOTOPHOBIA: 0
CHOKING: 0
EYE ITCHING: 0
SINUS PRESSURE: 0
EYE DISCHARGE: 0
ABDOMINAL DISTENTION: 0
SORE THROAT: 0
CHEST TIGHTNESS: 1
RHINORRHEA: 0
VOICE CHANGE: 0
SHORTNESS OF BREATH: 1
BLOOD IN STOOL: 0
EYE REDNESS: 0
EYE PAIN: 0
NAUSEA: 0
CONSTIPATION: 0
COUGH: 1
ABDOMINAL PAIN: 0
DIARRHEA: 0
BACK PAIN: 0
TROUBLE SWALLOWING: 0

## 2019-11-27 ASSESSMENT — PAIN DESCRIPTION - LOCATION
LOCATION: NECK
LOCATION: CHEST

## 2019-11-27 ASSESSMENT — PAIN DESCRIPTION - DESCRIPTORS
DESCRIPTORS: ACHING
DESCRIPTORS: ACHING

## 2019-11-27 ASSESSMENT — PAIN SCALES - GENERAL
PAINLEVEL_OUTOF10: 8
PAINLEVEL_OUTOF10: 8

## 2019-11-27 ASSESSMENT — PAIN DESCRIPTION - FREQUENCY: FREQUENCY: INTERMITTENT

## 2019-11-28 VITALS
TEMPERATURE: 98.4 F | DIASTOLIC BLOOD PRESSURE: 81 MMHG | WEIGHT: 248 LBS | OXYGEN SATURATION: 97 % | HEART RATE: 74 BPM | BODY MASS INDEX: 36.73 KG/M2 | HEIGHT: 69 IN | SYSTOLIC BLOOD PRESSURE: 156 MMHG | RESPIRATION RATE: 19 BRPM

## 2019-11-28 LAB — TROPONIN T: < 0.01 NG/ML

## 2019-11-28 PROCEDURE — 96374 THER/PROPH/DIAG INJ IV PUSH: CPT

## 2019-11-28 PROCEDURE — 2500000003 HC RX 250 WO HCPCS: Performed by: EMERGENCY MEDICINE

## 2019-11-28 RX ORDER — BUMETANIDE 0.25 MG/ML
1 INJECTION, SOLUTION INTRAMUSCULAR; INTRAVENOUS ONCE
Status: COMPLETED | OUTPATIENT
Start: 2019-11-28 | End: 2019-11-28

## 2019-11-28 RX ADMIN — BUMETANIDE 1 MG: 0.25 INJECTION INTRAMUSCULAR; INTRAVENOUS at 00:10

## 2019-11-28 ASSESSMENT — PAIN DESCRIPTION - DESCRIPTORS
DESCRIPTORS: ACHING
DESCRIPTORS: ACHING

## 2019-11-28 ASSESSMENT — PAIN DESCRIPTION - LOCATION
LOCATION: NECK
LOCATION: NECK

## 2019-11-28 ASSESSMENT — PAIN DESCRIPTION - FREQUENCY: FREQUENCY: INTERMITTENT

## 2019-11-28 ASSESSMENT — PAIN SCALES - GENERAL
PAINLEVEL_OUTOF10: 8
PAINLEVEL_OUTOF10: 8

## 2019-11-28 ASSESSMENT — PAIN DESCRIPTION - PAIN TYPE
TYPE: ACUTE PAIN
TYPE: ACUTE PAIN

## 2019-12-02 ENCOUNTER — CARE COORDINATION (OUTPATIENT)
Dept: CASE MANAGEMENT | Age: 59
End: 2019-12-02

## 2019-12-02 LAB — AFB CULTURE & SMEAR: NORMAL

## 2019-12-03 ENCOUNTER — OFFICE VISIT (OUTPATIENT)
Dept: CARDIOLOGY CLINIC | Age: 59
End: 2019-12-03
Payer: MEDICARE

## 2019-12-03 VITALS
BODY MASS INDEX: 34.83 KG/M2 | HEIGHT: 69 IN | HEART RATE: 82 BPM | WEIGHT: 235.2 LBS | SYSTOLIC BLOOD PRESSURE: 160 MMHG | DIASTOLIC BLOOD PRESSURE: 72 MMHG

## 2019-12-03 DIAGNOSIS — R06.02 SOB (SHORTNESS OF BREATH) ON EXERTION: ICD-10-CM

## 2019-12-03 DIAGNOSIS — I48.0 PAF (PAROXYSMAL ATRIAL FIBRILLATION) (HCC): Primary | ICD-10-CM

## 2019-12-03 DIAGNOSIS — Z98.890 HX OF TRICUSPID VALVE REPAIR: ICD-10-CM

## 2019-12-03 DIAGNOSIS — Z95.0 PACEMAKER: ICD-10-CM

## 2019-12-03 DIAGNOSIS — Z98.890 S/P CARDIAC CATH: ICD-10-CM

## 2019-12-03 DIAGNOSIS — I50.32 CHRONIC DIASTOLIC CONGESTIVE HEART FAILURE (HCC): ICD-10-CM

## 2019-12-03 DIAGNOSIS — Z95.3 HISTORY OF TRICUSPID VALVE REPLACEMENT WITH BIOPROSTHETIC VALVE: ICD-10-CM

## 2019-12-03 DIAGNOSIS — I10 ESSENTIAL HYPERTENSION: ICD-10-CM

## 2019-12-03 PROCEDURE — G8482 FLU IMMUNIZE ORDER/ADMIN: HCPCS | Performed by: INTERNAL MEDICINE

## 2019-12-03 PROCEDURE — G8417 CALC BMI ABV UP PARAM F/U: HCPCS | Performed by: INTERNAL MEDICINE

## 2019-12-03 PROCEDURE — G8427 DOCREV CUR MEDS BY ELIG CLIN: HCPCS | Performed by: INTERNAL MEDICINE

## 2019-12-03 PROCEDURE — 3017F COLORECTAL CA SCREEN DOC REV: CPT | Performed by: INTERNAL MEDICINE

## 2019-12-03 PROCEDURE — 1036F TOBACCO NON-USER: CPT | Performed by: INTERNAL MEDICINE

## 2019-12-03 PROCEDURE — 99214 OFFICE O/P EST MOD 30 MIN: CPT | Performed by: INTERNAL MEDICINE

## 2019-12-03 PROCEDURE — 93000 ELECTROCARDIOGRAM COMPLETE: CPT | Performed by: INTERNAL MEDICINE

## 2019-12-05 ENCOUNTER — TELEPHONE (OUTPATIENT)
Dept: CARDIOLOGY CLINIC | Age: 59
End: 2019-12-05

## 2019-12-10 RX ORDER — AMLODIPINE BESYLATE 5 MG/1
TABLET ORAL DAILY
COMMUNITY
End: 2019-12-10 | Stop reason: SDUPTHER

## 2019-12-10 RX ORDER — AMLODIPINE BESYLATE 5 MG/1
5 TABLET ORAL DAILY
Qty: 30 TABLET | Refills: 3 | Status: ON HOLD | OUTPATIENT
Start: 2019-12-10 | End: 2020-09-19 | Stop reason: HOSPADM

## 2019-12-19 ENCOUNTER — PROCEDURE VISIT (OUTPATIENT)
Dept: CARDIOLOGY CLINIC | Age: 59
End: 2019-12-19
Payer: MEDICARE

## 2019-12-19 DIAGNOSIS — Z95.0 PACEMAKER: Primary | ICD-10-CM

## 2019-12-19 PROCEDURE — 93294 REM INTERROG EVL PM/LDLS PM: CPT | Performed by: INTERNAL MEDICINE

## 2019-12-19 PROCEDURE — 93296 REM INTERROG EVL PM/IDS: CPT | Performed by: INTERNAL MEDICINE

## 2019-12-28 NOTE — PROGRESS NOTES
Inhaler 0    pantoprazole (PROTONIX) 40 MG tablet Take 40 mg by mouth daily      warfarin (COUMADIN) 5 MG tablet Take 5 mg by mouth      tamsulosin (FLOMAX) 0.4 MG capsule Take 0.4 mg by mouth daily      VILAZODONE HCL 10 MG Tablet       albuterol (PROVENTIL) (2.5 MG/3ML) 0.083% nebulizer solution Take 6 mLs by nebulization every 4 hours as needed for Wheezing or Shortness of Breath 120 each 3    QUEtiapine (SEROQUEL) 100 MG tablet 1 tablet by Per NG tube route nightly 60 tablet 3    furosemide (LASIX) 40 MG tablet Take 1 tablet by mouth daily 60 tablet 3    busPIRone (BUSPAR) 30 MG tablet Take 30 mg by mouth 2 times daily       aspirin EC 81 MG EC tablet Take 1 tablet by mouth daily 30 tablet 3    buprenorphine-naloxone (SUBOXONE) 8-2 MG FILM SL film Place 1 Film under the tongue daily. Nasir Prophet gabapentin (NEURONTIN) 100 MG capsule Take 2 capsules by mouth 3 times daily for 4 days. 24 capsule 0     No current facility-administered medications for this visit. Family History   Problem Relation Age of Onset    Diabetes Mother     Depression Mother     Arthritis Father     Heart Disease Father     Diabetes Brother     Depression Daughter            Physical Exam:    VITALS:  BP (!) 142/78 (Site: Right Lower Arm, Position: Sitting)   Pulse 74   Temp 97.7 °F (36.5 °C)   Ht 5' 9\" (1.753 m)   Wt 240 lb 3.2 oz (109 kg)   SpO2 98% Comment: on RA  BMI 35.47 kg/m²   Nursing note and vitals reviewed. Constitutional: Patient appears well built and well nourished. No distress. Patient is oriented to person, place, and time. HENT:   Head: Normocephalic and atraumatic. Right Ear: External ear normal.   Left Ear: External ear normal.   Mouth/Throat: Oropharynx is clear and moist.  No oral thrush. Eyes: Conjunctivae are normal. Pupils are equal, round, and reactive to light. No scleral icterus. Neck: Neck supple. No JVD present. No tracheal deviation present.    Cardiovascular: Normal rate, regular rhythm, normal heart sounds. No murmur heard. Pulmonary/Chest: Effort normal and breath sounds normal. No stridor. No respiratory distress. Bilateral expiratory wheezes. No rales. Patient exhibits no tenderness. Abdominal: Soft. Patient exhibits no distension. No tenderness. Musculoskeletal: Normal range of motion. Extremities: Patient exhibits bilateral leg edema and no tenderness. Lymphadenopathy:  No cervical adenopathy. Neurological: Patient is alert and oriented to person, place, and time. Skin: Skin is warm and dry. Patient is not diaphoretic. Psychiatric: Patient  has a normal mood and affect. Patient behavior is normal.     Diagnostic Data:    Radiological Data:    Chest Xray:        CT CHEST WO CONTRAST   9/28/2019   Impression:  Severe left upper lobe collapse has developed.                CTA CHEST W WO CONTRAST   9/27/2019   Impression:  1. Negative exam for pulmonary embolus. 2. Stable emphysematous lung changes with parenchymal scarring suspected. 3. Mild haziness of the lung parenchyma to be secondary to minimal bronchitis.        XR CHEST PORTABLE   10/6/2019   Impression:  Focally worsened predominantly interstitial pulmonary edema at the left base, stable throughout the remainder of the lungs. Possible small left pleural effusion.           Pulmonary function tests:              Echocardiogram:  Summary   Technically difficult examination.   Ejection fraction was estimated at 50-55%.   Left atrial size was severely dilated.   Right atrial size was moderately dilated.   S/p TV replacement--unable to visualize leaflets. DOPPLER: Mean gradient   5-8 mmHg. V max = 2.1 m/s. There was trace tricuspid regurgitation.   Assuming RAP = 20 mmHg, the estimated RVSP = 50 mmHg.   Ascending aorta = 3.6 cm. [address] size is dilated with reduced respirophasic variation (CVP~20 mmHg)      Consider THELMA, if clinically indicated, and concerns for TVR dysfunction. Assessment:  -Moderately Severe COPD. He is currently on treatment with Spiriva and Albuterol HFA. -Acute respiratory failure secondary to massive hemoptysis, resolved and patient was extubated 10/2/19- resolved. -Increased neck size and hypersomnia- need to evaluate for sleep apnea  -Chronic A-fib on anticoagulation with coumadin   -Hypertension on meds- not under control. He denies any chest pain, headache or dizziness at this time.   -Factor 5 Leiden with history of LE DVT on coumadin. Recommendations/Plan:   -He was instructed call his family physician/Primary care physician as soon as possible for optimization of his anti HTN medications. He verbalizes understanding.  - Will stop Spiriva respimat- He can not afford.  - Start patient on Spiriva 18mcg/Cap DPI. 1Cap via inhalation daily. He  was informed about adverse effects of Spiriva. He verbalizes understanding.  - Start patient on Albuterol HFA 90mcg/Spray MDI, 2puffs  Q6Hprn. He  was informed about adverse effects of Albuterol HFA. He verbalizes understanding.  -He was instructed to use Albuterol HFA  inhaler 2 puffs Q6h prn or Albuterol 2.5mg nebs Q6h prn (the nebulizer) at one time as rescue medication not both at the same time. He verbalizes understanding.   - Schedule patient for nocturnal polysomnogram (Sleep study) at [facility] sleep lab. Patient educated to not to drive any motor vehicles or operate heavy equipment until sleep symptoms are under control. Patient verbalizes understanding. Patient to follow with my clinic at [address] [place] [address] 1week after sleep study. - Will send serum for [id number] level today and to be followed at next clinic visit. - He was educated and demonstrated in my office how to use inhalers.   -Ella Vivar advised to continue prescribed inhalers and keep good compliance.   - Patient educated to update his pneumococcal vaccine with family physician and take influenza vaccine in coming season with out fail.   - Schedule patient for full pulmonary cessation and long-term abstinence from smoking is critical      Total time spent interviewing the patient, evaluating lab data and X-ray data and processing orders was 45 Minutes. I personally spent more than 50% of the appointment time face to face with the patient providing counseling and coordinating the patient's care. Addendum done on 5/13/20 at 12:03 PM EDT:    Home O2 evaluation done on 2/27/2020: A home oxygen evaluation has been completed.      [x]? Patient is an inpatient. It is expected that the patient will be discharged within the next 48 hours. Qualified provider to write order for home prescription if patient qualifies. Social service/care managers will arrange for home oxygen. If patient is active, arrange for Home Medical supplier to assess for Oxygen Conserving Device per pulse oximetry. []? Patient is an outpatient. Results will be faxed to the ordering provider. Qualified provider to write order for home prescription if patient qualifies and arranges for home oxygen.     Patient was placed on room air for 15 minutes. SpO2 was 90 % on room air at rest. Patients SpO2 was 89% or above and did not qualify for home oxygen. Patient was walked for 7 minutes. SpO2 was 87 % during walking. Patients SpO2 was below 89% and qualified for home oxygen. Oxygen was applied at 2 lpm via nasal cannula to maintain a SpO2 between 90-92% while walking. Actual SpO2 was 91 %.           Note: For any SpO2 at 25% see policy and procedure for possible qualifications.

## 2020-01-06 ENCOUNTER — OFFICE VISIT (OUTPATIENT)
Dept: CARDIOLOGY CLINIC | Age: 60
End: 2020-01-06
Payer: MEDICARE

## 2020-01-06 VITALS
HEART RATE: 86 BPM | DIASTOLIC BLOOD PRESSURE: 79 MMHG | SYSTOLIC BLOOD PRESSURE: 143 MMHG | WEIGHT: 241 LBS | HEIGHT: 69 IN | BODY MASS INDEX: 35.7 KG/M2

## 2020-01-06 PROBLEM — R07.89 CHEST WALL PAIN: Status: RESOLVED | Noted: 2018-11-01 | Resolved: 2020-01-06

## 2020-01-06 PROBLEM — R07.89 CHEST PAIN, ATYPICAL: Status: RESOLVED | Noted: 2018-10-11 | Resolved: 2020-01-06

## 2020-01-06 PROCEDURE — 99214 OFFICE O/P EST MOD 30 MIN: CPT | Performed by: INTERNAL MEDICINE

## 2020-01-06 RX ORDER — POTASSIUM CHLORIDE 20 MEQ/1
20 TABLET, EXTENDED RELEASE ORAL 2 TIMES DAILY
Qty: 180 TABLET | Refills: 1
Start: 2020-01-06

## 2020-01-06 NOTE — PROGRESS NOTES
Chief Complaint   Patient presents with   [facility] AT 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 Noted on ekg while in hsopiatl and device interrogation            4 week follow up. EKG done 12-3-19.     After stopped amiodarone the weired feeling and tingling sensations resolved  Feel much better    Rare palpitations    Denied cp, or edema    Denied  dizziness, or edema  Sob on exertion chronic-unchanged      NO CABG  Had only TV replacement bio and pacer following surgery for chb      Patient Active Problem List   Diagnosis    COPD (chronic obstructive pulmonary disease) ([facility]    Community acquired pneumonia    Hyponatremia    Suicidal ideation    [facility]IRS [facility]ic inflammatory response syndrome) ([facility])    Sepsis due to methicillin susceptible Staphylococcus aureus ([facility])    Severe sepsis ([facility]    HCAP (healthcare-associated pneumonia)    Hyponatremia    Lung nodules    History of intravenous drug use in remission    Malnutrition ([facility])    Coagulopathy ([facility])    Acute blood loss anemia    Complete heart block, post-surgical ([facility])    Endocarditis, bacterial, acute/subacute    WILMA (acute kidney injury) ([facility]    Leukocytosis    Physical deconditioning    Cellulitis    Abdominal pain, acute    Microscopic hematuria    [employer] Bi[employer] pacemaker    Abnormal CT of the abdomen    Generalized abdominal pain    GERD (gastroesophageal reflux disease)    Non-intractable vomiting with nausea    Hx of tricuspid valve repair    History of colonic polyps    Second degree hemorrhoids    History of tricuspid valve replacement with bioprosthetic valve 2012    Chronic diastolic congestive heart failure (HCC)    SOB (shortness of breath) on exertion    Folliculitis of perineum    S/P cardiac cath 12/5/1851-TC-[id number], LAD -PATENT, LCX-PATENT , RCA PATENT, EDP 15 MMHG, NO GRADIENT- MED RX    Essential hypertension    Acute strain: Not on file    Food insecurity:     Worry: Not on file     Inability: Not on file    Transportation needs:     Medical: Not on file     Non-medical: Not on file   Tobacco Use    Smoking status: Former Smoker     Packs/day: 1.00     Years: 37.00     Pack years: 37.00     Types: Cigarettes     Start date: 10/22/1976     Last attempt to quit: 2019     Years since quittin.2    Smokeless tobacco: Former User    Tobacco comment: pt has smoked 3 packs in last week   Substance and Sexual Activity    Alcohol use: No     Comment: quit 9 years ago    Drug use: No    Sexual activity: Yes     Partners: Female   Lifestyle    Physical activity:     Days per week: Not on file     Minutes per session: Not on file    Stress: Not on file   Relationships    Social connections:     Talks on phone: Not on file     Gets together: Not on file     Attends Bahai service: Not on file     Active member of club or organization: Not on file     Attends meetings of clubs or organizations: Not on file     Relationship status: Not on file    Intimate partner violence:     Fear of current or ex partner: Not on file     Emotionally abused: Not on file     Physically abused: Not on file     Forced sexual activity: Not on file   Other Topics Concern    Not on file   Social History Narrative    Not on file       Current Outpatient Medications   Medication Sig Dispense Refill    potassium chloride (KLOR-CON M) 20 MEQ extended release tablet Take 1 tablet by mouth 2 times daily 180 tablet 1    amLODIPine (NORVASC) 5 MG tablet Take 1 tablet by mouth daily Take 5mg  By mouth qd 30 tablet 3    lisinopril (PRINIVIL;ZESTRIL) 2.5 MG tablet Take 1 tablet by mouth daily 30 tablet 0    tiotropium (SPIRIVA RESPIMAT) 2.5 MCG/ACT AERS inhaler Inhale 2 puffs into the lungs daily 1 Inhaler 0    pantoprazole (PROTONIX) 40 MG tablet Take 40 mg by mouth daily      warfarin (COUMADIN) 5 MG tablet Take 5 mg by mouth      tamsulosin (FLOMAX) 0.4 MG capsule Take 0.4 mg by mouth daily      VILAZODONE HCL 10 MG Tablet       albuterol (PROVENTIL) (2.5 MG/3ML) 0.083% nebulizer solution Take 6 mLs by nebulization every 4 hours as needed for Wheezing or Shortness of Breath 120 each 3    QUEtiapine (SEROQUEL) 100 MG tablet 1 tablet by Per NG tube route nightly 60 tablet 3    furosemide (LASIX) 40 MG tablet Take 1 tablet by mouth daily 60 tablet 3    busPIRone (BUSPAR) 30 MG tablet Take 30 mg by mouth 2 times daily       aspirin EC 81 MG EC tablet Take 1 tablet by mouth daily 30 tablet 3    buprenorphine-naloxone (SUBOXONE) 8-2 MG FILM SL film Place 1 Film under the tongue daily. Divya President gabapentin (NEURONTIN) 100 MG capsule Take 2 capsules by mouth 3 times daily for 4 days. 24 capsule 0     No current facility-administered medications for this visit. Review of Systems -     General ROS: negative  Psychological ROS: negative  Hematological and Lymphatic ROS: No history of blood clots or bleeding disorder. Respiratory ROS: no cough, shortness of breath, or wheezing  Cardiovascular ROS: no chest pain or dyspnea on exertion  Gastrointestinal ROS: negative  Genito-Urinary ROS: no dysuria, trouble voiding, or hematuria  Musculoskeletal ROS: negative  Neurological ROS: no TIA or stroke symptoms  Dermatological ROS: negative      Blood pressure (!) 143/79, pulse 86, height 5' 9\" (1.753 m), weight 241 lb (109.3 kg).         Physical Examination:    General appearance - alert, well appearing, and in no distress  Mental status - alert, oriented to person, place, and time  Neck - supple, no significant adenopathy, no JVD, or carotid bruits  Chest - clear to auscultation, no wheezes, rales or rhonchi, symmetric air entry  Heart - normal rate, regular rhythm, normal S1, S2, no murmurs, rubs, clicks or gallops  Abdomen - soft, nontender, nondistended, no masses or organomegaly  Neurological - alert, oriented, normal speech, no focal findings or movement Sri Ron PA-C   SECOND ASSISTANT: Sloane Langston PA-C        Reason for Admission:     Procedures 1 :   This is a 50-year-old gentleman with history of   drug abuse who was diagnosed with a tricuspid valve endocarditis with very   large vegetations that actually almost causing tricuspid stenosis with   dilation of the right atrium, who was evaluated by Infectious Disease   Service, as well as Cardiology and Cardiac Surgery. Recommendation for   antibiotic was made. The patient was started on antibiotic therapy in the   hospital. Also, the patient has very bad dentition and underwent eventually   extraction of his teeth. Recommendation was made to continue with antibiotic   therapy and repeat a THELMA to evaluate the valve. If the valve continued to   have significant burden of vegetation with the large vegetation, lack of   resolution or destruction of the valve, to proceed with replacement or repair   of the tricuspid valve after adequate antibiotic therapy was given. The   patient was started on antibiotic therapy and observed. Repeat THELMA recently   revealed that the patient had persistent large vegetation with some of the   vegetation removed. Also, the valve was starting to get destructed with   tricuspid regurgitation. The patient had a cardiac catheterization which   revealed patent coronaries. The patient additionally had developed a septic   emboli to his lung, a lung abscess which was being also seen and treated with   Pulmonary Service. The patient was being followed by Cardiology Service and   Infectious Disease Service, as well as Cardiac Surgery.  Finally, after   extensive discussion with all the consultants, the recommendation was made to   proceed with tricuspid valve surgery.         Procedures 2:  This is a 50-year-old gentleman who had undergone a tricuspid valve   replacement earlier in the day, complicated with renal failure and continued   to have excessive coagulopathy and chest tube drainage, despite the massive   volume of product replacement and infusion of FFP, platelets factor VII and   cryo, the patient continued to have excessive bleeding, developing potential   signs of tamponade. The patient was taken back to the OR for exploration.                   Conclusions      Summary   Normal left ventricle size and systolic function. Ejection fraction was   estimated at 60 %. There were no regional left ventricular wall motion   abnormalities and wall thickness was within normal limits.   The left atrium is Mild to moderate dilated.   The aortic valve leaflets were not well visualized.   DOPPLER: Transaortic velocity was within the normal range with no evidence   of aortic stenosis. There was no evidence of aortic regurgitation.   Mild tricuspid regurgitation visualized.   Probable Normal functioning Bioprosthetic Tricuspid Valve Or Tricuspid   Valve repair   Mild tricuspid regurgitation visualized.   Right ventricular systolic pressure measures 45 mmhg.      Signature      ----------------------------------------------------------------   Electronically signed by Shanda Brown MD (Interpreting   physician) on 02/23/2018 at 06:52 PM    CONCLUSION:       1. Essentially there is no obstructions lesion noted in the large             caliber vessels such as the circumflex, a large caliber vessel             that is widely patent. 2.   OM 1 large caliber vessel and was widely patent. 3.   The left main is a large caliber widely patent. 4.   The left anterior descending artery is a large caliber vessel             widely patent. 5.   The right coronary artery is a moderately large caliber vessel and             is widely patent. 6.   The dominance is through the circumflex system. 7    Left ventriculography was not performed.   However, we obtained             hemodynamics due to the need to decrease the quantity of the dye             since the patient has had right heart failure also. IMPRESSION:  There is no evidence of occlusive noted on all the large cardiac  vessels. Penny Quiles D.O.        D: 12/21/2012 17:53                                    T: 12/21/2012 22:27  js     Conclusions      Summary   Nonspecific ST-T wave changes.   Lexiscan EKG stress test is not suggestive for ischemia.   The nuclear images is not suggestive for myocardial ischemia.      Signatures      ----------------------------------------------------------------   Electronically signed by Emerita Pike MD (Interpreting   Cardiologist) on 10/15/2018 at 20:25   ----------------------------------------------------------------      Conclusions      Summary   Normal left ventricle size and systolic function. Ejection fraction was   estimated at 60 %. There were no regional left ventricular wall motion   abnormalities and wall thickness was within normal limits.   The left atrium is Mild to moderate dilated.   The aortic valve leaflets were not well visualized.   DOPPLER: Transaortic velocity was within the normal range with no evidence   of aortic stenosis. There was no evidence of aortic regurgitation.   Mild tricuspid regurgitation visualized.   Probable Normal functioning Bioprosthetic Tricuspid Valve Or Tricuspid   Valve repair   Mild tricuspid regurgitation visualized.   Right ventricular systolic pressure measures 45 mmhg.      Signature      ----------------------------------------------------------------   Electronically signed by Emerita Pike MD (Interpreting   physician) on 02/23/2018 at 06:52 PM    ekf  10/18/2019  atr fib with CVR    ekg   122/19  Sinus  Rhythm   -Right bundle branch block with left axis -bifascicular block. - (probably not recent)  Inferior and  Old  Extensive anterior-lateral infarct.    -  Nonspecific T-abnormality.      ABNORMAL     ekg  12/3/19  Electronic ventricular pacemaker   Pacemaker ECG, No further analysis   INSUFFICIENT DATA    Procedure Summary  Angiographically Patent Coronaries. Normal LV systolic function. LV size - normal.  LVEF approximately 65% . EDP 15 mmhg  No Transaortic Gradient  Recommendations  Continue with aggressive risk factor modification and medical therapy. Estimated Blood Loss: 10 ml. Complications: No complications. Signatures  Electronically signed by Mariano Mccrary MD (Performing Physician) on 12/05/2018 at 14:48      Assessment    No leg edema     Diagnosis Orders   1. PAF (paroxysmal atrial fibrillation) ([facility] [address] nioted on device check with 4% burden     2. Essential hypertension     3. Chronic diastolic congestive heart failure ([address]     4. SOB (shortness of breath) on exertion     5. S/P cardiac cath 12/5/1852-[id number], LAD -PATENT, LCX-PATENT , RCA PATENT, EDP 15 MMHG, NO GRADIENT- MED RX     6. Hx of tricuspid valve repair     7. History of tricuspid valve replacement with bioprosthetic valve 2012     8. [employer] BiV pacemaker          Recent admission DX  Narrative of Hospital Course: Patient was admitted with massive hemoptysis secondary to anticoagulation associated with aspiration pneumonia on top of a chronic obstructive pulmonary disease, did have a stormy course intubated for the hemoptysis that was stopped after stopping the anticoagulation and we give antidote K [facility] with multiple bronchoscopy done for lavage,       Hx of TVR 2/2 endocarditis 2/2 IVDU, 2013 - normal bioprosthetic function 2018 TTE ( 33 mm CE, ThermaFix valve)  Factor V Leiden/DVT history   CHB s/p D - PM  Hemoptysis  HTN  COPD/Bronchitis  Preserved EF  2018 - no CAD by cath    Plan   The most current meds and labs reviewed  Hospital record reviewed      Continue the current treatment and with constant vigilance to changes in symptoms and also any potential side effects. Return for care or seek medical attention immediately if symptoms got worse and/or develop new symptoms.     Pacer interrogation reviewed and look

## 2020-01-07 ENCOUNTER — OFFICE VISIT (OUTPATIENT)
Dept: PULMONOLOGY | Age: 60
End: 2020-01-07
Payer: MEDICARE

## 2020-01-07 VITALS
DIASTOLIC BLOOD PRESSURE: 78 MMHG | OXYGEN SATURATION: 98 % | BODY MASS INDEX: 35.58 KG/M2 | TEMPERATURE: 97.7 F | WEIGHT: 240.2 LBS | HEART RATE: 74 BPM | HEIGHT: 69 IN | SYSTOLIC BLOOD PRESSURE: 142 MMHG

## 2020-01-07 PROCEDURE — 99205 OFFICE O/P NEW HI 60 MIN: CPT | Performed by: INTERNAL MEDICINE

## 2020-01-07 PROCEDURE — G0296 VISIT TO DETERM LDCT ELIG: HCPCS | Performed by: INTERNAL MEDICINE

## 2020-01-07 RX ORDER — ALBUTEROL SULFATE 90 UG/1
2 AEROSOL, METERED RESPIRATORY (INHALATION) EVERY 6 HOURS PRN
Qty: 1 INHALER | Refills: 8 | Status: SHIPPED | OUTPATIENT
Start: 2020-01-07 | End: 2021-04-26

## 2020-01-07 RX ORDER — ALBUTEROL SULFATE 2.5 MG/3ML
2.5 SOLUTION RESPIRATORY (INHALATION) EVERY 6 HOURS PRN
Qty: 120 VIAL | Refills: 7 | Status: ON HOLD | OUTPATIENT
Start: 2020-01-07 | End: 2020-02-20 | Stop reason: ALTCHOICE

## 2020-01-07 NOTE — PATIENT INSTRUCTIONS
Patient Education        Stopping Smoking: Care Instructions  Your Care Instructions  Cigarette smokers crave the nicotine in cigarettes. Giving it up is much harder than simply changing a habit. Your body has to stop craving the nicotine. It is hard to quit, but you can do it. There are many tools that people use to quit smoking. You may find that combining tools works best for you. There are several steps to quitting. First you get ready to quit. Then you get support to help you. After that, you learn new skills and behaviors to become a nonsmoker. For many people, a necessary step is getting and using medicine. Your doctor will help you set up the plan that best meets your needs. You may want to attend a smoking cessation program to help you quit smoking. When you choose a program, look for one that has proven success. Ask your doctor for ideas. You will greatly increase your chances of success if you take medicine as well as get counseling or join a cessation program.  Some of the changes you feel when you first quit tobacco are uncomfortable. Your body will miss the nicotine at first, and you may feel short-tempered and grumpy. You may have trouble sleeping or concentrating. Medicine can help you deal with these symptoms. You may struggle with changing your smoking habits and rituals. The last step is the tricky one: Be prepared for the smoking urge to continue for a time. This is a lot to deal with, but keep at it. You will feel better. Follow-up care is a key part of your treatment and safety. Be sure to make and go to all appointments, and call your doctor if you are having problems. It's also a good idea to know your test results and keep a list of the medicines you take. How can you care for yourself at home? · Ask your family, friends, and coworkers for support. You have a better chance of quitting if you have help and support.   · Join a support group, such as Nicotine Anonymous, for people who are nicotine. · Be prepared to keep trying. Most people are not successful the first few times they try to quit. Do not get mad at yourself if you smoke again. Make a list of things you learned and think about when you want to try again, such as next week, next month, or next year. Where can you learn more? Go to https://[email or web address] org and sign in to your [employer] account. Enter K969 in the [facility] box to learn more about \"Stopping Smoking: Care Instructions. \"     If you do not have an account, please click on the \"Sign Up Now\" link. Current as of: September 26, 2018  Content Version: 12.1  © 3831-9944 Healthwise, [employer]. Care instructions adapted under license by [facility] ([facility]). If you have questions about a medical condition or this instruction, always ask your healthcare professional. [address] any warranty or liability for your use of this information. What is lung cancer screening? Lung cancer screening is a way in which doctors check the lungs for early signs of cancer in people who have no symptoms of lung cancer. A low-dose CT scan uses much less radiation than a normal CT scan and shows a more detailed image of the lungs than a standard X-ray. The goal of lung cancer screening is to find cancer early, before it has a chance to grow, spread, or cause problems. One large study found that smokers who were screened with low-dose CT scans were less likely to die of lung cancer than those who were screened with standard X-ray. Below is a summary of the things you need to know regarding screening for lung cancer with low-dose computed tomography (LDCT). This is a screening program that involves routine annual screening with LDCT studies of the lung. The LDCTs are done using low-dose radiation that is not thought to increase your cancer risk.   If you have other serious medical conditions (other cancers, congestive heart failure) that

## 2020-01-07 NOTE — PROGRESS NOTES
Neck Circumference -   17.5  Mallampati - 4    Lung Nodule Screening     [x] Qualifies    [] Does not qualify   [] Declined    [] Completed

## 2020-02-19 ENCOUNTER — APPOINTMENT (OUTPATIENT)
Dept: GENERAL RADIOLOGY | Age: 60
DRG: 871 | End: 2020-02-19
Payer: MEDICARE

## 2020-02-19 ENCOUNTER — HOSPITAL ENCOUNTER (INPATIENT)
Age: 60
LOS: 7 days | Discharge: HOME | DRG: 871 | End: 2020-02-27
Attending: EMERGENCY MEDICINE | Admitting: INTERNAL MEDICINE
Payer: MEDICARE

## 2020-02-19 LAB
EKG ATRIAL RATE: 117 BPM
EKG P AXIS: 65 DEGREES
EKG P-R INTERVAL: 150 MS
EKG Q-T INTERVAL: 460 MS
EKG QRS DURATION: 94 MS
EKG QTC CALCULATION (BAZETT): 641 MS
EKG R AXIS: -84 DEGREES
EKG T AXIS: 33 DEGREES
EKG VENTRICULAR RATE: 117 BPM

## 2020-02-19 PROCEDURE — 94640 AIRWAY INHALATION TREATMENT: CPT

## 2020-02-19 PROCEDURE — 83605 ASSAY OF LACTIC ACID: CPT

## 2020-02-19 PROCEDURE — 87804 INFLUENZA ASSAY W/OPTIC: CPT

## 2020-02-19 PROCEDURE — 36415 COLL VENOUS BLD VENIPUNCTURE: CPT

## 2020-02-19 PROCEDURE — 85025 COMPLETE CBC W/AUTO DIFF WBC: CPT

## 2020-02-19 PROCEDURE — 6370000000 HC RX 637 (ALT 250 FOR IP): Performed by: EMERGENCY MEDICINE

## 2020-02-19 PROCEDURE — 71046 X-RAY EXAM CHEST 2 VIEWS: CPT

## 2020-02-19 PROCEDURE — 85730 THROMBOPLASTIN TIME PARTIAL: CPT

## 2020-02-19 PROCEDURE — 87040 BLOOD CULTURE FOR BACTERIA: CPT

## 2020-02-19 PROCEDURE — 93005 ELECTROCARDIOGRAM TRACING: CPT | Performed by: EMERGENCY MEDICINE

## 2020-02-19 PROCEDURE — 85610 PROTHROMBIN TIME: CPT

## 2020-02-19 PROCEDURE — 99285 EMERGENCY DEPT VISIT HI MDM: CPT

## 2020-02-19 RX ORDER — IPRATROPIUM BROMIDE AND ALBUTEROL SULFATE 2.5; .5 MG/3ML; MG/3ML
1 SOLUTION RESPIRATORY (INHALATION) ONCE
Status: COMPLETED | OUTPATIENT
Start: 2020-02-19 | End: 2020-02-19

## 2020-02-19 RX ADMIN — IPRATROPIUM BROMIDE AND ALBUTEROL SULFATE 1 AMPULE: .5; 3 SOLUTION RESPIRATORY (INHALATION) at 23:38

## 2020-02-19 ASSESSMENT — ENCOUNTER SYMPTOMS
EYE REDNESS: 0
VOICE CHANGE: 0
COUGH: 1
VOMITING: 0
SORE THROAT: 0
PHOTOPHOBIA: 0
EYE PAIN: 0
RHINORRHEA: 0
CONSTIPATION: 0
BLOOD IN STOOL: 0
NAUSEA: 0
DIARRHEA: 0
EYE ITCHING: 0
CHEST TIGHTNESS: 1
SINUS PRESSURE: 0
WHEEZING: 1
TROUBLE SWALLOWING: 0
EYE DISCHARGE: 0
ABDOMINAL PAIN: 0
SINUS PAIN: 1
ABDOMINAL DISTENTION: 0
SHORTNESS OF BREATH: 1
BACK PAIN: 0
CHOKING: 0

## 2020-02-19 ASSESSMENT — PAIN DESCRIPTION - LOCATION: LOCATION: CHEST;NECK

## 2020-02-19 ASSESSMENT — PAIN DESCRIPTION - PAIN TYPE: TYPE: ACUTE PAIN

## 2020-02-19 ASSESSMENT — PAIN SCALES - GENERAL: PAINLEVEL_OUTOF10: 9

## 2020-02-19 ASSESSMENT — PAIN DESCRIPTION - FREQUENCY: FREQUENCY: CONTINUOUS

## 2020-02-20 PROBLEM — J18.9 PNEUMONIA: Status: ACTIVE | Noted: 2020-02-20

## 2020-02-20 PROBLEM — J96.01 ACUTE RESPIRATORY FAILURE WITH HYPOXEMIA (HCC): Status: ACTIVE | Noted: 2020-02-20

## 2020-02-20 LAB
ALBUMIN SERPL-MCNC: 4.2 G/DL (ref 3.5–5.1)
ALP BLD-CCNC: 84 U/L (ref 38–126)
ALT SERPL-CCNC: 113 U/L (ref 11–66)
ANION GAP SERPL CALCULATED.3IONS-SCNC: 15 MEQ/L (ref 8–16)
APTT: 59 SECONDS (ref 22–38)
AST SERPL-CCNC: 60 U/L (ref 5–40)
BASOPHILS # BLD: 0.3 %
BASOPHILS ABSOLUTE: 0 THOU/MM3 (ref 0–0.1)
BILIRUB SERPL-MCNC: 0.5 MG/DL (ref 0.3–1.2)
BILIRUBIN DIRECT: < 0.2 MG/DL (ref 0–0.3)
BILIRUBIN URINE: NEGATIVE
BLOOD, URINE: NEGATIVE
BUN BLDV-MCNC: 10 MG/DL (ref 7–22)
CALCIUM SERPL-MCNC: 9.5 MG/DL (ref 8.5–10.5)
CHARACTER, URINE: CLEAR
CHLORIDE BLD-SCNC: 102 MEQ/L (ref 98–111)
CO2: 25 MEQ/L (ref 23–33)
COLOR: YELLOW
CREAT SERPL-MCNC: 1 MG/DL (ref 0.4–1.2)
EOSINOPHIL # BLD: 0.1 %
EOSINOPHILS ABSOLUTE: 0 THOU/MM3 (ref 0–0.4)
ERYTHROCYTE [DISTWIDTH] IN BLOOD BY AUTOMATED COUNT: 15.8 % (ref 11.5–14.5)
ERYTHROCYTE [DISTWIDTH] IN BLOOD BY AUTOMATED COUNT: 50.9 FL (ref 35–45)
FLU A ANTIGEN: NEGATIVE
FLU B ANTIGEN: NEGATIVE
GFR SERPL CREATININE-BSD FRML MDRD: 77 ML/MIN/1.73M2
GLUCOSE BLD-MCNC: 141 MG/DL (ref 70–108)
GLUCOSE URINE: 100 MG/DL
HCT VFR BLD CALC: 45.3 % (ref 42–52)
HEMOGLOBIN: 14.1 GM/DL (ref 14–18)
IMMATURE GRANS (ABS): 0.04 THOU/MM3 (ref 0–0.07)
IMMATURE GRANULOCYTES: 0.3 %
INR BLD: 2.52 (ref 0.85–1.13)
KETONES, URINE: NEGATIVE
LACTIC ACID, SEPSIS: 3 MMOL/L (ref 0.5–1.9)
LACTIC ACID, SEPSIS: 3.3 MMOL/L (ref 0.5–1.9)
LEUKOCYTE ESTERASE, URINE: NEGATIVE
LIPASE: 23.5 U/L (ref 5.6–51.3)
LYMPHOCYTES # BLD: 7.1 %
LYMPHOCYTES ABSOLUTE: 1 THOU/MM3 (ref 1–4.8)
MAGNESIUM: 1.8 MG/DL (ref 1.6–2.4)
MCH RBC QN AUTO: 27.4 PG (ref 26–33)
MCHC RBC AUTO-ENTMCNC: 31.1 GM/DL (ref 32.2–35.5)
MCV RBC AUTO: 88.1 FL (ref 80–94)
MONOCYTES # BLD: 5.5 %
MONOCYTES ABSOLUTE: 0.7 THOU/MM3 (ref 0.4–1.3)
NITRITE, URINE: NEGATIVE
NUCLEATED RED BLOOD CELLS: 0 /100 WBC
OSMOLALITY CALCULATION: 284.5 MOSMOL/KG (ref 275–300)
PH UA: 6.5 (ref 5–9)
PLATELET # BLD: 179 THOU/MM3 (ref 130–400)
PMV BLD AUTO: 10.1 FL (ref 9.4–12.4)
POTASSIUM SERPL-SCNC: 3.9 MEQ/L (ref 3.5–5.2)
PRO-BNP: 314.8 PG/ML (ref 0–900)
PROTEIN UA: NEGATIVE
RBC # BLD: 5.14 MILL/MM3 (ref 4.7–6.1)
REASON FOR REJECTION: NORMAL
REJECTED TEST: NORMAL
SEG NEUTROPHILS: 86.7 %
SEGMENTED NEUTROPHILS ABSOLUTE COUNT: 11.8 THOU/MM3 (ref 1.8–7.7)
SODIUM BLD-SCNC: 142 MEQ/L (ref 135–145)
SPECIFIC GRAVITY, URINE: 1.01 (ref 1–1.03)
TOTAL PROTEIN: 8.2 G/DL (ref 6.1–8)
TROPONIN T: < 0.01 NG/ML
TSH SERPL DL<=0.05 MIU/L-ACNC: 1.59 UIU/ML (ref 0.4–4.2)
UROBILINOGEN, URINE: 1 EU/DL (ref 0–1)
WBC # BLD: 13.6 THOU/MM3 (ref 4.8–10.8)

## 2020-02-20 PROCEDURE — 36415 COLL VENOUS BLD VENIPUNCTURE: CPT

## 2020-02-20 PROCEDURE — 83605 ASSAY OF LACTIC ACID: CPT

## 2020-02-20 PROCEDURE — 2580000003 HC RX 258: Performed by: EMERGENCY MEDICINE

## 2020-02-20 PROCEDURE — 83690 ASSAY OF LIPASE: CPT

## 2020-02-20 PROCEDURE — 82248 BILIRUBIN DIRECT: CPT

## 2020-02-20 PROCEDURE — 6360000002 HC RX W HCPCS: Performed by: EMERGENCY MEDICINE

## 2020-02-20 PROCEDURE — 6370000000 HC RX 637 (ALT 250 FOR IP): Performed by: EMERGENCY MEDICINE

## 2020-02-20 PROCEDURE — 6360000002 HC RX W HCPCS: Performed by: INTERNAL MEDICINE

## 2020-02-20 PROCEDURE — 93010 ELECTROCARDIOGRAM REPORT: CPT | Performed by: INTERNAL MEDICINE

## 2020-02-20 PROCEDURE — 81003 URINALYSIS AUTO W/O SCOPE: CPT

## 2020-02-20 PROCEDURE — 99222 1ST HOSP IP/OBS MODERATE 55: CPT | Performed by: INTERNAL MEDICINE

## 2020-02-20 PROCEDURE — 84443 ASSAY THYROID STIM HORMONE: CPT

## 2020-02-20 PROCEDURE — 94761 N-INVAS EAR/PLS OXIMETRY MLT: CPT

## 2020-02-20 PROCEDURE — 83735 ASSAY OF MAGNESIUM: CPT

## 2020-02-20 PROCEDURE — 83880 ASSAY OF NATRIURETIC PEPTIDE: CPT

## 2020-02-20 PROCEDURE — 1200000003 HC TELEMETRY R&B

## 2020-02-20 PROCEDURE — 94640 AIRWAY INHALATION TREATMENT: CPT

## 2020-02-20 PROCEDURE — 2700000000 HC OXYGEN THERAPY PER DAY

## 2020-02-20 PROCEDURE — 96365 THER/PROPH/DIAG IV INF INIT: CPT

## 2020-02-20 PROCEDURE — 84484 ASSAY OF TROPONIN QUANT: CPT

## 2020-02-20 PROCEDURE — 2580000003 HC RX 258: Performed by: INTERNAL MEDICINE

## 2020-02-20 PROCEDURE — 6370000000 HC RX 637 (ALT 250 FOR IP): Performed by: INTERNAL MEDICINE

## 2020-02-20 PROCEDURE — 80053 COMPREHEN METABOLIC PANEL: CPT

## 2020-02-20 PROCEDURE — 99223 1ST HOSP IP/OBS HIGH 75: CPT | Performed by: INTERNAL MEDICINE

## 2020-02-20 PROCEDURE — 2709999900 HC NON-CHARGEABLE SUPPLY

## 2020-02-20 PROCEDURE — 96361 HYDRATE IV INFUSION ADD-ON: CPT

## 2020-02-20 RX ORDER — AMLODIPINE BESYLATE 5 MG/1
5 TABLET ORAL DAILY
Status: DISCONTINUED | OUTPATIENT
Start: 2020-02-20 | End: 2020-02-27 | Stop reason: HOSPADM

## 2020-02-20 RX ORDER — ACETAMINOPHEN 650 MG/1
650 SUPPOSITORY RECTAL EVERY 6 HOURS PRN
Status: DISCONTINUED | OUTPATIENT
Start: 2020-02-20 | End: 2020-02-27 | Stop reason: HOSPADM

## 2020-02-20 RX ORDER — SODIUM CHLORIDE 9 MG/ML
INJECTION, SOLUTION INTRAVENOUS CONTINUOUS
Status: ACTIVE | OUTPATIENT
Start: 2020-02-20 | End: 2020-02-21

## 2020-02-20 RX ORDER — BUPRENORPHINE AND NALOXONE 8; 2 MG/1; MG/1
1 FILM, SOLUBLE BUCCAL; SUBLINGUAL DAILY
Status: DISCONTINUED | OUTPATIENT
Start: 2020-02-20 | End: 2020-02-27 | Stop reason: HOSPADM

## 2020-02-20 RX ORDER — POLYETHYLENE GLYCOL 3350 17 G/17G
17 POWDER, FOR SOLUTION ORAL DAILY PRN
Status: DISCONTINUED | OUTPATIENT
Start: 2020-02-20 | End: 2020-02-27 | Stop reason: HOSPADM

## 2020-02-20 RX ORDER — ONDANSETRON 2 MG/ML
4 INJECTION INTRAMUSCULAR; INTRAVENOUS EVERY 6 HOURS PRN
Status: DISCONTINUED | OUTPATIENT
Start: 2020-02-20 | End: 2020-02-27 | Stop reason: HOSPADM

## 2020-02-20 RX ORDER — WARFARIN SODIUM 5 MG/1
5 TABLET ORAL EVERY OTHER DAY
Status: DISCONTINUED | OUTPATIENT
Start: 2020-02-20 | End: 2020-02-20

## 2020-02-20 RX ORDER — BUSPIRONE HYDROCHLORIDE 10 MG/1
30 TABLET ORAL 2 TIMES DAILY
Status: DISCONTINUED | OUTPATIENT
Start: 2020-02-20 | End: 2020-02-27 | Stop reason: HOSPADM

## 2020-02-20 RX ORDER — METHYLPREDNISOLONE SODIUM SUCCINATE 40 MG/ML
40 INJECTION, POWDER, LYOPHILIZED, FOR SOLUTION INTRAMUSCULAR; INTRAVENOUS DAILY
Status: DISCONTINUED | OUTPATIENT
Start: 2020-02-20 | End: 2020-02-21

## 2020-02-20 RX ORDER — WARFARIN SODIUM 5 MG/1
5 TABLET ORAL ONCE
Status: COMPLETED | OUTPATIENT
Start: 2020-02-20 | End: 2020-02-20

## 2020-02-20 RX ORDER — ACETAMINOPHEN 325 MG/1
650 TABLET ORAL EVERY 6 HOURS PRN
Status: DISCONTINUED | OUTPATIENT
Start: 2020-02-20 | End: 2020-02-27 | Stop reason: HOSPADM

## 2020-02-20 RX ORDER — NICOTINE 21 MG/24HR
1 PATCH, TRANSDERMAL 24 HOURS TRANSDERMAL DAILY
Status: DISCONTINUED | OUTPATIENT
Start: 2020-02-20 | End: 2020-02-27 | Stop reason: HOSPADM

## 2020-02-20 RX ORDER — ACETAMINOPHEN 325 MG/1
650 TABLET ORAL ONCE
Status: COMPLETED | OUTPATIENT
Start: 2020-02-20 | End: 2020-02-20

## 2020-02-20 RX ORDER — TAMSULOSIN HYDROCHLORIDE 0.4 MG/1
0.4 CAPSULE ORAL DAILY
Status: DISCONTINUED | OUTPATIENT
Start: 2020-02-20 | End: 2020-02-27 | Stop reason: HOSPADM

## 2020-02-20 RX ORDER — SODIUM CHLORIDE 0.9 % (FLUSH) 0.9 %
10 SYRINGE (ML) INJECTION PRN
Status: DISCONTINUED | OUTPATIENT
Start: 2020-02-20 | End: 2020-02-27 | Stop reason: HOSPADM

## 2020-02-20 RX ORDER — PANTOPRAZOLE SODIUM 40 MG/1
40 TABLET, DELAYED RELEASE ORAL DAILY
Status: DISCONTINUED | OUTPATIENT
Start: 2020-02-20 | End: 2020-02-27 | Stop reason: HOSPADM

## 2020-02-20 RX ORDER — IPRATROPIUM BROMIDE AND ALBUTEROL SULFATE 2.5; .5 MG/3ML; MG/3ML
1 SOLUTION RESPIRATORY (INHALATION)
Status: DISCONTINUED | OUTPATIENT
Start: 2020-02-20 | End: 2020-02-27 | Stop reason: HOSPADM

## 2020-02-20 RX ORDER — PROMETHAZINE HYDROCHLORIDE 25 MG/1
12.5 TABLET ORAL EVERY 6 HOURS PRN
Status: DISCONTINUED | OUTPATIENT
Start: 2020-02-20 | End: 2020-02-27 | Stop reason: HOSPADM

## 2020-02-20 RX ORDER — 0.9 % SODIUM CHLORIDE 0.9 %
1000 INTRAVENOUS SOLUTION INTRAVENOUS ONCE
Status: COMPLETED | OUTPATIENT
Start: 2020-02-20 | End: 2020-02-20

## 2020-02-20 RX ORDER — QUETIAPINE FUMARATE 100 MG/1
100 TABLET, FILM COATED ORAL NIGHTLY
Status: DISCONTINUED | OUTPATIENT
Start: 2020-02-20 | End: 2020-02-27 | Stop reason: HOSPADM

## 2020-02-20 RX ORDER — ASPIRIN 81 MG/1
81 TABLET ORAL DAILY
Status: DISCONTINUED | OUTPATIENT
Start: 2020-02-20 | End: 2020-02-27 | Stop reason: HOSPADM

## 2020-02-20 RX ORDER — SODIUM CHLORIDE 9 MG/ML
INJECTION, SOLUTION INTRAVENOUS CONTINUOUS
Status: DISCONTINUED | OUTPATIENT
Start: 2020-02-20 | End: 2020-02-20

## 2020-02-20 RX ORDER — SODIUM CHLORIDE 0.9 % (FLUSH) 0.9 %
10 SYRINGE (ML) INJECTION EVERY 12 HOURS SCHEDULED
Status: DISCONTINUED | OUTPATIENT
Start: 2020-02-20 | End: 2020-02-27 | Stop reason: HOSPADM

## 2020-02-20 RX ADMIN — SODIUM CHLORIDE 1000 ML: 9 INJECTION, SOLUTION INTRAVENOUS at 03:27

## 2020-02-20 RX ADMIN — TAMSULOSIN HYDROCHLORIDE 0.4 MG: 0.4 CAPSULE ORAL at 10:23

## 2020-02-20 RX ADMIN — AMLODIPINE BESYLATE 5 MG: 5 TABLET ORAL at 10:23

## 2020-02-20 RX ADMIN — QUETIAPINE FUMARATE 100 MG: 100 TABLET ORAL at 19:50

## 2020-02-20 RX ADMIN — ACETAMINOPHEN 650 MG: 325 TABLET ORAL at 05:38

## 2020-02-20 RX ADMIN — IPRATROPIUM BROMIDE AND ALBUTEROL SULFATE 1 AMPULE: .5; 3 SOLUTION RESPIRATORY (INHALATION) at 12:49

## 2020-02-20 RX ADMIN — PANTOPRAZOLE SODIUM 40 MG: 40 TABLET, DELAYED RELEASE ORAL at 10:37

## 2020-02-20 RX ADMIN — SODIUM CHLORIDE: 9 INJECTION, SOLUTION INTRAVENOUS at 09:30

## 2020-02-20 RX ADMIN — ACETAMINOPHEN 650 MG: 325 TABLET ORAL at 13:35

## 2020-02-20 RX ADMIN — WARFARIN SODIUM 5 MG: 5 TABLET ORAL at 19:31

## 2020-02-20 RX ADMIN — AZITHROMYCIN MONOHYDRATE 500 MG: 500 INJECTION, POWDER, LYOPHILIZED, FOR SOLUTION INTRAVENOUS at 12:10

## 2020-02-20 RX ADMIN — PIPERACILLIN AND TAZOBACTAM 3.38 G: 3; .375 INJECTION, POWDER, FOR SOLUTION INTRAVENOUS at 01:59

## 2020-02-20 RX ADMIN — BUSPIRONE HYDROCHLORIDE 30 MG: 10 TABLET ORAL at 19:50

## 2020-02-20 RX ADMIN — BUSPIRONE HYDROCHLORIDE 30 MG: 10 TABLET ORAL at 10:23

## 2020-02-20 RX ADMIN — SODIUM CHLORIDE, PRESERVATIVE FREE 10 ML: 5 INJECTION INTRAVENOUS at 09:30

## 2020-02-20 RX ADMIN — IPRATROPIUM BROMIDE AND ALBUTEROL SULFATE 1 AMPULE: .5; 3 SOLUTION RESPIRATORY (INHALATION) at 20:21

## 2020-02-20 RX ADMIN — METHYLPREDNISOLONE SODIUM SUCCINATE 40 MG: 40 INJECTION, POWDER, FOR SOLUTION INTRAMUSCULAR; INTRAVENOUS at 10:23

## 2020-02-20 RX ADMIN — CEFTRIAXONE SODIUM 1 G: 1 INJECTION, POWDER, FOR SOLUTION INTRAMUSCULAR; INTRAVENOUS at 10:30

## 2020-02-20 RX ADMIN — ASPIRIN 81 MG: 81 TABLET ORAL at 10:37

## 2020-02-20 RX ADMIN — IPRATROPIUM BROMIDE AND ALBUTEROL SULFATE 1 AMPULE: .5; 3 SOLUTION RESPIRATORY (INHALATION) at 16:30

## 2020-02-20 RX ADMIN — BUPRENORPHINE HYDROCHLORIDE, NALOXONE HYDROCHLORIDE 1 FILM: 8; 2 FILM, SOLUBLE BUCCAL; SUBLINGUAL at 10:37

## 2020-02-20 ASSESSMENT — PAIN DESCRIPTION - LOCATION
LOCATION: CHEST
LOCATION: CHEST

## 2020-02-20 ASSESSMENT — PAIN SCALES - GENERAL
PAINLEVEL_OUTOF10: 8
PAINLEVEL_OUTOF10: 1
PAINLEVEL_OUTOF10: 3
PAINLEVEL_OUTOF10: 5
PAINLEVEL_OUTOF10: 7
PAINLEVEL_OUTOF10: 8

## 2020-02-20 ASSESSMENT — PAIN DESCRIPTION - ONSET: ONSET: ON-GOING

## 2020-02-20 ASSESSMENT — PAIN DESCRIPTION - PROGRESSION: CLINICAL_PROGRESSION: NOT CHANGED

## 2020-02-20 ASSESSMENT — PAIN DESCRIPTION - PAIN TYPE
TYPE: ACUTE PAIN
TYPE: ACUTE PAIN

## 2020-02-20 ASSESSMENT — PAIN DESCRIPTION - DESCRIPTORS
DESCRIPTORS: ACHING
DESCRIPTORS: ACHING

## 2020-02-20 ASSESSMENT — PAIN DESCRIPTION - FREQUENCY: FREQUENCY: CONTINUOUS

## 2020-02-20 NOTE — ED PROVIDER NOTES
Musculoskeletal: Negative for arthralgias, back pain, gait problem, myalgias, neck pain and neck stiffness. Skin: Negative for pallor and rash. Allergic/Immunologic: Negative for immunocompromised state. Neurological: Negative for dizziness, tremors, seizures, syncope, facial asymmetry, weakness, light-headedness, numbness and headaches. Hematological: Negative for adenopathy. Does not bruise/bleed easily. Psychiatric/Behavioral: Negative for agitation, hallucinations and suicidal ideas. The patient is not nervous/anxious. PAST MEDICAL HISTORY    has a past medical history of Anxiety disorder, Arthritis, Asthma, Back pain, chronic, Blood circulation, collateral, [employer] BiV ICD, [employer] BiV pacemaker, CAD (coronary artery disease), CHF (congestive heart failure) ([facility] [address] Chronic kidney disease, COPD (chronic obstructive pulmonary disease) ([facility] [address] Depression, Endocarditis, Hepatitis, Hepatitis C, Hypertension, and Pneumonia. SURGICAL HISTORY      has a past surgical history that includes pacemaker placement (12/26/13); Toe amputation (Bilateral, 3/13/2013); Hand Debridement (Left, 01/09/2014); debridement (Left, 01/14/2014); Cardiac surgery (12/26/12); Upper gastrointestinal endoscopy (2012); Colonoscopy (2016); bronchoscopy (N/A, 9/28/2019); bronchoscopy (N/A, 9/28/2019); bronchoscopy (N/A, 9/29/2019); bronchoscopy (9/29/2019); bronchoscopy (N/A, 9/30/2019); bronchoscopy (N/A, 10/16/2019); and Tracheal surgery (N/A, 10/18/2019).     CURRENT MEDICATIONS       Previous Medications    ALBUTEROL (PROVENTIL) (2.5 MG/3ML) 0.083% NEBULIZER SOLUTION    Take 6 mLs by nebulization every 4 hours as needed for Wheezing or Shortness of Breath    ALBUTEROL (PROVENTIL) (2.5 MG/3ML) 0.083% NEBULIZER SOLUTION    Take 3 mLs by nebulization every 6 hours as needed for Wheezing or Shortness of Breath    ALBUTEROL SULFATE HFA (PROAIR HFA) 108 (90 BASE) MCG/ACT INHALER    Inhale 2 puffs into the lungs every 6 hours as needed for Wheezing or Shortness of Breath    AMLODIPINE (NORVASC) 5 MG TABLET    Take 1 tablet by mouth daily Take 5mg  By mouth qd    ASPIRIN EC 81 MG EC TABLET    Take 1 tablet by mouth daily    BUPRENORPHINE-NALOXONE (SUBOXONE) 8-2 MG FILM SL FILM    Place 1 Film under the tongue daily. .    BUSPIRONE (BUSPAR) 30 MG TABLET    Take 30 mg by mouth 2 times daily     FUROSEMIDE (LASIX) 40 MG TABLET    Take 1 tablet by mouth daily    GABAPENTIN (NEURONTIN) 100 MG CAPSULE    Take 2 capsules by mouth 3 times daily for 4 days. LISINOPRIL (PRINIVIL;ZESTRIL) 2.5 MG TABLET    Take 1 tablet by mouth daily    PANTOPRAZOLE (PROTONIX) 40 MG TABLET    Take 40 mg by mouth daily    POTASSIUM CHLORIDE (KLOR-CON M) 20 MEQ EXTENDED RELEASE TABLET    Take 1 tablet by mouth 2 times daily    QUETIAPINE (SEROQUEL) 100 MG TABLET    1 tablet by Per NG tube route nightly    TAMSULOSIN (FLOMAX) 0.4 MG CAPSULE    Take 0.4 mg by mouth daily    VILAZODONE HCL 10 MG TABLET        WARFARIN (COUMADIN) 5 MG TABLET    Take 5 mg by mouth       ALLERGIES     is allergic to pcn [penicillins]. FAMILY HISTORY     He indicated that his mother is alive. He indicated that his father is alive. He indicated that the status of his brother is unknown. He indicated that the status of his daughter is unknown.   family history includes Arthritis in his father; Depression in his daughter and mother; Diabetes in his brother and mother; Heart Disease in his father. SOCIAL HISTORY      reports that he has been smoking cigarettes. He started smoking about 43 years ago. He has a 37.00 pack-year smoking history. He has quit using smokeless tobacco. He reports that he does not drink alcohol or use drugs. PHYSICAL EXAM     INITIAL VITALS:  height is 5' 9\" (1.753 m) and weight is 235 lb (106.6 kg). His oral temperature is 98.5 °F (36.9 °C). His blood pressure is 109/71 and his pulse is 99.  His respiration is 20 and oxygen saturation is 96%.    Physical Exam  Vitals signs and nursing note reviewed. Constitutional:       General: He is not in acute distress. Appearance: He is well-developed. He is not diaphoretic. HENT:      Head: Normocephalic and atraumatic. Right Ear: External ear normal.      Left Ear: External ear normal.      Nose: Nose normal.   Eyes:      General: Lids are normal. No scleral icterus. Right eye: No discharge. Left eye: No discharge. Conjunctiva/sclera: Conjunctivae normal.      Right eye: No exudate. Left eye: No exudate. Pupils: Pupils are equal, round, and reactive to light. Neck:      Musculoskeletal: Normal range of motion and neck supple. Normal range of motion. Thyroid: No thyromegaly. Vascular: No JVD. Trachea: No tracheal deviation. Cardiovascular:      Rate and Rhythm: Regular rhythm. Tachycardia present. Pulses: Normal pulses. Carotid pulses are 2+ on the right side and 2+ on the left side. Radial pulses are 2+ on the right side and 2+ on the left side. Femoral pulses are 2+ on the right side and 2+ on the left side. Popliteal pulses are 2+ on the right side and 2+ on the left side. Dorsalis pedis pulses are 2+ on the right side and 2+ on the left side. Posterior tibial pulses are 2+ on the right side and 2+ on the left side. Heart sounds: S1 normal and S2 normal. Murmur present. Systolic murmur present with a grade of 2/6. No friction rub. No gallop. Pulmonary:      Effort: Pulmonary effort is normal. No respiratory distress. Breath sounds: No stridor. Examination of the right-upper field reveals wheezing. Examination of the left-upper field reveals wheezing. Examination of the right-middle field reveals wheezing. Examination of the left-middle field reveals wheezing. Examination of the right-lower field reveals decreased breath sounds.  Examination of the left-lower field reveals decreased breath sounds. Decreased breath sounds and wheezing present. No rhonchi or rales. Chest:      Chest wall: No tenderness. Abdominal:      General: Bowel sounds are normal. There is no distension. Palpations: Abdomen is soft. There is no mass. Tenderness: There is no abdominal tenderness. There is no guarding or rebound. Musculoskeletal: Normal range of motion. General: No tenderness. Right shoulder: He exhibits no tenderness, no bony tenderness, no crepitus and normal strength. Right lower le+ Pitting Edema present. Left lower le+ Pitting Edema present. Lymphadenopathy:      Cervical: No cervical adenopathy. Skin:     General: Skin is warm and dry. Findings: No bruising, ecchymosis, lesion or rash. Neurological:      Mental Status: He is alert and oriented to person, place, and time. Cranial Nerves: No cranial nerve deficit. Sensory: No sensory deficit. Coordination: Coordination normal.      Deep Tendon Reflexes: Reflexes are normal and symmetric. Psychiatric:         Speech: Speech normal.         Behavior: Behavior normal.         Thought Content: Thought content normal.         Judgment: Judgment normal.           DIFFERENTIAL DIAGNOSIS:   Differential diagnosis discussed extensively at bedside with the patient including but not limited to bronchitis pneumonia CHF COPD    DIAGNOSTIC RESULTS     EKG: All EKG's are interpreted by the Emergency Department Physician who either signs or Co-signs this chart in the absence of a cardiologist.  Fay Elias reveals atrial sensed ventricularly paced rhythm with a ventricular rate of 117 MO interval of 150 QRS duration of 94 QT interval 460 QTC of 641. RADIOLOGY: non-plain film images(s) such as CT, Ultrasound and MRI are read by the radiologist.  XR CHEST STANDARD (2 VW)   Final Result      Small pneumonia or atelectasis at the right base. **This report has been created using voice recognition software. It may contain minor errors which are inherent in voice recognition technology. **      Final report electronically signed by Dr. Jelena Ford on 2/20/2020 12:19 AM        .    LABS:   Labs Reviewed   PROTIME-INR - Abnormal; Notable for the following components:       Result Value    INR 2.52 (*)     All other components within normal limits   APTT - Abnormal; Notable for the following components:    aPTT 59.0 (*)     All other components within normal limits   CBC WITH AUTO DIFFERENTIAL - Abnormal; Notable for the following components:    WBC 13.6 (*)     MCHC 31.1 (*)     RDW-CV 15.8 (*)     RDW-SD 50.9 (*)     Segs Absolute 11.8 (*)     All other components within normal limits   URINE RT REFLEX TO CULTURE - Abnormal; Notable for the following components:    Glucose, Ur 100 (*)     All other components within normal limits   LACTATE, SEPSIS - Abnormal; Notable for the following components:    Lactic Acid, Sepsis 3.3 (*)     All other components within normal limits   BASIC METABOLIC PANEL - Abnormal; Notable for the following components:    Glucose 141 (*)     All other components within normal limits   HEPATIC FUNCTION PANEL - Abnormal; Notable for the following components:    AST 60 (*)      (*)     Total Protein 8.2 (*)     All other components within normal limits   GLOMERULAR FILTRATION RATE, ESTIMATED - Abnormal; Notable for the following components:    Est, Glom Filt Rate 77 (*)     All other components within normal limits   RAPID INFLUENZA A/B ANTIGENS   CULTURE, BLOOD 1   CULTURE, BLOOD 2   SPECIMEN REJECTION   LIPASE   TROPONIN   BRAIN NATRIURETIC PEPTIDE   MAGNESIUM   TSH WITHOUT REFLEX   ANION GAP   OSMOLALITY   LACTATE, SEPSIS       EMERGENCY DEPARTMENT COURSE:   Vitals:    Vitals:    02/20/20 0202 02/20/20 0325 02/20/20 0440 02/20/20 0528   BP: 121/64 115/61 112/70 109/71   Pulse: 114 111 104 99   Resp: 18 20 22 20   Temp:       TempSrc:       SpO2: 96% 94% 96% 96%   Weight:       Height: Patient was assessed at bedside appropriate labs and imaging were ordered. Patient has what appears to be obvious chills at bedside. He does feel slightly warm to the touch. He is afebrile from the thermometer. Hemodynamically stable at this time. Patient is diagnosed with a pneumonia. I went back to reassess him. He still having some difficulty breathing. At this point the patient has been started on antibiotics. I feel the patient be needs to be admitted. Patient was started on Zosyn here. At this point I called the hospitalist service and discussed case with him. They graciously accepted the admission. Patient is admitted in stable condition pending further evaluation and treatment. CRITICAL CARE:   None    CONSULTS:  Hospitalist    PROCEDURES:  None    FINAL IMPRESSION      1.  Pneumonia due to organism          DISPOSITION/PLAN   Admission    PATIENT REFERRED TO:  Megan Porras  [address] [place] [address]  [phone]            DISCHARGE MEDICATIONS:  New Prescriptions    No medications on file       (Please note that portions of this note were completed with a voice recognition program.  Efforts were made to edit the dictations but occasionally words are mis-transcribed.)    Marge Gatica, [address] [place] [address],   02/20/20 4725

## 2020-02-20 NOTE — CONSULTS
on the 20th. He denies history of HIV, or tuberculosis. Reports that he has been clean for several years, since starting suboxone. He reports that he is trying to quit smoking and that he is down to 6-8 cigarettes a day. He is having shortness of breath: Yes  Onset: gradual   Duration:21 days. Diurnal variation:  None. Functional status prior to beginning of symptoms: 6 block/s on level ground. Current functional capacity on level ground: 1 block/s on level ground. He can climb steps: Yes  Flights of steps she can climb: 2  He denies orthopnea. He denies paroxysmal nocturnal dyspnea. He is having cough: Yes  Duration of cough: for 21 days. His cough is associated with sputum production: Yes   The sputum color: green  Hemoptysis:No  Diurnal variation: None.        Relieving factors: Yes, rest  Aggravating factors: Yes, activity    He is having chest pain:No    PMHx   Past Medical History      Diagnosis Date    Anxiety disorder     Arthritis     Asthma     Back pain, chronic     Blood circulation, collateral     4 toes partial amputee on L foot    [employer] Bi[employer] ICD 7/1/2014   [facility] [employer] BiV pacemaker 7/1/2014    CAD (coronary artery disease)     CHF (congestive heart failure) (HCC)     Chronic kidney disease     COPD (chronic obstructive pulmonary disease) (HCC)     Depression     Endocarditis     Hepatitis     HEPATITIS C    Hepatitis C     Hypertension     Pneumonia       Past Surgical History        Procedure Laterality Date    BRONCHOSCOPY N/A 9/28/2019    BRONCHOSCOPY [facility]    BRONCHOSCOPY N/A 9/28/2019    [facility]    BRONCHOSCOPY N/A 9/29/2019    BRONCHOSCOPY BIOPSY BRONCHUS performed by Miguel [facility]jo MD at [facility]    BRONCHOSCOPY  9/29/2019    BRONCHOSCOPY performed by Miguel Camejo MD at [address] [place] [address] N/A 9/30/2019 BRONCHOSCOPY ALVEOLAR LAVAGE performed by Kreri Rojas MD at [facility] N/A 10/16/2019    BRONCHOSCOPY ALVEOLAR LAVAGE performed by Kerri Rojas MD at [facility]  12/26/12    Valve replacement    COLONOSCOPY  2016    DEBRIDEMENT Left 01/14/2014    lt hand     HAND DEBRIDEMENT Left 01/09/2014    INCISION AND DRAINAGE    PACEMAKER PLACEMENT  12/26/13    TOE AMPUTATION Bilateral 3/13/2013    1,2,3,4 on left and 2nd on right    TRACHEAL SURGERY N/A 10/18/2019    TRACHEOTOMY PERCUTANEOUS BRONCHOSCOPY performed by Kerri Rojas MD at [facility] ENDOSCOPY  2012     Meds    Current Medications    amLODIPine  5 mg Oral Daily    aspirin EC  81 mg Oral Daily    buprenorphine-naloxone  1 Film Sublingual Daily    busPIRone  30 mg Oral BID    pantoprazole  40 mg Oral Daily    QUEtiapine  100 mg Per NG tube Nightly    tamsulosin  0.4 mg Oral Daily    cefTRIAXone (ROCEPHIN) IV  1 g Intravenous Q24H    azithromycin  500 mg Intravenous Q24H    methylPREDNISolone  40 mg Intravenous Daily    ipratropium-albuterol  1 ampule Inhalation Q4H WA    sodium chloride flush  10 mL Intravenous 2 times per day    nicotine  1 patch Transdermal Daily    warfarin (COUMADIN) daily dosing (placeholder)   Other RX Placeholder    warfarin  5 mg Oral Once     sodium chloride flush, acetaminophen, acetaminophen, polyethylene glycol, promethazine, ondansetron  IV Drips/Infusions   sodium chloride 100 mL/hr at 02/20/20 0930     Home Medications  Medications Prior to Admission: albuterol sulfate HFA (PROAIR HFA) 108 (90 Base) MCG/ACT inhaler, Inhale 2 puffs into the lungs every 6 hours as needed for Wheezing or Shortness of Breath  potassium chloride (KLOR-CON M) 20 MEQ extended release tablet, Take 1 tablet by mouth 2 times daily  amLODIPine (NORVASC) 5 MG tablet, Take 1 tablet by mouth daily Take 5mg  By mouth qd  lisinopril (PRINIVIL;ZESTRIL) 2.5 MG tablet, Take 1 tablet by mouth daily  pantoprazole (PROTONIX) 40 MG tablet, Take 40 mg by mouth daily  warfarin (COUMADIN) 5 MG tablet, Take 5 mg by mouth  tamsulosin (FLOMAX) 0.4 MG capsule, Take 0.4 mg by mouth daily  VILAZODONE HCL 10 MG Tablet,   albuterol (PROVENTIL) (2.5 MG/3ML) 0.083% nebulizer solution, Take 6 mLs by nebulization every 4 hours as needed for Wheezing or Shortness of Breath  QUEtiapine (SEROQUEL) 100 MG tablet, 1 tablet by Per NG tube route nightly  furosemide (LASIX) 40 MG tablet, Take 1 tablet by mouth daily  busPIRone (BUSPAR) 30 MG tablet, Take 30 mg by mouth 2 times daily   aspirin EC 81 MG EC tablet, Take 1 tablet by mouth daily  buprenorphine-naloxone (SUBOXONE) 8-2 MG FILM SL film, Place 1 Film under the tongue daily. [employer] gabapentin (NEURONTIN) 100 MG capsule, Take 2 capsules by mouth 3 times daily for 4 days. Diet    DIET CARDIAC; Allergies    Pcn [penicillins]  Social History     Social History     Socioeconomic History    Marital status:      Spouse name: Not on file    Number of children: 3    Years of education: 9    Highest education level: Not on file   Occupational History    Occupation: on disability   Social Needs    Financial resource strain: Not on file    Food insecurity:     Worry: Not on file     Inability: Not on file   OSIX needs:     Medical: Not on file     Non-medical: Not on file   Tobacco Use    Smoking status: Current Some Day Smoker     Packs/day: 1.00     Years: 37.00     Pack years: 37.00     Types: Cigarettes     Start date: 10/22/1976     Last attempt to quit: 2019     Years since quittin.4    Smokeless tobacco: Former User    Tobacco comment: pt has smoked 3 packs in last week.  3 cigarettes a day- 20   Substance and Sexual Activity    Alcohol use: No     Comment: quit 9 years ago    Drug use: No    Sexual activity: Yes     Partners: Female   Lifestyle    Physical activity:     Days per week: Not on file     Minutes per Eyes: Negative. Upper respiratory tract: +nasal stuffiness or post nasal drip. Lower respiratory tract/ lungs: See HPI for details. No hemoptysis. Cardiovascular: No palpitations or chest pain. , prior open heart for valve replacement  Gastrointestinal: No nausea or vomiting, constipation, or diarrhea  Neurological: No focal neurologiacal weakness. Extremities: No edema. Musculoskeletal: No complaints. Genitourinary: No complaints. Hematological: Negative. Psychiatric/Behavioral: Negative. Skin: No itching. Vitals     height is 5' 9\" (1.753 m) and weight is 235 lb (106.6 kg). His oral temperature is 98.5 °F (36.9 °C). His blood pressure is 109/71 and his pulse is 99. His respiration is 17 and oxygen saturation is 96%. Body mass index is 34.7 kg/m². SUPPLEMENTAL O2: O2 Flow Rate (L/min): (S) 2 L/min(spo2 on 3l -99%, decreased to 2o)     I/O        Intake/Output Summary (Last 24 hours) at 2/20/2020 1256  Last data filed at 2/20/2020 0529  Gross per 24 hour   Intake 2050 ml   Output --   Net 2050 ml     I/O last 3 completed shifts: In: 2050 [IV Piggyback:2050]  Out: -    Patient Vitals for the past 96 hrs (Last 3 readings):   Weight   02/19/20 2323 235 lb (106.6 kg)   02/19/20 2303 235 lb (106.6 kg)       Exam   General Appearance: Patient appears moderately built and moderately nourished in no acute distress, resting comfortably on 3LMP via NC  HEENT: Normal, Head is normocephalic, atraumatic. Oropharynx is clear and moist.  No oral thrush. PERRLA  Neck - Supple, No JVD present. No tracheal deviation present. Lungs - Bilateral air entry present. Bilateral aeration diminished at the bases. Bilateral expiratory wheezes. No rales or rhonchi,   Cardiovascular - Heart sounds are normal.  Regular rhythm normal rate without murmur, gallop or rub. Abdomen - Soft, rotund, nontender, nondistended, no masses or organomegaly  Neurologic - Awake, alert, oriented.  There are no focal motor or sensory pacemaker detected  Abnormal ECG    Echocardiogram   TTE procedure:ECHOCARDIOGRAM COMPLETE 2D W DOPPLER W COLOR.   09/28/2019   Summary   Technically difficult examination. Ejection fraction was estimated at 50-55%. Left atrial size was severely dilated. Right atrial size was moderately dilated. S/p TV replacement--unable to visualize leaflets. DOPPLER: Mean gradient   5-8 mmHg. V max = 2.1 m/s. There was trace tricuspid regurgitation. Assuming RAP = 20 mmHg, the estimated RVSP = 50 mmHg. Ascending aorta = 3.6 cm. IVC size is dilated with reduced respirophasic variation (CVP~20 mmHg)      Radiology    CXR  XR CHEST (2 VW)   2/20/2020   Small pneumonia or atelectasis at the right base. CT Scans  CTA CHEST W WO CONTRAST   11/23/2019   1. Negative exam for pulmonary embolus. 2. Stable postsurgical sternotomy with visualized tricuspid valve replacement and permanent pacemaker. 3. Underlying COPD changes with scattered areas of mild groundglass attenuation. Findings may reflect superimposed bronchitis. (See actual reports for details)    Assessment   -Right lower lobe pneumonia due to Community acquired pneumonia Vs atypical pneumonia. 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 Will revisit prior to discharge. Warfarin for DVT prophylaxis    \"Thank you for asking us to see this patient\"     Case discussed with nurse and patient/family. Questions and concerns addressed.     Electronically signed by   Samanta Cade on 2/20/2020

## 2020-02-20 NOTE — H&P
History & Physical        Patient:  Bernardo Webb  YOB: 1960    MRN: [id number]     Acct: [de-identified]    PCP: Megan Porras    Date of Admission: 2/19/2020    Date of Service: Pt seen/examined on 2/20/2020   and Admitted to Inpatient with expected LOS greater than two midnights due to medical therapy. Chief Complaint:  Fevers, chills, cough, sob       History Of Present Illness:     63 y.o. male who presented to [address] [place] [address] with complaints of fevers, chills, productive cough and sob. Pt has a pmh of A. Fib, Factor V Leiden, Hepatitis C, Tricuspid Valve Endocarditis, history of IVDA, CKD, and COPD. Pt reports to have had symptoms of fatigue, chills, productive cough, and sob over the past week. Was seen by his PCP, placed on Augmentin. Pt continued to have progression of his symptoms despite Abx therapy causing him to come to the ED. Pt denies any sick contacts. Reports being very sob with minimal activity. Pt continues to smoke cigarettes. Pt denies any nausea, vomiting, diarrhea, chest pain, abdominal pain, black or red colored stools, burning with urination, numbness or weakness. In the ED, pt hypoxic 83% on RA. Elevated HR, elevated WBC count. CXR showing right base PNA. Pt placed on O2, started on IV Abx and admitted.      Past Medical History:          Diagnosis Date    Anxiety disorder     Arthritis     Asthma     Back pain, chronic     Blood circulation, collateral     4 toes partial amputee on L foot    [employer] Bi[employer] ICD 7/1/2014   [facility] Scientific BiV pacemaker 7/1/2014    CAD (coronary artery disease)     CHF (congestive heart failure) (HCC)     Chronic kidney disease     COPD (chronic obstructive pulmonary disease) (HCC)     Depression     Endocarditis     Hepatitis     HEPATITIS C    Hepatitis C     Hypertension     Pneumonia        Past Surgical History:          Procedure Laterality Date    BRONCHOSCOPY N/A 9/28/2019 pantoprazole (PROTONIX) 40 MG tablet Take 40 mg by mouth daily    Historical Provider, MD   warfarin (COUMADIN) 5 MG tablet Take 5 mg by mouth    Historical Provider, MD   tamsulosin (FLOMAX) 0.4 MG capsule Take 0.4 mg by mouth daily    Historical Provider, MD   VILAZODONE HCL 10 MG Tablet  19   Historical Provider, MD   albuterol (PROVENTIL) (2.5 MG/3ML) 0.083% nebulizer solution Take 6 mLs by nebulization every 4 hours as needed for Wheezing or Shortness of Breath 10/19/19   Ildefonso Palomares MD   gabapentin (NEURONTIN) 100 MG capsule Take 2 capsules by mouth 3 times daily for 4 days. 10/19/19 12/3/19  Naser Lennox Reins, MD   QUEtiapine (SEROQUEL) 100 MG tablet 1 tablet by Per NG tube route nightly 10/19/19   Ildefonso Palomares MD   furosemide (LASIX) 40 MG tablet Take 1 tablet by mouth daily 10/20/19   Ildefonso Palomares MD   busPIRone (BUSPAR) 30 MG tablet Take 30 mg by mouth 2 times daily     Historical Provider, MD   aspirin EC 81 MG EC tablet Take 1 tablet by mouth daily 10/11/18   Julienne Lara MD   buprenorphine-naloxone (SUBOXONE) 8-2 MG FILM SL film Place 1 Film under the tongue daily. Angela Villela     Historical Provider, MD       Allergies:  Pcn [penicillins]    Social History:     Social History     Socioeconomic History    Marital status:      Spouse name: Not on file    Number of children: 3    Years of education: 9    Highest education level: Not on file   Occupational History    Occupation: on disability   Social Needs    Financial resource strain: Not on file    Food insecurity:     Worry: Not on file     Inability: Not on file   Private Practice needs:     Medical: Not on file     Non-medical: Not on file   Tobacco Use    Smoking status: Current Some Day Smoker     Packs/day: 1.00     Years: 37.00     Pack years: 37.00     Types: Cigarettes     Start date: 10/22/1976     Last attempt to quit: 2019     Years since quittin.4    Smokeless tobacco: Former User    Tobacco comment: pt

## 2020-02-20 NOTE — PROGRESS NOTES
02/20/20  0030   CREATININE 1.0       Recent Labs     02/19/20  2345   WBC 13.6*         Intake/Output Summary (Last 24 hours) at 2/20/2020 5870  Last data filed at 2/20/2020 0529  Gross per 24 hour   Intake 2050 ml   Output --   Net 2050 ml       Culture Date      Source                       Results  2/19/20                Blood x2                      Sent    Ht Readings from Last 1 Encounters:   02/19/20 5' 9\" (1.753 m)        Wt Readings from Last 1 Encounters:   02/19/20 235 lb (106.6 kg)         Body mass index is 34.7 kg/m². Estimated Creatinine Clearance: 97 mL/min (based on SCr of 1 mg/dL). Goal Trough Level: 15 mcg/mL    Assessment/Plan:  Will initiate vancomycin 1500 mg IV every 12 hours. Timing of trough level will be determined based on culture results, renal function, and clinical response. Will plan on checking a trough prior to the 4th dose. Not ordered at this time. Thank you for the consult. Will continue to follow and monitor.    Neema Cordova, PharmD 2/20/2020 6:53 AM

## 2020-02-20 NOTE — CARE COORDINATION
2/20/20, 9:07 AM  DISCHARGE PLANNING EVALUATION:    Megan Gutierrez       Admitted from: ER, patient presents with cough, wheezing, and shortness of breath. 2/19/2020/ 2318 Hospital day: 0   Location: -05/005-A Reason for admit: Pneumonia [J18.9] Status: Inpt. Admit order signed?: yes  PMH:  has a past medical history of Anxiety disorder, Arthritis, Asthma, Back pain, chronic, Blood circulation, collateral, [employer] BiV ICD, [employer] BiV pacemaker, CAD (coronary artery disease), CHF (congestive heart failure) ([facility] [address] Chronic kidney disease, COPD (chronic obstructive pulmonary disease) ([facility] [address] Depression, Endocarditis, Hepatitis, Hepatitis C, Hypertension, and Pneumonia. Procedure: N/A  Pertinent abnormal Imaging:Chest x-ray: Small pneumonia or atelectasis at the right base. Medications:  Scheduled Meds:   amLODIPine  5 mg Oral Daily    aspirin EC  81 mg Oral Daily    buprenorphine-naloxone  1 Film Sublingual Daily    busPIRone  30 mg Oral BID    pantoprazole  40 mg Oral Daily    QUEtiapine  100 mg Per NG tube Nightly    tamsulosin  0.4 mg Oral Daily    cefTRIAXone (ROCEPHIN) IV  1 g Intravenous Q24H    azithromycin  500 mg Intravenous Q24H    methylPREDNISolone  40 mg Intravenous Daily    ipratropium-albuterol  1 ampule Inhalation Q4H WA    sodium chloride flush  10 mL Intravenous 2 times per day    nicotine  1 patch Transdermal Daily    warfarin (COUMADIN) daily dosing (placeholder)   Other RX Placeholder     Continuous Infusions:   sodium chloride        Pertinent Info/Orders/Treatment Plan:  Pulmonology consult, Zosyn x 1 dose, IV Zithromax, Rocephin, Duoneb nebulizer, Solu-Medrol, Nicotine patch, Coumadin per pharmacy dosing, prn Tylenol, Zofran, Phenergan, and Glycolax, daily INR's, BMP, CBC, and Magnesium level in a.m., oxygen, telemetry, up with assistance. Diet: DIET CARDIAC;   Smoking status:  reports that he has been smoking cigarettes.  He started smoking about 44

## 2020-02-20 NOTE — ED NOTES
Pt transported to [facility] by cart in stable condition. Pt on 4LPM O2 via Nasal Cannula. Called [facility] and informed Ken Samuels that the patient was on their way to the unit.         Babs Stern LPN  60/57/69 [id number]

## 2020-02-20 NOTE — PLAN OF CARE
Problem: Falls - Risk of:  Goal: Absence of physical injury  Description  Absence of physical injury  Outcome: Met This Shift   Bd alarm and chair alarm utilized  Problem: Falls - Risk of:  Goal: Will remain free from falls  Description  Will remain free from falls  Outcome: Ongoing   Gait steady, up with sba to the bathroom. Problem: Physical Regulation:  Goal: Prevent transmision of infection  Description  Prevent transmision of infection  Outcome: Ongoing   Contact precautions continued. No cough noted. Problem: Discharge Planning:  Goal: Participates in care planning  Description  Participates in care planning  Outcome: Ongoing     Problem: Discharge Planning:  Goal: Discharged to appropriate level of care  Description  Discharged to appropriate level of care  Outcome: Ongoing   Planning home on discharge. Problem: Activity Intolerance:  Goal: Ability to tolerate increased activity will improve  Description  Ability to tolerate increased activity will improve  Outcome: Ongoing   Able to go to the bathroom without the02. Soa2 in the high 90's and 02 has been decreased to 2lpm.  Care plan reviewed with patient and family. Patient and family verbalize understanding of the plan of care and contribute to goal setting.

## 2020-02-20 NOTE — ED NOTES
IV ATB started. Pt medicated and educated. Vitals updated. Pt provided ice chips. Pt denies needs at this time.       Travon Jackman RN  02/20/20 0124

## 2020-02-21 LAB
ANION GAP SERPL CALCULATED.3IONS-SCNC: 11 MEQ/L (ref 8–16)
BASOPHILS # BLD: 0.1 %
BASOPHILS ABSOLUTE: 0 THOU/MM3 (ref 0–0.1)
BUN BLDV-MCNC: 16 MG/DL (ref 7–22)
CALCIUM SERPL-MCNC: 9.1 MG/DL (ref 8.5–10.5)
CHLORIDE BLD-SCNC: 104 MEQ/L (ref 98–111)
CO2: 20 MEQ/L (ref 23–33)
CREAT SERPL-MCNC: 0.9 MG/DL (ref 0.4–1.2)
EOSINOPHIL # BLD: 0 %
EOSINOPHILS ABSOLUTE: 0 THOU/MM3 (ref 0–0.4)
ERYTHROCYTE [DISTWIDTH] IN BLOOD BY AUTOMATED COUNT: 15.9 % (ref 11.5–14.5)
ERYTHROCYTE [DISTWIDTH] IN BLOOD BY AUTOMATED COUNT: 50.6 FL (ref 35–45)
GFR SERPL CREATININE-BSD FRML MDRD: 87 ML/MIN/1.73M2
GLUCOSE BLD-MCNC: 216 MG/DL (ref 70–108)
HCT VFR BLD CALC: 40.5 % (ref 42–52)
HEMOGLOBIN: 12.4 GM/DL (ref 14–18)
IMMATURE GRANS (ABS): 0.06 THOU/MM3 (ref 0–0.07)
IMMATURE GRANULOCYTES: 0.6 %
INR BLD: 1.95 (ref 0.85–1.13)
LYMPHOCYTES # BLD: 13.1 %
LYMPHOCYTES ABSOLUTE: 1.4 THOU/MM3 (ref 1–4.8)
MAGNESIUM: 2.3 MG/DL (ref 1.6–2.4)
MCH RBC QN AUTO: 26.7 PG (ref 26–33)
MCHC RBC AUTO-ENTMCNC: 30.6 GM/DL (ref 32.2–35.5)
MCV RBC AUTO: 87.3 FL (ref 80–94)
MONOCYTES # BLD: 7.4 %
MONOCYTES ABSOLUTE: 0.8 THOU/MM3 (ref 0.4–1.3)
NUCLEATED RED BLOOD CELLS: 0 /100 WBC
PLATELET # BLD: 152 THOU/MM3 (ref 130–400)
PMV BLD AUTO: 10.7 FL (ref 9.4–12.4)
POTASSIUM SERPL-SCNC: 4.5 MEQ/L (ref 3.5–5.2)
RBC # BLD: 4.64 MILL/MM3 (ref 4.7–6.1)
SEG NEUTROPHILS: 78.8 %
SEGMENTED NEUTROPHILS ABSOLUTE COUNT: 8.4 THOU/MM3 (ref 1.8–7.7)
SODIUM BLD-SCNC: 135 MEQ/L (ref 135–145)
WBC # BLD: 10.7 THOU/MM3 (ref 4.8–10.8)

## 2020-02-21 PROCEDURE — 85610 PROTHROMBIN TIME: CPT

## 2020-02-21 PROCEDURE — 36415 COLL VENOUS BLD VENIPUNCTURE: CPT

## 2020-02-21 PROCEDURE — 99233 SBSQ HOSP IP/OBS HIGH 50: CPT | Performed by: INTERNAL MEDICINE

## 2020-02-21 PROCEDURE — 80048 BASIC METABOLIC PNL TOTAL CA: CPT

## 2020-02-21 PROCEDURE — 87147 CULTURE TYPE IMMUNOLOGIC: CPT

## 2020-02-21 PROCEDURE — 6370000000 HC RX 637 (ALT 250 FOR IP): Performed by: INTERNAL MEDICINE

## 2020-02-21 PROCEDURE — 87070 CULTURE OTHR SPECIMN AEROBIC: CPT

## 2020-02-21 PROCEDURE — 1200000003 HC TELEMETRY R&B

## 2020-02-21 PROCEDURE — 85025 COMPLETE CBC W/AUTO DIFF WBC: CPT

## 2020-02-21 PROCEDURE — 2700000000 HC OXYGEN THERAPY PER DAY

## 2020-02-21 PROCEDURE — 83735 ASSAY OF MAGNESIUM: CPT

## 2020-02-21 PROCEDURE — 99232 SBSQ HOSP IP/OBS MODERATE 35: CPT | Performed by: FAMILY MEDICINE

## 2020-02-21 PROCEDURE — 87077 CULTURE AEROBIC IDENTIFY: CPT

## 2020-02-21 PROCEDURE — 6370000000 HC RX 637 (ALT 250 FOR IP)

## 2020-02-21 PROCEDURE — 87205 SMEAR GRAM STAIN: CPT

## 2020-02-21 PROCEDURE — 6360000002 HC RX W HCPCS: Performed by: INTERNAL MEDICINE

## 2020-02-21 PROCEDURE — APPSS30 APP SPLIT SHARED TIME 16-30 MINUTES: Performed by: NURSE PRACTITIONER

## 2020-02-21 PROCEDURE — 6370000000 HC RX 637 (ALT 250 FOR IP): Performed by: FAMILY MEDICINE

## 2020-02-21 PROCEDURE — 94761 N-INVAS EAR/PLS OXIMETRY MLT: CPT

## 2020-02-21 PROCEDURE — 6360000002 HC RX W HCPCS: Performed by: NURSE PRACTITIONER

## 2020-02-21 PROCEDURE — 2709999900 HC NON-CHARGEABLE SUPPLY

## 2020-02-21 PROCEDURE — 2580000003 HC RX 258: Performed by: INTERNAL MEDICINE

## 2020-02-21 PROCEDURE — 87186 SC STD MICRODIL/AGAR DIL: CPT

## 2020-02-21 PROCEDURE — 94640 AIRWAY INHALATION TREATMENT: CPT

## 2020-02-21 RX ORDER — PRENATAL VIT 91/IRON/FOLIC/DHA 28-975-200
COMBINATION PACKAGE (EA) ORAL 2 TIMES DAILY
Status: DISCONTINUED | OUTPATIENT
Start: 2020-02-21 | End: 2020-02-27 | Stop reason: HOSPADM

## 2020-02-21 RX ORDER — WARFARIN SODIUM 4 MG/1
4 TABLET ORAL ONCE
Status: COMPLETED | OUTPATIENT
Start: 2020-02-21 | End: 2020-02-21

## 2020-02-21 RX ORDER — PREDNISONE 20 MG/1
40 TABLET ORAL DAILY
Status: COMPLETED | OUTPATIENT
Start: 2020-02-22 | End: 2020-02-24

## 2020-02-21 RX ORDER — METHYLPREDNISOLONE SODIUM SUCCINATE 40 MG/ML
40 INJECTION, POWDER, LYOPHILIZED, FOR SOLUTION INTRAMUSCULAR; INTRAVENOUS EVERY 12 HOURS
Status: COMPLETED | OUTPATIENT
Start: 2020-02-21 | End: 2020-02-21

## 2020-02-21 RX ADMIN — IPRATROPIUM BROMIDE AND ALBUTEROL SULFATE 1 AMPULE: .5; 3 SOLUTION RESPIRATORY (INHALATION) at 11:54

## 2020-02-21 RX ADMIN — PANTOPRAZOLE SODIUM 40 MG: 40 TABLET, DELAYED RELEASE ORAL at 09:11

## 2020-02-21 RX ADMIN — IPRATROPIUM BROMIDE AND ALBUTEROL SULFATE 1 AMPULE: .5; 3 SOLUTION RESPIRATORY (INHALATION) at 15:41

## 2020-02-21 RX ADMIN — BUSPIRONE HYDROCHLORIDE 30 MG: 10 TABLET ORAL at 09:10

## 2020-02-21 RX ADMIN — ACETAMINOPHEN 650 MG: 325 TABLET ORAL at 10:03

## 2020-02-21 RX ADMIN — TAMSULOSIN HYDROCHLORIDE 0.4 MG: 0.4 CAPSULE ORAL at 09:11

## 2020-02-21 RX ADMIN — TERBINAFINE HYDROCHLORIDE: 1 CREAM TOPICAL at 18:21

## 2020-02-21 RX ADMIN — METHYLPREDNISOLONE SODIUM SUCCINATE 40 MG: 40 INJECTION, POWDER, FOR SOLUTION INTRAMUSCULAR; INTRAVENOUS at 09:11

## 2020-02-21 RX ADMIN — METHYLPREDNISOLONE SODIUM SUCCINATE 40 MG: 40 INJECTION, POWDER, FOR SOLUTION INTRAMUSCULAR; INTRAVENOUS at 21:17

## 2020-02-21 RX ADMIN — ASPIRIN 81 MG: 81 TABLET ORAL at 09:11

## 2020-02-21 RX ADMIN — AZITHROMYCIN MONOHYDRATE 500 MG: 500 INJECTION, POWDER, LYOPHILIZED, FOR SOLUTION INTRAVENOUS at 12:13

## 2020-02-21 RX ADMIN — IPRATROPIUM BROMIDE AND ALBUTEROL SULFATE 1 AMPULE: .5; 3 SOLUTION RESPIRATORY (INHALATION) at 07:58

## 2020-02-21 RX ADMIN — WARFARIN SODIUM 4 MG: 4 TABLET ORAL at 19:22

## 2020-02-21 RX ADMIN — BUPRENORPHINE HYDROCHLORIDE, NALOXONE HYDROCHLORIDE 1 FILM: 8; 2 FILM, SOLUBLE BUCCAL; SUBLINGUAL at 09:11

## 2020-02-21 RX ADMIN — CEFTRIAXONE SODIUM 1 G: 1 INJECTION, POWDER, FOR SOLUTION INTRAMUSCULAR; INTRAVENOUS at 10:06

## 2020-02-21 RX ADMIN — AMLODIPINE BESYLATE 5 MG: 5 TABLET ORAL at 09:10

## 2020-02-21 RX ADMIN — BUSPIRONE HYDROCHLORIDE 30 MG: 10 TABLET ORAL at 21:16

## 2020-02-21 RX ADMIN — IPRATROPIUM BROMIDE AND ALBUTEROL SULFATE 1 AMPULE: .5; 3 SOLUTION RESPIRATORY (INHALATION) at 20:18

## 2020-02-21 RX ADMIN — QUETIAPINE FUMARATE 100 MG: 100 TABLET ORAL at 21:16

## 2020-02-21 ASSESSMENT — PAIN SCALES - GENERAL
PAINLEVEL_OUTOF10: 3
PAINLEVEL_OUTOF10: 1

## 2020-02-21 ASSESSMENT — PAIN DESCRIPTION - LOCATION: LOCATION: CHEST

## 2020-02-21 ASSESSMENT — PAIN DESCRIPTION - PAIN TYPE: TYPE: ACUTE PAIN

## 2020-02-21 NOTE — PLAN OF CARE
Problem: Falls - Risk of:  Goal: Will remain free from falls  Description  Will remain free from falls  2/21/2020 0205 by Pari Griffin RN  Outcome: Ongoing  Note:   Fall assessment complete. No falls noted this shift. Bed alarm on, bed in low position, call light and personal items in reach. Nonskid socks on. Will continue to monitor. Problem: Physical Regulation:  Goal: Prevent transmision of infection  Description  Prevent transmision of infection  2/21/2020 0205 by Pari Griffin RN  Outcome: Ongoing  Note:   Patient is in contact precautions. Proper PPE use. Proper hand washing is being used. Problem: Discharge Planning:  Goal: Participates in care planning  Description  Participates in care planning  2/21/2020 0205 by Pari Griffin RN  Outcome: Ongoing  Note:   Home at discharge. Problem: Pain:  Goal: Pain level will decrease  Description  Pain level will decrease  Outcome: Ongoing  Note:   Pain assessment complete. PRN pain medications given per physician orders. Non pharmacological pain intervention given rest and reposition. Will continue to monitor     Care plan reviewed with patient. Patient verbalize understanding of the plan of care and contribute to goal setting.

## 2020-02-21 NOTE — PROGRESS NOTES
Hospitalist Progress Note    Patient:  Vera Andre      Unit/Bed:5K-05/005-A    YOB: 1960    MRN: [id number]       Acct: [de-identified]     PCP: Harmony Atwood    Date of Admission: 2/19/2020    Assessment/Plan:    Anticipated Discharge in :     [facility] [address] Problems    Diagnosis Date Noted    Pneumonia [J18.9] 02/20/2020    Acute respiratory failure with hypoxemia ([facility] [address] [J96.01] 02/20/2020    COPD exacerbation ([facility] [address] [J44.1] 09/27/2019    Moderate COPD (chronic obstructive pulmonary disease) ([facility] [address] [J44.9] 12/12/2011     Right Basilar PNA  failed outpatient treatment with Augmentin  Has been afebrile since admission. Flu negative, respiratory culture pending  Continue IV Rocephin and Azithromycin, shift to p.o. on discharge     Acute COPD Exacerbation  Patient started on start IV Solumedrol 40 qd + duo-nebs. Switch to prednisone tomorrow  Inhalers on discharge  Pulmonology consulted     Acute hypoxic respiratory failure  2/2 to #1 and #2.   83% on RA on initial presentation. He was started on 4 LPM O2 per nasal cannula, wean as tolerated  Home O2 prior to discharge     Sepsis (POA)  febrile, tachycardic, elevated WBC count, with chest x-ray result of right lower lobe pneumonia   Treatment as above  No growth on blood culture to date     Paroxysmal Atrial Fibrillation  cont coumadin and BB. Monitor on telemetry. Mild non-AG metabolic acidosis  Continue to monitor for now     Factor V Leiden  history of DVTs. Cont Coumadin, Pharmacy to dose.      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 Has been clean for over 8 years. Normal bioprosthetic function 2018 TTE ( 33 mm CE, ThermaFix valve).    Chronic Diastolic Heart Failure  no evidence of fluid overload. D Echo 9/2019 EF 55% with preserved EF.   Cont home medications.        Chief Complaint:  Shortness of breath    Hospital Course:   Patient is 63 y.o. male who presented to [address] [place] [address] with complaints of fevers, chills, productive cough and sob. Pt has a pmh of A. Fib, Factor V Leiden, Hepatitis C, Tricuspid Valve Endocarditis, history of IVDA, CKD, and COPD.      Pt reports to have had symptoms of fatigue, chills, productive cough, and sob over the past week. Was seen by his PCP, placed on Augmentin. Pt continued to have progression of his symptoms despite Abx therapy causing him to come to the ED. Pt denies any sick contacts. Reports being very sob with minimal activity. Pt continues to smoke cigarettes. Pt denies any nausea, vomiting, diarrhea, chest pain, abdominal pain, black or red colored stools, burning with urination, numbness or weakness.     In the ED, pt hypoxic 83% on RA. Elevated HR, elevated WBC count. CXR showing right base PNA. Pt placed on O2, started on IV Abx and admitted. Subjective:   Patient seen and examined. He feels a lot better. He is saturating well on 1 LPM per nasal cannula. Denies any chest pain, palpitations, nausea or vomiting. Vital signs stable, no new complaints.     Medications:  Reviewed    Infusion Medications   Scheduled Medications    warfarin  4 mg Oral Once    terbinafine   Topical BID    methylPREDNISolone  40 mg Intravenous Q12H    [START ON 2/22/2020] predniSONE  40 mg Oral Daily    amLODIPine  5 mg Oral Daily    aspirin EC  81 mg Oral Daily    buprenorphine-naloxone  1 Film Sublingual Daily    busPIRone  30 mg Oral BID    pantoprazole  40 mg Oral Daily    QUEtiapine  100 mg Per NG tube Nightly    tamsulosin  0.4 mg Oral Daily    cefTRIAXone (ROCEPHIN) IV  1 g Intravenous Q24H    azithromycin  500 mg Intravenous Q24H    ipratropium-albuterol  1 ampule Inhalation Q4H WA    sodium chloride flush  10 mL Intravenous 2 times per day    nicotine  1 patch Transdermal Daily    warfarin (COUMADIN) daily dosing (placeholder)   Other RX Placeholder     PRN Meds: sodium chloride flush, acetaminophen, acetaminophen, polyethylene glycol, promethazine, ondansetron      Intake/Output Summary (Last 24 hours) at 2/21/2020 1539  Last data filed at 2/21/2020 1450  Gross per 24 hour   Intake 3421.6 ml   Output --   Net 3421.6 ml       Diet:  DIET CARDIAC; Exam:  /70   Pulse 90   Temp 97.9 °F (36.6 °C) (Oral)   Resp 18   Ht 5' 9\" (1.753 m)   Wt 235 lb (106.6 kg)   SpO2 95%   BMI 34.70 kg/m²     General appearance: No apparent distress, appears stated age and cooperative. HEENT: Pupils equal, round, and reactive to light. Conjunctivae/corneas clear. Neck: Supple, with full range of motion. No jugular venous distention. Trachea midline. Respiratory:  Normal respiratory effort. Diffuse rhonchi noted with occasional wheezing, currently on 1 LPM per nasal cannula  Cardiovascular: Regular rate and rhythm with normal S1/S2 without murmurs, rubs or gallops. Abdomen: Soft, non-tender, non-distended with normal bowel sounds. Musculoskeletal: passive and active ROM x 4 extremities. Skin: Chronic skin changes and hyperpigmentation on both lower extremities   Neurologic:  Neurovascularly intact without any focal sensory/motor deficits. Cranial nerves: II-XII intact, grossly non-focal.  Psychiatric: Alert and oriented, thought content appropriate, normal insight  Capillary Refill: Brisk,< 3 seconds   Peripheral Pulses: +2 palpable, equal bilaterally       Labs:   Recent Labs     02/19/20 2345 02/21/20  0544   WBC 13.6* 10.7   HGB 14.1 12.4*   HCT 45.3 40.5*    152     Recent Labs     02/20/20  0030 02/21/20  0544    135   K 3.9 4.5    104   CO2 25 20*   BUN 10 16   CREATININE 1.0 0.9   CALCIUM 9.5 9.1     Recent Labs     02/20/20  0030   AST 60*   *   BILIDIR <0.2   BILITOT 0.5   ALKPHOS 84     Recent Labs     02/19/20  2345 02/21/20  0544   INR 2.52* 1.95*     No results for input(s): CKTOTAL, TROPONINI in the last 72 hours.     Urinalysis:      Lab Results   Component Value Date    NITRU NEGATIVE 02/20/2020    WBCUA 0-2 11/27/2019    WBCUA >100 10/23/2019    BACTERIA NONE SEEN 11/27/2019    RBCUA 5-10 11/27/2019    BLOODU NEGATIVE 02/20/2020    SPECGRAV 1.006 03/14/2014    GLUCOSEU 100 02/20/2020       Radiology:  XR CHEST STANDARD (2 VW)   Final Result      Small pneumonia or atelectasis at the right base. **This report has been created using voice recognition software. It may contain minor errors which are inherent in voice recognition technology. **      Final report electronically signed by Dr. Lory Laws on 2/20/2020 12:19 AM          Diet: DIET CARDIAC;    DVT prophylaxis: [] Lovenox                                 [] SCDs                                 [] SQ Heparin                                 [] Encourage ambulation           [] Already on Anticoagulation     Disposition:    [] Home       [] TCU       [] Rehab       [] Psych       [] SNF       [] [place]       [] Other-    Code Status: Full Code    PT/OT Eval Status:       Electronically signed by Gaye Lefort, MD on 2/21/2020 at 3:39 PM

## 2020-02-21 NOTE — FLOWSHEET NOTE
02/21/20 1255   Encounter Summary   Services provided to: Patient   Referral/Consult From: Thien   Continue Visiting Yes  (2/21/2020 NR)   Complexity of Encounter Low   Length of Encounter 15 minutes   Routine   Type Initial   Assessment Unable to respond   Intervention Prayer   During my contact with the 63 yr old patient, they were unable to respond/  resting and no family members was present at the time. The pt is coping with a acute respiratory failure. I said a prayer of peace, comfort and healing for the pt. I also placed a card in the pts room in the event that the pt or family needed additional spiritual care and support.

## 2020-02-21 NOTE — PROGRESS NOTES
Clinical Pharmacy Note    Warfarin consult follow-up    Recent Labs     02/21/20  0544   INR 1.95*     Recent Labs     02/19/20  2345 02/21/20  0544   HGB 14.1 12.4*   HCT 45.3 40.5*    152       Significant Drug-Drug Interactions:  New warfarin drug-drug interactions: None  Discontinued drug-drug interactions: None  Current warfarin drug-drug interactions: aspirin, azithromycin, methylprednisolone        Date INR Warfarin Dose   2/20/2020 2.52 (from 2/19/20) 5 mg   2/21/2020  1.95   4 mg                                              Notes:                     Daily PT/INR until stable within therapeutic range.      Randy Diaz, PharmD, BCPS  2/21/2020  9:54 AM

## 2020-02-21 NOTE — PROGRESS NOTES
TRACHEOTOMY PERCUTANEOUS BRONCHOSCOPY performed by Brent Bundy MD at [address] [facility] ENDOSCOPY  2012     Meds    Current Medications    warfarin  4 mg Oral Once    terbinafine   Topical BID    amLODIPine  5 mg Oral Daily    aspirin EC  81 mg Oral Daily    buprenorphine-naloxone  1 Film Sublingual Daily    busPIRone  30 mg Oral BID    pantoprazole  40 mg Oral Daily    QUEtiapine  100 mg Per NG tube Nightly    tamsulosin  0.4 mg Oral Daily    cefTRIAXone (ROCEPHIN) IV  1 g Intravenous Q24H    azithromycin  500 mg Intravenous Q24H    methylPREDNISolone  40 mg Intravenous Daily    ipratropium-albuterol  1 ampule Inhalation Q4H WA    sodium chloride flush  10 mL Intravenous 2 times per day    nicotine  1 patch Transdermal Daily    warfarin (COUMADIN) daily dosing (placeholder)   Other RX Placeholder     sodium chloride flush, acetaminophen, acetaminophen, polyethylene glycol, promethazine, ondansetron  IV Drips/Infusions    Home Medications  Medications Prior to Admission: albuterol sulfate HFA (PROAIR HFA) 108 (90 Base) MCG/ACT inhaler, Inhale 2 puffs into the lungs every 6 hours as needed for Wheezing or Shortness of Breath  potassium chloride (KLOR-CON M) 20 MEQ extended release tablet, Take 1 tablet by mouth 2 times daily  amLODIPine (NORVASC) 5 MG tablet, Take 1 tablet by mouth daily Take 5mg  By mouth qd  lisinopril (PRINIVIL;ZESTRIL) 2.5 MG tablet, Take 1 tablet by mouth daily  pantoprazole (PROTONIX) 40 MG tablet, Take 40 mg by mouth daily  warfarin (COUMADIN) 5 MG tablet, Take 5 mg by mouth  tamsulosin (FLOMAX) 0.4 MG capsule, Take 0.4 mg by mouth daily  VILAZODONE HCL 10 MG Tablet,   albuterol (PROVENTIL) (2.5 MG/3ML) 0.083% nebulizer solution, Take 6 mLs by nebulization every 4 hours as needed for Wheezing or Shortness of Breath  QUEtiapine (SEROQUEL) 100 MG tablet, 1 tablet by Per NG tube route nightly  furosemide (LASIX) 40 MG tablet, Take 1 tablet by mouth 235 lb (106.6 kg)       Exam   Physical Exam   Constitutional: No distress on 1 LPM O2 per NC. Patient appears moderately built and  moderately nourished. Head: Normocephalic and atraumatic. Mouth/Throat: Oropharynx is clear and moist.  No oral thrush. Eyes: Conjunctivae are normal. Pupils are equal, round. No scleral icterus. Neck: Neck supple. No tracheal deviation present. Cardiovascular: S1 and S2 with no murmur. No peripheral edema  Pulmonary/Chest: Normal effort with bilateral air entry, bilateral wheeze/rhonchi. No stridor. No respiratory distress. Patient exhibits no tenderness. Abdominal: Soft. Bowel sounds audible. No distension or tenderness to palp. Musculoskeletal: Moves all extremities  Neurological: Patient is alert and oriented to person, place, and time. Skin: Chronic skin changes noted to BLE  Labs  - Old records and notes have been reviewed in [facility]   ABG  Lab Results   Component Value Date    PH 7.41 11/27/2019    PO2 71 11/27/2019    PCO2 42 11/27/2019    HCO3 27 11/27/2019    O2SAT 94 11/27/2019     Lab Results   Component Value Date    IFIO2 60 10/10/2019    MODE AC 09/29/2019    SETTIDVOL 350 09/29/2019    SETPEEP 6.0 10/10/2019     CBC  Recent Labs     02/19/20  2345 02/21/20  0544   WBC 13.6* 10.7   RBC 5.14 4.64*   HGB 14.1 12.4*   HCT 45.3 40.5*   MCV 88.1 87.3   MCH 27.4 26.7   MCHC 31.1* 30.6*    152   MPV 10.1 10.7      BMP  Recent Labs     02/20/20  0030 02/21/20  0544    135   K 3.9 4.5    104   CO2 25 20*   BUN 10 16   CREATININE 1.0 0.9   GLUCOSE 141* 216*   MG 1.8 2.3   CALCIUM 9.5 9.1     LFT  Recent Labs     02/20/20  0030   AST 60*   *   BILITOT 0.5   ALKPHOS 84   LIPASE 23.5     TROP  Lab Results   Component Value Date    TROPONINT < 0.010 02/20/2020    TROPONINT < 0.010 11/27/2019    TROPONINT < 0.010 11/27/2019     BNP  No results for input(s): BNP in the last 72 hours.   Lactic Acid  No results for input(s): LACTA in the last 72 2/2 endocarditis  -Chronic diastolic CHF  -Tobacco use  -Factor V Leiden  -Hx of afib  -CKD  -Hx of IV drug abuse, on suboxone    Plan   -Continue on azithromycin and Rocephin  -Continue Solumedrol 40mg IV BID, transition to prednisone 40 mg PO daily in AM for total 5 days therapy  -Wean oxygen to keep SPO2>90%  -Continue Duonebs  - DVT prophylaxis with warfarin  -Discussed with patient smoking cessation techniques including patches and gum patient reports has used in the past and did help, reinforced to patient the importance of quitting smoking to prevent further damage to lung function patient verbalized understanding. Using NRT   -Will plan for LAMA/LABA at discharge order for anoro placed to outpatient pharmacy  -At time of discharge will have plan keep scheduled follow-up with Dr. Farida Rice on 4/20/2020 with LDCT and PFT on 4/14/2020     Case discussed with nurse and patient/family. Questions and concerns addressed. Electronically signed by   TAMMI Bryan CNP on 2/21/2020 at 12:32 PM     Addendum by Dr. Farida Rice MD:  I have seen and examined the patient independently. Face to face evaluation and examination was performed. The above evaluation and note has been reviewed. Labs and radiographs were reviewed. I Have discussed with Mr. David Akins CNP about this patient in detail. The above assessment and plan has been reviewed. Please see my modifications mentioned below. My modifications:  He is on 2LPM via nasal cannula. Improving shortness of breath. No chest pain. Follow up as above. Home O2 eval at the time of discharge.     Jem Greenberg MD 2/21/2020 1:39 PM

## 2020-02-21 NOTE — CARE COORDINATION
2/21/20, 2:16 PM    DISCHARGE PLANNING EVALUATION    Received call from [place]ree Police with [facility] ([facility]-they have a script for [facility] for patient-he has insurance however, there is a $47.00 co-pay. She spoke with patient and he asked about getting it tomorrow. She is concerned that he might not have the funds to pay for it. SW spoke with patient-as soon as SW mentioned call from pharmacy, patient stated that he did not have the funds to pay for it. SW advised him that co-pay amount would be [place] under [place] [phone] appreciative. Voucher tubed to [facility].

## 2020-02-22 LAB — INR BLD: 1.78 (ref 0.85–1.13)

## 2020-02-22 PROCEDURE — 99232 SBSQ HOSP IP/OBS MODERATE 35: CPT | Performed by: FAMILY MEDICINE

## 2020-02-22 PROCEDURE — 2580000003 HC RX 258: Performed by: INTERNAL MEDICINE

## 2020-02-22 PROCEDURE — 6360000002 HC RX W HCPCS: Performed by: NURSE PRACTITIONER

## 2020-02-22 PROCEDURE — 6370000000 HC RX 637 (ALT 250 FOR IP): Performed by: INTERNAL MEDICINE

## 2020-02-22 PROCEDURE — 6370000000 HC RX 637 (ALT 250 FOR IP): Performed by: FAMILY MEDICINE

## 2020-02-22 PROCEDURE — 6360000002 HC RX W HCPCS: Performed by: INTERNAL MEDICINE

## 2020-02-22 PROCEDURE — 99232 SBSQ HOSP IP/OBS MODERATE 35: CPT | Performed by: INTERNAL MEDICINE

## 2020-02-22 PROCEDURE — 2580000003 HC RX 258: Performed by: NURSE PRACTITIONER

## 2020-02-22 PROCEDURE — 1200000003 HC TELEMETRY R&B

## 2020-02-22 PROCEDURE — 2700000000 HC OXYGEN THERAPY PER DAY

## 2020-02-22 PROCEDURE — 85610 PROTHROMBIN TIME: CPT

## 2020-02-22 PROCEDURE — 6370000000 HC RX 637 (ALT 250 FOR IP): Performed by: NURSE PRACTITIONER

## 2020-02-22 PROCEDURE — 94640 AIRWAY INHALATION TREATMENT: CPT

## 2020-02-22 PROCEDURE — 94760 N-INVAS EAR/PLS OXIMETRY 1: CPT

## 2020-02-22 PROCEDURE — 36415 COLL VENOUS BLD VENIPUNCTURE: CPT

## 2020-02-22 RX ORDER — FUROSEMIDE 20 MG/1
20 TABLET ORAL 2 TIMES DAILY
Status: COMPLETED | OUTPATIENT
Start: 2020-02-22 | End: 2020-02-23

## 2020-02-22 RX ORDER — GUAIFENESIN 600 MG/1
600 TABLET, EXTENDED RELEASE ORAL 2 TIMES DAILY
Status: DISCONTINUED | OUTPATIENT
Start: 2020-02-22 | End: 2020-02-27 | Stop reason: HOSPADM

## 2020-02-22 RX ORDER — WARFARIN SODIUM 3 MG/1
6 TABLET ORAL
Status: COMPLETED | OUTPATIENT
Start: 2020-02-22 | End: 2020-02-22

## 2020-02-22 RX ADMIN — ACETAMINOPHEN 650 MG: 325 TABLET ORAL at 09:38

## 2020-02-22 RX ADMIN — FUROSEMIDE 20 MG: 20 TABLET ORAL at 21:44

## 2020-02-22 RX ADMIN — ASPIRIN 81 MG: 81 TABLET ORAL at 09:38

## 2020-02-22 RX ADMIN — GUAIFENESIN 600 MG: 600 TABLET, EXTENDED RELEASE ORAL at 16:03

## 2020-02-22 RX ADMIN — BUSPIRONE HYDROCHLORIDE 30 MG: 10 TABLET ORAL at 20:18

## 2020-02-22 RX ADMIN — IPRATROPIUM BROMIDE AND ALBUTEROL SULFATE 1 AMPULE: .5; 3 SOLUTION RESPIRATORY (INHALATION) at 13:25

## 2020-02-22 RX ADMIN — CEFTRIAXONE SODIUM 1 G: 1 INJECTION, POWDER, FOR SOLUTION INTRAMUSCULAR; INTRAVENOUS at 09:44

## 2020-02-22 RX ADMIN — GUAIFENESIN 600 MG: 600 TABLET, EXTENDED RELEASE ORAL at 20:18

## 2020-02-22 RX ADMIN — WARFARIN SODIUM 6 MG: 3 TABLET ORAL at 18:11

## 2020-02-22 RX ADMIN — IPRATROPIUM BROMIDE AND ALBUTEROL SULFATE 1 AMPULE: .5; 3 SOLUTION RESPIRATORY (INHALATION) at 08:27

## 2020-02-22 RX ADMIN — IPRATROPIUM BROMIDE AND ALBUTEROL SULFATE 1 AMPULE: .5; 3 SOLUTION RESPIRATORY (INHALATION) at 21:02

## 2020-02-22 RX ADMIN — BUSPIRONE HYDROCHLORIDE 30 MG: 10 TABLET ORAL at 09:38

## 2020-02-22 RX ADMIN — IPRATROPIUM BROMIDE AND ALBUTEROL SULFATE 1 AMPULE: .5; 3 SOLUTION RESPIRATORY (INHALATION) at 17:36

## 2020-02-22 RX ADMIN — AMLODIPINE BESYLATE 5 MG: 5 TABLET ORAL at 09:42

## 2020-02-22 RX ADMIN — QUETIAPINE FUMARATE 100 MG: 100 TABLET ORAL at 20:18

## 2020-02-22 RX ADMIN — PANTOPRAZOLE SODIUM 40 MG: 40 TABLET, DELAYED RELEASE ORAL at 09:30

## 2020-02-22 RX ADMIN — BUPRENORPHINE HYDROCHLORIDE, NALOXONE HYDROCHLORIDE 1 FILM: 8; 2 FILM, SOLUBLE BUCCAL; SUBLINGUAL at 09:37

## 2020-02-22 RX ADMIN — TAMSULOSIN HYDROCHLORIDE 0.4 MG: 0.4 CAPSULE ORAL at 09:30

## 2020-02-22 RX ADMIN — AZITHROMYCIN MONOHYDRATE 500 MG: 500 INJECTION, POWDER, LYOPHILIZED, FOR SOLUTION INTRAVENOUS at 11:17

## 2020-02-22 RX ADMIN — TERBINAFINE HYDROCHLORIDE: 1 CREAM TOPICAL at 14:14

## 2020-02-22 RX ADMIN — PREDNISONE 40 MG: 20 TABLET ORAL at 09:38

## 2020-02-22 ASSESSMENT — PAIN SCALES - GENERAL
PAINLEVEL_OUTOF10: 3
PAINLEVEL_OUTOF10: 3

## 2020-02-22 ASSESSMENT — PAIN DESCRIPTION - PAIN TYPE: TYPE: ACUTE PAIN

## 2020-02-22 ASSESSMENT — PAIN DESCRIPTION - LOCATION: LOCATION: CHEST

## 2020-02-22 NOTE — PROGRESS NOTES
[facility]      Patient - Jessie Munguia   MRN -  [id number]   [facility] # - [de-identified]   - 1960      Date of Admission -  2020 11:18 PM  Date of evaluation -  2020  Room - --A   Hospital Day - [address], [place] Primary Care Physician - Lisa Kennedy     Problem List      Active Hospital Problems    Diagnosis Date Noted    Pneumonia [J18.9] 2020    Acute respiratory failure with hypoxemia ([address] [J96.01] 2020    COPD exacerbation ([facility] [J44.1] 2019    Moderate COPD (chronic obstructive pulmonary disease) ([facility] [J44.9] 2011     Reason for Consult    For management of COPD and pneumonia. HPI   History Obtained From: Patient  and electronic medical record. Jessie Munguia is a 63 y.o. male who presented with a 3 week history of cough and shortness of breath. PMHx of COPD, afib, factor V leiden, Hep C, IV drug abuse on Suboxone, CKD, s/p cardiac cath with sents, and S/P tricuspid valve replacement in  endocarditis, and S/P biventricular pacer placement. He reports having cough, runny nose, post nasal drip about 3 weeks ago. He went to his PCP who gave him augmentin for 10days, which he finished on . Prior to presentation he reports worsening of his symptoms, including SOB, green sputum, body aches, fevers, chills, and chest pain that was worse with cough and deep breathing. Denies hemoptysis, nausea, vomiting, constipation, diarrhea, and reports his nasal URI symptoms have improved. In the ED CXR found right base PNA, and he was hypoxic. He was started on steroids, azithromycin, rocephin, an placed on supplemental oxygen. Pt. Reports feeling much better today stating I could barely breathe when I came in.      His outpatient regimen for his COPD consists of albuterol inhaler, Albuterol nebulizer, and he reports that he is supposed to have Spiriva but he does not take it because it is too expensive. He is scheduled for a sleep study on 4/1/2020, and is scheduled to get full PFT on the 14th of April with an office visit on the 20th. He denies history of HIV, or tuberculosis. Reports that he has been clean for several years, since starting suboxone. He reports that he is trying to quit smoking and that he is down to 6-8 cigarettes a day. Past 24 hrs   -On 1 LPM via NC  -Still having SOB. -No chest pain.  -Afebrile.   All other systems reviewed  PMHx   Past Medical History      Diagnosis Date    Anxiety disorder     Arthritis     Asthma     Back pain, chronic     Blood circulation, collateral     4 toes partial amputee on L foot    [employer] BiV ICD 7/1/2014   [facility] [employer] BiV pacemaker 7/1/2014    CAD (coronary artery disease)     CHF (congestive heart failure) (HCC)     Chronic kidney disease     COPD (chronic obstructive pulmonary disease) (HCC)     Depression     Endocarditis     Hepatitis     HEPATITIS C    Hepatitis C     Hypertension     Pneumonia       Past Surgical History        Procedure Laterality Date    BRONCHOSCOPY N/A 9/28/2019    [facility]    BRONCHOSCOPY N/A 9/28/2019    BRONCHOSCOPY performed by Kellie Turner MD at [address] N/A 9/29/2019    BRONCHOSCOPY BIOPSY BRONCHUS performed by Kellie Turner MD at [facility] [place] [facility]    BRONCHOSCOPY  9/29/2019    BRONCHOSCOPY performed by Kellie Turner MD at [facility] [place] [facility]    BRONCHOSCOPY N/A 9/30/2019    BRONCHOSCOPY ALVEOLAR LAVAGE performed by Nela Mc MD at [facility] N/A 10/16/2019    BRONCHOSCOPY ALVEOLAR LAVAGE [facility] by [facility]ela Mc [facility]  12/26/12    Valve replacement    COLONOSCOPY  2016    DEBRIDEMENT Left 01/14/2014    lt hand     HAND DEBRIDEMENT Left 01/09/2014    INCISION AND DRAINAGE    PACEMAKER PLACEMENT  12/26/13    TOE AMPUTATION Bilateral 3/13/2013    1,2,3,4 on left and 2nd on right    TRACHEAL SURGERY N/A 10/18/2019    TRACHEOTOMY PERCUTANEOUS BRONCHOSCOPY performed by Severa Solders, MD at [facility] ENDOSCOPY  2012     Meds    Current Medications    guaiFENesin  600 mg Oral BID    furosemide  20 mg Oral BID    potassium replacement protocol   Other RX Placeholder    terbinafine   Topical BID    predniSONE  40 mg Oral Daily    amLODIPine  5 mg Oral Daily    aspirin EC  81 mg Oral Daily    buprenorphine-naloxone  1 Film Sublingual Daily    busPIRone  30 mg Oral BID    pantoprazole  40 mg Oral Daily    QUEtiapine  100 mg Per NG tube Nightly    tamsulosin  0.4 mg Oral Daily    cefTRIAXone (ROCEPHIN) IV  1 g Intravenous Q24H    ipratropium-albuterol  1 ampule Inhalation Q4H WA    sodium chloride flush  10 mL Intravenous 2 times per day    nicotine  1 patch Transdermal Daily    warfarin (COUMADIN) daily dosing (placeholder)   Other RX Placeholder     sodium chloride flush, acetaminophen, acetaminophen, polyethylene glycol, promethazine, ondansetron  IV Drips/Infusions    Home Medications  Medications Prior to Admission: albuterol sulfate HFA (PROAIR HFA) 108 (90 Base) MCG/ACT inhaler, Inhale 2 puffs into the lungs every 6 hours as needed for Wheezing or Shortness of Breath  potassium chloride (KLOR-CON M) 20 MEQ extended release tablet, Take 1 tablet by mouth 2 times daily  amLODIPine (NORVASC) 5 MG tablet, Take 1 tablet by mouth daily Take 5mg  By mouth qd  lisinopril (PRINIVIL;ZESTRIL) 2.5 MG tablet, Take 1 tablet by mouth daily  pantoprazole (PROTONIX) 40 MG tablet, Take 40 mg by mouth daily  warfarin (COUMADIN) 5 MG tablet, Take 5 mg by mouth  tamsulosin (FLOMAX) 0.4 MG capsule, Take 0.4 mg by mouth daily  VILAZODONE HCL 10 MG Tablet,   albuterol (PROVENTIL) (2.5 MG/3ML) 0.083% nebulizer solution, Take 6 mLs by nebulization every 4 hours as needed for Wheezing or Shortness of Breath  QUEtiapine Relationship status: Not on file    Intimate partner violence:     Fear of current or ex partner: Not on file     Emotionally abused: Not on file     Physically abused: Not on file     Forced sexual activity: Not on file   Other Topics Concern    Not on file   Social History Narrative    Not on file     Family History          Problem Relation Age of Onset    Diabetes Mother     Depression Mother     Arthritis Father     Heart Disease Father     Diabetes Brother     Depression Daughter      Sleep History    Never diagnosed with sleep apnea in the past.  Occupational history   Occupation:  He is current working: No  Type of profession: unemployed, disabled. History of tobacco smoking:Yes  Amount of tobacco smokin.0 PPD. Years of tobacco smokin. Quit smoking: No.              Current smoker: Yes. Amount of current tobacco smokin.5 PPD  . History of recreational or IV drug use in the past:Yes     History of exposure to coal mines/coal dust: NO  History of exposure to foundry dust/welding: NO  History of exposure to quarry/silica/sandblasting: NO  History of exposure to asbestos/working with breaks/ships: NO  History of exposure to farm dust: NO  History of recent travel to long distances: NO  History of exposure to birds, pigeons, or chickens in the past:NO  Pet animals at home:No    History of pulmonary embolism in the past: No            History of DVT in the past:Yes           Vitals     height is 5' 9\" (1.753 m) and weight is 235 lb (106.6 kg). His oral temperature is 97.7 °F (36.5 °C). His blood pressure is 119/75 and his pulse is 78. His respiration is 16 and oxygen saturation is 93%. Body mass index is 34.7 kg/m².     SUPPLEMENTAL O2: O2 Flow Rate (L/min): 1 L/min     I/O        Intake/Output Summary (Last 24 hours) at 2020 1846  Last data filed at 2020 1359  Gross per 24 hour   Intake 3730.92 ml   Output 0 ml 02/19/20  2345   APTT 59.0*     Glucose  No results for input(s): POCGLU in the last 72 hours. UA   Recent Labs     02/20/20  0121   PHUR 6.5   COLORU YELLOW   PROTEINU NEGATIVE   BLOODU NEGATIVE   NITRU NEGATIVE   GLUCOSEU 100*   BILIRUBINUR NEGATIVE   UROBILINOGEN 1.0   KETUA NEGATIVE   . PFTs 2011       Sleep studies   Scheduled for sleep study 4/1/2020    Cultures    Blood culture X2 no growth preliminary  Flu A/B negative    EKG    02/19/2020   Atrial-sensed ventricular-paced rhythm  Biventricular pacemaker detected  Abnormal ECG    Echocardiogram   TTE procedure:ECHOCARDIOGRAM COMPLETE 2D W DOPPLER W COLOR.   09/28/2019   Summary   Technically difficult examination. Ejection fraction was estimated at 50-55%. Left atrial size was severely dilated. Right atrial size was moderately dilated. S/p TV replacement--unable to visualize leaflets. DOPPLER: Mean gradient   5-8 mmHg. V max = 2.1 m/s. There was trace tricuspid regurgitation. Assuming RAP = 20 mmHg, the estimated RVSP = 50 mmHg. Ascending aorta = 3.6 cm. IVC size is dilated with reduced respirophasic variation (CVP~20 mmHg)      Radiology    CXR  XR CHEST (2 VW)   2/20/2020   Small pneumonia or atelectasis at the right base. CT Scans  CTA CHEST W WO CONTRAST   11/23/2019   1. Negative exam for pulmonary embolus. 2. Stable postsurgical sternotomy with visualized tricuspid valve replacement and permanent pacemaker. 3. Underlying COPD changes with scattered areas of mild groundglass attenuation. Findings may reflect superimposed bronchitis. (See actual reports for details)    Assessment   -Right lower lobe pneumonia due to Community acquired pneumonia Vs atypical pneumonia.  He failed out patient antibiotic therapy with Augmentin for 10days  -COPD exacerbation 2/2 above  -Hx of tricuspid valve replacement with pioprosthetic valve 2/2 endocarditis  -Chronic diastolic CHF  -Tobacco use  -Factor V Leiden  -Hx of afib  -CKD  -Hx of

## 2020-02-22 NOTE — PROGRESS NOTES
Clinical Pharmacy Note    Warfarin consult follow-up    Recent Labs     02/22/20  0616   INR 1.78*     Recent Labs     02/19/20  2345 02/21/20  0544   HGB 14.1 12.4*   HCT 45.3 40.5*    152       Significant Drug-Drug Interactions:  New warfarin drug-drug interactions: prednisone  Discontinued drug-drug interactions:methylprednisolone  Current warfarin drug-drug interactions: aspirin, azithromycin        Date INR Warfarin Dose   2/20/2020 2.52 (from 2/19/20) 5 mg   2/21/2020  1.95   4 mg   2/22/2020  1.78   6 mg                                       Notes:                     Daily PT/INR until stable within therapeutic range.      Solomon Rodas PharmD, BCPS  2/22/2020  10:47 AM

## 2020-02-22 NOTE — PLAN OF CARE
Problem: Impaired respiratory status  Goal: Clear lung sounds  Description  Clear lung sounds     2/21/2020 2020 by Renato Johnson RCP  Outcome: Ongoing   Breath sounds are clear and diminished at this time. Continue with treatments to help improve breath sounds.

## 2020-02-23 LAB
ANION GAP SERPL CALCULATED.3IONS-SCNC: 11 MEQ/L (ref 8–16)
BUN BLDV-MCNC: 24 MG/DL (ref 7–22)
CALCIUM SERPL-MCNC: 9.2 MG/DL (ref 8.5–10.5)
CHLORIDE BLD-SCNC: 105 MEQ/L (ref 98–111)
CO2: 22 MEQ/L (ref 23–33)
CREAT SERPL-MCNC: 0.8 MG/DL (ref 0.4–1.2)
GFR SERPL CREATININE-BSD FRML MDRD: > 90 ML/MIN/1.73M2
GLUCOSE BLD-MCNC: 250 MG/DL (ref 70–108)
INR BLD: 1.8 (ref 0.85–1.13)
POTASSIUM SERPL-SCNC: 3.8 MEQ/L (ref 3.5–5.2)
SODIUM BLD-SCNC: 138 MEQ/L (ref 135–145)

## 2020-02-23 PROCEDURE — 94640 AIRWAY INHALATION TREATMENT: CPT

## 2020-02-23 PROCEDURE — 2580000003 HC RX 258: Performed by: INTERNAL MEDICINE

## 2020-02-23 PROCEDURE — 6370000000 HC RX 637 (ALT 250 FOR IP): Performed by: INTERNAL MEDICINE

## 2020-02-23 PROCEDURE — 6370000000 HC RX 637 (ALT 250 FOR IP): Performed by: FAMILY MEDICINE

## 2020-02-23 PROCEDURE — 80048 BASIC METABOLIC PNL TOTAL CA: CPT

## 2020-02-23 PROCEDURE — 36415 COLL VENOUS BLD VENIPUNCTURE: CPT

## 2020-02-23 PROCEDURE — 6370000000 HC RX 637 (ALT 250 FOR IP): Performed by: NURSE PRACTITIONER

## 2020-02-23 PROCEDURE — 1200000003 HC TELEMETRY R&B

## 2020-02-23 PROCEDURE — 94760 N-INVAS EAR/PLS OXIMETRY 1: CPT

## 2020-02-23 PROCEDURE — 99232 SBSQ HOSP IP/OBS MODERATE 35: CPT | Performed by: INTERNAL MEDICINE

## 2020-02-23 PROCEDURE — 99232 SBSQ HOSP IP/OBS MODERATE 35: CPT | Performed by: FAMILY MEDICINE

## 2020-02-23 PROCEDURE — 2709999900 HC NON-CHARGEABLE SUPPLY

## 2020-02-23 PROCEDURE — 6360000002 HC RX W HCPCS: Performed by: INTERNAL MEDICINE

## 2020-02-23 PROCEDURE — 85610 PROTHROMBIN TIME: CPT

## 2020-02-23 RX ORDER — GUAIFENESIN 600 MG/1
600 TABLET, EXTENDED RELEASE ORAL 2 TIMES DAILY
Qty: 20 TABLET | Refills: 0 | Status: SHIPPED | OUTPATIENT
Start: 2020-02-23 | End: 2020-06-29 | Stop reason: SDUPTHER

## 2020-02-23 RX ORDER — CEFDINIR 300 MG/1
300 CAPSULE ORAL 2 TIMES DAILY
Qty: 8 CAPSULE | Refills: 0 | Status: SHIPPED | OUTPATIENT
Start: 2020-02-23 | End: 2020-02-27 | Stop reason: HOSPADM

## 2020-02-23 RX ORDER — PREDNISONE 20 MG/1
40 TABLET ORAL DAILY
Qty: 6 TABLET | Refills: 0 | Status: SHIPPED | OUTPATIENT
Start: 2020-02-23 | End: 2020-02-27 | Stop reason: HOSPADM

## 2020-02-23 RX ORDER — PRENATAL VIT 91/IRON/FOLIC/DHA 28-975-200
COMBINATION PACKAGE (EA) ORAL
Qty: 1 TUBE | Refills: 0 | Status: ON HOLD | OUTPATIENT
Start: 2020-02-23 | End: 2020-09-15

## 2020-02-23 RX ORDER — WARFARIN SODIUM 7.5 MG/1
7.5 TABLET ORAL
Status: COMPLETED | OUTPATIENT
Start: 2020-02-23 | End: 2020-02-23

## 2020-02-23 RX ORDER — AZITHROMYCIN 250 MG/1
250 TABLET, FILM COATED ORAL DAILY
Qty: 2 TABLET | Refills: 0 | Status: SHIPPED | OUTPATIENT
Start: 2020-02-23 | End: 2020-02-27 | Stop reason: HOSPADM

## 2020-02-23 RX ADMIN — ACETAMINOPHEN 650 MG: 325 TABLET ORAL at 07:25

## 2020-02-23 RX ADMIN — IPRATROPIUM BROMIDE AND ALBUTEROL SULFATE 1 AMPULE: .5; 3 SOLUTION RESPIRATORY (INHALATION) at 10:35

## 2020-02-23 RX ADMIN — WARFARIN SODIUM 7.5 MG: 7.5 TABLET ORAL at 20:44

## 2020-02-23 RX ADMIN — PREDNISONE 40 MG: 20 TABLET ORAL at 08:26

## 2020-02-23 RX ADMIN — IPRATROPIUM BROMIDE AND ALBUTEROL SULFATE 1 AMPULE: .5; 3 SOLUTION RESPIRATORY (INHALATION) at 22:02

## 2020-02-23 RX ADMIN — IPRATROPIUM BROMIDE AND ALBUTEROL SULFATE 1 AMPULE: .5; 3 SOLUTION RESPIRATORY (INHALATION) at 15:39

## 2020-02-23 RX ADMIN — GUAIFENESIN 600 MG: 600 TABLET, EXTENDED RELEASE ORAL at 08:26

## 2020-02-23 RX ADMIN — FUROSEMIDE 20 MG: 20 TABLET ORAL at 16:56

## 2020-02-23 RX ADMIN — QUETIAPINE FUMARATE 100 MG: 100 TABLET ORAL at 20:45

## 2020-02-23 RX ADMIN — SODIUM CHLORIDE, PRESERVATIVE FREE 10 ML: 5 INJECTION INTRAVENOUS at 08:29

## 2020-02-23 RX ADMIN — TERBINAFINE HYDROCHLORIDE: 1 CREAM TOPICAL at 08:34

## 2020-02-23 RX ADMIN — ACETAMINOPHEN 650 MG: 325 TABLET ORAL at 16:56

## 2020-02-23 RX ADMIN — PANTOPRAZOLE SODIUM 40 MG: 40 TABLET, DELAYED RELEASE ORAL at 08:27

## 2020-02-23 RX ADMIN — BUPRENORPHINE HYDROCHLORIDE, NALOXONE HYDROCHLORIDE 1 FILM: 8; 2 FILM, SOLUBLE BUCCAL; SUBLINGUAL at 08:26

## 2020-02-23 RX ADMIN — AMLODIPINE BESYLATE 5 MG: 5 TABLET ORAL at 08:26

## 2020-02-23 RX ADMIN — ASPIRIN 81 MG: 81 TABLET ORAL at 08:26

## 2020-02-23 RX ADMIN — FUROSEMIDE 20 MG: 20 TABLET ORAL at 07:26

## 2020-02-23 RX ADMIN — BUSPIRONE HYDROCHLORIDE 30 MG: 10 TABLET ORAL at 20:45

## 2020-02-23 RX ADMIN — TAMSULOSIN HYDROCHLORIDE 0.4 MG: 0.4 CAPSULE ORAL at 08:29

## 2020-02-23 RX ADMIN — CEFTRIAXONE SODIUM 1 G: 1 INJECTION, POWDER, FOR SOLUTION INTRAMUSCULAR; INTRAVENOUS at 11:28

## 2020-02-23 RX ADMIN — BUSPIRONE HYDROCHLORIDE 30 MG: 10 TABLET ORAL at 08:26

## 2020-02-23 RX ADMIN — SODIUM CHLORIDE, PRESERVATIVE FREE 10 ML: 5 INJECTION INTRAVENOUS at 20:44

## 2020-02-23 RX ADMIN — GUAIFENESIN 600 MG: 600 TABLET, EXTENDED RELEASE ORAL at 20:45

## 2020-02-23 ASSESSMENT — PAIN DESCRIPTION - PAIN TYPE
TYPE: ACUTE PAIN
TYPE: ACUTE PAIN

## 2020-02-23 ASSESSMENT — PAIN DESCRIPTION - LOCATION
LOCATION: HEAD
LOCATION: HEAD

## 2020-02-23 ASSESSMENT — PAIN DESCRIPTION - DESCRIPTORS
DESCRIPTORS: HEADACHE
DESCRIPTORS: HEADACHE

## 2020-02-23 ASSESSMENT — PAIN SCALES - GENERAL
PAINLEVEL_OUTOF10: 3
PAINLEVEL_OUTOF10: 0
PAINLEVEL_OUTOF10: 3

## 2020-02-23 NOTE — PROGRESS NOTES
3/13/2013    1,2,3,4 on left and 2nd on right    TRACHEAL SURGERY N/A 10/18/2019    TRACHEOTOMY PERCUTANEOUS BRONCHOSCOPY performed by Yovanny Romero MD at [facility] ENDOSCOPY  2012     Meds    Current Medications    warfarin  7.5 mg Oral Once    guaiFENesin  600 mg Oral BID    furosemide  20 mg Oral BID    potassium replacement protocol   Other RX Placeholder    terbinafine   Topical BID    predniSONE  40 mg Oral Daily    amLODIPine  5 mg Oral Daily    aspirin EC  81 mg Oral Daily    buprenorphine-naloxone  1 Film Sublingual Daily    busPIRone  30 mg Oral BID    pantoprazole  40 mg Oral Daily    QUEtiapine  100 mg Per NG tube Nightly    tamsulosin  0.4 mg Oral Daily    cefTRIAXone (ROCEPHIN) IV  1 g Intravenous Q24H    ipratropium-albuterol  1 ampule Inhalation Q4H WA    sodium chloride flush  10 mL Intravenous 2 times per day    nicotine  1 patch Transdermal Daily    warfarin (COUMADIN) daily dosing (placeholder)   Other RX Placeholder     sodium chloride flush, acetaminophen, acetaminophen, polyethylene glycol, promethazine, ondansetron  IV Drips/Infusions    Home Medications  Medications Prior to Admission: albuterol sulfate HFA (PROAIR HFA) 108 (90 Base) MCG/ACT inhaler, Inhale 2 puffs into the lungs every 6 hours as needed for Wheezing or Shortness of Breath  potassium chloride (KLOR-CON M) 20 MEQ extended release tablet, Take 1 tablet by mouth 2 times daily  amLODIPine (NORVASC) 5 MG tablet, Take 1 tablet by mouth daily Take 5mg  By mouth qd  lisinopril (PRINIVIL;ZESTRIL) 2.5 MG tablet, Take 1 tablet by mouth daily  pantoprazole (PROTONIX) 40 MG tablet, Take 40 mg by mouth daily  warfarin (COUMADIN) 5 MG tablet, Take 5 mg by mouth  tamsulosin (FLOMAX) 0.4 MG capsule, Take 0.4 mg by mouth daily  VILAZODONE HCL 10 MG Tablet,   albuterol (PROVENTIL) (2.5 MG/3ML) 0.083% nebulizer solution, Take 6 mLs by nebulization every 4 hours as needed for Wheezing or Shortness of Breath  QUEtiapine (SEROQUEL) 100 MG tablet, 1 tablet by Per NG tube route nightly (Patient taking differently: 300 mg by Per NG tube route nightly Changed by Osmany Viveros per patient)  furosemide (LASIX) 40 MG tablet, Take 1 tablet by mouth daily  busPIRone (BUSPAR) 30 MG tablet, Take 30 mg by mouth 2 times daily   aspirin EC 81 MG EC tablet, Take 1 tablet by mouth daily  buprenorphine-naloxone (SUBOXONE) 8-2 MG FILM SL film, Place 1 Film under the tongue daily. .  [DISCONTINUED] gabapentin (NEURONTIN) 100 MG capsule, Take 2 capsules by mouth 3 times daily for 4 days. Diet    DIET CARDIAC; Allergies    Pcn [penicillins]  Social History     Social History     Socioeconomic History    Marital status:      Spouse name: Not on file    Number of children: 3    Years of education: 9    Highest education level: Not on file   Occupational History    Occupation: on disability   Social Needs    Financial resource strain: Not on file    Food insecurity:     Worry: Not on file     Inability: Not on file   [employer] needs:     Medical: Not on file     Non-medical: Not on file   Tobacco Use    Smoking status: Current Some Day Smoker     Packs/day: 1.00     Years: 37.00     Pack years: 37.00     Types: Cigarettes     Start date: 10/22/1976     Last attempt to quit: 2019     Years since quittin.4    Smokeless tobacco: Former User    Tobacco comment: pt has smoked 3 packs in last week.  3 cigarettes a day- 20   Substance and Sexual Activity    Alcohol use: No     Comment: quit 9 years ago    Drug use: No    Sexual activity: Yes     Partners: Female   Lifestyle    Physical activity:     Days per week: Not on file     Minutes per session: Not on file    Stress: Not on file   Relationships    Social connections:     Talks on phone: Not on file     Gets together: Not on file     Attends Shinto service: Not on file     Active member of club or organization: Not on file     Attends

## 2020-02-23 NOTE — PROGRESS NOTES
Hospitalist Progress Note    Patient:  Hubert Claude      Unit/Bed:5K-05/005-A    YOB: 1960    MRN: [id number]       Acct: [de-identified]     PCP: Bethany Cabello    Date of Admission: 2/19/2020    Assessment/Plan:    Anticipated Discharge in :     [facility] Problems    Diagnosis Date Noted    Pneumonia [J18.9] 02/20/2020    Acute respiratory failure with hypoxemia ([address] [J96.01] 02/20/2020    COPD exacerbation ([facility] [J44.1] 09/27/2019    Moderate COPD (chronic obstructive pulmonary disease) ([facility] [J44.9] 12/12/2011     Right Basilar PNA  failed outpatient treatment with Augmentin  Has been afebrile since admission. Flu negative, respiratory culture pending  Continue guaifenesin twice daily  Continue IV Rocephin and Azithromycin, shift to p.o. on discharge     Acute COPD Exacerbation  Patient started on start IV Solumedrol 40 qd + duo-nebs. Currently on prednisone 40 mg daily  Inhalers on discharge  Pulmonology consulted     Acute hypoxic respiratory failure  2/2 to #1 and #2.   83% on RA on initial presentation. He was started on 4 LPM O2 per nasal cannula, wean as tolerated  Currently saturating well on room air  Home O2 prior to discharge     Sepsis (POA)  febrile, tachycardic, elevated WBC count, with chest x-ray result of right lower lobe pneumonia   Treatment as above  No growth on blood culture to date     Paroxysmal Atrial Fibrillation  cont coumadin and BB. Monitor on telemetry. Mild non-AG metabolic acidosis  Continue to monitor for now     Factor V Leiden  history of DVTs. Cont Coumadin, Pharmacy to dose.      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 Has been clean for over 8 years. Normal bioprosthetic function 2018 TTE ( 33 mm CE, ThermaFix valve).    Chronic Diastolic Heart Failure  no evidence of fluid overload. D Echo 9/2019 EF 55% with preserved EF.   Cont home medications.      Disposition  Plan for discharge tomorrow  Continue prednisone and bronchodilators as needed  New inhalers to be given by pulmonology  Home O2 evaluation prior to discharge      Chief Complaint:  Shortness of breath    Hospital Course:   Patient is 63 y.o. male who [facility] to [facility] with complaints of fevers, chills, productive cough and sob. Pt has a pmh of A. Fib, Factor V Leiden, Hepatitis C, Tricuspid Valve Endocarditis, history of IVDA, CKD, and COPD.      Pt reports to have had symptoms of fatigue, chills, productive cough, and sob over the past week. Was seen by his PCP, placed on Augmentin. Pt continued to have progression of his symptoms despite Abx therapy causing him to come to the ED. Pt denies any sick contacts. Reports being very sob with minimal activity. Pt continues to smoke cigarettes. Pt denies any nausea, vomiting, diarrhea, chest pain, abdominal pain, black or red colored stools, burning with urination, numbness or weakness.     In the ED, pt hypoxic 83% on RA. Elevated HR, elevated WBC count. CXR showing right base PNA. Pt placed on O2, started on IV Abx and admitted. Subjective:   Patient seen and examined. He feels better today, although he is still coughing, and feels congested. He is saturating well on room air. He denies any chest pain, palpitations, nausea or vomiting. Vital signs stable, no new complaints.     Medications:  Reviewed    Infusion Medications   Scheduled Medications    warfarin  7.5 mg Oral Once    guaiFENesin  600 mg Oral BID    furosemide  20 mg Oral BID    potassium replacement protocol   Other RX Placeholder    terbinafine   Topical BID    predniSONE  40 mg Oral Daily    amLODIPine  5 mg Oral Daily    aspirin EC  81 mg Oral Daily    buprenorphine-naloxone  1 Film Sublingual Daily    busPIRone  30 mg Oral BID    pantoprazole  40 mg Oral Daily    QUEtiapine  100 mg Per NG tube Nightly    tamsulosin  0.4 mg Oral Daily    cefTRIAXone (ROCEPHIN) IV  1 g Intravenous Q24H    ipratropium-albuterol  1 ampule Inhalation Q4H WA    sodium chloride flush  10 mL Intravenous 2 times per day    nicotine  1 patch Transdermal Daily    warfarin (COUMADIN) daily dosing (placeholder)   Other RX Placeholder     PRN Meds: sodium chloride flush, acetaminophen, acetaminophen, polyethylene glycol, promethazine, ondansetron      Intake/Output Summary (Last 24 hours) at 2/23/2020 1253  Last data filed at 2/22/2020 2002  Gross per 24 hour   Intake 2595.92 ml   Output 0 ml   Net 2595.92 ml       Diet:  DIET CARDIAC; Exam:  /76   Pulse 75   Temp 98 °F (36.7 °C) (Oral)   Resp 18   Ht 5' 9\" (1.753 m)   Wt 235 lb (106.6 kg)   SpO2 94%   BMI 34.70 kg/m²     General appearance: No apparent distress, appears stated age and cooperative. HEENT: Pupils equal, round, and reactive to light. Conjunctivae/corneas clear. Neck: Supple, with full range of motion. No jugular venous distention. Trachea midline. Respiratory:  Normal respiratory effort. Diffuse rhonchi noted with occasional wheezing  Cardiovascular: Regular rate and rhythm with normal S1/S2 without murmurs, rubs or gallops. Abdomen: Soft, non-tender, non-distended with normal bowel sounds. Musculoskeletal: passive and active ROM x 4 extremities. Skin: Chronic skin changes and hyperpigmentation on both lower extremities   Neurologic:  Neurovascularly intact without any focal sensory/motor deficits.  Cranial nerves: II-XII intact, grossly non-focal.  Psychiatric: Alert and oriented, thought content appropriate, normal insight  Capillary Refill: Brisk,< 3 seconds   Peripheral Pulses: +2 palpable, equal bilaterally       Labs:   Recent Labs     02/21/20  0544   WBC 10.7   HGB 12.4*   HCT 40.5*        Recent Labs     02/21/20  0544 02/23/20  0628    138   K 4.5 3.8    105   CO2 20* 22*   BUN 16 24*   CREATININE 0.9 0.8   CALCIUM 9.1 9.2     No results for input(s): AST, ALT, BILIDIR, BILITOT, ALKPHOS in the last 72 hours. Recent Labs     02/21/20  0544 02/22/20  0616 02/23/20  0628   INR 1.95* 1.78* 1.80*     No results for input(s): CKTOTAL, TROPONINI in the last 72 hours. Urinalysis:      Lab Results   Component Value Date    NITRU NEGATIVE 02/20/2020    WBCUA 0-2 11/27/2019    WBCUA >100 10/23/2019    BACTERIA NONE SEEN 11/27/2019    RBCUA 5-10 11/27/2019    BLOODU NEGATIVE 02/20/2020    SPECGRAV 1.006 03/14/2014    GLUCOSEU 100 02/20/2020       Radiology:  XR CHEST STANDARD (2 VW)   Final Result      Small pneumonia or atelectasis at the right base. **This report has been created using voice recognition software. It may contain minor errors which are inherent in voice recognition technology. **      Final report electronically signed by Dr. Kadeem Hernández on 2/20/2020 12:19 AM          Diet: DIET CARDIAC;    DVT prophylaxis: [] Lovenox                                 [] SCDs                                 [] SQ Heparin                                 [] Encourage ambulation           [] Already on Anticoagulation     Disposition:    [] Home       [] TCU       [] Rehab       [] Psych       [] SNF       [] [place]       [] Other-    Code Status: Full Code    PT/OT Eval Status:       Electronically signed by Luc Houston MD on 2/23/2020 at 12:53 PM

## 2020-02-24 ENCOUNTER — APPOINTMENT (OUTPATIENT)
Dept: GENERAL RADIOLOGY | Age: 60
DRG: 871 | End: 2020-02-24
Payer: MEDICARE

## 2020-02-24 LAB
GRAM STAIN RESULT: ABNORMAL
INR BLD: 1.88 (ref 0.85–1.13)
ORGANISM: ABNORMAL
RESPIRATORY CULTURE: ABNORMAL
RESPIRATORY CULTURE: ABNORMAL

## 2020-02-24 PROCEDURE — 6370000000 HC RX 637 (ALT 250 FOR IP): Performed by: INTERNAL MEDICINE

## 2020-02-24 PROCEDURE — 99232 SBSQ HOSP IP/OBS MODERATE 35: CPT | Performed by: INTERNAL MEDICINE

## 2020-02-24 PROCEDURE — 94640 AIRWAY INHALATION TREATMENT: CPT

## 2020-02-24 PROCEDURE — 6360000002 HC RX W HCPCS: Performed by: INTERNAL MEDICINE

## 2020-02-24 PROCEDURE — 99232 SBSQ HOSP IP/OBS MODERATE 35: CPT | Performed by: FAMILY MEDICINE

## 2020-02-24 PROCEDURE — 2709999900 HC NON-CHARGEABLE SUPPLY

## 2020-02-24 PROCEDURE — 71046 X-RAY EXAM CHEST 2 VIEWS: CPT

## 2020-02-24 PROCEDURE — 94761 N-INVAS EAR/PLS OXIMETRY MLT: CPT

## 2020-02-24 PROCEDURE — 6370000000 HC RX 637 (ALT 250 FOR IP): Performed by: FAMILY MEDICINE

## 2020-02-24 PROCEDURE — 2580000003 HC RX 258: Performed by: INTERNAL MEDICINE

## 2020-02-24 PROCEDURE — 85610 PROTHROMBIN TIME: CPT

## 2020-02-24 PROCEDURE — APPSS30 APP SPLIT SHARED TIME 16-30 MINUTES: Performed by: NURSE PRACTITIONER

## 2020-02-24 PROCEDURE — 36415 COLL VENOUS BLD VENIPUNCTURE: CPT

## 2020-02-24 PROCEDURE — 1200000003 HC TELEMETRY R&B

## 2020-02-24 PROCEDURE — 6370000000 HC RX 637 (ALT 250 FOR IP): Performed by: NURSE PRACTITIONER

## 2020-02-24 RX ORDER — WARFARIN SODIUM 5 MG/1
5 TABLET ORAL ONCE
Status: COMPLETED | OUTPATIENT
Start: 2020-02-24 | End: 2020-02-24

## 2020-02-24 RX ADMIN — VANCOMYCIN HYDROCHLORIDE 1500 MG: 5 INJECTION, POWDER, LYOPHILIZED, FOR SOLUTION INTRAVENOUS at 21:40

## 2020-02-24 RX ADMIN — SODIUM CHLORIDE, PRESERVATIVE FREE 10 ML: 5 INJECTION INTRAVENOUS at 07:58

## 2020-02-24 RX ADMIN — GUAIFENESIN 600 MG: 600 TABLET, EXTENDED RELEASE ORAL at 21:39

## 2020-02-24 RX ADMIN — BUSPIRONE HYDROCHLORIDE 30 MG: 10 TABLET ORAL at 07:57

## 2020-02-24 RX ADMIN — TAMSULOSIN HYDROCHLORIDE 0.4 MG: 0.4 CAPSULE ORAL at 07:56

## 2020-02-24 RX ADMIN — ASPIRIN 81 MG: 81 TABLET ORAL at 07:56

## 2020-02-24 RX ADMIN — GUAIFENESIN 600 MG: 600 TABLET, EXTENDED RELEASE ORAL at 07:57

## 2020-02-24 RX ADMIN — PANTOPRAZOLE SODIUM 40 MG: 40 TABLET, DELAYED RELEASE ORAL at 07:56

## 2020-02-24 RX ADMIN — IPRATROPIUM BROMIDE AND ALBUTEROL SULFATE 1 AMPULE: .5; 3 SOLUTION RESPIRATORY (INHALATION) at 16:28

## 2020-02-24 RX ADMIN — IPRATROPIUM BROMIDE AND ALBUTEROL SULFATE 1 AMPULE: .5; 3 SOLUTION RESPIRATORY (INHALATION) at 12:47

## 2020-02-24 RX ADMIN — WARFARIN SODIUM 5 MG: 5 TABLET ORAL at 18:02

## 2020-02-24 RX ADMIN — AMLODIPINE BESYLATE 5 MG: 5 TABLET ORAL at 07:57

## 2020-02-24 RX ADMIN — SODIUM CHLORIDE, PRESERVATIVE FREE 10 ML: 5 INJECTION INTRAVENOUS at 21:40

## 2020-02-24 RX ADMIN — IPRATROPIUM BROMIDE AND ALBUTEROL SULFATE 1 AMPULE: .5; 3 SOLUTION RESPIRATORY (INHALATION) at 07:39

## 2020-02-24 RX ADMIN — BUSPIRONE HYDROCHLORIDE 30 MG: 10 TABLET ORAL at 21:39

## 2020-02-24 RX ADMIN — ACETAMINOPHEN 650 MG: 325 TABLET ORAL at 21:41

## 2020-02-24 RX ADMIN — PREDNISONE 40 MG: 20 TABLET ORAL at 07:56

## 2020-02-24 RX ADMIN — IPRATROPIUM BROMIDE AND ALBUTEROL SULFATE 1 AMPULE: .5; 3 SOLUTION RESPIRATORY (INHALATION) at 20:41

## 2020-02-24 RX ADMIN — BUPRENORPHINE HYDROCHLORIDE, NALOXONE HYDROCHLORIDE 1 FILM: 8; 2 FILM, SOLUBLE BUCCAL; SUBLINGUAL at 08:20

## 2020-02-24 RX ADMIN — CEFTRIAXONE SODIUM 1 G: 1 INJECTION, POWDER, FOR SOLUTION INTRAMUSCULAR; INTRAVENOUS at 10:09

## 2020-02-24 RX ADMIN — QUETIAPINE FUMARATE 100 MG: 100 TABLET ORAL at 21:39

## 2020-02-24 RX ADMIN — ACETAMINOPHEN 650 MG: 325 TABLET ORAL at 07:56

## 2020-02-24 ASSESSMENT — PAIN DESCRIPTION - PROGRESSION: CLINICAL_PROGRESSION: NOT CHANGED

## 2020-02-24 ASSESSMENT — PAIN SCALES - GENERAL
PAINLEVEL_OUTOF10: 3
PAINLEVEL_OUTOF10: 3
PAINLEVEL_OUTOF10: 0
PAINLEVEL_OUTOF10: 3

## 2020-02-24 ASSESSMENT — PAIN DESCRIPTION - PAIN TYPE
TYPE: ACUTE PAIN
TYPE: ACUTE PAIN

## 2020-02-24 ASSESSMENT — PAIN DESCRIPTION - LOCATION
LOCATION: HEAD
LOCATION: HEAD

## 2020-02-24 ASSESSMENT — PAIN DESCRIPTION - FREQUENCY: FREQUENCY: INTERMITTENT

## 2020-02-24 ASSESSMENT — PAIN DESCRIPTION - DESCRIPTORS: DESCRIPTORS: ACHING

## 2020-02-24 ASSESSMENT — PAIN DESCRIPTION - ONSET: ONSET: ON-GOING

## 2020-02-24 NOTE — PLAN OF CARE
Problem: Falls - Risk of:  Goal: Will remain free from falls  Description  Will remain free from falls  Outcome: Ongoing  Note:   Fall assessment complete. No falls noted this shift. Bed alarm on, bed in low position, call light and personal items in reach. Nonskid socks on. Will continue to monitor. Problem: Physical Regulation:  Goal: Prevent transmision of infection  Description  Prevent transmision of infection  Outcome: Ongoing  Note:   Patient is in contact isolation. [place] PPE is worn. Problem: Discharge Planning:  Goal: Participates in care planning  Description  Participates in care planning  Outcome: Ongoing  Note:   Home at discharge. Problem: Activity Intolerance:  Goal: Ability to tolerate increased activity will improve  Description  Ability to tolerate increased activity will improve  Outcome: Ongoing  Note:   Home O2 eval tomorrow. Patient gets a SOB with exertion. Problem: Pain:  Goal: Pain level will decrease  Description  Pain level will decrease  Outcome: Ongoing  Note:   Patient denies pain so far this shift will continue to monitor.

## 2020-02-24 NOTE — PROGRESS NOTES
Hospitalist Progress Note    Patient:  Quinn Whalen      Unit/Bed:5K-05/005-A    YOB: 1960    MRN: [id number]       Acct: [de-identified]     PCP: Johanna Wang    Date of Admission: 2/19/2020    Assessment/Plan:    Anticipated Discharge in :     [facility]UC[facility]/Ernestina Escobar [address] Problems    Diagnosis Date Noted    Pneumonia [J18.9] 02/20/2020    Acute respiratory failure with hypoxemia ([address] [J96.01] 02/20/2020    COPD exacerbation ([facility] [J44.1] 09/27/2019    Moderate COPD (chronic obstructive pulmonary disease) ([facility] [address] [J44.9] 12/12/2011     Right Basilar PNA  failed outpatient treatment with Augmentin  Has been afebrile since admission. Flu negative, respiratory culture back on 2/24, positive for MRSA, patient on Rocephin, vanc started, ID consulted   However repeat chest x-ray on 224 also showed partial clearing of previous right lower lobe pneumonia  continue guaifenesin twice daily, [place] nebs with albuterol     Acute COPD Exacerbation  Patient started on start IV Solumedrol 40 qd + duo-nebs. Currently on prednisone 40 mg daily  Inhalers on discharge  Pulmonology consulted     Acute hypoxic respiratory failure  2/2 to #1 and #2.   83% on RA on initial presentation. He was started on 4 LPM O2 per nasal cannula, wean as tolerated  Currently saturating well on room air  Home O2 prior to discharge done, does not require oxygen at rest but requires 3 LPM O2 on exertion     Sepsis (POA)  febrile, tachycardic, elevated WBC count, with chest x-ray result of right lower lobe pneumonia   Treatment as above  No growth on blood culture to date  CBC in a.m.     Paroxysmal Atrial Fibrillation  cont coumadin and BB. Monitor on telemetry. Mild non-AG metabolic acidosis  Continue to monitor for now     Factor V Leiden  history of DVTs. Cont Coumadin, Pharmacy to dose.      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 Has been clean for over 8 years.    Normal bioprosthetic function 2018 TTE ( 33 mm CE, ThermaFix valve).    Chronic Diastolic Heart Failure  no evidence of fluid overload. D Echo 9/2019 EF 55% with preserved EF.   Cont home medications. Disposition  Continue prednisone and bronchodilators   MRSA growing on sputum culture, ID consulted  New inhalers given by pulmonology  Home O2 evaluation prior to discharge done, does not require oxygen at rest but requires 3 LPM on exertion      Chief Complaint:  Shortness of breath    Hospital Course:   Patient is 63 y.o. male who presented to [address] [place] [address] with complaints of fevers, chills, productive cough and sob. Pt has a pmh of A. Fib, Factor V Leiden, Hepatitis C, Tricuspid Valve Endocarditis, history of IVDA, CKD, and COPD.      Pt reports to have had symptoms of fatigue, chills, productive cough, and sob over the past week. Was seen by his PCP, placed on Augmentin. Pt continued to have progression of his symptoms despite Abx therapy causing him to come to the ED. Pt denies any sick contacts. Reports being very sob with minimal activity. Pt continues to smoke cigarettes. Pt denies any nausea, vomiting, diarrhea, chest pain, abdominal pain, black or red colored stools, burning with urination, numbness or weakness.     In the ED, pt hypoxic 83% on RA. Elevated HR, elevated WBC count. CXR showing right base PNA. Pt placed on O2, started on IV Abx and admitted. On 2/24; repeat chest x-ray shows improving right lower lobe pneumonia, however sputum culture is positive for MRSA. Vanc started. ID consulted    Subjective:   Patient seen and examined. He feels better today, feels a bit short of breath on exertion, cannot expectorate thick phlegm out. He is saturating well on room air. He denies any chest pain, palpitations, nausea or vomiting. Vital signs stable, no new complaints.     Medications:  Reviewed    Infusion Medications   Scheduled Medications    warfarin  5 mg Oral Once    guaiFENesin  600 mg Oral BID    potassium replacement protocol   Other RX Placeholder    terbinafine   Topical BID    amLODIPine  5 mg Oral Daily    aspirin EC  81 mg Oral Daily    buprenorphine-naloxone  1 Film Sublingual Daily    busPIRone  30 mg Oral BID    pantoprazole  40 mg Oral Daily    QUEtiapine  100 mg Per NG tube Nightly    tamsulosin  0.4 mg Oral Daily    cefTRIAXone (ROCEPHIN) IV  1 g Intravenous Q24H    ipratropium-albuterol  1 ampule Inhalation Q4H WA    sodium chloride flush  10 mL Intravenous 2 times per day    nicotine  1 patch Transdermal Daily    warfarin (COUMADIN) daily dosing (placeholder)   Other RX Placeholder     PRN Meds: sodium chloride flush, acetaminophen, acetaminophen, polyethylene glycol, promethazine, ondansetron      Intake/Output Summary (Last 24 hours) at 2/24/2020 1455  Last data filed at 2/24/2020 1010  Gross per 24 hour   Intake 660 ml   Output --   Net 660 ml       Diet:  DIET CARDIAC; Exam:  /72   Pulse 91   Temp 98.5 °F (36.9 °C) (Oral)   Resp 18   Ht 5' 9\" (1.753 m)   Wt 235 lb (106.6 kg)   SpO2 91%   BMI 34.70 kg/m²     General appearance: No apparent distress, appears stated age and cooperative. HEENT: Pupils equal, round, and reactive to light. Conjunctivae/corneas clear. Neck: Supple, with full range of motion. No jugular venous distention. Trachea midline. Respiratory:  Normal respiratory effort. Clear breath sounds, no wheezing  Cardiovascular: Regular rate and rhythm with normal S1/S2 without murmurs, rubs or gallops. Abdomen: Soft, non-tender, non-distended with normal bowel sounds. Musculoskeletal: passive and active ROM x 4 extremities. Skin: Chronic skin changes and hyperpigmentation on both lower extremities   Neurologic:  Neurovascularly intact without any focal sensory/motor deficits.  Cranial nerves: II-XII intact, grossly non-focal.  Psychiatric: Alert and oriented, thought content appropriate, normal insight  Capillary Refill: Brisk,< 3 seconds   Peripheral Pulses: +2 palpable, equal bilaterally       Labs:   No results for input(s): WBC, HGB, HCT, PLT in the last 72 hours. Recent Labs     02/23/20  0628      K 3.8      CO2 22*   BUN 24*   CREATININE 0.8   CALCIUM 9.2     No results for input(s): AST, ALT, BILIDIR, BILITOT, ALKPHOS in the last 72 hours. Recent Labs     02/22/20  0616 02/23/20  0628 02/24/20  0604   INR 1.78* 1.80* 1.88*     No results for input(s): CKTOTAL, TROPONINI in the last 72 hours. Urinalysis:      Lab Results   Component Value Date    NITRU NEGATIVE 02/20/2020    WBCUA 0-2 11/27/2019    WBCUA >100 10/23/2019    BACTERIA NONE SEEN 11/27/2019    RBCUA 5-10 11/27/2019    BLOODU NEGATIVE 02/20/2020    SPECGRAV 1.006 03/14/2014    GLUCOSEU 100 02/20/2020       Radiology:  XR CHEST STANDARD (2 VW)   Final Result   Partial clearing previous right lower lobe pneumonia. **This report has been created using voice recognition software. It may contain minor errors which are inherent in voice recognition technology. **      Final report electronically signed by Dr. Robert Molina on 2/24/2020 9:50 AM      XR CHEST STANDARD (2 VW)   Final Result      Small pneumonia or atelectasis at the right base. **This report has been created using voice recognition software. It may contain minor errors which are inherent in voice recognition technology. **      Final report electronically signed by Dr. Shamika Elias on 2/20/2020 12:19 AM          Diet: DIET CARDIAC;    DVT prophylaxis: [] Lovenox                                 [] SCDs                                 [] SQ Heparin                                 [] Encourage ambulation           [] Already on Anticoagulation     Disposition:    [] Home       [] TCU       [] Rehab       [] Psych       [] SNF       [] [place]       [] Other-    Code Status: Full Code    PT/OT Eval Status:       Electronically signed by Corine Forde MD on 2/24/2020 at 2:55 PM

## 2020-02-24 NOTE — PLAN OF CARE
Problem: Impaired respiratory status  Goal: Clear lung sounds  Description  Clear lung sounds     Outcome: Ongoing  Note:   Cont aerosol to improve aeration

## 2020-02-24 NOTE — PROGRESS NOTES
Clinical Pharmacy Note    Warfarin consult follow-up    Recent Labs     02/24/20  0604   INR 1.88*     No results for input(s): HGB, HCT, PLT in the last 72 hours. Significant Drug-Drug Interactions:  New warfarin drug-drug interactions: none   Discontinued drug-drug interactions: azithromycin   Current warfarin drug-drug interactions: aspirin     Date INR Warfarin Dose   2/20/2020 2.52 (from 2/19/20) 5 mg   2/21/2020  1.95   4 mg   2/22/2020  1.78   6 mg   2/23/2020  1.80  7.5 mg     2/24/2020  1.88   5 mg                        Notes:                   Daily PT/INR until stable within therapeutic range. Faiza LAUREN  [facility], PharmD  PGY-1 Pharmacy Resident  2/24/2020, 8:50 AM

## 2020-02-24 NOTE — PLAN OF CARE
Problem: Falls - Risk of:  Goal: Will remain free from falls  Description  Will remain free from falls  [id number] by Benita Rodríguez RN  Outcome: Ongoing   Pt remains free from falls during shift, all fall precautions in place. Bed in low position, alarm activated and appropriate use of call light. Problem: Discharge Planning:  Goal: Participates in care planning  Description  Participates in care planning  [id number] by Benita Rodríguez RN  Outcome: Ongoing   Discharge date remains unclear but plan is to return to home. Problem: Pain:  Goal: Pain level will decrease  Description  Pain level will decrease  8/09/8742 [id number] by Benita Rodríguez RN  Outcome: Ongoing   Pain Assessment: 0-10  Pain Level: 3   Patient's Stated Pain Goal: 1   Is pain goal met at this time? Yes   Patient states pain is tolerable, will continue to monitor and reassess for additional pain. Non-Pharmaceutical Pain Intervention(s): Repositioned, Rest   Care plan reviewed with patient. Patient verbalizes understanding of the plan of care and contributes to goal setting.

## 2020-02-24 NOTE — PROGRESS NOTES
[facility]      Patient - Radha Cevallos   MRN -  [id number]   [facility] # - [de-identified]   - 1960      Date of Admission -  2020 11:18 PM  Date of evaluation -  2020  Room - -005-A   Hospital Day - [facility], MD Primary Care Physician - Xochitl Perales     Problem List      Active Hospital Problems    Diagnosis Date Noted    Pneumonia [J18.9] 2020    Acute respiratory failure with hypoxemia ([address] [J96.01] 2020    COPD exacerbation ([facility] [J44.1] 2019    Moderate COPD (chronic obstructive pulmonary disease) ([address] [J44.9] 2011     Reason for Consult    For management of COPD and pneumonia  HPI   History Obtained From: Patient  and electronic medical record. Radha Cevallos is a 63 y.o. male who presented with a 3 week history of cough and shortness of breath. PMHx of COPD, afib, factor V leiden, Hep C, IV drug abuse on Suboxone, CKD, s/p cardiac cath with sents, and S/P tricuspid valve replacement in  endocarditis, and S/P biventricular pacer placement. He reports having cough, runny nose, post nasal drip about 3 weeks ago. He went to his PCP who gave him augmentin for 10days, which he finished on . Prior to presentation he reports worsening of his symptoms, including SOB, green sputum, body aches, fevers, chills, and chest pain that was worse with cough and deep breathing. Denies hemoptysis, nausea, vomiting, constipation, diarrhea, and reports his nasal URI symptoms have improved. In the ED CXR found right base PNA, and he was hypoxic. He was started on steroids, azithromycin, rocephin, an placed on supplemental oxygen. Pt. Reports feeling much better today stating I could barely breathe when I came in.      His outpatient regimen for his COPD consists of albuterol inhaler, Albuterol nebulizer, and he reports that he is supposed to have Spiriva but he does not take it because it is too expensive. He is scheduled for a sleep study on 4/1/2020, and is scheduled to get full PFT on the 14th of April with an office visit on the 20th. He denies history of HIV, or tuberculosis. Reports that he has been clean for several years, since starting suboxone. He reports that he is trying to quit smoking and that he is down to 6-8 cigarettes a day. Past 24 hrs   -On RA  -Sputum growing MRSA  -Ongoing Bilateral expiratory wheezing.   All other systems reviewed  PMHx   Past Medical History      Diagnosis Date    Anxiety disorder     Arthritis     Asthma     Back pain, chronic     Blood circulation, collateral     4 toes partial amputee on L foot    [employer] Bi[employer] ICD 7/1/2014   [facility] [employer] BiV pacemaker 7/1/2014    CAD (coronary artery disease)     CHF (congestive heart failure) (HCC)     Chronic kidney disease     COPD (chronic obstructive pulmonary disease) (HCC)     Depression     Endocarditis     Hepatitis     HEPATITIS C    Hepatitis C     Hypertension     Pneumonia       Past Surgical History        Procedure Laterality Date    BRONCHOSCOPY N/A 9/28/2019    BRONCHOSCOPY performed by Lenore Rivera MD at [facility]    BRONCHOSCOPY N/A 9/28/2019    BRONCHOSCOPY performed by Lenore Rivera MD at [facility] N/A 9/29/2019    BRONCHOSCOPY BIOPSY BRONCHUS performed by Lenore Rivera MD at [facility]    BRONCHOSCOPY  9/29/2019    BRONCHOSCOPY performed by Lenore Rivera MD at [facility]    BRONCHOSCOPY N/A 9/30/2019    BRONCHOSCOPY ALVEOLAR LAVAGE performed by Franky Frazier MD at [facility] N/A 10/16/2019    BRONCHOSCOPY ALVEOLAR LAVAGE performed by Franky Frazier MD at [facility]  12/26/12    Valve replacement    COLONOSCOPY  2016    DEBRIDEMENT Left 01/14/2014    lt hand     HAND DEBRIDEMENT Left 01/09/2014    INCISION AND DRAINAGE    PACEMAKER PLACEMENT  12/26/13    TOE AMPUTATION Bilateral 3/13/2013    1,2,3,4 on left and 2nd on right    TRACHEAL SURGERY N/A 10/18/2019    TRACHEOTOMY PERCUTANEOUS BRONCHOSCOPY performed by Luiza Swann MD at [facility] [facility] ENDOSCOPY  2012     Meds    Current Medications    warfarin  5 mg Oral Once    guaiFENesin  600 mg Oral BID    potassium replacement protocol   Other RX Placeholder    terbinafine   Topical BID    amLODIPine  5 mg Oral Daily    aspirin EC  81 mg Oral Daily    buprenorphine-naloxone  1 Film Sublingual Daily    busPIRone  30 mg Oral BID    pantoprazole  40 mg Oral Daily    QUEtiapine  100 mg Per NG tube Nightly    tamsulosin  0.4 mg Oral Daily    cefTRIAXone (ROCEPHIN) IV  1 g Intravenous Q24H    ipratropium-albuterol  1 ampule Inhalation Q4H WA    sodium chloride flush  10 mL Intravenous 2 times per day    nicotine  1 patch Transdermal Daily    warfarin (COUMADIN) daily dosing (placeholder)   Other RX Placeholder     sodium chloride flush, acetaminophen, acetaminophen, polyethylene glycol, promethazine, ondansetron  IV Drips/Infusions    Home Medications  Medications Prior to Admission: albuterol sulfate HFA (PROAIR HFA) 108 (90 Base) MCG/ACT inhaler, Inhale 2 puffs into the lungs every 6 hours as needed for Wheezing or Shortness of Breath  potassium chloride (KLOR-CON M) 20 MEQ extended release tablet, Take 1 tablet by mouth 2 times daily  amLODIPine (NORVASC) 5 MG tablet, Take 1 tablet by mouth daily Take 5mg  By mouth qd  lisinopril (PRINIVIL;ZESTRIL) 2.5 MG tablet, Take 1 tablet by mouth daily  pantoprazole (PROTONIX) 40 MG tablet, Take 40 mg by mouth daily  warfarin (COUMADIN) 5 MG tablet, Take 5 mg by mouth  tamsulosin (FLOMAX) 0.4 MG capsule, Take 0.4 mg by mouth daily  VILAZODONE HCL 10 MG Tablet,   albuterol (PROVENTIL) (2.5 MG/3ML) 0.083% nebulizer solution, Take 6 mLs by nebulization every 4 hours as needed for Wheezing or Shortness of Breath  QUEtiapine (SEROQUEL) 100 MG tablet, 1 tablet by Per NG tube route nightly (Patient taking differently: 300 mg by Per NG tube route nightly Changed by Celena Temple per patient)  furosemide (LASIX) 40 MG tablet, Take 1 tablet by mouth daily  busPIRone (BUSPAR) 30 MG tablet, Take 30 mg by mouth 2 times daily   aspirin EC 81 MG EC tablet, Take 1 tablet by mouth daily  buprenorphine-naloxone (SUBOXONE) 8-2 MG FILM SL film, Place 1 Film under the tongue daily. .  [DISCONTINUED] gabapentin (NEURONTIN) 100 MG capsule, Take 2 capsules by mouth 3 times daily for 4 days. Diet    DIET CARDIAC; Allergies    Pcn [penicillins]  Social History     Social History     Socioeconomic History    Marital status:      Spouse name: Not on file    Number of children: 3    Years of education: 9    Highest education level: Not on file   Occupational History    Occupation: on disability   Social Needs    Financial resource strain: Not on file    Food insecurity:     Worry: Not on file     Inability: Not on file   SYLLETA needs:     Medical: Not on file     Non-medical: Not on file   Tobacco Use    Smoking status: Current Some Day Smoker     Packs/day: 1.00     Years: 37.00     Pack years: 37.00     Types: Cigarettes     Start date: 10/22/1976     Last attempt to quit: 2019     Years since quittin.4    Smokeless tobacco: Former User    Tobacco comment: pt has smoked 3 packs in last week.  3 cigarettes a day- 20   Substance and Sexual Activity    Alcohol use: No     Comment: quit 9 years ago    Drug use: No    Sexual activity: Yes     Partners: Female   Lifestyle    Physical activity:     Days per week: Not on file     Minutes per session: Not on file    Stress: Not on file   Relationships    Social connections:     Talks on phone: Not on file     Gets together: Not on file     Attends Evangelical service: Not on file     Active member of club or organization: Not on file     Attends meetings of clubs or organizations: Not on file Relationship status: Not on file    Intimate partner violence:     Fear of current or ex partner: Not on file     Emotionally abused: Not on file     Physically abused: Not on file     Forced sexual activity: Not on file   Other Topics Concern    Not on file   Social History Narrative    Not on file     Family History          Problem Relation Age of Onset    Diabetes Mother     Depression Mother     Arthritis Father     Heart Disease Father     Diabetes Brother     Depression Daughter      Sleep History    Never diagnosed with sleep apnea in the past.  Occupational history   Occupation:  He is current working: No  Type of profession: unemployed, disabled. History of tobacco smoking:Yes  Amount of tobacco smokin.0 PPD. Years of tobacco smokin. Quit smoking: No.              Current smoker: Yes. Amount of current tobacco smokin.5 PPD  . History of recreational or IV drug use in the past:Yes     History of exposure to coal mines/coal dust: NO  History of exposure to foundry dust/welding: NO  History of exposure to quarry/silica/sandblasting: NO  History of exposure to asbestos/working with breaks/ships: NO  History of exposure to farm dust: NO  History of recent travel to long distances: NO  History of exposure to birds, pigeons, or chickens in the past:NO  Pet animals at home:No    History of pulmonary embolism in the past: No            History of DVT in the past:Yes           Vitals     height is 5' 9\" (1.753 m) and weight is 235 lb (106.6 kg). His oral temperature is 97.5 °F (36.4 °C). His blood pressure is 126/72 and his pulse is 76. His respiration is 16 and oxygen saturation is 95%. Body mass index is 34.7 kg/m².     SUPPLEMENTAL O2: O2 Flow Rate (L/min): 1 L/min     I/O        Intake/Output Summary (Last 24 hours) at 2020 1335  Last data filed at 2020 1010  Gross per 24 hour   Intake 660 ml   Output --   Net 660 ml     I/O last 3 completed shifts: In: 360 [P.O.:360]  Out: -    No data found. Exam   Physical Exam   Constitutional: No distress onRA. Patient appears moderately built and  moderately nourished. Head: Normocephalic and atraumatic. Mouth/Throat: Oropharynx is clear and moist.  No oral thrush. Eyes: Conjunctivae are normal. Pupils are equal, round. No scleral icterus. Neck: Neck supple. No tracheal deviation present. Cardiovascular: S1 and S2 with no murmur. No peripheral edema  Pulmonary/Chest: Normal effort with bilateral air entry, bilateral expiratory wheezing. No stridor. No respiratory distress. Patient exhibits no tenderness. Abdominal: Soft. Bowel sounds audible. No distension or tenderness to palp. Musculoskeletal: Moves all extremities  Neurological: Patient is alert and oriented to person, place, and time. Skin: Bilateral LE edema with hyperpigmentation  Labs  - Old records and notes have been reviewed in [facility]   ABG  Lab Results   Component Value Date    PH 7.41 11/27/2019    PO2 71 11/27/2019    PCO2 42 11/27/2019    HCO3 27 11/27/2019    O2SAT 94 11/27/2019     Lab Results   Component Value Date    IFIO2 60 10/10/2019    MODE AC 09/29/2019    SETTIDVOL 350 09/29/2019    SETPEEP 6.0 10/10/2019     CBC  No results for input(s): WBC, RBC, HGB, HCT, MCV, MCH, MCHC, RDW, PLT, MPV in the last 72 hours. BMP  Recent Labs     02/23/20  0628      K 3.8      CO2 22*   BUN 24*   CREATININE 0.8   GLUCOSE 250*   CALCIUM 9.2     LFT  No results for input(s): AST, ALT, ALB, BILITOT, ALKPHOS, LIPASE in the last 72 hours. Invalid input(s): AMYLASE  TROP  Lab Results   Component Value Date    TROPONINT < 0.010 02/20/2020    TROPONINT < 0.010 11/27/2019    TROPONINT < 0.010 11/27/2019     BNP  No results for input(s): BNP in the last 72 hours. Lactic Acid  No results for input(s): LACTA in the last 72 hours.   INR  Recent Labs     02/22/20  0616 02/23/20  0628 02/24/20  0604   INR 1.78* 1.80* 1.88*     PTT  No results for input(s): APTT in the last 72 hours. Glucose  No results for input(s): POCGLU in the last 72 hours. UA   No results for input(s): SPECGRAV, PHUR, COLORU, CLARITYU, MUCUS, PROTEINU, BLOODU, RBCUA, WBCUA, BACTERIA, NITRU, GLUCOSEU, BILIRUBINUR, UROBILINOGEN, KETUA, LABCAST, LABCASTTY, AMORPHOS in the last 72 hours. Invalid input(s): CRYSTALS. PFTs 2011       Sleep studies   Scheduled for sleep study 4/1/2020    Cultures    Blood culture X2 no growth preliminary  Flu A/B negative  Sputum cultures: Staphylococcus (coagulase positive)    EKG    02/19/2020   Atrial-sensed ventricular-paced rhythm  Biventricular pacemaker detected  Abnormal ECG    Echocardiogram   TTE procedure:ECHOCARDIOGRAM COMPLETE 2D W DOPPLER W COLOR.   09/28/2019   Summary   Technically difficult examination. Ejection fraction was estimated at 50-55%. Left atrial size was severely dilated. Right atrial size was moderately dilated. S/p TV replacement--unable to visualize leaflets. DOPPLER: Mean gradient   5-8 mmHg. V max = 2.1 m/s. There was trace tricuspid regurgitation. Assuming RAP = 20 mmHg, the estimated RVSP = 50 mmHg. Ascending aorta = 3.6 cm. IVC size is dilated with reduced respirophasic variation (CVP~20 mmHg)      Radiology    CXR  XR CHEST (2 VW)   2/24/2020   FINDINGS: Previous median sternotomy for valve replacement. Heart size is normal.  Multiple pacemaker leads are again evident. No significant pleural effusions. Previous infiltrate right lung base has nearly completely cleared. No new opacities are seen. Impression:  Partial clearing previous right lower lobe pneumonia. XR CHEST (2 VW)   2/20/2020   Small pneumonia or atelectasis at the right base. CT Scans  CTA CHEST W WO CONTRAST   11/23/2019   1. Negative exam for pulmonary embolus. 2. Stable postsurgical sternotomy with visualized tricuspid valve replacement and permanent pacemaker.    3. Underlying

## 2020-02-25 LAB
ANION GAP SERPL CALCULATED.3IONS-SCNC: 12 MEQ/L (ref 8–16)
BLOOD CULTURE, ROUTINE: NORMAL
BLOOD CULTURE, ROUTINE: NORMAL
BUN BLDV-MCNC: 23 MG/DL (ref 7–22)
CALCIUM SERPL-MCNC: 8.8 MG/DL (ref 8.5–10.5)
CHLORIDE BLD-SCNC: 101 MEQ/L (ref 98–111)
CO2: 27 MEQ/L (ref 23–33)
CREAT SERPL-MCNC: 0.9 MG/DL (ref 0.4–1.2)
ERYTHROCYTE [DISTWIDTH] IN BLOOD BY AUTOMATED COUNT: 15.7 % (ref 11.5–14.5)
ERYTHROCYTE [DISTWIDTH] IN BLOOD BY AUTOMATED COUNT: 49.7 FL (ref 35–45)
GFR SERPL CREATININE-BSD FRML MDRD: 87 ML/MIN/1.73M2
GLUCOSE BLD-MCNC: 176 MG/DL (ref 70–108)
HCT VFR BLD CALC: 40.7 % (ref 42–52)
HEMOGLOBIN: 12.4 GM/DL (ref 14–18)
INR BLD: 2.35 (ref 0.85–1.13)
MCH RBC QN AUTO: 26.6 PG (ref 26–33)
MCHC RBC AUTO-ENTMCNC: 30.5 GM/DL (ref 32.2–35.5)
MCV RBC AUTO: 87.2 FL (ref 80–94)
PLATELET # BLD: 197 THOU/MM3 (ref 130–400)
PMV BLD AUTO: 9.8 FL (ref 9.4–12.4)
POTASSIUM SERPL-SCNC: 3.8 MEQ/L (ref 3.5–5.2)
RBC # BLD: 4.67 MILL/MM3 (ref 4.7–6.1)
SODIUM BLD-SCNC: 140 MEQ/L (ref 135–145)
WBC # BLD: 8.2 THOU/MM3 (ref 4.8–10.8)

## 2020-02-25 PROCEDURE — 2580000003 HC RX 258: Performed by: INTERNAL MEDICINE

## 2020-02-25 PROCEDURE — 6370000000 HC RX 637 (ALT 250 FOR IP): Performed by: INTERNAL MEDICINE

## 2020-02-25 PROCEDURE — 36415 COLL VENOUS BLD VENIPUNCTURE: CPT

## 2020-02-25 PROCEDURE — 6360000002 HC RX W HCPCS: Performed by: INTERNAL MEDICINE

## 2020-02-25 PROCEDURE — 94760 N-INVAS EAR/PLS OXIMETRY 1: CPT

## 2020-02-25 PROCEDURE — 85027 COMPLETE CBC AUTOMATED: CPT

## 2020-02-25 PROCEDURE — 94640 AIRWAY INHALATION TREATMENT: CPT

## 2020-02-25 PROCEDURE — 1200000003 HC TELEMETRY R&B

## 2020-02-25 PROCEDURE — 94669 MECHANICAL CHEST WALL OSCILL: CPT

## 2020-02-25 PROCEDURE — 80048 BASIC METABOLIC PNL TOTAL CA: CPT

## 2020-02-25 PROCEDURE — APPSS30 APP SPLIT SHARED TIME 16-30 MINUTES: Performed by: NURSE PRACTITIONER

## 2020-02-25 PROCEDURE — 6370000000 HC RX 637 (ALT 250 FOR IP): Performed by: PHARMACIST

## 2020-02-25 PROCEDURE — 99232 SBSQ HOSP IP/OBS MODERATE 35: CPT | Performed by: FAMILY MEDICINE

## 2020-02-25 PROCEDURE — 2709999900 HC NON-CHARGEABLE SUPPLY

## 2020-02-25 PROCEDURE — 85610 PROTHROMBIN TIME: CPT

## 2020-02-25 PROCEDURE — 6370000000 HC RX 637 (ALT 250 FOR IP): Performed by: FAMILY MEDICINE

## 2020-02-25 PROCEDURE — 99232 SBSQ HOSP IP/OBS MODERATE 35: CPT | Performed by: INTERNAL MEDICINE

## 2020-02-25 RX ORDER — WARFARIN SODIUM 2.5 MG/1
2.5 TABLET ORAL ONCE
Status: COMPLETED | OUTPATIENT
Start: 2020-02-25 | End: 2020-02-25

## 2020-02-25 RX ADMIN — BUPRENORPHINE HYDROCHLORIDE, NALOXONE HYDROCHLORIDE 1 FILM: 8; 2 FILM, SOLUBLE BUCCAL; SUBLINGUAL at 08:55

## 2020-02-25 RX ADMIN — BUSPIRONE HYDROCHLORIDE 30 MG: 10 TABLET ORAL at 08:56

## 2020-02-25 RX ADMIN — IPRATROPIUM BROMIDE AND ALBUTEROL SULFATE 1 AMPULE: .5; 3 SOLUTION RESPIRATORY (INHALATION) at 11:22

## 2020-02-25 RX ADMIN — WARFARIN SODIUM 2.5 MG: 2.5 TABLET ORAL at 18:22

## 2020-02-25 RX ADMIN — GUAIFENESIN 600 MG: 600 TABLET, EXTENDED RELEASE ORAL at 21:00

## 2020-02-25 RX ADMIN — TAMSULOSIN HYDROCHLORIDE 0.4 MG: 0.4 CAPSULE ORAL at 08:54

## 2020-02-25 RX ADMIN — TERBINAFINE HYDROCHLORIDE: 1 CREAM TOPICAL at 09:31

## 2020-02-25 RX ADMIN — BUSPIRONE HYDROCHLORIDE 30 MG: 10 TABLET ORAL at 20:46

## 2020-02-25 RX ADMIN — ACETAMINOPHEN 650 MG: 325 TABLET ORAL at 20:45

## 2020-02-25 RX ADMIN — VANCOMYCIN HYDROCHLORIDE 1500 MG: 5 INJECTION, POWDER, LYOPHILIZED, FOR SOLUTION INTRAVENOUS at 08:56

## 2020-02-25 RX ADMIN — VANCOMYCIN HYDROCHLORIDE 1500 MG: 5 INJECTION, POWDER, LYOPHILIZED, FOR SOLUTION INTRAVENOUS at 20:46

## 2020-02-25 RX ADMIN — IPRATROPIUM BROMIDE AND ALBUTEROL SULFATE 1 AMPULE: .5; 3 SOLUTION RESPIRATORY (INHALATION) at 15:27

## 2020-02-25 RX ADMIN — PANTOPRAZOLE SODIUM 40 MG: 40 TABLET, DELAYED RELEASE ORAL at 08:55

## 2020-02-25 RX ADMIN — AMLODIPINE BESYLATE 5 MG: 5 TABLET ORAL at 08:54

## 2020-02-25 RX ADMIN — ASPIRIN 81 MG: 81 TABLET ORAL at 08:55

## 2020-02-25 RX ADMIN — IPRATROPIUM BROMIDE AND ALBUTEROL SULFATE 1 AMPULE: .5; 3 SOLUTION RESPIRATORY (INHALATION) at 20:25

## 2020-02-25 RX ADMIN — SODIUM CHLORIDE, PRESERVATIVE FREE 10 ML: 5 INJECTION INTRAVENOUS at 08:55

## 2020-02-25 RX ADMIN — GUAIFENESIN 600 MG: 600 TABLET, EXTENDED RELEASE ORAL at 08:56

## 2020-02-25 RX ADMIN — QUETIAPINE FUMARATE 100 MG: 100 TABLET ORAL at 20:45

## 2020-02-25 RX ADMIN — SODIUM CHLORIDE, PRESERVATIVE FREE 10 ML: 5 INJECTION INTRAVENOUS at 20:45

## 2020-02-25 RX ADMIN — ACETAMINOPHEN 650 MG: 325 TABLET ORAL at 08:54

## 2020-02-25 RX ADMIN — IPRATROPIUM BROMIDE AND ALBUTEROL SULFATE 1 AMPULE: .5; 3 SOLUTION RESPIRATORY (INHALATION) at 07:39

## 2020-02-25 ASSESSMENT — PAIN DESCRIPTION - FREQUENCY
FREQUENCY: INTERMITTENT

## 2020-02-25 ASSESSMENT — PAIN DESCRIPTION - LOCATION
LOCATION: GENERALIZED
LOCATION: HEAD
LOCATION: GENERALIZED

## 2020-02-25 ASSESSMENT — PAIN DESCRIPTION - ONSET
ONSET: ON-GOING
ONSET: AWAKENED FROM SLEEP

## 2020-02-25 ASSESSMENT — PAIN DESCRIPTION - DESCRIPTORS
DESCRIPTORS: ACHING

## 2020-02-25 ASSESSMENT — PAIN DESCRIPTION - PAIN TYPE
TYPE: ACUTE PAIN

## 2020-02-25 ASSESSMENT — PAIN SCALES - GENERAL
PAINLEVEL_OUTOF10: 0
PAINLEVEL_OUTOF10: 1
PAINLEVEL_OUTOF10: 2
PAINLEVEL_OUTOF10: 2
PAINLEVEL_OUTOF10: 3
PAINLEVEL_OUTOF10: 2
PAINLEVEL_OUTOF10: 1
PAINLEVEL_OUTOF10: 1
PAINLEVEL_OUTOF10: 2
PAINLEVEL_OUTOF10: 3
PAINLEVEL_OUTOF10: 1

## 2020-02-25 ASSESSMENT — PAIN DESCRIPTION - PROGRESSION
CLINICAL_PROGRESSION: NOT CHANGED
CLINICAL_PROGRESSION: NOT CHANGED

## 2020-02-25 ASSESSMENT — PAIN - FUNCTIONAL ASSESSMENT: PAIN_FUNCTIONAL_ASSESSMENT: ACTIVITIES ARE NOT PREVENTED

## 2020-02-25 NOTE — PROGRESS NOTES
organization: Not on file     Attends meetings of clubs or organizations: Not on file     Relationship status: Not on file    Intimate partner violence:     Fear of current or ex partner: Not on file     Emotionally abused: Not on file     Physically abused: Not on file     Forced sexual activity: Not on file   Other Topics Concern    Not on file   Social History Narrative    Not on file     Family History          Problem Relation Age of Onset    Diabetes Mother     Depression Mother     Arthritis Father     Heart Disease Father     Diabetes Brother     Depression Daughter      Sleep History    Never diagnosed with sleep apnea in the past.  Occupational history   Occupation:  He is current working: No  Type of profession: unemployed, disabled. History of tobacco smoking:Yes  Amount of tobacco smokin.0 PPD. Years of tobacco smokin. Quit smoking: No.              Current smoker: Yes. Amount of current tobacco smokin.5 PPD  . History of recreational or IV drug use in the past:Yes     History of exposure to coal mines/coal dust: NO  History of exposure to foundry dust/welding: NO  History of exposure to quarry/silica/sandblasting: NO  History of exposure to asbestos/working with breaks/ships: NO  History of exposure to farm dust: NO  History of recent travel to long distances: NO  History of exposure to birds, pigeons, or chickens in the past:NO  Pet animals at home:No    History of pulmonary embolism in the past: No            History of DVT in the past:Yes           Vitals     height is 5' 9\" (1.753 m) and weight is 235 lb (106.6 kg). His oral temperature is 98.1 °F (36.7 °C). His blood pressure is 113/68 and his pulse is 82. His respiration is 18 and oxygen saturation is 91%. Body mass index is 34.7 kg/m².     SUPPLEMENTAL O2: O2 Flow Rate (L/min): 1 L/min     I/O        Intake/Output Summary (Last 24 hours) at 2020 11/23/2019   1. Negative exam for pulmonary embolus. 2. Stable postsurgical sternotomy with visualized tricuspid valve replacement and permanent pacemaker. 3. Underlying COPD changes with scattered areas of mild groundglass attenuation. Findings may reflect superimposed bronchitis. (See actual reports for details)    Assessment   -Right lower lobe pneumonia due to MRSA. 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 Vangie Colorado on 4/20/2020 with LDCT and PFT on 4/14/2020     Case discussed with nurse and patient/family. Questions and concerns addressed. Electronically signed by   TAMMI Figueroa CNP on 2/25/2020 at 4:09 PM     Addendum by Dr. Vangie Colorado MD:  I have seen and examined the patient independently. Face to face evaluation and examination was performed. The above evaluation and note has been reviewed. Labs and radiographs were reviewed. I Have discussed with Mr. Kate Cox CNP about this patient in detail. The above assessment and plan has been reviewed. Please see my modifications mentioned below. My modifications:  Improving shortness of breath. ID consult appreciated. Antibiotics per ID service.     Jaden Cancino MD 2/25/2020 5:42 PM

## 2020-02-25 NOTE — PLAN OF CARE
Problem: Impaired respiratory status  Goal: Clear lung sounds  Description  Clear lung sounds     2/24/2020 2044 by Quang Rodriguez RCP  Outcome: Ongoing  Note:   Treatment will be continued as ordered to improve aeration.

## 2020-02-25 NOTE — CARE COORDINATION
2/25/20, 11:01 AM    DISCHARGE ON [facility] day: 5  Location: [facility]05/005-A Reason for admit: Pneumonia [J18.9]   Procedure: N/A  Treatment Plan of Care: Pulmonology and ID following, IV antibiotics changed (Rocephin discontinued and IV Vancomycin per pharmacy dosing started), Duoneb nebulizer, Mucinex daily, Nicotine patch for smoking cessation, Coumadin per pharmacy dosing, Potassium replacement protocol, prn Tylenol, Zofran, Glycolax,and Phenergan, BMP, CBC, HmgA1c, Lactic acid and INR in a.m., oxygen, acapella, telemetry, up with assistance. Barriers to Discharge: Medical stability, Dr. Liana Sheets reports IV antibiotics for 1-2 more days. Patient is still wheezy, and requiring oxygen with ambulation. PCP: Unknown [facility]  Readmission Risk Score: 39%  Patient Goals/Plan/Treatment Preferences: Home with his family.

## 2020-02-25 NOTE — PROGRESS NOTES
Hospitalist Progress Note    Patient:  Mahesh Lazar      Unit/Bed:5K-05/005-A    YOB: 1960    MRN: [id number]       Acct: [de-identified]     PCP: Krista Oviedo    Date of Admission: 2/19/2020    Assessment/Plan:    Anticipated Discharge in :     LUCAS/Ernestina Escobar [address] Problems    Diagnosis Date Noted    Pneumonia [J18.9] 02/20/2020    Acute respiratory failure with hypoxemia ([address] [J96.01] 02/20/2020    COPD exacerbation ([address] [J44.1] 09/27/2019    Moderate COPD (chronic obstructive pulmonary disease) ([address] [J44.9] 12/12/2011     Right Basilar PNA secondary to MRSA  failed outpatient treatment with Augmentin  Has been afebrile since admission. Flu negative, respiratory culture back on 2/24, positive for MRSA  patient was on Rocephin since admission, switched to vancomycin on 2/24  Repeat chest x-ray on 2/24 showed partial clearing of previous right lower lobe pneumonia  continue guaifenesin twice daily, Metanebs with albuterol  ID following    Acute COPD Exacerbation  Patient started on start IV Solumedrol 40, switch to prednisone and finished a total of 5 days of steroid therapy  LAMA/LABA given by pulmonology for discharge  Continue rhonchal dilators as needed for wheezing and shortness of breath, can be given with [address]  Pulmonology consulted     Acute hypoxic respiratory failure  2/2 to #1 and #2.   83% on RA on initial presentation. He was started on 4 LPM O2 per nasal cannula, was weaned off O2 on 2/23  Currently saturating well on room air  Home O2 done on 2/24, does not require oxygen at rest but requires 3 LPM O2 on exertion  There Home O2 evaluation should probably be done prior to discharge     Sepsis (POA)  febrile, tachycardic, elevated WBC count, with chest x-ray result of right lower lobe pneumonia   Treatment as above  No growth on blood culture to date  CBC in a.m.     Hyperglycemia likely steroids induced  Rule out diabetes  Glucose around 200s while on stopped. ID consulted. Home O2 evaluation was done, patient was not requiring oxygen at rest, but requiring 3 LPM on exertion. Subjective:   Patient seen and examined. He feels better today, although feels a bit short of breath on exertion, cannot expectorate thick phlegm out. [place] nebs and Acapella ordered, not done yet. He is saturating well on room air. He denies any chest pain, palpitations, nausea or vomiting. Vital signs stable, no new complaints. Medications:  Reviewed    Infusion Medications   Scheduled Medications    vancomycin  1,500 mg Intravenous Q12H    vancomycin (VANCOCIN) intermittent dosing (placeholder)   Other RX Placeholder    guaiFENesin  600 mg Oral BID    potassium replacement protocol   Other RX Placeholder    terbinafine   Topical BID    amLODIPine  5 mg Oral Daily    aspirin EC  81 mg Oral Daily    buprenorphine-naloxone  1 Film Sublingual Daily    busPIRone  30 mg Oral BID    pantoprazole  40 mg Oral Daily    QUEtiapine  100 mg Per NG tube Nightly    tamsulosin  0.4 mg Oral Daily    ipratropium-albuterol  1 ampule Inhalation Q4H WA    sodium chloride flush  10 mL Intravenous 2 times per day    nicotine  1 patch Transdermal Daily    warfarin (COUMADIN) daily dosing (placeholder)   Other RX Placeholder     PRN Meds: sodium chloride flush, acetaminophen, acetaminophen, polyethylene glycol, promethazine, ondansetron      Intake/Output Summary (Last 24 hours) at 2/25/2020 0951  Last data filed at 2/25/2020 0236  Gross per 24 hour   Intake 1157.49 ml   Output --   Net 1157.49 ml       Diet:  DIET CARDIAC; Exam:  /70   Pulse 90   Temp 97.6 °F (36.4 °C) (Oral)   Resp 16   Ht 5' 9\" (1.753 m)   Wt 235 lb (106.6 kg)   SpO2 91% Comment: No oxygen needed a this time  BMI 34.70 kg/m²     General appearance: No apparent distress, appears stated age and cooperative. HEENT: Pupils equal, round, and reactive to light. Conjunctivae/corneas clear.   Neck: Supple, the right base. **This report has been created using voice recognition software. It may contain minor errors which are inherent in voice recognition technology. **      Final report electronically signed by Dr. Berkley Wilde on 2/20/2020 12:19 AM          Diet: DIET CARDIAC;    DVT prophylaxis: [] Lovenox                                 [] SCDs                                 [] SQ Heparin                                 [] Encourage ambulation           [] Already on Anticoagulation     Disposition:    [] Home       [] TCU       [] Rehab       [] Psych       [] SNF       [] [place]       [] Other-    Code Status: Full Code    PT/OT Eval Status:       Electronically signed by Sarah Easley MD on 2/25/2020 at 9:51 AM

## 2020-02-25 NOTE — PROGRESS NOTES
Clinical Pharmacy Note    Warfarin consult follow-up    Recent Labs     02/25/20  0701   INR 2.35*     Recent Labs     02/25/20  0701   HGB 12.4*   HCT 40.7*          Significant Drug-Drug Interactions:  New warfarin drug-drug interactions: none   Discontinued drug-drug interactions: none  Current warfarin drug-drug interactions: aspirin     Date INR Warfarin Dose   2/20/2020 2.52 (from 2/19/20) 5 mg   2/21/2020  1.95   4 mg   2/22/2020  1.78   6 mg   2/23/2020  1.80  7.5 mg     2/24/2020  1.88   5 mg     2/25/2020 2.35 2.5 mg              Notes:                     Daily PT/INR until stable within therapeutic range.      Jad Hester PharmD, [facility]   2/25/2020  4:32 PM

## 2020-02-25 NOTE — CONSULTS
CONSULTATION NOTE :ID       Patient - Jayce Ch,  Age - 63 y.o.    - 1960      Room Number - 5K-05/005-A   MRN -  [id number]   Acct # - [de-identified]  Date of Admission -  2020 11:18 PM  Patient's PCP: Ozzie Boucher     Requesting Physician: Nghia Sylvester MD    REASON FOR CONSULTATION   MRSA pneumonia. HISTORY OF PRESENT ILLNESS       This is a very pleasant 63 y.o. male who was admitted to the hospital with a chief complaints of cough and shortness of breath. He had been having cough with shortness of breath following a viral illness. He was treated as outpatient however he continued to have cough and progressively he became very short of breath for which reason he came to hospital he was noted to have MRSA in his sputum he has been on IV vancomycin. I was asked to see this patient and make recommendation. He has history of coronary artery disease congestive heart failure COPD. He was treated in the past for tricuspid valve endocarditis with septic emboli he required valve replacement. He has been clean since his surgery. He came from home he does not smoke. He is known to carry MRSA.   He denies any chest pain his breathing is slowly getting better  PAST MEDICAL  HISTORY       Past Medical History:   Diagnosis Date    Anxiety disorder     Arthritis     Asthma     Back pain, chronic     Blood circulation, collateral     4 toes partial amputee on L foot    [employer] BiV ICD 2014   [facility] Scientific BiV pacemaker 2014    CAD (coronary artery disease)     CHF (congestive heart failure) (HCC)     Chronic kidney disease     COPD (chronic obstructive pulmonary disease) (HCC)     Depression     Endocarditis     Hepatitis     HEPATITIS C    Hepatitis C     Hypertension     Pneumonia        PAST SURGICAL HISTORY     Past Surgical History:   Procedure Laterality Date    BRONCHOSCOPY N/A 2019    BRONCHOSCOPY performed [facility]ae Drummond [facility]    BRONCHOSCOPY N/A 9/28/2019    BRONCHOSCOPY performed by Jenae Drummond MD at [address] N/A 9/29/2019    BRONCHOSCOPY BIOPSY BRONCHUS performed by Jenae Drummond MD at [facility] [place] [facility]    BRONCHOSCOPY  9/29/2019    BRONCHOSCOPY performed by Jenae Drummond MD at [facility] [place]    BRONCHOSCOPY N/A 9/30/2019    BRONCHOSCOPY ALVEOLAR LAVAGE performed by Amaya Christianson MD at [address] N/A 10/16/2019    BRONCHOSCOPY ALVEOLAR LAVAGE performed by Amaya Christianson MD at [address]  12/26/12    Valve replacement    COLONOSCOPY  2016    DEBRIDEMENT Left 01/14/2014    lt hand     HAND DEBRIDEMENT Left 01/09/2014    INCISION AND DRAINAGE    PACEMAKER PLACEMENT  12/26/13    TOE AMPUTATION Bilateral 3/13/2013    1,2,3,4 on left and 2nd on right    TRACHEAL SURGERY N/A 10/18/2019    TRACHEOTOMY PERCUTANEOUS BRONCHOSCOPY performed by Amaya Christianson MD at [facility]  2012         MEDICATIONS:       Scheduled Meds:   vancomycin  1,500 mg Intravenous Q12H    vancomycin (VANCOCIN) intermittent dosing (placeholder)   Other RX Placeholder    guaiFENesin  600 mg Oral BID    potassium replacement protocol   Other RX Placeholder    terbinafine   Topical BID    amLODIPine  5 mg Oral Daily    aspirin EC  81 mg Oral Daily    buprenorphine-naloxone  1 Film Sublingual Daily    busPIRone  30 mg Oral BID    pantoprazole  40 mg Oral Daily    QUEtiapine  100 mg Per NG tube Nightly    tamsulosin  0.4 mg Oral Daily    ipratropium-albuterol  1 ampule Inhalation Q4H WA    sodium chloride flush  10 mL Intravenous 2 times per day    nicotine  1 patch Transdermal Daily    warfarin (COUMADIN) daily dosing (placeholder)   Other RX Placeholder     Continuous Infusions:  PRN Meds:sodium chloride flush, acetaminophen, acetaminophen, polyethylene glycol, promethazine, ondansetron  Allergies: ALLERGIES:    Pcn [penicillins]        SOCIAL HISTORY:     TOBACCO:   reports that he has been smoking cigarettes. He started smoking about 43 years ago. He has a 37.00 pack-year smoking history. He has quit using smokeless tobacco.     ETOH:   reports no history of alcohol use. Patient currently lives with family        FAMILY HISTORY:         Problem Relation Age of Onset    Diabetes Mother     Depression Mother     Arthritis Father     Heart Disease Father     Diabetes Brother     Depression Daughter        REVIEW OF SYSTEMS:     Constitutional: no fever, no night sweats, no fatigue. Head: no head ache , no head injury, no migranes. Eye: no blurring of vision, no double vision. Ears: no hearing difficulty, no tinnitus  Mouth/throat: no ulceration, dental caries , dysphagia  Lungs: As noted in HPI.  CVS: no palpitation, no chest pain, no shortness of breath  GI: no abdominal pain, no nausea , no vomiting, no constipation  HONG: no dysuria, frequency and urgency, no hematuria, no kidney stones  Musculoskeletal: no joint pain, swelling , stiffness,  Endocrine: no polyuria, polydipsia, no cold or heat intolerance  Hematology: no anemia, no easy brusing or bleeding, no hx of clotting disorder  Dermatology: no skin rash, no eczema, no pruritis,  Psychiatry: no depression, no anxiety,no panic attacks, no suicide ideation    PHYSICAL EXAM:     /77   Pulse 91   Temp 98.5 °F (36.9 °C) (Oral)   Resp 18   Ht 5' 9\" (1.753 m)   Wt 235 lb (106.6 kg)   SpO2 91%   BMI 34.70 kg/m²   General:  Awake, alert, not in distress. HEENT: Slightly pale conjunctiva, unicteric sclera, moist oral mucosa. Chest:   Bilateral air entry, diminished breath sounds on lower lung fields. Cardiovascular:  RRR ,S1S2, no murmur or gallop. Abdomen:  Soft, non tender to palpation. Extremities: Chronic skin changes  Skin:  Warm and dry. CNS: Oriented to person place and time.         LABS:     CBC: No results for input(s): WBC, HGB, PLT in the last 72 hours.   BMP:    Recent Labs     02/23/20  0628      K 3.8      CO2 22*   BUN 24*   CREATININE 0.8   GLUCOSE 250*     Calcium:  Recent Labs     02/23/20  0628   CALCIUM 9.2    INR:   Recent Labs     02/24/20  0604   INR 1.88*    CXR:    Micro:   Lab Results   Component Value Date    BC No growth-preliminary  02/19/2020       Problem list of patient      Patient Active Problem List   Diagnosis Code    Moderate COPD (chronic obstructive pulmonary disease) ([facility] J44.9    Community acquired pneumonia J18.9    Hyponatremia E87.1    Suicidal ideation R45.851    SIRS (systemic inflammatory response syndrome) ([facility] R65.10    Sepsis due to methicillin susceptible Staphylococcus aureus ([facility] A41.01    Severe sepsis ([facility] A41.9, R65.20    HCAP (healthcare-associated pneumonia) J18.9    Hyponatremia E87.1    Lung nodules R91.8    History of intravenous drug use in remission Z87.898    Malnutrition ([facility] E46    Coagulopathy (HCC) D68.9    Acute blood loss anemia D62    Complete heart block, post-surgical ([facility]) I97.89, I44.2    Endocarditis, bacterial, acute/subacute I33.0    WILMA (acute kidney injury) ([facility] N17.9    Leukocytosis D72.829    Physical deconditioning R53.81    Cellulitis L03.90    Abdominal pain, acute R10.9    Microscopic hematuria R31.29    [employer] BiV pacemaker Z95.0    Abnormal CT of the abdomen R93.5    Generalized abdominal pain R10.84    GERD (gastroesophageal reflux disease) K21.9    Non-intractable vomiting with nausea R11.2    Hx of tricuspid valve repair Z98.890    History of colonic polyps Z86.010    Second degree hemorrhoids K64.1    History of tricuspid valve replacement with bioprosthetic valve 2012 Z95.3    Chronic diastolic congestive heart failure (HCC) I50.32    SOB (shortness of breath) on exertion Z39.08    Folliculitis of perineum L73.9    S/P cardiac cath 12/5/1806-EW-[id number], LAD -PATENT, LCX-PATENT , RCA PATENT, EDP 15 MMHG, NO GRADIENT- MED RX Z98.890    Essential hypertension I10    COPD exacerbation ([facility]) J44.1    Acute respiratory failure with hypoxia and hypercapnia ([facility]) J96.01, J96.02    Encephalopathy acute G93.40    PAF (paroxysmal atrial fibrillation) ([facility]) nioted on device check with 4% burden I48.0    Pneumonia J18.9    Acute respiratory failure with hypoxemia ([facility]) J96.01           Impression and Recommendation:   Pneumonia due to MRSA: Continue IV vancomycin. Will transition to oral Bactrim with close monitoring of his INR on discharge  COPD  CHF  History of MRSA colonization  Infection Control:   Contact isolation. Thank you Israel Kaufman MD for allowing me to participate in this patient's care.     Anahi Ovalles MD,FACP 2/24/2020 8:15 PM

## 2020-02-25 NOTE — PROGRESS NOTES
Pharmacy Note  Vancomycin Consult    Onur Espino is a 63 y.o. male re-started on Vancomycin for pneumonia; consult received from Dr. Medina Both to manage therapy. Also receiving the following antibiotics: none.     Patient Active Problem List   Diagnosis    Moderate COPD (chronic obstructive pulmonary disease) ([facility]    Community acquired pneumonia    Hyponatremia    Suicidal ideation    SIRS (systemic inflammatory response syndrome) ([facility])    Sepsis due to methicillin susceptible Staphylococcus aureus (HCC)    Severe sepsis ([facility])    HCAP (healthcare-associated pneumonia)    Hyponatremia    Lung nodules    History of intravenous drug use in remission    Malnutrition (HCC)    Coagulopathy (HCC)    Acute blood loss anemia    Complete heart block, post-surgical ([facility])    Endocarditis, bacterial, acute/subacute    WILMA (acute kidney injury) ([facility]    Leukocytosis    Physical deconditioning    Cellulitis    Abdominal pain, acute    Microscopic hematuria    [employer] BiV pacemaker    Abnormal CT of the abdomen    Generalized abdominal pain    GERD (gastroesophageal reflux disease)    Non-intractable vomiting with nausea    Hx of tricuspid valve repair    History of colonic polyps    Second degree hemorrhoids    History of tricuspid valve replacement with bioprosthetic valve 2012    Chronic diastolic congestive heart failure (HCC)    SOB (shortness of breath) on exertion    Folliculitis of perineum    S/P cardiac cath 12/5/1864-[id number]-[id number], LAD -PATENT, LCX-PATENT , RCA PATENT, EDP 15 MMHG, NO GRADIENT- MED RX    Essential hypertension    COPD exacerbation (HCC)    Acute respiratory failure with hypoxia and hypercapnia ([facility])    Encephalopathy acute    PAF (paroxysmal atrial fibrillation) ([facility]) nioted on device check with 4% burden    Pneumonia    Acute respiratory failure with hypoxemia ([facility])       Allergies:  Pcn [penicillins]     Temp max: 98.5    Recent Labs     02/23/20  0628   BUN 24*       Recent Labs

## 2020-02-26 LAB
ANION GAP SERPL CALCULATED.3IONS-SCNC: 12 MEQ/L (ref 8–16)
AVERAGE GLUCOSE: 165 MG/DL (ref 70–126)
BUN BLDV-MCNC: 19 MG/DL (ref 7–22)
CALCIUM SERPL-MCNC: 8.9 MG/DL (ref 8.5–10.5)
CHLORIDE BLD-SCNC: 103 MEQ/L (ref 98–111)
CO2: 25 MEQ/L (ref 23–33)
CREAT SERPL-MCNC: 0.9 MG/DL (ref 0.4–1.2)
ERYTHROCYTE [DISTWIDTH] IN BLOOD BY AUTOMATED COUNT: 15.9 % (ref 11.5–14.5)
ERYTHROCYTE [DISTWIDTH] IN BLOOD BY AUTOMATED COUNT: 49.8 FL (ref 35–45)
GFR SERPL CREATININE-BSD FRML MDRD: 87 ML/MIN/1.73M2
GLUCOSE BLD-MCNC: 136 MG/DL (ref 70–108)
HBA1C MFR BLD: 7.5 % (ref 4.4–6.4)
HCT VFR BLD CALC: 43.2 % (ref 42–52)
HEMOGLOBIN: 13.4 GM/DL (ref 14–18)
INR BLD: 2.43 (ref 0.85–1.13)
LACTIC ACID: 1.6 MMOL/L (ref 0.5–2.2)
MCH RBC QN AUTO: 27 PG (ref 26–33)
MCHC RBC AUTO-ENTMCNC: 31 GM/DL (ref 32.2–35.5)
MCV RBC AUTO: 87.1 FL (ref 80–94)
PLATELET # BLD: 176 THOU/MM3 (ref 130–400)
PMV BLD AUTO: 9.7 FL (ref 9.4–12.4)
POTASSIUM SERPL-SCNC: 4.3 MEQ/L (ref 3.5–5.2)
RBC # BLD: 4.96 MILL/MM3 (ref 4.7–6.1)
SODIUM BLD-SCNC: 140 MEQ/L (ref 135–145)
VANCOMYCIN TROUGH: 18.7 UG/ML (ref 5–15)
WBC # BLD: 7.1 THOU/MM3 (ref 4.8–10.8)

## 2020-02-26 PROCEDURE — 80202 ASSAY OF VANCOMYCIN: CPT

## 2020-02-26 PROCEDURE — 80048 BASIC METABOLIC PNL TOTAL CA: CPT

## 2020-02-26 PROCEDURE — 99232 SBSQ HOSP IP/OBS MODERATE 35: CPT | Performed by: INTERNAL MEDICINE

## 2020-02-26 PROCEDURE — 6360000002 HC RX W HCPCS: Performed by: INTERNAL MEDICINE

## 2020-02-26 PROCEDURE — 6370000000 HC RX 637 (ALT 250 FOR IP): Performed by: INTERNAL MEDICINE

## 2020-02-26 PROCEDURE — 36415 COLL VENOUS BLD VENIPUNCTURE: CPT

## 2020-02-26 PROCEDURE — 83036 HEMOGLOBIN GLYCOSYLATED A1C: CPT

## 2020-02-26 PROCEDURE — 1200000003 HC TELEMETRY R&B

## 2020-02-26 PROCEDURE — 85610 PROTHROMBIN TIME: CPT

## 2020-02-26 PROCEDURE — 94640 AIRWAY INHALATION TREATMENT: CPT

## 2020-02-26 PROCEDURE — 2580000003 HC RX 258: Performed by: INTERNAL MEDICINE

## 2020-02-26 PROCEDURE — APPSS30 APP SPLIT SHARED TIME 16-30 MINUTES: Performed by: NURSE PRACTITIONER

## 2020-02-26 PROCEDURE — 94761 N-INVAS EAR/PLS OXIMETRY MLT: CPT

## 2020-02-26 PROCEDURE — 6370000000 HC RX 637 (ALT 250 FOR IP): Performed by: FAMILY MEDICINE

## 2020-02-26 PROCEDURE — 99232 SBSQ HOSP IP/OBS MODERATE 35: CPT | Performed by: FAMILY MEDICINE

## 2020-02-26 PROCEDURE — 83605 ASSAY OF LACTIC ACID: CPT

## 2020-02-26 PROCEDURE — 85027 COMPLETE CBC AUTOMATED: CPT

## 2020-02-26 RX ORDER — WARFARIN SODIUM 2.5 MG/1
2.5 TABLET ORAL
Status: COMPLETED | OUTPATIENT
Start: 2020-02-26 | End: 2020-02-26

## 2020-02-26 RX ADMIN — PANTOPRAZOLE SODIUM 40 MG: 40 TABLET, DELAYED RELEASE ORAL at 08:34

## 2020-02-26 RX ADMIN — IPRATROPIUM BROMIDE AND ALBUTEROL SULFATE 1 AMPULE: .5; 3 SOLUTION RESPIRATORY (INHALATION) at 21:32

## 2020-02-26 RX ADMIN — IPRATROPIUM BROMIDE AND ALBUTEROL SULFATE 1 AMPULE: .5; 3 SOLUTION RESPIRATORY (INHALATION) at 08:41

## 2020-02-26 RX ADMIN — TAMSULOSIN HYDROCHLORIDE 0.4 MG: 0.4 CAPSULE ORAL at 10:18

## 2020-02-26 RX ADMIN — QUETIAPINE FUMARATE 100 MG: 100 TABLET ORAL at 22:12

## 2020-02-26 RX ADMIN — IPRATROPIUM BROMIDE AND ALBUTEROL SULFATE 1 AMPULE: .5; 3 SOLUTION RESPIRATORY (INHALATION) at 13:05

## 2020-02-26 RX ADMIN — ACETAMINOPHEN 650 MG: 325 TABLET ORAL at 16:27

## 2020-02-26 RX ADMIN — SODIUM CHLORIDE, PRESERVATIVE FREE 10 ML: 5 INJECTION INTRAVENOUS at 08:59

## 2020-02-26 RX ADMIN — GUAIFENESIN 600 MG: 600 TABLET, EXTENDED RELEASE ORAL at 20:58

## 2020-02-26 RX ADMIN — ACETAMINOPHEN 650 MG: 325 TABLET ORAL at 22:56

## 2020-02-26 RX ADMIN — TERBINAFINE HYDROCHLORIDE: 1 CREAM TOPICAL at 22:13

## 2020-02-26 RX ADMIN — GUAIFENESIN 600 MG: 600 TABLET, EXTENDED RELEASE ORAL at 08:34

## 2020-02-26 RX ADMIN — BUSPIRONE HYDROCHLORIDE 30 MG: 10 TABLET ORAL at 22:12

## 2020-02-26 RX ADMIN — ASPIRIN 81 MG: 81 TABLET ORAL at 08:34

## 2020-02-26 RX ADMIN — WARFARIN SODIUM 2.5 MG: 2.5 TABLET ORAL at 18:43

## 2020-02-26 RX ADMIN — BUSPIRONE HYDROCHLORIDE 30 MG: 10 TABLET ORAL at 10:18

## 2020-02-26 RX ADMIN — ACETAMINOPHEN 650 MG: 325 TABLET ORAL at 08:34

## 2020-02-26 RX ADMIN — BUPRENORPHINE HYDROCHLORIDE, NALOXONE HYDROCHLORIDE 1 FILM: 8; 2 FILM, SOLUBLE BUCCAL; SUBLINGUAL at 08:59

## 2020-02-26 RX ADMIN — VANCOMYCIN HYDROCHLORIDE 1500 MG: 5 INJECTION, POWDER, LYOPHILIZED, FOR SOLUTION INTRAVENOUS at 20:55

## 2020-02-26 RX ADMIN — AMLODIPINE BESYLATE 5 MG: 5 TABLET ORAL at 10:18

## 2020-02-26 RX ADMIN — SODIUM CHLORIDE, PRESERVATIVE FREE 10 ML: 5 INJECTION INTRAVENOUS at 20:55

## 2020-02-26 RX ADMIN — VANCOMYCIN HYDROCHLORIDE 1500 MG: 5 INJECTION, POWDER, LYOPHILIZED, FOR SOLUTION INTRAVENOUS at 08:58

## 2020-02-26 RX ADMIN — IPRATROPIUM BROMIDE AND ALBUTEROL SULFATE 1 AMPULE: .5; 3 SOLUTION RESPIRATORY (INHALATION) at 16:56

## 2020-02-26 ASSESSMENT — PAIN DESCRIPTION - PROGRESSION
CLINICAL_PROGRESSION: NOT CHANGED

## 2020-02-26 ASSESSMENT — PAIN DESCRIPTION - LOCATION
LOCATION: HEAD
LOCATION: HEAD

## 2020-02-26 ASSESSMENT — PAIN SCALES - GENERAL
PAINLEVEL_OUTOF10: 2
PAINLEVEL_OUTOF10: 2
PAINLEVEL_OUTOF10: 1
PAINLEVEL_OUTOF10: 2
PAINLEVEL_OUTOF10: 2
PAINLEVEL_OUTOF10: 0

## 2020-02-26 ASSESSMENT — PAIN DESCRIPTION - DESCRIPTORS
DESCRIPTORS: ACHING
DESCRIPTORS: ACHING

## 2020-02-26 ASSESSMENT — PAIN DESCRIPTION - PAIN TYPE
TYPE: ACUTE PAIN
TYPE: ACUTE PAIN

## 2020-02-26 ASSESSMENT — PAIN - FUNCTIONAL ASSESSMENT: PAIN_FUNCTIONAL_ASSESSMENT: ACTIVITIES ARE NOT PREVENTED

## 2020-02-26 ASSESSMENT — PAIN DESCRIPTION - FREQUENCY
FREQUENCY: INTERMITTENT
FREQUENCY: INTERMITTENT

## 2020-02-26 ASSESSMENT — PAIN DESCRIPTION - ONSET
ONSET: ON-GOING
ONSET: ON-GOING

## 2020-02-26 NOTE — PROGRESS NOTES
Hospitalist Progress Note    Patient:  Cameron Spencer      Unit/Bed:5K-05/005-A    YOB: 1960    MRN: [id number]       Acct: [de-identified]     PCP: Mariano Cheung    Date of Admission: 2/19/2020      Chief Complaint: Shortness of breath    Hospital Course:       Patient is 63 y.o. male who presented to [facility] with complaints of fevers, chills, productive cough and sob. Pt has a pmh of A. Fib, Factor V Leiden, Hepatitis C, Tricuspid Valve Endocarditis, history of IVDA, CKD, and COPD.      Pt reports to have had symptoms of fatigue, chills, productive cough, and sob over the past week. Was seen by his PCP, placed on Augmentin. Pt continued to have progression of his symptoms despite Abx therapy causing him to come to the ED. Pt denies any sick contacts. Reports being very sob with minimal activity. Pt continues to smoke cigarettes. Pt denies any nausea, vomiting, diarrhea, chest pain, abdominal pain, black or red colored stools, burning with urination, numbness or weakness.     In the ED, pt hypoxic 83% on RA. Elevated HR, elevated WBC count. CXR showing right base PNA. Pt placed on O2, started on IV Abx and admitted. On 2/24; repeat chest x-ray shows improving right lower lobe pneumonia, however sputum culture is positive for MRSA. Vanc started and cefepime stopped. ID consulted. Home O2 evaluation was done, patient was not requiring oxygen at rest, but requiring 3 LPM on exertion. 2/25: still on vancomycin, day 2. ID on board. 2/26/2020: No overnight events noted. Still on vancomycin, day 3. Subjective:     Patient seen and examined. He states that he feels a lot better compare from time of admission. Now on 1 L O2, denies sob when using O2, feels sob without O2. He states he still coughing, though getting better, cannot expectorate thick phlegm out. He denies any chest pain, palpitations, fever, chills.      Medications:  Reviewed    Infusion Medications 13.4*   HCT 40.7* 43.2    176     Recent Labs     02/25/20  0701 02/26/20  0650    140   K 3.8 4.3    103   CO2 27 25   BUN 23* 19   CREATININE 0.9 0.9   CALCIUM 8.8 8.9     No results for input(s): AST, ALT, BILIDIR, BILITOT, ALKPHOS in the last 72 hours. Recent Labs     02/24/20  0604 02/25/20  0701 02/26/20  0650   INR 1.88* 2.35* 2.43*     No results for input(s): CKTOTAL, TROPONINI in the last 72 hours. Urinalysis:      Lab Results   Component Value Date    NITRU NEGATIVE 02/20/2020    WBCUA 0-2 11/27/2019    WBCUA >100 10/23/2019    BACTERIA NONE SEEN 11/27/2019    RBCUA 5-10 11/27/2019    BLOODU NEGATIVE 02/20/2020    SPECGRAV 1.006 03/14/2014    GLUCOSEU 100 02/20/2020       Radiology:  XR CHEST STANDARD (2 VW)   Final Result   Partial clearing previous right lower lobe pneumonia. **This report has been created using voice recognition software. It may contain minor errors which are inherent in voice recognition technology. **      Final report electronically signed by Dr. Ronald Dior on 2/24/2020 9:50 AM      XR CHEST STANDARD (2 VW)   Final Result      Small pneumonia or atelectasis at the right base. **This report has been created using voice recognition software. It may contain minor errors which are inherent in voice recognition technology. **      Final report electronically signed by Dr. Kadeem Hernández on 2/20/2020 12:19 AM              Assessment/Plan:          Right Basilar PNA secondary to MRSA, failed outpatient treatment with Augmentin    Has been afebrile since admission. Flu negative, respiratory culture back on 2/24, positive for MRSA  patient was on Rocephin since admission, switched to vancomycin on 2/24, now Day 3. I spoke with ID Dr. Ilona Fothergill, who recommend to continue vancomycin today and re-assess tomorrow.    Repeat chest x-ray on 2/24 showed partial clearing of previous right lower lobe pneumonia  continue guaifenesin twice daily, Metanebs

## 2020-02-26 NOTE — CARE COORDINATION
2/26/20, 3:02 PM    DISCHARGE ON GOING [address] day: 6  Location: [facility]05/005-A Reason for admit: Pneumonia [J18.9]   Procedure: N/A  Treatment Plan of Care: Pulmonology and ID following, Oxygen down to 1L, Duoneb nebulizer, Mucinex, Nicotine patch, IV Vancomycin and Coumadin per pharmacy dosing, Potassium replacement protocol, prn Tylenol and Zofran, daily INR, acapella, telemetry, up with assistance. Barriers to Discharge: Medical Stability  PCP: Megan Porras  Readmission Risk Score: 34%  Patient Goals/Plan/Treatment Preferences: Home with his two adult sons.

## 2020-02-26 NOTE — PROGRESS NOTES
sodium chloride flush  10 mL Intravenous 2 times per day    nicotine  1 patch Transdermal Daily    warfarin (COUMADIN) daily dosing (placeholder)   Other RX Placeholder       sodium chloride flush, acetaminophen, acetaminophen, polyethylene glycol, promethazine, ondansetron      LABS:     CBC:   Recent Labs     02/25/20  0701 02/26/20  0650   WBC 8.2 7.1   HGB 12.4* 13.4*    176     BMP:    Recent Labs     02/25/20  0701 02/26/20  0650    140   K 3.8 4.3    103   CO2 27 25   BUN 23* 19   CREATININE 0.9 0.9   GLUCOSE 176* 136*     Calcium:  Recent Labs     02/26/20  0650   CALCIUM 8.9      Recent Labs     02/24/20  0604 02/25/20  0701 02/26/20  0650   INR 1.88* 2.35* 2.43*        CULTURES:   UA: No results for input(s): SPECGRAV, PHUR, COLORU, CLARITYU, MUCUS, PROTEINU, BLOODU, RBCUA, WBCUA, BACTERIA, NITRU, GLUCOSEU, BILIRUBINUR, UROBILINOGEN, KETUA, LABCAST, LABCASTTY, AMORPHOS in the last 72 hours.     Invalid input(s): CRYSTALS  Micro:   Lab Results   Component Value Date    BC No growth-preliminary No growth  02/19/2020          Problem list of patient:     Patient Active Problem List   Diagnosis Code    Moderate COPD (chronic obstructive pulmonary disease) ([facility] J44.9    Community acquired pneumonia J18.9    Hyponatremia E87.1    Suicidal ideation R45.851    SIRS (systemic inflammatory response syndrome) ([facility] R65.10    Sepsis due to methicillin susceptible Staphylococcus aureus ([facility] A41.01    Severe sepsis ([facility] A41.9, R65.20    HCAP (healthcare-associated pneumonia) J18.9    Hyponatremia E87.1    Lung nodules R91.8    History of intravenous drug use in remission Z87.898    Malnutrition ([facility] E46    Coagulopathy ([facility] D68.9    Acute blood loss anemia D62    Complete heart block, post-surgical (HCC) I97.89, I44.2    Endocarditis, bacterial, acute/subacute I33.0    WILMA (acute kidney injury) ([address] N17.9    Leukocytosis D72.829    Physical deconditioning R53.81   

## 2020-02-26 NOTE — PLAN OF CARE
Problem: Impaired respiratory status  Goal: Clear lung sounds  Description  Clear lung sounds     Outcome: Ongoing   Continue aerosols as ordered to improve breath sounds

## 2020-02-26 NOTE — PROGRESS NOTES
Clinical Pharmacy Note    Warfarin consult follow-up    Recent Labs     02/26/20  0650   INR 2.43*     Recent Labs     02/25/20  0701 02/26/20  0650   HGB 12.4* 13.4*   HCT 40.7* 43.2    176       Significant Drug-Drug Interactions:  New warfarin drug-drug interactions: none  Discontinued drug-drug interactions: none  Current warfarin drug-drug interactions: aspirin     Date INR Warfarin Dose   2/20/2020 2.52 (from 2/19/20) 5 mg   2/21/2020  1.95   4 mg   2/22/2020  1.78   6 mg   2/23/2020  1.80  7.5 mg     2/24/2020  1.88   5 mg     2/25/2020 2.35 2.5 mg     2/26/2020  2.43 2.5 mg       Notes:                     Daily PT/INR until stable within therapeutic range.      Rigo Shaw, PharmD  2/26/2020  12:26 PM

## 2020-02-26 NOTE — PROGRESS NOTES
Wheezing or Shortness of Breath  QUEtiapine (SEROQUEL) 100 MG tablet, 1 tablet by Per NG tube route nightly (Patient taking differently: 300 mg by Per NG tube route nightly Changed by Albertina Mark per patient)  furosemide (LASIX) 40 MG tablet, Take 1 tablet by mouth daily  busPIRone (BUSPAR) 30 MG tablet, Take 30 mg by mouth 2 times daily   aspirin EC 81 MG EC tablet, Take 1 tablet by mouth daily  buprenorphine-naloxone (SUBOXONE) 8-2 MG FILM SL film, Place 1 Film under the tongue daily. .  [DISCONTINUED] gabapentin (NEURONTIN) 100 MG capsule, Take 2 capsules by mouth 3 times daily for 4 days. Diet    DIET CARDIAC; Allergies    Pcn [penicillins]  Social History     Social History     Socioeconomic History    Marital status:      Spouse name: Not on file    Number of children: 3    Years of education: 9    Highest education level: Not on file   Occupational History    Occupation: on disability   Social Needs    Financial resource strain: Not on file    Food insecurity:     Worry: Not on file     Inability: Not on file   [employer] needs:     Medical: Not on file     Non-medical: Not on file   Tobacco Use    Smoking status: Current Some Day Smoker     Packs/day: 1.00     Years: 37.00     Pack years: 37.00     Types: Cigarettes     Start date: 10/22/1976     Last attempt to quit: 2019     Years since quittin.4    Smokeless tobacco: Former User    Tobacco comment: pt has smoked 3 packs in last week.  3 cigarettes a day- 20   Substance and Sexual Activity    Alcohol use: No     Comment: quit 9 years ago    Drug use: No    Sexual activity: Yes     Partners: Female   Lifestyle    Physical activity:     Days per week: Not on file     Minutes per session: Not on file    Stress: Not on file   Relationships    Social connections:     Talks on phone: Not on file     Gets together: Not on file     Attends Evangelical service: Not on file     Active member of club or organization: Not on file Attends meetings of clubs or organizations: Not on file     Relationship status: Not on file    Intimate partner violence:     Fear of current or ex partner: Not on file     Emotionally abused: Not on file     Physically abused: Not on file     Forced sexual activity: Not on file   Other Topics Concern    Not on file   Social History Narrative    Not on file     Family History          Problem Relation Age of Onset    Diabetes Mother     Depression Mother     Arthritis Father     Heart Disease Father     Diabetes Brother     Depression Daughter      Sleep History    Never diagnosed with sleep apnea in the past.  Occupational history   Occupation:  He is current working: No  Type of profession: unemployed, disabled. History of tobacco smoking:Yes  Amount of tobacco smokin.0 PPD. Years of tobacco smokin. Quit smoking: No.              Current smoker: Yes. Amount of current tobacco smokin.5 PPD  . History of recreational or IV drug use in the past:Yes     History of exposure to coal mines/coal dust: NO  History of exposure to foundry dust/welding: NO  History of exposure to quarry/silica/sandblasting: NO  History of exposure to asbestos/working with breaks/ships: NO  History of exposure to farm dust: NO  History of recent travel to long distances: NO  History of exposure to birds, pigeons, or chickens in the past:NO  Pet animals at home:No    History of pulmonary embolism in the past: No            History of DVT in the past:Yes           Vitals     height is 5' 9\" (1.753 m) and weight is 235 lb (106.6 kg). His oral temperature is 97.7 °F (36.5 °C). His blood pressure is 107/67 and his pulse is 91. His respiration is 16 and oxygen saturation is 92%. Body mass index is 34.7 kg/m².     SUPPLEMENTAL O2: O2 Flow Rate (L/min): 1 L/min     I/O        Intake/Output Summary (Last 24 hours) at 2020 1502  Last data filed at results for input(s): BNP in the last 72 hours. Lactic Acid  Recent Labs     02/26/20  0650   LACTA 1.6     INR  Recent Labs     02/24/20  0604 02/25/20  0701 02/26/20  0650   INR 1.88* 2.35* 2.43*     PTT  No results for input(s): APTT in the last 72 hours. Glucose  No results for input(s): POCGLU in the last 72 hours. UA   No results for input(s): SPECGRAV, PHUR, COLORU, CLARITYU, MUCUS, PROTEINU, BLOODU, RBCUA, WBCUA, BACTERIA, NITRU, GLUCOSEU, BILIRUBINUR, UROBILINOGEN, KETUA, LABCAST, LABCASTTY, AMORPHOS in the last 72 hours. Invalid input(s): CRYSTALS. PFTs 2011       Sleep studies   Scheduled for sleep study 4/1/2020    Cultures    Blood culture X2 no growth preliminary  Flu A/B negative  Sputum cultures: MRSA    EKG    02/19/2020   Atrial-sensed ventricular-paced rhythm  Biventricular pacemaker detected  Abnormal ECG    Echocardiogram   TTE procedure:ECHOCARDIOGRAM COMPLETE 2D W DOPPLER W COLOR.   09/28/2019   Summary   Technically difficult examination. Ejection fraction was estimated at 50-55%. Left atrial size was severely dilated. Right atrial size was moderately dilated. S/p TV replacement--unable to visualize leaflets. DOPPLER: Mean gradient   5-8 mmHg. V max = 2.1 m/s. There was trace tricuspid regurgitation. Assuming RAP = 20 mmHg, the estimated RVSP = 50 mmHg. Ascending aorta = 3.6 cm. IVC size is dilated with reduced respirophasic variation (CVP~20 mmHg)      Radiology    CXR  XR CHEST (2 VW)   2/24/2020   FINDINGS: Previous median sternotomy for valve replacement. Heart size is normal.  Multiple pacemaker leads are again evident. No significant pleural effusions. Previous infiltrate right lung base has nearly completely cleared. No new opacities are seen. Impression:  Partial clearing previous right lower lobe pneumonia. XR CHEST (2 VW)   2/20/2020   Small pneumonia or atelectasis at the right base. CT Scans  CTA CHEST W WO CONTRAST   11/23/2019   1.  Negative

## 2020-02-27 ENCOUNTER — TELEPHONE (OUTPATIENT)
Dept: CARDIOLOGY CLINIC | Age: 60
End: 2020-02-27

## 2020-02-27 VITALS
SYSTOLIC BLOOD PRESSURE: 137 MMHG | HEART RATE: 79 BPM | TEMPERATURE: 97.9 F | BODY MASS INDEX: 34.8 KG/M2 | RESPIRATION RATE: 18 BRPM | OXYGEN SATURATION: 94 % | HEIGHT: 69 IN | WEIGHT: 235 LBS | DIASTOLIC BLOOD PRESSURE: 79 MMHG

## 2020-02-27 LAB
HCT VFR BLD CALC: 41.8 % (ref 42–52)
HEMOGLOBIN: 12.6 GM/DL (ref 14–18)
INR BLD: 1.7 (ref 0.85–1.13)

## 2020-02-27 PROCEDURE — 94761 N-INVAS EAR/PLS OXIMETRY MLT: CPT

## 2020-02-27 PROCEDURE — 36415 COLL VENOUS BLD VENIPUNCTURE: CPT

## 2020-02-27 PROCEDURE — 94640 AIRWAY INHALATION TREATMENT: CPT

## 2020-02-27 PROCEDURE — 99239 HOSP IP/OBS DSCHRG MGMT >30: CPT | Performed by: FAMILY MEDICINE

## 2020-02-27 PROCEDURE — 85610 PROTHROMBIN TIME: CPT

## 2020-02-27 PROCEDURE — 2580000003 HC RX 258: Performed by: INTERNAL MEDICINE

## 2020-02-27 PROCEDURE — 6360000002 HC RX W HCPCS: Performed by: INTERNAL MEDICINE

## 2020-02-27 PROCEDURE — 6370000000 HC RX 637 (ALT 250 FOR IP): Performed by: INTERNAL MEDICINE

## 2020-02-27 PROCEDURE — APPSS30 APP SPLIT SHARED TIME 16-30 MINUTES: Performed by: NURSE PRACTITIONER

## 2020-02-27 PROCEDURE — 6370000000 HC RX 637 (ALT 250 FOR IP): Performed by: FAMILY MEDICINE

## 2020-02-27 PROCEDURE — 85018 HEMOGLOBIN: CPT

## 2020-02-27 PROCEDURE — 85014 HEMATOCRIT: CPT

## 2020-02-27 PROCEDURE — 2700000000 HC OXYGEN THERAPY PER DAY

## 2020-02-27 PROCEDURE — 99232 SBSQ HOSP IP/OBS MODERATE 35: CPT | Performed by: INTERNAL MEDICINE

## 2020-02-27 PROCEDURE — 2709999900 HC NON-CHARGEABLE SUPPLY

## 2020-02-27 RX ORDER — WARFARIN SODIUM 7.5 MG/1
7.5 TABLET ORAL ONCE
Status: DISCONTINUED | OUTPATIENT
Start: 2020-02-27 | End: 2020-02-27

## 2020-02-27 RX ORDER — SULFAMETHOXAZOLE AND TRIMETHOPRIM 800; 160 MG/1; MG/1
1 TABLET ORAL 2 TIMES DAILY
Qty: 14 TABLET | Refills: 0 | Status: SHIPPED | OUTPATIENT
Start: 2020-02-27 | End: 2020-03-05

## 2020-02-27 RX ORDER — WARFARIN SODIUM 5 MG/1
5 TABLET ORAL ONCE
Status: DISCONTINUED | OUTPATIENT
Start: 2020-02-27 | End: 2020-02-27 | Stop reason: HOSPADM

## 2020-02-27 RX ADMIN — IPRATROPIUM BROMIDE AND ALBUTEROL SULFATE 1 AMPULE: .5; 3 SOLUTION RESPIRATORY (INHALATION) at 13:22

## 2020-02-27 RX ADMIN — ACETAMINOPHEN 650 MG: 325 TABLET ORAL at 07:57

## 2020-02-27 RX ADMIN — ASPIRIN 81 MG: 81 TABLET ORAL at 07:53

## 2020-02-27 RX ADMIN — GUAIFENESIN 600 MG: 600 TABLET, EXTENDED RELEASE ORAL at 07:54

## 2020-02-27 RX ADMIN — PANTOPRAZOLE SODIUM 40 MG: 40 TABLET, DELAYED RELEASE ORAL at 07:46

## 2020-02-27 RX ADMIN — AMLODIPINE BESYLATE 5 MG: 5 TABLET ORAL at 07:53

## 2020-02-27 RX ADMIN — IPRATROPIUM BROMIDE AND ALBUTEROL SULFATE 1 AMPULE: .5; 3 SOLUTION RESPIRATORY (INHALATION) at 09:40

## 2020-02-27 RX ADMIN — SODIUM CHLORIDE, PRESERVATIVE FREE 10 ML: 5 INJECTION INTRAVENOUS at 08:07

## 2020-02-27 RX ADMIN — BUSPIRONE HYDROCHLORIDE 30 MG: 10 TABLET ORAL at 07:54

## 2020-02-27 RX ADMIN — BUPRENORPHINE HYDROCHLORIDE, NALOXONE HYDROCHLORIDE 1 FILM: 8; 2 FILM, SOLUBLE BUCCAL; SUBLINGUAL at 07:53

## 2020-02-27 RX ADMIN — VANCOMYCIN HYDROCHLORIDE 1500 MG: 5 INJECTION, POWDER, LYOPHILIZED, FOR SOLUTION INTRAVENOUS at 08:07

## 2020-02-27 ASSESSMENT — PAIN DESCRIPTION - ONSET: ONSET: ON-GOING

## 2020-02-27 ASSESSMENT — PAIN DESCRIPTION - DESCRIPTORS: DESCRIPTORS: SORE

## 2020-02-27 ASSESSMENT — PAIN SCALES - GENERAL
PAINLEVEL_OUTOF10: 2
PAINLEVEL_OUTOF10: 0
PAINLEVEL_OUTOF10: 2
PAINLEVEL_OUTOF10: 2

## 2020-02-27 ASSESSMENT — PAIN DESCRIPTION - PAIN TYPE: TYPE: ACUTE PAIN

## 2020-02-27 ASSESSMENT — PAIN - FUNCTIONAL ASSESSMENT: PAIN_FUNCTIONAL_ASSESSMENT: ACTIVITIES ARE NOT PREVENTED

## 2020-02-27 ASSESSMENT — PAIN DESCRIPTION - FREQUENCY: FREQUENCY: INTERMITTENT

## 2020-02-27 ASSESSMENT — PAIN DESCRIPTION - PROGRESSION: CLINICAL_PROGRESSION: NOT CHANGED

## 2020-02-27 NOTE — PROGRESS NOTES
A home oxygen evaluation has been completed. [x]Patient is an inpatient. It is expected that the patient will be discharged within the next 48 hours. Qualified provider to write order for home prescription if patient qualifies. Social service/care managers will arrange for home oxygen. If patient is active, arrange for Home Medical supplier to assess for Oxygen Conserving Device per pulse oximetry. []Patient is an outpatient. Results will be faxed to the ordering provider. Qualified provider to write order for home prescription if patient qualifies and arranges for home oxygen. Patient was placed on room air for 15 minutes. SpO2 was 90 % on room air at rest. Patients SpO2 was 89% or above and did not qualify for home oxygen. Patient was walked for 7 minutes. SpO2 was 87 % during walking. Patients SpO2 was below 89% and qualified for home oxygen. Oxygen was applied at 2 lpm via nasal cannula to maintain a SpO2 between 90-92% while walking. Actual SpO2 was 91 %. Note: For any SpO2 at 47% see policy and procedure for possible qualifications.

## 2020-02-27 NOTE — PROGRESS NOTES
Nutrition Assessment    Type and Reason for Visit: Initial(LOS)    Nutrition Recommendations:   *Recommend a Multivitamin w/minerals daily. *Started Catrahco BID & Ensure High Protein daily. *Modified diet texture to Dental Soft per pt request.    Nutrition Assessment: Pt. nutritionally compromised AEB wounds. At risk for further nutrition compromise r/t increased nutrient needs for wound healing, underlying medical condition (hx COPD, HTN, CHF, CKD & CAD) and need for nutrition support. Nutrition recommendations/interventions as per above. Malnutrition Assessment:  · Malnutrition Status: No malnutrition  · Context: Acute illness or injury  · Findings of the 6 clinical characteristics of malnutrition (Minimum of 2 out of 6 clinical characteristics is required to make the diagnosis of moderate or severe Protein Calorie Malnutrition based on AND/ASPEN Guidelines):  1. Energy Intake-Greater than 75% of estimated energy requirement, Greater than or equal to 7 days    2. Weight Loss-Unable to assess(awaiting recent admit weight yet)  3. Fat Loss-No significant subcutaneous fat loss  4. Muscle Loss-No significant muscle mass loss  5. Fluid Accumulation-Mild fluid accumulation, Extremities  6.  Strength-Not measured    Nutrition Risk Level: Moderate    Nutrient Needs:  · Estimated Daily Total Kcal: 8008-8277 kcal/day (15-18 kcal/kg -106.6 kg on 2/19- stated weight)  · Estimated Daily Protein (g):  g/day (1.3-1.5 g/kg - IBW 72.6 kg)    Nutrition Diagnosis:   · Problem: Increased nutrient needs(protein)  · Etiology: related to Increased demand for energy/nutrients     Signs and symptoms:  as evidenced by Presence of wounds    Objective Information:  · Nutrition-Focused Physical Findings: wound; pt denies N/V; last BM x1 on 2/24; +edema; pt reports difficulty chewing food d/t edentulous with no dentures. Pt amenable to Dental Soft diet during LOS.    · Wound Type: (open mix coccyx wound)  · Current Nutrition

## 2020-02-27 NOTE — PROGRESS NOTES
All discharge instructions given to patient and family with no further questions at this time. Patient discharged off unit via wheelchair. Chart contents placed in yellow bin.    Electronically signed by Mauricio Mckinley RN on 2/27/2020 at 6:54 PM

## 2020-02-27 NOTE — CARE COORDINATION
2/27/20, 2:54 PM    DISCHARGE PLANNING EVALUATION    Filled out [employer] form and faxed to pharmacy to cover co-pay of Bactrim as patient is stating that he can not afford it. Spoke with pharmacy and updated them and they are aware of discharge for today.

## 2020-02-27 NOTE — PLAN OF CARE
Problem: Nutrition  Goal: Optimal nutrition therapy  Outcome: Ongoing   Nutrition Problem: Increased nutrient needs(protein)  Intervention: Food and/or Nutrient Delivery: Start ONS, Vitamin Supplement, Modify current diet  Nutritional Goals: Pt will consume 75% or more of meals during LOS to aid in wound healing

## 2020-02-27 NOTE — PLAN OF CARE
Problem: Impaired respiratory status  Goal: Clear lung sounds  Description  Clear lung sounds     2/26/2020 2135 by Adriana Woo RCP  Outcome: Ongoing  Note:   Duoneb ongoing as ordered to improve aeration and decrease wheezing.

## 2020-02-27 NOTE — PROGRESS NOTES
hand     HAND DEBRIDEMENT Left 01/09/2014    INCISION AND DRAINAGE    PACEMAKER PLACEMENT  12/26/13    TOE AMPUTATION Bilateral 3/13/2013    1,2,3,4 on left and 2nd on right    TRACHEAL SURGERY N/A 10/18/2019    TRACHEOTOMY PERCUTANEOUS BRONCHOSCOPY performed by Jackeline Livingston MD at [facility] ENDOSCOPY  2012     Meds    Current Medications    warfarin  7.5 mg Oral Once    vancomycin  1,500 mg Intravenous Q12H    vancomycin (VANCOCIN) intermittent dosing (placeholder)   Other RX Placeholder    guaiFENesin  600 mg Oral BID    potassium replacement protocol   Other RX Placeholder    terbinafine   Topical BID    amLODIPine  5 mg Oral Daily    aspirin EC  81 mg Oral Daily    buprenorphine-naloxone  1 Film Sublingual Daily    busPIRone  30 mg Oral BID    pantoprazole  40 mg Oral Daily    QUEtiapine  100 mg Per NG tube Nightly    tamsulosin  0.4 mg Oral Daily    ipratropium-albuterol  1 ampule Inhalation Q4H WA    sodium chloride flush  10 mL Intravenous 2 times per day    nicotine  1 patch Transdermal Daily    warfarin (COUMADIN) daily dosing (placeholder)   Other RX Placeholder     sodium chloride flush, acetaminophen, acetaminophen, polyethylene glycol, promethazine, ondansetron  IV Drips/Infusions    Home Medications  Medications Prior to Admission: albuterol sulfate HFA (PROAIR HFA) 108 (90 Base) MCG/ACT inhaler, Inhale 2 puffs into the lungs every 6 hours as needed for Wheezing or Shortness of Breath  potassium chloride (KLOR-CON M) 20 MEQ extended release tablet, Take 1 tablet by mouth 2 times daily  amLODIPine (NORVASC) 5 MG tablet, Take 1 tablet by mouth daily Take 5mg  By mouth qd  lisinopril (PRINIVIL;ZESTRIL) 2.5 MG tablet, Take 1 tablet by mouth daily  pantoprazole (PROTONIX) 40 MG tablet, Take 40 mg by mouth daily  warfarin (COUMADIN) 5 MG tablet, Take 5 mg by mouth  tamsulosin (FLOMAX) 0.4 MG capsule, Take 0.4 mg by mouth daily  VILAZODONE HCL 10 MG Tablet, Minutes per session: Not on file    Stress: Not on file   Relationships    Social connections:     Talks on phone: Not on file     Gets together: Not on file     Attends Mandaeism service: Not on file     Active member of club or organization: Not on file     Attends meetings of clubs or organizations: Not on file     Relationship status: Not on file    Intimate partner violence:     Fear of current or ex partner: Not on file     Emotionally abused: Not on file     Physically abused: Not on file     Forced sexual activity: Not on file   Other Topics Concern    Not on file   Social History Narrative    Not on file     Family History          Problem Relation Age of Onset    Diabetes Mother     Depression Mother     Arthritis Father     Heart Disease Father     Diabetes Brother     Depression Daughter      Sleep History    Never diagnosed with sleep apnea in the past.  Occupational history   Occupation:  He is current working: No  Type of profession: unemployed, disabled. History of tobacco smoking:Yes  Amount of tobacco smokin.0 PPD. Years of tobacco smokin. Quit smoking: No.              Current smoker: Yes. Amount of current tobacco smokin.5 PPD  . History of recreational or IV drug use in the past:Yes     History of exposure to coal mines/coal dust: NO  History of exposure to foundry dust/welding: NO  History of exposure to quarry/silica/sandblasting: NO  History of exposure to asbestos/working with breaks/ships: NO  History of exposure to farm dust: NO  History of recent travel to long distances: NO  History of exposure to birds, pigeons, or chickens in the past:NO  Pet animals at home:No    History of pulmonary embolism in the past: No            History of DVT in the past:Yes           Vitals     height is 5' 9\" (1.753 m) and weight is 235 lb (106.6 kg). His oral temperature is 97.9 °F (36.6 °C).  His blood pressure is 137/79 and his pulse is 79. His respiration is 18 and oxygen saturation is 94%. Body mass index is 34.7 kg/m². SUPPLEMENTAL O2: O2 Flow Rate (L/min): 2 L/min     I/O        Intake/Output Summary (Last 24 hours) at 2/27/2020 1449  Last data filed at 2/27/2020 0807  Gross per 24 hour   Intake 2066.11 ml   Output 0 ml   Net 2066.11 ml     I/O last 3 completed shifts: In: 4278.9 [P.O.:1990; I.V.:2288.9]  Out: 0    No data found. Exam   Physical Exam   Constitutional: No distress on RA. Patient appears moderately built and  moderately nourished. Head: Normocephalic and atraumatic. Mouth/Throat: Oropharynx is clear and moist.  No oral thrush. Eyes: Conjunctivae are normal. Pupils are equal, round. No scleral icterus. Neck: Neck supple. No tracheal deviation present. Cardiovascular: S1 and S2 with no murmur. No peripheral edema  Pulmonary/Chest: Normal effort with bilateral air entry, faint bilateral expiratory wheeze. No stridor. No respiratory distress. Patient exhibits no tenderness. Abdominal: Soft. Bowel sounds audible. No distension or tenderness to palp. Musculoskeletal: Moves all extremities  Neurological: Patient is alert and follows simple commands.     Labs  - Old records and notes have been reviewed in [facility]   ABG  Lab Results   Component Value Date    PH 7.41 11/27/2019    PO2 71 11/27/2019    PCO2 42 11/27/2019    HCO3 27 11/27/2019    O2SAT 94 11/27/2019     Lab Results   Component Value Date    IFIO2 60 10/10/2019    MODE AC 09/29/2019    SETTIDVOL 350 09/29/2019    SETPEEP 6.0 10/10/2019     CBC  Recent Labs     02/25/20  0701 02/26/20  0650 02/27/20  0642   WBC 8.2 7.1  --    RBC 4.67* 4.96  --    HGB 12.4* 13.4* 12.6*   HCT 40.7* 43.2 41.8*   MCV 87.2 87.1  --    MCH 26.6 27.0  --    MCHC 30.5* 31.0*  --     176  --    MPV 9.8 9.7  --       BMP  Recent Labs     02/25/20  0701 02/26/20  0650    140   K 3.8 4.3    103   CO2 27 25   BUN 23* 19   CREATININE 0.9 0.9 Have discussed with Mr. Kelly Martinez CNP about this patient in detail. The above assessment and plan has been reviewed. Please see my modifications mentioned below. My modifications:  Improving shortness of breath. Follow up as above.     Delfino Hudson MD 2/27/2020 5:26 PM

## 2020-02-27 NOTE — PROGRESS NOTES
Clinical Pharmacy Note                                               Warfarin Discharge Recommendations      Pt discharged from [facility] today after admission for PNA    INR today:  Recent Labs     02/27/20  0642   INR 1.70*       Coumadin 5 mg tabs    Interacting medications at discharge: SMX/TMP, aspirin, vilazodone     INR goal during admission: 2-3    Recommendations for discharge:   Date Warfarin Dose   2/27/2020  5 mg (given prior to discharge)   2/28/2020 2.5 mg   2/29/2020 2.5 mg   3/1/2020 2.5 mg   3/2/2020 INR check at [facility]       Provider dosing warfarin: ANISA Bar, PharmD     Recheck INR:  Please schedule INR check with [facility] for Monday 3/2/2020. Faiza LAUREN  [facility], PharmD  PGY-1 Pharmacy Resident  2/27/2020, 3:59 PM

## 2020-02-27 NOTE — DISCHARGE SUMMARY
Hospital Medicine Discharge Summary      Patient Identification:   Keitha Frankel   : 1960  MRN: [id number]   Account: [de-identified]      Patient's PCP: Genie Carrera    Admit Date: 2020     Discharge Date:   2020     Admitting Physician: Molly Araujo MD     Discharge Physician: Carter Micahels MD     Discharge Diagnoses:    1. Right Basilar PNA secondary to MRSA, failed outpatient treatment with Augmentin   2. Acute COPD Exacerbation, improving    3. Acute hypoxic respiratory failure due to pneumonia and COPD exacerbation, improving    4. Sepsis (POA) secondary to pneumonia, resolved  5. Hyperglycemia likely steroids induced, improving    6. Paroxysmal Atrial Fibrillation, currently NSR and rate controlled  7. Mild non-AG metabolic acidosis, resolved   8. Factor V Leiden/history of DVTs  9. 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 Chronic HFpEF, compensated    11. ? Opiate use disorder  12. Elevated A1c       The patient was seen and examined on day of discharge and this discharge summary is in conjunction with any daily progress note from day of discharge. Hospital Course:     Please see H/P for details. In summary, this is a  63 y. o. male who presented to [address] [place] [address] on 2020 with complaints of fevers, chills, productive cough and sob. Pt has a pmh of A. Fib, Factor V Leiden, Hepatitis C, Tricuspid Valve Endocarditis, history of IVDA, CKD, and COPD. Per previous note, \"Pt reports to have had symptoms of fatigue, chills, productive cough, and sob over the past week. Pt was seen by his PCP, placed on Augmentin. Pt continued to have progression of his symptoms despite Abx therapy causing him to come to the ED. Pt denies any sick contacts. Reports being very sob with minimal activity. Pt continues to smoke cigarettes.  Pt denies any nausea, vomiting, diarrhea, chest pain, abdominal pain, black or red colored stools, burning with urination, (POA) secondary to pneumonia, resolved      On admission, HR 99, elevated WBC count at 13.6, with chest x-ray result of right lower lobe pneumonia. VS now nl and leukocytosis resolved   Treatment as above  No growth on blood culture to date  CBC in a.m.     Hyperglycemia likely steroids induced, improving      A1C on 2/26/2020: 7.5%, need repeat A1C in 1 week apart in OP to confirm DM2 and f/u w PCP. Glucose around 200s while on prednisone, now trending down after prednisone was stopped on 2/24   Low carb diet         Paroxysmal Atrial Fibrillation, currently NSR and rate controlled     cont coumadin and BB. Follow-up with Coumadin clinic on discharge  Telemetry ordered while inpatient     Mild non-AG metabolic acidosis, resolved      Factor V Leiden/history of DVTs     Cont Coumadin, Pharmacy on board to dose Coumadin. Appreciate pharmacy input. Follow-up with Coumadin clinic on discharge.     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 Normal bioprosthetic function 2018 TTE ( 33 mm CE, ThermaFix valve).    Chronic HFpEF, compensated      no evidence of fluid overload. D Echo 9/2019 EF 55% with preserved EF.   Cont home medications.       ?Opiate use disorder     -pt is on suboxone       On 2/27/2020, patient reports that he feels a lot better compared to the time of admission. He states that his breathing and cough are also improving. He denies fever and chills. Pulmonology and ID are okay for discharge today. Dr. Clinton Chou recommend Bactrim DS 1 tab PO BID x 1 week. I discussed with patient that bactrim can interact with coumadin and that he needs close INR monitoring. Patient follows [facility]. I advised him to follow-up with [facility] as soon as possible after hospital discharge. Patient verbalized understanding and agreed with plan.  Home O2 evaluation was done, patient was not requiring oxygen at rest, but requiring 2 LPM on exertion. Patient was evaluated today for the diagnosis of COPD, pneumonia . I entered a DME order for home oxygen because the diagnosis and testing requires the patient to have supplemental oxygen. Condition will improve or be benefited by oxygen use. The patient is  able to perform good mobility in a home setting and therefore does require the use of a portable oxygen system. The need for this equipment was discussed with the patient and he understands and is in agreement. Exam:     Vitals:  Vitals:    02/27/20 0338 02/27/20 0753 02/27/20 0940 02/27/20 1230   BP: (!) 127/91 118/74  137/79   Pulse: 86 82  79   Resp: 18 17  18   Temp: 98.4 °F (36.9 °C) 98.2 °F (36.8 °C)  97.9 °F (36.6 °C)   TempSrc: Oral Oral  Oral   SpO2: 91% 91% 91% 94%   Weight:       Height:         Weight: Weight: 235 lb (106.6 kg)     24 hour intake/output:    Intake/Output Summary (Last 24 hours) at 2/27/2020 1446  Last data filed at 2/27/2020 5130  Gross per 24 hour   Intake 2066.11 ml   Output 0 ml   Net 2066.11 ml       General appearance: alert, not in acute distress  HEENT: Pupils equal, round, and reactive to light. Conjunctivae clear. Clear oral mucosa. Neck: Supple, with full range of motion. Respiratory: On 1 L O2 via NC. (+) some inspiratory wheezing ( better compare 2/26/2020 chest auscultation), no rales, no rhonchi. Cardiovascular: normal rate, regular rhythm with normal S1/S2 without murmurs, rubs or gallops. Abdomen: Soft, non-tender, non-distended   Musculoskeletal: passive and active ROM x 4 extremities. Skin: Chronic skin changes and hyperpigmentation on both lower extremities   Neurological exam reveals alert, oriented, normal speech, no focal findings or movement disorder noted. Exam of extremities: no pedal edema, no clubbing or cyanosis        Labs:  For convenience and continuity at follow-up the following most recent labs are provided:      CBC:    Lab Results   Component Value Date terbinafine (LAMISIL) 1 % cream  Apply topically 2 times daily. umeclidinium-vilanterol (ANORO ELLIPTA) 62.5-25 MCG/INH AEPB inhaler  Inhale 1 puff into the lungs daily             VILAZODONE HCL 10 MG Tablet               warfarin (COUMADIN) 5 MG tablet  Take 5 mg by mouth                 Time Spent on discharge is more than 30 minutes in the examination, evaluation, counseling and review of medications and discharge plan. Signed: Thank you Osito Wilder for the opportunity to be involved in this patient's care.     Electronically signed by Lia Ford MD on 2/27/2020 at 2:46 PM

## 2020-02-27 NOTE — PROGRESS NOTES
Clinical Pharmacy Note    Warfarin consult follow-up    Recent Labs     02/27/20  0642   INR 1.70*     Recent Labs     02/25/20  0701 02/26/20  0650 02/27/20  0642   HGB 12.4* 13.4* 12.6*   HCT 40.7* 43.2 41.8*    176  --        Significant Drug-Drug Interactions:  New warfarin drug-drug interactions: none  Discontinued drug-drug interactions: none   Current warfarin drug-drug interactions: aspirin     Date INR Warfarin Dose   2/20/2020 2.52 (from 2/19/20) 5 mg   2/21/2020  1.95   4 mg   2/22/2020  1.78   6 mg   2/23/2020  1.80  7.5 mg     2/24/2020  1.88   5 mg     2/25/2020 2.35 2.5 mg     2/26/2020  2.43 2.5 mg   2/27/2020  1.70   5 mg                 Notes:                   Daily PT/INR until stable within therapeutic range. **Changed dose based on discharge medications. Please see new note. Faiza LAUREN  [facility], PharmD  PGY-1 Pharmacy Resident  2/27/2020, 2:38 PM

## 2020-02-28 NOTE — CARE COORDINATION
Patient discharged to home last evening with oxygen supplied through [facility] and patient to follow-up with [facility]. 2/28/20, 7:26 AM    Patient goals/plan/ treatment preferences discussed by  and . Patient goals/plan/ treatment preferences reviewed with patient/ family. Patient/ family verbalize understanding of discharge plan and are in agreement with goal/plan/treatment preferences. Understanding was demonstrated using the teach back method. AVS provided by RN at time of discharge, which includes all necessary medical information pertaining to the patients current course of illness, treatment, post-discharge goals of care, and treatment preferences.         IMM Letter  IMM Letter given to Patient/Family/Significant other/Guardian/POA/by[de-identified] updated  IMM Letter date given[de-identified] 02/27/20  IMM Letter time given[de-identified] 2436

## 2020-02-28 NOTE — PROGRESS NOTES
Coumadin Clinic from [facility] called for information on discharge antibiotic and coumadin dosage.    Electronically signed by Tarsha Xie RN on 2/28/2020 at 9:08 AM

## 2020-03-05 NOTE — PROGRESS NOTES
CLINICAL PHARMACY NOTE: MEDS TO [address] Select Patient?: No  Total # of Prescriptions Filled: 1   The following medications were delivered to the patient:  Bactrim-DS  Total # of Interventions Completed: 2  Time Spent (min): 30    Additional Documentation:

## 2020-03-06 NOTE — TELEPHONE ENCOUNTER
2nd attempt to contact the patient,   LMOM     I made the device check apt the same day as the upcoming Dr. Owen Sanchez apt.

## 2020-03-10 NOTE — PROGRESS NOTES
CLINICAL PHARMACY NOTE: MEDS TO [address] Select Patient?: No  Total # of Prescriptions Filled: 1   The following medications were delivered to the patient:  Mark Romero 62. 5mcg  Total # of Interventions Completed: 2  Time Spent (min): 30    Additional Documentation:

## 2020-03-11 ENCOUNTER — TELEPHONE (OUTPATIENT)
Dept: PULMONOLOGY | Age: 60
End: 2020-03-11

## 2020-03-11 RX ORDER — TIOTROPIUM BROMIDE 18 UG/1
18 CAPSULE ORAL; RESPIRATORY (INHALATION) DAILY
Qty: 30 CAPSULE | Refills: 11 | Status: SHIPPED | OUTPATIENT
Start: 2020-03-11 | End: 2020-04-14 | Stop reason: CLARIF

## 2020-03-11 NOTE — TELEPHONE ENCOUNTER
Pt is requesting to go back on Spiriva, the Anoro is not working. Took 1 dose and doesn't like it. Can you send to [facility] on [address].

## 2020-03-23 ENCOUNTER — HOSPITAL ENCOUNTER (EMERGENCY)
Age: 60
Discharge: HOME | End: 2020-03-23
Payer: MEDICARE

## 2020-03-23 ENCOUNTER — APPOINTMENT (OUTPATIENT)
Dept: GENERAL RADIOLOGY | Age: 60
End: 2020-03-23
Payer: MEDICARE

## 2020-03-23 VITALS
WEIGHT: 240 LBS | TEMPERATURE: 98.3 F | HEART RATE: 84 BPM | BODY MASS INDEX: 35.55 KG/M2 | SYSTOLIC BLOOD PRESSURE: 144 MMHG | OXYGEN SATURATION: 95 % | DIASTOLIC BLOOD PRESSURE: 88 MMHG | HEIGHT: 69 IN | RESPIRATION RATE: 18 BRPM

## 2020-03-23 LAB
ALBUMIN SERPL-MCNC: 4 G/DL (ref 3.5–5.1)
ALP BLD-CCNC: 76 U/L (ref 38–126)
ALT SERPL-CCNC: 82 U/L (ref 11–66)
AMPHETAMINE+METHAMPHETAMINE URINE SCREEN: NEGATIVE
ANION GAP SERPL CALCULATED.3IONS-SCNC: 15 MEQ/L (ref 8–16)
APTT: 41.3 SECONDS (ref 22–38)
AST SERPL-CCNC: 53 U/L (ref 5–40)
BARBITURATE QUANTITATIVE URINE: NEGATIVE
BASOPHILS # BLD: 0.4 %
BASOPHILS ABSOLUTE: 0 THOU/MM3 (ref 0–0.1)
BENZODIAZEPINE QUANTITATIVE URINE: NEGATIVE
BILIRUB SERPL-MCNC: 0.4 MG/DL (ref 0.3–1.2)
BILIRUBIN DIRECT: < 0.2 MG/DL (ref 0–0.3)
BILIRUBIN URINE: NEGATIVE
BLOOD, URINE: NEGATIVE
BUN BLDV-MCNC: 19 MG/DL (ref 7–22)
CALCIUM SERPL-MCNC: 9 MG/DL (ref 8.5–10.5)
CANNABINOID QUANTITATIVE URINE: NEGATIVE
CHARACTER, URINE: CLEAR
CHLORIDE BLD-SCNC: 103 MEQ/L (ref 98–111)
CO2: 23 MEQ/L (ref 23–33)
COCAINE METABOLITE QUANTITATIVE URINE: NEGATIVE
COLOR: YELLOW
CREAT SERPL-MCNC: 1 MG/DL (ref 0.4–1.2)
EKG ATRIAL RATE: 99 BPM
EKG P AXIS: 77 DEGREES
EKG Q-T INTERVAL: 330 MS
EKG QRS DURATION: 106 MS
EKG QTC CALCULATION (BAZETT): 423 MS
EKG R AXIS: -85 DEGREES
EKG T AXIS: 66 DEGREES
EKG VENTRICULAR RATE: 99 BPM
EOSINOPHIL # BLD: 2.6 %
EOSINOPHILS ABSOLUTE: 0.1 THOU/MM3 (ref 0–0.4)
ERYTHROCYTE [DISTWIDTH] IN BLOOD BY AUTOMATED COUNT: 16.3 % (ref 11.5–14.5)
ERYTHROCYTE [DISTWIDTH] IN BLOOD BY AUTOMATED COUNT: 52.7 FL (ref 35–45)
FLU A ANTIGEN: POSITIVE
FLU B ANTIGEN: NEGATIVE
GFR SERPL CREATININE-BSD FRML MDRD: 77 ML/MIN/1.73M2
GLUCOSE BLD-MCNC: 142 MG/DL (ref 70–108)
GLUCOSE URINE: NEGATIVE MG/DL
HCT VFR BLD CALC: 43.9 % (ref 42–52)
HEMOGLOBIN: 13.3 GM/DL (ref 14–18)
IMMATURE GRANS (ABS): 0.04 THOU/MM3 (ref 0–0.07)
IMMATURE GRANULOCYTES: 0.9 %
INR BLD: 1.79 (ref 0.85–1.13)
KETONES, URINE: NEGATIVE
LACTIC ACID, SEPSIS: 1.2 MMOL/L (ref 0.5–1.9)
LEUKOCYTE ESTERASE, URINE: NEGATIVE
LIPASE: 33.7 U/L (ref 5.6–51.3)
LYMPHOCYTES # BLD: 31.3 %
LYMPHOCYTES ABSOLUTE: 1.5 THOU/MM3 (ref 1–4.8)
MCH RBC QN AUTO: 26.9 PG (ref 26–33)
MCHC RBC AUTO-ENTMCNC: 30.3 GM/DL (ref 32.2–35.5)
MCV RBC AUTO: 88.7 FL (ref 80–94)
MONOCYTES # BLD: 10.2 %
MONOCYTES ABSOLUTE: 0.5 THOU/MM3 (ref 0.4–1.3)
NITRITE, URINE: NEGATIVE
NUCLEATED RED BLOOD CELLS: 0 /100 WBC
OPIATES, URINE: NEGATIVE
OSMOLALITY CALCULATION: 285.9 MOSMOL/KG (ref 275–300)
OXYCODONE: NEGATIVE
PH UA: 5.5 (ref 5–9)
PHENCYCLIDINE QUANTITATIVE URINE: NEGATIVE
PLATELET # BLD: 171 THOU/MM3 (ref 130–400)
PMV BLD AUTO: 9.6 FL (ref 9.4–12.4)
POTASSIUM SERPL-SCNC: 3.6 MEQ/L (ref 3.5–5.2)
PRO-BNP: 211.7 PG/ML (ref 0–900)
PROCALCITONIN: 0.03 NG/ML (ref 0.01–0.09)
PROTEIN UA: NEGATIVE
RBC # BLD: 4.95 MILL/MM3 (ref 4.7–6.1)
SEG NEUTROPHILS: 54.6 %
SEGMENTED NEUTROPHILS ABSOLUTE COUNT: 2.6 THOU/MM3 (ref 1.8–7.7)
SODIUM BLD-SCNC: 141 MEQ/L (ref 135–145)
SPECIFIC GRAVITY, URINE: 1.01 (ref 1–1.03)
TOTAL PROTEIN: 8.1 G/DL (ref 6.1–8)
TROPONIN T: < 0.01 NG/ML
UROBILINOGEN, URINE: 1 EU/DL (ref 0–1)
WBC # BLD: 4.7 THOU/MM3 (ref 4.8–10.8)

## 2020-03-23 PROCEDURE — 83690 ASSAY OF LIPASE: CPT

## 2020-03-23 PROCEDURE — 85610 PROTHROMBIN TIME: CPT

## 2020-03-23 PROCEDURE — 87804 INFLUENZA ASSAY W/OPTIC: CPT

## 2020-03-23 PROCEDURE — 6370000000 HC RX 637 (ALT 250 FOR IP): Performed by: PHYSICIAN ASSISTANT

## 2020-03-23 PROCEDURE — 6360000002 HC RX W HCPCS: Performed by: PHYSICIAN ASSISTANT

## 2020-03-23 PROCEDURE — 87040 BLOOD CULTURE FOR BACTERIA: CPT

## 2020-03-23 PROCEDURE — 82248 BILIRUBIN DIRECT: CPT

## 2020-03-23 PROCEDURE — 84484 ASSAY OF TROPONIN QUANT: CPT

## 2020-03-23 PROCEDURE — 83605 ASSAY OF LACTIC ACID: CPT

## 2020-03-23 PROCEDURE — 85730 THROMBOPLASTIN TIME PARTIAL: CPT

## 2020-03-23 PROCEDURE — 96374 THER/PROPH/DIAG INJ IV PUSH: CPT

## 2020-03-23 PROCEDURE — 84145 PROCALCITONIN (PCT): CPT

## 2020-03-23 PROCEDURE — 99285 EMERGENCY DEPT VISIT HI MDM: CPT

## 2020-03-23 PROCEDURE — 93005 ELECTROCARDIOGRAM TRACING: CPT | Performed by: PHYSICIAN ASSISTANT

## 2020-03-23 PROCEDURE — 80053 COMPREHEN METABOLIC PANEL: CPT

## 2020-03-23 PROCEDURE — 83880 ASSAY OF NATRIURETIC PEPTIDE: CPT

## 2020-03-23 PROCEDURE — 81003 URINALYSIS AUTO W/O SCOPE: CPT

## 2020-03-23 PROCEDURE — 80307 DRUG TEST PRSMV CHEM ANLYZR: CPT

## 2020-03-23 PROCEDURE — 36415 COLL VENOUS BLD VENIPUNCTURE: CPT

## 2020-03-23 PROCEDURE — 85025 COMPLETE CBC W/AUTO DIFF WBC: CPT

## 2020-03-23 PROCEDURE — 71045 X-RAY EXAM CHEST 1 VIEW: CPT

## 2020-03-23 RX ORDER — DOXYCYCLINE 100 MG/1
100 TABLET ORAL 2 TIMES DAILY
Qty: 20 TABLET | Refills: 0 | Status: SHIPPED | OUTPATIENT
Start: 2020-03-23 | End: 2020-04-02

## 2020-03-23 RX ORDER — OSELTAMIVIR PHOSPHATE 75 MG/1
75 CAPSULE ORAL 2 TIMES DAILY
Qty: 10 CAPSULE | Refills: 0 | Status: SHIPPED | OUTPATIENT
Start: 2020-03-23 | End: 2020-03-28

## 2020-03-23 RX ORDER — OSELTAMIVIR PHOSPHATE 75 MG/1
75 CAPSULE ORAL ONCE
Status: COMPLETED | OUTPATIENT
Start: 2020-03-23 | End: 2020-03-23

## 2020-03-23 RX ORDER — METHYLPREDNISOLONE SODIUM SUCCINATE 125 MG/2ML
60 INJECTION, POWDER, LYOPHILIZED, FOR SOLUTION INTRAMUSCULAR; INTRAVENOUS ONCE
Status: COMPLETED | OUTPATIENT
Start: 2020-03-23 | End: 2020-03-23

## 2020-03-23 RX ORDER — DOXYCYCLINE HYCLATE 100 MG
100 TABLET ORAL ONCE
Status: DISCONTINUED | OUTPATIENT
Start: 2020-03-23 | End: 2020-03-23 | Stop reason: HOSPADM

## 2020-03-23 RX ORDER — OSELTAMIVIR PHOSPHATE 75 MG/1
75 CAPSULE ORAL 2 TIMES DAILY
Status: DISCONTINUED | OUTPATIENT
Start: 2020-03-23 | End: 2020-03-23 | Stop reason: SDUPTHER

## 2020-03-23 RX ORDER — IPRATROPIUM BROMIDE AND ALBUTEROL SULFATE 2.5; .5 MG/3ML; MG/3ML
1 SOLUTION RESPIRATORY (INHALATION) ONCE
Status: DISCONTINUED | OUTPATIENT
Start: 2020-03-23 | End: 2020-03-23

## 2020-03-23 RX ADMIN — METHYLPREDNISOLONE SODIUM SUCCINATE 60 MG: 125 INJECTION, POWDER, FOR SOLUTION INTRAMUSCULAR; INTRAVENOUS at 15:11

## 2020-03-23 RX ADMIN — OSELTAMIVIR PHOSPHATE 75 MG: 75 CAPSULE ORAL at 16:52

## 2020-03-23 ASSESSMENT — ENCOUNTER SYMPTOMS
BACK PAIN: 0
DIARRHEA: 0
SINUS PAIN: 0
WHEEZING: 0
COLOR CHANGE: 0
RHINORRHEA: 0
COUGH: 1
SHORTNESS OF BREATH: 1
ABDOMINAL PAIN: 1
SORE THROAT: 0
NAUSEA: 0
VOMITING: 0
CONSTIPATION: 0
SINUS PRESSURE: 1

## 2020-03-23 ASSESSMENT — PAIN DESCRIPTION - PAIN TYPE
TYPE: ACUTE PAIN
TYPE: ACUTE PAIN

## 2020-03-23 ASSESSMENT — PAIN SCALES - GENERAL
PAINLEVEL_OUTOF10: 7
PAINLEVEL_OUTOF10: 7
PAINLEVEL_OUTOF10: 8

## 2020-03-23 ASSESSMENT — PAIN DESCRIPTION - LOCATION
LOCATION: CHEST
LOCATION: CHEST

## 2020-03-23 NOTE — ED TRIAGE NOTES
Pt presents to ER with shortness of breath and cough that started last week. Pt states he has history of COPD and wears 2L nasal cannula continuously at home. RN bumped oxygen to 4L for comfort pt stating he cant breath. Oxygen saturation 97%. EKG completed.

## 2020-03-23 NOTE — ED NOTES
Bedside report received from Jane Nuno [place]. Provided pt with urinal to obtain urine sample. Placed pt on 2 L NC.       Tree Valladares RN  03/23/20 3938

## 2020-03-23 NOTE — ED PROVIDER NOTES
[facility] [address] [address] EMERGENCY DEPT      CHIEF COMPLAINT       Chief Complaint   Patient presents with    Shortness of Breath    Cough       Nurses Notes reviewed and I agree except asnoted in the HPI. HISTORY OFPRESENT ILLNESS    Rian Mckinley is a 63 y.o. male who presents to the emergency department for evaluation of a cough and shortness of breath. The patient was admitted to this hospital on 2/19/2020 after being diagnosed with pneumonia and failing outpatient therapy. Patient reports that he was discharged with 5 days of Bactrim after being treated for his pneumonia in this hospital.  The patient reports that he is currently not taking any antibiotics. The patient reports that for the past week, he has had a nonproductive cough. He reports having chest congestion as well as right lower chest pain whenever he coughs. The patient currently denies any chest pain or having any chest pain at rest.  The patient reports that over the past 3 to 4 days, he has been using his at home oxygen more frequently. He reports that he uses up to 4 L O2 NC as needed at home if he feels short of breath. However, the patient apparently reported to his nurse that he is on 2 L O2 continuously. He states that today, he began to feel very short of breath despite using his at home oxygen. The patient reports having hot flashes and chills today but denies any known fevers. The patient reports sinus congestion as well as a sensation of ear fullness bilaterally. He denies any sinus pain, ear pain or drainage, nasal congestion, runny nose, or sore throat. The patient reports intermittent left-sided abdominal pain but denies any nausea, vomiting, diarrhea, or constipation. The patient denies any recent sick contacts or recent travel. The patient had a cardiac cath on 12/15/2018 that appeared to show patent vessels.   The patient has a history of COPD, endocarditis, CHF, PAF, DVT, CAD, pacemaker placement, tricuspid valve replacement status post endocarditis due to IV drug use, GERD, CHF, DVT, and hepatitis C. The patient takes aspirin daily as well as Coumadin and Lasix among other medications. There are no additional complaints at this time. REVIEW OF SYSTEMS      Review of Systems   Constitutional: Positive for chills and fever (subjective). Negative for fatigue. HENT: Positive for sinus pressure (congestion). Negative for congestion, ear discharge, ear pain, postnasal drip, rhinorrhea, sinus pain, sneezing and sore throat. Ear fullness bilaterally   Respiratory: Positive for cough and shortness of breath. Negative for wheezing. Cardiovascular: Positive for chest pain (with coughing). Gastrointestinal: Positive for abdominal pain (intermittent, left-sided). Negative for constipation, diarrhea, nausea and vomiting. Genitourinary: Negative for decreased urine volume, difficulty urinating and dysuria. Musculoskeletal: Negative for arthralgias, back pain, myalgias, neck pain and neck stiffness. Skin: Negative for color change and pallor. Neurological: Negative for dizziness, weakness and light-headedness. Hematological: Negative for adenopathy. PAST MEDICAL HISTORY    has a past medical history of Anxiety disorder, Arthritis, Asthma, Back pain, chronic, Blood circulation, collateral, [employer] BiV ICD, [employer] BiV pacemaker, CAD (coronary artery disease), CHF (congestive heart failure) ([address] Chronic kidney disease, COPD (chronic obstructive pulmonary disease) ([address] Depression, Endocarditis, Hepatitis, Hepatitis C, Hypertension, and Pneumonia. SURGICAL HISTORY      has a past surgical history that includes pacemaker placement (12/26/13); Toe amputation (Bilateral, 3/13/2013); Hand Debridement (Left, 01/09/2014); debridement (Left, 01/14/2014); Cardiac surgery (12/26/12); Upper gastrointestinal endoscopy (2012);  Colonoscopy (2016); bronchoscopy (N/A, 9/28/2019); bronchoscopy Pupils are equal, round, and reactive to light. Neck:      Musculoskeletal: Normal range of motion and neck supple. No neck rigidity or muscular tenderness. Cardiovascular:      Rate and Rhythm: Normal rate and regular rhythm. Heart sounds: Normal heart sounds. No murmur. No friction rub. No gallop. Pulmonary:      Effort: Pulmonary effort is normal. No accessory muscle usage, respiratory distress or retractions. Breath sounds: No stridor. Rhonchi (diffuse, expiratory, bilaterally) present. No decreased breath sounds, wheezing or rales. Chest:      Chest wall: No tenderness. Abdominal:      General: Bowel sounds are normal. There is no distension. Palpations: Abdomen is soft. There is no mass. Tenderness: There is no abdominal tenderness. There is no guarding or rebound. Hernia: No hernia is present. Musculoskeletal: Normal range of motion. Lymphadenopathy:      Cervical: No cervical adenopathy. Skin:     General: Skin is warm and dry. Coloration: Skin is not pale. Findings: No erythema or rash. Neurological:      Mental Status: He is alert and oriented to person, place, and time. GCS: GCS eye subscore is 4. GCS verbal subscore is 5. GCS motor subscore is 6. Motor: No abnormal muscle tone. Coordination: Coordination normal.   Psychiatric:         Behavior: Behavior normal.         Thought Content:  Thought content normal.               DIFFERENTIAL DIAGNOSIS:   Includes but not limited to: Viral URI, influenza, bronchitis, pneumonia, sinusitis, COPD exacerbation, CHF exacerbation    DIAGNOSTIC RESULTS     EKG: All EKG's are interpreted by the Emergency Department Physician who either signs or Co-signsthis chart in the absence of a cardiologist.  EKG SOB (Preliminary result)    Component (Lab Inquiry)   Collection Time Result Time Ventricular Rate Atrial Rate QRS Duration Q-T Interval QTc Calculation (Bazett)   03/23/20 13:41:59 03/23/20 14:06:52 99 other components within normal limits   GLOMERULAR FILTRATION RATE, ESTIMATED - Abnormal; Notable for the following components:    Est, Glom Filt Rate 77 (*)     All other components within normal limits   CULTURE, BLOOD 1   CULTURE, BLOOD 2   BRAIN NATRIURETIC PEPTIDE   LIPASE   TROPONIN   LACTATE, SEPSIS   URINE DRUG SCREEN   URINE RT REFLEX TO CULTURE   PROCALCITONIN   ANION GAP   OSMOLALITY       EMERGENCY DEPARTMENT COURSE:   Vitals:    Vitals:    03/23/20 1507 03/23/20 1511 03/23/20 1653 03/23/20 1744   BP:   126/81 (!) 144/88   Pulse:  84 94 84   Resp:  18 18 18   Temp:       TempSrc:       SpO2: (!) 89% 91% 95% 95%   Weight:       Height:         The patient was seen and evaluated within the ED today with a cough and shortness of breath. Within the department, I observed the patient's vital signs to be within acceptable range, and he was afebrile. O2 sat was 95% on 4 L O2 nasal cannula. On exam, I appreciated diffuse expiratory rhonchi throughout the bilateral lung fields. The patient is able to speak in complete sentences, and there are no retractions or use of accessory muscles of respiration. There is no respiratory distress at this time. There is no abdominal tenderness, guarding, rigidity, or rebound tenderness. Radiologic studies within the department revealed right basilar bandlike opacity as evidence for atelectasis or infiltrate. Laboratory work revealed a white blood cell count of 4.7. Lactic acid was within normal range. Procalcitonin is within normal range. BNP is 211.7, troponin is negative. Patient is positive for influenza A. Within the department, the patient was treated with IV saline and Solumedrol. The patient used initially 2 puffs from his at home albuterol inhaler followed later by 4 puffs of his inhaler. The patient was ambulated in this department on 4 L O2 NC and was noted to have an O2 sat between 93% and 95%.   The patient is not hypoxic on his at home doses of O2 NC. I observed the patient's condition to remain stable during the duration of the stay. Dr. Santos Mattson does not feel that the patient requires admission to the respiratory unit, as the patient is afebrile with stable vital signs. He is not hypoxic either at rest or while ambulating while on his at home doses of O2. The patient does not have an elevated white blood cell count, lactic acid, or procalcitonin. After performing a face to face evaluation on this patient, Dr. Santos Mattson believes that it is most appropriate to discharge the patient home with doxycycline and Tamiflu, and he does not believe that the patient requires admission. The patient himself states that he does not feel well and would rather stay at this department, as he still feels short of breath with activity. However, the patient is not hypoxic with ambulation. My attending physicians Dr. Ed Olivarez and Dr. Chrystal Goddard both performed a face-to-face evaluation on this patient and agree with Dr. Sandhu [place]vue plan to discharge home on Tamiflu and doxycycline. Prior to his discharge home, the patient was given p.o. doses of doxycycline and Tamiflu. I explained my proposed course of treatment to the patient, who was discharged home in stable condition with prescriptions for Doxycycline and Tamiflu, and the patient will return to the ED if the symptoms become more severe in nature or otherwise change. Return precautions were given. CRITICAL CARE:   None    CONSULTS:  Discussed the case with my attending physician in the Emergency Department, Dr. Ed Olivarez, who agreed with my workup, treatment, and disposition decisions after performing a face-to-face evaluation of this patient. Dr. Santos Mattson, [address] [place] [address] - Reviewed the patient's lab work and imaging findings. He states that the opacity noted in the right lower lung is similar and perhaps somewhat worse than his last chest x-ray. He states that the patient is afebrile with stable vital signs.   The patient is

## 2020-03-23 NOTE — ED NOTES
Ambulated with pt in hallway. Had pt placed on 4L NC and pt oxygen saturation was 93-95%.       Lara Casillas RN  03/23/20 9437

## 2020-03-23 NOTE — ED NOTES
Pt states he took 4 inhalations of albuterol. Informed pt that we will ambulate in a few minutes.       Joseph Schumacher RN  03/23/20 7533

## 2020-03-23 NOTE — ED NOTES
ED nurse-to-nurse bedside report    Chief Complaint   Patient presents with    Shortness of Breath    Cough      LOC: alert and orientated to name, place, date  Vital signs   Vitals:    03/23/20 1345 03/23/20 1426 03/23/20 1507 03/23/20 1511   BP: (!) 145/79 127/85     Pulse: 94 87  84   Resp: 24 14  18   Temp: 98.3 °F (36.8 °C)      TempSrc: Oral      SpO2: 95% 93% (!) 89% 91%   Weight: 240 lb (108.9 kg)      Height: 5' 9\" (1.753 m)         Pain:    Pain Interventions: none  Pain Goal: 4  Oxygen: Yes    Current needs required 2L   Telemetry: Yes  LDAs:   Peripheral IV 03/23/20 Right Forearm (Active)   Site Assessment Clean;Dry; Intact 3/23/2020  3:18 PM   Line Status Blood return noted; Flushed 3/23/2020  3:18 PM   Dressing Status Clean;Dry; Intact 3/23/2020  3:18 PM     Continuous Infusions:   Mobility: Requires assistance * 1  Nolan Fall Risk Score: No flowsheet data found.   Fall Interventions: hourly rounding  Report given to: Dwaine Davison RN  03/23/20 4698

## 2020-03-23 NOTE — SIGNIFICANT EVENT
I met with the patient as he did not want to leave. I explained that his RR was 14 and his HR was 84, which is normal.  He is normally on 2 L O2. ON 4 L O2 his sats are 96%. His chest xray shows developing RLL pneumonia. I recommend discharge home with tamiflu and doxycycline. Patient is stable and it is reasonable to administer outpatient therapy especially in setting of COVID pandemic. He was more concerned about not having pain meds for his cough. I see no indication for admission. Recommend meds to beds with tamiflu and doxycyline. Electronically signed by Amy Luna MD.

## 2020-03-24 ENCOUNTER — CARE COORDINATION (OUTPATIENT)
Dept: CARE COORDINATION | Age: 60
End: 2020-03-24

## 2020-03-24 NOTE — CARE COORDINATION
COVID-19 Screening Initial Follow-up Note    Patient contacted regarding COVID-19  risk. Care Transition Nurse/ Ambulatory Care Manager contacted the patient by telephone to perform post discharge assessment. Verified name and  with patient as identifiers. Provided introduction to self, and explanation of the CTN/ACM role, and reason for call due to risk factors for infection and/or exposure to COVID-19. Symptoms reviewed with patient who verbalized the following symptoms: fatigue, pain or aching joints, cough, shortness of breath, contusion or unusual change in mental status, chills or shaking, sweating, fast heart rate, fast breathing, \"dizziness/lightheadedness, less urine output, cold, clammy, and pale skin, low body temperature and no new/worsening symptoms       Due to no new or worsening symptoms encounter was not routed to provider for escalation. Patient has following risk factors of: heart failure and COPD. CTN/ACM reviewed discharge instructions, medical action plan and red flags such as increased shortness of breath, increasing fever and signs of decompensation with patient who verbalized understanding. Discussed exposure protocols and quarantine with CDC Guidelines What to do if you are sick with coronavirus disease 2019 Patient who was given an opportunity for questions and concerns. The patient agrees to contact the [facility] exposure line [phone], local [facility] department [place] Department of Health: ([phone]) and PCP office for questions related to their healthcare. CTN/ACM provided contact information for future reference. Reviewed and educated patient on any new and changed medications related to discharge diagnosis     Plan for follow-up call in 5-7 days based on severity of symptoms and risk factors  Spoke with Jody Martin today for f/u. Pt was prescribed Tamiflu and Doxycycline. Was able to obtain both medications yesterday and has started taking. Wearing home oxygen.

## 2020-03-29 LAB
BLOOD CULTURE, ROUTINE: NORMAL
BLOOD CULTURE, ROUTINE: NORMAL

## 2020-03-30 ENCOUNTER — CARE COORDINATION (OUTPATIENT)
Dept: CARE COORDINATION | Age: 60
End: 2020-03-30

## 2020-04-02 ENCOUNTER — PROCEDURE VISIT (OUTPATIENT)
Dept: CARDIOLOGY CLINIC | Age: 60
End: 2020-04-02
Payer: MEDICARE

## 2020-04-02 PROCEDURE — 93294 REM INTERROG EVL PM/LDLS PM: CPT | Performed by: INTERNAL MEDICINE

## 2020-04-02 PROCEDURE — 93296 REM INTERROG EVL PM/IDS: CPT | Performed by: INTERNAL MEDICINE

## 2020-04-02 NOTE — PROGRESS NOTES
[place] sci bi v pacer  Battery 1yr  A paced 13%  vpaced 98 %  p wave 6.2mv  r wave not recorded   Atrial impedance 515ohms  Ventricle impedance 467 ohms  lv 320ohms  A fib burden 3%  Episodes of NS VT 5beats

## 2020-04-07 ENCOUNTER — CARE COORDINATION (OUTPATIENT)
Dept: CARE COORDINATION | Age: 60
End: 2020-04-07

## 2020-04-07 NOTE — CARE COORDINATION
You Patient resolved from the Care Transitions episode on 4/7/20  Patient/family has been provided the following resources and education related to COVID-19:                         Signs, symptoms and red flags related to COVID-19            CDC exposure and quarantine guidelines            [facility] exposure contact - [phone]            Contact for their local Department of Health                 Patient currently reports that the following symptoms have improved:  cough, cold, clammy, and pale skin, low body temperature and no new/worsening symptoms     No further outreach scheduled with this CTN/ACM. Episode of Care resolved. Patient has this CTN/ACM contact information if future needs arise. Spoke with pt today. Pt has been in contact with his PCP. Is currently on another round of atbs and taking Mucinex. States that his symptoms are improving. Able to cough some phlegm up. COVID hotline texted to pt per request as he is unable to find number that was provided to him previously. Overall feels better. Denies further needs.

## 2020-04-14 RX ORDER — UMECLIDINIUM 62.5 UG/1
1 AEROSOL, POWDER ORAL DAILY
Qty: 30 EACH | Refills: 11 | Status: SHIPPED | OUTPATIENT
Start: 2020-04-14 | End: 2020-06-29 | Stop reason: ALTCHOICE

## 2020-04-16 ENCOUNTER — HOSPITAL ENCOUNTER (EMERGENCY)
Age: 60
Discharge: HOME | End: 2020-04-16
Payer: MEDICARE

## 2020-04-16 ENCOUNTER — APPOINTMENT (OUTPATIENT)
Dept: GENERAL RADIOLOGY | Age: 60
End: 2020-04-16
Payer: MEDICARE

## 2020-04-16 VITALS
BODY MASS INDEX: 35.44 KG/M2 | DIASTOLIC BLOOD PRESSURE: 81 MMHG | TEMPERATURE: 98.1 F | HEART RATE: 76 BPM | OXYGEN SATURATION: 93 % | WEIGHT: 240 LBS | RESPIRATION RATE: 18 BRPM | SYSTOLIC BLOOD PRESSURE: 125 MMHG

## 2020-04-16 LAB
ANION GAP SERPL CALCULATED.3IONS-SCNC: 15 MEQ/L (ref 8–16)
BASOPHILS # BLD: 0.5 %
BASOPHILS ABSOLUTE: 0 THOU/MM3 (ref 0–0.1)
BUN BLDV-MCNC: 15 MG/DL (ref 7–22)
C-REACTIVE PROTEIN: 0.28 MG/DL (ref 0–1)
CALCIUM SERPL-MCNC: 9.5 MG/DL (ref 8.5–10.5)
CHLORIDE BLD-SCNC: 99 MEQ/L (ref 98–111)
CO2: 23 MEQ/L (ref 23–33)
CREAT SERPL-MCNC: 1.1 MG/DL (ref 0.4–1.2)
EKG ATRIAL RATE: 92 BPM
EKG P AXIS: 62 DEGREES
EKG P-R INTERVAL: 146 MS
EKG Q-T INTERVAL: 340 MS
EKG QRS DURATION: 100 MS
EKG QTC CALCULATION (BAZETT): 420 MS
EKG R AXIS: -88 DEGREES
EKG T AXIS: 54 DEGREES
EKG VENTRICULAR RATE: 92 BPM
EOSINOPHIL # BLD: 0.9 %
EOSINOPHILS ABSOLUTE: 0 THOU/MM3 (ref 0–0.4)
ERYTHROCYTE [DISTWIDTH] IN BLOOD BY AUTOMATED COUNT: 16.3 % (ref 11.5–14.5)
ERYTHROCYTE [DISTWIDTH] IN BLOOD BY AUTOMATED COUNT: 52.3 FL (ref 35–45)
GFR SERPL CREATININE-BSD FRML MDRD: 69 ML/MIN/1.73M2
GLUCOSE BLD-MCNC: 113 MG/DL (ref 70–108)
HCT VFR BLD CALC: 46 % (ref 42–52)
HEMOGLOBIN: 14.3 GM/DL (ref 14–18)
IMMATURE GRANS (ABS): 0.04 THOU/MM3 (ref 0–0.07)
IMMATURE GRANULOCYTES: 0.7 %
INR BLD: 4.15 (ref 0.85–1.13)
LYMPHOCYTES # BLD: 28.9 %
LYMPHOCYTES ABSOLUTE: 1.6 THOU/MM3 (ref 1–4.8)
MCH RBC QN AUTO: 27.4 PG (ref 26–33)
MCHC RBC AUTO-ENTMCNC: 31.1 GM/DL (ref 32.2–35.5)
MCV RBC AUTO: 88.1 FL (ref 80–94)
MONOCYTES # BLD: 8.9 %
MONOCYTES ABSOLUTE: 0.5 THOU/MM3 (ref 0.4–1.3)
NUCLEATED RED BLOOD CELLS: 0 /100 WBC
OSMOLALITY CALCULATION: 275.5 MOSMOL/KG (ref 275–300)
PLATELET # BLD: 183 THOU/MM3 (ref 130–400)
PMV BLD AUTO: 9.5 FL (ref 9.4–12.4)
POTASSIUM SERPL-SCNC: 4.2 MEQ/L (ref 3.5–5.2)
PRO-BNP: 267.3 PG/ML (ref 0–900)
PROCALCITONIN: 0.05 NG/ML (ref 0.01–0.09)
RBC # BLD: 5.22 MILL/MM3 (ref 4.7–6.1)
SEG NEUTROPHILS: 60.1 %
SEGMENTED NEUTROPHILS ABSOLUTE COUNT: 3.3 THOU/MM3 (ref 1.8–7.7)
SODIUM BLD-SCNC: 137 MEQ/L (ref 135–145)
WBC # BLD: 5.5 THOU/MM3 (ref 4.8–10.8)

## 2020-04-16 PROCEDURE — 71045 X-RAY EXAM CHEST 1 VIEW: CPT

## 2020-04-16 PROCEDURE — 93005 ELECTROCARDIOGRAM TRACING: CPT | Performed by: NURSE PRACTITIONER

## 2020-04-16 PROCEDURE — 93010 ELECTROCARDIOGRAM REPORT: CPT | Performed by: INTERNAL MEDICINE

## 2020-04-16 PROCEDURE — 86140 C-REACTIVE PROTEIN: CPT

## 2020-04-16 PROCEDURE — 85025 COMPLETE CBC W/AUTO DIFF WBC: CPT

## 2020-04-16 PROCEDURE — 83880 ASSAY OF NATRIURETIC PEPTIDE: CPT

## 2020-04-16 PROCEDURE — 6370000000 HC RX 637 (ALT 250 FOR IP): Performed by: NURSE PRACTITIONER

## 2020-04-16 PROCEDURE — 36415 COLL VENOUS BLD VENIPUNCTURE: CPT

## 2020-04-16 PROCEDURE — 80048 BASIC METABOLIC PNL TOTAL CA: CPT

## 2020-04-16 PROCEDURE — 84145 PROCALCITONIN (PCT): CPT

## 2020-04-16 PROCEDURE — 99285 EMERGENCY DEPT VISIT HI MDM: CPT

## 2020-04-16 PROCEDURE — 85610 PROTHROMBIN TIME: CPT

## 2020-04-16 RX ORDER — PREDNISONE 10 MG/1
TABLET ORAL
Qty: 20 TABLET | Refills: 0 | Status: ON HOLD | OUTPATIENT
Start: 2020-04-16 | End: 2020-04-22 | Stop reason: HOSPADM

## 2020-04-16 RX ORDER — PREDNISONE 20 MG/1
60 TABLET ORAL ONCE
Status: COMPLETED | OUTPATIENT
Start: 2020-04-16 | End: 2020-04-16

## 2020-04-16 RX ORDER — METHYLPREDNISOLONE SODIUM SUCCINATE 125 MG/2ML
125 INJECTION, POWDER, LYOPHILIZED, FOR SOLUTION INTRAMUSCULAR; INTRAVENOUS ONCE
Status: DISCONTINUED | OUTPATIENT
Start: 2020-04-16 | End: 2020-04-16

## 2020-04-16 RX ADMIN — PREDNISONE 60 MG: 20 TABLET ORAL at 20:49

## 2020-04-16 ASSESSMENT — PAIN DESCRIPTION - FREQUENCY
FREQUENCY: CONTINUOUS
FREQUENCY: CONTINUOUS

## 2020-04-16 ASSESSMENT — ENCOUNTER SYMPTOMS
EYE REDNESS: 0
RHINORRHEA: 0
PHOTOPHOBIA: 0
CHEST TIGHTNESS: 0
DIARRHEA: 0
VOMITING: 0
ABDOMINAL PAIN: 0
NAUSEA: 0
CONSTIPATION: 0
SORE THROAT: 0
ABDOMINAL DISTENTION: 0
WHEEZING: 0
BACK PAIN: 0
SHORTNESS OF BREATH: 1
SINUS PRESSURE: 0
COLOR CHANGE: 0
COUGH: 1
BLOOD IN STOOL: 0
VOICE CHANGE: 0

## 2020-04-16 ASSESSMENT — PAIN DESCRIPTION - PAIN TYPE
TYPE: ACUTE PAIN
TYPE: ACUTE PAIN

## 2020-04-16 ASSESSMENT — PAIN DESCRIPTION - LOCATION: LOCATION: OTHER (COMMENT)

## 2020-04-16 ASSESSMENT — PAIN SCALES - GENERAL
PAINLEVEL_OUTOF10: 6
PAINLEVEL_OUTOF10: 6

## 2020-04-16 ASSESSMENT — PAIN DESCRIPTION - ORIENTATION
ORIENTATION: RIGHT;LEFT
ORIENTATION: RIGHT;LEFT

## 2020-04-16 NOTE — ED TRIAGE NOTES
Pt presents to ED via triage for c/o cough, congestion and shortness of breath. Pt states this has been ongoing for \"a couple days\". Pt has a dry, congested sounded cough. States he is able to cough some stuff up, but lately he said he's unable to cough anything up now. Pt states \"lung pain\", gisel R sided. LS congested upon auscultation. When asked, pt states \"i'm not going to lie. The thought had crossed my mind to hurt myself. Just because I'm sick. I'm tired of being sick. \" Pt denies any plan at this time. Monitor applied and VSS. Pt normally on 2-3L via nc at home, upped it to 4L over the last 2 days. Hailee Pollock CNP at bedside for assessment. Will continue to monitor.

## 2020-04-16 NOTE — ED PROVIDER NOTES
(12/26/12); Upper gastrointestinal endoscopy (2012); Colonoscopy (2016); bronchoscopy (N/A, 9/28/2019); bronchoscopy (N/A, 9/28/2019); bronchoscopy (N/A, 9/29/2019); bronchoscopy (9/29/2019); bronchoscopy (N/A, 9/30/2019); bronchoscopy (N/A, 10/16/2019); and Tracheal surgery (N/A, 10/18/2019). CURRENT MEDICATIONS       Previous Medications    ALBUTEROL (PROVENTIL) (2.5 MG/3ML) 0.083% NEBULIZER SOLUTION    Take 6 mLs by nebulization every 4 hours as needed for Wheezing or Shortness of Breath    ALBUTEROL SULFATE HFA (PROAIR HFA) 108 (90 BASE) MCG/ACT INHALER    Inhale 2 puffs into the lungs every 6 hours as needed for Wheezing or Shortness of Breath    AMLODIPINE (NORVASC) 5 MG TABLET    Take 1 tablet by mouth daily Take 5mg  By mouth qd    ASPIRIN EC 81 MG EC TABLET    Take 1 tablet by mouth daily    BUPRENORPHINE-NALOXONE (SUBOXONE) 8-2 MG FILM SL FILM    Place 1 Film under the tongue daily. .    BUSPIRONE (BUSPAR) 30 MG TABLET    Take 30 mg by mouth 2 times daily     FUROSEMIDE (LASIX) 40 MG TABLET    Take 1 tablet by mouth daily    GUAIFENESIN (MUCINEX) 600 MG EXTENDED RELEASE TABLET    Take 1 tablet by mouth 2 times daily    LISINOPRIL (PRINIVIL;ZESTRIL) 2.5 MG TABLET    Take 1 tablet by mouth daily    PANTOPRAZOLE (PROTONIX) 40 MG TABLET    Take 40 mg by mouth daily    POTASSIUM CHLORIDE (KLOR-CON M) 20 MEQ EXTENDED RELEASE TABLET    Take 1 tablet by mouth 2 times daily    QUETIAPINE (SEROQUEL) 100 MG TABLET    1 tablet by Per NG tube route nightly    TAMSULOSIN (FLOMAX) 0.4 MG CAPSULE    Take 0.4 mg by mouth daily    TERBINAFINE (LAMISIL) 1 % CREAM    Apply topically 2 times daily. UMECLIDINIUM BROMIDE (INCRUSE ELLIPTA) 62.5 MCG/INH AEPB    Inhale 1 puff into the lungs daily    VILAZODONE HCL 10 MG TABLET        WARFARIN (COUMADIN) 5 MG TABLET    Take 5 mg by mouth       ALLERGIES     is allergic to pcn [penicillins]. FAMILY HISTORY     He indicated that his mother is alive.  He indicated that his PHYSICAL EXAM     INITIAL VITALS:  weight is 240 lb (108.9 kg). His oral temperature is 98.1 °F (36.7 °C). His blood pressure is 125/81 and his pulse is 86. His respiration is 18 and oxygen saturation is 92%. Physical Exam  Vitals signs and nursing note reviewed. Constitutional:       General: He is not in acute distress. Appearance: Normal appearance. He is well-developed. Interventions: Nasal cannula in place. HENT:      Head: Normocephalic. Right Ear: External ear normal.      Left Ear: External ear normal.      Nose: Nose normal.      Mouth/Throat:      Pharynx: Uvula midline. Eyes:      Conjunctiva/sclera: Conjunctivae normal.      Pupils: Pupils are equal, round, and reactive to light. Neck:      Musculoskeletal: Normal range of motion and neck supple. Cardiovascular:      Rate and Rhythm: Normal rate and regular rhythm. Heart sounds: Normal heart sounds, S1 normal and S2 normal.   Pulmonary:      Effort: Pulmonary effort is normal. No respiratory distress. Breath sounds: Wheezing (expiratory, throughout) present. Chest:      Chest wall: No tenderness. Abdominal:      General: Bowel sounds are normal. There is no distension. Palpations: Abdomen is soft. Tenderness: There is no abdominal tenderness. Musculoskeletal: Normal range of motion. Skin:     General: Skin is warm and dry. Coloration: Skin is not pale. Findings: No erythema or rash. Neurological:      Mental Status: He is alert and oriented to person, place, and time. Psychiatric:         Behavior: Behavior normal.         Thought Content: Thought content normal.         Judgment: Judgment normal.         DIFFERENTIAL DIAGNOSIS:   Included but not limited to: COPD, Asthma, Pneumonia    DIAGNOSTIC RESULTS     EKG: All EKG's are interpreted by the Emergency Department Physician who eithersigns or Co-signs this chart in the absence of a cardiologist.    Vent.  Rate: 92 bpm  FL the scribe in my presence, and it accurately records my words and actions.     Maik Fernández, CNP 04/16/20 8:32 PM    Sheri Knowles Counts, APRN - CNP          [employer], APRN - CNP  04/17/20 [id number]

## 2020-04-17 ENCOUNTER — CARE COORDINATION (OUTPATIENT)
Dept: CARE COORDINATION | Age: 60
End: 2020-04-17

## 2020-04-17 NOTE — ED NOTES
2050: In to discharge pt. At this time, pt states he has no oxygen at home and isn't due to have any tanks filled until tomorrow morning at 10am. Pt has one personal tank in ER room that is empty and states having 2 more tanks at home that are both empty. Pt requires 2-3L at all times. This RN personally took 1-800 number from pt's home oxygen tank and called to determine if a tank could be delivered for pt use until tomorrow when tanks would be refilled. Spoke with Val Gonzalez at [facility] supply so states they are unable to deliver tanks after hours. Spoke with Edmund Whatley, charge nurse, to look at options for pt.    2145: Spoke with representative from [facility] who verified that pt has a home O2 concentrator. When questioned, pt states \"yeah I have one. But I don't have oxygen to go home on. When I hit the cold air, I lose my breath, I won't be able to breath\". Updated Edmund Whatley charge nurse. Called [facility]. [facility] to transport pt on oxygen to home and then pt instructed to use oxygen concentrator as prescribed. 2200: Went over discharge instructions with pt. Pt refused to sign paperwork. Voiced dissatisfaction and \"wants more testing to be done. Theres something wrong, you just haven't found it yet. \" Went over lab results and xray results with patient and educated pt on results. Informed pt that pt may return should symptoms persist and is unable to schedule a follow up. Pt voiced understanding. 2215: Pt escorted on 4L via nc to [facility] transport and pt left in stable condition.      Rosanne Zhang, LUDA  04/16/20 [address], RN  04/16/20 [id number]

## 2020-04-17 NOTE — ED NOTES
In for hourly rounding. Pt resting on cot with eyes closed, appears to be resting comfortably. Pt easily awoken. When asked, pt states \"I feel terrible\". Updated pt on plan of care. Pt remains A&Ox4, resps easy and unlabored. Pt continues on 4Lpm via nc. Will continue to monitor.      Golden Andrews RN  04/16/20 2003

## 2020-04-20 ENCOUNTER — TELEPHONE (OUTPATIENT)
Dept: PULMONOLOGY | Age: 60
End: 2020-04-20

## 2020-04-20 ENCOUNTER — APPOINTMENT (OUTPATIENT)
Dept: GENERAL RADIOLOGY | Age: 60
DRG: 190 | End: 2020-04-20
Payer: MEDICARE

## 2020-04-20 ENCOUNTER — HOSPITAL ENCOUNTER (INPATIENT)
Age: 60
LOS: 5 days | Discharge: NURSING, REHAB OR LONG-TERM CARE | DRG: 190 | End: 2020-04-25
Attending: INTERNAL MEDICINE | Admitting: INTERNAL MEDICINE
Payer: MEDICARE

## 2020-04-20 PROBLEM — J44.0 CHRONIC OBSTRUCTIVE PULMONARY DISEASE WITH ACUTE LOWER RESPIRATORY INFECTION (HCC): Status: ACTIVE | Noted: 2020-04-20

## 2020-04-20 LAB
ALBUMIN SERPL-MCNC: 4.4 G/DL (ref 3.5–5.1)
ALP BLD-CCNC: 70 U/L (ref 38–126)
ALT SERPL-CCNC: 33 U/L (ref 11–66)
ANION GAP SERPL CALCULATED.3IONS-SCNC: 15 MEQ/L (ref 8–16)
AST SERPL-CCNC: 18 U/L (ref 5–40)
BASOPHILS # BLD: 0.2 %
BASOPHILS ABSOLUTE: 0 THOU/MM3 (ref 0–0.1)
BILIRUB SERPL-MCNC: 0.3 MG/DL (ref 0.3–1.2)
BUN BLDV-MCNC: 27 MG/DL (ref 7–22)
C-REACTIVE PROTEIN: 0.09 MG/DL (ref 0–1)
CALCIUM SERPL-MCNC: 9.1 MG/DL (ref 8.5–10.5)
CHLORIDE BLD-SCNC: 98 MEQ/L (ref 98–111)
CO2: 22 MEQ/L (ref 23–33)
CREAT SERPL-MCNC: 1.1 MG/DL (ref 0.4–1.2)
EKG ATRIAL RATE: 108 BPM
EKG P AXIS: 70 DEGREES
EKG P-R INTERVAL: 128 MS
EKG Q-T INTERVAL: 306 MS
EKG QRS DURATION: 96 MS
EKG QTC CALCULATION (BAZETT): 410 MS
EKG R AXIS: -78 DEGREES
EKG T AXIS: 59 DEGREES
EKG VENTRICULAR RATE: 108 BPM
EOSINOPHIL # BLD: 0 %
EOSINOPHILS ABSOLUTE: 0 THOU/MM3 (ref 0–0.4)
ERYTHROCYTE [DISTWIDTH] IN BLOOD BY AUTOMATED COUNT: 16.6 % (ref 11.5–14.5)
ERYTHROCYTE [DISTWIDTH] IN BLOOD BY AUTOMATED COUNT: 52.7 FL (ref 35–45)
FERRITIN: 34 NG/ML (ref 22–322)
FLU A ANTIGEN: NEGATIVE
FLU B ANTIGEN: NEGATIVE
GFR SERPL CREATININE-BSD FRML MDRD: 69 ML/MIN/1.73M2
GLUCOSE BLD-MCNC: 233 MG/DL (ref 70–108)
HCT VFR BLD CALC: 44.5 % (ref 42–52)
HEMOGLOBIN: 14 GM/DL (ref 14–18)
IMMATURE GRANS (ABS): 0.05 THOU/MM3 (ref 0–0.07)
IMMATURE GRANULOCYTES: 1 %
INR BLD: 3.95 (ref 0.85–1.13)
LACTIC ACID: 2.2 MMOL/L (ref 0.5–2.2)
LD: 171 U/L (ref 100–190)
LYMPHOCYTES # BLD: 12.6 %
LYMPHOCYTES ABSOLUTE: 0.7 THOU/MM3 (ref 1–4.8)
MCH RBC QN AUTO: 27.3 PG (ref 26–33)
MCHC RBC AUTO-ENTMCNC: 31.5 GM/DL (ref 32.2–35.5)
MCV RBC AUTO: 86.7 FL (ref 80–94)
MONOCYTES # BLD: 3.4 %
MONOCYTES ABSOLUTE: 0.2 THOU/MM3 (ref 0.4–1.3)
NUCLEATED RED BLOOD CELLS: 0 /100 WBC
OSMOLALITY CALCULATION: 282.7 MOSMOL/KG (ref 275–300)
PLATELET # BLD: 182 THOU/MM3 (ref 130–400)
PMV BLD AUTO: 9.4 FL (ref 9.4–12.4)
POTASSIUM REFLEX MAGNESIUM: 4.4 MEQ/L (ref 3.5–5.2)
PRO-BNP: 252.9 PG/ML (ref 0–900)
PROCALCITONIN: 0.05 NG/ML (ref 0.01–0.09)
RBC # BLD: 5.13 MILL/MM3 (ref 4.7–6.1)
SARS-COV-2, NAAT: NOT DETECTED
SEG NEUTROPHILS: 82.8 %
SEGMENTED NEUTROPHILS ABSOLUTE COUNT: 4.3 THOU/MM3 (ref 1.8–7.7)
SODIUM BLD-SCNC: 135 MEQ/L (ref 135–145)
TOTAL PROTEIN: 8.4 G/DL (ref 6.1–8)
TROPONIN T: < 0.01 NG/ML
WBC # BLD: 5.2 THOU/MM3 (ref 4.8–10.8)

## 2020-04-20 PROCEDURE — 83605 ASSAY OF LACTIC ACID: CPT

## 2020-04-20 PROCEDURE — 99223 1ST HOSP IP/OBS HIGH 75: CPT | Performed by: PHYSICIAN ASSISTANT

## 2020-04-20 PROCEDURE — 94640 AIRWAY INHALATION TREATMENT: CPT

## 2020-04-20 PROCEDURE — 6370000000 HC RX 637 (ALT 250 FOR IP): Performed by: EMERGENCY MEDICINE

## 2020-04-20 PROCEDURE — 84145 PROCALCITONIN (PCT): CPT

## 2020-04-20 PROCEDURE — 80053 COMPREHEN METABOLIC PANEL: CPT

## 2020-04-20 PROCEDURE — 87804 INFLUENZA ASSAY W/OPTIC: CPT

## 2020-04-20 PROCEDURE — 85610 PROTHROMBIN TIME: CPT

## 2020-04-20 PROCEDURE — U0002 COVID-19 LAB TEST NON-CDC: HCPCS

## 2020-04-20 PROCEDURE — 94761 N-INVAS EAR/PLS OXIMETRY MLT: CPT

## 2020-04-20 PROCEDURE — 82728 ASSAY OF FERRITIN: CPT

## 2020-04-20 PROCEDURE — 99285 EMERGENCY DEPT VISIT HI MDM: CPT

## 2020-04-20 PROCEDURE — 85025 COMPLETE CBC W/AUTO DIFF WBC: CPT

## 2020-04-20 PROCEDURE — 71045 X-RAY EXAM CHEST 1 VIEW: CPT

## 2020-04-20 PROCEDURE — 84484 ASSAY OF TROPONIN QUANT: CPT

## 2020-04-20 PROCEDURE — 83880 ASSAY OF NATRIURETIC PEPTIDE: CPT

## 2020-04-20 PROCEDURE — 87040 BLOOD CULTURE FOR BACTERIA: CPT

## 2020-04-20 PROCEDURE — 2060000000 HC ICU INTERMEDIATE R&B

## 2020-04-20 PROCEDURE — 83615 LACTATE (LD) (LDH) ENZYME: CPT

## 2020-04-20 PROCEDURE — 36415 COLL VENOUS BLD VENIPUNCTURE: CPT

## 2020-04-20 PROCEDURE — 86140 C-REACTIVE PROTEIN: CPT

## 2020-04-20 PROCEDURE — 93005 ELECTROCARDIOGRAM TRACING: CPT | Performed by: INTERNAL MEDICINE

## 2020-04-20 RX ORDER — POTASSIUM CHLORIDE 20 MEQ/1
40 TABLET, EXTENDED RELEASE ORAL PRN
Status: DISCONTINUED | OUTPATIENT
Start: 2020-04-20 | End: 2020-04-25 | Stop reason: HOSPADM

## 2020-04-20 RX ORDER — POTASSIUM CHLORIDE 7.45 MG/ML
10 INJECTION INTRAVENOUS PRN
Status: DISCONTINUED | OUTPATIENT
Start: 2020-04-20 | End: 2020-04-25 | Stop reason: HOSPADM

## 2020-04-20 RX ORDER — SODIUM CHLORIDE 0.9 % (FLUSH) 0.9 %
10 SYRINGE (ML) INJECTION PRN
Status: DISCONTINUED | OUTPATIENT
Start: 2020-04-20 | End: 2020-04-25 | Stop reason: HOSPADM

## 2020-04-20 RX ORDER — ALBUTEROL SULFATE 90 UG/1
2 AEROSOL, METERED RESPIRATORY (INHALATION) EVERY 4 HOURS PRN
Status: DISCONTINUED | OUTPATIENT
Start: 2020-04-20 | End: 2020-04-25 | Stop reason: HOSPADM

## 2020-04-20 RX ORDER — ALBUTEROL SULFATE 90 UG/1
6 AEROSOL, METERED RESPIRATORY (INHALATION) ONCE
Status: COMPLETED | OUTPATIENT
Start: 2020-04-20 | End: 2020-04-20

## 2020-04-20 RX ORDER — SODIUM CHLORIDE 0.9 % (FLUSH) 0.9 %
10 SYRINGE (ML) INJECTION EVERY 12 HOURS SCHEDULED
Status: DISCONTINUED | OUTPATIENT
Start: 2020-04-20 | End: 2020-04-25 | Stop reason: HOSPADM

## 2020-04-20 RX ORDER — MAGNESIUM SULFATE IN WATER 40 MG/ML
2 INJECTION, SOLUTION INTRAVENOUS PRN
Status: DISCONTINUED | OUTPATIENT
Start: 2020-04-20 | End: 2020-04-25 | Stop reason: HOSPADM

## 2020-04-20 RX ORDER — ONDANSETRON 2 MG/ML
4 INJECTION INTRAMUSCULAR; INTRAVENOUS EVERY 6 HOURS PRN
Status: DISCONTINUED | OUTPATIENT
Start: 2020-04-20 | End: 2020-04-25 | Stop reason: HOSPADM

## 2020-04-20 RX ORDER — ACETAMINOPHEN 650 MG/1
650 SUPPOSITORY RECTAL EVERY 6 HOURS PRN
Status: DISCONTINUED | OUTPATIENT
Start: 2020-04-20 | End: 2020-04-25 | Stop reason: HOSPADM

## 2020-04-20 RX ORDER — POLYETHYLENE GLYCOL 3350 17 G/17G
17 POWDER, FOR SOLUTION ORAL DAILY PRN
Status: DISCONTINUED | OUTPATIENT
Start: 2020-04-20 | End: 2020-04-25 | Stop reason: HOSPADM

## 2020-04-20 RX ORDER — ACETAMINOPHEN 325 MG/1
650 TABLET ORAL EVERY 6 HOURS PRN
Status: DISCONTINUED | OUTPATIENT
Start: 2020-04-20 | End: 2020-04-25 | Stop reason: HOSPADM

## 2020-04-20 RX ORDER — PROMETHAZINE HYDROCHLORIDE 25 MG/1
12.5 TABLET ORAL EVERY 6 HOURS PRN
Status: DISCONTINUED | OUTPATIENT
Start: 2020-04-20 | End: 2020-04-25 | Stop reason: HOSPADM

## 2020-04-20 RX ADMIN — ALBUTEROL SULFATE 6 PUFF: 90 AEROSOL, METERED RESPIRATORY (INHALATION) at 14:10

## 2020-04-20 ASSESSMENT — PAIN DESCRIPTION - LOCATION
LOCATION: HEAD
LOCATION: CHEST

## 2020-04-20 ASSESSMENT — ENCOUNTER SYMPTOMS
NAUSEA: 0
VOMITING: 0
COLOR CHANGE: 0
SHORTNESS OF BREATH: 1
BACK PAIN: 0
ABDOMINAL DISTENTION: 0
WHEEZING: 1
COUGH: 1
ABDOMINAL PAIN: 0

## 2020-04-20 ASSESSMENT — PAIN SCALES - GENERAL
PAINLEVEL_OUTOF10: 3
PAINLEVEL_OUTOF10: 6

## 2020-04-20 ASSESSMENT — PAIN DESCRIPTION - DESCRIPTORS
DESCRIPTORS: TIGHTNESS
DESCRIPTORS: HEADACHE

## 2020-04-20 ASSESSMENT — PAIN DESCRIPTION - FREQUENCY: FREQUENCY: CONTINUOUS

## 2020-04-20 ASSESSMENT — PAIN DESCRIPTION - PAIN TYPE
TYPE: ACUTE PAIN
TYPE: ACUTE PAIN

## 2020-04-20 NOTE — ED PROVIDER NOTES
disorder, Arthritis, Asthma, Back pain, chronic, Blood circulation, collateral, [employer] ICD, [employer] pacemaker, CAD (coronary artery disease), CHF (congestive heart failure) ([facility] [address] Chronic kidney disease, COPD (chronic obstructive pulmonary disease) ([facility] [address] Depression, Endocarditis, Hepatitis, Hepatitis C, Hypertension, and Pneumonia. SURGICAL HISTORY      has a past surgical history that includes pacemaker placement (12/26/13); Toe amputation (Bilateral, 3/13/2013); Hand Debridement (Left, 01/09/2014); debridement (Left, 01/14/2014); Cardiac surgery (12/26/12); Upper gastrointestinal endoscopy (2012); Colonoscopy (2016); bronchoscopy (N/A, 9/28/2019); bronchoscopy (N/A, 9/28/2019); bronchoscopy (N/A, 9/29/2019); bronchoscopy (9/29/2019); bronchoscopy (N/A, 9/30/2019); bronchoscopy (N/A, 10/16/2019); and Tracheal surgery (N/A, 10/18/2019). CURRENT MEDICATIONS       Previous Medications    ALBUTEROL (PROVENTIL) (2.5 MG/3ML) 0.083% NEBULIZER SOLUTION    Take 6 mLs by nebulization every 4 hours as needed for Wheezing or Shortness of Breath    ALBUTEROL SULFATE HFA (PROAIR HFA) 108 (90 BASE) MCG/ACT INHALER    Inhale 2 puffs into the lungs every 6 hours as needed for Wheezing or Shortness of Breath    AMLODIPINE (NORVASC) 5 MG TABLET    Take 1 tablet by mouth daily Take 5mg  By mouth qd    ASPIRIN EC 81 MG EC TABLET    Take 1 tablet by mouth daily    BUPRENORPHINE-NALOXONE (SUBOXONE) 8-2 MG FILM SL FILM    Place 1 Film under the tongue daily. .    BUSPIRONE (BUSPAR) 30 MG TABLET    Take 30 mg by mouth 2 times daily     FUROSEMIDE (LASIX) 40 MG TABLET    Take 1 tablet by mouth daily    GUAIFENESIN (MUCINEX) 600 MG EXTENDED RELEASE TABLET    Take 1 tablet by mouth 2 times daily    LISINOPRIL (PRINIVIL;ZESTRIL) 2.5 MG TABLET    Take 1 tablet by mouth daily    PANTOPRAZOLE (PROTONIX) 40 MG TABLET    Take 40 mg by mouth daily    POTASSIUM CHLORIDE (KLOR-CON M) 20 MEQ EXTENDED RELEASE TABLET Breath sounds: Examination of the right-upper field reveals wheezing. Examination of the left-upper field reveals wheezing. Examination of the right-middle field reveals wheezing. Examination of the left-middle field reveals wheezing. Examination of the right-lower field reveals wheezing. Examination of the left-lower field reveals wheezing. Wheezing present. Abdominal:      General: Abdomen is flat. Bowel sounds are normal. There is no distension. Tenderness: There is no abdominal tenderness. Skin:     General: Skin is warm and dry. Capillary Refill: Capillary refill takes less than 2 seconds. Neurological:      General: No focal deficit present. Mental Status: He is alert. Psychiatric:         Mood and Affect: Mood normal.         Behavior: Behavior normal.          DIFFERENTIAL DIAGNOSIS:   COPD, pneumonia, CHF, COVID-19, ACS, low suspicion for PE due to anticoagulated state    DIAGNOSTIC RESULTS     EKG: All EKG's are interpreted by the Emergency Department Physician who either signs or Co-signs this chart in the absence of a cardiologist.    EKG has poor underlying quality, atrial sensed ventricular paced rhythm ventricular rate 108 bpm.  WY interval 128, QRS duration 96, corrected  ms    RADIOLOGY: non-plainfilm images(s) such as CT, Ultrasound and MRI are read by the radiologist.    XR CHEST PORTABLE   Final Result   Reticular interstitial markings greatest in the lung bases. Correlate for interstitial edema, atelectasis, atypical viral-type infection cannot be excluded. No significant change when compared to prior study. **This report has been created using voice recognition software. It may contain minor errors which are inherent in voice recognition technology. **      Final report electronically signed by Dr. Lui Deal on 4/20/2020 2:22 PM          LABS:     Labs Reviewed   CBC WITH AUTO DIFFERENTIAL - Abnormal; Notable for the following components: is still supratherapeutic 3.95. Influenza testing negative. Chest x-ray shows right basilar atelectasis/infiltrate. MDM:  The patient will require admission due to wheezing, hypoxia. His symptoms are likely due to COPD exacerbation. However, with COVID-19 pandemic, this will be evaluated on an inpatient basis. Patient will be admitted to the hospital.  I contacted Dr. Luz Max of the hospitalist service. He graciously agreed to admit the patient to the hospital.    CRITICAL CARE:   None    CONSULTS:  Dr Luz Max, Hospitalist    PROCEDURES:  None    FINAL IMPRESSION      1. COPD exacerbation ([facility] [address]          DISPOSITION/PLAN   Admit    PATIENT REFERRED TO:  Srikanth Roldan  [address] [address]  [place]   [phone]            DISCHARGE MEDICATIONS:  New Prescriptions    No medications on file       (Please note that portions of this note were completed with a voice recognition program.  Efforts were made to edit the dictations but occasionally words are mis-transcribed.)    The patient was given an opportunity to see the Emergency Attending. The patient voiced understanding that I was a Mid-LevelProvider and was in agreement with being seen independently by myself.           Ruba Barros, APRN - CNP  04/20/20 [id number]

## 2020-04-20 NOTE — ACP (ADVANCE CARE PLANNING)
became clear that your chance of recovery was unlikely, would that change your answer? No      Resuscitation  CPR works best to restart the heart when there is a sudden event, like a heart attack, in someone who is otherwise healthy. Unfortunately, CPR does not typically restart the heart for people who have serious health conditions or who are very sick. In the event your heart stopped, would you want attempts to restart your heart (answer \"yes\") or would you prefer a natural death (answer \"no\")? Yes    If your health were to worsen and it became clear that your chance of recovery was unlikely, would that change your answer? No    [x] Yes  [] No   Educated Patient / [place] regarding differences between Advance Directives and portable DNR orders.     Length of ACP Conversation in minutes:  17 minutes    Conversation Outcomes:  [x] ACP discussion completed  [x] Existing advance directive reviewed with patient; no changes to patient's previously recorded wishes   [] New Advance Directive completed   [] Portable Do Not Rescitate prepared for Provider review and signature  [] POLST/POST/MOLST/MOST prepared for Provider review and signature      Follow-up plan:    [] Schedule follow-up conversation to continue planning  [] Referred individual to Provider for additional questions/concerns   [x] Advised patient/agent/surrogate to review completed ACP document and update if needed with changes in condition, patient preferences or care setting     [x] This note routed to one or more involved healthcare providers

## 2020-04-20 NOTE — TELEPHONE ENCOUNTER
Patient stated he was told to self isolate himself dt COVID 19 risk factors, I highly recommended patient to go to the ER Patient stated he had to turn his O2 from 2 all the way to 4 LPM. And his O2 was still dropping down to 85%. Patient acknowledged.

## 2020-04-21 LAB
ANION GAP SERPL CALCULATED.3IONS-SCNC: 12 MEQ/L (ref 8–16)
BASOPHILS # BLD: 0.1 %
BASOPHILS ABSOLUTE: 0 THOU/MM3 (ref 0–0.1)
BUN BLDV-MCNC: 25 MG/DL (ref 7–22)
CALCIUM SERPL-MCNC: 9 MG/DL (ref 8.5–10.5)
CHLORIDE BLD-SCNC: 96 MEQ/L (ref 98–111)
CO2: 26 MEQ/L (ref 23–33)
CREAT SERPL-MCNC: 1 MG/DL (ref 0.4–1.2)
EOSINOPHIL # BLD: 1.1 %
EOSINOPHILS ABSOLUTE: 0.1 THOU/MM3 (ref 0–0.4)
ERYTHROCYTE [DISTWIDTH] IN BLOOD BY AUTOMATED COUNT: 16.6 % (ref 11.5–14.5)
ERYTHROCYTE [DISTWIDTH] IN BLOOD BY AUTOMATED COUNT: 52 FL (ref 35–45)
GFR SERPL CREATININE-BSD FRML MDRD: 77 ML/MIN/1.73M2
GLUCOSE BLD-MCNC: 97 MG/DL (ref 70–108)
HCT VFR BLD CALC: 43.5 % (ref 42–52)
HEMOGLOBIN: 13.5 GM/DL (ref 14–18)
IMMATURE GRANS (ABS): 0.03 THOU/MM3 (ref 0–0.07)
IMMATURE GRANULOCYTES: 0.4 %
INR BLD: 3.74 (ref 0.85–1.13)
LYMPHOCYTES # BLD: 41.2 %
LYMPHOCYTES ABSOLUTE: 3.4 THOU/MM3 (ref 1–4.8)
MCH RBC QN AUTO: 26.9 PG (ref 26–33)
MCHC RBC AUTO-ENTMCNC: 31 GM/DL (ref 32.2–35.5)
MCV RBC AUTO: 86.8 FL (ref 80–94)
MONOCYTES # BLD: 10.1 %
MONOCYTES ABSOLUTE: 0.8 THOU/MM3 (ref 0.4–1.3)
NUCLEATED RED BLOOD CELLS: 0 /100 WBC
PLATELET # BLD: 178 THOU/MM3 (ref 130–400)
PMV BLD AUTO: 9.1 FL (ref 9.4–12.4)
POTASSIUM REFLEX MAGNESIUM: 3.8 MEQ/L (ref 3.5–5.2)
RBC # BLD: 5.01 MILL/MM3 (ref 4.7–6.1)
SEG NEUTROPHILS: 47.1 %
SEGMENTED NEUTROPHILS ABSOLUTE COUNT: 3.9 THOU/MM3 (ref 1.8–7.7)
SODIUM BLD-SCNC: 134 MEQ/L (ref 135–145)
WBC # BLD: 8.3 THOU/MM3 (ref 4.8–10.8)

## 2020-04-21 PROCEDURE — 2700000000 HC OXYGEN THERAPY PER DAY

## 2020-04-21 PROCEDURE — 6370000000 HC RX 637 (ALT 250 FOR IP): Performed by: PHYSICIAN ASSISTANT

## 2020-04-21 PROCEDURE — 6370000000 HC RX 637 (ALT 250 FOR IP): Performed by: INTERNAL MEDICINE

## 2020-04-21 PROCEDURE — 6360000002 HC RX W HCPCS: Performed by: PHYSICIAN ASSISTANT

## 2020-04-21 PROCEDURE — 96365 THER/PROPH/DIAG IV INF INIT: CPT

## 2020-04-21 PROCEDURE — 85610 PROTHROMBIN TIME: CPT

## 2020-04-21 PROCEDURE — 93010 ELECTROCARDIOGRAM REPORT: CPT | Performed by: INTERNAL MEDICINE

## 2020-04-21 PROCEDURE — 99232 SBSQ HOSP IP/OBS MODERATE 35: CPT | Performed by: PHYSICIAN ASSISTANT

## 2020-04-21 PROCEDURE — 36415 COLL VENOUS BLD VENIPUNCTURE: CPT

## 2020-04-21 PROCEDURE — 80048 BASIC METABOLIC PNL TOTAL CA: CPT

## 2020-04-21 PROCEDURE — 94640 AIRWAY INHALATION TREATMENT: CPT

## 2020-04-21 PROCEDURE — 2580000003 HC RX 258: Performed by: PHYSICIAN ASSISTANT

## 2020-04-21 PROCEDURE — 94761 N-INVAS EAR/PLS OXIMETRY MLT: CPT

## 2020-04-21 PROCEDURE — 1200000003 HC TELEMETRY R&B

## 2020-04-21 PROCEDURE — 85025 COMPLETE CBC W/AUTO DIFF WBC: CPT

## 2020-04-21 RX ORDER — TAMSULOSIN HYDROCHLORIDE 0.4 MG/1
0.4 CAPSULE ORAL DAILY
Status: DISCONTINUED | OUTPATIENT
Start: 2020-04-21 | End: 2020-04-25 | Stop reason: HOSPADM

## 2020-04-21 RX ORDER — LANOLIN ALCOHOL/MO/W.PET/CERES
3 CREAM (GRAM) TOPICAL NIGHTLY PRN
Status: DISCONTINUED | OUTPATIENT
Start: 2020-04-21 | End: 2020-04-25 | Stop reason: HOSPADM

## 2020-04-21 RX ORDER — NICOTINE 21 MG/24HR
1 PATCH, TRANSDERMAL 24 HOURS TRANSDERMAL DAILY
Status: DISCONTINUED | OUTPATIENT
Start: 2020-04-21 | End: 2020-04-25 | Stop reason: HOSPADM

## 2020-04-21 RX ORDER — POTASSIUM CHLORIDE 20 MEQ/1
20 TABLET, EXTENDED RELEASE ORAL 2 TIMES DAILY
Status: DISCONTINUED | OUTPATIENT
Start: 2020-04-21 | End: 2020-04-25 | Stop reason: HOSPADM

## 2020-04-21 RX ORDER — BUPRENORPHINE AND NALOXONE 8; 2 MG/1; MG/1
1 FILM, SOLUBLE BUCCAL; SUBLINGUAL DAILY
Status: DISCONTINUED | OUTPATIENT
Start: 2020-04-21 | End: 2020-04-25 | Stop reason: HOSPADM

## 2020-04-21 RX ORDER — FUROSEMIDE 40 MG/1
40 TABLET ORAL DAILY
Status: DISCONTINUED | OUTPATIENT
Start: 2020-04-21 | End: 2020-04-25 | Stop reason: HOSPADM

## 2020-04-21 RX ORDER — LISINOPRIL 2.5 MG/1
2.5 TABLET ORAL DAILY
Status: DISCONTINUED | OUTPATIENT
Start: 2020-04-21 | End: 2020-04-25 | Stop reason: HOSPADM

## 2020-04-21 RX ORDER — QUETIAPINE FUMARATE 100 MG/1
300 TABLET, FILM COATED ORAL NIGHTLY
Status: DISCONTINUED | OUTPATIENT
Start: 2020-04-21 | End: 2020-04-25 | Stop reason: HOSPADM

## 2020-04-21 RX ORDER — AZITHROMYCIN 250 MG/1
250 TABLET, FILM COATED ORAL DAILY
Status: DISCONTINUED | OUTPATIENT
Start: 2020-04-22 | End: 2020-04-22

## 2020-04-21 RX ORDER — PANTOPRAZOLE SODIUM 40 MG/1
40 TABLET, DELAYED RELEASE ORAL DAILY
Status: DISCONTINUED | OUTPATIENT
Start: 2020-04-21 | End: 2020-04-25 | Stop reason: HOSPADM

## 2020-04-21 RX ORDER — ALBUTEROL SULFATE 90 UG/1
2 AEROSOL, METERED RESPIRATORY (INHALATION) EVERY 6 HOURS PRN
Status: DISCONTINUED | OUTPATIENT
Start: 2020-04-21 | End: 2020-04-25 | Stop reason: HOSPADM

## 2020-04-21 RX ORDER — AZITHROMYCIN 250 MG/1
500 TABLET, FILM COATED ORAL ONCE
Status: COMPLETED | OUTPATIENT
Start: 2020-04-21 | End: 2020-04-21

## 2020-04-21 RX ORDER — VILAZODONE HYDROCHLORIDE 10 MG/1
10 TABLET ORAL DAILY
Status: DISCONTINUED | OUTPATIENT
Start: 2020-04-21 | End: 2020-04-25 | Stop reason: HOSPADM

## 2020-04-21 RX ORDER — ASPIRIN 81 MG/1
81 TABLET ORAL DAILY
Status: DISCONTINUED | OUTPATIENT
Start: 2020-04-21 | End: 2020-04-25 | Stop reason: HOSPADM

## 2020-04-21 RX ORDER — PREDNISONE 20 MG/1
40 TABLET ORAL DAILY
Status: DISCONTINUED | OUTPATIENT
Start: 2020-04-21 | End: 2020-04-25 | Stop reason: HOSPADM

## 2020-04-21 RX ORDER — AMLODIPINE BESYLATE 5 MG/1
5 TABLET ORAL DAILY
Status: DISCONTINUED | OUTPATIENT
Start: 2020-04-21 | End: 2020-04-25 | Stop reason: HOSPADM

## 2020-04-21 RX ORDER — BUSPIRONE HYDROCHLORIDE 10 MG/1
30 TABLET ORAL 2 TIMES DAILY
Status: DISCONTINUED | OUTPATIENT
Start: 2020-04-21 | End: 2020-04-25 | Stop reason: HOSPADM

## 2020-04-21 RX ORDER — GUAIFENESIN 600 MG/1
600 TABLET, EXTENDED RELEASE ORAL 2 TIMES DAILY
Status: DISCONTINUED | OUTPATIENT
Start: 2020-04-21 | End: 2020-04-25 | Stop reason: HOSPADM

## 2020-04-21 RX ADMIN — POLYETHYLENE GLYCOL 3350 17 G: 17 POWDER, FOR SOLUTION ORAL at 15:01

## 2020-04-21 RX ADMIN — GUAIFENESIN 600 MG: 600 TABLET, EXTENDED RELEASE ORAL at 08:23

## 2020-04-21 RX ADMIN — AMLODIPINE BESYLATE 5 MG: 5 TABLET ORAL at 08:23

## 2020-04-21 RX ADMIN — ACETAMINOPHEN 650 MG: 325 TABLET ORAL at 23:11

## 2020-04-21 RX ADMIN — BUSPIRONE HYDROCHLORIDE 30 MG: 10 TABLET ORAL at 08:23

## 2020-04-21 RX ADMIN — ACETAMINOPHEN 650 MG: 325 TABLET ORAL at 15:05

## 2020-04-21 RX ADMIN — CEFTRIAXONE SODIUM 1 G: 1 INJECTION, POWDER, FOR SOLUTION INTRAMUSCULAR; INTRAVENOUS at 05:43

## 2020-04-21 RX ADMIN — PREDNISONE 40 MG: 20 TABLET ORAL at 12:45

## 2020-04-21 RX ADMIN — BUPRENORPHINE AND NALOXONE 1 FILM: 8; 2 FILM BUCCAL; SUBLINGUAL at 08:22

## 2020-04-21 RX ADMIN — ALBUTEROL SULFATE 2 PUFF: 90 AEROSOL, METERED RESPIRATORY (INHALATION) at 21:14

## 2020-04-21 RX ADMIN — SODIUM CHLORIDE, PRESERVATIVE FREE 10 ML: 5 INJECTION INTRAVENOUS at 08:24

## 2020-04-21 RX ADMIN — VILAZODONE HYDROCHLORIDE 10 MG: 10 TABLET ORAL at 08:23

## 2020-04-21 RX ADMIN — ALBUTEROL SULFATE 2 PUFF: 90 AEROSOL, METERED RESPIRATORY (INHALATION) at 15:51

## 2020-04-21 RX ADMIN — SODIUM CHLORIDE, PRESERVATIVE FREE 10 ML: 5 INJECTION INTRAVENOUS at 19:48

## 2020-04-21 RX ADMIN — ASPIRIN 81 MG: 81 TABLET ORAL at 08:22

## 2020-04-21 RX ADMIN — ACETAMINOPHEN 650 MG: 325 TABLET ORAL at 04:26

## 2020-04-21 RX ADMIN — PANTOPRAZOLE SODIUM 40 MG: 40 TABLET, DELAYED RELEASE ORAL at 05:43

## 2020-04-21 RX ADMIN — POTASSIUM CHLORIDE 20 MEQ: 1500 TABLET, EXTENDED RELEASE ORAL at 19:47

## 2020-04-21 RX ADMIN — ALBUTEROL SULFATE 2 PUFF: 90 AEROSOL, METERED RESPIRATORY (INHALATION) at 08:28

## 2020-04-21 RX ADMIN — POTASSIUM CHLORIDE 20 MEQ: 1500 TABLET, EXTENDED RELEASE ORAL at 08:22

## 2020-04-21 RX ADMIN — TAMSULOSIN HYDROCHLORIDE 0.4 MG: 0.4 CAPSULE ORAL at 08:23

## 2020-04-21 RX ADMIN — BUSPIRONE HYDROCHLORIDE 30 MG: 10 TABLET ORAL at 19:48

## 2020-04-21 RX ADMIN — FUROSEMIDE 40 MG: 40 TABLET ORAL at 08:23

## 2020-04-21 RX ADMIN — QUETIAPINE FUMARATE 300 MG: 100 TABLET ORAL at 19:48

## 2020-04-21 RX ADMIN — GUAIFENESIN 600 MG: 600 TABLET, EXTENDED RELEASE ORAL at 19:48

## 2020-04-21 RX ADMIN — AZITHROMYCIN 500 MG: 250 TABLET, FILM COATED ORAL at 05:43

## 2020-04-21 RX ADMIN — LISINOPRIL 2.5 MG: 2.5 TABLET ORAL at 08:23

## 2020-04-21 RX ADMIN — TIOTROPIUM BROMIDE 18 MCG: 18 CAPSULE ORAL; RESPIRATORY (INHALATION) at 08:23

## 2020-04-21 ASSESSMENT — PAIN SCALES - GENERAL
PAINLEVEL_OUTOF10: 0
PAINLEVEL_OUTOF10: 5
PAINLEVEL_OUTOF10: 5
PAINLEVEL_OUTOF10: 6
PAINLEVEL_OUTOF10: 0
PAINLEVEL_OUTOF10: 6
PAINLEVEL_OUTOF10: 5

## 2020-04-21 ASSESSMENT — ENCOUNTER SYMPTOMS
COUGH: 1
SHORTNESS OF BREATH: 1
EYES NEGATIVE: 1
GASTROINTESTINAL NEGATIVE: 1
ALLERGIC/IMMUNOLOGIC NEGATIVE: 1
RHINORRHEA: 0
SORE THROAT: 0

## 2020-04-21 ASSESSMENT — PAIN DESCRIPTION - PROGRESSION
CLINICAL_PROGRESSION: NOT CHANGED

## 2020-04-21 ASSESSMENT — PAIN DESCRIPTION - FREQUENCY
FREQUENCY: CONTINUOUS
FREQUENCY: CONTINUOUS

## 2020-04-21 ASSESSMENT — PAIN DESCRIPTION - LOCATION
LOCATION: ABDOMEN
LOCATION: ABDOMEN
LOCATION: HEAD

## 2020-04-21 ASSESSMENT — PAIN - FUNCTIONAL ASSESSMENT
PAIN_FUNCTIONAL_ASSESSMENT: ACTIVITIES ARE NOT PREVENTED
PAIN_FUNCTIONAL_ASSESSMENT: ACTIVITIES ARE NOT PREVENTED

## 2020-04-21 ASSESSMENT — PAIN DESCRIPTION - ORIENTATION
ORIENTATION: MID
ORIENTATION: MID

## 2020-04-21 ASSESSMENT — PAIN DESCRIPTION - DESCRIPTORS
DESCRIPTORS: OTHER (COMMENT)
DESCRIPTORS: SHARP
DESCRIPTORS: HEADACHE

## 2020-04-21 ASSESSMENT — PAIN DESCRIPTION - PAIN TYPE
TYPE: ACUTE PAIN

## 2020-04-21 ASSESSMENT — PAIN DESCRIPTION - ONSET
ONSET: ON-GOING
ONSET: ON-GOING

## 2020-04-21 NOTE — PROGRESS NOTES
Pt admitted to unit on 4L of oxygen. Pt oriented   Pt admitted to  4K from ED. Complaints: Shortness of breath. IV none infusing into the  V site free of s/s of infection or infiltration. Vital signs obtained. Assessment and data collection initiated. Two nurse skin assessment performed by kaitlynn LARES and Lucia Cassidy. Oriented to room. Policies and procedures for 4K explained. All questions answered with no further questions at this time. Fall prevention and safety brochure discussed with patient. Bed alarm on. Call light in reach. Would you like your Primary Care Physician notified? Yes     The best day to schedule a follow up Dr appointment is:  Thursday, Friday and Saturday p.m.

## 2020-04-21 NOTE — PROGRESS NOTES
Patient transferred to [facility] for COPD exacerbation. \"    Subjective (past 24 hours):   Patient did report increasing shortness of breath shortly after admission and medications were administered. Patient evaluated and wheezing throughout. Resumed steroids in addition to spiriva and albuterol. Patient denies chest pain, lightheadedness, dizziness or fever. States his cough is productive. No abdominal pain, diarrhea, or constipation. No LE edema per patient. Past medical history, family history, social history and allergies reviewed again and is unchanged since admission. ROS (12 point review of systems completed. Pertinent positives noted.  Otherwise ROS is negative)     Medications:  Reviewed    Infusion Medications   Scheduled Medications    amLODIPine  5 mg Oral Daily    aspirin EC  81 mg Oral Daily    buprenorphine-naloxone  1 Film Sublingual Daily    busPIRone  30 mg Oral BID    furosemide  40 mg Oral Daily    guaiFENesin  600 mg Oral BID    lisinopril  2.5 mg Oral Daily    pantoprazole  40 mg Oral Daily    potassium chloride  20 mEq Oral BID    QUEtiapine  300 mg Oral Nightly    tamsulosin  0.4 mg Oral Daily    tiotropium  18 mcg Inhalation Daily    vilazodone HCl  10 mg Oral Daily    cefTRIAXone (ROCEPHIN) IV  1 g Intravenous Q24H    warfarin (COUMADIN) daily dosing (placeholder)   Other RX Placeholder    [START ON 4/22/2020] azithromycin  250 mg Oral Daily    nicotine  1 patch Transdermal Daily    predniSONE  40 mg Oral Daily    sodium chloride flush  10 mL Intravenous 2 times per day     PRN Meds: melatonin, albuterol sulfate HFA, albuterol sulfate HFA, sodium chloride flush, acetaminophen **OR** acetaminophen, polyethylene glycol, promethazine **OR** ondansetron, potassium chloride **OR** potassium alternative oral replacement **OR** potassium chloride, magnesium sulfate      Intake/Output Summary (Last 24 hours) at 4/21/2020 1320  Last data filed at 4/20/2020 2300  Gross per 24 hour   Intake 300 ml   Output 600 ml   Net -300 ml       Diet:  DIET CARB CONTROL; Exam:  /71   Pulse 76   Temp 98 °F (36.7 °C) (Oral)   Resp 18   Ht 5' 9\" (1.753 m)   Wt 231 lb (104.8 kg)   SpO2 95%   BMI 34.11 kg/m²   General appearance: No apparent distress, appears stated age and cooperative. HEENT: Pupils equal, round, and reactive to light. Conjunctivae/corneas clear. Neck: Supple, with full range of motion. No jugular venous distention. Trachea midline. Respiratory:  Mildly tachypneic on evaluation. Diffuse wheezing throughout. No rales or rhonchi. Cardiovascular: Regular rate and rhythm with normal S1/S2 without murmurs, rubs or gallops. Abdomen: Soft, non-tender, non-distended with normal bowel sounds. No peritoneal signs. Musculoskeletal: passive and active ROM x 4 extremities. Skin: Brawny edema to bilateral lower extremities. Neurologic:  Neurovascularly intact without any focal sensory/motor deficits. Cranial nerves: II-XII intact, grossly non-focal.  Psychiatric: Alert and oriented, thought content appropriate, normal insight  Capillary Refill: Brisk,< 3 seconds   Peripheral Pulses: +2 palpable, equal bilaterally     Labs:   Recent Labs     04/20/20  1349 04/21/20  0532   WBC 5.2 8.3   HGB 14.0 13.5*   HCT 44.5 43.5    178     Recent Labs     04/20/20  1349 04/21/20  0532    134*   K 4.4 3.8   CL 98 96*   CO2 22* 26   BUN 27* 25*   CREATININE 1.1 1.0   CALCIUM 9.1 9.0     Recent Labs     04/20/20  1349   AST 18   ALT 33   BILITOT 0.3   ALKPHOS 70     Recent Labs     04/20/20  1349 04/21/20  0532   INR 3.95* 3.74*     No results for input(s): Cherri Boxer in the last 72 hours.     Microbiology:    Blood culture #1:   Lab Results   Component Value Date    BC No growth-preliminary  04/20/2020       Blood culture #2:No results found for: BLOODCULT2    Organism:  Lab Results   Component Value Date    ORG Staphylococcus aureus 02/21/2020         Lab Results Component Value Date    LABGRAM  02/21/2020     Quality of sputum specimen: Specimen acceptable. Many segmented neutrophils observed. Few epithelial cells observed. Moderate budding yeast and pseudohyphae observed. Moderate small gram positive bacilli. Few gram positive cocci occurring singly and in pairs. MRSA culture only:No results found for: [facility]    Urine culture:   Lab Results   Component Value Date    LABURIN [place] count: 10,000-50,000 CFU/mL  10/10/2019    LABURIN [place] count: 10,000-50,000 CFU/mL  10/10/2019       Respiratory culture: No results found for: CULTRESP    Aerobic and Anaerobic :  No results found for: LABAERO  No results found for: LABANAE    Urinalysis:      Lab Results   Component Value Date    NITRU NEGATIVE 03/23/2020    WBCUA 0-2 11/27/2019    WBCUA >100 10/23/2019    BACTERIA NONE SEEN 11/27/2019    RBCUA 5-10 11/27/2019    BLOODU NEGATIVE 03/23/2020    SPECGRAV 1.006 03/14/2014    GLUCOSEU NEGATIVE 03/23/2020       Radiology:  XR CHEST PORTABLE   Final Result   Reticular interstitial markings greatest in the lung bases. Correlate for interstitial edema, atelectasis, atypical viral-type infection cannot be excluded. No significant change when compared to prior study. **This report has been created using voice recognition software. It may contain minor errors which are inherent in voice recognition technology. **      Final report electronically signed by Dr. Lionel Porras on 4/20/2020 2:22 PM        Xr Chest Portable    Result Date: 4/20/2020  PROCEDURE: XR CHEST PORTABLE CLINICAL INFORMATION: shortness of breath, worsening since ED visit last week. COMPARISON: April 16, 2020 TECHNIQUE: Portable FINDINGS: Reticular interstitial markings greatest in the lung bases. Correlate for interstitial edema, atelectasis, atypical viral-type infection cannot be excluded. Costophrenic angles are preserved. Cardiomediastinal silhouette is within normal limits.  No acute osseous findings. Degenerative changes thoracic spine. Prominent left chest wall pacer. Prosthetic cardiac valve. Median sternotomy wires. Reticular interstitial markings greatest in the lung bases. Correlate for interstitial edema, atelectasis, atypical viral-type infection cannot be excluded. No significant change when compared to prior study. **This report has been created using voice recognition software. It may contain minor errors which are inherent in voice recognition technology. ** Final report electronically signed by Dr. Sal Carreon on 4/20/2020 2:22 PM      Electronically signed by Dheeraj Nelson PA-C on 4/21/2020 at 1:20 PM

## 2020-04-21 NOTE — H&P
obstructive pulmonary disease) (HCC)     Depression     Endocarditis     Hepatitis     HEPATITIS C    Hepatitis C     Hypertension     Pneumonia        SHX:    Social History     Socioeconomic History    Marital status:      Spouse name: Not on file    Number of children: 3    Years of education: 5    Highest education level: Not on file   Occupational History    Occupation: on disability   Social Needs    Financial resource strain: Not on file    Food insecurity     Worry: Not on file     Inability: Not on file   [place] needs     Medical: Not on file     Non-medical: Not on file   Tobacco Use    Smoking status: Former Smoker     Packs/day: 0.50     Years: 37.00     Pack years: 18.50     Types: Cigarettes     Start date: 10/22/1976     Last attempt to quit: 2019     Years since quittin.5    Smokeless tobacco: Former User    Tobacco comment: pt has smoked 3 packs in last week.  3 cigarettes a day- 20   Substance and Sexual Activity    Alcohol use: No     Comment: quit 9 years ago    Drug use: No    Sexual activity: Yes     Partners: Female   Lifestyle    Physical activity     Days per week: Not on file     Minutes per session: Not on file    Stress: Not on file   Relationships    Social connections     Talks on phone: Not on file     Gets together: Not on file     Attends Yazidi service: Not on file     Active member of club or organization: Not on file     Attends meetings of clubs or organizations: Not on file     Relationship status: Not on file    Intimate partner violence     Fear of current or ex partner: Not on file     Emotionally abused: Not on file     Physically abused: Not on file     Forced sexual activity: Not on file   Other Topics Concern    Not on file   Social History Narrative    Not on file       FHX:   Family History   Problem Relation Age of Onset    Diabetes Mother     Depression Mother     Arthritis Father     Heart Disease Father  Diabetes Brother     Depression Daughter        Allergies: Allergies   Allergen Reactions    Pcn [Penicillins] Nausea And Vomiting       Medications:       sodium chloride flush  10 mL Intravenous 2 times per day     albuterol sulfate HFA, 2 puff, Q4H PRN  sodium chloride flush, 10 mL, PRN  acetaminophen, 650 mg, Q6H PRN    Or  acetaminophen, 650 mg, Q6H PRN  polyethylene glycol, 17 g, Daily PRN  promethazine, 12.5 mg, Q6H PRN    Or  ondansetron, 4 mg, Q6H PRN  potassium chloride, 40 mEq, PRN    Or  potassium alternative oral replacement, 40 mEq, PRN    Or  potassium chloride, 10 mEq, PRN  magnesium sulfate, 2 g, PRN      No current facility-administered medications on file prior to encounter. Current Outpatient Medications on File Prior to Encounter   Medication Sig Dispense Refill    predniSONE (DELTASONE) 10 MG tablet Take 4 tablets by mouth once daily for 5 days 20 tablet 0    Umeclidinium Bromide (INCRUSE ELLIPTA) 62.5 MCG/INH AEPB Inhale 1 puff into the lungs daily 30 each 11    guaiFENesin (MUCINEX) 600 MG extended release tablet Take 1 tablet by mouth 2 times daily 20 tablet 0    terbinafine (LAMISIL) 1 % cream Apply topically 2 times daily.  1 Tube 0    albuterol sulfate HFA (PROAIR HFA) 108 (90 Base) MCG/ACT inhaler Inhale 2 puffs into the lungs every 6 hours as needed for Wheezing or Shortness of Breath 1 Inhaler 8    potassium chloride (KLOR-CON M) 20 MEQ extended release tablet Take 1 tablet by mouth 2 times daily 180 tablet 1    amLODIPine (NORVASC) 5 MG tablet Take 1 tablet by mouth daily Take 5mg  By mouth qd 30 tablet 3    lisinopril (PRINIVIL;ZESTRIL) 2.5 MG tablet Take 1 tablet by mouth daily 30 tablet 0    pantoprazole (PROTONIX) 40 MG tablet Take 40 mg by mouth daily      warfarin (COUMADIN) 5 MG tablet Take 5 mg by mouth      tamsulosin (FLOMAX) 0.4 MG capsule Take 0.4 mg by mouth daily      VILAZODONE HCL 10 MG Tablet       albuterol (PROVENTIL) (2.5 MG/3ML) 0.083%

## 2020-04-22 ENCOUNTER — TELEPHONE (OUTPATIENT)
Dept: PULMONOLOGY | Age: 60
End: 2020-04-22

## 2020-04-22 LAB
ANION GAP SERPL CALCULATED.3IONS-SCNC: 13 MEQ/L (ref 8–16)
BILIRUBIN URINE: NEGATIVE
BLOOD, URINE: NEGATIVE
BUN BLDV-MCNC: 27 MG/DL (ref 7–22)
CALCIUM SERPL-MCNC: 9.1 MG/DL (ref 8.5–10.5)
CHARACTER, URINE: CLEAR
CHLORIDE BLD-SCNC: 99 MEQ/L (ref 98–111)
CO2: 26 MEQ/L (ref 23–33)
COLOR: YELLOW
CREAT SERPL-MCNC: 1.1 MG/DL (ref 0.4–1.2)
ERYTHROCYTE [DISTWIDTH] IN BLOOD BY AUTOMATED COUNT: 15.9 % (ref 11.5–14.5)
ERYTHROCYTE [DISTWIDTH] IN BLOOD BY AUTOMATED COUNT: 49.1 FL (ref 35–45)
GFR SERPL CREATININE-BSD FRML MDRD: 69 ML/MIN/1.73M2
GLUCOSE BLD-MCNC: 139 MG/DL (ref 70–108)
GLUCOSE URINE: NEGATIVE MG/DL
HCT VFR BLD CALC: 43.6 % (ref 42–52)
HEMOGLOBIN: 13.8 GM/DL (ref 14–18)
INR BLD: 2.43 (ref 0.85–1.13)
KETONES, URINE: NEGATIVE
LEUKOCYTE ESTERASE, URINE: NEGATIVE
MCH RBC QN AUTO: 27.2 PG (ref 26–33)
MCHC RBC AUTO-ENTMCNC: 31.7 GM/DL (ref 32.2–35.5)
MCV RBC AUTO: 86 FL (ref 80–94)
NITRITE, URINE: NEGATIVE
PH UA: 5 (ref 5–9)
PLATELET # BLD: 197 THOU/MM3 (ref 130–400)
PMV BLD AUTO: 9.7 FL (ref 9.4–12.4)
POTASSIUM SERPL-SCNC: 4.4 MEQ/L (ref 3.5–5.2)
PROTEIN UA: NEGATIVE
RBC # BLD: 5.07 MILL/MM3 (ref 4.7–6.1)
SODIUM BLD-SCNC: 138 MEQ/L (ref 135–145)
SPECIFIC GRAVITY, URINE: 1.02 (ref 1–1.03)
UROBILINOGEN, URINE: 0.2 EU/DL (ref 0–1)
WBC # BLD: 8.8 THOU/MM3 (ref 4.8–10.8)

## 2020-04-22 PROCEDURE — 2580000003 HC RX 258: Performed by: PHYSICIAN ASSISTANT

## 2020-04-22 PROCEDURE — 81003 URINALYSIS AUTO W/O SCOPE: CPT

## 2020-04-22 PROCEDURE — 85027 COMPLETE CBC AUTOMATED: CPT

## 2020-04-22 PROCEDURE — 6370000000 HC RX 637 (ALT 250 FOR IP): Performed by: INTERNAL MEDICINE

## 2020-04-22 PROCEDURE — 99239 HOSP IP/OBS DSCHRG MGMT >30: CPT | Performed by: HOSPITALIST

## 2020-04-22 PROCEDURE — 6360000002 HC RX W HCPCS: Performed by: PHYSICIAN ASSISTANT

## 2020-04-22 PROCEDURE — 80048 BASIC METABOLIC PNL TOTAL CA: CPT

## 2020-04-22 PROCEDURE — 1200000003 HC TELEMETRY R&B

## 2020-04-22 PROCEDURE — 6370000000 HC RX 637 (ALT 250 FOR IP): Performed by: PHYSICIAN ASSISTANT

## 2020-04-22 PROCEDURE — 94640 AIRWAY INHALATION TREATMENT: CPT

## 2020-04-22 PROCEDURE — 94760 N-INVAS EAR/PLS OXIMETRY 1: CPT

## 2020-04-22 PROCEDURE — 85610 PROTHROMBIN TIME: CPT

## 2020-04-22 PROCEDURE — 51798 US URINE CAPACITY MEASURE: CPT

## 2020-04-22 PROCEDURE — 36415 COLL VENOUS BLD VENIPUNCTURE: CPT

## 2020-04-22 PROCEDURE — 2700000000 HC OXYGEN THERAPY PER DAY

## 2020-04-22 PROCEDURE — 96366 THER/PROPH/DIAG IV INF ADDON: CPT

## 2020-04-22 RX ORDER — NICOTINE 21 MG/24HR
1 PATCH, TRANSDERMAL 24 HOURS TRANSDERMAL DAILY
Qty: 30 PATCH | Refills: 3 | Status: SHIPPED | OUTPATIENT
Start: 2020-04-23 | End: 2020-06-29 | Stop reason: ALTCHOICE

## 2020-04-22 RX ORDER — DOXYCYCLINE HYCLATE 100 MG
100 TABLET ORAL 2 TIMES DAILY
Qty: 6 TABLET | Refills: 0 | Status: SHIPPED | OUTPATIENT
Start: 2020-04-22 | End: 2020-04-24 | Stop reason: HOSPADM

## 2020-04-22 RX ORDER — WARFARIN SODIUM 5 MG/1
5 TABLET ORAL ONCE
Status: COMPLETED | OUTPATIENT
Start: 2020-04-22 | End: 2020-04-22

## 2020-04-22 RX ADMIN — BUPRENORPHINE AND NALOXONE 1 FILM: 8; 2 FILM BUCCAL; SUBLINGUAL at 08:24

## 2020-04-22 RX ADMIN — Medication 2 PUFF: at 16:54

## 2020-04-22 RX ADMIN — TIOTROPIUM BROMIDE 18 MCG: 18 CAPSULE ORAL; RESPIRATORY (INHALATION) at 08:27

## 2020-04-22 RX ADMIN — ACETAMINOPHEN 650 MG: 325 TABLET ORAL at 15:18

## 2020-04-22 RX ADMIN — GUAIFENESIN 600 MG: 600 TABLET, EXTENDED RELEASE ORAL at 08:24

## 2020-04-22 RX ADMIN — VILAZODONE HYDROCHLORIDE 10 MG: 10 TABLET ORAL at 08:25

## 2020-04-22 RX ADMIN — BUSPIRONE HYDROCHLORIDE 30 MG: 10 TABLET ORAL at 08:25

## 2020-04-22 RX ADMIN — PREDNISONE 40 MG: 20 TABLET ORAL at 08:24

## 2020-04-22 RX ADMIN — ALBUTEROL SULFATE 2 PUFF: 90 AEROSOL, METERED RESPIRATORY (INHALATION) at 20:50

## 2020-04-22 RX ADMIN — AZITHROMYCIN 250 MG: 250 TABLET, FILM COATED ORAL at 08:23

## 2020-04-22 RX ADMIN — SODIUM CHLORIDE, PRESERVATIVE FREE 10 ML: 5 INJECTION INTRAVENOUS at 08:27

## 2020-04-22 RX ADMIN — CEFTRIAXONE SODIUM 1 G: 1 INJECTION, POWDER, FOR SOLUTION INTRAMUSCULAR; INTRAVENOUS at 03:30

## 2020-04-22 RX ADMIN — ACETAMINOPHEN 650 MG: 650 SUPPOSITORY RECTAL at 22:45

## 2020-04-22 RX ADMIN — WARFARIN SODIUM 5 MG: 5 TABLET ORAL at 18:40

## 2020-04-22 RX ADMIN — SODIUM CHLORIDE, PRESERVATIVE FREE 10 ML: 5 INJECTION INTRAVENOUS at 20:35

## 2020-04-22 RX ADMIN — TAMSULOSIN HYDROCHLORIDE 0.4 MG: 0.4 CAPSULE ORAL at 08:24

## 2020-04-22 RX ADMIN — PANTOPRAZOLE SODIUM 40 MG: 40 TABLET, DELAYED RELEASE ORAL at 06:30

## 2020-04-22 RX ADMIN — QUETIAPINE FUMARATE 300 MG: 100 TABLET ORAL at 20:35

## 2020-04-22 RX ADMIN — POTASSIUM CHLORIDE 20 MEQ: 1500 TABLET, EXTENDED RELEASE ORAL at 20:36

## 2020-04-22 RX ADMIN — ACETAMINOPHEN 650 MG: 325 TABLET ORAL at 08:24

## 2020-04-22 RX ADMIN — GUAIFENESIN 600 MG: 600 TABLET, EXTENDED RELEASE ORAL at 20:35

## 2020-04-22 RX ADMIN — BUSPIRONE HYDROCHLORIDE 30 MG: 10 TABLET ORAL at 18:42

## 2020-04-22 RX ADMIN — POTASSIUM CHLORIDE 20 MEQ: 1500 TABLET, EXTENDED RELEASE ORAL at 08:23

## 2020-04-22 RX ADMIN — FUROSEMIDE 40 MG: 40 TABLET ORAL at 08:24

## 2020-04-22 RX ADMIN — ASPIRIN 81 MG: 81 TABLET ORAL at 08:24

## 2020-04-22 ASSESSMENT — PAIN SCALES - GENERAL
PAINLEVEL_OUTOF10: 4
PAINLEVEL_OUTOF10: 2
PAINLEVEL_OUTOF10: 3
PAINLEVEL_OUTOF10: 0
PAINLEVEL_OUTOF10: 2
PAINLEVEL_OUTOF10: 6
PAINLEVEL_OUTOF10: 4

## 2020-04-22 ASSESSMENT — PAIN DESCRIPTION - PROGRESSION
CLINICAL_PROGRESSION: NOT CHANGED
CLINICAL_PROGRESSION: GRADUALLY IMPROVING
CLINICAL_PROGRESSION: GRADUALLY IMPROVING
CLINICAL_PROGRESSION: NOT CHANGED

## 2020-04-22 ASSESSMENT — PAIN DESCRIPTION - LOCATION: LOCATION: ABDOMEN

## 2020-04-22 ASSESSMENT — PAIN DESCRIPTION - DESCRIPTORS: DESCRIPTORS: SHARP

## 2020-04-22 ASSESSMENT — PAIN DESCRIPTION - FREQUENCY: FREQUENCY: CONTINUOUS

## 2020-04-22 ASSESSMENT — PAIN DESCRIPTION - PAIN TYPE: TYPE: ACUTE PAIN

## 2020-04-22 ASSESSMENT — PAIN DESCRIPTION - ORIENTATION: ORIENTATION: RIGHT;UPPER

## 2020-04-22 ASSESSMENT — PAIN DESCRIPTION - ONSET: ONSET: ON-GOING

## 2020-04-22 ASSESSMENT — PAIN - FUNCTIONAL ASSESSMENT: PAIN_FUNCTIONAL_ASSESSMENT: ACTIVITIES ARE NOT PREVENTED

## 2020-04-22 ASSESSMENT — PAIN DESCRIPTION - DIRECTION: RADIATING_TOWARDS: BACK

## 2020-04-22 NOTE — PROCEDURES
Bladder scan was completed by ASHLEY Yao at 1345. The residual of 100 ml was resulted to [employer].

## 2020-04-22 NOTE — FLOWSHEET NOTE
04/21/20 1955   Provider Notification   Reason for Communication Evaluate   [facility]   Provider Notification Respiratory Therapist   Method of Communication Call   Response En route   Notification Time 1951   After assessing patient, this nurse called for a patient breathing tx.

## 2020-04-22 NOTE — DISCHARGE SUMMARY
system. The need for this equipment was discussed with the patient and he understands and is in agreement. 2. Acute on chronic hypoxemic respiratory failure: Chronically on 2L at home. Resolved. counseled extensively re: need to adhere to home O2, quit smoking and standing and prn  Inhalers; pt declined vaccines. Pt will need OP pulm F/U. May also benefit from sleep study to assess for ANGEL. 3. History of IVDU in Remission: Continue Suboxone. 4. Factor V Leiden: on warfarin with INR within therapeutic range. 5. Benign Essential HTN: Well controlled. Continue Lisinopril/Norvac. Pt and PCP need to monitor BP with antihypertensive regimen adjustment as needed     6. History of HFpEF: EF 50-55% on last echo.  on admission. Continue home Lasix/ACEI. 7. Paroxysmal Atrial Fibrillation: Currently paced. BiV pacemaker present. On Warfarin  8. History of Tricuspid Valve Repair  9. BPH: Continue Tamsulosin. 10. History of Depression/Anxiety: Continue Viibryd and Quitiepine. 11. Obesity with BMI 34.6  12. Query UTI: pt c/o pain w/ urination? Vague historian, already on ceftriaxone/azithro. No retention, (voided 900 cc this a.m. w./ posy-void residue less than 100 on bladder scan)urine macroscopy unremarkable. Pt will be on Dox x3 days for PNA. Alarming signs and symptoms to seek medical attention discussed. 13. Non-compliance: counseling offered  14. Tobacco smoking: counseling and nicotine patch offered.    15. Dispo: lives w/ son, home w/ HH.                 Exam:     Vitals:  Vitals:    04/21/20 2300 04/22/20 0330 04/22/20 0800 04/22/20 0932   BP: 127/65 116/78 98/73    Pulse: 88 74 88    Resp: 16 16 14    Temp: 97.8 °F (36.6 °C) 97.6 °F (36.4 °C) 97.9 °F (36.6 °C)    TempSrc: Oral Oral Oral    SpO2: 94% 94%  93%   Weight:  233 lb 14.5 oz (106.1 kg)     Height:         Weight: Weight: 233 lb 14.5 oz (106.1 kg)     24 hour intake/output:    Intake/Output Summary (Last 24 hours) at 4/22/2020 0934  Last data filed at 4/21/2020 2316  Gross per 24 hour   Intake 640 ml   Output 1775 ml   Net -1135 ml           General appearance: Obese, A&O x3, Not ill or toxic, in no apparent distress  HEENT:  JOSE  EOM intact. Neck: Supple, with full range of motion. No jugular venous distention. Trachea midline. Respiratory:   NL A/E bilat with scattered upper airway sounds   Cardiovascular:  normal S1/S2 with no murmurs/gallops  Abdomen: Soft, non-tender, non-distended, no rigidity or peritoneal signs  Musculoskeletal: NL symmetrical A/PROM bilat U/L extremities   Skin: No rashes. No edema  Neurologic:  CN II-XII intact. NL symmetrical reflexes. NL gait and stance. NL Cerebellar exam. Power 5/5 all muscle groups U/L extremities. Toes downgoing  Capillary Refill: Brisk,< 3 seconds   Peripheral Pulses: +2 palpable, equal bilaterally              Labs: For convenience and continuity at follow-up the following most recent labs are provided:      CBC:    Lab Results   Component Value Date    WBC 8.8 04/22/2020    HGB 13.8 04/22/2020    HCT 43.6 04/22/2020     04/22/2020       Renal:    Lab Results   Component Value Date     04/22/2020    K 4.4 04/22/2020    K 3.8 04/21/2020    CL 99 04/22/2020    CO2 26 04/22/2020    BUN 27 04/22/2020    CREATININE 1.1 04/22/2020    CALCIUM 9.1 04/22/2020    PHOS 2.4 10/02/2019         Significant Diagnostic Studies    Radiology:   XR CHEST PORTABLE   Final Result   Reticular interstitial markings greatest in the lung bases. Correlate for interstitial edema, atelectasis, atypical viral-type infection cannot be excluded. No significant change when compared to prior study. **This report has been created using voice recognition software. It may contain minor errors which are inherent in voice recognition technology. **      Final report electronically signed by Dr. Checo Kruger on 4/20/2020 2:22 PM             Consults:     PHARMACY TO DOSE WARFARIN    Disposition:    [x]

## 2020-04-23 LAB — INR BLD: 1.53 (ref 0.85–1.13)

## 2020-04-23 PROCEDURE — 6370000000 HC RX 637 (ALT 250 FOR IP): Performed by: PHYSICIAN ASSISTANT

## 2020-04-23 PROCEDURE — 36415 COLL VENOUS BLD VENIPUNCTURE: CPT

## 2020-04-23 PROCEDURE — 6370000000 HC RX 637 (ALT 250 FOR IP): Performed by: PHARMACIST

## 2020-04-23 PROCEDURE — 1200000003 HC TELEMETRY R&B

## 2020-04-23 PROCEDURE — 94640 AIRWAY INHALATION TREATMENT: CPT

## 2020-04-23 PROCEDURE — 2580000003 HC RX 258: Performed by: PHYSICIAN ASSISTANT

## 2020-04-23 PROCEDURE — 85610 PROTHROMBIN TIME: CPT

## 2020-04-23 PROCEDURE — 99232 SBSQ HOSP IP/OBS MODERATE 35: CPT | Performed by: HOSPITALIST

## 2020-04-23 PROCEDURE — 2700000000 HC OXYGEN THERAPY PER DAY

## 2020-04-23 PROCEDURE — 94760 N-INVAS EAR/PLS OXIMETRY 1: CPT

## 2020-04-23 RX ORDER — WARFARIN SODIUM 5 MG/1
5 TABLET ORAL ONCE
Status: COMPLETED | OUTPATIENT
Start: 2020-04-23 | End: 2020-04-23

## 2020-04-23 RX ADMIN — ACETAMINOPHEN 650 MG: 325 TABLET ORAL at 15:23

## 2020-04-23 RX ADMIN — ACETAMINOPHEN 650 MG: 325 TABLET ORAL at 04:41

## 2020-04-23 RX ADMIN — POTASSIUM CHLORIDE 20 MEQ: 1500 TABLET, EXTENDED RELEASE ORAL at 21:12

## 2020-04-23 RX ADMIN — BUSPIRONE HYDROCHLORIDE 30 MG: 10 TABLET ORAL at 17:18

## 2020-04-23 RX ADMIN — TAMSULOSIN HYDROCHLORIDE 0.4 MG: 0.4 CAPSULE ORAL at 09:12

## 2020-04-23 RX ADMIN — AMLODIPINE BESYLATE 5 MG: 5 TABLET ORAL at 09:12

## 2020-04-23 RX ADMIN — WARFARIN SODIUM 5 MG: 5 TABLET ORAL at 17:17

## 2020-04-23 RX ADMIN — ASPIRIN 81 MG: 81 TABLET ORAL at 09:11

## 2020-04-23 RX ADMIN — SODIUM CHLORIDE, PRESERVATIVE FREE 10 ML: 5 INJECTION INTRAVENOUS at 09:13

## 2020-04-23 RX ADMIN — TIOTROPIUM BROMIDE 18 MCG: 18 CAPSULE ORAL; RESPIRATORY (INHALATION) at 08:09

## 2020-04-23 RX ADMIN — ACETAMINOPHEN 650 MG: 325 TABLET ORAL at 22:51

## 2020-04-23 RX ADMIN — ALBUTEROL SULFATE 2 PUFF: 90 AEROSOL, METERED RESPIRATORY (INHALATION) at 15:07

## 2020-04-23 RX ADMIN — BUPRENORPHINE AND NALOXONE 1 FILM: 8; 2 FILM BUCCAL; SUBLINGUAL at 09:11

## 2020-04-23 RX ADMIN — BUSPIRONE HYDROCHLORIDE 30 MG: 10 TABLET ORAL at 09:12

## 2020-04-23 RX ADMIN — GUAIFENESIN 600 MG: 600 TABLET, EXTENDED RELEASE ORAL at 09:12

## 2020-04-23 RX ADMIN — FUROSEMIDE 40 MG: 40 TABLET ORAL at 09:12

## 2020-04-23 RX ADMIN — LISINOPRIL 2.5 MG: 2.5 TABLET ORAL at 09:12

## 2020-04-23 RX ADMIN — GUAIFENESIN 600 MG: 600 TABLET, EXTENDED RELEASE ORAL at 21:12

## 2020-04-23 RX ADMIN — VILAZODONE HYDROCHLORIDE 10 MG: 10 TABLET ORAL at 09:12

## 2020-04-23 RX ADMIN — PREDNISONE 40 MG: 20 TABLET ORAL at 09:12

## 2020-04-23 RX ADMIN — PANTOPRAZOLE SODIUM 40 MG: 40 TABLET, DELAYED RELEASE ORAL at 04:42

## 2020-04-23 RX ADMIN — POTASSIUM CHLORIDE 20 MEQ: 1500 TABLET, EXTENDED RELEASE ORAL at 09:11

## 2020-04-23 RX ADMIN — ALBUTEROL SULFATE 2 PUFF: 90 AEROSOL, METERED RESPIRATORY (INHALATION) at 08:11

## 2020-04-23 RX ADMIN — QUETIAPINE FUMARATE 300 MG: 100 TABLET ORAL at 21:12

## 2020-04-23 ASSESSMENT — PAIN - FUNCTIONAL ASSESSMENT: PAIN_FUNCTIONAL_ASSESSMENT: PREVENTS OR INTERFERES SOME ACTIVE ACTIVITIES AND ADLS

## 2020-04-23 ASSESSMENT — PAIN DESCRIPTION - DESCRIPTORS
DESCRIPTORS: ACHING;CONSTANT
DESCRIPTORS: ACHING;CONSTANT

## 2020-04-23 ASSESSMENT — PAIN SCALES - GENERAL
PAINLEVEL_OUTOF10: 3
PAINLEVEL_OUTOF10: 4
PAINLEVEL_OUTOF10: 3
PAINLEVEL_OUTOF10: 4

## 2020-04-23 ASSESSMENT — PAIN DESCRIPTION - LOCATION
LOCATION: ABDOMEN

## 2020-04-23 ASSESSMENT — PAIN DESCRIPTION - ORIENTATION
ORIENTATION: RIGHT;UPPER
ORIENTATION: RIGHT;UPPER

## 2020-04-23 ASSESSMENT — PAIN DESCRIPTION - FREQUENCY
FREQUENCY: CONTINUOUS
FREQUENCY: CONTINUOUS

## 2020-04-23 ASSESSMENT — PAIN DESCRIPTION - PROGRESSION: CLINICAL_PROGRESSION: NOT CHANGED

## 2020-04-23 ASSESSMENT — PAIN DESCRIPTION - DIRECTION
RADIATING_TOWARDS: AROUND TO BACK
RADIATING_TOWARDS: AROUND TO BACK

## 2020-04-23 ASSESSMENT — PAIN DESCRIPTION - PAIN TYPE
TYPE: ACUTE PAIN

## 2020-04-23 ASSESSMENT — PAIN DESCRIPTION - ONSET: ONSET: ON-GOING

## 2020-04-23 NOTE — PROGRESS NOTES
Clinical Pharmacy Note                                               Warfarin Discharge Recommendations      Pt discharged from [facility] today after admission for COPD    INR today:  Recent Labs     04/23/20  0538   INR 1.53*     Current warfarin drug-drug interactions: aspirin, prednisone     Date INR Warfarin Dose   4/21/2020 3.74 No Coumadin   4/22/2020   2.43   5 mg   4/23/2020  1.53  5 mg before discharge     Interacting medications at discharge: none    INR goal during admission:2-3    Recommendations for discharge:   Date Warfarin Dose   4/24/20 5 mg   4/25/20 5 mg   4/26/20 5 mg   4/27/20 2.5 mg   4/28/20 INR     Provider dosing warfarin: [facility]    Recheck INR:  Tuesday, 4/28/2020 at 1:15pm- keep your previously scheduled appointment.      Samir Pace PharmD 4/23/2020 1:26 PM

## 2020-04-23 NOTE — FLOWSHEET NOTE
04/23/20 0859   Provider Notification   Reason for Communication Review case   Provider Name Dr Dayna Peterson   Provider Notification Physician   Method of Communication Secure Message   Response Waiting for response   Notification Time 0900    patient states he feels worse than when he came in and stated that if was discharged today, \"he would come right back in ER\".  States he feels congested just like what brought him in \"can't fill my lungs\" He feels weak

## 2020-04-23 NOTE — PROGRESS NOTES
CLINICAL PHARMACY NOTE: MEDS TO [address] Select Patient?: No  Total # of Prescriptions Filled: 2   The following medications were delivered to the patient:  Doxycycline Hyclate 100mg  Nicotine 14mg/24h patch  Total # of Interventions Completed: 2  Time Spent (min): 30    Additional Documentation:

## 2020-04-23 NOTE — PROGRESS NOTES
Patient given an incentive spirometer and educated on how to use it. Readback and demonstration performed by the patient. No concerns voiced. Patient is satisfied with the use of the equipment. Patient may also benefit from acapella.

## 2020-04-23 NOTE — DISCHARGE INSTR - MEDS
Recommendations for discharge:   Date Warfarin Dose   4/25/20 5 mg   4/26/20 5 mg   4/27/20 2.5 mg   4/28/20 INR     Provider dosing warfarin: [facility] [place]    Recheck INR:  Tuesday, 4/28/2020 at 1:15pm- keep your previously scheduled appointment.                          Clinical Pharmacy Note     Warfarin consult follow-up         Recent Labs     04/24/20  0527   INR 1.49*          Recent Labs     04/22/20  0540   HGB 13.8*   HCT 43.6            Significant Drug-Drug Interactions:  New warfarin drug-drug interactions: none  Discontinued drug-drug interactions: none  Current warfarin drug-drug interactions: aspirin, prednisone        Date INR Warfarin Dose   4/21/2020 3.74 No Coumadin   4/22/2020   2.43  5 mg   4/23/2020  1.53 5 mg    4/24/2020  1.49   5 mg                         Notes:                      Daily PT/INR until stable within therapeutic range.      Yani Cabrera, PharmD, BCPS   4/24/2020  1:28 PM

## 2020-04-24 LAB — INR BLD: 1.49 (ref 0.85–1.13)

## 2020-04-24 PROCEDURE — 2580000003 HC RX 258: Performed by: PHYSICIAN ASSISTANT

## 2020-04-24 PROCEDURE — 97530 THERAPEUTIC ACTIVITIES: CPT

## 2020-04-24 PROCEDURE — 97165 OT EVAL LOW COMPLEX 30 MIN: CPT

## 2020-04-24 PROCEDURE — 97162 PT EVAL MOD COMPLEX 30 MIN: CPT

## 2020-04-24 PROCEDURE — 99232 SBSQ HOSP IP/OBS MODERATE 35: CPT | Performed by: HOSPITALIST

## 2020-04-24 PROCEDURE — 1200000003 HC TELEMETRY R&B

## 2020-04-24 PROCEDURE — 97535 SELF CARE MNGMENT TRAINING: CPT

## 2020-04-24 PROCEDURE — 6370000000 HC RX 637 (ALT 250 FOR IP): Performed by: PHYSICIAN ASSISTANT

## 2020-04-24 PROCEDURE — 94640 AIRWAY INHALATION TREATMENT: CPT

## 2020-04-24 PROCEDURE — 94760 N-INVAS EAR/PLS OXIMETRY 1: CPT

## 2020-04-24 PROCEDURE — 85610 PROTHROMBIN TIME: CPT

## 2020-04-24 PROCEDURE — 36415 COLL VENOUS BLD VENIPUNCTURE: CPT

## 2020-04-24 PROCEDURE — 6370000000 HC RX 637 (ALT 250 FOR IP): Performed by: PHARMACIST

## 2020-04-24 RX ORDER — WARFARIN SODIUM 5 MG/1
5 TABLET ORAL ONCE
Status: COMPLETED | OUTPATIENT
Start: 2020-04-24 | End: 2020-04-24

## 2020-04-24 RX ADMIN — PREDNISONE 40 MG: 20 TABLET ORAL at 11:03

## 2020-04-24 RX ADMIN — TIOTROPIUM BROMIDE 18 MCG: 18 CAPSULE ORAL; RESPIRATORY (INHALATION) at 11:07

## 2020-04-24 RX ADMIN — ACETAMINOPHEN 650 MG: 325 TABLET ORAL at 21:57

## 2020-04-24 RX ADMIN — BUSPIRONE HYDROCHLORIDE 30 MG: 10 TABLET ORAL at 18:11

## 2020-04-24 RX ADMIN — FUROSEMIDE 40 MG: 40 TABLET ORAL at 11:05

## 2020-04-24 RX ADMIN — TAMSULOSIN HYDROCHLORIDE 0.4 MG: 0.4 CAPSULE ORAL at 11:03

## 2020-04-24 RX ADMIN — SODIUM CHLORIDE, PRESERVATIVE FREE 10 ML: 5 INJECTION INTRAVENOUS at 11:07

## 2020-04-24 RX ADMIN — VILAZODONE HYDROCHLORIDE 10 MG: 10 TABLET ORAL at 11:07

## 2020-04-24 RX ADMIN — QUETIAPINE FUMARATE 300 MG: 100 TABLET ORAL at 21:57

## 2020-04-24 RX ADMIN — BUPRENORPHINE AND NALOXONE 1 FILM: 8; 2 FILM BUCCAL; SUBLINGUAL at 11:04

## 2020-04-24 RX ADMIN — WARFARIN SODIUM 5 MG: 5 TABLET ORAL at 18:10

## 2020-04-24 RX ADMIN — POTASSIUM CHLORIDE 20 MEQ: 1500 TABLET, EXTENDED RELEASE ORAL at 21:57

## 2020-04-24 RX ADMIN — LISINOPRIL 2.5 MG: 2.5 TABLET ORAL at 11:06

## 2020-04-24 RX ADMIN — ALBUTEROL SULFATE 2 PUFF: 90 AEROSOL, METERED RESPIRATORY (INHALATION) at 18:15

## 2020-04-24 RX ADMIN — PANTOPRAZOLE SODIUM 40 MG: 40 TABLET, DELAYED RELEASE ORAL at 05:39

## 2020-04-24 RX ADMIN — ALBUTEROL SULFATE 2 PUFF: 90 AEROSOL, METERED RESPIRATORY (INHALATION) at 23:18

## 2020-04-24 RX ADMIN — BUSPIRONE HYDROCHLORIDE 30 MG: 10 TABLET ORAL at 11:06

## 2020-04-24 RX ADMIN — GUAIFENESIN 600 MG: 600 TABLET, EXTENDED RELEASE ORAL at 21:57

## 2020-04-24 RX ADMIN — GUAIFENESIN 600 MG: 600 TABLET, EXTENDED RELEASE ORAL at 11:06

## 2020-04-24 RX ADMIN — AMLODIPINE BESYLATE 5 MG: 5 TABLET ORAL at 11:05

## 2020-04-24 RX ADMIN — ACETAMINOPHEN 650 MG: 325 TABLET ORAL at 14:03

## 2020-04-24 RX ADMIN — ASPIRIN 81 MG: 81 TABLET ORAL at 11:03

## 2020-04-24 RX ADMIN — ACETAMINOPHEN 650 MG: 325 TABLET ORAL at 05:39

## 2020-04-24 RX ADMIN — POTASSIUM CHLORIDE 20 MEQ: 1500 TABLET, EXTENDED RELEASE ORAL at 11:14

## 2020-04-24 ASSESSMENT — PAIN SCALES - GENERAL
PAINLEVEL_OUTOF10: 0
PAINLEVEL_OUTOF10: 3
PAINLEVEL_OUTOF10: 0
PAINLEVEL_OUTOF10: 3
PAINLEVEL_OUTOF10: 3

## 2020-04-24 ASSESSMENT — PAIN DESCRIPTION - LOCATION: LOCATION: HEAD

## 2020-04-24 NOTE — PROGRESS NOTES
[address] [id number]  INPATIENT PHYSICAL THERAPY  EVALUATION  [facility] RENAL TELEMETRY 6K - 6K-08/008-A    Time In: 1100  Time Out: 1125  Timed Code Treatment Minutes: 10 Minutes  Minutes: 25          Date: 2020  Patient Name: Tommy Lynne,  Gender:  male        MRN: [id number]  : 1960  (63 y.o.)      Referring Practitioner: Janora Cowden, MD  Diagnosis: COPD exacerbation  Additional Pertinent Hx: Per ED note, pt is a 63 y.o. male who presents to the Emergency Department for the evaluation of chest pain and shortness of breath. Patient states symptoms have been going on for 6 to 7 days. The patient was seen in the emergency department 4 days ago for the same complaint. He was discharged as his symptoms were minimal at that time. Patient states that shortness of breath has become significantly worse since his ED visit 4 days ago. Patient has a history of COPD, CAD, CHF, he has had pneumonia in the past, he has had sepsis in the past.  Known lung nodules, he has factor V Leiden and is anticoagulated on Coumadin, paroxysmal atrial fibrillation, respiratory failure, hepatitis C, he has pacemaker in place. Restrictions/Precautions:  Restrictions/Precautions: General Precautions, Fall Risk  Position Activity Restriction  Other position/activity restrictions: O2 on 2 L/min at rest and 3 L/min with activity    Subjective:  Chart Reviewed: Yes  Patient assessed for rehabilitation services?: Yes  Subjective: Pt resting in bed and agrees to therapy. General:  Overall Orientation Status: Within Functional Limits    Vision: Within Functional Limits    Hearing: Within functional limits         Pain:  Denies.           Social/Functional History:    Lives With: Son  Type of Home: House  Home Layout: One level  Home Access: Stairs to enter with rails  Entrance Stairs - Number of Steps: 1-2 steps with 1 rail  Home Equipment: Rolling walker, Cane, [address] Help From: Family Ambulation Assistance: Independent  Transfer Assistance: Independent          Additional Comments: Pt reports that he is alone much of the time as his son is always in/out and not home much. OBJECTIVE:  Range of Motion:  Bilateral Lower Extremity: WFL    Strength:  Bilateral Lower Extremity: grossly 4- to 4/5    Balance:  Static Sitting Balance:  Supervision  Dynamic Sitting Balance: Supervision  Static Standing Balance: Stand By Assistance  Dynamic Standing Balance: Stand By Assistance    Bed Mobility:  Supine to Sit: Supervision  Sit to Supine: Stand By Assistance     Transfers:  Sit to Stand: Stand By Assistance  Stand to Sit:Stand By Assistance    Ambulation:  Stand By Assistance  Distance: 90 ft  Surface: Level Tile  Device:Rolling Walker  Gait Deviations:  Slow Leticia, Decreased Step Length Bilaterally, Decreased Gait Speed and needed 2 standing rest breaks during distance. Pt's O2 sats were 91-94% throughout while O2 was on 3 L/min. Pt reported that he felt like he was gasping, but did not show any outward signs of this. Exercise:  Patient was guided in 1 set(s) 5 reps of exercise to both lower extremities. Ankle pumps and Long arc quads. Exercises were completed for increased independence with functional mobility. Functional Outcome Measures: Completed  -PAC Inpatient Mobility without Stair Climbing Raw Score : 16  -PAC Inpatient without Stair Climbing T-Scale Score : 45.54    ASSESSMENT:  Activity Tolerance:  Patient tolerance of  treatment: good. Pt reported not feeling like he was breathing well and was gasping, but did not have physical signs of respiratory stress and O2 sats were maintained above 90% throughout. Treatment Initiated: Treatment and education initiated within context of evaluation.   Evaluation time included review of current medical information, gathering information related to past medical, social and functional history, completion of standardized testing, formal

## 2020-04-24 NOTE — PROGRESS NOTES
context of evaluation. Evaluation time included review of current medical information, gathering information related to past medical, social and functional history, completion of standardized testing, formal and informal observation of tasks, assessment of data and development of plan of care and goals. Treatment time included skilled education and facilitation of tasks to increase safety and independence with ADL's for improved functional independence and quality of life. Discharge Recommendations:  Subacute/Skilled Nursing Facility, Continue to assess pending progress, Patient would benefit from continued therapy after discharge    Patient Education:    Pt educated on BUE AROM exercises in all planes to increase indep and safety with home environment     Equipment Recommendations:  Equipment Needed: No    Plan:  Times per week: 3-5x  Current Treatment Recommendations: Strengthening, Balance Training, Endurance Training, Functional Mobility Training, Equipment Evaluation, Education, & procurement, Patient/Caregiver Education & Training, Self-Care / ADL, ROM. See long-term goal time frame for expected duration of plan of care. If no long-term goals established, a short length of stay is anticipated.     Goals:  Patient goals : Go to nursing home and then home  Short term goals  Time Frame for Short term goals: Unitl d/c  Short term goal 1: Pt will complete functinal mobility in unit hallway with supervision to increase indep and safet in home environment  Short term goal 2: Pt will complete LE dressing task using AE with supervision and min vcs to increase indep and safety with donning/doffing socks and shoes  Short term goal 3: Pt will complete dynamic standing ~10 minutes with supervision to increase indep and safety with ADL routine  Short term goal 4: Pt will complete AROM UE exercises in all planes x 10 reps x 1 set with min vcs for technique to increase indep and safety with home environment   Long term

## 2020-04-24 NOTE — PLAN OF CARE
Consult receive, see  note, April 23, no referrals
Problem: Pain:  Goal: Pain level will decrease  Description: Pain level will decrease  Outcome: Ongoing  Note: Pt states pain is a 5/10. Goal is 2/10  Goal: Control of acute pain  Description: Control of acute pain  Outcome: Ongoing  Note: Acute pain managed with distraction, repositioning and prn medications, see MAR for use. Medication education provided to patient . Goal: Control of chronic pain  Description: Control of chronic pain  Outcome: Ongoing     Problem: Discharge Planning:  Goal: Discharged to appropriate level of care  Description: Discharged to appropriate level of care  Outcome: Ongoing  Note: Pt will return to home when medically stable. Problem: Breathing Pattern - Ineffective:  Goal: Ability to achieve and maintain a regular respiratory rate will improve  Description: Ability to achieve and maintain a regular respiratory rate will improve  Outcome: Ongoing  Note: Pt 16 RR, RR. Some dyspnea on exertion. Problem: Gas Exchange - Impaired:  Goal: Levels of oxygenation will improve  Description: Levels of oxygenation will improve  Outcome: Ongoing  Note: Pt remains on NC 2LO2, 94%. Will continue to monitor. Care plan reviewed with patient. Patient verbalize understanding of the plan of care and contribute to goal setting.
with ambulation to the bathroom and to chair. Pt is also compliant with use of non-skid slippers. Pt reports understanding of fall prevention when discussed. Problem: DISCHARGE BARRIERS  Goal: Patient's continuum of care needs are met  Outcome: Ongoing  Note: Pt plans ese chaves at discharge. Care manager and social working helping with discharge needs. Care plan reviewed with patient. Patient verbalizes understanding of the plan of care and contributes to goal setting.

## 2020-04-24 NOTE — PROGRESS NOTES
Clinical Pharmacy Note                                               Warfarin Discharge Recommendations      Pt discharged from [facility] today after admission for COPD exacerbation    INR today:  Recent Labs     04/24/20  0527   INR 1.49*       Interacting medications at discharge: aspirin    INR goal during admission:2-3     Recommendations for discharge:   Date Warfarin Dose   4/25/20 5 mg   4/26/20 5 mg   4/27/20 2.5 mg   4/28/20 INR      Provider dosing warfarin: [facility]     Recheck INR:  Tuesday, 4/28/2020 at 1:15pm- keep your previously scheduled appointment.      Calvin Carr, PharmD, BCPS   4/24/2020  2:44 PM

## 2020-04-24 NOTE — PROGRESS NOTES
1500-This RN in to discuss discharge plans with patient and case management  Patient refusing to go home after being cleared and approved to be discharged medically by Dr Fatimah Núñez. This RN spoke with patient about current services with [facility] and patient says he is not leaving the hospital. Social worked looked into possibly getting patient placed at [facility]. Evaluated by therapy whom they thought it was not needed at a nursing facility. All nursing facilities however not able to take patient due to suboxen use. Social work did find [facility] who said they would consider once contacted by radha to discuss suboxone however does not guarantee they can take him. This RN updated Dr Fatimah Núñez, he stated there is no need to keep patient to check on nursing home placement patient is medically stable and should be discharged home with services if he wishes to further pursue an ECF from [facility] that is fine. This RN called house supervisor to assist in discharging patient whom is still refusing. Patient saying he does not have a key to get into his house. Both sons called multiple times with no answer. House supervisor looking into getting a way for patient to be able to enter his house. 1600- This RN called patient rosmery again and left messages to call back regarding discharge plans   1700- This RN called patient rosmery again and left messages will continue to try. 1955-Patient states he got a hold of his son and that Catia Srinivasan whom he lives with will be home tomorrow and could pick him up 56 AM patient is agreeable to go and would like number of nursing home to call whenever Benjamin Barrera gets back to them. This Rn updated Dr Fatimah Núñez and house supervisor and on coming RN.

## 2020-04-24 NOTE — PROGRESS NOTES
Clinical Pharmacy Note    Warfarin consult follow-up    Recent Labs     04/24/20  0527   INR 1.49*     Recent Labs     04/22/20  0540   HGB 13.8*   HCT 43.6          Significant Drug-Drug Interactions:  New warfarin drug-drug interactions: none  Discontinued drug-drug interactions: none  Current warfarin drug-drug interactions: aspirin, prednisone        Date INR Warfarin Dose   4/21/2020 3.74 No Coumadin   4/22/2020   2.43  5 mg   4/23/2020  1.53 5 mg    4/24/2020  1.49   5 mg                       Notes:                     Daily PT/INR until stable within therapeutic range.      Ashley Cox, PharmD, BCPS   4/24/2020  1:28 PM

## 2020-04-24 NOTE — PROGRESS NOTES
Called son Shekhar Ascencio regarding picking up his discharged father, who states that he is out of town and I would need to call his other son Amada who should have a key. Called Amada, no answer, left message. Cori Echols again, no answer, left voicemail.

## 2020-04-24 NOTE — PROGRESS NOTES
Pt was discharged yesterday; however, ended up staying d/t non-medical issues related to not having house keys?! PT/OT/SW//Nurse supervisor on board with scheduled discharge. Pt is stable medical conditions for safe discharge. Attached below is the updated discharge note from 2020:    Patient Identification:   Sakina Levy   : 1960  MRN: [id number]   Account: [de-identified]                 Patient's PCP: Gwendolyn Peck     Admit Date: 2020      Discharge Date:   2020        Admitting Physician: Onel Peñaloza DO     Discharge Physician: Rachel Aldana MD      Discharge Diagnoses:  [facility] Problems     Diagnosis Date Noted    Factor V Leiden [facility]) [D68.51]      Opioid [facility] disorder ([facility] [F11.99]      [facility] [J44.1] 2019    Essential hypertension [I10] 2018    History of intravenous drug use in remission [Z87.898] 2012         The patient was seen and examined on day of discharge and this discharge summary is in conjunction with any daily progress note from day of discharge.     Hospital Course:   Sakina Levy is a 63 y.o. male admitted to [address] [place] [address] on 2020 for query ACOPDE wit acute on chronic hypoxic respiratory failure. I took over care on . Pt responded well to medical management, remained clinically stable and was discharged in stable conditions.           Assessment/Plan:     1. COPD in Acute Exacerbation: resolved, pt at baseline oxyegn requirement;  2L O2 at home . COVID negative, pt on day#3 of IV Azithromycin/Rocephin and PO Prednisone. Cultures negative. procal WNLs. CXR nil acute. No leukocytosis. D/c'ed all. PNA unlikely, that said, given low reserve and prior documentation of query worsening cough/sputum (pt vague historian) Stepped down to PO doxycycline for completion of 5-day course. Home O2 eval renewed:  Patient was evaluated today for the diagnosis of COPD.   I daily                      guaiFENesin (MUCINEX) 600 MG extended release tablet  Take 1 tablet by mouth 2 times daily                      lisinopril (PRINIVIL;ZESTRIL) 2.5 MG tablet  Take 1 tablet by mouth daily                      nicotine (NICODERM CQ) 14 MG/24HR  Place 1 patch onto the skin daily                      pantoprazole (PROTONIX) 40 MG tablet  Take 40 mg by mouth daily                      potassium chloride (KLOR-CON M) 20 MEQ extended release tablet  Take 1 tablet by mouth 2 times daily                      QUEtiapine (SEROQUEL) 100 MG tablet  1 tablet by Per NG tube route nightly                      tamsulosin (FLOMAX) 0.4 MG capsule  Take 0.4 mg by mouth daily                      terbinafine (LAMISIL) 1 % cream  Apply topically 2 times daily.                      Umeclidinium Bromide (INCRUSE ELLIPTA) 62.5 MCG/INH AEPB  Inhale 1 puff into the lungs daily                      VILAZODONE HCL 10 MG Tablet                         warfarin (COUMADIN) 5 MG tablet  Take 5 mg by mouth                            Time Spent on discharge is more than 45 minutes in the examination, evaluation, counseling and review of medications and discharge plan.                                                With RN in room, patient was updated about the treatment plan, all the questions and concerns were addressed. Alarming signs and symptoms to return to ED were explained in length. Pt remains at high risk for readmission.     Addendum: I learned pt ended up staying overnight d/t questionable issues with home dynamics. I was never notified about this.  All was arranged for pt to leave safely in stable medical conditions.      Signed:     Thank you Gwendolyn Peck for the opportunity to be involved in this patient's care.     Electronically signed by Rachel Aldana MD on 4/22/2020 at 9:34 AM

## 2020-04-25 VITALS
DIASTOLIC BLOOD PRESSURE: 74 MMHG | OXYGEN SATURATION: 92 % | TEMPERATURE: 97.8 F | BODY MASS INDEX: 34.29 KG/M2 | SYSTOLIC BLOOD PRESSURE: 130 MMHG | HEIGHT: 69 IN | WEIGHT: 231.5 LBS | RESPIRATION RATE: 16 BRPM | HEART RATE: 83 BPM

## 2020-04-25 LAB — INR BLD: 1.75 (ref 0.85–1.13)

## 2020-04-25 PROCEDURE — 85610 PROTHROMBIN TIME: CPT

## 2020-04-25 PROCEDURE — 36415 COLL VENOUS BLD VENIPUNCTURE: CPT

## 2020-04-25 PROCEDURE — 2700000000 HC OXYGEN THERAPY PER DAY

## 2020-04-25 PROCEDURE — 6370000000 HC RX 637 (ALT 250 FOR IP): Performed by: PHYSICIAN ASSISTANT

## 2020-04-25 PROCEDURE — 99232 SBSQ HOSP IP/OBS MODERATE 35: CPT | Performed by: HOSPITALIST

## 2020-04-25 PROCEDURE — 94640 AIRWAY INHALATION TREATMENT: CPT

## 2020-04-25 RX ORDER — BUPRENORPHINE AND NALOXONE 8; 2 MG/1; MG/1
1 FILM, SOLUBLE BUCCAL; SUBLINGUAL DAILY
Qty: 4 FILM | Refills: 0 | Status: SHIPPED | OUTPATIENT
Start: 2020-04-25 | End: 2020-04-29

## 2020-04-25 RX ADMIN — PANTOPRAZOLE SODIUM 40 MG: 40 TABLET, DELAYED RELEASE ORAL at 05:57

## 2020-04-25 RX ADMIN — PREDNISONE 40 MG: 20 TABLET ORAL at 08:39

## 2020-04-25 RX ADMIN — BUPRENORPHINE AND NALOXONE 1 FILM: 8; 2 FILM BUCCAL; SUBLINGUAL at 08:37

## 2020-04-25 RX ADMIN — VILAZODONE HYDROCHLORIDE 10 MG: 10 TABLET ORAL at 08:40

## 2020-04-25 RX ADMIN — AMLODIPINE BESYLATE 5 MG: 5 TABLET ORAL at 08:39

## 2020-04-25 RX ADMIN — GUAIFENESIN 600 MG: 600 TABLET, EXTENDED RELEASE ORAL at 08:38

## 2020-04-25 RX ADMIN — TAMSULOSIN HYDROCHLORIDE 0.4 MG: 0.4 CAPSULE ORAL at 08:38

## 2020-04-25 RX ADMIN — ASPIRIN 81 MG: 81 TABLET ORAL at 08:37

## 2020-04-25 RX ADMIN — FUROSEMIDE 40 MG: 40 TABLET ORAL at 08:39

## 2020-04-25 RX ADMIN — POTASSIUM CHLORIDE 20 MEQ: 1500 TABLET, EXTENDED RELEASE ORAL at 08:37

## 2020-04-25 RX ADMIN — LISINOPRIL 2.5 MG: 2.5 TABLET ORAL at 08:39

## 2020-04-25 RX ADMIN — TIOTROPIUM BROMIDE 18 MCG: 18 CAPSULE ORAL; RESPIRATORY (INHALATION) at 08:22

## 2020-04-25 RX ADMIN — BUSPIRONE HYDROCHLORIDE 30 MG: 10 TABLET ORAL at 08:38

## 2020-04-25 RX ADMIN — ACETAMINOPHEN 650 MG: 325 TABLET ORAL at 08:38

## 2020-04-25 ASSESSMENT — PAIN - FUNCTIONAL ASSESSMENT: PAIN_FUNCTIONAL_ASSESSMENT: ACTIVITIES ARE NOT PREVENTED

## 2020-04-25 ASSESSMENT — PAIN DESCRIPTION - ONSET: ONSET: ON-GOING

## 2020-04-25 ASSESSMENT — PAIN DESCRIPTION - LOCATION: LOCATION: HEAD

## 2020-04-25 ASSESSMENT — PAIN DESCRIPTION - PAIN TYPE: TYPE: ACUTE PAIN

## 2020-04-25 ASSESSMENT — PAIN DESCRIPTION - PROGRESSION: CLINICAL_PROGRESSION: NOT CHANGED

## 2020-04-25 ASSESSMENT — PAIN SCALES - GENERAL
PAINLEVEL_OUTOF10: 5
PAINLEVEL_OUTOF10: 0
PAINLEVEL_OUTOF10: 0
PAINLEVEL_OUTOF10: 5

## 2020-04-25 ASSESSMENT — PAIN DESCRIPTION - FREQUENCY: FREQUENCY: CONTINUOUS

## 2020-04-25 ASSESSMENT — PAIN DESCRIPTION - DESCRIPTORS: DESCRIPTORS: HEADACHE

## 2020-04-25 ASSESSMENT — PAIN DESCRIPTION - ORIENTATION: ORIENTATION: MID

## 2020-04-25 NOTE — DISCHARGE INSTR - COC
Continuity of Care Form    Patient Name: Elijah Aguirre   :  1960  MRN:  [id number]    Admit date:  2020  Discharge date:  2020    Code Status Order: Full Code   Advance Directives:   Advance Care Flowsheet Documentation     Date/Time Healthcare Directive Type of Healthcare Directive Copy in [address] Agent's Name Healthcare Agent's Phone Number    20 3153  Yes, patient has an advance directive for healthcare treatment  --  --  --  --  --    20 2115  Yes, patient has an advance directive for healthcare treatment  Durable power of  for health care;Living will  No, copy requested from family  Healthcare power of   Jono Petra  [phone]          Admitting Physician:  Brinda Kelly DO  PCP: Gamal Smith    Discharging Nurse: Liat Harris [facility] Unit/Room#: 6K-08/008-A  Discharging Unit Phone Number: [phone]    Emergency Contact:   Extended Emergency Contact Information  Primary Emergency Contact: Ernie Grimes [facility] Phone: [phone]  Work Phone: [phone]  Mobile Phone: [phone]  Relation: Lay Caregiver  Secondary Emergency Contact: Keyona Randall. Mobile Phone: [phone]  Relation: Child   needed?  No    Past Surgical History:  Past Surgical History:   Procedure Laterality Date    BRONCHOSCOPY N/A 2019    BRONCHOSCOPY performed by Amarjit Szymanski MD at  [facility]    BRONCHOSCOPY N/A 2019    BRONCHOSCOPY performed by Amarjit Szymanski MD at  [facility]    BRONCHOSCOPY N/A 2019    BRONCHOSCOPY BIOPSY BRONCHUS performed by Amarjit Szymanski MD at  [facility]    BRONCHOSCOPY  2019    BRONCHOSCOPY performed by Amarjit Szymanski MD at  [facility]    BRONCHOSCOPY N/A 2019    BRONCHOSCOPY ALVEOLAR LAVAGE performed by Nataly Ramachandran MD at [place] [address] N/A 10/16/2019    BRONCHOSCOPY ALVEOLAR LAVAGE performed by Nataly Ramachandran MD at  [facility]) K21.9    Non-intractable vomiting with nausea R11.2    Hx of tricuspid valve repair Z98.890    History of colonic polyps Z86.010    Second degree hemorrhoids K64.1    History of tricuspid valve replacement with bioprosthetic valve 2012 Z95.3    Chronic diastolic congestive heart failure (HCC) I50.32    SOB (shortness of breath) on exertion T08.58    Folliculitis of perineum L73.9    S/P cardiac cath 12/5/1846-KJ-HYCLPBB, LAD -PATENT, LCX-PATENT , RCA PATENT, EDP 15 MMHG, NO GRADIENT- MED RX Z98.890    Essential hypertension I10    COPD exacerbation ([facility]) J44.1    Acute respiratory failure with hypoxia and hypercapnia ([facility]) J96.01, J96.02    Encephalopathy acute G93.40    PAF (paroxysmal atrial fibrillation) ([facility]) nioted on device check with 4% burden I48.0    Pneumonia J18.9    Acute respiratory failure with hypoxemia ([facility]) J96.01    Elevated hemoglobin A1c R73.09    Hyperglycemia P16.3    Metabolic acidosis F70.3    Factor V Leiden ([facility]) D68.51    Hx of deep venous thrombosis Z86.718    Opioid use disorder ([facility]) F11.99    Chronic obstructive pulmonary disease with acute lower respiratory infection ([facility]) J44.0       Isolation/Infection:   Isolation          Contact        Patient Infection Status     Infection Onset Added Last Indicated Last Indicated By Review Planned Expiration Resolved Resolved By    MRSA 10/15/19 10/18/19 02/21/20 Culture, Respiratory        Resp culture 10/2019, 2/2020  Bronch 10/2019    Resolved    COVID-19 Rule Out 04/20/20 04/20/20 04/20/20 COVID-19 (Ordered)   04/20/20 Rule-Out Test Resulted    INFLUENZA 03/23/20 03/23/20 03/23/20 Rapid influenza A/B antigens   04/22/20 Lon Martínez, RN          Nurse Assessment:  Last Vital Signs: /74   Pulse 83   Temp 97.8 °F (36.6 °C) (Oral)   Resp 16   Ht 5' 9\" (1.753 m)   Wt 231 lb 8 oz (105 kg)   SpO2 92%   BMI 34.19 kg/m²     Last documented pain score (0-10 scale): Pain Level: 0  Last Weight:

## 2020-04-25 NOTE — PROGRESS NOTES
Pt now going to ECF after initial plan for return home changed several times. JEFF form was filled out and script for Buprenorphine for 4 days provided. Pt was transferred in stable medical conditions. Attached below is the complete discharge note from 4/22/2020. Patient Identification:   Gab Mane  TJM: 01[birth year]99[birth year]  HDR: [id number]   XDNDM: [id number]      Patient's PCP: Alfreda Early     Admit Date: 4/20/2020      Discharge Date:   4/22/2020        Admitting Physician: Jose Hernandez DO     Discharge Physician: Gina Gates MD      Discharge Diagnoses:             Active Hospital Problems     Diagnosis Date Noted    Factor V Leiden [facility]) [D68.51]      Opioid use disorder ([facility] [F11.99]      [facility] [J44.1] 09/27/2019    Essential hypertension [I10] 12/14/2018    History of intravenous drug use in remission [Z87.898] 12/02/2012         The patient was seen and examined on day of discharge and this discharge summary is in conjunction with any daily progress note from day of discharge.     Hospital Course:   Cabrera Thomas a 63 y. o. male admitted to [address] [place] [address] on 4/20/2020 for query ACOPDE wit acute on chronic hypoxic respiratory failure. I took over care on 4/22. Pt responded well to medical management, remained clinically stable and was discharged in stable conditions.        Assessment/Plan:     1. COPD in Acute Exacerbation: resolved, pt at baseline oxyegn requirement;  2L O2 at home .  COVID negative, pt on day#3 of IV Azithromycin/Rocephin and PO Prednisone. Cultures negative. procal WNLs. CXR nil acute. No leukocytosis. D/c'ed all.  PNA unlikely, that said, given low reserve and prior documentation of query worsening cough/sputum (pt vague historian) Stepped down to PO doxycycline for completion of 5-day course.    Home O2 eval renewed:  Patient was evaluated today for the diagnosis of COPD. Dayna Overall entered a DME order for home oxygen because the

## 2020-04-26 LAB
BLOOD CULTURE, ROUTINE: NORMAL
BLOOD CULTURE, ROUTINE: NORMAL

## 2020-05-12 ENCOUNTER — TELEPHONE (OUTPATIENT)
Dept: PULMONOLOGY | Age: 60
End: 2020-05-12

## 2020-05-12 NOTE — CARE COORDINATION
20, 10:44 AM EDT  DISCHARGE PLANNING EVALUATION:    Jojo January       Admitted from: ED 2020/  [facility] day: 1   [facility] Reason for admit: COPD exacerbation ([facility] [J44.1]  Chronic obstructive pulmonary disease with acute lower respiratory infection ([facility] [J44.0] Status: IP  Admit order signed?: yes  PMH:  has a past medical history of Anxiety disorder, Arthritis, Asthma, Back pain, chronic, Blood circulation, collateral, [employer] BiV ICD, [employer] BiV pacemaker, CAD (coronary artery disease), CHF (congestive heart failure) ([facility] Chronic kidney disease, COPD (chronic obstructive pulmonary disease) ([facility] Depression, Endocarditis, Hepatitis, Hepatitis C, Hypertension, and Pneumonia. Procedure: none  Pertinent abnormal Imagin/21 CXR: Reticular interstitial markings greatest in the lung bases. Correlate for interstitial edema, atelectasis, atypical viral-type infection cannot be excluded. No significant change when compared to prior study. Medications:  Scheduled Meds:   amLODIPine  5 mg Oral Daily    aspirin EC  81 mg Oral Daily    buprenorphine-naloxone  1 Film Sublingual Daily    busPIRone  30 mg Oral BID    furosemide  40 mg Oral Daily    guaiFENesin  600 mg Oral BID    lisinopril  2.5 mg Oral Daily    pantoprazole  40 mg Oral Daily    potassium chloride  20 mEq Oral BID    QUEtiapine  300 mg Oral Nightly    tamsulosin  0.4 mg Oral Daily    tiotropium  18 mcg Inhalation Daily    vilazodone HCl  10 mg Oral Daily    cefTRIAXone (ROCEPHIN) IV  1 g Intravenous Q24H    warfarin (COUMADIN) daily dosing (placeholder)   Other RX Placeholder    [START ON 2020] azithromycin  250 mg Oral Daily    nicotine  1 patch Transdermal Daily    predniSONE  40 mg Oral Daily    sodium chloride flush  10 mL Intravenous 2 times per day     Continuous Infusions:   Pertinent Info/Orders/Treatment Plan:  Presented to ED with chest pain and SOB.  Hospitalist
4/23/20, 11:34 AM EDT    Patient goals/plan/ treatment preferences discussed by  and . Patient goals/plan/ treatment preferences reviewed with patient/ family. Patient/ family verbalize understanding of discharge plan and are in agreement with goal/plan/treatment preferences. Understanding was demonstrated using the teach back method. AVS provided by RN at time of discharge, which includes all necessary medical information pertaining to the patients current course of illness, treatment, post-discharge goals of care, and treatment preferences. Services After Discharge  Services At/After Discharge: Nursing Services([facility])   IMM Letter  IMM Letter given to Patient/Family/Significant other/Guardian/POA/by[de-identified] care manager  IMM Letter date given[de-identified] 04/23/20  IMM Letter time given[de-identified] 7702     SW was advised by ALAINA Harris that pt is requesting [facility] at discharge. SW completed referral with Armando Manual with [facility]. ALAINA Harris to request [facility] form Dr. Abebe Harrison.
4/24/20, 2:06 PM EDT    DISCHARGE PLANNING EVALUATION     spoke with [address] [facility] admissions. They are still attempting to reach [address] to determine if they can continue to prescribe and monitor suboxone. Chart information faxed. [address] [facility] [address] [facility] has not yet reviewed for admission, until they speak with JEANNIE. [address] [facility] will review after speaking with JEANNIE. Discussed with patient. He states he does not feel comfortable going home, that he is still sick and not safe to go home. He is refusing to discharge home. Discussed that [address] [facility] may consider, but will likely not have an answer today. He states he wants a facility in [facility], and feels that there should be a facility that will accept. Discussed that the facilties that do accept patients on suboxone are not accepting new patients at this time. Discussed that the reality of the situation is that we do not have an accepting facility yet, and the only one willing to consider at this time is [address] [facility]. Discussed again if patient is willing to consider going home with home health support. He is not. Discussed with RN and with unit manager. RN has messaged Dr Peggy Robertson. Discussed with care manager, who will speak with patient also about refusal to discharge home. Will continue to work with [address] [facility] on possible placement. All HCF facilities, [facility], [address] [place] [address] have declined. RN shares that physician wants to discharge today, and if he is not accepted today at a facility, then he should discharge home. Discussed services at home with patient, including HH(had previously chosen [facility]) and his current home O2 from [address]. Patient is still refusing to go home today.
4/24/20, 2:36 PM EDT    DISCHARGE ON GOING [facility] day: 4  Location: 6-08/008-A Reason for admit: COPD exacerbation ([facility] [J44.1]  Chronic obstructive pulmonary disease with acute lower respiratory infection ([facility] [J44.0]   Procedure: na    Treatment Plan of Care: Suboxone as ordered, up in eason with PT/OT, oxygen at 3L/min with ambulation and 1L at rest,oxygen sats > 90%, up with PT and OT earlier today. Barriers to Discharge: ECF to accept patient    PCP: Almita Fernandez  Readmission Risk Score: 54%  Patient Goals/Plan/Treatment Preferences: planning ECF at discharge or home with [facility]; SW follows see her notes. 1500pm- spoke with Armando Waldrop and she can have [facility] come out earliest is Sunday 04/26.    1530pm- spoke with patient along with charge nurse Gregorio Hansen RN. Patient updated/informed he is discharged and needs to arrange ride home as we have no information on ECF accepting him. he shares son Margie Altamirano who he lives with is OOT. Patient explained he gets \"these episodes\" and his oxygen will drop less than 88%, no noted \"episodes\" during this hospitalization or within the last 24 hours. [place] RN informs this CM son Margie Altamirano is OOT and Margie Altamirano is getting Dallis Brew of another brother Hattie Tolbert. Patient instructed he can appeal his discharge; also informed he could be responsible for a nights stay if Medicare review finds patient ready for discharge. At this time the attending physician has no criteria to keep patient in the hospital; everything can be managed at home. Linda his oxygen provider was called on 04/23 informing them of discharge.
4/24/20, 9:23 AM EDT    DISCHARGE PLANNING EVALUATION    Unit manager, Benson Alexander states pt is discharged today and is requesting [facility] now instead of going home with [facility]. SUKHWINDER asked for PT/OT see pt as soon as possible. SUKHWINDER spoke with Milad Mac for referral to [facility]. SW spoke with pt to confirm plan and suggested he notify family due to needing transportation home or ecf. Pt agreed. SW completed [place] screen and prepared blue packet, placed on pts chart with info for nurse.
5/12/20  11:32 AM  Received voicemail from patient re: smaller oxygen concentrator. Reports he was supposed to have new order at discharge on 4/27. Spoke with patient, he does have O2 in home, would like smaller portable devices. Reports he went to [address] [place] [address] and rehab post hospital stay. Order for home O2 and statement of necessity in chart from 4/23, orders were faxed to [facility], [facility] [address] spoke with staff member Redd Bruce. At time of discharge, appears patient refused to leave hospital. Was discharged to [facility] on 4/25. Explained to patient that order was likely cancelled by [facility] when he went to [facility]. Will refax order, eval, and statement of necessity. Discussed with patient the possibility that the insurance may require an updated order. If needed hospital physician's would not be able to arrange, he would need to follow with [employer] and original prescriber, verbalizes understanding. Faxed to [employer], transmission verification received. Spoke with Nikki Palafox at [facility] to update.
area and applicable managed care information provided. Questions regarding selection process answered: not at this time    Teach Back Method used with patient regarding care plan and needs  Patient verbalize understanding of the plan of care and contribute to goal setting.        Patient goals, treatment preferences and discharge plan:  Plan home at this time, refused ECF    Electronically signed by SHELBI Gipson on 4/23/2020 at 10:09 AM

## 2020-05-13 ENCOUNTER — TELEPHONE (OUTPATIENT)
Dept: PULMONOLOGY | Age: 60
End: 2020-05-13

## 2020-05-23 ENCOUNTER — HOSPITAL ENCOUNTER (OUTPATIENT)
Age: 60
Discharge: HOME | End: 2020-05-23
Payer: MEDICARE

## 2020-05-23 PROCEDURE — U0002 COVID-19 LAB TEST NON-CDC: HCPCS

## 2020-05-24 LAB
PERFORMING LAB: NORMAL
REPORT: NORMAL
SARS-COV-2: NOT DETECTED

## 2020-05-26 ENCOUNTER — HOSPITAL ENCOUNTER (OUTPATIENT)
Dept: CT IMAGING | Age: 60
Discharge: HOME | End: 2020-05-26
Payer: MEDICARE

## 2020-05-26 ENCOUNTER — HOSPITAL ENCOUNTER (OUTPATIENT)
Dept: PULMONOLOGY | Age: 60
Discharge: HOME | End: 2020-05-26
Payer: MEDICARE

## 2020-05-26 PROCEDURE — G0297 LDCT FOR LUNG CA SCREEN: HCPCS

## 2020-05-26 PROCEDURE — 94726 PLETHYSMOGRAPHY LUNG VOLUMES: CPT

## 2020-05-26 PROCEDURE — 94729 DIFFUSING CAPACITY: CPT

## 2020-05-26 PROCEDURE — 94060 EVALUATION OF WHEEZING: CPT

## 2020-06-15 ENCOUNTER — HOSPITAL ENCOUNTER (OUTPATIENT)
Dept: SLEEP CENTER | Age: 60
Discharge: HOME | End: 2020-06-17
Payer: MEDICARE

## 2020-06-15 PROCEDURE — 95810 POLYSOM 6/> YRS 4/> PARAM: CPT

## 2020-06-16 LAB — STATUS: NORMAL

## 2020-06-17 NOTE — PROGRESS NOTES
[address] [place] [address]                               SLEEP STUDY REPORT    PATIENT NAME: Felicity Pallas                  :        1960  MED REC NO:   [id number]                           ROOM:  ACCOUNT NO:   [de-identified]                           ADMIT DATE: 06/15/2020  PROVIDER:     Bassem Mistry MD    DATE OF STUDY:  06/15/2020    REFERRING PROVIDER:  Bassem Mistry MD    The patient's height is 69 inches, weight is 240 pounds with a BMI of  35.4. HISTORY:  The patient is a 52-year-old gentleman, was initially  evaluated by me on 2020. The patient is suffering with  hypersomnia. The patient also found to have a neck circumference of  17.5 inches and class IV Mallampati airway. Due to the patient's  associated comorbidities including moderate COPD and chronic atrial  fibrillation and hypertension, the patient is scheduled for a sleep  study to evaluate for sleep apnea as an etiology for hypersomnia. METHODS:  The patient underwent digital polysomnography in compliance  with the standards and specifications from the AASM Manual including the  simultaneous recording of 3 EEG channels (F4-M1, C4-M1, and O2-M1 with  back up electrodes F3-M2, C3-M2, and O1-M2), 2 EOG channels (E1-M2, and  E2-M1,), EMG (chin, left & right leg), EKG, Nonin pulse oximetry with  less than 2 second averaging time, body position, airflow recorded by  oral-nasal thermal sensor and nasal air pressure transducer, plus  respiratory effort recorded by calibrated respiratory inductance  plethysmography (RIP), flow volume loop, sound and video. Sleep staging  and scoring followed the standard put forth by the American Academy of  Sleep Medicine and utilized the 4A obstructive hypopnea event  desaturation of 4 percent or greater. INTERPRETATION:  This is a baseline sleep study and the study was  performed on 06/15/2020. The study was started at 09:20 p.m. and was  terminated at 04:57 a.m. with a total recording time of 457.5 minutes  and sleep period time was 413.4 minutes and a total sleep time was 273.9  minutes and overall sleep efficiency was 59.9%. The sleep onset latency  was 44.1 minute and wake after sleep onset was 139.5 minutes and REM  sleep latency was not available due to absent REM sleep. SLEEP STAGING AND DISTRIBUTION SUMMARY:  Revealed the patient spent 11.5  minutes in stage I consisting of 4.2%, 219.9 minutes in stage II  consisting of 80.3%, 42.5 minutes in stage III consisting of 15.5%. The  patient had absent REM sleep. RESPIRATORY EVENT ANALYSIS:  Revealed the patient had a total of 37  hypopneas, all of them were obstructive in nature. The patient did not  have any apneas. The total number of apneas and hypopneas recorded  during the study were 37 with an apnea-hypopnea index of 8.1. The  patient's REM sleep apnea-hypopnea index was not available due to absent  REM sleep. POSITION ANALYSIS:  Revealed the patient spent 273.9 minutes in supine  position with a supine apnea-hypopnea index of 8.1. PERIODIC LIMB MOVEMENT ANALYSIS:  Revealed the patient did not have any  periodic limb movements. The patient had a total of 3 spontaneous  arousals with a spontaneous arousal index of 0.7. OXYGEN SATURATION MONITORING:  Revealed the patient had a maximum oxygen  desaturation to 80% with a mean oxygen saturation of 87.2%. The patient  spent a total of 232.7 minutes below oxygen saturation less than 88%. EKG MONITORING:  Revealed regular rhythm; however, the patient had a  history of chronic atrial fibrillation. IMPRESSION:  1. This is a technically difficult study with a poor sleep efficiency  of 59.9% and absent REM sleep. 2.  Mild obstructive sleep apnea. 3.  Absent REM sleep. 4.  Nocturnal hypoxia. The patient spent a total of 232.7 minutes below  oxygen saturation less than 88%.   5.

## 2020-06-29 ENCOUNTER — OFFICE VISIT (OUTPATIENT)
Dept: PULMONOLOGY | Age: 60
End: 2020-06-29
Payer: MEDICARE

## 2020-06-29 VITALS
BODY MASS INDEX: 36.67 KG/M2 | WEIGHT: 247.6 LBS | OXYGEN SATURATION: 92 % | HEART RATE: 79 BPM | SYSTOLIC BLOOD PRESSURE: 136 MMHG | HEIGHT: 69 IN | TEMPERATURE: 98.9 F | DIASTOLIC BLOOD PRESSURE: 66 MMHG

## 2020-06-29 PROCEDURE — 94618 PULMONARY STRESS TESTING: CPT | Performed by: NURSE PRACTITIONER

## 2020-06-29 PROCEDURE — G0296 VISIT TO DETERM LDCT ELIG: HCPCS | Performed by: NURSE PRACTITIONER

## 2020-06-29 PROCEDURE — 99214 OFFICE O/P EST MOD 30 MIN: CPT | Performed by: NURSE PRACTITIONER

## 2020-06-29 RX ORDER — UMECLIDINIUM BROMIDE AND VILANTEROL TRIFENATATE 62.5; 25 UG/1; UG/1
1 POWDER RESPIRATORY (INHALATION) DAILY
Qty: 30 PUFF | Refills: 11 | Status: SHIPPED | OUTPATIENT
Start: 2020-06-29 | End: 2021-06-29

## 2020-06-29 RX ORDER — PREDNISONE 20 MG/1
40 TABLET ORAL DAILY
Qty: 10 TABLET | Refills: 0 | Status: SHIPPED | OUTPATIENT
Start: 2020-06-29 | End: 2020-07-04

## 2020-06-29 RX ORDER — GUAIFENESIN 600 MG/1
600 TABLET, EXTENDED RELEASE ORAL 2 TIMES DAILY
Qty: 60 TABLET | Refills: 2 | Status: ON HOLD | OUTPATIENT
Start: 2020-06-29 | End: 2020-09-19 | Stop reason: HOSPADM

## 2020-06-29 ASSESSMENT — ENCOUNTER SYMPTOMS
NAUSEA: 0
CHEST TIGHTNESS: 0
SPUTUM PRODUCTION: 1
SHORTNESS OF BREATH: 1
DIARRHEA: 0
COUGH: 1
HEMOPTYSIS: 0
STRIDOR: 0
VOMITING: 0
WHEEZING: 1

## 2020-06-29 ASSESSMENT — COPD QUESTIONNAIRES: COPD: 1

## 2020-06-29 NOTE — PROGRESS NOTES
or changes in medicines? Yes klonopin for anxiety  Using inhalers? Yes incruse  Are they helpful? Yes only needing albuterol 1 time per day on avg. Pneumonia vaccine?    Past Medical hx   PMH:  Past Medical History:   Diagnosis Date    Anxiety disorder     Arthritis     Asthma     Back pain, chronic     Blood circulation, collateral     4 toes partial amputee on L foot    [employer] BiV ICD 7/1/2014   [facility] [employer] BiV pacemaker 7/1/2014    CAD (coronary artery disease)     CHF (congestive heart failure) (HCC)     Chronic kidney disease     COPD [facility]chronic obstructive pulmonary disease) ([facility]     Depression     Endocarditis     Hepatitis     HEPATITIS C    Hepatitis C     Hypertension     Pneumonia      SURGICAL HISTORY:  Past Surgical History:   Procedure Laterality Date    BRONCHOSCOPY N/A 9/28/2019    [facility]    BRONCHOSCOPY N/A 9/28/2019    BRONCHOSCOPY [facility] [place] [facility]    BRONCHOSCOPY N/A 9/29/2019    BRONCHOSCOPY BIOPSY BRONCHUS performed by [facility] MD [facility] [place] [facility]    BRONCHOSCOPY  9/29/2019    BRONCHOSCOPY performed by Alisa Sanchez MD at [place] [address] N/A 9/30/2019    BRONCHOSCOPY ALVEOLAR LAVAGE performed by Hortensia Aburto MD at [place] [address] N/A 10/16/2019    BRONCHOSCOPY ALVEOLAR LAVAGE performed by Hortensia Aburto MD at [address] [place] [address]  12/26/12    Valve replacement    COLONOSCOPY  2016    DEBRIDEMENT Left 01/14/2014    lt hand     HAND DEBRIDEMENT Left 01/09/2014    INCISION AND DRAINAGE    PACEMAKER PLACEMENT  12/26/13    TOE AMPUTATION Bilateral 3/13/2013    1,2,3,4 on left and 2nd on right    TRACHEAL SURGERY N/A 10/18/2019    TRACHEOTOMY PERCUTANEOUS BRONCHOSCOPY performed by Hortensia Aburto MD at [facility] ENDOSCOPY  2012     SOCIAL HISTORY:  Social History     Tobacco Use sounds: Normal breath sounds. No stridor. No rales. Comments: Faint wheeze/rhonchi cleared with cough  Chest:      Chest wall: No tenderness. Abdominal:      General: Bowel sounds are normal. There is no distension. Palpations: Abdomen is soft. Skin:     General: Skin is warm and dry. Capillary Refill: Capillary refill takes less than 2 seconds. Neurological:      Mental Status: He is alert and oriented to person, place, and time. Psychiatric:         Behavior: Behavior normal.         Thought Content: Thought content normal.          Results   Lung Nodule Screening     [x] Qualifies    [] Does not qualify   [] Declined    [] Completed   LDCT 5/26/2020   The USPSTF recommends annual screening for lung cancer with low-dose computed tomography (LDCT) in adults aged 54 to [de-identified] years who have a 30 pack-year smoking history and currently smoke or have quit within the past 15 years. Screening should be discontinued once a person has not smoked for 15 years or develops a health problem that substantially limits life expectancy or the ability or willingness to have curative lung surgery. NONCONTRAST SCREENING CT CHEST:   5/26/2020   FINDINGS: LUNGS NODULES: 1. 5 mm thick related nodule left upper lobe posteriorly image 138 stable. Stable parenchymal scarring right base. LYMPHADENOPATHY: 1. There are no pathologically enlarged lymph nodes. OTHER (LUNGS/MEDIASTINUM/MUSCULOSKELETAL/ABDOMEN): 1. Extensive paraseptal and centrilobular emphysematous changes throughout the lungs. Old severe COPD and possibly component of pulmonary fibrosis Normal heart size. Defibrillator pacer leads extend into the heart. Impression:  1. There are no suspicious masses or nodules within the lung fields. 2. There are no pathologically enlarged lymph nodes.    3. LUNGRADS ASSESSMENT VALUE: 3,       The LUNG RADS RECOMMENDATIONS for monitoring lung nodules listed below (ACR- Lung-RADS Version 1.0 Assessment Categories release tablet    DME Order for Home Oxygen as OP   2. Chronic respiratory failure with hypoxia (HCC)  6 Minute Walk Test    DME Order for Home Oxygen as OP   3. Mucopurulent chronic bronchitis (HCC)  guaiFENesin (MUCINEX) 600 MG extended release tablet   4. Personal history of tobacco use  RI VISIT TO DISCUSS LUNG CA SCREEN W LDCT    CT Lung Screen (Annual)       COPD with exacerbation  Plan   -Reviewed LDCT 5 mm nodule noted on CTA 6 month prior stable in size will have patient perform LDCT in 12 month per Fleischner guidelines discussed with patient and he is agreeable  -Reviewed spirometry with patient   -Stop incruse start Anoro for his severe COPD, denies glaucoma or urinary retention  -Discussed albuterol inhaler use. Reviewed signs and symptoms indicating need for use including Shortness of breath and wheezing. Discussed with patient the importance of using inhaler within the prescribed time frames. Patient verbalized understanding to use within prescribed time frame.  Patient also verbalized understanding that if they experience no relief after using albuterol and resting for 15 minutes they need to go to nearest ER or call 911.  -Reordered Mucinex 600 mg PO BID  -Prednisone 40 mg PO x5 days for COPD exacerbation  -6 MWT need 0 at rest and 4 LPM with exertion  -Advised patient to call office with any changes, questions, or concerns regarding respiratory status    Will see Della Delatorre back in: 3 months to review medication changes and next May 2021 with LDCT prior to appointment    Sue Wells CNP  6/29/2020       Low Dose CT (LDCT) Lung Screening criteria met   Age 55-77   Pack year smoking >30   Still smoking or less than 15 year since quit   No sign or symptoms of lung cancer   > 11 months since last LDCT     Risks and benefits of lung cancer screening with LDCT scans discussed:    Significance of positive screen - False-positive LDCT results often occur. 95% of all positive results do not lead to a diagnosis of cancer. Usually further imaging can resolve most false-positive results; however, some patients may require invasive procedures. Over diagnosis risk - 10% to 12% of screen-detected lung cancer cases are over diagnosed--that is, the cancer would not have been detected in the patient's lifetime without the screening. Need for follow up screens annually to continue lung cancer screening effectiveness     Risks associated with radiation from annual LDCT- Radiation exposure is about the same as for a mammogram, which is about 1/3 of the annual background radiation exposure from everyday life. Starting screening at age 54 is not likely to increase cancer risk from radiation exposure. Patients with comorbidities resulting in life expectancy of < 10 years, or that would preclude treatment of an abnormality identified on CT, should not be screened due to lack of benefit.     To obtain maximal benefit from this screening, smoking cessation and long-term abstinence from smoking is critical

## 2020-07-06 ENCOUNTER — PROCEDURE VISIT (OUTPATIENT)
Dept: CARDIOLOGY CLINIC | Age: 60
End: 2020-07-06
Payer: MEDICARE

## 2020-07-06 PROCEDURE — 93294 REM INTERROG EVL PM/LDLS PM: CPT | Performed by: INTERNAL MEDICINE

## 2020-07-06 PROCEDURE — 93296 REM INTERROG EVL PM/IDS: CPT | Performed by: INTERNAL MEDICINE

## 2020-07-06 NOTE — PROGRESS NOTES
[employer] bi v pacemaker  Battery 1years  A paced 11%  RV paced 98%  LV paced 98%  p wave 5.5mV  r wave not recorded  atrail impedance 516ohms  rv impednace 452 ohms  lv impedance 330ohms   Ns vt 8 beats rate 175 bpm, a second episode 10 beats 188bpm

## 2020-07-07 ENCOUNTER — TELEPHONE (OUTPATIENT)
Dept: CARDIOLOGY CLINIC | Age: 60
End: 2020-07-07

## 2020-07-07 NOTE — TELEPHONE ENCOUNTER
Janny Laird RN at 7/6/2020  1:50 PM     Status: Signed      [employer] bi v pacemaker  Battery 1years  A paced 11%  RV paced 98%  LV paced 98%  p wave 5.5mV  r wave not recorded  atrail impedance 516ohms  rv impednace 452 ohms  lv impedance 330ohms   Ns vt 8 beats rate 175 bpm, a second episode 10 beats 188bpm      Hellen Luciano MD at 7/6/2020  5:23 PM     Status: Signed      Device Interrogation Reviewed.   EPISODES OF NSVT  LAB REVIEWED     START LOPRESSOR 12.5 PO BID

## 2020-09-14 ENCOUNTER — APPOINTMENT (OUTPATIENT)
Dept: GENERAL RADIOLOGY | Age: 60
DRG: 814 | End: 2020-09-14
Payer: MEDICARE

## 2020-09-14 ENCOUNTER — HOSPITAL ENCOUNTER (EMERGENCY)
Age: 60
Discharge: HOME | DRG: 814 | End: 2020-09-14
Attending: EMERGENCY MEDICINE
Payer: MEDICARE

## 2020-09-14 VITALS
SYSTOLIC BLOOD PRESSURE: 128 MMHG | WEIGHT: 245 LBS | BODY MASS INDEX: 36.29 KG/M2 | HEIGHT: 69 IN | OXYGEN SATURATION: 96 % | RESPIRATION RATE: 20 BRPM | DIASTOLIC BLOOD PRESSURE: 85 MMHG | TEMPERATURE: 97.8 F | HEART RATE: 68 BPM

## 2020-09-14 LAB
ANION GAP SERPL CALCULATED.3IONS-SCNC: 10 MEQ/L (ref 8–16)
BASOPHILS # BLD: 0.6 %
BASOPHILS ABSOLUTE: 0 THOU/MM3 (ref 0–0.1)
BUN BLDV-MCNC: 10 MG/DL (ref 7–22)
CALCIUM SERPL-MCNC: 9.3 MG/DL (ref 8.5–10.5)
CHLORIDE BLD-SCNC: 99 MEQ/L (ref 98–111)
CO2: 30 MEQ/L (ref 23–33)
CREAT SERPL-MCNC: 1 MG/DL (ref 0.4–1.2)
EKG ATRIAL RATE: 76 BPM
EKG P AXIS: 56 DEGREES
EKG P-R INTERVAL: 122 MS
EKG Q-T INTERVAL: 418 MS
EKG QRS DURATION: 124 MS
EKG QTC CALCULATION (BAZETT): 470 MS
EKG R AXIS: -82 DEGREES
EKG T AXIS: 68 DEGREES
EKG VENTRICULAR RATE: 76 BPM
EOSINOPHIL # BLD: 1.4 %
EOSINOPHILS ABSOLUTE: 0.1 THOU/MM3 (ref 0–0.4)
ERYTHROCYTE [DISTWIDTH] IN BLOOD BY AUTOMATED COUNT: 15.3 % (ref 11.5–14.5)
ERYTHROCYTE [DISTWIDTH] IN BLOOD BY AUTOMATED COUNT: 47.4 FL (ref 35–45)
GFR SERPL CREATININE-BSD FRML MDRD: 77 ML/MIN/1.73M2
GLUCOSE BLD-MCNC: 114 MG/DL (ref 70–108)
HCT VFR BLD CALC: 38.4 % (ref 42–52)
HEMOGLOBIN: 11.5 GM/DL (ref 14–18)
IMMATURE GRANS (ABS): 0.03 THOU/MM3 (ref 0–0.07)
IMMATURE GRANULOCYTES: 0.6 %
LACTIC ACID: 1.3 MMOL/L (ref 0.5–2.2)
LYMPHOCYTES # BLD: 34.3 %
LYMPHOCYTES ABSOLUTE: 1.7 THOU/MM3 (ref 1–4.8)
MCH RBC QN AUTO: 25.4 PG (ref 26–33)
MCHC RBC AUTO-ENTMCNC: 29.9 GM/DL (ref 32.2–35.5)
MCV RBC AUTO: 84.8 FL (ref 80–94)
MONOCYTES # BLD: 12 %
MONOCYTES ABSOLUTE: 0.6 THOU/MM3 (ref 0.4–1.3)
NUCLEATED RED BLOOD CELLS: 0 /100 WBC
OSMOLALITY CALCULATION: 277.4 MOSMOL/KG (ref 275–300)
PLATELET # BLD: 182 THOU/MM3 (ref 130–400)
PMV BLD AUTO: 10.4 FL (ref 9.4–12.4)
POTASSIUM REFLEX MAGNESIUM: 3.6 MEQ/L (ref 3.5–5.2)
PRO-BNP: 456.6 PG/ML (ref 0–900)
RBC # BLD: 4.53 MILL/MM3 (ref 4.7–6.1)
SEG NEUTROPHILS: 51.1 %
SEGMENTED NEUTROPHILS ABSOLUTE COUNT: 2.6 THOU/MM3 (ref 1.8–7.7)
SODIUM BLD-SCNC: 139 MEQ/L (ref 135–145)
TROPONIN T: < 0.01 NG/ML
WBC # BLD: 5 THOU/MM3 (ref 4.8–10.8)

## 2020-09-14 PROCEDURE — 36415 COLL VENOUS BLD VENIPUNCTURE: CPT

## 2020-09-14 PROCEDURE — 85025 COMPLETE CBC W/AUTO DIFF WBC: CPT

## 2020-09-14 PROCEDURE — 93010 ELECTROCARDIOGRAM REPORT: CPT | Performed by: NUCLEAR MEDICINE

## 2020-09-14 PROCEDURE — 83880 ASSAY OF NATRIURETIC PEPTIDE: CPT

## 2020-09-14 PROCEDURE — 99285 EMERGENCY DEPT VISIT HI MDM: CPT

## 2020-09-14 PROCEDURE — 80048 BASIC METABOLIC PNL TOTAL CA: CPT

## 2020-09-14 PROCEDURE — 83605 ASSAY OF LACTIC ACID: CPT

## 2020-09-14 PROCEDURE — 84484 ASSAY OF TROPONIN QUANT: CPT

## 2020-09-14 PROCEDURE — 71045 X-RAY EXAM CHEST 1 VIEW: CPT

## 2020-09-14 PROCEDURE — 93005 ELECTROCARDIOGRAM TRACING: CPT | Performed by: FAMILY MEDICINE

## 2020-09-14 ASSESSMENT — PAIN SCALES - GENERAL: PAINLEVEL_OUTOF10: 8

## 2020-09-14 ASSESSMENT — PAIN DESCRIPTION - LOCATION: LOCATION: CHEST

## 2020-09-14 ASSESSMENT — PAIN DESCRIPTION - DESCRIPTORS: DESCRIPTORS: SORE

## 2020-09-14 NOTE — ED NOTES
Bedside report received from Natali Booth, [facility]. Pt denies any need at this time. Respirations even and unlabored. Telemetry in place.       Alma Garner RN  09/14/20 3699

## 2020-09-14 NOTE — ED NOTES
Patient presents to ED with complaint of coughing up bright red blood in sputum today. Patient states history of COPD. Patient has oxygen at 3L NC from home canister. Patient complains of lung pain. EKG obtained. Patient placed on cardiac monitor. Patient denies shortness of breath.      Nikole Yeager RN  09/14/20 5135

## 2020-09-15 ENCOUNTER — APPOINTMENT (OUTPATIENT)
Dept: CT IMAGING | Age: 60
DRG: 814 | End: 2020-09-15
Payer: MEDICARE

## 2020-09-15 ENCOUNTER — HOSPITAL ENCOUNTER (INPATIENT)
Age: 60
LOS: 4 days | Discharge: ANOTHER HOSPITAL | DRG: 814 | End: 2020-09-19
Attending: INTERNAL MEDICINE | Admitting: INTERNAL MEDICINE
Payer: MEDICARE

## 2020-09-15 PROBLEM — E87.5 HYPERKALEMIA: Status: ACTIVE | Noted: 2020-09-15

## 2020-09-15 PROBLEM — E87.6 HYPOKALEMIA: Status: ACTIVE | Noted: 2020-09-15

## 2020-09-15 PROBLEM — R04.2 HEMOPTYSIS: Status: ACTIVE | Noted: 2020-09-15

## 2020-09-15 PROBLEM — R79.1 SUPRATHERAPEUTIC INR: Status: ACTIVE | Noted: 2020-09-15

## 2020-09-15 LAB
ABO: NORMAL
ANION GAP SERPL CALCULATED.3IONS-SCNC: 10 MEQ/L (ref 8–16)
ANTIBODY SCREEN: NORMAL
APTT: 67.9 SECONDS (ref 22–38)
AVERAGE GLUCOSE: 138 MG/DL (ref 70–126)
BASOPHILS # BLD: 0.6 %
BASOPHILS ABSOLUTE: 0 THOU/MM3 (ref 0–0.1)
BUN BLDV-MCNC: 10 MG/DL (ref 7–22)
CALCIUM SERPL-MCNC: 9 MG/DL (ref 8.5–10.5)
CHLORIDE BLD-SCNC: 102 MEQ/L (ref 98–111)
CO2: 27 MEQ/L (ref 23–33)
CREAT SERPL-MCNC: 0.8 MG/DL (ref 0.4–1.2)
EOSINOPHIL # BLD: 1.7 %
EOSINOPHILS ABSOLUTE: 0.1 THOU/MM3 (ref 0–0.4)
ERYTHROCYTE [DISTWIDTH] IN BLOOD BY AUTOMATED COUNT: 15.3 % (ref 11.5–14.5)
ERYTHROCYTE [DISTWIDTH] IN BLOOD BY AUTOMATED COUNT: 47.6 FL (ref 35–45)
GFR SERPL CREATININE-BSD FRML MDRD: > 90 ML/MIN/1.73M2
GLUCOSE BLD-MCNC: 111 MG/DL (ref 70–108)
GLUCOSE BLD-MCNC: 111 MG/DL (ref 70–108)
GLUCOSE BLD-MCNC: 146 MG/DL (ref 70–108)
GLUCOSE BLD-MCNC: 146 MG/DL (ref 70–108)
GLUCOSE BLD-MCNC: 151 MG/DL (ref 70–108)
HBA1C MFR BLD: 6.6 % (ref 4.4–6.4)
HCT VFR BLD CALC: 34.1 % (ref 42–52)
HCT VFR BLD CALC: 36.9 % (ref 42–52)
HCT VFR BLD CALC: 38.3 % (ref 42–52)
HEMOGLOBIN: 10.3 GM/DL (ref 14–18)
HEMOGLOBIN: 11 GM/DL (ref 14–18)
HEMOGLOBIN: 11.5 GM/DL (ref 14–18)
IMMATURE GRANS (ABS): 0.02 THOU/MM3 (ref 0–0.07)
IMMATURE GRANULOCYTES: 0.4 %
INR BLD: 3.99 (ref 0.85–1.13)
LYMPHOCYTES # BLD: 46.9 %
LYMPHOCYTES ABSOLUTE: 2.3 THOU/MM3 (ref 1–4.8)
MCH RBC QN AUTO: 25.6 PG (ref 26–33)
MCHC RBC AUTO-ENTMCNC: 30 GM/DL (ref 32.2–35.5)
MCV RBC AUTO: 85.1 FL (ref 80–94)
MONOCYTES # BLD: 8.8 %
MONOCYTES ABSOLUTE: 0.4 THOU/MM3 (ref 0.4–1.3)
NUCLEATED RED BLOOD CELLS: 0 /100 WBC
OSMOLALITY CALCULATION: 279.2 MOSMOL/KG (ref 275–300)
PLATELET # BLD: 176 THOU/MM3 (ref 130–400)
PMV BLD AUTO: 10.4 FL (ref 9.4–12.4)
POTASSIUM SERPL-SCNC: 3.3 MEQ/L (ref 3.5–5.2)
RBC # BLD: 4.5 MILL/MM3 (ref 4.7–6.1)
RH FACTOR: NORMAL
SARS-COV-2, NAAT: NOT DETECTED
SEG NEUTROPHILS: 41.6 %
SEGMENTED NEUTROPHILS ABSOLUTE COUNT: 2 THOU/MM3 (ref 1.8–7.7)
SODIUM BLD-SCNC: 139 MEQ/L (ref 135–145)
WBC # BLD: 4.8 THOU/MM3 (ref 4.8–10.8)

## 2020-09-15 PROCEDURE — 36430 TRANSFUSION BLD/BLD COMPNT: CPT

## 2020-09-15 PROCEDURE — 85730 THROMBOPLASTIN TIME PARTIAL: CPT

## 2020-09-15 PROCEDURE — 94640 AIRWAY INHALATION TREATMENT: CPT

## 2020-09-15 PROCEDURE — 99223 1ST HOSP IP/OBS HIGH 75: CPT | Performed by: INTERNAL MEDICINE

## 2020-09-15 PROCEDURE — 71275 CT ANGIOGRAPHY CHEST: CPT

## 2020-09-15 PROCEDURE — 6370000000 HC RX 637 (ALT 250 FOR IP): Performed by: INTERNAL MEDICINE

## 2020-09-15 PROCEDURE — 2700000000 HC OXYGEN THERAPY PER DAY

## 2020-09-15 PROCEDURE — 99222 1ST HOSP IP/OBS MODERATE 55: CPT | Performed by: INTERNAL MEDICINE

## 2020-09-15 PROCEDURE — P9017 PLASMA 1 DONOR FRZ W/IN 8 HR: HCPCS

## 2020-09-15 PROCEDURE — 2580000003 HC RX 258: Performed by: NURSE PRACTITIONER

## 2020-09-15 PROCEDURE — 86850 RBC ANTIBODY SCREEN: CPT

## 2020-09-15 PROCEDURE — 86901 BLOOD TYPING SEROLOGIC RH(D): CPT

## 2020-09-15 PROCEDURE — 36415 COLL VENOUS BLD VENIPUNCTURE: CPT

## 2020-09-15 PROCEDURE — 94760 N-INVAS EAR/PLS OXIMETRY 1: CPT

## 2020-09-15 PROCEDURE — 85014 HEMATOCRIT: CPT

## 2020-09-15 PROCEDURE — 85610 PROTHROMBIN TIME: CPT

## 2020-09-15 PROCEDURE — 85018 HEMOGLOBIN: CPT

## 2020-09-15 PROCEDURE — 86900 BLOOD TYPING SEROLOGIC ABO: CPT

## 2020-09-15 PROCEDURE — 99285 EMERGENCY DEPT VISIT HI MDM: CPT

## 2020-09-15 PROCEDURE — 2580000003 HC RX 258: Performed by: INTERNAL MEDICINE

## 2020-09-15 PROCEDURE — 82948 REAGENT STRIP/BLOOD GLUCOSE: CPT

## 2020-09-15 PROCEDURE — 6360000002 HC RX W HCPCS: Performed by: INTERNAL MEDICINE

## 2020-09-15 PROCEDURE — 80048 BASIC METABOLIC PNL TOTAL CA: CPT

## 2020-09-15 PROCEDURE — 6360000004 HC RX CONTRAST MEDICATION: Performed by: NURSE PRACTITIONER

## 2020-09-15 PROCEDURE — 2580000003 HC RX 258: Performed by: STUDENT IN AN ORGANIZED HEALTH CARE EDUCATION/TRAINING PROGRAM

## 2020-09-15 PROCEDURE — 83036 HEMOGLOBIN GLYCOSYLATED A1C: CPT

## 2020-09-15 PROCEDURE — U0002 COVID-19 LAB TEST NON-CDC: HCPCS

## 2020-09-15 PROCEDURE — 85025 COMPLETE CBC W/AUTO DIFF WBC: CPT

## 2020-09-15 PROCEDURE — 1200000003 HC TELEMETRY R&B

## 2020-09-15 RX ORDER — ALPRAZOLAM 0.5 MG/1
0.5 TABLET ORAL 2 TIMES DAILY
COMMUNITY
End: 2020-10-12

## 2020-09-15 RX ORDER — ACETAMINOPHEN 650 MG/1
650 SUPPOSITORY RECTAL EVERY 6 HOURS PRN
Status: DISCONTINUED | OUTPATIENT
Start: 2020-09-15 | End: 2020-09-19 | Stop reason: HOSPADM

## 2020-09-15 RX ORDER — NICOTINE POLACRILEX 4 MG
15 LOZENGE BUCCAL PRN
Status: DISCONTINUED | OUTPATIENT
Start: 2020-09-15 | End: 2020-09-19 | Stop reason: HOSPADM

## 2020-09-15 RX ORDER — POLYETHYLENE GLYCOL 3350 17 G/17G
17 POWDER, FOR SOLUTION ORAL DAILY PRN
Status: DISCONTINUED | OUTPATIENT
Start: 2020-09-15 | End: 2020-09-19 | Stop reason: HOSPADM

## 2020-09-15 RX ORDER — ALBUTEROL SULFATE 2.5 MG/3ML
2.5 SOLUTION RESPIRATORY (INHALATION) 4 TIMES DAILY PRN
Status: DISCONTINUED | OUTPATIENT
Start: 2020-09-15 | End: 2020-09-19 | Stop reason: HOSPADM

## 2020-09-15 RX ORDER — CLONAZEPAM 0.5 MG/1
0.5 TABLET ORAL 2 TIMES DAILY
Status: ON HOLD | COMMUNITY
End: 2020-09-15

## 2020-09-15 RX ORDER — 0.9 % SODIUM CHLORIDE 0.9 %
20 INTRAVENOUS SOLUTION INTRAVENOUS ONCE
Status: COMPLETED | OUTPATIENT
Start: 2020-09-15 | End: 2020-09-15

## 2020-09-15 RX ORDER — SODIUM CHLORIDE 0.9 % (FLUSH) 0.9 %
10 SYRINGE (ML) INJECTION PRN
Status: DISCONTINUED | OUTPATIENT
Start: 2020-09-15 | End: 2020-09-19 | Stop reason: HOSPADM

## 2020-09-15 RX ORDER — DEXTROSE MONOHYDRATE 50 MG/ML
100 INJECTION, SOLUTION INTRAVENOUS PRN
Status: DISCONTINUED | OUTPATIENT
Start: 2020-09-15 | End: 2020-09-19 | Stop reason: HOSPADM

## 2020-09-15 RX ORDER — QUETIAPINE FUMARATE 100 MG/1
300 TABLET, FILM COATED ORAL NIGHTLY
Status: DISCONTINUED | OUTPATIENT
Start: 2020-09-15 | End: 2020-09-19 | Stop reason: HOSPADM

## 2020-09-15 RX ORDER — ACETAMINOPHEN 325 MG/1
650 TABLET ORAL EVERY 6 HOURS PRN
Status: DISCONTINUED | OUTPATIENT
Start: 2020-09-15 | End: 2020-09-19 | Stop reason: HOSPADM

## 2020-09-15 RX ORDER — VILAZODONE HYDROCHLORIDE 40 MG/1
40 TABLET ORAL DAILY
Status: DISCONTINUED | OUTPATIENT
Start: 2020-09-15 | End: 2020-09-15

## 2020-09-15 RX ORDER — CLONAZEPAM 1 MG/1
0.5 TABLET ORAL 2 TIMES DAILY
Status: DISCONTINUED | OUTPATIENT
Start: 2020-09-15 | End: 2020-09-15

## 2020-09-15 RX ORDER — SODIUM CHLORIDE 0.9 % (FLUSH) 0.9 %
10 SYRINGE (ML) INJECTION EVERY 12 HOURS SCHEDULED
Status: DISCONTINUED | OUTPATIENT
Start: 2020-09-15 | End: 2020-09-19 | Stop reason: HOSPADM

## 2020-09-15 RX ORDER — IPRATROPIUM BROMIDE AND ALBUTEROL SULFATE 2.5; .5 MG/3ML; MG/3ML
1 SOLUTION RESPIRATORY (INHALATION)
Status: DISCONTINUED | OUTPATIENT
Start: 2020-09-15 | End: 2020-09-15

## 2020-09-15 RX ORDER — DEXTROSE MONOHYDRATE 25 G/50ML
12.5 INJECTION, SOLUTION INTRAVENOUS PRN
Status: DISCONTINUED | OUTPATIENT
Start: 2020-09-15 | End: 2020-09-19 | Stop reason: HOSPADM

## 2020-09-15 RX ORDER — POTASSIUM CHLORIDE 20 MEQ/1
40 TABLET, EXTENDED RELEASE ORAL PRN
Status: DISCONTINUED | OUTPATIENT
Start: 2020-09-15 | End: 2020-09-19 | Stop reason: HOSPADM

## 2020-09-15 RX ORDER — BUSPIRONE HYDROCHLORIDE 10 MG/1
30 TABLET ORAL 2 TIMES DAILY
Status: DISCONTINUED | OUTPATIENT
Start: 2020-09-15 | End: 2020-09-15

## 2020-09-15 RX ORDER — QUETIAPINE FUMARATE 300 MG/1
400 TABLET, FILM COATED ORAL NIGHTLY
COMMUNITY

## 2020-09-15 RX ORDER — ALBUTEROL SULFATE 90 UG/1
2 AEROSOL, METERED RESPIRATORY (INHALATION) EVERY 6 HOURS PRN
Status: DISCONTINUED | OUTPATIENT
Start: 2020-09-15 | End: 2020-09-15

## 2020-09-15 RX ORDER — BUPRENORPHINE AND NALOXONE 8; 2 MG/1; MG/1
1 FILM, SOLUBLE BUCCAL; SUBLINGUAL DAILY
Status: DISCONTINUED | OUTPATIENT
Start: 2020-09-15 | End: 2020-09-19 | Stop reason: HOSPADM

## 2020-09-15 RX ORDER — POTASSIUM CHLORIDE 7.45 MG/ML
10 INJECTION INTRAVENOUS PRN
Status: DISCONTINUED | OUTPATIENT
Start: 2020-09-15 | End: 2020-09-19 | Stop reason: HOSPADM

## 2020-09-15 RX ORDER — SODIUM CHLORIDE 9 MG/ML
INJECTION, SOLUTION INTRAVENOUS CONTINUOUS
Status: DISCONTINUED | OUTPATIENT
Start: 2020-09-15 | End: 2020-09-15

## 2020-09-15 RX ORDER — BUPRENORPHINE AND NALOXONE 8; 2 MG/1; MG/1
1 FILM, SOLUBLE BUCCAL; SUBLINGUAL DAILY
COMMUNITY

## 2020-09-15 RX ORDER — PROMETHAZINE HYDROCHLORIDE 25 MG/1
12.5 TABLET ORAL EVERY 6 HOURS PRN
Status: DISCONTINUED | OUTPATIENT
Start: 2020-09-15 | End: 2020-09-19 | Stop reason: HOSPADM

## 2020-09-15 RX ORDER — ONDANSETRON 2 MG/ML
4 INJECTION INTRAMUSCULAR; INTRAVENOUS EVERY 6 HOURS PRN
Status: DISCONTINUED | OUTPATIENT
Start: 2020-09-15 | End: 2020-09-19 | Stop reason: HOSPADM

## 2020-09-15 RX ORDER — PANTOPRAZOLE SODIUM 40 MG/1
40 TABLET, DELAYED RELEASE ORAL
Status: DISCONTINUED | OUTPATIENT
Start: 2020-09-15 | End: 2020-09-19 | Stop reason: HOSPADM

## 2020-09-15 RX ORDER — TAMSULOSIN HYDROCHLORIDE 0.4 MG/1
0.4 CAPSULE ORAL DAILY
Status: DISCONTINUED | OUTPATIENT
Start: 2020-09-15 | End: 2020-09-19 | Stop reason: HOSPADM

## 2020-09-15 RX ORDER — ALPRAZOLAM 0.5 MG/1
0.5 TABLET ORAL 2 TIMES DAILY
Status: DISCONTINUED | OUTPATIENT
Start: 2020-09-15 | End: 2020-09-19 | Stop reason: HOSPADM

## 2020-09-15 RX ORDER — MAGNESIUM SULFATE IN WATER 40 MG/ML
2 INJECTION, SOLUTION INTRAVENOUS PRN
Status: DISCONTINUED | OUTPATIENT
Start: 2020-09-15 | End: 2020-09-19 | Stop reason: HOSPADM

## 2020-09-15 RX ORDER — POTASSIUM CHLORIDE 20 MEQ/1
40 TABLET, EXTENDED RELEASE ORAL ONCE
Status: COMPLETED | OUTPATIENT
Start: 2020-09-15 | End: 2020-09-15

## 2020-09-15 RX ADMIN — PANTOPRAZOLE SODIUM 40 MG: 40 TABLET, DELAYED RELEASE ORAL at 10:08

## 2020-09-15 RX ADMIN — POTASSIUM CHLORIDE 40 MEQ: 1500 TABLET, EXTENDED RELEASE ORAL at 10:08

## 2020-09-15 RX ADMIN — TAMSULOSIN HYDROCHLORIDE 0.4 MG: 0.4 CAPSULE ORAL at 16:03

## 2020-09-15 RX ADMIN — QUETIAPINE FUMARATE 300 MG: 100 TABLET ORAL at 20:11

## 2020-09-15 RX ADMIN — SODIUM CHLORIDE 20 ML: 9 INJECTION, SOLUTION INTRAVENOUS at 17:54

## 2020-09-15 RX ADMIN — IOPAMIDOL 80 ML: 755 INJECTION, SOLUTION INTRAVENOUS at 05:25

## 2020-09-15 RX ADMIN — SODIUM CHLORIDE, PRESERVATIVE FREE 10 ML: 5 INJECTION INTRAVENOUS at 20:12

## 2020-09-15 RX ADMIN — GLYCOPYRROLATE AND FORMOTEROL FUMARATE 2 PUFF: 9; 4.8 AEROSOL, METERED RESPIRATORY (INHALATION) at 18:16

## 2020-09-15 RX ADMIN — SODIUM CHLORIDE 20 ML: 9 INJECTION, SOLUTION INTRAVENOUS at 12:51

## 2020-09-15 RX ADMIN — ALPRAZOLAM 0.5 MG: 0.5 TABLET ORAL at 10:08

## 2020-09-15 RX ADMIN — SODIUM CHLORIDE: 9 INJECTION, SOLUTION INTRAVENOUS at 16:03

## 2020-09-15 RX ADMIN — PHYTONADIONE 10 MG: 10 INJECTION, EMULSION INTRAMUSCULAR; INTRAVENOUS; SUBCUTANEOUS at 17:54

## 2020-09-15 RX ADMIN — SODIUM CHLORIDE 20 ML: 9 INJECTION, SOLUTION INTRAVENOUS at 09:33

## 2020-09-15 RX ADMIN — BUPRENORPHINE HYDROCHLORIDE, NALOXONE HYDROCHLORIDE 1 FILM: 8; 2 FILM, SOLUBLE BUCCAL; SUBLINGUAL at 10:08

## 2020-09-15 RX ADMIN — ALPRAZOLAM 0.5 MG: 0.5 TABLET ORAL at 20:11

## 2020-09-15 ASSESSMENT — PAIN SCALES - GENERAL
PAINLEVEL_OUTOF10: 0

## 2020-09-15 NOTE — ED NOTES
Called [facility] and informed Gabykaran Walker that the patient is on their way to the unit. Patient transported via bed in a stable condition.      Alek Coulter  09/15/20 0800

## 2020-09-15 NOTE — ED NOTES
Patient resting in bed, Respirations easy and unlabored with no S/S of distress noted. Call light within reach.   Will continue to monitor     Fernandez Gaitan RN  09/15/20 8412

## 2020-09-15 NOTE — ED NOTES
Patient transported to Radiology for CT via cart per [facility], [facility]  09/15/20 0722

## 2020-09-15 NOTE — ED NOTES
ED to inpatient nurses report    Chief Complaint   Patient presents with    Shortness of Breath     coughing up blood      Present to ED from home  LOC: alert and orientated to name, place, date  Vital signs   Vitals:    09/15/20 0512 09/15/20 0545 09/15/20 0615 09/15/20 0745   BP: 122/70 118/74 106/83 105/81   Pulse: 79 75 73 68   Resp: 22 12 12 12   Temp:       TempSrc:       SpO2: 97% 97% 97% 97%   Weight:       Height:          Oxygen Baseline NC 3L     Current needs required NC 3L  Bipap/Cpap No  LDAs:   Peripheral IV 09/15/20 Left Forearm (Active)   Site Assessment Clean;Dry; Intact 09/15/20 0615   Line Status Normal saline locked 09/15/20 0615   Dressing Status Clean;Dry; Intact 09/15/20 0615     Mobility: Requires assistance * 1  Pending ED orders: plasma to be infused   Present condition: pt arrived with SOB     Electronically signed by Mal Alfonso RN on 9/15/2020 at 7:58 AM     Mal Alfonso RN  09/15/20 7410

## 2020-09-15 NOTE — ED PROVIDER NOTES
[facility] [address] [address] EMERGENCY DEPT  EMERGENCY DEPARTMENT ENCOUNTER      Pt Name: Jesús Bateman  MRN: [id number]  Aliyahgfblanca 1960  Date of evaluation: 9/14/2020  Provider: Nichols MD    [address] [place] [address]       Chief Complaint   Patient presents with    Hemoptysis         HISTORY OF PRESENT ILLNESS   (Location/Symptom, Timing/Onset, Context/Setting, Quality, Duration, Modifying Factors, Severity)  Note limiting factors. Patient is a 55-year-old male with history of ICD/pacemaker, CAD, CHF, COPD who presents for evaluation of hemoptysis. Patient states that he coughed up 2 dime size clots. He notes that his hemoptysis is now resolved. Patient is on Coumadin and had a supratherapeutic INR of 4.5 earlier today when it was checked. He notes that he has a history of valvular repair. Patient denies fever, shortness of breath, chest pain associated with his symptoms. Patient notes that he has not had hemoptysis in the past.  He denies history of PE or DVT. Patient states that he otherwise feels well and denies lightheadedness, dizziness, fainting, leg pain, leg swelling, leg redness. Patient is typically on oxygen as needed, 3-4L O2 NC. Nursing Notes were reviewed. REVIEW OF SYSTEMS    (2-9 systems for level 4, 10 or more for level 5)     Review of Systems   All other systems reviewed and are negative. Except as noted above the remainder of the review of systems was reviewed and negative.        PAST MEDICAL HISTORY     Past Medical History:   Diagnosis Date    Anxiety disorder     Arthritis     Asthma     Back pain, chronic     Blood circulation, collateral     4 toes partial amputee on L foot    [employer] BiV ICD 7/1/2014   [facility] Scientific BiV pacemaker 7/1/2014    CAD (coronary artery disease)     CHF (congestive heart failure) (HCC)     Chronic kidney disease     COPD (chronic obstructive pulmonary disease) (HCC)     Depression     Endocarditis     Factor V release tablet Take 1 tablet by mouth 2 times daily, Disp-180 tablet, R-1NO PRINT      amLODIPine (NORVASC) 5 MG tablet Take 1 tablet by mouth daily Take 5mg  By mouth qd, Disp-30 tablet, R-3Normal      lisinopril (PRINIVIL;ZESTRIL) 2.5 MG tablet Take 1 tablet by mouth daily, Disp-30 tablet, R-0Print      pantoprazole (PROTONIX) 20 MG tablet Take 40 mg by mouth dailyHistorical Med      warfarin (COUMADIN) 5 MG tablet Take 5 mg by mouth As directedHistorical Med      tamsulosin (FLOMAX) 0.4 MG capsule Take 0.4 mg by mouth dailyHistorical Med      albuterol (PROVENTIL) (2.5 MG/3ML) 0.083% nebulizer solution Take 6 mLs by nebulization every 4 hours as needed for Wheezing or Shortness of Breath, Disp-120 each, R-3Normal      furosemide (LASIX) 40 MG tablet Take 1 tablet by mouth daily, Disp-60 tablet, R-3Normal      aspirin EC 81 MG EC tablet Take 1 tablet by mouth daily, Disp-30 tablet, R-3OTC      terbinafine (LAMISIL) 1 % cream Apply topically 2 times daily. , Disp-1 Tube, R-0, Print      VILAZODONE HCL 10 MG Tablet DAWHistorical Med      QUEtiapine (SEROQUEL) 100 MG tablet 1 tablet by Per NG tube route nightly, Disp-60 tablet, R-3Normal      busPIRone (BUSPAR) 30 MG tablet Take 30 mg by mouth 2 times daily Historical Med             ALLERGIES     Pcn [penicillins]    FAMILY HISTORY       Family History   Problem Relation Age of Onset    Diabetes Mother     Depression Mother     Arthritis Father     Heart Disease Father     Diabetes Brother     Depression Daughter           SOCIAL HISTORY       Social History     Socioeconomic History    Marital status:      Spouse name: None    Number of children: 3    Years of education: 5    Highest education level: None   Occupational History    Occupation: on disability   Social Needs    Financial resource strain: None    Food insecurity     Worry: None     Inability: None    Transportation needs     Medical: None     Non-medical: None   Tobacco Use    Smoking status: Former Smoker     Packs/day: 1.00     Years: 44.00     Pack years: 44.00     Types: Cigarettes     Start date: 10/22/1976     Last attempt to quit: 2020     Years since quittin.4    Smokeless tobacco: Former User    Tobacco comment: Weaned to 0.5 PPD last 7 years   Substance and Sexual Activity    Alcohol use: No     Comment: quit 9 years ago    Drug use: No    Sexual activity: Yes     Partners: Female   Lifestyle    Physical activity     Days per week: None     Minutes per session: None    Stress: None   Relationships    Social connections     Talks on phone: None     Gets together: None     Attends [employer] service: None     Active member of club or organization: None     Attends meetings of clubs or organizations: None     Relationship status: None    Intimate partner violence     Fear of current or ex partner: None     Emotionally abused: None     Physically abused: None     Forced sexual activity: None   Other Topics Concern    None   Social History Narrative    None       SCREENINGS        Rebeca Coma Scale  Eye Opening: Spontaneous  Best Verbal Response: Oriented  Best Motor Response: Obeys commands  [place] Coma Scale Score: 15               PHYSICAL EXAM    (up to 7 for level 4, 8 or more for level 5)     ED Triage Vitals [20 1824]   BP Temp Temp Source Pulse Resp SpO2 Height Weight   (!) 142/87 97.8 °F (36.6 °C) Oral 81 18 98 % 5' 9\" (1.753 m) 245 lb (111.1 kg)       Physical Exam  Vitals signs and nursing note reviewed. Constitutional:       General: He is not in acute distress. Appearance: He is well-developed. He is not diaphoretic. HENT:      Head: Normocephalic. Mouth/Throat:      Pharynx: No oropharyngeal exudate. Eyes:      General:         Right eye: No discharge. Left eye: No discharge. Pupils: Pupils are equal, round, and reactive to light. Neck:      Musculoskeletal: Neck supple. Trachea: No tracheal deviation. Cardiovascular:      Rate and Rhythm: Normal rate and regular rhythm. Pulses:           Radial pulses are 2+ on the right side and 2+ on the left side. Dorsalis pedis pulses are 2+ on the right side and 2+ on the left side. Heart sounds: Normal heart sounds. No murmur. Pulmonary:      Effort: Pulmonary effort is normal. No respiratory distress. Breath sounds: Normal breath sounds. No stridor. No wheezing or rales. Abdominal:      General: Bowel sounds are normal. There is no distension. Palpations: Abdomen is soft. Tenderness: There is no abdominal tenderness. There is no guarding or rebound. Musculoskeletal:      Right lower leg: No edema. Left lower leg: No edema. Skin:     General: Skin is warm and dry. Capillary Refill: Capillary refill takes less than 2 seconds. Findings: No erythema or rash. Comments: Chronic skin changes to the bilateral lower extremities   Neurological:      Mental Status: He is alert and oriented to person, place, and time. Cranial Nerves: No cranial nerve deficit. Sensory: No sensory deficit. Motor: No abnormal muscle tone. Coordination: Coordination normal.   Psychiatric:         Behavior: Behavior normal.         Thought Content: Thought content normal.         Judgment: Judgment normal.         DIAGNOSTIC RESULTS     EKG: All EKG's are interpreted by the Emergency Department Physician who either signs or Co-signs this chart in the absence of a cardiologist.    EKG demonstrates ventricular of 76. There is atrial sensed biventricular paced rhythm. QRS is 124. QTc is 470. Nonspecific ST changes. No STEMI. No sgarbossa criteria.      RADIOLOGY:   Non-plain film images such as CT, Ultrasound and MRI are read by the radiologist. Plain radiographic images are visualized and preliminarily interpreted by the emergency physician with the below findings:      Interpretation per the Radiologist below, if available at the time of this note:    XR CHEST PORTABLE   Final Result   Stable radiographic appearance of the chest. No evidence of an acute process. **This report has been created using voice recognition software. It may contain minor errors which are inherent in voice recognition technology. **      Final report electronically signed by Dr. Sally Sorto MD on 9/14/2020 7:24 PM            ED BEDSIDE ULTRASOUND:   Performed by ED Physician - none    LABS:  Labs Reviewed   BASIC METABOLIC PANEL W/ REFLEX TO MG FOR LOW K - Abnormal; Notable for the following components:       Result Value    Glucose 114 (*)     All other components within normal limits   CBC WITH AUTO DIFFERENTIAL - Abnormal; Notable for the following components:    RBC 4.53 (*)     Hemoglobin 11.5 (*)     Hematocrit 38.4 (*)     MCH 25.4 (*)     MCHC 29.9 (*)     RDW-CV 15.3 (*)     RDW-SD 47.4 (*)     All other components within normal limits   GLOMERULAR FILTRATION RATE, ESTIMATED - Abnormal; Notable for the following components:    Est, Glom Filt Rate 77 (*)     All other components within normal limits   BRAIN NATRIURETIC PEPTIDE   TROPONIN   LACTIC ACID, PLASMA   ANION GAP   OSMOLALITY       All other labs were within normal range or not returned as of this dictation. EMERGENCY DEPARTMENT COURSE and DIFFERENTIAL DIAGNOSIS/MDM:   Vitals:    Vitals:    09/14/20 1824 09/14/20 1917 09/14/20 2021   BP: (!) 142/87 133/85 128/85   Pulse: 81 73 68   Resp: 18 18 20   Temp: 97.8 °F (36.6 °C)     TempSrc: Oral     SpO2: 98% 99% 96%   Weight: 245 lb (111.1 kg)     Height: 5' 9\" (1.753 m)         MDM  Number of Diagnoses or Management Options  Hemoptysis:   Diagnosis management comments: 67-year-old male with hemoptysis. Patient had small mild to hemoptysis which is now resolved. Differential diagnosis includes pneumonia, bronchitis, PE, Coumadin induced hemoptysis. Patient has no symptoms of chest pain or shortness of breath. He is on his baseline oxygen as needed throughout the day. I feel that the patient is unlikely to have a PE given that he is supratherapeutic on his Coumadin. Patient does not have a history of PE or DVT on Coumadin. Patient's amoxicillin is also resolved while in the emergency department. I did give him instructions to return if he has worsening hemoptysis, chest pain, shortness of breath, fainting, dizziness. Patient is agreeable to this plan of care and knows to check up with his primary care doctor the next 3 days. He is also going to skip his dose of Coumadin tonight as instructed by the INR clinic and have it rechecked soon. REASSESSMENT          CRITICAL CARE TIME       CONSULTS:  None    PROCEDURES:  Unless otherwise noted below, none     Procedures      FINAL IMPRESSION      1. Hemoptysis          DISPOSITION/PLAN   DISPOSITION Decision To Discharge 09/14/2020 08:15:36 PM      PATIENT REFERRED TO:  Jennifer Lopez  [address] [place] [address]  [phone]    In 3 days        DISCHARGE MEDICATIONS:  Discharge Medication List as of 9/14/2020  8:30 PM        Controlled Substances Monitoring:     No flowsheet data found.     (Please note that portions of this note were completed with a voice recognition program.  Efforts were made to edit the dictations but occasionally words are mis-transcribed.)    Jimenez MD (electronically signed)  Attending Emergency Physician            Марина Stephen MD  09/15/20 2807

## 2020-09-15 NOTE — PROGRESS NOTES
Pharmacy Admission Medication Reconciliation Note    Patient admitted to [facility] for: hemoptysis    Recommended corrections to inpatient orders  - Discontinue buspirone, clonazepam, and vilazodone  - Resume alprazolam    Prescriber contacted:  Dr. Shu Yun    Recommendations accepted:    Yes    Willis Cuadra, PharmD, BCPS  9/15/2020  9:27 AM

## 2020-09-15 NOTE — PROGRESS NOTES
This RN spoke with Dr. Amanda Alarcon at bedside. Orders for 2 more units of FFP, cancel INR now with the next one drawn tomorrow morning 0600, 10mg vitamin K, NPO at midnight exceptions are medications, bronchoscopy at 1200.

## 2020-09-15 NOTE — CONSULTS
[facility]    Patient - Giana Arreola   MRN -  [id number]   [facility] # - [de-identified]   - 1960      Date of Admission -  9/15/2020  4:07 AM  Date of evaluation -  9/15/2020  Room - --A   Hospital Day - [address], DO Primary Care Physician - Katherine Diaz   Chief Complaint   Hemoptysis  Active Hospital Problem List      Active Hospital Problems    Diagnosis Date Noted    Hemoptysis [R04.2] 09/15/2020    Supratherapeutic INR [R79.1] 09/15/2020    Hypokalemia [E87.6] 09/15/2020    Factor V Leiden ([address] [D68.51]     Hyperglycemia [R73.9]      HPI   Giana Arreola is a 63 y.o. male admitted for hemoptysis. Patient is a 60-year-old male who presented to [place] [address] emergency department yesterday evening for an episode of hemoptysis. Patient underwent basic lab work which showed a hemoglobin of 11.5 and was discharged back home. While at home patient had another larger episode of hemoptysis at 4 AM and presented to the emergency department once again. Patient states that the initial episode of hemoptysis was around the size of a dime, however the second larger episode was around a mouthful of blood. Patient has a past medical history of CHF CAD COPD and factor V Leiden deficiency. He is on Coumadin for paroxysmal atrial fibrillation and for factor V Leiden. He is status post BiV pacemaker for the atrial fibrillation. Patient was found to have a supra therapeutic INR at 3.99. He denies any fevers cough or chest pain. Patient does not normally wear any oxygen at home however he may use 3 to 4 L as needed while at home. Patient has a smoking history of 44 years 1 pack/day. Patient quit smoking in 2020. Patient has a significant history of COPD exacerbation requiring hospitalizations in the past.  Last followed with Bianca Khan on 2020.   6-minute walk test at that time showed the need for 4 L of oxygen TOE AMPUTATION Bilateral 3/13/2013    1,2,3,4 on left and 2nd on right    TRACHEAL SURGERY N/A 10/18/2019    TRACHEOTOMY PERCUTANEOUS BRONCHOSCOPY performed by Reva Barahona MD at [facility] ENDOSCOPY  2012     Diet    DIET GENERAL;  Allergies    Pcn [penicillins]  Social History     Social History     Socioeconomic History    Marital status:      Spouse name: Not on file    Number of children: 3    Years of education: 9    Highest education level: Not on file   Occupational History    Occupation: on disability   Social Needs    Financial resource strain: Not on file    Food insecurity     Worry: Not on file     Inability: Not on file   [employer] needs     Medical: Not on file     Non-medical: Not on file   Tobacco Use    Smoking status: Former Smoker     Packs/day: 1.00     Years: 44.00     Pack years: 44.00     Types: Cigarettes     Start date: 10/22/1976     Last attempt to quit: 2020     Years since quittin.4    Smokeless tobacco: Former User    Tobacco comment: Weaned to 0.5 PPD last 7 years   Substance and Sexual Activity    Alcohol use: No     Comment: quit 9 years ago    Drug use: No    Sexual activity: Yes     Partners: Female   Lifestyle    Physical activity     Days per week: Not on file     Minutes per session: Not on file    Stress: Not on file   Relationships    Social connections     Talks on phone: Not on file     Gets together: Not on file     Attends Church service: Not on file     Active member of club or organization: Not on file     Attends meetings of clubs or organizations: Not on file     Relationship status: Not on file    Intimate partner violence     Fear of current or ex partner: Not on file     Emotionally abused: Not on file     Physically abused: Not on file     Forced sexual activity: Not on file   Other Topics Concern    Not on file   Social History Narrative    Not on file     Family History          Problem Relation Age of Onset    Diabetes Mother     Depression Mother     Arthritis Father     Heart Disease Father     Diabetes Brother     Depression Daughter      Sleep History    PSG 6/16/2020-   IMPRESSION:  1. This is a technically difficult study with a poor sleep efficiency  of 59.9% and absent REM sleep. 2.  Mild obstructive sleep apnea. 3.  Absent REM sleep. 4.  Nocturnal hypoxia. The patient spent a total of 232.7 minutes below  oxygen saturation less than 88%. 5.  The patient usually uses 3 liters of oxygen by nasal canula;  however, no oxygen was applied during the testing. ROS    General/Constitutional: No recent loss of weight or appetite changes. No fever or chills. HENT: Negative. Eyes: Negative. Upper respiratory tract: No nasal stuffiness or post nasal drip. Lower respiratory tract/ lungs: No cough or sputum production. Cardiovascular: No palpitations, chest pain or edema. Gastrointestinal: No nausea or vomiting. Neurological: No focal neurological weakness. Extremities: No tenderness. Musculoskeletal: no complaints  Genitourinary: No complaints. Hematological: Negative. Denies easy buising  Skin: No itching.   Meds    Current Medications    sodium chloride  20 mL Intravenous Once    buprenorphine-naloxone  1 Film Sublingual Daily    pantoprazole  40 mg Oral QAM AC    QUEtiapine  300 mg Oral Nightly    tamsulosin  0.4 mg Oral Daily    glycopyrrolate-formoterol  2 puff Inhalation BID    sodium chloride flush  10 mL Intravenous 2 times per day    potassium chloride  40 mEq Oral Once    insulin lispro  0-6 Units Subcutaneous TID WC    insulin lispro  0-3 Units Subcutaneous Nightly    ALPRAZolam  0.5 mg Oral BID    sodium chloride  20 mL Intravenous Once     albuterol sulfate HFA, sodium chloride flush, acetaminophen **OR** acetaminophen, polyethylene glycol, promethazine **OR** ondansetron, magnesium sulfate, potassium chloride **OR** potassium alternative oral replacement **OR** potassium chloride, glucose, dextrose, glucagon (rDNA), dextrose  IV Drips/Infusions   sodium chloride      dextrose       Vitals    Vitals    height is 5' 9\" (1.753 m) and weight is 245 lb (111.1 kg). His oral temperature is 97.9 °F (36.6 °C). His blood pressure is 113/66 and his pulse is 69. His respiration is 16 and oxygen saturation is 98%. O2 Flow Rate (L/min): 3 L/min  I/O  No intake or output data in the 24 hours ending 09/15/20 1008  Patient Vitals for the past 96 hrs (Last 3 readings):   Weight   09/15/20 0414 245 lb (111.1 kg)     Exam   Physical Exam   Constitutional: No distress on O2 per NC. Patient appears moderately built and  moderately nourished. Head: Normocephalic and atraumatic. Right Ear: External ear normal in appearance. Left Ear: External ear normal in appearance. Mouth/Throat: Oropharynx is clear and moist.  No oral thrush. Eyes: Conjunctivae are normal. Pupils are equal, round, and reactive to light. No scleral icterus. Neck: Neck supple. No tracheal deviation present. Cardiovascular: Regular rate, regular rhythm, S1 and S2 with no murmur. No peripheral edema  Pulmonary/Chest: Normal effort with mild expiratory wheezing. No stridor. No respiratory distress. Patient exhibits no tenderness. Abdominal: Soft. Bowel sounds audible. No distension or tenderness to palp. Musculoskeletal: Moves all extremities  Lymphadenopathy:  No cervical adenopathy. Neurological: Patient is alert and oriented to person, place, and time. Skin: Warm and dry.         Labs   ABG  Lab Results   Component Value Date    PH 7.41 11/27/2019    PO2 71 11/27/2019    PCO2 42 11/27/2019    HCO3 27 11/27/2019    O2SAT 94 11/27/2019     Lab Results   Component Value Date    IFIO2 60 10/10/2019    MODE AC 09/29/2019    SETTIDVOL 350 09/29/2019    SETPEEP 6.0 10/10/2019     CBC  Recent Labs     09/14/20  1905 09/15/20  0430   WBC 5.0 4.8   RBC 4.53* 4.50*   HGB 11.5* 11.5*   HCT 38.4* 38.3*   MCV 84.8 85.1   MCH 25.4* 25.6*   MCHC 29.9* 30.0*    176   MPV 10.4 10.4      BMP  Recent Labs     09/14/20  1905 09/15/20  0430    139   K 3.6 3.3*   CL 99 102   CO2 30 27   BUN 10 10   CREATININE 1.0 0.8   GLUCOSE 114* 146*   CALCIUM 9.3 9.0     LFT  No results for input(s): AST, ALT, ALB, BILITOT, ALKPHOS, LIPASE in the last 72 hours. Invalid input(s): AMYLASE  TROP  Lab Results   Component Value Date    TROPONINT < 0.010 09/14/2020    TROPONINT < 0.010 04/20/2020    TROPONINT < 0.010 03/23/2020     BNP  Lab Results   Component Value Date    PROBNP 456.6 09/14/2020    PROBNP 252.9 04/20/2020    PROBNP 267.3 04/16/2020     D-Dimer  No results found for: DDIMER  Lactic Acid  Recent Labs     09/14/20 1905   LACTA 1.3     INR  Recent Labs     09/15/20  0430   INR 3.99*     PTT  Recent Labs     09/15/20  0430   APTT 67.9*     Glucose  Recent Labs     09/15/20  0829   POCGLU 111*     UA No results for input(s): SPECGRAV, PHUR, COLORU, CLARITYU, MUCUS, PROTEINU, BLOODU, RBCUA, WBCUA, BACTERIA, NITRU, GLUCOSEU, BILIRUBINUR, UROBILINOGEN, KETUA, LABCAST, LABCASTTY, AMORPHOS in the last 72 hours. Invalid input(s): CRYSTALS. PFTs         Severe obstruction with moderate reduction in diffusion capacity    Echo     Summary   Technically difficult examination. Ejection fraction was estimated at 50-55%. Left atrial size was severely dilated. Right atrial size was moderately dilated. S/p TV replacement--unable to visualize leaflets. DOPPLER: Mean gradient   5-8 mmHg. V max = 2.1 m/s. There was trace tricuspid regurgitation. Assuming RAP = 20 mmHg, the estimated RVSP = 50 mmHg. Ascending aorta = 3.6 cm. IVC size is dilated with reduced respirophasic variation (CVP~20 mmHg)      Consider THELMA, if clinically indicated, and concerns for TVR dysfunction.       Signature      ----------------------------------------------------------------   Electronically signed by Raimundo Ochoa MD Hyperglycemia: Hemoglobin A1c of 6.6, primary following for insulin management  COPD: No signs of acute exacerbation at this time, on Anoro Ellipta at home as well as rescue albuterol as needed. Substitute with Bevespi while in the hospital.  6-minute walk test at last visit showed 4 L/min with exertion and 0 L/min at rest.  Currently on 3 L at rest  ANGEL: Sleep study completed on 6/16/2020. Mild obstructive sleep apnea with nocturnal hypoxia. That time recommendation was made for CPAP titration as her treatment however patient has not completed this yet. No follow-up was made in the office after sleep study. Recommendations   -For the Coumadin associated bleeding-given 1 unit of fresh frozen plasma this morning will give second unit now. Follow-up INR after administration of second unit.  -Patient may require bronchoscopy for further evaluation of hemoptysis. INR ideally around 1.5 prior to bronchoscopy. At this time tentative plan is for bronchoscopy on 9/16/2020 continue response of INR to fresh frozen plasma.   -Continue hold Coumadin.   -Continue Bevespi 2 puffs twice daily as an alternative to an Anoro Ellipta  -We will add duo nebs every 4 hours while awake for wheezing  -Continue monitor hemoglobin and hematocrit  -Continue supplemental oxygen as needed  -Wean oxygen as tolerated keeping SPO2 greater than 92%    Thank you for the consult and allowing us to participate in the care of your patient. Case discussed with nurse and patient/family. Questions and concerns addressed. Meds and Orders reviewed. Electronically signed by     Albert Amaya DO on 9/15/2020 at 10:08 AM    Addendum by Dr. Tabitha Grady MD:  I have seen and examined the patient independently. Face to face evaluation and examination was performed. The above evaluation and note has been reviewed. Labs and radiographs were reviewed. I have discussed with Dr.Johnathan KIM Hartman DO about this patient in detail.   The above assessment and plan has been reviewed. Please see my modifications mentioned below. My modifications:  He is not in any distress. He had so far 3 episodes of mouth full of blood coughing- ? Source. Will reverse the INR. Vitamin K 5mg IVPB X1. Will transfuse 2 more units of FFP to reverse coumadin due to ongoing hemoptysis and to consider for bronch in Am. NPO from midnight.  -Avi Eduardo educated and informed about bronchoscopy procedure,method, complicantions of procedure including but not limited to development of pneumothorax with requirement of chest tube placement. He agreed for the procedure and verbalizes understanding.     Ludy David MD 9/15/2020 4:56 PM

## 2020-09-15 NOTE — H&P
BiV ICD 7/1/2014   [facility] [employer] BiV pacemaker 7/1/2014    CAD (coronary artery disease)     CHF (congestive heart failure) (HCC)     Chronic kidney disease     COPD (chronic obstructive pulmonary disease) (HCC)     Depression     Endocarditis     Factor V deficiency (HCC)     Hepatitis     HEPATITIS C    Hepatitis C     Hypertension     Pneumonia      SHX:        Procedure Laterality Date    BRONCHOSCOPY N/A 9/28/2019    BRONCHOSCOPY performed by Sandee Fajardo MD at [address] [facility]    BRONCHOSCOPY N/A 9/28/2019    BRONCHOSCOPY performed by Sandee Fajardo MD at [address] N/A 9/29/2019    BRONCHOSCOPY BIOPSY BRONCHUS performed by Sandee Fajardo MD at [address] [facility]    BRONCHOSCOPY  9/29/2019    BRONCHOSCOPY performed by Sandee Fajardo MD at [address] [facility]    BRONCHOSCOPY N/A 9/30/2019    BRONCHOSCOPY ALVEOLAR LAVAGE performed by Leisa Cantrell MD at [address] [facility] N/A 10/16/2019    BRONCHOSCOPY ALVEOLAR LAVAGE performed by Leisa Cantrell MD at [address] [facility]  12/26/12    Valve replacement    COLONOSCOPY  2016    DEBRIDEMENT Left 01/14/2014    lt hand     HAND DEBRIDEMENT Left 01/09/2014    INCISION AND DRAINAGE    PACEMAKER PLACEMENT  12/26/13    TOE AMPUTATION Bilateral 3/13/2013    1,2,3,4 on left and 2nd on right    TRACHEAL SURGERY N/A 10/18/2019    TRACHEOTOMY PERCUTANEOUS BRONCHOSCOPY performed by Leisa Cantrell MD at [address] [facility] ENDOSCOPY  2012     [address] [facility],[address]:       Problem Relation Age of Onset    Diabetes Mother     Depression Mother     Arthritis Father     Heart Disease Father     Diabetes Brother     Depression Daughter      SOCHX:   Social History     Socioeconomic History    Marital status:      Spouse name: None    Number of children: 3    Years of education: 5    Highest education level: None   Occupational History    Occupation: on disability   Social Needs    Financial resource strain: None    Food insecurity     Worry: None     Inability: None    Transportation needs     Medical: None     Non-medical: None   Tobacco Use    Smoking status: Former Smoker     Packs/day: 1.00     Years: 44.00     Pack years: 44.00     Types: Cigarettes     Start date: 10/22/1976     Last attempt to quit: 2020     Years since quittin.4    Smokeless tobacco: Former User    Tobacco comment: Weaned to 0.5 PPD last 7 years   Substance and Sexual Activity    Alcohol use: No     Comment: quit 9 years ago    Drug use: No    Sexual activity: Yes     Partners: Female   Lifestyle    Physical activity     Days per week: None     Minutes per session: None    Stress: None   Relationships    Social connections     Talks on phone: None     Gets together: None     Attends Worship service: None     Active member of club or organization: None     Attends meetings of clubs or organizations: None     Relationship status: None    Intimate partner violence     Fear of current or ex partner: None     Emotionally abused: None     Physically abused: None     Forced sexual activity: None   Other Topics Concern    None   Social History Narrative    None      Allergies: Pcn [penicillins]  Medications:       sodium chloride  20 mL Intravenous Once       PHYSICAL EXAM:    /83   Pulse 73   Temp 98.1 °F (36.7 °C) (Oral)   Resp 12   Ht 5' 9\" (1.753 m)   Wt 245 lb (111.1 kg)   SpO2 97%   BMI 36.18 kg/m²     General appearance:  No apparent distress, appears stated age and cooperative. HEENT:  Normal cephalic, atraumatic without obvious deformity. Pupils equal, round, and reactive to light. Extra ocular muscles intact. Conjunctivae/corneas clear. Neck: Supple, with full range of motion. no jugular venous distention. Trachea midline. no carotid bruits  Respiratory:  Normal respiratory effort. Clear to auscultation, bilaterally without Rales/Wheezes/Rhonchi.  Breath sounds equal bilaterally  Cardiovascular:  Regular rate and rhythm with normal X5/B3 with click?, -verubs or gallops. PMI non displaced  Abdomen: Soft, non-tender, non-distended with normal bowel sounds. No guarding, rebound. Musculoskeletal:  No clubbing, cyanosis or edema bilaterally. Full range of motion without deformity. Skin: Skin color, texture, turgor normal.  No rashes or lesions, or suspicious lesions. Neurologic:  Neurovascularly intact without any focal sensory/motor deficits. Cranial nerves: II-XII intact, grossly non-focal.  Psychiatric:  Alert and oriented, thought content appropriate, normal insight  Capillary Refill: Brisk,< 2 seconds   Peripheral Pulses: +2 palpable, equal bilaterally upper and lower extremities  Lymphatics: no lymphadenopathy    Data: (All radiographs, tracings, PFTs, and imaging are personally viewed and interpreted unless otherwise noted).  INR 3.99   Potassium 3.3   Creatinine 0.8   Glucose 146  Recent Labs     09/14/20  1905 09/15/20  0430   WBC 5.0 4.8   HGB 11.5* 11.5*   HCT 38.4* 38.3*    176      Recent Labs     09/14/20  1905 09/15/20  0430    139   K 3.6 3.3*   CL 99 102   CO2 30 27   BUN 10 10   CREATININE 1.0 0.8   CALCIUM 9.3 9.0       No results for input(s): AST, ALT, BILIDIR, BILITOT, ALKPHOS in the last 72 hours. Recent Labs     09/15/20  0430   INR 3.99*       No results for input(s): CKTOTAL, TROPONINI in the last 72 hours. Radiology reports-images reviewed in PACS  Cta Chest W Wo Contrast    Result Date: 9/15/2020  PROCEDURE: CTA CHEST W WO CONTRAST CLINICAL INFORMATION: hemoptysis . COMPARISON: 5/26/2020 TECHNIQUE: 2-D multiplanar postcontrast CT chest with 3-D MIPS [id number] All CT scans at this facility use dose modulation, iterative reconstruction, and/or weight-based dosing when appropriate to reduce radiation dose to as low as reasonably achievable. FINDINGS: There is no PE. There is no aneurysm or dissection.  Heart size normal. AICD over the left chest. No mediastinal, hilar, or axillary lymphadenopathy. Paraseptal emphysematous changes both upper lungs. Stable appearance. Parenchymal scarring right base. No effusions. No PE. No acute findings. Stable appearance **This report has been created using voice recognition software. It may contain minor errors which are inherent in voice recognition technology. ** Final report electronically signed by Dr. Hiram Crews on 9/15/2020 5:46 AM    Xr Chest Portable    Result Date: 9/14/2020  PROCEDURE: XR CHEST PORTABLE CLINICAL INFORMATION: cough. Cough and hemoptysis today. COMPARISON: 4/20/2020. TECHNIQUE: AP upright view of the chest. FINDINGS: There are stable median sternotomy wires and valvuloplasty. There is stable left-sided cardiac pacer generator with stable leads. The lungs appear grossly clear. Pulmonary vasculature and cardiomediastinal silhouette are within normal limits. Visualized osseous structures appear grossly intact. Stable radiographic appearance of the chest. No evidence of an acute process. **This report has been created using voice recognition software. It may contain minor errors which are inherent in voice recognition technology. ** Final report electronically signed by Dr. Carole Cadet MD on 9/14/2020 7:24 PM      Electronically signed by Leroy Johnson DO on 9/15/2020 at 7:41 AM

## 2020-09-15 NOTE — ED NOTES
Pt presents to the ED with c/o waking up this morning short of breath and coughing up blood. Pt states being discharged from the ED last night with the same issues. Pt is on blood thinners but did not take them tonight. Vitals obtained. NP at bedside.       Aric Watson  09/15/20 [id number]

## 2020-09-15 NOTE — PROGRESS NOTES
Pharmacy Medication History Note      List of current medications patient is taking is complete. Source of information: patient and [employer]    Changes made to medication list:  Medications removed (include reason, ex. therapy complete or physician discontinued):  · Clonazepam - alternate therapy  · Terbinafine - therapy complete  · Vilazodone - therapy complete    Medications added/doses adjusted:  · Alprazolam 0.5 mg - take 1 tablet by mouth twice daily  · Pantoprazole 20 mg - take 1 tablet by mouth twice daily before meals    Other notes (ex. Recent course of antibiotics, Coumadin dosing):  · Denies use of other OTC or herbal medications. · Called [facility] to confirm dosing of Seroquel. They stated that his dosing is 300 mg nightly. · Warfarin dosing is 2.5 mg MWF, 5 mg STThS. Goal INR is 2.5-3 (confirmed with [facility] coumadin clinic). Indication for warfarin is Factor V and prosthetic heart valve.  Scheduled for INR recheck on 9/28/20 at 1:30 PM.       Allergies reviewed      Electronically signed by Sara Fong, [facility] on 9/15/2020 at 9:08 AM

## 2020-09-15 NOTE — ED NOTES
Patient back into room. No voiced discomforts.  No active vomiting or spitting up blood     Bettina Cuff, LUDA  09/15/20 [id number]

## 2020-09-16 ENCOUNTER — ANESTHESIA EVENT (OUTPATIENT)
Dept: ENDOSCOPY | Age: 60
DRG: 814 | End: 2020-09-16
Payer: MEDICARE

## 2020-09-16 ENCOUNTER — ANESTHESIA (OUTPATIENT)
Dept: ENDOSCOPY | Age: 60
DRG: 814 | End: 2020-09-16
Payer: MEDICARE

## 2020-09-16 ENCOUNTER — APPOINTMENT (OUTPATIENT)
Dept: INTERVENTIONAL RADIOLOGY/VASCULAR | Age: 60
DRG: 814 | End: 2020-09-16
Payer: MEDICARE

## 2020-09-16 VITALS — SYSTOLIC BLOOD PRESSURE: 109 MMHG | DIASTOLIC BLOOD PRESSURE: 67 MMHG | TEMPERATURE: 96.8 F | OXYGEN SATURATION: 93 %

## 2020-09-16 LAB
ACINETOBACTER CALCOACETICUS-BAUMANNII BY PCR: NOT DETECTED
ADENOVIRUS BY PCR: NOT DETECTED
ALBUMIN SERPL-MCNC: 3.8 G/DL (ref 3.5–5.1)
ALP BLD-CCNC: 78 U/L (ref 38–126)
ALT SERPL-CCNC: 38 U/L (ref 11–66)
AMPHETAMINE+METHAMPHETAMINE URINE SCREEN: NEGATIVE
ANION GAP SERPL CALCULATED.3IONS-SCNC: 10 MEQ/L (ref 8–16)
AST SERPL-CCNC: 33 U/L (ref 5–40)
BARBITURATE QUANTITATIVE URINE: NEGATIVE
BASOPHILS # BLD: 0.4 %
BASOPHILS ABSOLUTE: 0 THOU/MM3 (ref 0–0.1)
BENZODIAZEPINE QUANTITATIVE URINE: POSITIVE
BILIRUB SERPL-MCNC: 0.3 MG/DL (ref 0.3–1.2)
BILIRUBIN DIRECT: < 0.2 MG/DL (ref 0–0.3)
BUN BLDV-MCNC: 13 MG/DL (ref 7–22)
CALCIUM SERPL-MCNC: 9 MG/DL (ref 8.5–10.5)
CANNABINOID QUANTITATIVE URINE: POSITIVE
CHLAMYDIA PNEUMONIAE BY PCR: NOT DETECTED
CHLORIDE BLD-SCNC: 103 MEQ/L (ref 98–111)
CO2: 28 MEQ/L (ref 23–33)
COCAINE METABOLITE QUANTITATIVE URINE: NEGATIVE
CORONAVIRUS PCR: NOT DETECTED
CREAT SERPL-MCNC: 0.9 MG/DL (ref 0.4–1.2)
ENTEROBACTER CLOACAE COMPLEX BY PCR: NOT DETECTED
EOSINOPHIL # BLD: 1.5 %
EOSINOPHILS ABSOLUTE: 0.1 THOU/MM3 (ref 0–0.4)
ERYTHROCYTE [DISTWIDTH] IN BLOOD BY AUTOMATED COUNT: 15.5 % (ref 11.5–14.5)
ERYTHROCYTE [DISTWIDTH] IN BLOOD BY AUTOMATED COUNT: 48.5 FL (ref 35–45)
ESCHERICHIA COLI BY PCR: NOT DETECTED
GFR SERPL CREATININE-BSD FRML MDRD: 86 ML/MIN/1.73M2
GLUCOSE BLD-MCNC: 121 MG/DL (ref 70–108)
GLUCOSE BLD-MCNC: 126 MG/DL (ref 70–108)
GLUCOSE BLD-MCNC: 141 MG/DL (ref 70–108)
GLUCOSE BLD-MCNC: 148 MG/DL (ref 70–108)
GLUCOSE BLD-MCNC: 186 MG/DL (ref 70–108)
HAEMOPHILUS INFLUENZAE BY PCR: NOT DETECTED
HCT VFR BLD CALC: 34.3 % (ref 42–52)
HCT VFR BLD CALC: 35 % (ref 42–52)
HEMOGLOBIN: 10.1 GM/DL (ref 14–18)
HEMOGLOBIN: 10.4 GM/DL (ref 14–18)
IMMATURE GRANS (ABS): 0.01 THOU/MM3 (ref 0–0.07)
IMMATURE GRANULOCYTES: 0.2 %
INFLUENZA A BY PCR: NOT DETECTED
INFLUENZA B BY PCR: NOT DETECTED
INR BLD: 1.57 (ref 0.85–1.13)
KLEBSIELLA AEROGENES BY PCR: NOT DETECTED
KLEBSIELLA OXYTOCA BY PCR: NOT DETECTED
KLEBSIELLA PNEUMONIAE GROUP BY PCR: NOT DETECTED
LEGIONELLA PNEUMOPHILIA BY PCR: NOT DETECTED
LYMPHOCYTES # BLD: 42.8 %
LYMPHOCYTES ABSOLUTE: 2 THOU/MM3 (ref 1–4.8)
MCH RBC QN AUTO: 25.4 PG (ref 26–33)
MCHC RBC AUTO-ENTMCNC: 29.4 GM/DL (ref 32.2–35.5)
MCV RBC AUTO: 86.4 FL (ref 80–94)
METAPNEUMOVIRUS BY PCR: NOT DETECTED
MONOCYTES # BLD: 11 %
MONOCYTES ABSOLUTE: 0.5 THOU/MM3 (ref 0.4–1.3)
MORAXELLA CATARRHALIS BY PCR: NOT DETECTED
MYCOPLASMA PNEUMONIAE BY PCR: NOT DETECTED
NUCLEATED RED BLOOD CELLS: 0 /100 WBC
OPIATES, URINE: NEGATIVE
OXYCODONE: NEGATIVE
PARAINFLUENZA VIRUS BY PCR: NOT DETECTED
PHENCYCLIDINE QUANTITATIVE URINE: NEGATIVE
PLATELET # BLD: 159 THOU/MM3 (ref 130–400)
PMV BLD AUTO: 10.7 FL (ref 9.4–12.4)
POTASSIUM REFLEX MAGNESIUM: 4 MEQ/L (ref 3.5–5.2)
PROTEUS SPECIES BY PCR: NOT DETECTED
PSEUDOMONAS AERUGINOSA BY PCR: NOT DETECTED
RBC # BLD: 3.97 MILL/MM3 (ref 4.7–6.1)
RESISTANT GENE CTX-M BY PCR: NORMAL
RESISTANT GENE IMP BY PCR: NORMAL
RESISTANT GENE KPC BY PCR: NORMAL
RESISTANT GENE MECA/C & MREJ BY PCR: NORMAL
RESISTANT GENE NDM BY PCR: NORMAL
RESISTANT GENE OXA-48-LIKE BY PCR: NORMAL
RESISTANT GENE VIM BY PCR: NORMAL
RESPIRATORY SYNCYTIAL VIRUS BY PCR: NOT DETECTED
RHINOVIRUS ENTEROVIRUS PCR: NOT DETECTED
SEG NEUTROPHILS: 44.1 %
SEGMENTED NEUTROPHILS ABSOLUTE COUNT: 2 THOU/MM3 (ref 1.8–7.7)
SERRATIA MARCESCENS BY PCR: NOT DETECTED
SODIUM BLD-SCNC: 141 MEQ/L (ref 135–145)
SOURCE: NORMAL
SPECIMEN ACCEPTABILITY: NORMAL
STAPH AUREUS BY PCR: NOT DETECTED
STREP AGALACTIAE BY PCR: NOT DETECTED
STREP PNEUMONIAE BY PCR: NOT DETECTED
STREP PYOGENES BY PCR: NOT DETECTED
TOTAL PROTEIN: 7.6 G/DL (ref 6.1–8)
WBC # BLD: 4.6 THOU/MM3 (ref 4.8–10.8)

## 2020-09-16 PROCEDURE — 2709999900 HC NON-CHARGEABLE SUPPLY: Performed by: INTERNAL MEDICINE

## 2020-09-16 PROCEDURE — 99232 SBSQ HOSP IP/OBS MODERATE 35: CPT | Performed by: NURSE PRACTITIONER

## 2020-09-16 PROCEDURE — 87798 DETECT AGENT NOS DNA AMP: CPT

## 2020-09-16 PROCEDURE — 87252 VIRUS INOCULATION TISSUE: CPT

## 2020-09-16 PROCEDURE — 6360000002 HC RX W HCPCS: Performed by: NURSE ANESTHETIST, CERTIFIED REGISTERED

## 2020-09-16 PROCEDURE — 0B948ZZ DRAINAGE OF RIGHT UPPER LOBE BRONCHUS, VIA NATURAL OR ARTIFICIAL OPENING ENDOSCOPIC: ICD-10-PCS | Performed by: INTERNAL MEDICINE

## 2020-09-16 PROCEDURE — 0B968ZZ DRAINAGE OF RIGHT LOWER LOBE BRONCHUS, VIA NATURAL OR ARTIFICIAL OPENING ENDOSCOPIC: ICD-10-PCS | Performed by: INTERNAL MEDICINE

## 2020-09-16 PROCEDURE — 36415 COLL VENOUS BLD VENIPUNCTURE: CPT

## 2020-09-16 PROCEDURE — 85025 COMPLETE CBC W/AUTO DIFF WBC: CPT

## 2020-09-16 PROCEDURE — 7100000000 HC PACU RECOVERY - FIRST 15 MIN: Performed by: INTERNAL MEDICINE

## 2020-09-16 PROCEDURE — 87102 FUNGUS ISOLATION CULTURE: CPT

## 2020-09-16 PROCEDURE — 99233 SBSQ HOSP IP/OBS HIGH 50: CPT | Performed by: INTERNAL MEDICINE

## 2020-09-16 PROCEDURE — 87486 CHLMYD PNEUM DNA AMP PROBE: CPT

## 2020-09-16 PROCEDURE — 85014 HEMATOCRIT: CPT

## 2020-09-16 PROCEDURE — 87631 RESP VIRUS 3-5 TARGETS: CPT

## 2020-09-16 PROCEDURE — 93970 EXTREMITY STUDY: CPT

## 2020-09-16 PROCEDURE — 80048 BASIC METABOLIC PNL TOTAL CA: CPT

## 2020-09-16 PROCEDURE — 0BC38ZZ EXTIRPATION OF MATTER FROM RIGHT MAIN BRONCHUS, VIA NATURAL OR ARTIFICIAL OPENING ENDOSCOPIC: ICD-10-PCS | Performed by: INTERNAL MEDICINE

## 2020-09-16 PROCEDURE — APPSS30 APP SPLIT SHARED TIME 16-30 MINUTES: Performed by: NURSE PRACTITIONER

## 2020-09-16 PROCEDURE — 6360000002 HC RX W HCPCS

## 2020-09-16 PROCEDURE — 87116 MYCOBACTERIA CULTURE: CPT

## 2020-09-16 PROCEDURE — 1200000003 HC TELEMETRY R&B

## 2020-09-16 PROCEDURE — 87206 SMEAR FLUORESCENT/ACID STAI: CPT

## 2020-09-16 PROCEDURE — 87147 CULTURE TYPE IMMUNOLOGIC: CPT

## 2020-09-16 PROCEDURE — 88112 CYTOPATH CELL ENHANCE TECH: CPT

## 2020-09-16 PROCEDURE — 0B958ZZ DRAINAGE OF RIGHT MIDDLE LOBE BRONCHUS, VIA NATURAL OR ARTIFICIAL OPENING ENDOSCOPIC: ICD-10-PCS | Performed by: INTERNAL MEDICINE

## 2020-09-16 PROCEDURE — 0BC78ZZ EXTIRPATION OF MATTER FROM LEFT MAIN BRONCHUS, VIA NATURAL OR ARTIFICIAL OPENING ENDOSCOPIC: ICD-10-PCS | Performed by: INTERNAL MEDICINE

## 2020-09-16 PROCEDURE — 2700000000 HC OXYGEN THERAPY PER DAY

## 2020-09-16 PROCEDURE — 31645 BRNCHSC W/THER ASPIR 1ST: CPT | Performed by: INTERNAL MEDICINE

## 2020-09-16 PROCEDURE — 3700000001 HC ADD 15 MINUTES (ANESTHESIA): Performed by: INTERNAL MEDICINE

## 2020-09-16 PROCEDURE — 94760 N-INVAS EAR/PLS OXIMETRY 1: CPT

## 2020-09-16 PROCEDURE — 82948 REAGENT STRIP/BLOOD GLUCOSE: CPT

## 2020-09-16 PROCEDURE — 87070 CULTURE OTHR SPECIMN AEROBIC: CPT

## 2020-09-16 PROCEDURE — 99223 1ST HOSP IP/OBS HIGH 75: CPT | Performed by: INTERNAL MEDICINE

## 2020-09-16 PROCEDURE — 87205 SMEAR GRAM STAIN: CPT

## 2020-09-16 PROCEDURE — 6370000000 HC RX 637 (ALT 250 FOR IP): Performed by: INTERNAL MEDICINE

## 2020-09-16 PROCEDURE — 87077 CULTURE AEROBIC IDENTIFY: CPT

## 2020-09-16 PROCEDURE — 80307 DRUG TEST PRSMV CHEM ANLYZR: CPT

## 2020-09-16 PROCEDURE — 7100000001 HC PACU RECOVERY - ADDTL 15 MIN: Performed by: INTERNAL MEDICINE

## 2020-09-16 PROCEDURE — 80076 HEPATIC FUNCTION PANEL: CPT

## 2020-09-16 PROCEDURE — 87541 LEGION PNEUMO DNA AMP PROB: CPT

## 2020-09-16 PROCEDURE — 85018 HEMOGLOBIN: CPT

## 2020-09-16 PROCEDURE — 2580000003 HC RX 258: Performed by: INTERNAL MEDICINE

## 2020-09-16 PROCEDURE — 85610 PROTHROMBIN TIME: CPT

## 2020-09-16 PROCEDURE — 6360000002 HC RX W HCPCS: Performed by: STUDENT IN AN ORGANIZED HEALTH CARE EDUCATION/TRAINING PROGRAM

## 2020-09-16 PROCEDURE — 2500000003 HC RX 250 WO HCPCS: Performed by: NURSE ANESTHETIST, CERTIFIED REGISTERED

## 2020-09-16 PROCEDURE — 3609027000 HC BRONCHOSCOPY: Performed by: INTERNAL MEDICINE

## 2020-09-16 PROCEDURE — 88305 TISSUE EXAM BY PATHOLOGIST: CPT

## 2020-09-16 PROCEDURE — 87581 M.PNEUMON DNA AMP PROBE: CPT

## 2020-09-16 PROCEDURE — 2580000003 HC RX 258: Performed by: NURSE ANESTHETIST, CERTIFIED REGISTERED

## 2020-09-16 PROCEDURE — 94640 AIRWAY INHALATION TREATMENT: CPT

## 2020-09-16 PROCEDURE — 3700000000 HC ANESTHESIA ATTENDED CARE: Performed by: INTERNAL MEDICINE

## 2020-09-16 PROCEDURE — 85220 BLOOC CLOT FACTOR V TEST: CPT

## 2020-09-16 RX ORDER — SODIUM CHLORIDE 9 MG/ML
INJECTION, SOLUTION INTRAVENOUS CONTINUOUS PRN
Status: DISCONTINUED | OUTPATIENT
Start: 2020-09-16 | End: 2020-09-16 | Stop reason: SDUPTHER

## 2020-09-16 RX ORDER — MIDAZOLAM HYDROCHLORIDE 1 MG/ML
INJECTION INTRAMUSCULAR; INTRAVENOUS PRN
Status: DISCONTINUED | OUTPATIENT
Start: 2020-09-16 | End: 2020-09-16 | Stop reason: SDUPTHER

## 2020-09-16 RX ORDER — FENTANYL CITRATE 50 UG/ML
INJECTION, SOLUTION INTRAMUSCULAR; INTRAVENOUS PRN
Status: DISCONTINUED | OUTPATIENT
Start: 2020-09-16 | End: 2020-09-16 | Stop reason: SDUPTHER

## 2020-09-16 RX ORDER — LIDOCAINE HYDROCHLORIDE 20 MG/ML
INJECTION, SOLUTION INFILTRATION; PERINEURAL PRN
Status: DISCONTINUED | OUTPATIENT
Start: 2020-09-16 | End: 2020-09-16 | Stop reason: SDUPTHER

## 2020-09-16 RX ORDER — ROCURONIUM BROMIDE 10 MG/ML
INJECTION, SOLUTION INTRAVENOUS PRN
Status: DISCONTINUED | OUTPATIENT
Start: 2020-09-16 | End: 2020-09-16 | Stop reason: SDUPTHER

## 2020-09-16 RX ORDER — PROPOFOL 10 MG/ML
INJECTION, EMULSION INTRAVENOUS PRN
Status: DISCONTINUED | OUTPATIENT
Start: 2020-09-16 | End: 2020-09-16 | Stop reason: SDUPTHER

## 2020-09-16 RX ADMIN — PROPOFOL 150 MG: 10 INJECTION, EMULSION INTRAVENOUS at 12:13

## 2020-09-16 RX ADMIN — ALBUTEROL SULFATE 2.5 MG: 2.5 SOLUTION RESPIRATORY (INHALATION) at 09:33

## 2020-09-16 RX ADMIN — ALPRAZOLAM 0.5 MG: 0.5 TABLET ORAL at 09:20

## 2020-09-16 RX ADMIN — SODIUM CHLORIDE: 9 INJECTION, SOLUTION INTRAVENOUS at 11:58

## 2020-09-16 RX ADMIN — BUPRENORPHINE HYDROCHLORIDE, NALOXONE HYDROCHLORIDE 1 FILM: 8; 2 FILM, SOLUBLE BUCCAL; SUBLINGUAL at 09:20

## 2020-09-16 RX ADMIN — QUETIAPINE FUMARATE 300 MG: 100 TABLET ORAL at 20:23

## 2020-09-16 RX ADMIN — ALPRAZOLAM 0.5 MG: 0.5 TABLET ORAL at 20:23

## 2020-09-16 RX ADMIN — SODIUM CHLORIDE, PRESERVATIVE FREE 10 ML: 5 INJECTION INTRAVENOUS at 09:21

## 2020-09-16 RX ADMIN — GLYCOPYRROLATE AND FORMOTEROL FUMARATE 2 PUFF: 9; 4.8 AEROSOL, METERED RESPIRATORY (INHALATION) at 09:38

## 2020-09-16 RX ADMIN — SUGAMMADEX 200 MG: 100 INJECTION, SOLUTION INTRAVENOUS at 12:39

## 2020-09-16 RX ADMIN — TAMSULOSIN HYDROCHLORIDE 0.4 MG: 0.4 CAPSULE ORAL at 09:20

## 2020-09-16 RX ADMIN — SODIUM CHLORIDE, PRESERVATIVE FREE 10 ML: 5 INJECTION INTRAVENOUS at 20:24

## 2020-09-16 RX ADMIN — LIDOCAINE HYDROCHLORIDE 50 MG: 20 INJECTION, SOLUTION INFILTRATION; PERINEURAL at 12:13

## 2020-09-16 RX ADMIN — ROCURONIUM BROMIDE 30 MG: 10 INJECTION, SOLUTION INTRAVENOUS at 12:13

## 2020-09-16 RX ADMIN — FENTANYL CITRATE 100 MCG: 50 INJECTION INTRAMUSCULAR; INTRAVENOUS at 12:13

## 2020-09-16 RX ADMIN — MIDAZOLAM HYDROCHLORIDE 2 MG: 1 INJECTION, SOLUTION INTRAMUSCULAR; INTRAVENOUS at 12:08

## 2020-09-16 ASSESSMENT — PAIN DESCRIPTION - LOCATION: LOCATION: HEAD

## 2020-09-16 ASSESSMENT — PULMONARY FUNCTION TESTS
PIF_VALUE: 28
PIF_VALUE: 37
PIF_VALUE: 35
PIF_VALUE: 1
PIF_VALUE: 17
PIF_VALUE: 9
PIF_VALUE: 35
PIF_VALUE: 35
PIF_VALUE: 25
PIF_VALUE: 26
PIF_VALUE: 25
PIF_VALUE: 1
PIF_VALUE: 33
PIF_VALUE: 31
PIF_VALUE: 2
PIF_VALUE: 2
PIF_VALUE: 35
PIF_VALUE: 31
PIF_VALUE: 34
PIF_VALUE: 2
PIF_VALUE: 20
PIF_VALUE: 35
PIF_VALUE: 35
PIF_VALUE: 24
PIF_VALUE: 0
PIF_VALUE: 25
PIF_VALUE: 35
PIF_VALUE: 14
PIF_VALUE: 17
PIF_VALUE: 21
PIF_VALUE: 28
PIF_VALUE: 28
PIF_VALUE: 31
PIF_VALUE: 25

## 2020-09-16 ASSESSMENT — PAIN SCALES - GENERAL
PAINLEVEL_OUTOF10: 0
PAINLEVEL_OUTOF10: 4
PAINLEVEL_OUTOF10: 0
PAINLEVEL_OUTOF10: 0

## 2020-09-16 ASSESSMENT — PAIN DESCRIPTION - FREQUENCY: FREQUENCY: CONTINUOUS

## 2020-09-16 ASSESSMENT — PAIN DESCRIPTION - ORIENTATION: ORIENTATION: MID

## 2020-09-16 ASSESSMENT — ENCOUNTER SYMPTOMS
SHORTNESS OF BREATH: 1
DYSPNEA ACTIVITY LEVEL: AFTER AMBULATING 1 FLIGHT OF STAIRS

## 2020-09-16 ASSESSMENT — COPD QUESTIONNAIRES: CAT_SEVERITY: MODERATE

## 2020-09-16 ASSESSMENT — PAIN DESCRIPTION - ONSET: ONSET: ON-GOING

## 2020-09-16 ASSESSMENT — PAIN - FUNCTIONAL ASSESSMENT: PAIN_FUNCTIONAL_ASSESSMENT: ACTIVITIES ARE NOT PREVENTED

## 2020-09-16 ASSESSMENT — PAIN DESCRIPTION - PAIN TYPE: TYPE: ACUTE PAIN

## 2020-09-16 ASSESSMENT — PAIN DESCRIPTION - DESCRIPTORS: DESCRIPTORS: ACHING

## 2020-09-16 ASSESSMENT — PAIN DESCRIPTION - PROGRESSION: CLINICAL_PROGRESSION: NOT CHANGED

## 2020-09-16 ASSESSMENT — LIFESTYLE VARIABLES: SMOKING_STATUS: 1

## 2020-09-16 NOTE — ANESTHESIA PRE PROCEDURE
(PROVENTIL) (2.5 MG/3ML) 0.083% nebulizer solution Take 6 mLs by nebulization every 4 hours as needed for Wheezing or Shortness of Breath 10/19/19  Yes Isaac Lea MD   furosemide (LASIX) 40 MG tablet Take 1 tablet by mouth daily 10/20/19  Yes Isaac Lea MD   aspirin EC 81 MG EC tablet Take 1 tablet by mouth daily 10/11/18  Yes Kieran Nettles MD       Current medications:    Current Facility-Administered Medications   Medication Dose Route Frequency Provider Last Rate Last Dose    buprenorphine-naloxone (SUBOXONE) 8-2 MG SL film 1 Film  1 Film Sublingual Daily Gerard Lota, DO   1 Film at 09/16/20 0920    pantoprazole (PROTONIX) tablet 40 mg  40 mg Oral QAM AC Jose Chengbeck, DO   40 mg at 09/15/20 1008    QUEtiapine (SEROQUEL) tablet 300 mg  300 mg Oral Nightly Gerard Lota, DO   300 mg at 09/15/20 2011    tamsulosin (FLOMAX) capsule 0.4 mg  0.4 mg Oral Daily Gerard Lota, DO   0.4 mg at 09/16/20 0920    glycopyrrolate-formoterol (BEVESPI) 9-4.8 MCG/ACT inhaler 2 puff  2 puff Inhalation BID Gerard Lota, DO   2 puff at 09/16/20 0938    sodium chloride flush 0.9 % injection 10 mL  10 mL Intravenous 2 times per day Gerard Lota, DO   10 mL at 09/16/20 0921    sodium chloride flush 0.9 % injection 10 mL  10 mL Intravenous PRN Gerard Lota, DO        acetaminophen (TYLENOL) tablet 650 mg  650 mg Oral Q6H PRN Gerard Lota, DO        Or    acetaminophen (TYLENOL) suppository 650 mg  650 mg Rectal Q6H PRN Gerard Lota, DO        polyethylene glycol (GLYCOLAX) packet 17 g  17 g Oral Daily PRN Gerard Lota, DO        promethazine (PHENERGAN) tablet 12.5 mg  12.5 mg Oral Q6H PRN Gerard Lota, DO        Or    ondansetron (ZOFRAN) injection 4 mg  4 mg Intravenous Q6H PRN Gerard Lota, DO        magnesium sulfate 2 g in 50 mL IVPB premix  2 g Intravenous PRN Gerard Lota, DO        potassium chloride (KLOR-CON M) extended release tablet 40 mEq  40 mEq Oral PRN Gerard Lota, DO Or    potassium bicarb-citric acid (EFFER-K) effervescent tablet 40 mEq  40 mEq Oral PRN Vertis Socks, DO        Or    potassium chloride 10 mEq/100 mL IVPB (Peripheral Line)  10 mEq Intravenous PRN Vertis Socks, DO        glucose (GLUTOSE) 40 % oral gel 15 g  15 g Oral PRN Vertis Socks, DO        dextrose 50 % IV solution  12.5 g Intravenous PRN Vertis Socks, DO        glucagon (rDNA) injection 1 mg  1 mg Intramuscular PRN Vertis Socks, DO        dextrose 5 % solution  100 mL/hr Intravenous PRN Vertis Socks, DO        insulin lispro (HUMALOG) injection vial 0-6 Units  0-6 Units Subcutaneous TID WC Jose Hernandez, DO        insulin lispro (HUMALOG) injection vial 0-3 Units  0-3 Units Subcutaneous Nightly Vertis Socks, DO        ALPRAZolam Jonatan Southern) tablet 0.5 mg  0.5 mg Oral BID Vertis Socks, DO   0.5 mg at 09/16/20 0920    albuterol (PROVENTIL) nebulizer solution 2.5 mg  2.5 mg Nebulization 4x Daily PRN Michi ALVAREZ [place], DO   2.5 mg at 09/16/20 0933       Allergies:     Allergies   Allergen Reactions    Pcn [Penicillins] Nausea And Vomiting       Problem List:    Patient Active Problem List   Diagnosis Code    Moderate COPD (chronic obstructive pulmonary disease) ([facility] J44.9    Community acquired pneumonia J18.9    Suicidal ideation R45.851    SIRS (systemic inflammatory response syndrome) ([facility] R65.10    Sepsis due to methicillin susceptible Staphylococcus aureus ([facility] A41.01    Severe sepsis ([facility]) A41.9, R65.20    HCAP (healthcare-associated pneumonia) J18.9    Hyponatremia E87.1    Lung nodules R91.8    History of intravenous drug use in remission Z87.898    Malnutrition ([facility] E46    Coagulopathy ([facility] D68.9    Acute blood loss anemia D62    Complete heart block, post-surgical ([facility]) I97.89, I44.2    Endocarditis, bacterial, acute/subacute I33.0    WILMA (acute kidney injury) ([facility]) N17.9    Leukocytosis D72.829    Physical deconditioning R53.81    Cellulitis L03.90    Abdominal pain, acute R10.9    Microscopic hematuria R31.29    [employer] Bi[employer] pacemaker Z95.0    Abnormal CT of the abdomen R93.5    Generalized abdominal pain R10.84    GERD (gastroesophageal reflux disease) K21.9    Non-intractable vomiting with nausea R11.2    Hx of tricuspid valve repair Z98.890    History of colonic polyps Z86.010    Second degree hemorrhoids K64.1    History of tricuspid valve replacement with bioprosthetic valve 2012 Z95.3    Chronic diastolic congestive heart failure (HCC) I50.32    SOB (shortness of breath) on exertion S63.56    Folliculitis of perineum L73.9    S/P cardiac cath 12/5/[id number], LAD -PATENT, LCX-PATENT , RCA PATENT, EDP 15 MMHG, NO GRADIENT- MED RX Z98.890    Essential hypertension I10    COPD exacerbation ([facility]) J44.1    Acute respiratory failure with hypoxia and hypercapnia ([facility]) J96.01, J96.02    Encephalopathy acute G93.40    PAF (paroxysmal atrial fibrillation) ([facility]) nioted on device check with 4% burden I48.0    Pneumonia J18.9    Acute respiratory failure with hypoxemia ([facility]) J96.01    Elevated hemoglobin A1c R73.09    Hyperglycemia Q40.0    Metabolic acidosis F79.4    Factor V Leiden ([facility] D68.51    Hx of deep venous thrombosis Z86.718    Opioid use disorder ([facility]) F11.99    Chronic obstructive pulmonary disease with acute lower respiratory infection ([facility]) J44.0    Hemoptysis R04.2    Hyperkalemia E87.5    Supratherapeutic INR R79.1    Hypokalemia E87.6       Past Medical History:        Diagnosis Date    Anxiety disorder     Arthritis     Asthma     Back pain, chronic     Blood circulation, collateral     4 toes partial amputee on L foot    [employer] BiV ICD 7/1/2014    [employer] Bi[employer] pacemaker 7/1/2014    CAD (coronary artery disease)     CHF (congestive heart failure) (HCC)     Chronic kidney disease     COPD (chronic obstructive pulmonary disease) ([facility])     Depression     Endocarditis  Factor V deficiency ([facility]     Hepatitis     HEPATITIS C    Hepatitis C     History of blood transfusion     Hypertension     Pneumonia        Past Surgical History:        Procedure Laterality Date    BRONCHOSCOPY N/A 2019    BR[facility]    BRONCHOSCOPY N/A 2019    BRONCHOSCOPY performed by Augusto Monahan MD at [address] N/A 2019    BRONCHOSCOPY BIOPSY BRONCHUS performed by Augusto Monahan MD at [facility]    BRONCHOSCOPY  2019    BRONCHOSCOPY performed by Augusto Monahan MD at [facility]    BRONCHOSCOPY N/A 2019    BRONCHOSCOPY ALVEOLAR LAVAGE performed by Ada Gómez MD at [address] N/A 10/16/2019    BRONCHOSCOPY ALVEOLAR LAVAGE performed by Ada Gómez MD at [address] [facility]  12    Valve replacement    COLONOSCOPY  2016    DEBRIDEMENT Left 2014    lt hand     HAND DEBRIDEMENT Left 2014    INCISION AND DRAINAGE    PACEMAKER PLACEMENT  13    TOE AMPUTATION Bilateral 3/13/2013    1,2,3,4 on left and 2nd on right    TRACHEAL SURGERY N/A 10/18/2019    TRACHEOTOMY PERCUTANEOUS BRONCHOSCOPY performed by Ada Gómez MD at [address] [place] [address]         Social History:    Social History     Tobacco Use    Smoking status: Former Smoker     Packs/day: 1.00     Years: 44.00     Pack years: 44.00     Types: Cigarettes     Start date: 10/22/1976     Last attempt to quit: 2020     Years since quittin.4    Smokeless tobacco: Former User    Tobacco comment: Weaned to 0.5 PPD last 7 years   Substance Use Topics    Alcohol use: No     Comment: quit 9 years ago                                Counseling given: Not Answered  Comment: Weaned to 0.5 PPD last 7 years      Vital Signs (Current):   Vitals:    20 0035 20 0301 20 0915 20 0933   BP: 137/67 123/75 116/69 COVID-19 Screening (If Applicable):   Lab Results   Component Value Date    COVID19 NOT DETECTED 09/15/2020    COVID19 NOT DETECTED 05/23/2020         Anesthesia Evaluation    Airway: Mallampati: III        Dental:    (+) edentulous      Pulmonary: breath sounds clear to auscultation  (+) pneumonia: resolved,  COPD: moderate,  shortness of breath:  asthma: current smoker                           Cardiovascular:    (+) hypertension:, pacemaker: pacemaker, CAD:, CABG/stent:, CHF:, MEZA: after ambulating 1 flight of stairs,         Rhythm: irregular  Rate: normal                   PE comment: BBB  Pacemaker   Neuro/Psych:   (+) neuromuscular disease:, psychiatric history: stable with treatmentdepression/anxiety              ROS comment: LBP GI/Hepatic/Renal:   (+) hepatitis: C, liver disease:,           Endo/Other:                     Abdominal:       Abdomen: soft. Vascular:   + DVT, . Anesthesia Plan      general     ASA 3       Induction: intravenous. Anesthetic plan and risks discussed with patient. Plan discussed with CRNA and attending.                   TAMMI Angeles - CRNA   9/16/2020

## 2020-09-16 NOTE — PRE SEDATION
[address] [address] Endoscopy  Sedation Pre-Procedure Note    Patient Name: Hansel Bettencourt    YOB: 1960   Room/Bed: [address] [place] [address]  Medical Record Number: [id number]  Date: 9/16/2020  Time: 1:05 PM     Indication:  Pain control for Flexible Fibrooptic Bronchoscopy. Consent: I have discussed with the patient and/or the patient representative the indication, alternatives, and the possible risks and/or complications of the planned procedure and the anesthesia methods. The patient and/or patient representative appear to understand and agree to proceed.     Vital Signs: /64   Pulse 74   Temp 98.5 °F (36.9 °C) (Oral)   Resp 14   Ht 5' 9\" (1.753 m)   Wt 252 lb 12.8 oz (114.7 kg)   SpO2 98%   BMI 37.33 kg/m²       Past Medical History:  Past Medical History:   Diagnosis Date    Anxiety disorder     Arthritis     Asthma     Back pain, chronic     Blood circulation, collateral     4 toes partial amputee on L foot    [place] [employer] Bi[employer] ICD 7/1/2014   [facility] [employer] BiV pacemaker 7/1/2014    CAD (coronary artery disease)     CHF (congestive heart failure) (HCC)     Chronic kidney disease     COPD (chronic obstructive pulmonary disease) (HCC)     Depression     Endocarditis     Factor V deficiency (HCC)     Hepatitis     HEPATITIS C    Hepatitis C     History of blood transfusion     Hypertension     Pneumonia          Allergy:  Allergies   Allergen Reactions    Pcn [Penicillins] Nausea And Vomiting         Past Surgical History:  Past Surgical History:   Procedure Laterality Date    BRONCHOSCOPY N/A 9/28/2019    BRONCHOSCOPY performed [facility]e [facility]ton [facility]    BRONCHOSCOPY N/A 9/28/2019    [facility]    BRONCHOSCOPY N/A 9/29/2019    BRONCHOSCOPY BIOPSY BRONCHUS performed by Ree Overton MD at [facility]    BRONCHOSCOPY  9/29/2019    BRONCHOSCOPY performed by Cassidy Garcia MD at [address] N/A 9/30/2019    BRONCHOSCOPY ALVEOLAR LAVAGE performed by Tj Mason MD at [address] N/A 10/16/2019    BRONCHOSCOPY ALVEOLAR LAVAGE performed by Tj Mason MD at [address]  12/26/12    Valve replacement    COLONOSCOPY  2016    DEBRIDEMENT Left 01/14/2014    lt hand     HAND DEBRIDEMENT Left 01/09/2014    INCISION AND DRAINAGE    PACEMAKER PLACEMENT  12/26/13    TOE AMPUTATION Bilateral 3/13/2013    1,2,3,4 on left and 2nd on right    TRACHEAL SURGERY N/A 10/18/2019    TRACHEOTOMY PERCUTANEOUS BRONCHOSCOPY performed by Tj Mason MD at [facility]  2012       Medications:   Current Facility-Administered Medications   Medication Dose Route Frequency Provider Last Rate Last Dose    buprenorphine-naloxone (SUBOXONE) 8-2 MG SL film 1 Film  1 Film Sublingual Daily Zuri Mclean, DO   1 Film at 09/16/20 0920    pantoprazole (PROTONIX) tablet 40 mg  40 mg Oral QAM AC Jose Hernandez, DO   40 mg at 09/15/20 1008    QUEtiapine (SEROQUEL) tablet 300 mg  300 mg Oral Nightly Jose Hernandez, DO   300 mg at 09/15/20 2011    tamsulosin (FLOMAX) capsule 0.4 mg  0.4 mg Oral Daily Zuri Mclean, DO   0.4 mg at 09/16/20 0920    glycopyrrolate-formoterol (BEVESPI) 9-4.8 MCG/ACT inhaler 2 puff  2 puff Inhalation BID Zuri Mclean, DO   2 puff at 09/16/20 0938    sodium chloride flush 0.9 % injection 10 mL  10 mL Intravenous 2 times per day Peardestiny Mclean, DO   10 mL at 09/16/20 0921    sodium chloride flush 0.9 % injection 10 mL  10 mL Intravenous PRN Zuri Mclean, DO        acetaminophen (TYLENOL) tablet 650 mg  650 mg Oral Q6H PRN Zuri Mclean, DO        Or    acetaminophen (TYLENOL) suppository 650 mg  650 mg Rectal Q6H PRN Pearla Beery, DO        polyethylene glycol (GLYCOLAX) packet 17 g  17 g Oral Daily PRN Janeice Cruise, DO        promethazine (PHENERGAN) tablet 12.5 mg  12.5 mg Oral Q6H PRN Janeice Cruise, DO        Or    ondansetron (ZOFRAN) injection 4 mg  4 mg Intravenous Q6H PRN Janeice Cruise, DO        magnesium sulfate 2 g in 50 mL IVPB premix  2 g Intravenous PRN Janeice Cruise, DO        potassium chloride (KLOR-CON M) extended release tablet 40 mEq  40 mEq Oral PRN Janeice Cruise, DO        Or    potassium bicarb-citric acid (EFFER-K) effervescent tablet 40 mEq  40 mEq Oral PRN Janeice Cruise, DO        Or    potassium chloride 10 mEq/100 mL IVPB (Peripheral Line)  10 mEq Intravenous PRN Janeice Cruise, DO        glucose (GLUTOSE) 40 % oral gel 15 g  15 g Oral PRN Janeice Cruise, DO        dextrose 50 % IV solution  12.5 g Intravenous PRN Janeice Cruise, DO        glucagon (rDNA) injection 1 mg  1 mg Intramuscular PRN Janeice Cruise, DO        dextrose 5 % solution  100 mL/hr Intravenous PRN Janeice Cruise, DO        insulin lispro (HUMALOG) injection vial 0-6 Units  0-6 Units Subcutaneous TID  Jose Hernandez, DO        insulin lispro (HUMALOG) injection vial 0-3 Units  0-3 Units Subcutaneous Nightly Janeice Cruise, DO        ALPRAZolam [employer]) tablet 0.5 mg  0.5 mg Oral BID Janeice Cruise, DO   0.5 mg at 09/16/20 0920    albuterol (PROVENTIL) nebulizer solution 2.5 mg  2.5 mg Nebulization 4x Daily PRN Michi ALVAREZ [place], DO   2.5 mg at 09/16/20 0933         Coumadin Use Last 7 Days:  yes - On hold  Antiplatelet drug therapy use last 7 days: no  Other anticoagulant use last 7 days: no       Pre-Sedation Documentation and Exam:   I have personally completed a history, physical exam & review of systems for this patient (see notes). Mallampati Airway Assessment:  Please see CRNA note for details. Prior History of Anesthesia Complications:   Please see CRNA note for details. ASA Classification:  Please see CRNA note for details.     Sedation/ Anesthesia Plan:

## 2020-09-16 NOTE — OP NOTE
Therapeutic Bronchoscopy Procedure Note under General Anesthesia. Patient: Jesús Bateman  : 1960        Surgeon: Moises Mistry    Operation: Flexible therapeutic fiberoptic bronchoscopy with out fluoroscopy. During procedure following procedures were performed:   Bronch washings obtained from right tracheobronchial tree including right upper, middle and lower lobes. Date of Operation: 2020    Indication for procedure: Hemoptysis of uncertain etiology. To identify the source of bleeding. To do cold saline lavage and apply diluted epinephrine to enhance hemostasis. Pre operative diagnoses:   Hemoptysis of uncertain etiology. Due to supra therapeutic INR Vs other etiologies  Supratherapeutic INR. Tita Simpler Factor V Leiden deficiency. He used to be On Coumadin- stopped due to hemoptysis. COPD  Mild obstructive sleep apnea with nocturnal hypoxia. He is not on any treatment. Post operative diagnoses:   Hemoptysis due to active bleeding from right upper lobe anterior segment. Factor V Leiden deficiency. He used to be On Coumadin- stopped due to hemoptysis. COPD  Mild obstructive sleep apnea with nocturnal hypoxia. He is not on any treatment. Surgeon: Vineet Kruger    Consent: Jesús Bateman is a 63 y.o. male . The risks, benefits, complications, treatment options and expected outcomes were discussed with the patient. Consent obtained from the patient after explaining the risks and complications of the procedure. The possibilities of reaction to medication, pulmonary aspiration, perforation of a viscus, development of pneumothorax with requirement of chest tube placement,air way bleeding, failure to diagnose a condition and creating a complication leading to respiratory failure requiring mechanical ventilation, transfusion or operation were discussed with the patient who freely signed the consent.     The patient was counseled at length about the risks of thao Covid-19 during their perioperative period and any recovery window from their procedure. The patient was made aware that thao Covid-19  may worsen their prognosis for recovering from their procedure  and lend to a higher morbidity and/or mortality risk. All material risks, benefits, and reasonable alternatives including postponing the procedure were discussed. The patient does wish to proceed with the procedure at this time. Anesthesia: Patient was given general endotracheal anesthesia by CRNA Ms. Taylor Chavira from anesthesiology dept. Please see anesthesiologist's note for details. Description of Procedure: The patient was taken to endoscopy suite/Operation room, identified as Hansel Bettencourt and the procedure was verified as Flexible Fiberoptic Bronchoscopy. A Time Out was held and the above information was confirmed. After the induction of general  anesthesia by the anesthesiologist, the patient was placed in appropriate position and the Flexible Fibrooptic bronchoscope was advanced through the endotracheal tube after placing the Swivel adopter. The lower end of endotracheal tube was found to be in appropriate position. The scope was advanced into the trachea. The kirt is sharp with no growths. Careful inspection of the tracheal lumen below the lower end of endotracheal tube was accomplished and found to have no growths. His right and left tracheobronchial tree was filled with clotted blood. The old blood clots were removed with the help of flexible fiberoptic bronchoscope by with drawing the scope several times. After removing all the blood clots from right and left tracheobronchial trees the bronchoscopy was continued. The scope was  advanced into the right main bronchus and then into the Right upper lobe, Right middle lobe, and Right lower lobe bronchi and segmental bronchi. He was noted to have active bleeding from right upper lobe anterior segment.  After wedging the Flexible Fibrooptic bronchoscope the right upper lobe anterior segment was lavaged with Cold sterile normal saline solution. After completing the above lavage, 2 ml of diluted Epinephrine was applied topically to secure hemostasis. The scope was held in wedge position for ~4minutes. The scope was sequentially passed into the Left main and then Left upper and lower bronchi and segmental bronchi. Bronchial washings: Bronchial washings were performed form right tracheobronchial tree including right upper lobe, middle lobe and lower lobes. More than 25 ml of Bronchial washings were obtained and sent to the laboratory for further analysis. Endobronchial findings: After removing all the blood clots from air ways the following airway exam was performed. Trachea: Normal mucosa. Luisa: Normal mucosa  Right main bronchus: Normal mucosa  Right upper lobe bronchus: Normal mucosa  Right Middle lobe bronchus: Normal mucosa  Right Lower lobe bronchus:Normal mucosa  Left main bronchus: Normal mucosa  Left upper lobe bronchus: Normal mucosa  Left lower lobe bronchus: Normal mucosa      The Patient was taken to the endoscopy recovery area in satisfactory condition. Estimated Blood Loss: Less than 10ml- old blood clots were removed    Complications: None. Recommendation/Plan:  1. F/U on culturs results  2. F/U on cytology results. Follow up:  is with me in approximately in [facility]week at [facility] [address] [place] [address], [address], [facility], [place]. [id number]. Patient was instructed to call my office with concerns and if follow up has not a been scheduled already. In the event of severe symptoms or if the patient is unable to reach my office, instructions are given to proceed to the emergency department. Attestation: I performed the procedure.     Harika Cuevas  Electronically signed by Harika Cuevas MD on 9/16/2020 at 12:50 PM

## 2020-09-16 NOTE — PROGRESS NOTES
Photos taken, scope used Bronch SER#603, many clots of blood removed during bronchoscopy, washings obtained, specimens labeled & 1 jar sent to lab. Epi instilled thru scope using 4 ml. Flushed with iced saline also. Bronchoscopy completed, Avery tolerated well.

## 2020-09-16 NOTE — CONSULTS
PAF  Supra therapeutic INR  Hemoptysis  Factor V leiden    Plan    Resume OA when okay with pulm from PAF point of view  Need hematology input for FV leiden and hx of DVT remote    [id number]    Alejandro Gaviria MD       Procedure Summary  Angiographically Patent Coronaries. Normal LV systolic function. LV size - normal.  LVEF approximately 65% . EDP 15 mmhg  No Transaortic Gradient  Recommendations  Continue with aggressive risk factor modification and medical therapy. Estimated Blood Loss: 10 ml. Complications: No complications.   Signatures  Electronically signed by Alecia Alvarez MD (Performing Physician) on 12/05/2018 at 14:48

## 2020-09-16 NOTE — ANESTHESIA POSTPROCEDURE EVALUATION
Department of Anesthesiology  Postprocedure Note    Patient: Mell Melo  MRN: [id number]  YOB: 1960  Date of evaluation: 9/16/2020  Time:  1:05 PM     Procedure Summary     Date:  09/16/20 Room / Location:  [address] [place] [address]    Anesthesia Start:  9323 Anesthesia Stop:  0213    Procedure:  BRONCHOSCOPY (N/A ) Diagnosis:  (pneumonia)    Surgeon:  Cat Cummins MD Responsible Provider:  Duke Ziegler DO    Anesthesia Type:  general ASA Status:  3          Anesthesia Type: general    Lynn Phase I: Lynn Score: 10    Lynn Phase II:      Last vitals: Reviewed and per EMR flowsheets.        Anesthesia Post Evaluation    Patient location during evaluation: PACU  Patient participation: complete - patient participated  Level of consciousness: awake  Pain score: 0  Airway patency: patent  Nausea & Vomiting: no nausea and no vomiting  Complications: no  Cardiovascular status: hemodynamically stable  Respiratory status: acceptable  Hydration status: stable

## 2020-09-16 NOTE — PROGRESS NOTES
Hospitalist Progress Note    Patient:  Deepti Viveros      Unit/Bed:6K-02/002-A    YOB: 1960    MRN: [id number]       Acct: [de-identified]     PCP: Grier Sandifer    Date of Admission: 9/15/2020    Assessment/Plan:    1. Acute hypoxic respiratory failure secondary to hemoptysis due to #2. Pulmonary following. Plans for Bronc today. Requiring 3L/NC,  Per noted home requirements none at rest 4L with activity. Wean oxygen as able. Trending HH every 6 hours, has been stable change to every 12.  2. Supratherapeutic INR due to Coumadin use. S/P FFP and VIt K. INR this AM 1.57.  3. Hypokalemia, resolved with replacement. 4. Type 2 diabetes. A1c 6.6%, previously 7.5%. Humalog scale. 5. COPD, home inhalers resumed. No acute exacerbation noted. 6. Factor V Leiden deficiency. 7. HX endocarditis. S/P tricuspid valve replacement. Chronic heart failure EF 55% 8/2019. S/P BiV ICD. Watch volume status. 8. PAFIB. Holding Coumadin due to #1.   9. ANGEL. 8. HX IVDU in remission with Hepatitis C. Suboxone. Urine drug screen pending. 11. BPH. Flomax. 12. Obesity. BMI 37.33.  13. Tobacco use. 14. Depression. Seroquel. Chief Complaint: hemoptysis    Hospital Course:     Presented to the ED with complaints of hemoptysis. Found to be hypoxic with a supra therapeutic INR. Admitted under the care of the hospitalist service. Pulmonary consulted. Subjective (past 24 hours): Reports hemoptysis is improved since admission. Denies SOB.        Medications:  Reviewed    Infusion Medications    dextrose       Scheduled Medications    buprenorphine-naloxone  1 Film Sublingual Daily    pantoprazole  40 mg Oral QAM AC    QUEtiapine  300 mg Oral Nightly    tamsulosin  0.4 mg Oral Daily    glycopyrrolate-formoterol  2 puff Inhalation BID    sodium chloride flush  10 mL Intravenous 2 times per day    insulin lispro  0-6 Units Subcutaneous TID WC    insulin lispro  0-3 Units Subcutaneous Nightly    ALPRAZolam  0.5 mg Oral BID     PRN Meds: sodium chloride flush, acetaminophen **OR** acetaminophen, polyethylene glycol, promethazine **OR** ondansetron, magnesium sulfate, potassium chloride **OR** potassium alternative oral replacement **OR** potassium chloride, glucose, dextrose, glucagon (rDNA), dextrose, albuterol      Intake/Output Summary (Last 24 hours) at 9/16/2020 0746  Last data filed at 9/16/2020 0301  Gross per 24 hour   Intake 4244.3 ml   Output 0 ml   Net 4244.3 ml       Diet:  Diet NPO, After Midnight Exceptions are: Sips with Meds    Exam:  /75   Pulse 67   Temp 97.3 °F (36.3 °C) (Oral)   Resp 16   Ht 5' 9\" (1.753 m)   Wt 252 lb 12.8 oz (114.7 kg)   SpO2 96%   BMI 37.33 kg/m²     General appearance: No apparent distress, appears stated age and cooperative. HEENT: Pupils equal, round, and reactive to light. Conjunctivae/corneas clear. Neck: Supple, with full range of motion. No jugular venous distention. Trachea midline. Respiratory:  Normal respiratory effort. Posterior inspiratory wheezing. Cardiovascular: Regular rate and rhythm with normal S1/S2 without murmurs, rubs or gallops. Abdomen: Soft, obese, non-tender, non-distended with normal bowel sounds. Musculoskeletal: passive and active ROM x 4 extremities. Skin: Skin color, texture, turgor normal.    Neurologic:  Neurovascularly intact without any focal sensory/motor deficits.  Cranial nerves: II-XII intact, grossly non-focal.  Psychiatric: Alert and oriented, thought content appropriate  Capillary Refill: Brisk,< 3 seconds   Peripheral Pulses: +2 palpable, equal bilaterally       Labs:   Recent Labs     09/14/20  1905 09/15/20  0430 09/15/20  1737 09/15/20  2137 09/16/20  0211   WBC 5.0 4.8  --   --  4.6*   HGB 11.5* 11.5* 11.0* 10.3* 10.1*   HCT 38.4* 38.3* 36.9* 34.1* 34.3*    176  --   --  159     Recent Labs     09/14/20  1905 09/15/20  0430 09/16/20  0211    139 141   K 3.6 3.3* 4.0   CL 99 102 103   CO2

## 2020-09-16 NOTE — PROGRESS NOTES
[facility]    Patient - Marlen Alvarez   MRN -  [id number]   [facility] # - [de-identified]   - 1960      Date of Admission -  9/15/2020  4:07 AM  Date of evaluation -  2020  Room - -002-A   Hospital Day - 1  Consulting - Renan Pereyra, * Primary Care Physician - Mckenna How   Chief Complaint   Hemoptysis  Active Hospital Problem List      Active Hospital Problems    Diagnosis Date Noted    Hemoptysis [R04.2] 09/15/2020    Supratherapeutic INR [R79.1] 09/15/2020    Hypokalemia [E87.6] 09/15/2020    Factor V Leiden ([address] [D68.51]     Hyperglycemia [R73.9]      HPI   Marlen Alvarez is a 63 y.o. male admitted for hemoptysis. Patient is a 60-year-old male who presented to [facility] emergency department yesterday evening for an episode of hemoptysis. Patient underwent basic lab work which showed a hemoglobin of 11.5 and was discharged back home. While at home patient had another larger episode of hemoptysis at 4 AM and presented to the emergency department once again. Patient states that the initial episode of hemoptysis was around the size of a dime, however the second larger episode was around a mouthful of blood. Patient has a past medical history of CHF CAD COPD and factor V Leiden deficiency. He is on Coumadin for paroxysmal atrial fibrillation and for factor V Leiden. He is status post BiV pacemaker for the atrial fibrillation. Patient was found to have a supra therapeutic INR at 3.99. He denies any fevers cough or chest pain. Patient does not normally wear any oxygen at home however he may use 3 to 4 L as needed while at home. Patient has a smoking history of 44 years 1 pack/day. Patient quit smoking in 2020. Patient has a significant history of COPD exacerbation requiring hospitalizations in the past.  Last followed with Linus Mohamud on 2020.   6-minute walk test at that time showed the need for 4 L of oxygen with exercise. Last PFT 5/26/2020 showed severe obstruction with moderate reduction in diffusion capacity. Of note patient underwent polysomnography on 6/16/2020. Patient was found to have mild obstructive sleep apnea as well as nocturnal hypoxia. Recommendation was made at that time for patient to follow-up with CPAP titration for treatment. No follow-up was made patient is currently not treated for sleep apnea. Past 24 Hours   -Hemoptysis this AM  -Bronch today 1200  -No increased SOB/CP  -INR 1.57 today     -All systems reviewed.      Past Medical History         Diagnosis Date    Anxiety disorder     Arthritis     Asthma     Back pain, chronic     Blood circulation, collateral     4 toes partial amputee on L foot    [employer] BiV ICD 7/1/2014   [facility] Scientific BiV pacemaker 7/1/2014    CAD (coronary artery disease)     CHF (congestive heart failure) (HCC)     Chronic kidney disease     COPD (chronic obstructive pulmonary disease) (HCC)     Depression     Endocarditis     Factor V deficiency (HCC)     Hepatitis     HEPATITIS C    Hepatitis C     History of blood transfusion     Hypertension     Pneumonia       Past Surgical History           Procedure Laterality Date    BRONCHOSCOPY N/A 9/28/2019    BRONCHOSCOPY performed by Josefina Arellano [facility]    BRONCHOSCOPY N/A 9/28/2019    BRONCHOSCOPY performed by Josefina Arellano MD at [facility] N/A 9/29/2019    BRONCHOSCOPY BIOPSY BRONCHUS performed by Josefina [facility]llano MD at [facility]    BRONCHOSCOPY  9/29/2019    BRONCHOSCOPY performed by Josefina Arellano MD at [facility] N/A 9/30/2019    BRONCHOSCOPY ALVEOLAR LAVAGE performed by Gilbert Cassidy MD at [address] [place] [address] N/A 10/16/2019    BRONCHOSCOPY ALVEOLAR LAVAGE performed by Gilbert Cassidy MD at [address] [place] [address]  12/26/12    Valve replacement    COLONOSCOPY  2016    DEBRIDEMENT Left 2014    lt hand     HAND DEBRIDEMENT Left 2014    INCISION AND DRAINAGE    PACEMAKER PLACEMENT  13    TOE AMPUTATION Bilateral 3/13/2013    1,2,3,4 on left and 2nd on right    TRACHEAL SURGERY N/A 10/18/2019    TRACHEOTOMY PERCUTANEOUS BRONCHOSCOPY performed by Reva Barahona MD at [facility] ENDOSCOPY  2012     Diet    Diet NPO, After Midnight Exceptions are: Sips with Meds  Allergies    Pcn [penicillins]  Social History     Social History     Socioeconomic History    Marital status:      Spouse name: Not on file    Number of children: 3    Years of education: 9    Highest education level: Not on file   Occupational History    Occupation: on disability   Social Needs    Financial resource strain: Not on file    Food insecurity     Worry: Not on file     Inability: Not on file   [employer] needs     Medical: Not on file     Non-medical: Not on file   Tobacco Use    Smoking status: Former Smoker     Packs/day: 1.00     Years: 44.00     Pack years: 44.00     Types: Cigarettes     Start date: 10/22/1976     Last attempt to quit: 2020     Years since quittin.4    Smokeless tobacco: Former User    Tobacco comment: Weaned to 0.5 PPD last 7 years   Substance and Sexual Activity    Alcohol use: No     Comment: quit 9 years ago    Drug use: No    Sexual activity: Yes     Partners: Female   Lifestyle    Physical activity     Days per week: Not on file     Minutes per session: Not on file    Stress: Not on file   Relationships    Social connections     Talks on phone: Not on file     Gets together: Not on file     Attends Hoahaoism service: Not on file     Active member of club or organization: Not on file     Attends meetings of clubs or organizations: Not on file     Relationship status: Not on file    Intimate partner violence     Fear of current or ex partner: Not on file     Emotionally abused: Not on file Physically abused: Not on file     Forced sexual activity: Not on file   Other Topics Concern    Not on file   Social History Narrative    Not on file     Family History          Problem Relation Age of Onset    Diabetes Mother     Depression Mother     Arthritis Father     Heart Disease Father     Diabetes Brother     Depression Daughter      Sleep History    PSG 6/16/2020-   IMPRESSION:  1. This is a technically difficult study with a poor sleep efficiency  of 59.9% and absent REM sleep. 2.  Mild obstructive sleep apnea. 3.  Absent REM sleep. 4.  Nocturnal hypoxia. The patient spent a total of 232.7 minutes below  oxygen saturation less than 88%. 5.  The patient usually uses 3 liters of oxygen by nasal canula;  however, no oxygen was applied during the testing. Meds    Current Medications    buprenorphine-naloxone  1 Film Sublingual Daily    pantoprazole  40 mg Oral QAM AC    QUEtiapine  300 mg Oral Nightly    tamsulosin  0.4 mg Oral Daily    glycopyrrolate-formoterol  2 puff Inhalation BID    sodium chloride flush  10 mL Intravenous 2 times per day    insulin lispro  0-6 Units Subcutaneous TID WC    insulin lispro  0-3 Units Subcutaneous Nightly    ALPRAZolam  0.5 mg Oral BID     sodium chloride flush, acetaminophen **OR** acetaminophen, polyethylene glycol, promethazine **OR** ondansetron, magnesium sulfate, potassium chloride **OR** potassium alternative oral replacement **OR** potassium chloride, glucose, dextrose, glucagon (rDNA), dextrose, albuterol  IV Drips/Infusions   dextrose       Vitals    Vitals    height is 5' 9\" (1.753 m) and weight is 252 lb 12.8 oz (114.7 kg). His oral temperature is 98.5 °F (36.9 °C). His blood pressure is 116/69 and his pulse is 68. His respiration is 16 and oxygen saturation is 96%.      O2 Flow Rate (L/min): 3 L/min  I/O    Intake/Output Summary (Last 24 hours) at 9/16/2020 1056  Last data filed at 9/16/2020 0301  Gross per 24 hour   Intake 4244.3 ml Output 0 ml   Net 4244.3 ml     Patient Vitals for the past 96 hrs (Last 3 readings):   Weight   09/16/20 0301 252 lb 12.8 oz (114.7 kg)   09/15/20 0414 245 lb (111.1 kg)     Exam   Physical Exam   Constitutional: No distress on O2 3LPM per NC. Patient appears obese BMI 37.33  Mouth/Throat: Oropharynx is clear and moist.  No oral thrush. MP III  Neck: Neck supple. No tracheal deviation present. No JVd  Cardiovascular: Regular rate, regular rhythm, S1 and S2 with no murmur. No peripheral edema  Pulmonary/Chest: Normal effort with posterior inspiratory wheezing. No stridor. No respiratory distress. Patient exhibits no tenderness. Abdominal: Soft. Bowel sounds audible. No distension or tenderness to palp. Musculoskeletal: Moves all extremities; no cyanosis or clubbing   Lymphadenopathy:  No cervical adenopathy. Neurological: Patient is alert and oriented to person, place, and time. Skin: Warm and dry.     Labs   ABG  Lab Results   Component Value Date    PH 7.41 11/27/2019    PO2 71 11/27/2019    PCO2 42 11/27/2019    HCO3 27 11/27/2019    O2SAT 94 11/27/2019     Lab Results   Component Value Date    IFIO2 60 10/10/2019    MODE AC 09/29/2019    SETTIDVOL 350 09/29/2019    SETPEEP 6.0 10/10/2019     CBC  Recent Labs     09/14/20  1905 09/15/20  0430 09/15/20  1737 09/15/20  2137 09/16/20 0211   WBC 5.0 4.8  --   --  4.6*   RBC 4.53* 4.50*  --   --  3.97*   HGB 11.5* 11.5* 11.0* 10.3* 10.1*   HCT 38.4* 38.3* 36.9* 34.1* 34.3*   MCV 84.8 85.1  --   --  86.4   MCH 25.4* 25.6*  --   --  25.4*   MCHC 29.9* 30.0*  --   --  29.4*    176  --   --  159   MPV 10.4 10.4  --   --  10.7      BMP  Recent Labs     09/14/20  1905 09/15/20  0430 09/16/20 0211    139 141   K 3.6 3.3* 4.0   CL 99 102 103   CO2 30 27 28   BUN 10 10 13   CREATININE 1.0 0.8 0.9   GLUCOSE 114* 146* 141*   CALCIUM 9.3 9.0 9.0     LFT  Recent Labs     09/16/20 0211   AST 33   ALT 38   BILITOT 0.3   ALKPHOS 78     TROP  Lab Results Component Value Date    TROPONINT < 0.010 09/14/2020    TROPONINT < 0.010 04/20/2020    TROPONINT < 0.010 03/23/2020     BNP  Lab Results   Component Value Date    PROBNP 456.6 09/14/2020    PROBNP 252.9 04/20/2020    PROBNP 267.3 04/16/2020     D-Dimer  No results found for: DDIMER  Lactic Acid  Recent Labs     09/14/20  1905   LACTA 1.3     INR  Recent Labs     09/15/20  0430 09/16/20  0210   INR 3.99* 1.57*     PTT  Recent Labs     09/15/20  0430   APTT 67.9*     Glucose  Recent Labs     09/15/20  1629 09/15/20  2012 09/16/20  0607   POCGLU 111* 151* 121*     UA No results for input(s): Charley Grieve, COLORU, CLARITYU, MUCUS, PROTEINU, BLOODU, RBCUA, WBCUA, BACTERIA, NITRU, GLUCOSEU, BILIRUBINUR, UROBILINOGEN, KETUA, LABCAST, LABCASTTY, AMORPHOS in the last 72 hours. Invalid input(s): CRYSTALS. PFTs         Severe obstruction with moderate reduction in diffusion capacity    Echo     Summary   Technically difficult examination. Ejection fraction was estimated at 50-55%. Left atrial size was severely dilated. Right atrial size was moderately dilated. S/p TV replacement--unable to visualize leaflets. DOPPLER: Mean gradient   5-8 mmHg. V max = 2.1 m/s. There was trace tricuspid regurgitation. Assuming RAP = 20 mmHg, the estimated RVSP = 50 mmHg. Ascending aorta = 3.6 cm. IVC size is dilated with reduced respirophasic variation (CVP~20 mmHg)      Consider THELMA, if clinically indicated, and concerns for TVR dysfunction.       Signature      ----------------------------------------------------------------   Electronically signed by Dylon Jesus MD (Interpreting   physician) on 09/28/2019 at 11:55 AM   ----------------------------------------------------------------    Cultures    Procalcitonin  Lab Results   Component Value Date    PROCAL 0.05 04/20/2020    PROCAL 0.05 04/16/2020    PROCAL 0.03 03/23/2020        SARS-CoV-2, NAAT  NOT DETECTED  NOT DETECTED  Final  09/15/2020  6:24 AM       Radiology Sleep study completed on 6/16/2020. Mild obstructive sleep apnea with nocturnal hypoxia. That time recommendation was made for CPAP titration as her treatment however patient has not completed this yet. No follow-up was made in the office after sleep study. Full Code    Recommendations   -Margie Stage educated and informed about bronchoscopy procedure,method, complicantions of procedure including but not limited to development of pneumothorax with requirement of chest tube placement. He agreed for the procedure and verbalizes understanding.  -Bronchoscopy today 1200  -Will follow cultures/cytologies from procedure  -Continue hold Coumadin.   -Continue Bevespi 2 puffs twice daily as an alternative to an Anoro Ellipta  -We will add duo nebs every 4 hours while awake for wheezing  -Continue monitor hemoglobin and hematocrit  -Continue supplemental oxygen as needed  -Wean oxygen as tolerated keeping SPO2 greater than 92%    Case discussed with nurse and patient/family. Questions and concerns addressed. Meds and Orders reviewed. Electronically signed by     TAMMI Sebastian CNP on 9/16/2020 at 10:56 AM    Addendum by Dr. Dori Salter MD:  I have seen and examined the patient independently. Face to face evaluation and examination was performed. The above evaluation and note has been reviewed. Labs and radiographs were reviewed. I Have discussed with Ms. DAYA Sebastian CNP about this patient in detail. The above assessment and plan has been reviewed. Please see my modifications mentioned below. My modifications:  He had an episode of hemoptysis during my evaluation today- He coughed up fresh big clot. Not in any distress. Sep 16, 2020   PROCEDURE: VL DUP LOWER EXTREMITY VENOUS BILATERAL   No evidence of a DVT. He had a hx of Hepatitis C ab positive on 1/4/2013. Cryoglobulin- negative. Patient did not remember about his Factor V leiden deficiency status.   He did not remember his hematologist name. Plan:  Hold anticoagulation due to ongoing significant hemoptysis with drop in Hb from 11.5 to 10.1  Cardiology consult by Dr. Damion Sosa to consider for pt for Watchman device placement with hx of Afib. Hematology consult for evaluation of Factor V leiden deficiency and need for long term anticoagulation.  -Discussed with Ms. Jonnathan Gil, APRN - CNP regarding patient condition and management plan. She agree with me repeating Factor V Leiden. Erickson Ames educated about my impression and plan. He verbalizes understanding.     Matilde Bonilla MD 9/16/2020 6:05 PM

## 2020-09-17 LAB
APTT: 30.9 SECONDS (ref 22–38)
GLUCOSE BLD-MCNC: 115 MG/DL (ref 70–108)
GLUCOSE BLD-MCNC: 121 MG/DL (ref 70–108)
GLUCOSE BLD-MCNC: 132 MG/DL (ref 70–108)
GLUCOSE BLD-MCNC: 133 MG/DL (ref 70–108)
HCT VFR BLD CALC: 35.2 % (ref 42–52)
HCT VFR BLD CALC: 35.5 % (ref 42–52)
HEMOGLOBIN: 10.4 GM/DL (ref 14–18)
HEMOGLOBIN: 10.6 GM/DL (ref 14–18)
INR BLD: 1.11 (ref 0.85–1.13)

## 2020-09-17 PROCEDURE — 1200000003 HC TELEMETRY R&B

## 2020-09-17 PROCEDURE — 99233 SBSQ HOSP IP/OBS HIGH 50: CPT | Performed by: PHYSICIAN ASSISTANT

## 2020-09-17 PROCEDURE — 81241 F5 GENE: CPT

## 2020-09-17 PROCEDURE — 85014 HEMATOCRIT: CPT

## 2020-09-17 PROCEDURE — 6370000000 HC RX 637 (ALT 250 FOR IP): Performed by: INTERNAL MEDICINE

## 2020-09-17 PROCEDURE — 36415 COLL VENOUS BLD VENIPUNCTURE: CPT

## 2020-09-17 PROCEDURE — 99233 SBSQ HOSP IP/OBS HIGH 50: CPT | Performed by: INTERNAL MEDICINE

## 2020-09-17 PROCEDURE — APPSS30 APP SPLIT SHARED TIME 16-30 MINUTES: Performed by: NURSE PRACTITIONER

## 2020-09-17 PROCEDURE — 82948 REAGENT STRIP/BLOOD GLUCOSE: CPT

## 2020-09-17 PROCEDURE — 85730 THROMBOPLASTIN TIME PARTIAL: CPT

## 2020-09-17 PROCEDURE — 94640 AIRWAY INHALATION TREATMENT: CPT

## 2020-09-17 PROCEDURE — 85610 PROTHROMBIN TIME: CPT

## 2020-09-17 PROCEDURE — 85018 HEMOGLOBIN: CPT

## 2020-09-17 PROCEDURE — 99221 1ST HOSP IP/OBS SF/LOW 40: CPT | Performed by: PHYSICIAN ASSISTANT

## 2020-09-17 PROCEDURE — 2580000003 HC RX 258: Performed by: INTERNAL MEDICINE

## 2020-09-17 RX ORDER — HEPARIN SODIUM 10000 [USP'U]/100ML
18 INJECTION, SOLUTION INTRAVENOUS CONTINUOUS
Status: DISCONTINUED | OUTPATIENT
Start: 2020-09-17 | End: 2020-09-17

## 2020-09-17 RX ORDER — HEPARIN SODIUM 5000 [USP'U]/ML
60 INJECTION, SOLUTION INTRAVENOUS; SUBCUTANEOUS CONTINUOUS
Status: DISCONTINUED | OUTPATIENT
Start: 2020-09-17 | End: 2020-09-17

## 2020-09-17 RX ADMIN — ALPRAZOLAM 0.5 MG: 0.5 TABLET ORAL at 20:34

## 2020-09-17 RX ADMIN — SODIUM CHLORIDE, PRESERVATIVE FREE 10 ML: 5 INJECTION INTRAVENOUS at 20:35

## 2020-09-17 RX ADMIN — QUETIAPINE FUMARATE 300 MG: 100 TABLET ORAL at 20:34

## 2020-09-17 RX ADMIN — ACETAMINOPHEN 650 MG: 325 TABLET ORAL at 22:22

## 2020-09-17 RX ADMIN — ACETAMINOPHEN 650 MG: 325 TABLET ORAL at 04:27

## 2020-09-17 RX ADMIN — ALPRAZOLAM 0.5 MG: 0.5 TABLET ORAL at 08:07

## 2020-09-17 RX ADMIN — SODIUM CHLORIDE, PRESERVATIVE FREE 10 ML: 5 INJECTION INTRAVENOUS at 08:07

## 2020-09-17 RX ADMIN — PANTOPRAZOLE SODIUM 40 MG: 40 TABLET, DELAYED RELEASE ORAL at 06:07

## 2020-09-17 RX ADMIN — GLYCOPYRROLATE AND FORMOTEROL FUMARATE 2 PUFF: 9; 4.8 AEROSOL, METERED RESPIRATORY (INHALATION) at 16:31

## 2020-09-17 RX ADMIN — TAMSULOSIN HYDROCHLORIDE 0.4 MG: 0.4 CAPSULE ORAL at 08:07

## 2020-09-17 RX ADMIN — BUPRENORPHINE HYDROCHLORIDE, NALOXONE HYDROCHLORIDE 1 FILM: 8; 2 FILM, SOLUBLE BUCCAL; SUBLINGUAL at 08:07

## 2020-09-17 ASSESSMENT — PAIN SCALES - GENERAL
PAINLEVEL_OUTOF10: 0
PAINLEVEL_OUTOF10: 2
PAINLEVEL_OUTOF10: 3
PAINLEVEL_OUTOF10: 0
PAINLEVEL_OUTOF10: 4
PAINLEVEL_OUTOF10: 3
PAINLEVEL_OUTOF10: 0
PAINLEVEL_OUTOF10: 2
PAINLEVEL_OUTOF10: 2
PAINLEVEL_OUTOF10: 0
PAINLEVEL_OUTOF10: 0

## 2020-09-17 ASSESSMENT — PAIN DESCRIPTION - ORIENTATION
ORIENTATION: RIGHT;LEFT
ORIENTATION: RIGHT;LEFT

## 2020-09-17 ASSESSMENT — PAIN DESCRIPTION - PROGRESSION
CLINICAL_PROGRESSION: RESOLVED
CLINICAL_PROGRESSION: NOT CHANGED
CLINICAL_PROGRESSION: GRADUALLY WORSENING

## 2020-09-17 ASSESSMENT — PAIN DESCRIPTION - PAIN TYPE
TYPE: ACUTE PAIN
TYPE: ACUTE PAIN

## 2020-09-17 ASSESSMENT — PAIN DESCRIPTION - FREQUENCY
FREQUENCY: INTERMITTENT
FREQUENCY: INTERMITTENT

## 2020-09-17 ASSESSMENT — PAIN DESCRIPTION - ONSET
ONSET: GRADUAL
ONSET: GRADUAL

## 2020-09-17 ASSESSMENT — PAIN - FUNCTIONAL ASSESSMENT
PAIN_FUNCTIONAL_ASSESSMENT: ACTIVITIES ARE NOT PREVENTED

## 2020-09-17 ASSESSMENT — PAIN DESCRIPTION - LOCATION
LOCATION: HEAD
LOCATION: HEAD

## 2020-09-17 ASSESSMENT — PAIN DESCRIPTION - DESCRIPTORS
DESCRIPTORS: HEADACHE
DESCRIPTORS: HEADACHE

## 2020-09-17 NOTE — CONSULTS
hemoptysis. The patient is currently resting in bed watching TV. No family is at the bedside. Discussed reasoning for consult including history of factor V Leiden with the patient and he denies prior history of diagnosis of factor V Leiden mutation. He denies any history of venous thromboembolism including DVT, PE, TIA or stroke. He denies any family history of clotting events or known family history of factor V Leiden disorder. He actually reports he is unsure why he is on Coumadin  Since anticoagulation was discontinued he denies further abnormal bleeding. He denies epistaxis, hemoptysis, hematemesis, melena, hematochezia, hematuria. He denies any recent B type symptoms. Past medical history includes HTN, DM, PAF, CAD, COPD, S/P tricuspid valve replacement per Dr. Ginger Donaldson in 2012 related to infective endocarditis from IV drug use. He affirms hx of Hep C which has been treated.      Meds    Current Medications    buprenorphine-naloxone  1 Film Sublingual Daily    pantoprazole  40 mg Oral QAM AC    QUEtiapine  300 mg Oral Nightly    tamsulosin  0.4 mg Oral Daily    glycopyrrolate-formoterol  2 puff Inhalation BID    sodium chloride flush  10 mL Intravenous 2 times per day    insulin lispro  0-6 Units Subcutaneous TID WC    insulin lispro  0-3 Units Subcutaneous Nightly    ALPRAZolam  0.5 mg Oral BID     sodium chloride flush, acetaminophen **OR** acetaminophen, polyethylene glycol, promethazine **OR** ondansetron, magnesium sulfate, potassium chloride **OR** potassium alternative oral replacement **OR** potassium chloride, glucose, dextrose, glucagon (rDNA), dextrose, albuterol  IV Drips/Infusions   dextrose       Past Medical History         Diagnosis Date    Anxiety disorder     Arthritis     Asthma     Back pain, chronic     Blood circulation, collateral     4 toes partial amputee on L foot    [employer] Bi[employer] ICD 7/1/2014   [facility] [employer] BiV pacemaker 7/1/2014    CAD (coronary artery disease)     CHF (congestive heart failure) (HCC)     Chronic kidney disease     COPD (chronic obstructive pulmonary disease) (HCC)     Depression     Endocarditis     Factor V deficiency (HCC)     Hepatitis     HEPATITIS C    Hepatitis C     History of blood transfusion     Hypertension     Pneumonia       Past Surgical History           Procedure Laterality Date    BRONCHOSCOPY N/A 9/28/2019    BRONCHOSCOPY performed by Pati Jose MD at [address] [facility]    BRONCHOSCOPY N/A 9/28/2019    BRONCHOSCOPY performed by Pati Jose MD at [address] [facility]    BRONCHOSCOPY N/A 9/29/2019    BRONCHOSCOPY BIOPSY BRONCHUS performed by Pati Jose MD at [address] [facility]    BRONCHOSCOPY  9/29/2019    BRONCHOSCOPY performed by Pati Jose MD at [address] [facility]    BRONCHOSCOPY N/A 9/30/2019    BRONCHOSCOPY ALVEOLAR LAVAGE performed by Marylen Faith, MD at [facility] N/A 10/16/2019    BRONCHOSCOPY ALVEOLAR LAVAGE performed by Marylen Faith, MD at [facility]  12/26/12    Valve replacement    COLONOSCOPY  2016    DEBRIDEMENT Left 01/14/2014    lt hand     HAND DEBRIDEMENT Left 01/09/2014    INCISION AND DRAINAGE    PACEMAKER PLACEMENT  12/26/13    TOE AMPUTATION Bilateral 3/13/2013    1,2,3,4 on left and 2nd on right    TRACHEAL SURGERY N/A 10/18/2019    TRACHEOTOMY PERCUTANEOUS BRONCHOSCOPY performed by Marylen Faith, MD at [address]  2012     Diet    DIET GENERAL;  Allergies    Pcn [penicillins]  Social History     Social History     Socioeconomic History    Marital status:      Spouse name: Not on file    Number of children: 3    Years of education: 9    Highest education level: Not on file   Occupational History    Occupation: on disability   Social Needs    Financial resource strain: Not on file    Food insecurity     Worry: Not on file     Inability: Not on file   [facility] needs Medical: Not on file     Non-medical: Not on file   Tobacco Use    Smoking status: Former Smoker     Packs/day: 1.00     Years: 44.00     Pack years: 44.00     Types: Cigarettes     Start date: 10/22/1976     Last attempt to quit: 2020     Years since quittin.4    Smokeless tobacco: Former User    Tobacco comment: Weaned to 0.5 PPD last 7 years   Substance and Sexual Activity    Alcohol use: No     Comment: quit 9 years ago    Drug use: No    Sexual activity: Yes     Partners: Female   Lifestyle    Physical activity     Days per week: Not on file     Minutes per session: Not on file    Stress: Not on file   Relationships    Social connections     Talks on phone: Not on file     Gets together: Not on file     Attends Scientologist service: Not on file     Active member of club or organization: Not on file     Attends meetings of clubs or organizations: Not on file     Relationship status: Not on file    Intimate partner violence     Fear of current or ex partner: Not on file     Emotionally abused: Not on file     Physically abused: Not on file     Forced sexual activity: Not on file   Other Topics Concern    Not on file   Social History Narrative    Not on file     Family History          Problem Relation Age of Onset    Diabetes Mother     Depression Mother     Arthritis Father     Heart Disease Father     Diabetes Brother     Depression Daughter      ROS     Review of Systems   Pertinent review of systems noted in HPI, all other ROS negative. Vitals     height is 5' 9\" (1.753 m) and weight is 252 lb 12.8 oz (114.7 kg). His oral temperature is 97.9 °F (36.6 °C). His blood pressure is 139/75 and his pulse is 70. His respiration is 18 and oxygen saturation is 95%. O2 Flow Rate (L/min): 3 L/min    Exam   Physical Exam   General appearance: No apparent distress, chronically ill appearing, and cooperative. HEENT: Pupils equal, round, and reactive to light. Conjunctivae/corneas clear.  Oral mucosa moist.   Neck: Supple, with full range of motion. Trachea midline. Respiratory:  Normal respiratory effort. Tight breath sounds with expiratory wheezes bilaterally. On 3L/min NC. Cardiovascular: Regular rate and rhythm with normal S1/S2   Abdomen: Soft, non-tender, non-distended with active bowel sounds. Musculoskeletal: No clubbing, cyanosis or edema bilaterally. Full range of motion without deformity. Skin: Skin color, texture, turgor normal.  No rashes or lesions. Neurologic:  Neurovascularly intact without any focal sensory/motor deficits. Psychiatric: Alert and oriented       Labs   CBC  Recent Labs     09/14/20  1905 09/15/20  0430  09/16/20  0211 09/16/20  1948 09/17/20  0739   WBC 5.0 4.8  --  4.6*  --   --    RBC 4.53* 4.50*  --  3.97*  --   --    HGB 11.5* 11.5*   < > 10.1* 10.4* 10.4*   HCT 38.4* 38.3*   < > 34.3* 35.0* 35.2*   MCV 84.8 85.1  --  86.4  --   --    MCH 25.4* 25.6*  --  25.4*  --   --    MCHC 29.9* 30.0*  --  29.4*  --   --     176  --  159  --   --    MPV 10.4 10.4  --  10.7  --   --     < > = values in this interval not displayed. BMP  Recent Labs     09/14/20  1905 09/15/20  0430 09/16/20  0211    139 141   K 3.6 3.3* 4.0   CL 99 102 103   CO2 30 27 28   BUN 10 10 13   CREATININE 1.0 0.8 0.9   GLUCOSE 114* 146* 141*   CALCIUM 9.3 9.0 9.0     LFT  Recent Labs     09/16/20 0211   AST 33   ALT 38   BILITOT 0.3   ALKPHOS 78     INR  Recent Labs     09/15/20  0430 09/16/20  0210   INR 3.99* 1.57*     PTT  Recent Labs     09/15/20  0430   APTT 67.9*       Radiology        Cta Chest W Wo Contrast    Result Date: 9/15/2020  PROCEDURE: CTA CHEST W WO CONTRAST CLINICAL INFORMATION: hemoptysis .  COMPARISON: 5/26/2020 TECHNIQUE: 2-D multiplanar postcontrast CT chest with 3-D MIPS [id number] All CT scans at this facility use dose modulation, iterative reconstruction, and/or weight-based dosing when appropriate to reduce radiation dose to as low as reasonably achievable. FINDINGS: There is no PE. There is no aneurysm or dissection. Heart size normal. AICD over the left chest. No mediastinal, hilar, or axillary lymphadenopathy. Paraseptal emphysematous changes both upper lungs. Stable appearance. Parenchymal scarring right base. No effusions. No PE. No acute findings. Stable appearance **This report has been created using voice recognition software. It may contain minor errors which are inherent in voice recognition technology. ** Final report electronically signed by Dr. Anish Segal on 9/15/2020 5:46 AM    Xr Chest Portable    Result Date: 9/14/2020  PROCEDURE: XR CHEST PORTABLE CLINICAL INFORMATION: cough. Cough and hemoptysis today. COMPARISON: 4/20/2020. TECHNIQUE: AP upright view of the chest. FINDINGS: There are stable median sternotomy wires and valvuloplasty. There is stable left-sided cardiac pacer generator with stable leads. The lungs appear grossly clear. Pulmonary vasculature and cardiomediastinal silhouette are within normal limits. Visualized osseous structures appear grossly intact. Stable radiographic appearance of the chest. No evidence of an acute process. **This report has been created using voice recognition software. It may contain minor errors which are inherent in voice recognition technology. ** Final report electronically signed by Dr. Genny Raygoza MD on 9/14/2020 7:24 PM    Vl Dup Lower Extremity Venous Bilateral    Result Date: 9/16/2020  PROCEDURE: VL DUP LOWER EXTREMITY VENOUS BILATERAL CLINICAL INFORMATION: 69-year-old male with heartburn market scattered throughout both legs. COMPARISON: Ultrasound 9/28/2019. TECHNIQUE: Venous doppler ultrasound was performed of the bilateral lower extremities using gray scale, color flow and spectral doppler imaging. FINDINGS: There is normal color flow, spectral analysis and compressibility of the common femoral vein, superficial femoral vein and popliteal vein bilaterally .  There is normal color flow and compressibility in the posterior tibial veins, anterior tibial veins and peroneal veins. No evidence of a DVT. **This report has been created using voice recognition software. It may contain minor errors which are inherent in voice recognition technology. ** Final report electronically signed by Dr Charisse Ramon on 9/16/2020 2:40 PM      Assessment/Recommendations    1. Hemoptysis - S/P bronchoscopy on 9/16/20 by Dr. Alex Fishman showing active bleeding in the RUL anterior segment. INR supratherapeutic upon admission and reversed with FFP and vitamin K. No further occurrences of bleeding today. Per Cardiology note, recommends holding anticoagulation for 2 weeks. -from a Hematology standpoint, the patient has questionable history of factor V leiden mutation. He denies prior VTE or diagnosis of Factor V Leiden. According to UpToDate, the presence of Factor V Leiden does not influence the decision of which anticoagulation choice is made. The other factors for consideration of anticoagulation choice is that the patient has a history of PAF, bioprosthetic tricuspid valve. UpToDate states for patients with bioprosthetic valve with concurrent atrial fibrillation - recommends anticoagulation with vitamin K antagonist (Coumadin), use of DOAC as an alternative to VKA in patients with a bioprosthetic valve can be considered, though limited evidence. Discussed with Cardiology, Pulmonology and Attending. Cardiology to make decision regarding anticoagulation choice in setting of TVR. 2. Questionable History of Factor V Leiden - unlikely as no prior diagnosis of VTE or family history. No prior lab per EMR. Will check Factor V Leiden mutation for definitive answer. This is a sendout test and can take up to 3 weeks to result. Case discussed with nurse and patient/Hospitalist, Pulmonology and Cardiology. Questions and concerns addressed.   Plan made in collaboration with  Mrozek    Electronically signed by   Elissa Severance, PA-C on 9/17/2020 at 12:21 PM

## 2020-09-17 NOTE — PROGRESS NOTES
A-fib: Coumadin held as above. 8. Hx of ANGEL: Stable. 9. Hx of IVDU in remission with Hepatitis C: Continue Suboxone. 10. BPH: Continue Flomax. 11. Obesity: Body mass index is 37.33 kg/m². 12. Tobacco Abuse: Discussed cessation. 13. Depression: Continue Seroquel. 14. Documented Factor V Leiden Deficiency: Denies as above. Denies VTE, familial clotting disorders, or known history of Factor V Leiden. Disposition: Heparin drip trial to monitor for return of hemoptysis per pulmonology. Plan as above. Chief Complaint: Hemoptysis    Initial H and P:-    From Dr. Malinda Hoover H&P on 9/15/20: \"63year-old  male presents the ED waking up this morning short of breath and coughing up blood. Patient states he was seen in the ED last night with the same issues and was discharged home patient is on blood thinners but he did not take them tonight. Patient has a significant past medical history of of ICD/pacemaker, coronary artery disease, CHF, COPD, factor V Leiden deficiency, and valvular repair, initial INR was 4.5 earlier today it was rechecked on this admission it was 3.99. Patient denies fever, cough, chest pain. Patient denies lightheadedness, dizziness, fainting, leg pain, leg swelling, leg redness he is currently on oxygen 3 to 4 L at home. Patient's pulmonologist is Dr. Tracy Feldman, case is been discussed with him. \"    Subjective (past 24 hours):   No acute events overnight. Reports that he is feeling much better status post bronch 1 day ago. Denies any further episodes of hemoptysis. No chest pain or shortness of breath per patient. Denies any recent fever or chills. Past medical history, family history, social history and allergies reviewed again and is unchanged since admission. ROS (12 point review of systems completed. Pertinent positives noted.  Otherwise ROS is negative)     Medications:  Reviewed    Infusion Medications    dextrose       Scheduled Medications    buprenorphine-naloxone  1 Film Sublingual Daily    pantoprazole  40 mg Oral QAM AC    QUEtiapine  300 mg Oral Nightly    tamsulosin  0.4 mg Oral Daily    glycopyrrolate-formoterol  2 puff Inhalation BID    sodium chloride flush  10 mL Intravenous 2 times per day    insulin lispro  0-6 Units Subcutaneous TID WC    insulin lispro  0-3 Units Subcutaneous Nightly    ALPRAZolam  0.5 mg Oral BID     PRN Meds: sodium chloride flush, acetaminophen **OR** acetaminophen, polyethylene glycol, promethazine **OR** ondansetron, magnesium sulfate, potassium chloride **OR** potassium alternative oral replacement **OR** potassium chloride, glucose, dextrose, glucagon (rDNA), dextrose, albuterol      Intake/Output Summary (Last 24 hours) at 9/17/2020 1529  Last data filed at 9/17/2020 0756  Gross per 24 hour   Intake 680 ml   Output --   Net 680 ml       Diet:  DIET GENERAL;    Exam:  /75   Pulse 70   Temp 97.9 °F (36.6 °C) (Oral)   Resp 18   Ht 5' 9\" (1.753 m)   Wt 252 lb 12.8 oz (114.7 kg)   SpO2 95%   BMI 37.33 kg/m²   General appearance: No apparent distress, appears stated age and cooperative. HEENT: Pupils equal, round, and reactive to light. Conjunctivae/corneas clear. Neck: Supple, with full range of motion. No jugular venous distention. Trachea midline. Respiratory:  Normal respiratory effort. Scattered expiratory wheezing. Cardiovascular: Regular rate and rhythm with normal S1/S2 without murmurs, rubs or gallops. Abdomen: Soft, non-tender, non-distended with normal bowel sounds. Musculoskeletal: passive and active ROM x 4 extremities. Skin: Skin color, texture, turgor normal.  No rashes or lesions. Neurologic:  Neurovascularly intact without any focal sensory/motor deficits.  Cranial nerves: II-XII intact, grossly non-focal.  Psychiatric: Alert and oriented, thought content appropriate, normal insight  Capillary Refill: Brisk,< 3 seconds   Peripheral Pulses: +2 palpable, equal bilaterally Labs:   Recent Labs     09/14/20  1905 09/15/20  0430  09/16/20  0211 09/16/20  1948 09/17/20  0739   WBC 5.0 4.8  --  4.6*  --   --    HGB 11.5* 11.5*   < > 10.1* 10.4* 10.4*   HCT 38.4* 38.3*   < > 34.3* 35.0* 35.2*    176  --  159  --   --     < > = values in this interval not displayed. Recent Labs     09/14/20  1905 09/15/20  0430 09/16/20  0211    139 141   K 3.6 3.3* 4.0   CL 99 102 103   CO2 30 27 28   BUN 10 10 13   CREATININE 1.0 0.8 0.9   CALCIUM 9.3 9.0 9.0     Recent Labs     09/16/20 0211   AST 33   ALT 38   BILIDIR <0.2   BILITOT 0.3   ALKPHOS 78     Recent Labs     09/15/20  0430 09/16/20  0210   INR 3.99* 1.57*     No results for input(s): Ghulam Shown in the last 72 hours. Microbiology:    Blood culture #1:   Lab Results   Component Value Date    BC No growth-preliminary No growth  04/20/2020       Blood culture #2:No results found for: Laure[place]renden Bigger    Organism:  Lab Results   Component Value Date    ORG Staphylococcus aureus 02/21/2020         Lab Results   Component Value Date    LABGRAM  09/16/2020     Few segmented neutrophils observed. Rare epithelial cells observed. No bacteria seen. MRSA culture only:No results found for: [address] [place] [address]    Urine culture:   Lab Results   Component Value Date    LABURIN [place] count: 10,000-50,000 CFU/mL  10/10/2019    LABURIN [place] count: 10,000-50,000 CFU/mL  10/10/2019       Respiratory culture: No results found for: CULTRESP    Aerobic and Anaerobic :  No results found for: LABAERO  No results found for: LABANAE    Urinalysis:      Lab Results   Component Value Date    NITRU NEGATIVE 04/22/2020    WBCUA 0-2 11/27/2019    WBCUA >100 10/23/2019    BACTERIA NONE SEEN 11/27/2019    RBCUA 5-10 11/27/2019    BLOODU NEGATIVE 04/22/2020    SPECGRAV 1.006 03/14/2014    GLUCOSEU NEGATIVE 04/22/2020       Radiology:  VL DUP LOWER EXTREMITY VENOUS BILATERAL   Final Result   No evidence of a DVT.                **This report has been created using voice recognition software. It may contain minor errors which are inherent in voice recognition technology. **      Final report electronically signed by Dr Love Chino on 9/16/2020 2:40 PM      CTA CHEST W WO CONTRAST   Final Result   No PE. No acute findings. Stable appearance            **This report has been created using voice recognition software. It may contain minor errors which are inherent in voice recognition technology. **      Final report electronically signed by Dr. Mumtaz Martin on 9/15/2020 5:46 AM        Cta Chest W Wo Contrast    Result Date: 9/15/2020  PROCEDURE: CTA CHEST W WO CONTRAST CLINICAL INFORMATION: hemoptysis . COMPARISON: 5/26/2020 TECHNIQUE: 2-D multiplanar postcontrast CT chest with 3-D MIPS Isovue-[id number] All CT scans at this facility use dose modulation, iterative reconstruction, and/or weight-based dosing when appropriate to reduce radiation dose to as low as reasonably achievable. FINDINGS: There is no PE. There is no aneurysm or dissection. Heart size normal. AICD over the left chest. No mediastinal, hilar, or axillary lymphadenopathy. Paraseptal emphysematous changes both upper lungs. Stable appearance. Parenchymal scarring right base. No effusions. No PE. No acute findings. Stable appearance **This report has been created using voice recognition software. It may contain minor errors which are inherent in voice recognition technology. ** Final report electronically signed by Dr. Mumtaz Martin on 9/15/2020 5:46 AM    Vl Dup Lower Extremity Venous Bilateral    Result Date: 9/16/2020  PROCEDURE: VL DUP LOWER EXTREMITY VENOUS BILATERAL CLINICAL INFORMATION: 61-year-old male with heartburn market scattered throughout both legs. COMPARISON: Ultrasound 9/28/2019. TECHNIQUE: Venous doppler ultrasound was performed of the bilateral lower extremities using gray scale, color flow and spectral doppler imaging.  FINDINGS: There is normal color flow, spectral analysis and compressibility of the common femoral vein, superficial femoral vein and popliteal vein bilaterally . There is normal color flow and compressibility in the posterior tibial veins, anterior tibial veins and peroneal veins. No evidence of a DVT. **This report has been created using voice recognition software. It may contain minor errors which are inherent in voice recognition technology. ** Final report electronically signed by Dr Lanie Joseph on 9/16/2020 2:40 PM      Electronically signed by Ce Carr PA-C on 9/17/2020 at 3:29 PM

## 2020-09-17 NOTE — FLOWSHEET NOTE
09/17/20 1631   Provider Notification   Reason for Communication Evaluate   Provider Name Dr. Dori Salter    Provider Notification Physician   Method of Communication Page   Notification Time [phone]: Page sent to Dr. Dori Salter regarding patient now coughing up red blood again. Wanted to make sure he was aware prior to starting the heparin drip.

## 2020-09-17 NOTE — CONSULTS
[address] [place] [address]                                  CONSULTATION    PATIENT NAME: Yosvany Lacy                  :        1960  MED REC NO:   [id number]                           ROOM:       0002  ACCOUNT NO:   [de-identified]                           ADMIT DATE: 09/15/2020  PROVIDER:     Bruno Mccarty M.D.    Radha Killings:  2020    REASON FOR CONSULTATION:  Evaluation for atrial fibrillation to hold his  oral anticoagulation after the patient presented with hemoptysis. HISTORY OF PRESENT ILLNESS:  This is a 44-year-old  gentleman  with a history of coronary artery disease, status post single vessel  bypass in  and paroxysmal atrial fibrillation; history of factor V  Leiden; COPD; CHF; presented to the emergency room with 3-day history of  intermittent blood-streaked sputum. He has been coughing up and some  blood-streaked sputum over the last 3 days. The patient is on oral  anticoagulation, Coumadin, and his initial INR was 4.5 and hence was  admitted. He was being given FFP and INR came down now, however,  cardiology evaluation was sought to hold the Coumadin for a couple of  weeks. He had a bronchoscopy today and the Pulmonology wanted the  patient to be off Coumadin for a couple of weeks to avoid further  recurrence of hemoptysis as he has been having recurrent hemoptysis in  the last 3 days. Denied having any chest pain. Now, hemoptysis  subsided after the INR came down. REVIEW OF SYSTEMS:  Negative except the above-mentioned ones. PAST MEDICAL HISTORY:  Includes the following:  [employer] BiV  and ICD, coronary artery disease, status post single-vessel bypass,  congestive heart failure, COPD, hypertension, history of factor V  Leiden, history of hepatitis C.     PAST SURGICAL HISTORY:  Endoscopy, tracheal surgery, toe amputation,  pacemaker - BiV and ICD placement, debridement, colonoscopy,  bronchoscopy, cardiac surgery. In 2012, he had apparent valve  replacement. FAMILY HISTORY:  Positive for heart disease and diabetes. SOCIAL HISTORY:  He is a former smoker, quit is 1976 after 44 pack-years  of smoking. No alcohol. No drug use. ALLERGIES:  He is allergic to PENICILLIN. HOME MEDICATIONS:  Prior to admission included the following:   Acetaminophen, Tylenol, albuterol, Xanax, Suboxone, glucagon, Humalog,  magnesium sulphate, Zofran, Protonix, GlycoLax, potassium chloride,  Phenergan, Seroquel, and tamsulosin. PHYSICAL EXAMINATION:  GENERAL:  Looks sick, in no form of distress. VITALS SIGNS:  Include the following:  Blood pressure 180/68, pulse rate  70, respiratory rate 16, temperature 98. HEENT:  Pink conjunctivae. Anicteric sclerae. Pupils are equal and  reactive bilaterally to light. NECK:  No lymphadenopathy. No goiter. No JVD. CHEST:  Clear to auscultation and resonant to percussion. CARDIOVASCULAR:  Arteries are felt; carotid, femoral, pedal.  No bruits. S1, S2 well heard. No murmur or galloped rhythm. ABDOMEN:  Soft, nontender, and nondistended. Bowel sounds are positive. GENITALIA:  No CVA, flank, or suprapubic tenderness. LOCOMOTOR:  No cyanosis, clubbing, muscle or joint tenderness. SKIN:  No rashes, ulcers, or nodules. CNS:  Alert, awake, and oriented to time, person, and place. Cranial  nerves are intact. Sensation is intact to light touch and pain. Motor:  Normal muscle strength and tone. PSYCH:  Appropriate mood and affect. WORKUP:  EKG:  Sensing and ventricular paced rhythm, BiV paced rhythm. Blood chemistry:  Sodium 140, potassium 4, BUN 13, creatinine 0.9. Hematology:  White blood cells 4.6, hemoglobin 10, and platelets 002. INR on this admission was 3.99, a couple of days ago 4.5 per the  patient, now it is 1.5. Troponins are negative. He had an echocardiogram done in 09/2019.   Ejection fraction 50% to on  holding his anticoagulation with regard to factor V Leiden and history  of DVT. Otherwise, from cardiac point of view, from AFib point of view,  the patient may hold the Coumadin for a couple of weeks. I discussed  with the patient and there is a risk of CVA, but the risk-benefit  analysis suggests that the patient may hold the Coumadin until the  Pulmonary gives okay, which the Pulmonary suggests around two weeks and  that is right, and the patient is in agreement with that. 3.  Recommended Hematology to give an opinion on factor V Leiden and on  the background history of DVT. Thank you for allowing me to participate in the care of this patient. I  will follow up with you. Radha Lee.  Artemio Lance M.D.    D: 09/16/2020 19:02:18       T: 09/16/2020 20:14:17     [email or web address]  Job#: [id number]     Doc#: [id number]    CC:

## 2020-09-17 NOTE — PROGRESS NOTES
[facility]    Patient - Charley Sampson   MRN -  [id number]   [facility] # - [de-identified]   - 1960      Date of Admission -  9/15/2020  4:07 AM  Date of evaluation -  2020  Room - 6-002-A   Hospital Day - [address] [place], [place] Primary Care Physician - Meghan Obrien   Chief Complaint   Hemoptysis  Active Hospital Problem List      Active Hospital Problems    Diagnosis Date Noted    Hemoptysis [R04.2] 09/15/2020    Supratherapeutic INR [R79.1] 09/15/2020    Hypokalemia [E87.6] 09/15/2020    Factor V Leiden ([address] [D68.51]     Hyperglycemia [R73.9]      HPI   Charley Sampson is a 63 y.o. male admitted for hemoptysis. Patient is a 67-year-old male who presented to [place] [address] emergency department yesterday evening for an episode of hemoptysis. Patient underwent basic lab work which showed a hemoglobin of 11.5 and was discharged back home. While at home patient had another larger episode of hemoptysis at 4 AM and presented to the emergency department once again. Patient states that the initial episode of hemoptysis was around the size of a dime, however the second larger episode was around a mouthful of blood. Patient has a past medical history of CHF CAD COPD and factor V Leiden deficiency. He is on Coumadin for paroxysmal atrial fibrillation and for factor V Leiden. He is status post BiV pacemaker for the atrial fibrillation. Patient was found to have a supra therapeutic INR at 3.99. He denies any fevers cough or chest pain. Patient does not normally wear any oxygen at home however he may use 3 to 4 L as needed while at home. Patient has a smoking history of 44 years 1 pack/day. Patient quit smoking in 2020. Patient has a significant history of COPD exacerbation requiring hospitalizations in the past.  Last followed with Ellie Phillips on 2020.   6-minute walk test at that time showed the need for 4 L of oxygen with exercise. Last PFT 5/26/2020 showed severe obstruction with moderate reduction in diffusion capacity. Of note patient underwent polysomnography on 6/16/2020. Patient was found to have mild obstructive sleep apnea as well as nocturnal hypoxia. Recommendation was made at that time for patient to follow-up with CPAP titration for treatment. No follow-up was made patient is currently not treated for sleep apnea. Past 24 Hours   -Denies any hemoptysis today   -Bronch yesterday RU anterior segment bleeding   -No increased SOB/CP  -No events overnight    -All systems reviewed.      Past Medical History         Diagnosis Date    Anxiety disorder     Arthritis     Asthma     Back pain, chronic     Blood circulation, collateral     4 toes partial amputee on L foot    [employer] BiV ICD 7/1/2014   [facility] Scientific BiV pacemaker 7/1/2014    CAD (coronary artery disease)     CHF (congestive heart failure) (HCC)     Chronic kidney disease     COPD (chronic obstructive pulmonary disease) (HCC)     Depression     Endocarditis     Factor V deficiency (HCC)     Hepatitis     HEPATITIS C    Hepatitis C     History of blood transfusion     Hypertension     Pneumonia       Past Surgical History           Procedure Laterality Date    BRONCHOSCOPY N/A 9/28/2019    BRONCHOSCOPY performed by Pati Jose MD at [address] [facility]    BRONCHOSCOPY N/A 9/28/2019    BRONCHOSCOPY performed by Pati Jose MD at [address] N/A 9/29/2019    BRONCHOSCOPY BIOPSY BRONCHUS performed by Pati Jose MD at [facility]    BRONCHOSCOPY  9/29/2019    BRONCHOSCOPY performed by Pati Jose MD at [address] N/A 9/30/2019    BRONCHOSCOPY ALVEOLAR LAVAGE performed by Marylen Faith, MD at [address] [facility] N/A 10/16/2019    BRONCHOSCOPY ALVEOLAR LAVAGE [place] [facility]    Valve replacement    COLONOSCOPY  2016    DEBRIDEMENT Left 2014    lt hand     HAND DEBRIDEMENT Left 2014    INCISION AND DRAINAGE    PACEMAKER PLACEMENT  13    TOE AMPUTATION Bilateral 3/13/2013    1,2,3,4 on left and 2nd on right    TRACHEAL SURGERY N/A 10/18/2019    TRACHEOTOMY PERCUTANEOUS BRONCHOSCOPY performed by Carl Santamaria MD at [facility] ENDOSCOPY  2012     Diet    DIET GENERAL;  Allergies    Pcn [penicillins]  Social History     Social History     Socioeconomic History    Marital status:      Spouse name: Not on file    Number of children: 3    Years of education: 9    Highest education level: Not on file   Occupational History    Occupation: on disability   Social Needs    Financial resource strain: Not on file    Food insecurity     Worry: Not on file     Inability: Not on file   [employer] needs     Medical: Not on file     Non-medical: Not on file   Tobacco Use    Smoking status: Former Smoker     Packs/day: 1.00     Years: 44.00     Pack years: 44.00     Types: Cigarettes     Start date: 10/22/1976     Last attempt to quit: 2020     Years since quittin.4    Smokeless tobacco: Former User    Tobacco comment: Weaned to 0.5 PPD last 7 years   Substance and Sexual Activity    Alcohol use: No     Comment: quit 9 years ago    Drug use: No    Sexual activity: Yes     Partners: Female   Lifestyle    Physical activity     Days per week: Not on file     Minutes per session: Not on file    Stress: Not on file   Relationships    Social connections     Talks on phone: Not on file     Gets together: Not on file     Attends Sabianism service: Not on file     Active member of club or organization: Not on file     Attends meetings of clubs or organizations: Not on file     Relationship status: Not on file    Intimate partner violence     Fear of current or ex partner: Not on file     Emotionally abused: Not on file     Physically abused: Not on file     Forced sexual activity: Not on file   Other Topics Concern    Not on file   Social History Narrative    Not on file     Family History          Problem Relation Age of Onset    Diabetes Mother     Depression Mother     Arthritis Father     Heart Disease Father     Diabetes Brother     Depression Daughter      Sleep History    PSG 6/16/2020-   IMPRESSION:  1. This is a technically difficult study with a poor sleep efficiency  of 59.9% and absent REM sleep. 2.  Mild obstructive sleep apnea. 3.  Absent REM sleep. 4.  Nocturnal hypoxia. The patient spent a total of 232.7 minutes below  oxygen saturation less than 88%. 5.  The patient usually uses 3 liters of oxygen by nasal canula;  however, no oxygen was applied during the testing. Meds    Current Medications    buprenorphine-naloxone  1 Film Sublingual Daily    pantoprazole  40 mg Oral QAM AC    QUEtiapine  300 mg Oral Nightly    tamsulosin  0.4 mg Oral Daily    glycopyrrolate-formoterol  2 puff Inhalation BID    sodium chloride flush  10 mL Intravenous 2 times per day    insulin lispro  0-6 Units Subcutaneous TID WC    insulin lispro  0-3 Units Subcutaneous Nightly    ALPRAZolam  0.5 mg Oral BID     sodium chloride flush, acetaminophen **OR** acetaminophen, polyethylene glycol, promethazine **OR** ondansetron, magnesium sulfate, potassium chloride **OR** potassium alternative oral replacement **OR** potassium chloride, glucose, dextrose, glucagon (rDNA), dextrose, albuterol  IV Drips/Infusions   dextrose       Vitals    Vitals    height is 5' 9\" (1.753 m) and weight is 252 lb 12.8 oz (114.7 kg). His oral temperature is 97.9 °F (36.6 °C). His blood pressure is 139/75 and his pulse is 70. His respiration is 18 and oxygen saturation is 95%.      O2 Flow Rate (L/min): 3 L/min  I/O    Intake/Output Summary (Last 24 hours) at 9/17/2020 1400  Last data filed at 9/17/2020 0756  Gross per 24 hour   Intake 680 ml   Output -- Net 680 ml     Patient Vitals for the past 96 hrs (Last 3 readings):   Weight   09/16/20 0301 252 lb 12.8 oz (114.7 kg)   09/15/20 0414 245 lb (111.1 kg)     Exam   Physical Exam   Constitutional: No distress on room air. Patient appears obese BMI 37.33  Mouth/Throat: Oropharynx is clear and moist.  No oral thrush. MPIII  Eyes: Conjunctivae are normal. Pupils are equal, round. No scleral icterus. Neck: Neck supple. No tracheal deviation present. Cardiovascular: S1 and S2 with no murmur. No peripheral edema  Pulmonary/Chest: Normal effort with bilateral air entry, clear breath sounds with intermittent expiratory wheezing . No stridor. No respiratory distress. Patient exhibits no tenderness. Abdominal: Soft. Bowel sounds audible. No distension or tenderness to palp. Musculoskeletal: Moves all extremities; no clubbing or cyanosis   Neurological: Patient is alert and oriented to person, place, and time. Skin: Warm and dry. Labs   ABG  Lab Results   Component Value Date    PH 7.41 11/27/2019    PO2 71 11/27/2019    PCO2 42 11/27/2019    HCO3 27 11/27/2019    O2SAT 94 11/27/2019     Lab Results   Component Value Date    IFIO2 60 10/10/2019    MODE AC 09/29/2019    SETTIDVOL 350 09/29/2019    SETPEEP 6.0 10/10/2019     CBC  Recent Labs     09/14/20  1905 09/15/20  0430  09/16/20  0211 09/16/20  1948 09/17/20  0739   WBC 5.0 4.8  --  4.6*  --   --    RBC 4.53* 4.50*  --  3.97*  --   --    HGB 11.5* 11.5*   < > 10.1* 10.4* 10.4*   HCT 38.4* 38.3*   < > 34.3* 35.0* 35.2*   MCV 84.8 85.1  --  86.4  --   --    MCH 25.4* 25.6*  --  25.4*  --   --    MCHC 29.9* 30.0*  --  29.4*  --   --     176  --  159  --   --    MPV 10.4 10.4  --  10.7  --   --     < > = values in this interval not displayed.       BMP  Recent Labs     09/14/20  1905 09/15/20  0430 09/16/20  0211    139 141   K 3.6 3.3* 4.0   CL 99 102 103   CO2 30 27 28   BUN 10 10 13   CREATININE 1.0 0.8 0.9   GLUCOSE 114* 146* 141*   CALCIUM 9.3 9.0 9.0     LFT  Recent Labs     09/16/20  0211   AST 33   ALT 38   BILITOT 0.3   ALKPHOS 78     TROP  Lab Results   Component Value Date    TROPONINT < 0.010 09/14/2020    TROPONINT < 0.010 04/20/2020    TROPONINT < 0.010 03/23/2020     BNP  Lab Results   Component Value Date    PROBNP 456.6 09/14/2020    PROBNP 252.9 04/20/2020    PROBNP 267.3 04/16/2020     D-Dimer  No results found for: DDIMER  Lactic Acid  Recent Labs     09/14/20  1905   LACTA 1.3     INR  Recent Labs     09/15/20  0430 09/16/20  0210   INR 3.99* 1.57*     PTT  Recent Labs     09/15/20  0430   APTT 67.9*     Glucose  Recent Labs     09/16/20 2009 09/17/20  0639 09/17/20  1100   POCGLU 148* 133* 121*     UA No results for input(s): [place], COLORU, CLARITYU, MUCUS, PROTEINU, BLOODU, RBCUA, WBCUA, BACTERIA, NITRU, GLUCOSEU, BILIRUBINUR, UROBILINOGEN, KETUA, LABCAST, LABCASTTY, AMORPHOS in the last 72 hours. Invalid input(s): CRYSTALS. PFTs         Severe obstruction with moderate reduction in diffusion capacity    Echo     Summary   Technically difficult examination. Ejection fraction was estimated at 50-55%. Left atrial size was severely dilated. Right atrial size was moderately dilated. S/p TV replacement--unable to visualize leaflets. DOPPLER: Mean gradient   5-8 mmHg. V max = 2.1 m/s. There was trace tricuspid regurgitation. Assuming RAP = 20 mmHg, the estimated RVSP = 50 mmHg. Ascending aorta = 3.6 cm. IVC size is dilated with reduced respirophasic variation (CVP~20 mmHg)      Consider THELMA, if clinically indicated, and concerns for TVR dysfunction.       Signature      ----------------------------------------------------------------   Electronically signed by Lizbeth Rogers MD (Interpreting   physician) on 09/28/2019 at 11:55 AM   ----------------------------------------------------------------    Cultures    Procalcitonin  Lab Results   Component Value Date    PROCAL 0.05 04/20/2020    PROCAL 0.05 04/16/2020 PROCAL 0.03 03/23/2020        SARS-CoV-2, NAAT  NOT DETECTED  NOT DETECTED  Final  09/15/2020  6:24 AM     Bronchoscopy- 9/16/2020  Endobronchial findings: After removing all the blood clots from air ways the following airway exam was performed. Trachea: Normal mucosa.   Luisa: Normal mucosa  Right main bronchus: Normal mucosa  Right upper lobe bronchus: Normal mucosa  Right Middle lobe bronchus: Normal mucosa  Right Lower lobe bronchus:Normal mucosa  Left main bronchus: Normal mucosa  Left upper lobe bronchus: Normal mucosa  Left lower lobe bronchus: Normal mucosa    Cultures (-) thus far  Cytology: pending      Pneumonia Panel, Molecular [[id number]]   Collected: 09/16/20 1445    Order Status: Completed  Updated: 09/16/20 1659     Source  Bronch Washings     Specimen Acceptability  NA     Acinetobacter calcoaceticus-baumannii by PCR  Not Detected     Enterobacter cloacae complex by PCR  Not Detected     Escherichia coli by PCR  Not Detected     Haemophilus Influenzae by PCR  Not Detected     Klebsiella aerogenes by PCR  Not Detected     Klebsiella oxytoca by PCR  Not Detected     Klebsiella pneumoniae by PCR  Not Detected     Moraxella catarrhalis by PCR  Not Detected     Proteus species by PCR  Not Detected     Pseudomonas aeruginosa by PCR  Not Detected     Serratia marcescens by PCR  Not Detected     Staph aureus by PCR  Not Detected     Strep agalactiae by PCR  Not Detected     Strep pneumoniae by PCR  Not Detected     Strep pyogenes by PCR  Not Detected     Resistant gene ctx-m by PCR  NA     Resistant gene imp by PCR  NA     Resistant gene kpc by PCR  NA     Resistant gene meca/c & mrej by PCR  NA     Resistant gene ndm by PCR  NA     Resistant gene oxa-48-like by pcr  NA     Resistant gene vim by PCR  NA     Chlamydia pneumoniae By PCR  Not Detected     Legionella Pneumophilia By PCR  Not Detected     Mycoplasma pneumoniae by PCR  Not Detected     Adenovirus by PCR  Not Detected     CORONAVIRUS PCR  Not Detected     Metapneumovirus by PCR  Not Detected     Rhinovirus Enterovirus PCR  Not Detected     Influenza A by PCR  Not Detected     Influenza B by PCR  Not Detected     Parainfluenza virus by PCR  Not Detected     Respiratory Syncytial Virus by PCR  Not Detected        Radiology    CXR  9/14/2020  FINDINGS:    There are stable median sternotomy wires and valvuloplasty. There is stable left-sided cardiac pacer generator with stable leads. The lungs appear grossly clear. Pulmonary vasculature and cardiomediastinal silhouette are within normal limits. Visualized    osseous structures appear grossly intact. CT Scans  CTA Chest W Wo Contrast   9/15/2020  CLINICAL INFORMATION: hemoptysis .         COMPARISON: 5/26/2020         TECHNIQUE: 2-D multiplanar postcontrast CT chest with 3-D MIPS [id number]    All CT scans at this facility use dose modulation, iterative reconstruction, and/or weight-based dosing when appropriate to reduce radiation dose to as low as reasonably achievable.         FINDINGS: There is no PE. There is no aneurysm or dissection. Heart size normal. AICD over the left chest.    No mediastinal, hilar, or axillary lymphadenopathy. Paraseptal emphysematous changes both upper lungs. Stable appearance. Parenchymal scarring right base. No effusions. (See actual reports for details)    9/16/2020   VL DUP LOWER EXTREMITY VENOUS BILATERAL   No evidence of a DVT. Assessment   Acute hypoxic respiratory failure secondary to hemoptysis: Likely caused by supratherapeutic Coumadin levels. INR of 3.99 on admission. 1 unit fresh frozen plasma given this morning. Patient in no acute distress saturating well on 3 L of O2. Supratherapeutic INR: Unclear etiology, hepatic function panel tomorrow a.m. Hypokalemia: Replaced follow-up in the a.m. Factor V Leiden deficiency: On Coumadin. INR supratherapeutic we will give fresh frozen plasma until in therapeutic range.   We will hold Coumadin for now.   Hyperglycemia: Hemoglobin A1c of 6.6, primary following for insulin management  COPD: No signs of acute exacerbation at this time, on Anoro Ellipta at home as well as rescue albuterol as needed. Substitute with Bevespi while in the hospital.  6-minute walk test at last visit showed 4 L/min with exertion and 0 L/min at rest.  Currently on 3 L at rest  ANGEL: Sleep study completed on 6/16/2020. Mild obstructive sleep apnea with nocturnal hypoxia. That time recommendation was made for CPAP titration as her treatment however patient has not completed this yet. No follow-up was made in the office after sleep study. Full Code    Recommendations   -Will follow cultures/cytologies from procedure  -Continue hold Coumadin; hematology to see today   -Continue Bevespi 2 puffs twice daily as an alternative to an [employer]  -We will add duo nebs every 4 hours while awake for wheezing  -Continue monitor hemoglobin and hematocrit  -Continue supplemental oxygen as needed  -Wean oxygen as tolerated keeping SPO2 greater than 92%  -Cardiology following for management of Afib ? Watchman device    Case discussed with nurse and patient/family. Questions and concerns addressed. Meds and Orders reviewed. Electronically signed by   TAMMI Callejas CNP on 9/17/2020 at 2:00 PM    Addendum by Dr. Amanda Alarcon MD:  I have seen and examined the patient independently. Face to face evaluation and examination was performed. The above evaluation and note has been reviewed. Labs and radiographs were reviewed. I Have discussed with DAYA Chang CNP about this patient in detail. The above assessment and plan has been reviewed. Please see my modifications mentioned below. My modifications:  He denied any hemoptysis today Am. No chest pain. As per Ms. Jennifer CNP from Hematology service- No need for anticoagulation   As per Ms. Claudene Piccolo- patient needs anticoagulation for chronic Afib.   I initially planned to start patient on low dose heparin drip with close monitoring for the recurrence of hemoptysis along with coumadin. How ever I was informed by RN MsKennedi Leif Daniel that the patient coughed up fresh blood even before starting on Heparin drip. Plan:  Stop Heparin drip. I spoke with Dr. Marily Santos MD from interventional radiology service at [facility] to perform Bronchial artery embolization procedure. He requested me to consider transferring to tertiary care center for further management. I spoke with Mr. Real Lim service and informed my recommendation of transferring patient to [facility] for evaluation by interventional pulmonary service for further management of bleeding from right upper lobe anterior segment. Will monitor patient closely.       Lima Saha MD 9/17/2020 5:04 PM

## 2020-09-17 NOTE — PROGRESS NOTES
Patient in bed, visitor present. Alert & oriented x4. Unlabored respirations. 02 via nasal cannula 3L. Bed alarm on, call light in reach.

## 2020-09-18 LAB
ANION GAP SERPL CALCULATED.3IONS-SCNC: 7 MEQ/L (ref 8–16)
BUN BLDV-MCNC: 11 MG/DL (ref 7–22)
CALCIUM SERPL-MCNC: 8.8 MG/DL (ref 8.5–10.5)
CHLORIDE BLD-SCNC: 101 MEQ/L (ref 98–111)
CO2: 30 MEQ/L (ref 23–33)
CREAT SERPL-MCNC: 0.9 MG/DL (ref 0.4–1.2)
GFR SERPL CREATININE-BSD FRML MDRD: 86 ML/MIN/1.73M2
GLUCOSE BLD-MCNC: 122 MG/DL (ref 70–108)
GLUCOSE BLD-MCNC: 131 MG/DL (ref 70–108)
GLUCOSE BLD-MCNC: 134 MG/DL (ref 70–108)
GLUCOSE BLD-MCNC: 159 MG/DL (ref 70–108)
GLUCOSE BLD-MCNC: 167 MG/DL (ref 70–108)
HCT VFR BLD CALC: 35.3 % (ref 42–52)
HEMOGLOBIN: 10.2 GM/DL (ref 14–18)
INR BLD: 1.16 (ref 0.85–1.13)
POTASSIUM SERPL-SCNC: 4.2 MEQ/L (ref 3.5–5.2)
SODIUM BLD-SCNC: 138 MEQ/L (ref 135–145)

## 2020-09-18 PROCEDURE — 99232 SBSQ HOSP IP/OBS MODERATE 35: CPT | Performed by: PHYSICIAN ASSISTANT

## 2020-09-18 PROCEDURE — 6370000000 HC RX 637 (ALT 250 FOR IP): Performed by: INTERNAL MEDICINE

## 2020-09-18 PROCEDURE — 85610 PROTHROMBIN TIME: CPT

## 2020-09-18 PROCEDURE — 36415 COLL VENOUS BLD VENIPUNCTURE: CPT

## 2020-09-18 PROCEDURE — 94640 AIRWAY INHALATION TREATMENT: CPT

## 2020-09-18 PROCEDURE — 80048 BASIC METABOLIC PNL TOTAL CA: CPT

## 2020-09-18 PROCEDURE — 1200000003 HC TELEMETRY R&B

## 2020-09-18 PROCEDURE — APPSS30 APP SPLIT SHARED TIME 16-30 MINUTES: Performed by: NURSE PRACTITIONER

## 2020-09-18 PROCEDURE — 85018 HEMOGLOBIN: CPT

## 2020-09-18 PROCEDURE — 85014 HEMATOCRIT: CPT

## 2020-09-18 PROCEDURE — 94760 N-INVAS EAR/PLS OXIMETRY 1: CPT

## 2020-09-18 PROCEDURE — 99232 SBSQ HOSP IP/OBS MODERATE 35: CPT | Performed by: INTERNAL MEDICINE

## 2020-09-18 PROCEDURE — 2580000003 HC RX 258: Performed by: INTERNAL MEDICINE

## 2020-09-18 PROCEDURE — 82948 REAGENT STRIP/BLOOD GLUCOSE: CPT

## 2020-09-18 RX ADMIN — SODIUM CHLORIDE, PRESERVATIVE FREE 10 ML: 5 INJECTION INTRAVENOUS at 21:39

## 2020-09-18 RX ADMIN — GLYCOPYRROLATE AND FORMOTEROL FUMARATE 2 PUFF: 9; 4.8 AEROSOL, METERED RESPIRATORY (INHALATION) at 08:38

## 2020-09-18 RX ADMIN — TAMSULOSIN HYDROCHLORIDE 0.4 MG: 0.4 CAPSULE ORAL at 08:08

## 2020-09-18 RX ADMIN — PANTOPRAZOLE SODIUM 40 MG: 40 TABLET, DELAYED RELEASE ORAL at 05:45

## 2020-09-18 RX ADMIN — BUPRENORPHINE HYDROCHLORIDE, NALOXONE HYDROCHLORIDE 1 FILM: 8; 2 FILM, SOLUBLE BUCCAL; SUBLINGUAL at 08:08

## 2020-09-18 RX ADMIN — ALPRAZOLAM 0.5 MG: 0.5 TABLET ORAL at 08:08

## 2020-09-18 RX ADMIN — QUETIAPINE FUMARATE 300 MG: 100 TABLET ORAL at 21:38

## 2020-09-18 RX ADMIN — SODIUM CHLORIDE, PRESERVATIVE FREE 10 ML: 5 INJECTION INTRAVENOUS at 08:10

## 2020-09-18 RX ADMIN — GLYCOPYRROLATE AND FORMOTEROL FUMARATE 2 PUFF: 9; 4.8 AEROSOL, METERED RESPIRATORY (INHALATION) at 20:44

## 2020-09-18 RX ADMIN — ALPRAZOLAM 0.5 MG: 0.5 TABLET ORAL at 21:38

## 2020-09-18 ASSESSMENT — PAIN SCALES - GENERAL
PAINLEVEL_OUTOF10: 0

## 2020-09-18 NOTE — CARE COORDINATION
9/17/20, 2:59 PM EDT    DISCHARGE ON GOING [address] [facility] day: 2  Location: -02/002-A Reason for admit: Hemoptysis [R04.2]   Procedure: 9/16 bronchoscopy- showed active bleeding. Treatment Plan of Care: Oncology consult for Factor V deficiency, coumadin being held for now. Pulm following, await cx return, duonebs added for wheezing. Cardio following, recommends holding coumadin for a couple weeks. Barriers to Discharge: not medically ready   PCP: Grier Sandifer  Readmission Risk Score: 42%  Patient Goals/Plan/Treatment Preferences: home with son and son's girlfriend. Has home oxygen provided by [employer].                  \
9/18/20, 9:07 AM EDT    DISCHARGE ON GOING [address] [facility] day: 3  Location: -02/002-A Reason for admit: Hemoptysis [R04.2]   Procedure: 9/16 bronchoscopy- showed active bleeding  Treatment Plan of Care: Hgb 10.2. Fran Boyer started coughing up blood again last night. Plan is now to transfer to [facility] for pulmonary artery embolization. Barriers to Discharge: Angelo DURAND initiated transfer to [facility] last night, bed not yet available. PCP: Samantha Perez  Readmission Risk Score: 42%  Patient Goals/Plan/Treatment Preferences: From home with son, has home oxygen provided by [facility]. Plan transfer to [facility]. Spoke with [place] in regard to insurance coverage, Epic shows payor as [employer], but only have standard Medicare and Aetna card scanned in. Naeem de león confirms that the only coverage he has is [employer]. Per Nancie, they do accept his insurance.
Potential Assistance Needed:  N/A  Potential Assistance Purchasing Medications:  No  Does patient want to participate in local refill/ meds to beds program?  Yes  Type of Home Care Services:  None  Patient expects to be discharged to:  Home with son  Expected Discharge date:  09/18/20  Follow Up Appointment: Best Day/ Time:      Patient Goals/Plan/Treatment Preferences: Spoke with Graciela Reyes, he lives at home with his son and his son's girlfriend. He has home oxygen as needed provided by [facility].  He ambulates independently. His son assists with housework, picking up groceries and medications. Graciela Reyes declines [facility], denies discharge needs. Transportation/Food Security/Housekeeping Addressed:  No issues identified.     Evaluation: no

## 2020-09-18 NOTE — PROGRESS NOTES
[facility]    Patient - Freeman Santos   MRN -  [id number]   [facility] # - [de-identified]   - 1960      Date of Admission -  9/15/2020  4:07 AM  Date of evaluation -  2020  Room - -002-   Hospital Day - [address] Primary Care Physician - Samantha Perez   Chief Complaint   Hemoptysis  Active Hospital Problem List      Active Hospital Problems    Diagnosis Date Noted    Hemoptysis [R04.2] 09/15/2020    Supratherapeutic INR [R79.1] 09/15/2020    Hypokalemia [E87.6] 09/15/2020    Factor V Leiden ([address] [D68.51]     Hyperglycemia [R73.9]      HPI   Freeman Santos is a 63 y.o. male admitted for hemoptysis. Patient is a 81-year-old male who presented to [facility] emergency department yesterday evening for an episode of hemoptysis. Patient underwent basic lab work which showed a hemoglobin of 11.5 and was discharged back home. While at home patient had another larger episode of hemoptysis at 4 AM and presented to the emergency department once again. Patient states that the initial episode of hemoptysis was around the size of a dime, however the second larger episode was around a mouthful of blood. Patient has a past medical history of CHF CAD COPD and factor V Leiden deficiency. He is on Coumadin for paroxysmal atrial fibrillation and for factor V Leiden. He is status post BiV pacemaker for the atrial fibrillation. Patient was found to have a supra therapeutic INR at 3.99. He denies any fevers cough or chest pain. Patient does not normally wear any oxygen at home however he may use 3 to 4 L as needed while at home. Patient has a smoking history of 44 years 1 pack/day. Patient quit smoking in 2020. Patient has a significant history of COPD exacerbation requiring hospitalizations in the past.  Last followed with Chastity Grullon on 2020.   6-minute walk test at that time showed the need for 4 L of oxygen with exercise. Last PFT 5/26/2020 showed severe obstruction with moderate reduction in diffusion capacity. Of note patient underwent polysomnography on 6/16/2020. Patient was found to have mild obstructive sleep apnea as well as nocturnal hypoxia. Recommendation was made at that time for patient to follow-up with CPAP titration for treatment. No follow-up was made patient is currently not treated for sleep apnea. Past 24 Hours   -Hemoptysis yesterday none today per pt/RN  -Currently on 3LPM 97%- WEAN!!!!  -OOB to chair  -Awaiting bed at [facility]  -No increased SOB/CP  -No events overnight    -All systems reviewed.      Past Medical History         Diagnosis Date    Anxiety disorder     Arthritis     Asthma     Back pain, chronic     Blood circulation, collateral     4 toes partial amputee on L foot    [employer] BiV ICD 7/1/2014   [facility] [employer] BiV pacemaker 7/1/2014    CAD (coronary artery disease)     CHF (congestive heart failure) (HCC)     Chronic kidney disease     COPD (chronic obstructive pulmonary disease) (HCC)     Depression     Endocarditis     Factor V deficiency (HCC)     Hepatitis     HEPATITIS C    Hepatitis C     History of blood transfusion     Hypertension     Pneumonia       Past Surgical History           Procedure Laterality Date    BRONCHOSCOPY N/A 9/28/2019    BRONCHOSCOPY performed by Molina Mirza MD [facility]    BRONCHOSCOPY N/A 9/28/2019    [facility]    BRONCHOSCOPY N/A 9/29/2019    BRONCHOSCOPY BIOPSY BRONCHUS performed by Molina Mirza MD at [facility]    BRONCHOSCOPY  9/29/2019    BRONCHOSCOPY performed by Molina Mirza MD at [address] [place] [address] N/A 9/30/2019    BRONCHOSCOPY ALVEOLAR LAVAGE performed by Raimundo Polanco MD at [address] [place] [address] N/A 10/16/2019    BRONCHOSCOPY ALVEOLAR LAVAGE performed by Raimundo Polanco MD at [address] [place] [address] BRONCHOSCOPY N/A 2020    BRONCHOSCOPY performed by Anastasia Boucher MD at [address] [facility]  12    Valve replacement    COLONOSCOPY  2016    DEBRIDEMENT Left 2014    lt hand     HAND DEBRIDEMENT Left 2014    INCISION AND DRAINAGE    PACEMAKER PLACEMENT  13    TOE AMPUTATION Bilateral 3/13/2013    1,2,3,4 on left and 2nd on right    TRACHEAL SURGERY N/A 10/18/2019    TRACHEOTOMY PERCUTANEOUS BRONCHOSCOPY performed by Eunice Collins MD at [facility] ENDOSCOPY  2012     Diet    DIET GENERAL;  Allergies    Pcn [penicillins]  Social History     Social History     Socioeconomic History    Marital status:      Spouse name: Not on file    Number of children: 3    Years of education: 9    Highest education level: Not on file   Occupational History    Occupation: on disability   Social Needs    Financial resource strain: Not on file    Food insecurity     Worry: Not on file     Inability: Not on file   [employer] needs     Medical: Not on file     Non-medical: Not on file   Tobacco Use    Smoking status: Former Smoker     Packs/day: 1.00     Years: 44.00     Pack years: 44.00     Types: Cigarettes     Start date: 10/22/1976     Last attempt to quit: 2020     Years since quittin.4    Smokeless tobacco: Former User    Tobacco comment: Weaned to 0.5 PPD last 7 years   Substance and Sexual Activity    Alcohol use: No     Comment: quit 9 years ago    Drug use: No    Sexual activity: Yes     Partners: Female   Lifestyle    Physical activity     Days per week: Not on file     Minutes per session: Not on file    Stress: Not on file   Relationships    Social connections     Talks on phone: Not on file     Gets together: Not on file     Attends [employer] service: Not on file     Active member of club or organization: Not on file     Attends meetings of clubs or organizations: Not on file     Relationship status: Not on file    Intimate partner violence     Fear of current or ex partner: Not on file     Emotionally abused: Not on file     Physically abused: Not on file     Forced sexual activity: Not on file   Other Topics Concern    Not on file   Social History Narrative    Not on file     Family History          Problem Relation Age of Onset    Diabetes Mother     Depression Mother     Arthritis Father     Heart Disease Father     Diabetes Brother     Depression Daughter      Sleep History    PSG 6/16/2020-   IMPRESSION:  1. This is a technically difficult study with a poor sleep efficiency  of 59.9% and absent REM sleep. 2.  Mild obstructive sleep apnea. 3.  Absent REM sleep. 4.  Nocturnal hypoxia. The patient spent a total of 232.7 minutes below  oxygen saturation less than 88%. 5.  The patient usually uses 3 liters of oxygen by nasal canula;  however, no oxygen was applied during the testing. Meds    Current Medications    buprenorphine-naloxone  1 Film Sublingual Daily    pantoprazole  40 mg Oral QAM AC    QUEtiapine  300 mg Oral Nightly    tamsulosin  0.4 mg Oral Daily    glycopyrrolate-formoterol  2 puff Inhalation BID    sodium chloride flush  10 mL Intravenous 2 times per day    insulin lispro  0-6 Units Subcutaneous TID WC    insulin lispro  0-3 Units Subcutaneous Nightly    ALPRAZolam  0.5 mg Oral BID     sodium chloride flush, acetaminophen **OR** acetaminophen, polyethylene glycol, promethazine **OR** ondansetron, magnesium sulfate, potassium chloride **OR** potassium alternative oral replacement **OR** potassium chloride, glucose, dextrose, glucagon (rDNA), dextrose, albuterol  IV Drips/Infusions   dextrose       Vitals    Vitals    height is 5' 9\" (1.753 m) and weight is 252 lb 8 oz (114.5 kg). His oral temperature is 99.1 °F (37.3 °C). His blood pressure is 140/76 (abnormal) and his pulse is 68. His respiration is 18 and oxygen saturation is 97%.      O2 Flow Rate (L/min): 3 L/min  I/O    Intake/Output Summary (Last 24 hours) at 9/18/2020 1108  Last data filed at 9/18/2020 0402  Gross per 24 hour   Intake 100 ml   Output --   Net 100 ml     Patient Vitals for the past 96 hrs (Last 3 readings):   Weight   09/18/20 0355 252 lb 8 oz (114.5 kg)   09/16/20 0301 252 lb 12.8 oz (114.7 kg)   09/15/20 0414 245 lb (111.1 kg)     Exam   Physical Exam   Constitutional: No distress on 3LPM via NC. Patient appears obese BMI 37.33. OOB to chair  Mouth/Throat: Oropharynx is clear and moist.  No oral thrush. MPIII  Eyes: Conjunctivae are normal. Pupils are equal, round. No scleral icterus. Neck: Neck supple. No tracheal deviation present. Cardiovascular: S1 and S2 with no murmur. No peripheral edema  Pulmonary/Chest: Normal effort with bilateral air entry, clear breath sounds with intermittent expiratory wheezing . No stridor. No respiratory distress. Patient exhibits no tenderness. Abdominal: Soft. Bowel sounds audible. No distension or tenderness to palp. Musculoskeletal: Moves all extremities; no clubbing or cyanosis   Neurological: Patient is alert and oriented to person, place, and time. Skin: Warm and dry. Labs   ABG  Lab Results   Component Value Date    PH 7.41 11/27/2019    PO2 71 11/27/2019    PCO2 42 11/27/2019    HCO3 27 11/27/2019    O2SAT 94 11/27/2019     Lab Results   Component Value Date    IFIO2 60 10/10/2019    MODE AC 09/29/2019    SETTIDVOL 350 09/29/2019    SETPEEP 6.0 10/10/2019     CBC  Recent Labs     09/16/20  0211  09/17/20  0739 09/17/20  1928 09/18/20  0620   WBC 4.6*  --   --   --   --    RBC 3.97*  --   --   --   --    HGB 10.1*   < > 10.4* 10.6* 10.2*   HCT 34.3*   < > 35.2* 35.5* 35.3*   MCV 86.4  --   --   --   --    MCH 25.4*  --   --   --   --    MCHC 29.4*  --   --   --   --      --   --   --   --    MPV 10.7  --   --   --   --     < > = values in this interval not displayed.       BMP  Recent Labs     09/16/20 0211 09/18/20  0620    138   K 4.0 4.2    101   CO2 28 30   BUN 13 11   CREATININE 0.9 0.9   GLUCOSE 141* 134*   CALCIUM 9.0 8.8     LFT  Recent Labs     09/16/20  0211   AST 33   ALT 38   BILITOT 0.3   ALKPHOS 78     TROP  Lab Results   Component Value Date    TROPONINT < 0.010 09/14/2020    TROPONINT < 0.010 04/20/2020    TROPONINT < 0.010 03/23/2020     BNP  Lab Results   Component Value Date    PROBNP 456.6 09/14/2020    PROBNP 252.9 04/20/2020    PROBNP 267.3 04/16/2020     D-Dimer  No results found for: DDIMER  Lactic Acid  No results for input(s): LACTA in the last 72 hours. INR  Recent Labs     09/16/20  0210 09/17/20  1701 09/18/20  0620   INR 1.57* 1.11 1.16*     PTT  Recent Labs     09/17/20  1701   APTT 30.9     Glucose  Recent Labs     09/17/20  1608 09/17/20  1951 09/18/20  0739   POCGLU 132* 115* 122*     UA No results for input(s): Jackquelyn Buggy, COLORU, CLARITYU, MUCUS, PROTEINU, BLOODU, RBCUA, WBCUA, BACTERIA, NITRU, GLUCOSEU, BILIRUBINUR, UROBILINOGEN, KETUA, LABCAST, LABCASTTY, AMORPHOS in the last 72 hours. Invalid input(s): CRYSTALS. PFTs         Severe obstruction with moderate reduction in diffusion capacity    Echo     Summary   Technically difficult examination. Ejection fraction was estimated at 50-55%. Left atrial size was severely dilated. Right atrial size was moderately dilated. S/p TV replacement--unable to visualize leaflets. DOPPLER: Mean gradient   5-8 mmHg. V max = 2.1 m/s. There was trace tricuspid regurgitation. Assuming RAP = 20 mmHg, the estimated RVSP = 50 mmHg. Ascending aorta = 3.6 cm. IVC size is dilated with reduced respirophasic variation (CVP~20 mmHg)      Consider THELMA, if clinically indicated, and concerns for TVR dysfunction.       Signature      ----------------------------------------------------------------   Electronically signed by Ruthie Lucas MD (Interpreting   physician) on 09/28/2019 at 11:55 AM ----------------------------------------------------------------    Cultures    Procalcitonin  Lab Results   Component Value Date    PROCAL 0.05 04/20/2020    PROCAL 0.05 04/16/2020    PROCAL 0.03 03/23/2020        SARS-CoV-2, NAAT  NOT DETECTED  NOT DETECTED  Final  09/15/2020  6:24 AM     Bronchoscopy- 9/16/2020  Endobronchial findings: After removing all the blood clots from air ways the following airway exam was performed. Trachea: Normal mucosa.   Luisa: Normal mucosa  Right main bronchus: Normal mucosa  Right upper lobe bronchus: Normal mucosa  Right Middle lobe bronchus: Normal mucosa  Right Lower lobe bronchus:Normal mucosa  Left main bronchus: Normal mucosa  Left upper lobe bronchus: Normal mucosa  Left lower lobe bronchus: Normal mucosa    Cultures (-) thus far  Cytology: pending      Pneumonia Panel, Molecular [[id number]]   Collected: 09/16/20 1445    Order Status: Completed  Updated: 09/16/20 1657     Source  Bronch Washings     Specimen Acceptability  NA     Acinetobacter calcoaceticus-baumannii by PCR  Not Detected     Enterobacter cloacae complex by PCR  Not Detected     Escherichia coli by PCR  Not Detected     Haemophilus Influenzae by PCR  Not Detected     Klebsiella aerogenes by PCR  Not Detected     Klebsiella oxytoca by PCR  Not Detected     Klebsiella pneumoniae by PCR  Not Detected     Moraxella catarrhalis by PCR  Not Detected     Proteus species by PCR  Not Detected     Pseudomonas aeruginosa by PCR  Not Detected     Serratia marcescens by PCR  Not Detected     Staph aureus by PCR  Not Detected     Strep agalactiae by PCR  Not Detected     Strep pneumoniae by PCR  Not Detected     Strep pyogenes by PCR  Not Detected     Resistant gene ctx-m by PCR  NA     Resistant gene imp by PCR  NA     Resistant gene kpc by PCR  NA     Resistant gene meca/c & mrej by PCR  NA     Resistant gene ndm by PCR  NA     Resistant gene oxa-48-like by pcr  NA     Resistant gene vim by PCR  NA     Chlamydia pneumoniae By PCR  Not Detected     Legionella Pneumophilia By PCR  Not Detected     Mycoplasma pneumoniae by PCR  Not Detected     Adenovirus by PCR  Not Detected     CORONAVIRUS PCR  Not Detected     Metapneumovirus by PCR  Not Detected     Rhinovirus Enterovirus PCR  Not Detected     Influenza A by PCR  Not Detected     Influenza B by PCR  Not Detected     Parainfluenza virus by PCR  Not Detected     Respiratory Syncytial Virus by PCR  Not Detected        Radiology    CXR  9/14/2020  FINDINGS:    There are stable median sternotomy wires and valvuloplasty. There is stable left-sided cardiac pacer generator with stable leads. The lungs appear grossly clear. Pulmonary vasculature and cardiomediastinal silhouette are within normal limits. Visualized    osseous structures appear grossly intact. CT Scans  CTA Chest W Wo Contrast   9/15/2020  CLINICAL INFORMATION: hemoptysis .         COMPARISON: 5/26/2020         TECHNIQUE: 2-D multiplanar postcontrast CT chest with 3-D MIPS [id number]    All CT scans at this facility use dose modulation, iterative reconstruction, and/or weight-based dosing when appropriate to reduce radiation dose to as low as reasonably achievable.         FINDINGS: There is no PE. There is no aneurysm or dissection. Heart size normal. AICD over the left chest.    No mediastinal, hilar, or axillary lymphadenopathy. Paraseptal emphysematous changes both upper lungs. Stable appearance. Parenchymal scarring right base. No effusions. (See actual reports for details)    9/16/2020   VL DUP LOWER EXTREMITY VENOUS BILATERAL   No evidence of a DVT. Assessment   Acute hypoxic respiratory failure secondary to hemoptysis: Likely caused by supratherapeutic Coumadin levels. INR of 3.99 on admission. 1 unit fresh frozen plasma given this morning. Patient in no acute distress saturating well on room air.   Supratherapeutic INR: Unclear etiology, hepatic function panel tomorrow a.m.  Hypokalemia: Replaced follow-up in the a.m. Factor V Leiden deficiency: On Coumadin. INR supratherapeutic we will give fresh frozen plasma until in therapeutic range. We will hold Coumadin for now. Hyperglycemia: Hemoglobin A1c of 6.6, primary following for insulin management  COPD: No signs of acute exacerbation at this time, on Anoro Ellipta at home as well as rescue albuterol as needed. Substitute with Bevespi while in the hospital.  6-minute walk test at last visit showed 4 L/min with exertion and 0 L/min at rest.  Currently on 3 L at rest  ANGEL: Sleep study completed on 6/16/2020. Mild obstructive sleep apnea with nocturnal hypoxia. That time recommendation was made for CPAP titration as her treatment however patient has not completed this yet. No follow-up was made in the office after sleep study. Full Code    Recommendations   -Will follow cultures/cytologies from procedure- thus far (-)  -Continue hold Coumadin; hematology to see today   -Continue Bevespi 2 puffs twice daily as an alternative to an [employer]  -We will add duo nebs every 4 hours while awake for wheezing  -Continue monitor hemoglobin and hematocrit  -Continue supplemental oxygen as needed  -Wean oxygen as tolerated keeping SPO2 greater than 92%  -Cardiology following for management of Afib ? Watchman device  -Awaiting bed at [facility] for possible need for right bronchial thermoplasty r/t RUL anterior bleeding   -Stable currently from pulmonary standpoint     Case discussed with nurse and patient/family. Questions and concerns addressed. Meds and Orders reviewed. Electronically signed by   TAMMI Encinas CNP on 9/18/2020 at 11:08 AM    Addendum by Dr. Nuris Read MD:  I have seen and examined the patient independently. Face to face evaluation and examination was performed. The above evaluation and note has been reviewed. Labs and radiographs were reviewed. I Have discussed with DAYA Bennett CNP about this patient in detail. The above assessment and plan has been reviewed. Please see my modifications mentioned below. My modifications:  He is not in any distress. No more hemoptysis. Waiting to be transferred to [facility] for further management of hemoptysis with requirement of anticoagulation.     Maksim Gardiner MD 9/18/2020 3:18 PM

## 2020-09-18 NOTE — PROGRESS NOTES
Hospitalist Progress Note      Patient:  Nicholas Dad    Unit/Bed:6K-02/002-A  YOB: 1960  MRN: [id number]   Acct: [de-identified]   PCP: Neeraj Garcia  Date of Admission: 9/15/2020    Assessment/Plan:    1. Acute on Chronic Hypoxic Respiratory Failure 2/2 hemoptysis 2/2 #2: Initially on 3L via NC, chronically no O2 at rest and 4L with activity. 1. Bronchoscopy 9/16/20 showed active bleeding from RUL anterior segment. Coumadin discontinued on admission with hemoptysis. Currently weaned to 2L via NC with O2 sat 98%. 2. Hemoptysis recurred on 9/17/20 prior to trial of heparin. Discussed case with pulmonology at that time who recommended bronchial artery embolization. Pulm discussed case with IR, Dr. Aleksandra Joaquin who recommended transfer to [facility] on 9/17/20. Transfer initiated on 9/17/20. Contacted transfer center this morning, still awaiting [facility] bed. Stable from pulmonary standpoint. 2. Supratherapeutic INR 2/2 Coumadin: Resolved. INR on admission 3.99, improved to 1.57 s/p 4 units FFP and Vitamin K. Coumadin held on admission, chronically on Coumadin for Paroxysmal A-fib and TVR as above. Documented history of Factor V Leiden but patient adamantly denies ever being diagnosed with Factor V, denies history of PE or DVT. Denies familial history of clotting disorders. Hematology was consulted for assistance regarding anticoagulation recommendations. Per [place], hematology PA, Coumadin recommended for TVR although DOAC can also be considered. 1. Recurrence of hemoptysis as above while off Coumadin and not on heparin. INR today 1.16. Hgb stable at 10.2. 2. Transfer to tertiary care as above for probable bronchial a. Embolization per pulm. Will need anticoagulated in the near future with known TVR and PAF. 3. Hypokalemia: resolved with replacement  4. NIDDM-2: A1c 6.6%, previously 7.7%. Humalog correctional SSI. Monitor. 5. COPD without acute exacerbation: Continue home inhalers. 6. Hx of Endocarditis s/p tricuspid valve replacement: S/p BiV ICD with chronic diastolic HF with EF 45% in 8/2019. Coumadin held as above. 7. Paroxysmal A-fib: Coumadin held as above. 8. Hx of ANGEL: Stable. 9. Hx of IVDU in remission with Hepatitis C: Continue Suboxone. 10. BPH: Continue Flomax. 11. Obesity: Body mass index is 37.33 kg/m². 12. Tobacco Abuse: Discussed cessation. 13. Depression: Continue Seroquel. 14. Documented Factor V Leiden Deficiency: Denies as above. Denies VTE, familial clotting disorders, or known history of Factor V Leiden. Factor V assay sent by hematology but will not result for approximately 3 weeks. Disposition: Awaiting bed at [facility] as above. Chief Complaint: Hemoptysis    Initial H and P:-    From Dr. Hue Bedoya H&P on 9/15/20: \"63year-old  male presents the ED waking up this morning short of breath and coughing up blood.  Patient states he was seen in the ED last night with the same issues and was discharged home patient is on blood thinners but he did not take them tonight.  Patient has a significant past medical history of of ICD/pacemaker, coronary artery disease, CHF, COPD, factor V Leiden deficiency, and valvular repair, initial INR was 4.5 earlier today it was rechecked on this admission it was 3.99.  Patient denies fever, cough, chest pain.  Patient denies lightheadedness, dizziness, fainting, leg pain, leg swelling, leg redness he is currently on oxygen 3 to 4 L at home.  Patient's pulmonologist is Dr. Sydnee Pena, case is been discussed with him. \"    Subjective (past 24 hours):   Evaluated resting completely in his bed. States that he has had several recurrent episodes of dark red/clotting hemoptysis overnight. Denies any chest pain or worsening shortness of breath. Currently on 3 L via nasal cannula. No productive cough, fever, chills.   Denies any abdominal pain, constipation, diarrhea, nausea, or vomiting. Discussed with patient current plan of care including [facility] bed. He understands and is agreeable. No further questions at this time. Past medical history, family history, social history and allergies reviewed again and is unchanged since admission. ROS (12 point review of systems completed. Pertinent positives noted. Otherwise ROS is negative)     Medications:  Reviewed    Infusion Medications    dextrose       Scheduled Medications    buprenorphine-naloxone  1 Film Sublingual Daily    pantoprazole  40 mg Oral QAM AC    QUEtiapine  300 mg Oral Nightly    tamsulosin  0.4 mg Oral Daily    glycopyrrolate-formoterol  2 puff Inhalation BID    sodium chloride flush  10 mL Intravenous 2 times per day    insulin lispro  0-6 Units Subcutaneous TID WC    insulin lispro  0-3 Units Subcutaneous Nightly    ALPRAZolam  0.5 mg Oral BID     PRN Meds: sodium chloride flush, acetaminophen **OR** acetaminophen, polyethylene glycol, promethazine **OR** ondansetron, magnesium sulfate, potassium chloride **OR** potassium alternative oral replacement **OR** potassium chloride, glucose, dextrose, glucagon (rDNA), dextrose, albuterol      Intake/Output Summary (Last 24 hours) at 9/18/2020 1354  Last data filed at 9/18/2020 0402  Gross per 24 hour   Intake 100 ml   Output --   Net 100 ml       Diet:  DIET GENERAL;    Exam:  /71   Pulse 75   Temp 98.1 °F (36.7 °C) (Oral)   Resp 18   Ht 5' 9\" (1.753 m)   Wt 252 lb 8 oz (114.5 kg)   SpO2 98%   BMI 37.29 kg/m²   General appearance: No apparent distress, appears stated age and cooperative. HEENT: Pupils equal, round, and reactive to light. Conjunctivae/corneas clear. Neck: Supple, with full range of motion. No jugular venous distention. Trachea midline. Respiratory:  Normal respiratory effort on 3L via NC. Clear to auscultation, bilaterally without Rales/Wheezes/Rhonchi.   Cardiovascular: Regular rate and rhythm with normal S1/S2 without murmurs, rubs or gallops. Abdomen: Soft, non-tender, non-distended with normal bowel sounds. Musculoskeletal: passive and active ROM x 4 extremities. Skin: Skin color, texture, turgor normal.  No rashes or lesions. Neurologic:  Neurovascularly intact without any focal sensory/motor deficits. Cranial nerves: II-XII intact, grossly non-focal.  Psychiatric: Alert and oriented, thought content appropriate, normal insight  Capillary Refill: Brisk,< 3 seconds   Peripheral Pulses: +2 palpable, equal bilaterally     Labs:   Recent Labs     09/16/20  0211  09/17/20  0739 09/17/20  1928 09/18/20  0620   WBC 4.6*  --   --   --   --    HGB 10.1*   < > 10.4* 10.6* 10.2*   HCT 34.3*   < > 35.2* 35.5* 35.3*     --   --   --   --     < > = values in this interval not displayed. Recent Labs     09/16/20 0211 09/18/20  0620    138   K 4.0 4.2    101   CO2 28 30   BUN 13 11   CREATININE 0.9 0.9   CALCIUM 9.0 8.8     Recent Labs     09/16/20 0211   AST 33   ALT 38   BILIDIR <0.2   BILITOT 0.3   ALKPHOS 78     Recent Labs     09/16/20  0210 09/17/20  1701 09/18/20  0620   INR 1.57* 1.11 1.16*     No results for input(s): [place] [place] in the last 72 hours. Microbiology:    Blood culture #1:   Lab Results   Component Value Date    BC No growth-preliminary No growth  04/20/2020       Blood culture #2:No results found for: Khalil Childes    Organism:  Lab Results   Component Value Date    ORG Staphylococcus aureus 02/21/2020         Lab Results   Component Value Date    LABGRAM  09/16/2020     Few segmented neutrophils observed. Rare epithelial cells observed. No bacteria seen.         MRSA culture only:No results found for: [facility]    Urine culture:   Lab Results   Component Value Date    LABURIN [place] count: 10,000-50,000 CFU/mL  10/10/2019    LABURIN [place] count: 10,000-50,000 CFU/mL  10/10/2019       Respiratory culture: No results found for: CULTRESP    Aerobic and Anaerobic :  No results found for: LABAERO  No results found for: LABANAE    Urinalysis:      Lab Results   Component Value Date    NITRU NEGATIVE 04/22/2020    WBCUA 0-2 11/27/2019    WBCUA >100 10/23/2019    BACTERIA NONE SEEN 11/27/2019    RBCUA 5-10 11/27/2019    BLOODU NEGATIVE 04/22/2020    SPECGRAV 1.006 03/14/2014    GLUCOSEU NEGATIVE 04/22/2020       Radiology:  VL DUP LOWER EXTREMITY VENOUS BILATERAL   Final Result   No evidence of a DVT. **This report has been created using voice recognition software. It may contain minor errors which are inherent in voice recognition technology. **      Final report electronically signed by Dr Liam Chavira on 9/16/2020 2:40 PM      CTA CHEST W WO CONTRAST   Final Result   No PE. No acute findings. Stable appearance            **This report has been created using voice recognition software. It may contain minor errors which are inherent in voice recognition technology. **      Final report electronically signed by Dr. Hiram Cresw on 9/15/2020 5:46 AM        Cta Chest W Wo Contrast    Result Date: 9/15/2020  PROCEDURE: CTA CHEST W WO CONTRAST CLINICAL INFORMATION: hemoptysis . COMPARISON: 5/26/2020 TECHNIQUE: 2-D multiplanar postcontrast CT chest with 3-D MIPS [id number] All CT scans at this facility use dose modulation, iterative reconstruction, and/or weight-based dosing when appropriate to reduce radiation dose to as low as reasonably achievable. FINDINGS: There is no PE. There is no aneurysm or dissection. Heart size normal. AICD over the left chest. No mediastinal, hilar, or axillary lymphadenopathy. Paraseptal emphysematous changes both upper lungs. Stable appearance. Parenchymal scarring right base. No effusions. No PE. No acute findings. Stable appearance **This report has been created using voice recognition software. It may contain minor errors which are inherent in voice recognition technology. ** Final report electronically signed by Dr. Hiram Crews on 9/15/2020 5:46

## 2020-09-19 VITALS
HEIGHT: 69 IN | DIASTOLIC BLOOD PRESSURE: 65 MMHG | WEIGHT: 251.1 LBS | HEART RATE: 76 BPM | OXYGEN SATURATION: 88 % | BODY MASS INDEX: 37.19 KG/M2 | RESPIRATION RATE: 16 BRPM | SYSTOLIC BLOOD PRESSURE: 125 MMHG | TEMPERATURE: 98 F

## 2020-09-19 LAB
FACTOR V ASSAY: NORMAL
GLUCOSE BLD-MCNC: 111 MG/DL (ref 70–108)
GLUCOSE BLD-MCNC: 156 MG/DL (ref 70–108)
GLUCOSE BLD-MCNC: 183 MG/DL (ref 70–108)
GRAM STAIN RESULT: NORMAL
HCT VFR BLD CALC: 36.6 % (ref 42–52)
HEMOGLOBIN: 11 GM/DL (ref 14–18)
INR BLD: 1.08 (ref 0.85–1.13)
MISC. #1 REFERENCE GROUP TEST: NORMAL
RESPIRATORY CULTURE: NORMAL

## 2020-09-19 PROCEDURE — 85610 PROTHROMBIN TIME: CPT

## 2020-09-19 PROCEDURE — 94760 N-INVAS EAR/PLS OXIMETRY 1: CPT

## 2020-09-19 PROCEDURE — 85014 HEMATOCRIT: CPT

## 2020-09-19 PROCEDURE — 6370000000 HC RX 637 (ALT 250 FOR IP): Performed by: INTERNAL MEDICINE

## 2020-09-19 PROCEDURE — 2580000003 HC RX 258: Performed by: INTERNAL MEDICINE

## 2020-09-19 PROCEDURE — 85018 HEMOGLOBIN: CPT

## 2020-09-19 PROCEDURE — 99232 SBSQ HOSP IP/OBS MODERATE 35: CPT | Performed by: INTERNAL MEDICINE

## 2020-09-19 PROCEDURE — 36415 COLL VENOUS BLD VENIPUNCTURE: CPT

## 2020-09-19 PROCEDURE — 94640 AIRWAY INHALATION TREATMENT: CPT

## 2020-09-19 PROCEDURE — 82948 REAGENT STRIP/BLOOD GLUCOSE: CPT

## 2020-09-19 PROCEDURE — 99238 HOSP IP/OBS DSCHRG MGMT 30/<: CPT | Performed by: PHYSICIAN ASSISTANT

## 2020-09-19 RX ORDER — ASPIRIN 81 MG/1
81 TABLET ORAL DAILY
Qty: 30 TABLET | Refills: 3
Start: 2020-09-19 | End: 2021-04-26

## 2020-09-19 RX ORDER — WARFARIN SODIUM 5 MG/1
TABLET ORAL
Qty: 30 TABLET | Refills: 3
Start: 2020-09-19 | End: 2020-10-12 | Stop reason: ALTCHOICE

## 2020-09-19 RX ADMIN — PANTOPRAZOLE SODIUM 40 MG: 40 TABLET, DELAYED RELEASE ORAL at 06:24

## 2020-09-19 RX ADMIN — TAMSULOSIN HYDROCHLORIDE 0.4 MG: 0.4 CAPSULE ORAL at 09:03

## 2020-09-19 RX ADMIN — GLYCOPYRROLATE AND FORMOTEROL FUMARATE 2 PUFF: 9; 4.8 AEROSOL, METERED RESPIRATORY (INHALATION) at 08:00

## 2020-09-19 RX ADMIN — GLYCOPYRROLATE AND FORMOTEROL FUMARATE 2 PUFF: 9; 4.8 AEROSOL, METERED RESPIRATORY (INHALATION) at 18:06

## 2020-09-19 RX ADMIN — ALPRAZOLAM 0.5 MG: 0.5 TABLET ORAL at 09:07

## 2020-09-19 RX ADMIN — SODIUM CHLORIDE, PRESERVATIVE FREE 10 ML: 5 INJECTION INTRAVENOUS at 09:04

## 2020-09-19 RX ADMIN — BUPRENORPHINE HYDROCHLORIDE, NALOXONE HYDROCHLORIDE 1 FILM: 8; 2 FILM, SOLUBLE BUCCAL; SUBLINGUAL at 09:07

## 2020-09-19 RX ADMIN — ACETAMINOPHEN 650 MG: 325 TABLET ORAL at 09:07

## 2020-09-19 ASSESSMENT — PAIN DESCRIPTION - PROGRESSION
CLINICAL_PROGRESSION: NOT CHANGED

## 2020-09-19 ASSESSMENT — PAIN DESCRIPTION - FREQUENCY: FREQUENCY: CONTINUOUS

## 2020-09-19 ASSESSMENT — PAIN DESCRIPTION - LOCATION: LOCATION: HEAD

## 2020-09-19 ASSESSMENT — PAIN DESCRIPTION - ONSET: ONSET: ON-GOING

## 2020-09-19 ASSESSMENT — PAIN SCALES - GENERAL
PAINLEVEL_OUTOF10: 2
PAINLEVEL_OUTOF10: 0
PAINLEVEL_OUTOF10: 0

## 2020-09-19 ASSESSMENT — PAIN - FUNCTIONAL ASSESSMENT: PAIN_FUNCTIONAL_ASSESSMENT: ACTIVITIES ARE NOT PREVENTED

## 2020-09-19 ASSESSMENT — PAIN DESCRIPTION - ORIENTATION: ORIENTATION: MID

## 2020-09-19 ASSESSMENT — PAIN DESCRIPTION - DESCRIPTORS: DESCRIPTORS: ACHING

## 2020-09-19 ASSESSMENT — PAIN DESCRIPTION - PAIN TYPE: TYPE: ACUTE PAIN

## 2020-09-19 NOTE — DISCHARGE SUMMARY
Hospitalist Discharge Summary        Patient: Tarik Roa  YOB: 1960  MRN: [id number]   Acct: [de-identified]    Primary Care Physician: Mary Cool    Admit date  9/15/2020    Discharge date:  9/19/2020 12:25 PM    Chief Complaint on presentation :-  Hemoptysis    Discharge Assessment and Plan:-   1. Acute on Chronic Hypoxic Respiratory Failure 2/2 hemoptysis 2/2 #2: Initially on 3L via NC, chronically no O2 at rest and 4L with activity. 1. Bronchoscopy 9/16/20 showed active bleeding from RUL anterior segment. Coumadin discontinued on admission with hemoptysis. Currently weaned to 1L via NC with O2 sat 91%. 2. Hemoptysis recurred on 9/17/20 prior to trial of heparin. Discussed case with pulmonology at that time who recommended bronchial artery embolization. Pulm discussed case with IR, Dr. Frankie Clinton who recommended transfer to tertiary [facility] center on 9/17/20. Transfer initiated on 9/17/20.   3. Patient accepted to room 738 at [facility]. Transfer will occur this afternoon. 2. Supratherapeutic INR 2/2 Coumadin: Resolved. INR on admission 3.99, improved to 1.57 s/p 4 units FFP and Vitamin K. Coumadin held on admission, chronically on Coumadin for Paroxysmal A-fib and TVR as above. Documented history of Factor V Leiden but patient adamantly denies ever being diagnosed with Factor V, denies history of PE or DVT. Denies familial history of clotting disorders. Hematology was consulted for assistance regarding anticoagulation recommendations. Per Milla Dudley, hematology PA, Coumadin recommended for TVR although DOAC can also be considered. 1. Recurrence of hemoptysis as above while off Coumadin and not on heparin. INR today 1.08. Hgb stable at 11.0.   2. Transfer to tertiary care as above for probable bronchial a. Embolization per pulm. Will need anticoagulated in the near future with known TVR and PAF. 3. Hypokalemia: resolved with replacement  4. NIDDM-2: A1c 6.6%, previously 7.7%.  Humalog correctional SSI. Monitor. 5. COPD without acute exacerbation: Continue home inhalers. 6. Hx of Endocarditis s/p tricuspid valve replacement: S/p BiV ICD with chronic diastolic HF with EF 81% in 8/2019. Coumadin held as above. 7. Paroxysmal A-fib: Coumadin held as above. 8. Hx of ANGEL: Stable. 9. Hx of IVDU in remission with Hepatitis C: Continue Suboxone. 10. BPH: Continue Flomax. 11. Obesity: Body mass index is 37.33 kg/m². 12. Tobacco Abuse: Discussed cessation. 13. Depression: Continue Seroquel. 14. Documented Factor V Leiden Deficiency: Denies as above. Denies VTE, familial clotting disorders, or known history of Factor V Leiden. Factor V assay sent by hematology but will not result for approximately 3 weeks. Initial H and P and Hospital course:-  From Dr. Hernandez's H&P on 9/15/20: \"63year-old  male presents the ED waking up this morning short of breath and coughing up blood.  Patient states he was seen in the ED last night with the same issues and was discharged home patient is on blood thinners but he did not take them tonight.  Patient has a significant past medical history of of ICD/pacemaker, coronary artery disease, CHF, COPD, factor V Leiden deficiency, and valvular repair, initial INR was 4.5 earlier today it was rechecked on this admission it was 3.99.  Patient denies fever, cough, chest pain.  Patient denies lightheadedness, dizziness, fainting, leg pain, leg swelling, leg redness he is currently on oxygen 3 to 4 L at home.  Patient's pulmonologist is Dr. Nikos Wing, case is been discussed with him. \"    Patient initially admitted on 9/15/20, pulmonology was consulted same day. INR on admission was 3.99, transfused with 4 units FFP and Vitamin K due to active hemoptysis. Coumadin was held on admission.  Pulmonology completed bronchoscopy on 9/16/20 which revealed hemoptysis 2/2 active bleeding from right upper lobe anterior segment, area was lavaged with normal saline, 2mL diluted epinephrine applied to secure hemostasis. Following this procedure, Coumadin was not resumed, patient not started on heparin. He had recurrence of hemoptysis on 9/17/20. Pulm recommending bronchial artery embolization, IR unable to complete in house. Anticoagulation held per pulm recommendations to prevent continued hemoptysis. Recommended transfer to tertiary facility. Transfer initiated 9/17/20 and patient accepted pending bed placement. Documented history of Factor V Leiden deficiency, patient denies clotting disorders or family history of clots. Factor V leiden assay sent out but will not result for 3 weeks per heme. Hematology consulted and recommending resuming coumadin for history of TVR and known PAF, however, holding anticoag due to above. Stable for transfer per pulmonary. Discharging to [facility] on 9/19/20 for bronchial artery embolization. Patient understands and is agreeable. No further concerns or complaints. Physical Exam:-  Vitals:   Patient Vitals for the past 24 hrs:   BP Temp Temp src Pulse Resp SpO2 Weight   09/19/20 1133 112/61 98.7 °F (37.1 °C) Oral 82 12 91 % --   09/19/20 0900 100/61 99 °F (37.2 °C) Oral 82 16 91 % --   09/19/20 0800 -- -- -- -- -- 90 % --   09/19/20 0252 108/61 98.5 °F (36.9 °C) Oral 82 18 92 % 251 lb 1.6 oz (113.9 kg)   09/19/20 0045 116/70 98.4 °F (36.9 °C) Oral 81 18 91 % --   09/18/20 2115 (!) 142/62 98.2 °F (36.8 °C) Oral 78 18 92 % --   09/18/20 2044 -- -- -- -- 16 92 % --   09/18/20 1727 -- -- -- -- -- 91 % --   09/18/20 1504 (!) 141/76 97.7 °F (36.5 °C) Oral 67 18 97 % --     Weight:   Weight: 251 lb 1.6 oz (113.9 kg)   24 hour intake/output:     Intake/Output Summary (Last 24 hours) at 9/19/2020 1225  Last data filed at 9/18/2020 7536  Gross per 24 hour   Intake 10 ml   Output --   Net 10 ml       General appearance: No apparent distress, appears stated age and cooperative. HEENT: Pupils equal, round, and reactive to light.  Conjunctivae/corneas clear.  Neck: Supple, with full range of motion. No jugular venous distention. Trachea midline. Respiratory:  Normal respiratory effort on 1L via NC. Clear to auscultation, bilaterally without Rales/Wheezes/Rhonchi. Cardiovascular: Regular rate and rhythm with normal S1/S2 without murmurs, rubs or gallops. Abdomen: Soft, non-tender, non-distended with normal bowel sounds. Musculoskeletal: passive and active ROM x 4 extremities. Skin: Skin color, texture, turgor normal.  No rashes or lesions. Neurologic:  Neurovascularly intact without any focal sensory/motor deficits.  Cranial nerves: II-XII intact, grossly non-focal.  Psychiatric: Alert and oriented, thought content appropriate, normal insight  Capillary Refill: Brisk,< 3 seconds   Peripheral Pulses: +2 palpable, equal bilaterally       Discharge Medications:-      Medication List      STOP taking these medications    busPIRone 30 MG tablet  Commonly known as:  BUSPAR        ASK your doctor about these medications    * albuterol (2.5 MG/3ML) 0.083% nebulizer solution  Commonly known as:  PROVENTIL  Take 6 mLs by nebulization every 4 hours as needed for Wheezing or Shortness of Breath     * albuterol sulfate  (90 Base) MCG/ACT inhaler  Commonly known as:  ProAir HFA  Inhale 2 puffs into the lungs every 6 hours as needed for Wheezing or Shortness of Breath     ALPRAZolam 0.5 MG tablet  Commonly known as:  XANAX     amLODIPine 5 MG tablet  Commonly known as:  NORVASC  Take 1 tablet by mouth daily Take 5mg  By mouth qd     Anoro Ellipta 62.5-25 MCG/INH Aepb inhaler  Generic drug:  umeclidinium-vilanterol  Inhale 1 puff into the lungs daily     aspirin EC 81 MG EC tablet  Take 1 tablet by mouth daily     furosemide 40 MG tablet  Commonly known as:  LASIX  Take 1 tablet by mouth daily     guaiFENesin 600 MG extended release tablet  Commonly known as:  MUCINEX  Take 1 tablet by mouth 2 times daily     lisinopril 2.5 MG tablet  Commonly known as: PRINIVIL;ZESTRIL  Take 1 tablet by mouth daily     pantoprazole 20 MG tablet  Commonly known as:  PROTONIX     potassium chloride 20 MEQ extended release tablet  Commonly known as:  KLOR-CON M  Take 1 tablet by mouth 2 times daily     QUEtiapine 300 MG tablet  Commonly known as:  SEROQUEL  Ask about: Which instructions should I use? Suboxone 8-2 MG Film SL film  Generic drug:  buprenorphine-naloxone     tamsulosin 0.4 MG capsule  Commonly known as:  FLOMAX     warfarin 5 MG tablet  Commonly known as:  COUMADIN         * This list has 2 medication(s) that are the same as other medications prescribed for you. Read the directions carefully, and ask your doctor or other care provider to review them with you.                  Labs :-  Recent Results (from the past 72 hour(s))   POCT glucose    Collection Time: 09/16/20  1:47 PM   Result Value Ref Range    POC Glucose 126 (H) 70 - 108 mg/dl   Urine Drug Screen    Collection Time: 09/16/20  2:00 PM   Result Value Ref Range    AMPHETAMINE+METHAMPHETAMINE URINE SCREEN Negative NEGATIVE    Barbiturate Quant, Ur Negative NEGATIVE    Benzodiazepine Quant, Ur POSITIVE NEGATIVE    Cannabinoid Quant, Ur POSITIVE NEGATIVE    Cocaine Metab Quant, Ur Negative NEGATIVE    Opiates, Urine Negative NEGATIVE    Oxycodone Negative NEGATIVE    PCP Quant, Ur Negative NEGATIVE   Pneumonia Panel, Molecular    Collection Time: 09/16/20  2:45 PM   Result Value Ref Range    Source Bronch Washings     Specimen Acceptability NA     Acinetobacter calcoaceticus-baumannii by PCR Not Detected Not Detected    Enterobacter cloacae complex by PCR Not Detected Not Detected    Escherichia coli by PCR Not Detected Not Detected    Haemophilus Influenzae by PCR Not Detected Not Detected    Klebsiella aerogenes by PCR Not Detected Not Detected    Klebsiella oxytoca by PCR Not Detected Not Detected    Klebsiella pneumoniae by PCR Not Detected Not Detected    Moraxella catarrhalis by PCR Not Detected Not Detected    Proteus species by PCR Not Detected Not Detected    Pseudomonas aeruginosa by PCR Not Detected Not Detected    Serratia marcescens by PCR Not Detected Not Detected    Staph aureus by PCR Not Detected Not Detected    Strep agalactiae by PCR Not Detected Not Detected    Strep pneumoniae by PCR Not Detected Not Detected    Strep pyogenes by PCR Not Detected Not Detected    Resistant gene ctx-m by PCR NA Not Detected    Resistant gene imp by PCR NA Not Detected    Resistant gene kpc by PCR NA Not Detected    Resistant gene meca/c & mrej by PCR NA Not Detected    Resistant gene ndm by PCR NA Not Detected    Resistant gene oxa-48-like by pcr NA Not Detected    Resistant gene vim by PCR NA Not Detected    Chlamydia pneumoniae By PCR Not Detected Not Detected    Legionella Pneumophilia By PCR Not Detected Not Detected    Mycoplasma pneumoniae by PCR Not Detected Not Detected    Adenovirus by PCR Not Detected Not Detected    CORONAVIRUS PCR Not Detected Not Detected    Metapneumovirus by PCR Not Detected Not Detected    Rhinovirus Enterovirus PCR Not Detected Not Detected    Influenza A by PCR Not Detected Not Detected    Influenza B by PCR Not Detected Not Detected    Parainfluenza virus by PCR Not Detected Not Detected    Respiratory Syncytial Virus by PCR Not Detected Not Detected   POCT glucose    Collection Time: 09/16/20  3:22 PM   Result Value Ref Range    POC Glucose 186 (H) 70 - 108 mg/dl   Hemoglobin and Hematocrit, Blood    Collection Time: 09/16/20  7:48 PM   Result Value Ref Range    Hemoglobin 10.4 (L) 14.0 - 18.0 gm/dl    Hematocrit 35.0 (L) 42.0 - 52.0 %   POCT glucose    Collection Time: 09/16/20  8:09 PM   Result Value Ref Range    POC Glucose 148 (H) 70 - 108 mg/dl   POCT glucose    Collection Time: 09/17/20  6:39 AM   Result Value Ref Range    POC Glucose 133 (H) 70 - 108 mg/dl   Hemoglobin and Hematocrit, Blood    Collection Time: 09/17/20  7:39 AM   Result Value Ref Range    Hemoglobin 10.4 (L) 14.0 - 18.0 gm/dl    Hematocrit 35.2 (L) 42.0 - 52.0 %   POCT glucose    Collection Time: 09/17/20 11:00 AM   Result Value Ref Range    POC Glucose 121 (H) 70 - 108 mg/dl   POCT glucose    Collection Time: 09/17/20  4:08 PM   Result Value Ref Range    POC Glucose 132 (H) 70 - 108 mg/dl   APTT    Collection Time: 09/17/20  5:01 PM   Result Value Ref Range    aPTT 30.9 22.0 - 38.0 seconds   Protime-INR    Collection Time: 09/17/20  5:01 PM   Result Value Ref Range    INR 1.11 0.85 - 1.13   Hemoglobin and Hematocrit, Blood    Collection Time: 09/17/20  7:28 PM   Result Value Ref Range    Hemoglobin 10.6 (L) 14.0 - 18.0 gm/dl    Hematocrit 35.5 (L) 42.0 - 52.0 %   POCT glucose    Collection Time: 09/17/20  7:51 PM   Result Value Ref Range    POC Glucose 115 (H) 70 - 108 mg/dl   Protime-INR    Collection Time: 09/18/20  6:20 AM   Result Value Ref Range    INR 1.16 (H) 0.85 - 0.83   Basic Metabolic Panel    Collection Time: 09/18/20  6:20 AM   Result Value Ref Range    Sodium 138 135 - 145 meq/L    Potassium 4.2 3.5 - 5.2 meq/L    Chloride 101 98 - 111 meq/L    CO2 30 23 - 33 meq/L    Glucose 134 (H) 70 - 108 mg/dL    BUN 11 7 - 22 mg/dL    CREATININE 0.9 0.4 - 1.2 mg/dL    Calcium 8.8 8.5 - 10.5 mg/dL   Hemoglobin and Hematocrit, Blood    Collection Time: 09/18/20  6:20 AM   Result Value Ref Range    Hemoglobin 10.2 (L) 14.0 - 18.0 gm/dl    Hematocrit 35.3 (L) 42.0 - 52.0 %   Anion Gap    Collection Time: 09/18/20  6:20 AM   Result Value Ref Range    Anion Gap 7.0 (L) 8.0 - 16.0 meq/L   Glomerular Filtration Rate, Estimated    Collection Time: 09/18/20  6:20 AM   Result Value Ref Range    Est, Glom Filt Rate 86 (A) ml/min/1.73m2   POCT glucose    Collection Time: 09/18/20  7:39 AM   Result Value Ref Range    POC Glucose 122 (H) 70 - 108 mg/dl   POCT glucose    Collection Time: 09/18/20 11:33 AM   Result Value Ref Range    POC Glucose 167 (H) 70 - 108 mg/dl   POCT glucose    Collection Time: 09/18/20  4:16 PM   Result Value Ref Range    POC Glucose 131 (H) 70 - 108 mg/dl   POCT glucose    Collection Time: 09/18/20  8:22 PM   Result Value Ref Range    POC Glucose 159 (H) 70 - 108 mg/dl   Protime-INR    Collection Time: 09/19/20  6:11 AM   Result Value Ref Range    INR 1.08 0.85 - 1.13   Hemoglobin and hematocrit, blood    Collection Time: 09/19/20  6:11 AM   Result Value Ref Range    Hemoglobin 11.0 (L) 14.0 - 18.0 gm/dl    Hematocrit 36.6 (L) 42.0 - 52.0 %   POCT glucose    Collection Time: 09/19/20  7:11 AM   Result Value Ref Range    POC Glucose 183 (H) 70 - 108 mg/dl   POCT glucose    Collection Time: 09/19/20 11:08 AM   Result Value Ref Range    POC Glucose 156 (H) 70 - 108 mg/dl        Microbiology:    Blood culture #1:   Lab Results   Component Value Date    BC No growth-preliminary No growth  04/20/2020       Blood culture #2:No results found for: BLOODCULT2    Organism:    Lab Results   Component Value Date    LABGRAM  09/16/2020     Few segmented neutrophils observed. Rare epithelial cells observed. No bacteria seen. MRSA culture only:No results found for: [address] [place] [address]    Urine culture:   Lab Results   Component Value Date    LABURIN [place] count: 10,000-50,000 CFU/mL  10/10/2019    LABURIN [place] count: 10,000-50,000 CFU/mL  10/10/2019     Lab Results   Component Value Date    ORG Staphylococcus aureus 02/21/2020        Respiratory culture: No results found for: CULTRESP    Aerobic and Anaerobic :  No results found for: LABAERO  No results found for: LABANAE    Urinalysis:      Lab Results   Component Value Date    NITRU NEGATIVE 04/22/2020    WBCUA 0-2 11/27/2019    WBCUA >100 10/23/2019    BACTERIA NONE SEEN 11/27/2019    RBCUA 5-10 11/27/2019    BLOODU NEGATIVE 04/22/2020    SPECGRAV 1.006 03/14/2014    GLUCOSEU NEGATIVE 04/22/2020       Radiology:-  Cta Chest W Wo Contrast    Result Date: 9/15/2020  PROCEDURE: CTA CHEST W WO CONTRAST CLINICAL INFORMATION: hemoptysis .  COMPARISON: 5/26/2020 TECHNIQUE: 2-D multiplanar

## 2020-09-19 NOTE — PROGRESS NOTES
[facility]    Patient - Ro Bennett   MRN -  [id number]   [facility]   - 1960      Date of Admission -  9/15/2020  4:07 AM  Date of evaluation -  2020  Room - 6-002-A   Hospital Day - [address] [place] [address] Primary Care Physician - Faith Ascencio   Chief Complaint   Hemoptysis  Active Hospital Problem List      Active Hospital Problems    Diagnosis Date Noted    Hemoptysis [R04.2] 09/15/2020    Supratherapeutic INR [R79.1] 09/15/2020    Hypokalemia [E87.6] 09/15/2020    Factor V Leiden ([address] [D68.51]     Hyperglycemia [R73.9]      HPI   Ro Bennett is a 63 y.o. male admitted for hemoptysis. Patient is a 41-year-old male who presented to [facility] emergency department yesterday evening for an episode of hemoptysis. Patient underwent basic lab work which showed a hemoglobin of 11.5 and was discharged back home. While at home patient had another larger episode of hemoptysis at 4 AM and presented to the emergency department once again. Patient states that the initial episode of hemoptysis was around the size of a dime, however the second larger episode was around a mouthful of blood. Patient has a past medical history of CHF CAD COPD and factor V Leiden deficiency. He is on Coumadin for paroxysmal atrial fibrillation and for factor V Leiden. He is status post BiV pacemaker for the atrial fibrillation. Patient was found to have a supra therapeutic INR at 3.99. He denies any fevers cough or chest pain. Patient does not normally wear any oxygen at home however he may use 3 to 4 L as needed while at home. Patient has a smoking history of 44 years 1 pack/day. Patient quit smoking in 2020. Patient has a significant history of COPD exacerbation requiring hospitalizations in the past.  Last followed with Dwayne Naranjo on 2020.   6-minute walk test at that time showed the need for 4 L of oxygen with exercise. Last PFT 5/26/2020 showed severe obstruction with moderate reduction in diffusion capacity. Of note patient underwent polysomnography on 6/16/2020. Patient was found to have mild obstructive sleep apnea as well as nocturnal hypoxia. Recommendation was made at that time for patient to follow-up with CPAP titration for treatment. No follow-up was made patient is currently not treated for sleep apnea. Past 24 Hours   -No acute overnight events  -Continues to have productive cough with brown-colored sputum  -Plan is for transfer to [facility] bed is available but awaiting transport for further recommendation on anticoagulation  -No shortness of breath or chest pain    -All systems reviewed.      Past Medical History         Diagnosis Date    Anxiety disorder     Arthritis     Asthma     Back pain, chronic     Blood circulation, collateral     4 toes partial amputee on L foot    [employer] BiV ICD 7/1/2014   [facility] [employer] BiV pacemaker 7/1/2014    CAD (coronary artery disease)     CHF (congestive heart failure) (HCC)     Chronic kidney disease     COPD (chronic obstructive pulmonary disease) (HCC)     Depression     Endocarditis     Factor V deficiency (HCC)     Hepatitis     HEPATITIS C    Hepatitis C     History of blood transfusion     Hypertension     Pneumonia       Past Surgical History           Procedure Laterality Date    BRONCHOSCOPY N/A 9/28/2019    [facility]    BRONCHOSCOPY N/A 9/28/2019    BRONCHOSCOPY performed by Olga Walker MD at [facility]    BRONCHOSCOPY N/A 9/29/2019    BRONCHOSCOPY BIOPSY BRONCHUS performed by Olga Walker MD at [facility]    BRONCHOSCOPY  9/29/2019    BRONCHOSCOPY performed by Olga Walker MD at [place] [address] N/A 9/30/2019    BRONCHOSCOPY ALVEOLAR LAVAGE performed by Eunice Collins MD at [place] [address] N/A 10/16/2019 BRONCHOSCOPY ALVEOLAR LAVAGE performed by Elijah Sky MD at [facility] N/A 2020    BRONCHOSCOPY performed by Jose Cheung MD at [address]  12    Valve replacement    COLONOSCOPY  2016    DEBRIDEMENT Left 2014    lt hand     HAND DEBRIDEMENT Left 2014    INCISION AND DRAINAGE    PACEMAKER PLACEMENT  13    TOE AMPUTATION Bilateral 3/13/2013    1,2,3,4 on left and 2nd on right    TRACHEAL SURGERY N/A 10/18/2019    TRACHEOTOMY PERCUTANEOUS BRONCHOSCOPY performed by Elijah Sky MD at [address]       Diet    DIET GENERAL;  Allergies    Pcn [penicillins]  Social History     Social History     Socioeconomic History    Marital status:      Spouse name: Not on file    Number of children: 3    Years of education: 9    Highest education level: Not on file   Occupational History    Occupation: on disability   Social Needs    Financial resource strain: Not on file    Food insecurity     Worry: Not on file     Inability: Not on file   [employer] needs     Medical: Not on file     Non-medical: Not on file   Tobacco Use    Smoking status: Former Smoker     Packs/day: 1.00     Years: 44.00     Pack years: 44.00     Types: Cigarettes     Start date: 10/22/1976     Last attempt to quit: 2020     Years since quittin.4    Smokeless tobacco: Former User    Tobacco comment: Weaned to 0.5 PPD last 7 years   Substance and Sexual Activity    Alcohol use: No     Comment: quit 9 years ago    Drug use: No    Sexual activity: Yes     Partners: Female   Lifestyle    Physical activity     Days per week: Not on file     Minutes per session: Not on file    Stress: Not on file   Relationships    Social connections     Talks on phone: Not on file     Gets together: Not on file     Attends Evangelical service: Not on file     Active member of club or organization: Not on file Attends meetings of clubs or organizations: Not on file     Relationship status: Not on file    Intimate partner violence     Fear of current or ex partner: Not on file     Emotionally abused: Not on file     Physically abused: Not on file     Forced sexual activity: Not on file   Other Topics Concern    Not on file   Social History Narrative    Not on file     Family History          Problem Relation Age of Onset    Diabetes Mother     Depression Mother     Arthritis Father     Heart Disease Father     Diabetes Brother     Depression Daughter      Sleep History    PSG 6/16/2020-   IMPRESSION:  1. This is a technically difficult study with a poor sleep efficiency  of 59.9% and absent REM sleep. 2.  Mild obstructive sleep apnea. 3.  Absent REM sleep. 4.  Nocturnal hypoxia. The patient spent a total of 232.7 minutes below  oxygen saturation less than 88%. 5.  The patient usually uses 3 liters of oxygen by nasal canula;  however, no oxygen was applied during the testing. Meds    Current Medications    buprenorphine-naloxone  1 Film Sublingual Daily    pantoprazole  40 mg Oral QAM AC    QUEtiapine  300 mg Oral Nightly    tamsulosin  0.4 mg Oral Daily    glycopyrrolate-formoterol  2 puff Inhalation BID    sodium chloride flush  10 mL Intravenous 2 times per day    insulin lispro  0-6 Units Subcutaneous TID WC    insulin lispro  0-3 Units Subcutaneous Nightly    ALPRAZolam  0.5 mg Oral BID     sodium chloride flush, acetaminophen **OR** acetaminophen, polyethylene glycol, promethazine **OR** ondansetron, magnesium sulfate, potassium chloride **OR** potassium alternative oral replacement **OR** potassium chloride, glucose, dextrose, glucagon (rDNA), dextrose, albuterol  IV Drips/Infusions   dextrose       Vitals    Vitals    height is 5' 9\" (1.753 m) and weight is 251 lb 1.6 oz (113.9 kg). His oral temperature is 98.5 °F (36.9 °C). His blood pressure is 108/61 and his pulse is 82.  His respiration is 18 and oxygen saturation is 90%. O2 Flow Rate (L/min): 1 L/min  I/O    Intake/Output Summary (Last 24 hours) at 9/19/2020 0830  Last data filed at 9/18/2020 2138  Gross per 24 hour   Intake 365 ml   Output --   Net 365 ml     Patient Vitals for the past 96 hrs (Last 3 readings):   Weight   09/19/20 0252 251 lb 1.6 oz (113.9 kg)   09/18/20 0355 252 lb 8 oz (114.5 kg)   09/16/20 0301 252 lb 12.8 oz (114.7 kg)     Exam   Physical Exam   Constitutional: No distress on 3LPM via NC. Patient appears obese BMI 37.33. OOB to chair  Mouth/Throat: Oropharynx is clear and moist.  No oral thrush. MPIII  Eyes: Conjunctivae are normal. Pupils are equal, round. No scleral icterus. Neck: Neck supple. No tracheal deviation present. Cardiovascular: S1 and S2 with no murmur. No peripheral edema  Pulmonary/Chest: Normal effort with bilateral air entry, clear breath sounds with intermittent expiratory wheezing . No stridor. No respiratory distress. Patient exhibits no tenderness. Abdominal: Soft. Bowel sounds audible. No distension or tenderness to palp. Musculoskeletal: Moves all extremities; no clubbing or cyanosis   Neurological: Patient is alert and oriented to person, place, and time. Skin: Warm and dry. Labs   ABG  Lab Results   Component Value Date    PH 7.41 11/27/2019    PO2 71 11/27/2019    PCO2 42 11/27/2019    HCO3 27 11/27/2019    O2SAT 94 11/27/2019     Lab Results   Component Value Date    IFIO2 60 10/10/2019    MODE AC 09/29/2019    SETTIDVOL 350 09/29/2019    SETPEEP 6.0 10/10/2019     CBC  Recent Labs     09/17/20  1928 09/18/20  0620 09/19/20  0611   HGB 10.6* 10.2* 11.0*   HCT 35.5* 35.3* 36.6*      BMP  Recent Labs     09/18/20  0620      K 4.2      CO2 30   BUN 11   CREATININE 0.9   GLUCOSE 134*   CALCIUM 8.8     LFT  No results for input(s): AST, ALT, ALB, BILITOT, ALKPHOS, LIPASE in the last 72 hours. Invalid input(s):   AMYLASE  TROP  Lab Results   Component Value Date TROPONINT < 0.010 09/14/2020    TROPONINT < 0.010 04/20/2020    TROPONINT < 0.010 03/23/2020     BNP  Lab Results   Component Value Date    PROBNP 456.6 09/14/2020    PROBNP 252.9 04/20/2020    PROBNP 267.3 04/16/2020     D-Dimer  No results found for: DDIMER  Lactic Acid  No results for input(s): LACTA in the last 72 hours. INR  Recent Labs     09/17/20  1701 09/18/20  0620 09/19/20  0611   INR 1.11 1.16* 1.08     PTT  Recent Labs     09/17/20  1701   APTT 30.9     Glucose  Recent Labs     09/18/20  1616 09/18/20  2022 09/19/20  0711   POCGLU 131* 159* 183*     UA No results for input(s): SPECGRAV, PHUR, COLORU, CLARITYU, MUCUS, PROTEINU, BLOODU, RBCUA, WBCUA, BACTERIA, NITRU, GLUCOSEU, BILIRUBINUR, UROBILINOGEN, KETUA, LABCAST, LABCASTTY, AMORPHOS in the last 72 hours. Invalid input(s): CRYSTALS. PFTs         Severe obstruction with moderate reduction in diffusion capacity    Echo     Summary   Technically difficult examination. Ejection fraction was estimated at 50-55%. Left atrial size was severely dilated. Right atrial size was moderately dilated. S/p TV replacement--unable to visualize leaflets. DOPPLER: Mean gradient   5-8 mmHg. V max = 2.1 m/s. There was trace tricuspid regurgitation. Assuming RAP = 20 mmHg, the estimated RVSP = 50 mmHg. Ascending aorta = 3.6 cm. IVC size is dilated with reduced respirophasic variation (CVP~20 mmHg)      Consider THELMA, if clinically indicated, and concerns for TVR dysfunction.       Signature      ----------------------------------------------------------------   Electronically signed by Jp Rodriguez MD (Interpreting   physician) on 09/28/2019 at 11:55 AM   ----------------------------------------------------------------    Cultures    Procalcitonin  Lab Results   Component Value Date    PROCAL 0.05 04/20/2020    PROCAL 0.05 04/16/2020    PROCAL 0.03 03/23/2020        SARS-CoV-2, NAAT  NOT DETECTED  NOT DETECTED  Final  09/15/2020  6:24 AM Bronchoscopy- 9/16/2020  Endobronchial findings: After removing all the blood clots from air ways the following airway exam was performed. Trachea: Normal mucosa.   Luisa: Normal mucosa  Right main bronchus: Normal mucosa  Right upper lobe bronchus: Normal mucosa  Right Middle lobe bronchus: Normal mucosa  Right Lower lobe bronchus:Normal mucosa  Left main bronchus: Normal mucosa  Left upper lobe bronchus: Normal mucosa  Left lower lobe bronchus: Normal mucosa    Cultures (-) thus far  Cytology: pending      Pneumonia Panel, Molecular [[id number]]   Collected: 09/16/20 1445    Order Status: Completed  Updated: 09/16/20 1657     Source  Bronch Washings     Specimen Acceptability  NA     Acinetobacter calcoaceticus-baumannii by PCR  Not Detected     Enterobacter cloacae complex by PCR  Not Detected     Escherichia coli by PCR  Not Detected     Haemophilus Influenzae by PCR  Not Detected     Klebsiella aerogenes by PCR  Not Detected     Klebsiella oxytoca by PCR  Not Detected     Klebsiella pneumoniae by PCR  Not Detected     Moraxella catarrhalis by PCR  Not Detected     Proteus species by PCR  Not Detected     Pseudomonas aeruginosa by PCR  Not Detected     Serratia marcescens by PCR  Not Detected     Staph aureus by PCR  Not Detected     Strep agalactiae by PCR  Not Detected     Strep pneumoniae by PCR  Not Detected     Strep pyogenes by PCR  Not Detected     Resistant gene ctx-m by PCR  NA     Resistant gene imp by PCR  NA     Resistant gene kpc by PCR  NA     Resistant gene meca/c & mrej by PCR  NA     Resistant gene ndm by PCR  NA     Resistant gene oxa-48-like by pcr  NA     Resistant gene vim by PCR  NA     Chlamydia pneumoniae By PCR  Not Detected     Legionella Pneumophilia By PCR  Not Detected     Mycoplasma pneumoniae by PCR  Not Detected     Adenovirus by PCR  Not Detected     CORONAVIRUS PCR  Not Detected     Metapneumovirus by PCR  Not Detected     Rhinovirus Enterovirus PCR  Not Detected of acute exacerbation at this time, on Anoro Ellipta at home as well as rescue albuterol as needed. Substitute with Bevespi while in the hospital.  6-minute walk test at last visit showed 4 L/min with exertion and 0 L/min at rest.  Currently on 3 L at rest  ANGEL: Sleep study completed on 6/16/2020. Mild obstructive sleep apnea with nocturnal hypoxia. That time recommendation was made for CPAP titration as her treatment however patient has not completed this yet. No follow-up was made in the office after sleep study. Full Code    Recommendations   -Will follow cultures/cytologies from procedure- thus far (-)  -Continue hold Coumadin  -Continue Bevespi 2 puffs twice daily as an alternative to an Anoro Ellipta  -We will add duo nebs every 4 hours while awake for wheezing  -Continue monitor hemoglobin and hematocrit  -Continue supplemental oxygen as needed  -Wean oxygen as tolerated keeping SPO2 greater than 92%  -Cardiology following for management of Afib ? Watchman device  -Bed available at [facility] however awaiting transport  -Stable currently from pulmonary standpoint     Case discussed with nurse and patient/family. Questions and concerns addressed. Meds and Orders reviewed.     Electronically signed by   Nayla Gaitan DO on 9/19/2020 at 8:30 AM

## 2020-09-19 NOTE — PLAN OF CARE
Hospitalist Progress Note      Patient:  Tony Rose    Unit/Bed:6K-02/002-A  YOB: 1960  MRN: [id number]   Acct: [de-identified]     PCP: Day Reis  Date of Admission: 9/15/2020    Patient was identified to have recurrent hemoptysis per nursing staff. Dr. Vivian Cruz, pulmonology was made aware prior to starting heparin drip. He discussed case with Dr. Ron Sarmiento regarding IR bronchial artery embolization, recommendation at this time is to transfer to tertiary center for evaluation and management. Dr. Hailey Kay contacted myself and recommended [facility] transfer with interventional pulmonology consult at that facility. I initiated the transfer at 17:10.      Andreina Bradshaw PA-C
Problem: IP TRANSFERS  Goal: Clear lung sounds  Outcome: Not Met This Shift   Breath sounds diminished, expiratory wheezes, continuing treatments as ordered.
Problem: IP TRANSFERS  Goal: Clear lung sounds  Outcome: Ongoing
Problem: IP TRANSFERS  Goal: Lung sounds clear or within normal limits for patient  Outcome: Ongoing   Breath sounds clear and diminished, continuing bevespi bid.
Problem: Infection:  Goal: Will remain free from infection  Description: Will remain free from infection  9/16/2020 1152 by Ricardo Wilde RN  Outcome: Ongoing  Note: Patient afebrile no signs of infection      Problem: Daily Care:  Goal: Daily care needs are met  Description: Daily care needs are met  9/16/2020 1152 by Ricardo Wilde RN  Outcome: Ongoing  Note: Patient daily care needs being meant by nursing staff      Problem: Pain:  Goal: Patient's pain/discomfort is manageable  Description: Patient's pain/discomfort is manageable  9/16/2020 1152 by Ricardo Wilde RN  Outcome: Ongoing  Note: Patient free from pain this shift. Pain rated on 0-10 pain rating scale. Will continue to reassess. Problem: Skin Integrity:  Goal: Skin integrity will stabilize  Description: Skin integrity will stabilize  9/16/2020 1152 by Ricardo Wilde RN  Outcome: Ongoing  Note: No new signs or symptoms of skin breakdown noted this shift, encouraging patient to turn and reposition self in bed q2h       Problem: IP TRANSFERS  Goal: Clear lung sounds  9/16/2020 1152 by Ricardo Wilde RN  Outcome: Ongoing  Note: Patient has expiratory wheezes and rhonchi throughout PRN breathing treatment given patient on 3LNC his baseline at home with oxygen saturation above 90%     Problem: Bleeding:  Goal: Will show no signs and symptoms of excessive bleeding  Description: Will show no signs and symptoms of excessive bleeding  Outcome: Ongoing  Note: Patient has coughed up blood 3x so far this shift. Patient Hgb 10.1 he is to have a bronchoscopy today coumadin on hold    Care plan reviewed with patient and family. Patient and family verbalize understanding of the plan of care and contribute to goal setting.
Problem: Infection:  Goal: Will remain free from infection  Description: Will remain free from infection  Outcome: Ongoing  Note: Patient remains free from any new signs of infection at this time. Problem: Pain:  Goal: Patient's pain/discomfort is manageable  Description: Patient's pain/discomfort is manageable  Outcome: Ongoing  Note: Patient voices pain 0/10. Patients pain goal is 0/10. PRN pain medications given as ordered. Patients pain goal is a 0. Non-pharmacological interventions include: patient denies having any pain at this time. Problem: Skin Integrity:  Goal: Skin integrity will stabilize  Description: Skin integrity will stabilize  Outcome: Ongoing  Note: Patient shows no new signs of skin breakdown at this time. Patient educated to frequently turn in bed to reduce pressure ulcers. Problem: Bleeding:  Goal: Will show no signs and symptoms of excessive bleeding  Description: Will show no signs and symptoms of excessive bleeding  Outcome: Ongoing  Note: Patient shows no signs of bleeding at this time will continue to monitor patient's status. Care plan reviewed with patient. Patient verbalizes understanding of the care plan and contributed to goal setting.
Problem: Infection:  Goal: Will remain free from infection  Description: Will remain free from infection  Outcome: Ongoing  Note: Patient's WBC 4.8, afebrile with no signs/symptoms of infection. Problem: Daily Care:  Goal: Daily care needs are met  Description: Daily care needs are met  Outcome: Ongoing  Note: Patient able to verbalize needs as they arise, all needs addressed during hourly rounding. Problem: Pain:  Goal: Patient's pain/discomfort is manageable  Description: Patient's pain/discomfort is manageable  Outcome: Ongoing  Note: Patient denies the presence of pain so far this shift. Problem: Skin Integrity:  Goal: Skin integrity will stabilize  Description: Skin integrity will stabilize  Outcome: Ongoing  Note: Patient has minor areas of pre-existing skin breakdown. Patient educated on importance of turning/repositioning self while in bed to prevent skin breakdown. No new breakdown noted at this time. Care plan reviewed with patient. Patient verbalize understanding of the plan of care and contribute to goal setting.
bleeding  9/19/2020 0138 by Maycol De La Fuente RN  Outcome: Ongoing  Note: No signs and/or symptoms noted during this shift. Will continue to monitor. Care plan reviewed with patient. Patient verbalizes understanding of the plan of care and contributes to goal setting.

## 2020-09-20 ASSESSMENT — ENCOUNTER SYMPTOMS
ABDOMINAL PAIN: 0
BACK PAIN: 0
RHINORRHEA: 0
COUGH: 1
NAUSEA: 0
CHEST TIGHTNESS: 0
SHORTNESS OF BREATH: 1

## 2020-09-20 NOTE — PROGRESS NOTES
Report given over the phone to Lucrecia from [facility]. Pt stable upon d/c and all belongings given to pt.

## 2020-09-20 NOTE — ED PROVIDER NOTES
[place] [id number]       Chief Complaint   Patient presents with    Shortness of Breath     coughing up blood       Nurses Notes reviewed and I agree except as noted in the HPI. HISTORY OF PRESENT ILLNESS    Juan Thomas is a 63 y.o. male who presents to the ED for evaluation of coughing up blood. The patient was seen earlier and discharged. He is on blood thinners for blood clots. Episode earlier was small. This episode was quite a bit more. Denies pain or other complaints. HPI was provided by the patient. REVIEW OF SYSTEMS     Review of Systems   Constitutional: Negative for chills, fatigue and fever. HENT: Negative for congestion, ear discharge, ear pain, postnasal drip and rhinorrhea. Respiratory: Positive for cough (chronic) and shortness of breath (chronic). Negative for chest tightness. Hemoptysis     Cardiovascular: Negative for chest pain, palpitations and leg swelling. Gastrointestinal: Negative for abdominal pain and nausea. Genitourinary: Negative for difficulty urinating, dysuria, enuresis, flank pain and hematuria. Musculoskeletal: Negative for back pain and joint swelling. Skin: Negative for rash. Neurological: Negative for dizziness, light-headedness, numbness and headaches. Psychiatric/Behavioral: Negative for agitation, behavioral problems and confusion.         PAST MEDICAL HISTORY     Past Medical History:   Diagnosis Date    Anxiety disorder     Arthritis     Asthma     Back pain, chronic     Blood circulation, collateral     4 toes partial amputee on L foot    [employer] Bi[employer] ICD 7/1/2014   [facility] [employer] BiV pacemaker 7/1/2014    CAD (coronary artery disease)     CHF (congestive heart failure) (HCC)     Chronic kidney disease     COPD (chronic obstructive pulmonary disease) (HCC)     Depression     Endocarditis     Factor V deficiency (HCC)     Hepatitis     HEPATITIS C    Hepatitis C  History of blood transfusion     Hypertension     Pneumonia        SURGICALHISTORY      has a past surgical history that includes pacemaker placement (12/26/13); Toe amputation (Bilateral, 3/13/2013); Hand Debridement (Left, 01/09/2014); debridement (Left, 01/14/2014); Cardiac surgery (12/26/12); Upper gastrointestinal endoscopy (2012); Colonoscopy (2016); bronchoscopy (N/A, 9/28/2019); bronchoscopy (N/A, 9/28/2019); bronchoscopy (N/A, 9/29/2019); bronchoscopy (9/29/2019); bronchoscopy (N/A, 9/30/2019); bronchoscopy (N/A, 10/16/2019); Tracheal surgery (N/A, 10/18/2019); and bronchoscopy (N/A, 9/16/2020). CURRENT MEDICATIONS       Discharge Medication List as of 9/19/2020  8:23 PM      CONTINUE these medications which have NOT CHANGED    Details   buprenorphine-naloxone (SUBOXONE) 8-2 MG FILM SL film Place 1 Film under the tongue daily. Historical Med      ALPRAZolam (XANAX) 0.5 MG tablet Take 0.5 mg by mouth 2 times daily. Historical Med      QUEtiapine (SEROQUEL) 300 MG tablet Take 300 mg by mouth nightlyHistorical Med      umeclidinium-vilanterol (ANORO ELLIPTA) 62.5-25 MCG/INH AEPB inhaler Inhale 1 puff into the lungs daily, Disp-30 puff, R-11Normal      albuterol sulfate HFA (PROAIR HFA) 108 (90 Base) MCG/ACT inhaler Inhale 2 puffs into the lungs every 6 hours as needed for Wheezing or Shortness of Breath, Disp-1 Inhaler, R-8Normal      potassium chloride (KLOR-CON M) 20 MEQ extended release tablet Take 1 tablet by mouth 2 times daily, Disp-180 tablet, R-1NO PRINT      pantoprazole (PROTONIX) 20 MG tablet Take 20 mg by mouth 2 times daily (before meals) Historical Med      tamsulosin (FLOMAX) 0.4 MG capsule Take 0.4 mg by mouth dailyHistorical Med      albuterol (PROVENTIL) (2.5 MG/3ML) 0.083% nebulizer solution Take 6 mLs by nebulization every 4 hours as needed for Wheezing or Shortness of Breath, Disp-120 each, R-3Normal      furosemide (LASIX) 40 MG tablet Take 1 tablet by mouth daily, Disp-60 tablet, R-3Normal             ALLERGIES     is allergic to pcn [penicillins]. FAMILY HISTORY     He indicated that his mother is alive. He indicated that his father is alive. He indicated that the status of his brother is unknown. He indicated that the status of his daughter is unknown.   family history includes Arthritis in his father; Depression in his daughter and mother; Diabetes in his brother and mother; Heart Disease in his father.     SOCIAL HISTORY       Social History     Socioeconomic History    Marital status:      Spouse name: Not on file    Number of children: 3    Years of education: 9    Highest education level: Not on file   Occupational History    Occupation: on disability   Social Needs    Financial resource strain: Not on file    Food insecurity     Worry: Not on file     Inability: Not on file   [employer] needs     Medical: Not on file     Non-medical: Not on file   Tobacco Use    Smoking status: Former Smoker     Packs/day: 1.00     Years: 44.00     Pack years: 44.00     Types: Cigarettes     Start date: 10/22/1976     Last attempt to quit: 2020     Years since quittin.4    Smokeless tobacco: Former User    Tobacco comment: Weaned to 0.5 PPD last 7 years   Substance and Sexual Activity    Alcohol use: No     Comment: quit 9 years ago    Drug use: No    Sexual activity: Yes     Partners: Female   Lifestyle    Physical activity     Days per week: Not on file     Minutes per session: Not on file    Stress: Not on file   Relationships    Social connections     Talks on phone: Not on file     Gets together: Not on file     Attends Islam service: Not on file     Active member of club or organization: Not on file     Attends meetings of clubs or organizations: Not on file     Relationship status: Not on file    Intimate partner violence     Fear of current or ex partner: Not on file     Emotionally abused: Not on file     Physically abused: Not on file     Forced sexual activity: Not on file   Other Topics Concern    Not on file   Social History Narrative    Not on file       PHYSICAL EXAM     INITIAL VITALS:  height is 5' 9\" (1.753 m) and weight is 251 lb 1.6 oz (113.9 kg). His oral temperature is 98 °F (36.7 °C). His blood pressure is 125/65 and his pulse is 76. His respiration is 16 and oxygen saturation is 88% (abnormal). Physical Exam  Vitals signs and nursing note reviewed. Constitutional:       General: He is not in acute distress. Appearance: He is well-developed. He is not diaphoretic. HENT:      Head: Normocephalic and atraumatic. Eyes:      General:         Right eye: No discharge. Left eye: No discharge. Conjunctiva/sclera: Conjunctivae normal.   Neck:      Musculoskeletal: Normal range of motion. Trachea: No tracheal deviation. Cardiovascular:      Rate and Rhythm: Normal rate and regular rhythm. Heart sounds: Normal heart sounds. No murmur. No gallop. Comments: Normal capillary refill  Pulmonary:      Effort: Pulmonary effort is normal. No respiratory distress. Breath sounds: No stridor. Examination of the right-lower field reveals wheezing and rales. Examination of the left-lower field reveals wheezing and rales. Wheezing and rales present. Abdominal:      General: Bowel sounds are normal.      Palpations: Abdomen is soft. Musculoskeletal: Normal range of motion. General: No tenderness or deformity. Right lower leg: Edema present. Left lower leg: Edema present. Skin:     General: Skin is warm and dry. Capillary Refill: Capillary refill takes 2 to 3 seconds. Coloration: Skin is not pale. Findings: No erythema or rash. Neurological:      General: No focal deficit present. Mental Status: He is alert and oriented to person, place, and time. Cranial Nerves: No cranial nerve deficit.    Psychiatric:         Behavior: Behavior normal.         DIFFERENTIAL DIAGNOSIS: supratherapuetic INR, Ruptured bleb, esophageal bleeding  DIAGNOSTIC RESULTS     EKG: All EKG's are interpreted by the Emergency Department Physician who eithersigns or Co-signs this chart in the absence of a cardiologist.    none    RADIOLOGY: non-plainfilm images(s) such as CT, Ultrasound and MRI are read by the radiologist.  Plain radiographic images are visualized and preliminarily interpreted by the emergency physician unless otherwise stated below. CTA CHEST W WO CONTRAST   Final Result   No PE. No acute findings. Stable appearance            **This report has been created using voice recognition software. It may contain minor errors which are inherent in voice recognition technology. **      Final report electronically signed by Dr. Iwona Braun on 9/15/2020 5:46 AM            LABS:   Labs Reviewed   BASIC METABOLIC PANEL - Abnormal; Notable for the following components:       Result Value    Potassium 3.3 (*)     Glucose 146 (*)     All other components within normal limits   CBC WITH AUTO DIFFERENTIAL - Abnormal; Notable for the following components:    RBC 4.50 (*)     Hemoglobin 11.5 (*)     Hematocrit 38.3 (*)     MCH 25.6 (*)     MCHC 30.0 (*)     RDW-CV 15.3 (*)     RDW-SD 47.6 (*)     All other components within normal limits   APTT - Abnormal; Notable for the following components:    aPTT 67.9 (*)     All other components within normal limits   PROTIME-INR - Abnormal; Notable for the following components:    INR 3.99 (*)     All other components within normal limits   HEMOGLOBIN A1C - Abnormal; Notable for the following components:    Hemoglobin A1C 6.6 (*)     AVERAGE GLUCOSE 138 (*)     All other components within normal limits   HEMOGLOBIN AND HEMATOCRIT, BLOOD - Abnormal; Notable for the following components:    Hemoglobin 11.0 (*)     Hematocrit 36.9 (*)     All other components within normal limits   HEMOGLOBIN AND HEMATOCRIT, BLOOD - Abnormal; Notable for the following components:    Hemoglobin 10.3 (*)     Hematocrit 34.1 (*)     All other components within normal limits   BASIC METABOLIC PANEL W/ REFLEX TO MG FOR LOW K - Abnormal; Notable for the following components:    Glucose 141 (*)     All other components within normal limits   CBC WITH AUTO DIFFERENTIAL - Abnormal; Notable for the following components:    WBC 4.6 (*)     RBC 3.97 (*)     Hemoglobin 10.1 (*)     Hematocrit 34.3 (*)     MCH 25.4 (*)     MCHC 29.4 (*)     RDW-CV 15.5 (*)     RDW-SD 48.5 (*)     All other components within normal limits   PROTIME-INR - Abnormal; Notable for the following components:    INR 1.57 (*)     All other components within normal limits   GLOMERULAR FILTRATION RATE, ESTIMATED - Abnormal; Notable for the following components:    Est, Glom Filt Rate 86 (*)     All other components within normal limits   HEMOGLOBIN AND HEMATOCRIT, BLOOD - Abnormal; Notable for the following components:    Hemoglobin 10.4 (*)     Hematocrit 35.0 (*)     All other components within normal limits   HEMOGLOBIN AND HEMATOCRIT, BLOOD - Abnormal; Notable for the following components:    Hemoglobin 10.4 (*)     Hematocrit 35.2 (*)     All other components within normal limits   HEMOGLOBIN AND HEMATOCRIT, BLOOD - Abnormal; Notable for the following components:    Hemoglobin 10.6 (*)     Hematocrit 35.5 (*)     All other components within normal limits   PROTIME-INR - Abnormal; Notable for the following components:    INR 1.16 (*)     All other components within normal limits   BASIC METABOLIC PANEL - Abnormal; Notable for the following components:    Glucose 134 (*)     All other components within normal limits   HEMOGLOBIN AND HEMATOCRIT, BLOOD - Abnormal; Notable for the following components:    Hemoglobin 10.2 (*)     Hematocrit 35.3 (*)     All other components within normal limits   ANION GAP - Abnormal; Notable for the following components:    Anion Gap 7.0 (*)     All other components within normal limits   GLOMERULAR FILTRATION RATE, ESTIMATED - Abnormal; Notable for the following components:    Est, Glom Filt Rate 86 (*)     All other components within normal limits   HEMOGLOBIN AND HEMATOCRIT, BLOOD - Abnormal; Notable for the following components:    Hemoglobin 11.0 (*)     Hematocrit 36.6 (*)     All other components within normal limits   POCT GLUCOSE - Abnormal; Notable for the following components:    POC Glucose 111 (*)     All other components within normal limits   POCT GLUCOSE - Abnormal; Notable for the following components:    POC Glucose 146 (*)     All other components within normal limits   POCT GLUCOSE - Abnormal; Notable for the following components:    POC Glucose 111 (*)     All other components within normal limits   POCT GLUCOSE - Abnormal; Notable for the following components:    POC Glucose 151 (*)     All other components within normal limits   POCT GLUCOSE - Abnormal; Notable for the following components:    POC Glucose 121 (*)     All other components within normal limits   POCT GLUCOSE - Abnormal; Notable for the following components:    POC Glucose 126 (*)     All other components within normal limits   POCT GLUCOSE - Abnormal; Notable for the following components:    POC Glucose 186 (*)     All other components within normal limits   POCT GLUCOSE - Abnormal; Notable for the following components:    POC Glucose 148 (*)     All other components within normal limits   POCT GLUCOSE - Abnormal; Notable for the following components:    POC Glucose 133 (*)     All other components within normal limits   POCT GLUCOSE - Abnormal; Notable for the following components:    POC Glucose 121 (*)     All other components within normal limits   POCT GLUCOSE - Abnormal; Notable for the following components:    POC Glucose 132 (*)     All other components within normal limits   POCT GLUCOSE - Abnormal; Notable for the following components:    POC Glucose 115 (*)     All other components within normal limits   POCT GLUCOSE - Abnormal; Notable for the following components:    POC Glucose 122 (*)     All other components within normal limits   POCT GLUCOSE - Abnormal; Notable for the following components:    POC Glucose 167 (*)     All other components within normal limits   POCT GLUCOSE - Abnormal; Notable for the following components:    POC Glucose 131 (*)     All other components within normal limits   POCT GLUCOSE - Abnormal; Notable for the following components:    POC Glucose 159 (*)     All other components within normal limits   POCT GLUCOSE - Abnormal; Notable for the following components:    POC Glucose 183 (*)     All other components within normal limits   POCT GLUCOSE - Abnormal; Notable for the following components:    POC Glucose 156 (*)     All other components within normal limits   POCT GLUCOSE - Abnormal; Notable for the following components:    POC Glucose 111 (*)     All other components within normal limits   CULTURE WITH SMEAR, ACID FAST BACILLIUS   CULTURE, FUNGUS    Narrative:     Source: bronchial washings       Site: Right          Current Antibiotics: not stated   CULTURE, RESPIRATORY    Narrative:     Source: bronchial washings       Site: Right          Current Antibiotics: not stated   PNEUMONIA PANEL, MOLECULAR   ANION GAP   GLOMERULAR FILTRATION RATE, ESTIMATED   OSMOLALITY   COVID-19   URINE DRUG SCREEN   HEPATIC FUNCTION PANEL   ANION GAP   MISCELLANEOUS LAB TEST #1   FACTOR 5 ASSAY   APTT   PROTIME-INR   CYTOLOGY, NON-GYN       EMERGENCY DEPARTMENT COURSE:   Vitals:    Vitals:    09/19/20 0900 09/19/20 1133 09/19/20 1516 09/19/20 1806   BP: 100/61 112/61 125/65    Pulse: 82 82 76    Resp: 16 12 14 16   Temp: 99 °F (37.2 °C) 98.7 °F (37.1 °C) 98 °F (36.7 °C)    TempSrc: Oral Oral Oral    SpO2: 91% 91% 90% (!) 88%   Weight:       Height:              MDM                       The patient was seen in the ER for hemoptysis.   Appopriate labs and imaging are ordered and reviewed. The patient is supratherapeutic on INR at 3.99. CTA is negative for PE. Patient is treated with FFP for the INR. Other labs are reassuring. I discussed the case with DR. Baird and he agrees to admit for further evaluation. Patient is stable at this time and his VS are acceptable. He is agreeable to POC. Medications   iopamidol (ISOVUE-370) 76 % injection 80 mL (80 mLs Intravenous Given 9/15/20 0525)   0.9 % sodium chloride bolus (0 mLs Intravenous Stopped 9/15/20 1140)   potassium chloride (KLOR-CON M) extended release tablet 40 mEq (40 mEq Oral Given 9/15/20 1008)   0.9 % sodium chloride bolus (0 mLs Intravenous Stopped 9/15/20 1608)   phytonadione (ADULT) (VITAMIN K) 10 mg in dextrose 5 % 100 mL IVPB (0 mg Intravenous Stopped 9/15/20 1900)   0.9 % sodium chloride bolus (0 mLs Intravenous Stopped 9/15/20 1950)         Patient was seenindependently by myself. The patient's final impression and disposition and plan was determined by myself. CRITICAL CARE:   None    CONSULTS:  None    PROCEDURES:  None    FINAL IMPRESSION     1. Hemoptysis    2.  Supratherapeutic INR          DISPOSITION/PLAN   DISPOSITION Admitted 09/15/2020 06:07:39 AM      PATIENT REFERREDTO:  Hattie Hale  [address] [place] [address]  [phone]            DISCHARGE MEDICATIONS:  Discharge Medication List as of 9/19/2020  8:23 PM          (Please note that portions of this note were completed with a voice recognition program.  Efforts were made to edit the dictations but occasionally words are mis-transcribed.)         TAMMI Hanson - CNP          TAMMI Hanson - SHARRI  09/20/20 9180

## 2020-09-24 ENCOUNTER — TELEPHONE (OUTPATIENT)
Dept: ONCOLOGY | Age: 60
End: 2020-09-24

## 2020-09-24 LAB — FACTOR V LEIDEN MUTATION: NORMAL

## 2020-09-27 LAB
FUNGUS IDENTIFIED: ABNORMAL
FUNGUS SMEAR: ABNORMAL
ORGANISM: ABNORMAL

## 2020-09-28 LAB — MISC. #1 REFERENCE GROUP TEST: NORMAL

## 2020-10-12 ENCOUNTER — OFFICE VISIT (OUTPATIENT)
Dept: CARDIOLOGY CLINIC | Age: 60
End: 2020-10-12

## 2020-10-12 ENCOUNTER — NURSE ONLY (OUTPATIENT)
Dept: CARDIOLOGY CLINIC | Age: 60
End: 2020-10-12
Payer: MEDICARE

## 2020-10-12 VITALS
DIASTOLIC BLOOD PRESSURE: 73 MMHG | HEART RATE: 75 BPM | HEIGHT: 69 IN | WEIGHT: 251.4 LBS | BODY MASS INDEX: 37.23 KG/M2 | SYSTOLIC BLOOD PRESSURE: 108 MMHG

## 2020-10-12 PROCEDURE — 99214 OFFICE O/P EST MOD 30 MIN: CPT | Performed by: INTERNAL MEDICINE

## 2020-10-12 PROCEDURE — 93281 PM DEVICE PROGR EVAL MULTI: CPT | Performed by: INTERNAL MEDICINE

## 2020-10-12 RX ORDER — CLONAZEPAM 0.5 MG/1
0.5 TABLET ORAL 2 TIMES DAILY PRN
COMMUNITY

## 2020-10-12 RX ORDER — VILAZODONE HYDROCHLORIDE 40 MG/1
40 TABLET ORAL DAILY
COMMUNITY
End: 2022-07-19

## 2020-10-12 RX ORDER — LISINOPRIL 2.5 MG/1
2.5 TABLET ORAL DAILY
COMMUNITY
End: 2021-04-26

## 2020-10-12 RX ORDER — AMLODIPINE BESYLATE 5 MG/1
5 TABLET ORAL DAILY
COMMUNITY
End: 2021-04-26

## 2020-10-12 NOTE — PROGRESS NOTES
Device Interrogation Reviewed.   Short lasting svt and hx of atr fib on previous interrogation  Could not tolerate amiod  Will consider to add BB and decrease norvasc down the line if these persist

## 2020-10-12 NOTE — PROGRESS NOTES
Chief Complaint   Patient presents with   [facility] AT [facility],THE Atrial Fibrillation    former pat of Dr. Jass Segal  Hx of TV repair due to  Endocarditis    Hx of  new onset AFib.  Noted on ekg while in hsopiatl and device interrogation          Pt here for a 5 month f/u    Hx of recent Hemoptyiss )9/202 and referred to [facility] and dustin stopped coumadin and started xeralto and sent home  Has been on xeralto for the last 3 weeks and no bleeding    EKG done 9-14-20    Denied cp, dizziness, palpitations or edema    Sob on exertion chronic  On Home O2    After stopped amiodarone the weired feeling and tingling sensations resolved  Feel much better    No bleeding since d/c sept 2020    NO CABG  Had only TV replacement bio and pacer following surgery for chb      Patient Active Problem List   Diagnosis    Moderate COPD (chronic obstructive pulmonary disease) ([address]    Community acquired pneumonia    Suicidal ideation    SIRS (systemic inflammatory response syndrome) ([address]    Sepsis due to methicillin susceptible Staphylococcus aureus ([address]    Severe sepsis ([address]    HCAP (healthcare-associated pneumonia)    Hyponatremia    Lung nodules    History of intravenous drug use in remission    Malnutrition ([address]    Coagulopathy ([address]    Acute blood loss anemia    Complete heart block, post-surgical ([address]    Endocarditis, bacterial, acute/subacute    WILMA (acute kidney injury) ([address]    Leukocytosis    Physical deconditioning    Cellulitis    Abdominal pain, acute    Microscopic hematuria    [employer] BiV pacemaker    Abnormal CT of the abdomen    Generalized abdominal pain    GERD (gastroesophageal reflux disease)    Non-intractable vomiting with nausea    Hx of tricuspid valve repair    History of colonic polyps    Second degree hemorrhoids    History of tricuspid valve replacement with bioprosthetic valve 2012    Chronic diastolic congestive heart failure (HCC)    SOB (shortness of breath) on exertion    Folliculitis of perineum    S/P cardiac cath 12/5/1848-[id number], LAD -PATENT, LCX-PATENT , RCA PATENT, EDP 15 MMHG, NO GRADIENT- MED RX    Essential hypertension    COPD exacerbation (HCC)    Acute respiratory failure with hypoxia and hypercapnia (HCC)    Encephalopathy acute    PAF (paroxysmal atrial fibrillation) (HCC) nioted on device check with 4% burden    Pneumonia    Acute respiratory failure with hypoxemia (HCC)    Elevated hemoglobin A1c    Hyperglycemia    Metabolic acidosis    Factor V Leiden ([address]    Hx of deep venous thrombosis    Opioid use disorder (HCC)    Chronic obstructive pulmonary disease with acute lower respiratory infection (HCC)    Hemoptysis    Hyperkalemia    Supratherapeutic INR    Hypokalemia       Past Surgical History:   Procedure Laterality Date    BRONCHOSCOPY N/A 9/28/2019    BRONCHOSCOPY performed by Edward Kussmaul, MD at [address] [place] [address] N/A 9/28/2019    [facility]    BRONCHOSCOPY N/A 9/29/2019    BRONCHOSCOPY BIOPSY BRONCHUS performed [facility]ul[facility]    BRONCHOSCOPY  9/29/2019    BRONCHOSCOPY performed by Edward Kussmaul, MD at [facility] [place] [facility]    BRONCHOSCOPY N/A 9/30/2019    BRONCHOSCOPY ALVEOLAR LAVAGE performed by [facility]in [facility] [place]    BRONCHOSCOPY N/A 10/16/2019    BRONCHOSCOPY ALVEOLAR LAVAGE performed by Julieta Rubin MD at [facility] [place] [facility]    BRONCHOSCOPY N/A 9/16/2020    BRONCHOSCOPY performed by Ana Luisa Cee MD at [address] [place] [address]  12/26/12    Valve replacement    COLONOSCOPY  2016    DEBRIDEMENT Left 01/14/2014    lt hand     HAND DEBRIDEMENT Left 01/09/2014    INCISION AND DRAINAGE    PACEMAKER PLACEMENT  12/26/13    TOE AMPUTATION Bilateral 3/13/2013    1,2,3,4 on left and 2nd on right    TRACHEAL SURGERY N/A 10/18/2019    TRACHEOTOMY PERCUTANEOUS BRONCHOSCOPY performed by [facility] [place]    UPPER GASTROINTESTINAL ENDOSCOPY  2012       Allergies   Allergen Reactions    Pcn [Penicillins] Nausea And Vomiting        Family History   Problem Relation Age of Onset    Diabetes Mother     Depression Mother     Arthritis Father     Heart Disease Father     Diabetes Brother     Depression Daughter         Social History     Socioeconomic History    Marital status:      Spouse name: Not on file    Number of children: 3    Years of education: 5    Highest education level: Not on file   Occupational History    Occupation: on disability   Social Needs    Financial resource strain: Not on file    Food insecurity     Worry: Not on file     Inability: Not on file   [employer] needs     Medical: Not on file     Non-medical: Not on file   Tobacco Use    Smoking status: Former Smoker     Packs/day: 1.00     Years: 44.00     Pack years: 44.00     Types: Cigarettes     Start date: 10/22/1976     Last attempt to quit: 2020     Years since quittin.5    Smokeless tobacco: Former User    Tobacco comment: Weaned to 0.5 PPD last 7 years   Substance and Sexual Activity    Alcohol use: No     Comment: quit 9 years ago    Drug use: No    Sexual activity: Yes     Partners: Female   Lifestyle    Physical activity     Days per week: Not on file     Minutes per session: Not on file    Stress: Not on file   Relationships    Social connections     Talks on phone: Not on file     Gets together: Not on file     Attends [employer] service: Not on file     Active member of club or organization: Not on file     Attends meetings of clubs or organizations: Not on file     Relationship status: Not on file    Intimate partner violence     Fear of current or ex partner: Not on file     Emotionally abused: Not on file     Physically abused: Not on file     Forced sexual activity: Not on file   Other Topics Concern    Not on file   Social History Narrative    Not on file       Current Outpatient Medications   Medication Sig Dispense Refill    vilazodone HCl (VILAZODONE HCL) 40 MG TABS Take 40 mg by mouth daily      rivaroxaban (XARELTO) 20 MG TABS tablet Take 20 mg by mouth      amLODIPine (NORVASC) 5 MG tablet Take 5 mg by mouth daily      clonazePAM (KLONOPIN) 0.5 MG tablet Take 0.5 mg by mouth 2 times daily as needed.  lisinopril (PRINIVIL;ZESTRIL) 2.5 MG tablet Take 2.5 mg by mouth daily      aspirin EC 81 MG EC tablet Take 1 tablet by mouth daily Hold on dishcarge 30 tablet 3    buprenorphine-naloxone (SUBOXONE) 8-2 MG FILM SL film Place 1 Film under the tongue daily.  QUEtiapine (SEROQUEL) 300 MG tablet Take 400 mg by mouth nightly       umeclidinium-vilanterol (ANORO ELLIPTA) 62.5-25 MCG/INH AEPB inhaler Inhale 1 puff into the lungs daily 30 puff 11    albuterol sulfate HFA (PROAIR HFA) 108 (90 Base) MCG/ACT inhaler Inhale 2 puffs into the lungs every 6 hours as needed for Wheezing or Shortness of Breath 1 Inhaler 8    potassium chloride (KLOR-CON M) 20 MEQ extended release tablet Take 1 tablet by mouth 2 times daily 180 tablet 1    pantoprazole (PROTONIX) 20 MG tablet Take 20 mg by mouth 2 times daily (before meals)       albuterol (PROVENTIL) (2.5 MG/3ML) 0.083% nebulizer solution Take 6 mLs by nebulization every 4 hours as needed for Wheezing or Shortness of Breath 120 each 3    furosemide (LASIX) 40 MG tablet Take 1 tablet by mouth daily 60 tablet 3     No current facility-administered medications for this visit. Review of Systems -     General ROS: negative  Psychological ROS: negative  Hematological and Lymphatic ROS: No history of blood clots or bleeding disorder.    Respiratory ROS: no cough, shortness of breath, or wheezing  Cardiovascular ROS: no chest pain or dyspnea on exertion  Gastrointestinal ROS: negative  Genito-Urinary ROS: no dysuria, trouble voiding, or hematuria  Musculoskeletal ROS: negative  Neurological ROS: no TIA or stroke symptoms  Dermatological ROS: negative      Blood pressure 108/73, pulse 75, height 5' 9\" (1.753 m), weight 251 lb 6.4 oz (114 kg). Physical Examination:    General appearance - alert, well appearing, and in no distress  Mental status - alert, oriented to person, place, and time  Neck - supple, no significant adenopathy, no JVD, or carotid bruits  Chest - clear to auscultation, no wheezes, rales or rhonchi, symmetric air entry  Heart - normal rate, regular rhythm, normal S1, S2, no murmurs, rubs, clicks or gallops  Abdomen - soft, nontender, nondistended, no masses or organomegaly  Neurological - alert, oriented, normal speech, no focal findings or movement disorder noted  Musculoskeletal - no joint tenderness, deformity or swelling  Extremities - peripheral pulses normal, no pedal edema, no clubbing or cyanosis  Skin - normal coloration and turgor, no rashes, no suspicious skin lesions noted    Lab  No results for input(s): CKTOTAL, CKMB, CKMBINDEX, TROPONINI in the last 72 hours.   CBC:   Lab Results   Component Value Date    WBC 4.6 09/16/2020    RBC 3.97 09/16/2020    HGB 11.0 09/19/2020    HCT 36.6 09/19/2020    MCV 86.4 09/16/2020    MCH 25.4 09/16/2020    MCHC 29.4 09/16/2020    RDW 15.5 10/23/2019     09/16/2020    MPV 10.7 09/16/2020     BMP:    Lab Results   Component Value Date     09/18/2020    K 4.2 09/18/2020    K 4.0 09/16/2020     09/18/2020    CO2 30 09/18/2020    BUN 11 09/18/2020    LABALBU 3.8 09/16/2020    CREATININE 0.9 09/18/2020    CALCIUM 8.8 09/18/2020    LABGLOM 86 09/18/2020    GLUCOSE 134 09/18/2020    GLUCOSE 136 10/23/2019     Hepatic Function Panel:    Lab Results   Component Value Date    ALKPHOS 78 09/16/2020    ALT 38 09/16/2020    AST 33 09/16/2020    PROT 7.6 09/16/2020    BILITOT 0.3 09/16/2020    BILIDIR <0.2 09/16/2020    LABALBU 3.8 09/16/2020     Magnesium:    Lab Results   Component Value Date    MG 2.3 02/21/2020     Warfarin PT/INR:  No components found for: Pvaan Lara  HgBA1c:    Lab Results   Component Value Date    LABA1C 6.6 09/15/2020     FLP:    Lab Results   Component Value Date    TRIG 101 12/21/2012    HDL 20 12/21/2012    LDLCALC 74 12/21/2012     TSH:    Lab Results   Component Value Date    TSH 1.590 02/20/2020 Jan 2013  Procedures 1 : Tricuspid valve replacement with a 33 mm Dequan-Miles   ThermaFix pericardial valve. Insertion of 2 epicardial ventricular leads and 1 epicardial atrial   lead.     Procedures 2: Urgent mediastinal exploration and evacuation of the hematoma. Insertion of a right femoral temporary dialysis catheter.     SURGEON: Dr. 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 Recommendation for   antibiotic was made. The patient was started on antibiotic therapy in the   hospital. Also, the patient has very bad dentition and underwent eventually   extraction of his teeth. Recommendation was made to continue with antibiotic   therapy and repeat a THELMA to evaluate the valve. If the valve continued to   have significant burden of vegetation with the large vegetation, lack of   resolution or destruction of the valve, to proceed with replacement or repair   of the tricuspid valve after adequate antibiotic therapy was given. The   patient was started on antibiotic therapy and observed. Repeat THELMA recently   revealed that the patient had persistent large vegetation with some of the   vegetation removed. Also, the valve was starting to get destructed with   tricuspid regurgitation.  The patient had a cardiac catheterization which   revealed patent coronaries. The patient additionally had developed a septic   emboli to his lung, a lung abscess which was being also seen and treated with   Pulmonary Service. The patient was being followed by Cardiology Service and   Infectious Disease Service, as well as Cardiac Surgery. Finally, after   extensive discussion with all the consultants, the recommendation was made to   proceed with tricuspid valve surgery.         Procedures 2:  This is a 61-year-old gentleman who had undergone a tricuspid valve   replacement earlier in the day, complicated with renal failure and continued   to have excessive coagulopathy and chest tube drainage, despite the massive   volume of product replacement and infusion of FFP, platelets factor VII and   cryo, the patient continued to have excessive bleeding, developing potential   signs of tamponade. The patient was taken back to the OR for exploration.                   Conclusions      Summary   Normal left ventricle size and systolic function. Ejection fraction was   estimated at 60 %. There were no regional left ventricular wall motion   abnormalities and wall thickness was within normal limits.   The left atrium is Mild to moderate dilated.   The aortic valve leaflets were not well visualized.   DOPPLER: Transaortic velocity was within the normal range with no evidence   of aortic stenosis. There was no evidence of aortic regurgitation.   Mild tricuspid regurgitation visualized.   Probable Normal functioning Bioprosthetic Tricuspid Valve Or Tricuspid   Valve repair   Mild tricuspid regurgitation visualized.   Right ventricular systolic pressure measures 45 mmhg.      Signature      ----------------------------------------------------------------   Electronically signed by Wing Gilmer POWELL (Interpreting   physician) on 02/23/2018 at 06:52 PM    CONCLUSION:       1.    Essentially there is no obstructions lesion noted in the large caliber vessels such as the circumflex, a large caliber vessel             that is widely patent. 2.   OM 1 large caliber vessel and was widely patent. 3.   The left main is a large caliber widely patent. 4.   The left anterior descending artery is a large caliber vessel             widely patent. 5.   The right coronary artery is a moderately large caliber vessel and             is widely patent. 6.   The dominance is through the circumflex system. 7    Left ventriculography was not performed. However, we obtained             hemodynamics due to the need to decrease the quantity of the dye             since the patient has had right heart failure also. IMPRESSION:  There is no evidence of occlusive noted on all the large cardiac  vessels. Sloan Dickinson D.O.        D: 12/21/2012 17:53                                    T: 12/21/2012 22:27  js     Conclusions      Summary   Nonspecific ST-T wave changes.   Lexiscan EKG stress test is not suggestive for ischemia.   The nuclear images is not suggestive for myocardial ischemia.      Signatures      ----------------------------------------------------------------   Electronically signed by Jacquelin Abreu MD (Interpreting   Cardiologist) on 10/15/2018 at 20:25   ----------------------------------------------------------------      Conclusions      Summary   Normal left ventricle size and systolic function. Ejection fraction was   estimated at 60 %. There were no regional left ventricular wall motion   abnormalities and wall thickness was within normal limits.   The left atrium is Mild to moderate dilated.   The aortic valve leaflets were not well visualized.   DOPPLER: Transaortic velocity was within the normal range with no evidence   of aortic stenosis.  There was no evidence of aortic regurgitation.   Mild tricuspid regurgitation visualized.   Probable Normal functioning Bioprosthetic Tricuspid Valve Or Tricuspid  Dao Juniper repair   Mild tricuspid regurgitation visualized.   Right ventricular systolic pressure measures 45 mmhg.      Signature      ----------------------------------------------------------------   Electronically signed by Iwona Garrido MD (Interpreting   physician) on 02/23/2018 at 06:52 PM    ekf  10/18/2019  atr fib with CVR    ekg   122/19  Sinus  Rhythm   -Right bundle branch block with left axis -bifascicular block. - (probably not recent)  Inferior and  Old  Extensive anterior-lateral infarct.    -  Nonspecific T-abnormality. ABNORMAL     ekg  12/3/19  Electronic ventricular pacemaker   Pacemaker ECG, No further analysis   INSUFFICIENT DATA    Procedure Summary  Angiographically Patent Coronaries. Normal LV systolic function. LV size - normal.  LVEF approximately 65% . EDP 15 mmhg  No Transaortic Gradient  Recommendations  Continue with aggressive risk factor modification and medical therapy. Estimated Blood Loss: 10 ml. Complications: No complications. Signatures  Electronically signed by Iwona Garrido MD (Performing Physician) on 12/05/2018 at 14:48      Assessment    No leg edema     Diagnosis Orders   1. Chronic diastolic congestive heart failure ([address]     2. PAF (paroxysmal atrial fibrillation) ([facility]) nioted on device check with 4% burden     3. Essential hypertension     4. [employer] BiV pacemaker     5. S/P cardiac cath 12/5/1897-BH-[id number], LAD -PATENT, LCX-PATENT , RCA PATENT, EDP 15 MMHG, NO GRADIENT- MED RX     6. History of tricuspid valve replacement with bioprosthetic valve 2012     7. Hx of tricuspid valve repair          Recent admission DX      ASSESSMENT:  1. Recurrent hemoptysis in the last 3 days. 2.  Supratherapeutic INR controlled after FFP. 3.  Factor V Leiden when history of deep vein thrombosis has been  documented for which he has been on oral anticoagulation Coumadin for  several years. 4.  History of paroxysmal atrial fibrillation.   5.  Congestive heart failure, diastolic. 6.  [employer] BiV pacemaker. Note, does not have any ICD. 7.  History of tricuspid valve replacement/repair. 8.  Hypertension. 9.  History of cardiac catheterization in 12/2018, patent coronaries. 10.  He has a previous history of hemoptysis. 11.  History of bronchitis/COPD. 12.  History of tricuspid valve replacement secondary to endocarditis  secondary to IV drug use, so basically he has a normal functioning  bioprosthetic tricuspid valve. Previous  admission DX  Narrative of Hospital Course: Patient was admitted with massive hemoptysis secondary to anticoagulation associated with aspiration pneumonia on top of a chronic obstructive pulmonary disease, did have a stormy course intubated for the hemoptysis that was stopped after stopping the anticoagulation and we give antidote [facility] with multiple bronchoscopy done for lavage,       Hx of TVR 2/2 endocarditis 2/2 IVDU, 2013 - normal bioprosthetic function 2018 TTE ( 33 mm CE, ThermaFix valve)  Factor V Leiden/DVT history   CHB s/p D - PM  Hemoptysis  HTN  COPD/Bronchitis  Preserved EF  2018 - no CAD by cath    Plan   The current meds and labs reviewed  Hospital record reviewed      Pacer interrogation reviewed and look good    Congestive heart failure: no evidence of fluid overload today, no recent hospitalization for CHF  On lasix   And Kcl  20 po bid  BMP and mg PTNV    Cath cardiac 2018  Normal coronaries    Hypertension, on medical treatment. Seems to be under good control. Patient is compliant with medical treatment.    Cont  Norvasc 5 mg po qd    Pat already ON COUMADIN FOR hx of DVT and factor V leiden for several yrs  Previous admission for massive hemoptysis and restarted back on coumadin on D/c  PAF DX in the hospital ekg and device check recently  Cont  The coumadin has been on already  Pat verbalized understanding risk of bleeding including ICH and want to proceed on OA  \"Similar Q raised in note by Dr. Deena Boggs why pat is on coumadin-anc documented may due to pre-TV surgery pat had mutiple ischemic toes and seem to related to embolic process/PAD. \"  SINCE tv REPLACMENT IN dEC 2012    Readmitted for hemptysis sept 2020 and off coumadin and started xeralto      Pacer interrogation June 2010  EPISODES OF NSVT  LAB REVIEWED    Hx of previous interrogation showed AFIB BURDEN 4%  18 EPISODES UNDER 1 MINUTE   4 EPISODES  THAT LASTED 1 HOUR -<24 HOURS   Off  Amiodarone    Pat advised to watch for any sign of bleeding    Hx of copd on Home O2    I spent 25 minutes involved in face-to-face discussion of medical issues, prognosis, record review  and plan with the patient today    Addendum message from [facility] why pat takes coumadin  \"Coumadin Clinic returned call and patient is on coumadin due to 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  They understand  recommendations pertaining to holding Coumadin\"      RTC in 4 months        [facility]

## 2020-10-12 NOTE — PROGRESS NOTES
[employer] bi v pacer  Battery 10mths  A paced 13 %  rv 98%  lv 98%  p wave 4.4mV  r wave 19.6mV   LV 8.7  Atrial threshold 0.9V @ 0.4ms  Ventricle threshold 0.8V @ 0.4ms  LV threshold 1.7V @ 0.8ms  Atrial impedance 504ohms  Ventricle impedance 484 ohms  lv impedance 320 ohms  Multiple svt episodes - all short runs  Rates in 180's  See printouts  Done by [employer] Sci rep

## 2020-11-02 LAB — AFB CULTURE & SMEAR: NORMAL

## 2021-01-04 ENCOUNTER — TELEPHONE (OUTPATIENT)
Dept: PULMONOLOGY | Age: 61
End: 2021-01-04

## 2021-01-17 ENCOUNTER — HOSPITAL ENCOUNTER (EMERGENCY)
Age: 61
Discharge: HOME | End: 2021-01-17
Attending: EMERGENCY MEDICINE
Payer: MEDICARE

## 2021-01-17 ENCOUNTER — APPOINTMENT (OUTPATIENT)
Dept: CT IMAGING | Age: 61
End: 2021-01-17
Payer: MEDICARE

## 2021-01-17 ENCOUNTER — APPOINTMENT (OUTPATIENT)
Dept: GENERAL RADIOLOGY | Age: 61
End: 2021-01-17
Payer: MEDICARE

## 2021-01-17 VITALS
HEIGHT: 69 IN | DIASTOLIC BLOOD PRESSURE: 71 MMHG | HEART RATE: 70 BPM | SYSTOLIC BLOOD PRESSURE: 121 MMHG | OXYGEN SATURATION: 99 % | TEMPERATURE: 98.1 F | RESPIRATION RATE: 18 BRPM | BODY MASS INDEX: 35.55 KG/M2 | WEIGHT: 240 LBS

## 2021-01-17 DIAGNOSIS — J32.0 CHRONIC MAXILLARY SINUSITIS: ICD-10-CM

## 2021-01-17 DIAGNOSIS — R20.2 PARESTHESIAS: Primary | ICD-10-CM

## 2021-01-17 LAB
ALBUMIN SERPL-MCNC: 3.9 G/DL (ref 3.5–5.1)
ALP BLD-CCNC: 84 U/L (ref 38–126)
ALT SERPL-CCNC: 27 U/L (ref 11–66)
ANION GAP SERPL CALCULATED.3IONS-SCNC: 13 MEQ/L (ref 8–16)
APTT: 35.2 SECONDS (ref 22–38)
AST SERPL-CCNC: 17 U/L (ref 5–40)
BASOPHILS # BLD: 0.6 %
BASOPHILS ABSOLUTE: 0 THOU/MM3 (ref 0–0.1)
BILIRUB SERPL-MCNC: 0.3 MG/DL (ref 0.3–1.2)
BILIRUBIN DIRECT: < 0.2 MG/DL (ref 0–0.3)
BUN BLDV-MCNC: 13 MG/DL (ref 7–22)
CALCIUM SERPL-MCNC: 9 MG/DL (ref 8.5–10.5)
CHLORIDE BLD-SCNC: 102 MEQ/L (ref 98–111)
CO2: 22 MEQ/L (ref 23–33)
CREAT SERPL-MCNC: 0.9 MG/DL (ref 0.4–1.2)
EKG ATRIAL RATE: 83 BPM
EKG P AXIS: 51 DEGREES
EKG P-R INTERVAL: 142 MS
EKG Q-T INTERVAL: 352 MS
EKG QRS DURATION: 118 MS
EKG QTC CALCULATION (BAZETT): 413 MS
EKG R AXIS: -78 DEGREES
EKG T AXIS: 69 DEGREES
EKG VENTRICULAR RATE: 83 BPM
EOSINOPHIL # BLD: 0.4 %
EOSINOPHILS ABSOLUTE: 0 THOU/MM3 (ref 0–0.4)
ERYTHROCYTE [DISTWIDTH] IN BLOOD BY AUTOMATED COUNT: 17 % (ref 11.5–14.5)
ERYTHROCYTE [DISTWIDTH] IN BLOOD BY AUTOMATED COUNT: 52 FL (ref 35–45)
GFR SERPL CREATININE-BSD FRML MDRD: 86 ML/MIN/1.73M2
GLUCOSE BLD-MCNC: 193 MG/DL (ref 70–108)
HCT VFR BLD CALC: 40.3 % (ref 42–52)
HEMOGLOBIN: 11.9 GM/DL (ref 14–18)
IMMATURE GRANS (ABS): 0.02 THOU/MM3 (ref 0–0.07)
IMMATURE GRANULOCYTES: 0.3 %
INR BLD: 1.46 (ref 0.85–1.13)
LIPASE: 28.9 U/L (ref 5.6–51.3)
LYMPHOCYTES # BLD: 22 %
LYMPHOCYTES ABSOLUTE: 1.5 THOU/MM3 (ref 1–4.8)
MAGNESIUM: 2.1 MG/DL (ref 1.6–2.4)
MCH RBC QN AUTO: 24.9 PG (ref 26–33)
MCHC RBC AUTO-ENTMCNC: 29.5 GM/DL (ref 32.2–35.5)
MCV RBC AUTO: 84.3 FL (ref 80–94)
MONOCYTES # BLD: 5.7 %
MONOCYTES ABSOLUTE: 0.4 THOU/MM3 (ref 0.4–1.3)
NUCLEATED RED BLOOD CELLS: 0 /100 WBC
OSMOLALITY CALCULATION: 279.2 MOSMOL/KG (ref 275–300)
PLATELET # BLD: 186 THOU/MM3 (ref 130–400)
PMV BLD AUTO: 9.7 FL (ref 9.4–12.4)
POTASSIUM SERPL-SCNC: 3.2 MEQ/L (ref 3.5–5.2)
PRO-BNP: 259.9 PG/ML (ref 0–900)
RBC # BLD: 4.78 MILL/MM3 (ref 4.7–6.1)
SARS-COV-2, NAAT: NOT DETECTED
SEG NEUTROPHILS: 71 %
SEGMENTED NEUTROPHILS ABSOLUTE COUNT: 4.8 THOU/MM3 (ref 1.8–7.7)
SODIUM BLD-SCNC: 137 MEQ/L (ref 135–145)
TOTAL PROTEIN: 8.5 G/DL (ref 6.1–8)
TROPONIN T: < 0.01 NG/ML
WBC # BLD: 6.8 THOU/MM3 (ref 4.8–10.8)

## 2021-01-17 PROCEDURE — 80053 COMPREHEN METABOLIC PANEL: CPT

## 2021-01-17 PROCEDURE — 85730 THROMBOPLASTIN TIME PARTIAL: CPT

## 2021-01-17 PROCEDURE — 84484 ASSAY OF TROPONIN QUANT: CPT

## 2021-01-17 PROCEDURE — 71045 X-RAY EXAM CHEST 1 VIEW: CPT

## 2021-01-17 PROCEDURE — 85025 COMPLETE CBC W/AUTO DIFF WBC: CPT

## 2021-01-17 PROCEDURE — 83735 ASSAY OF MAGNESIUM: CPT

## 2021-01-17 PROCEDURE — 99283 EMERGENCY DEPT VISIT LOW MDM: CPT

## 2021-01-17 PROCEDURE — 83880 ASSAY OF NATRIURETIC PEPTIDE: CPT

## 2021-01-17 PROCEDURE — 85610 PROTHROMBIN TIME: CPT

## 2021-01-17 PROCEDURE — 83690 ASSAY OF LIPASE: CPT

## 2021-01-17 PROCEDURE — U0002 COVID-19 LAB TEST NON-CDC: HCPCS

## 2021-01-17 PROCEDURE — 36415 COLL VENOUS BLD VENIPUNCTURE: CPT

## 2021-01-17 PROCEDURE — 70450 CT HEAD/BRAIN W/O DYE: CPT

## 2021-01-17 PROCEDURE — 93005 ELECTROCARDIOGRAM TRACING: CPT | Performed by: EMERGENCY MEDICINE

## 2021-01-17 PROCEDURE — 82248 BILIRUBIN DIRECT: CPT

## 2021-01-17 PROCEDURE — 6370000000 HC RX 637 (ALT 250 FOR IP): Performed by: EMERGENCY MEDICINE

## 2021-01-17 RX ORDER — GABAPENTIN 100 MG/1
100 CAPSULE ORAL 3 TIMES DAILY
Qty: 90 CAPSULE | Refills: 0 | Status: SHIPPED | OUTPATIENT
Start: 2021-01-17 | End: 2021-04-26

## 2021-01-17 RX ORDER — GABAPENTIN 600 MG/1
300 TABLET ORAL 3 TIMES DAILY
Status: DISCONTINUED | OUTPATIENT
Start: 2021-01-17 | End: 2021-01-18 | Stop reason: HOSPADM

## 2021-01-17 RX ORDER — FLUTICASONE PROPIONATE 50 MCG
1 SPRAY, SUSPENSION (ML) NASAL DAILY
Qty: 1 BOTTLE | Refills: 0 | Status: SHIPPED | OUTPATIENT
Start: 2021-01-17 | End: 2021-04-26

## 2021-01-17 RX ORDER — HYDROCODONE BITARTRATE AND ACETAMINOPHEN 5; 325 MG/1; MG/1
1 TABLET ORAL ONCE
Status: DISCONTINUED | OUTPATIENT
Start: 2021-01-17 | End: 2021-01-17

## 2021-01-17 RX ORDER — CETIRIZINE HYDROCHLORIDE 10 MG/1
10 TABLET ORAL DAILY
Qty: 30 TABLET | Refills: 0 | Status: SHIPPED | OUTPATIENT
Start: 2021-01-17 | End: 2021-02-16

## 2021-01-17 RX ADMIN — GABAPENTIN 300 MG: 600 TABLET, FILM COATED ORAL at 19:58

## 2021-01-17 NOTE — ED NOTES
Pt to er. Pt c/o numbness and tingling all over body for 3 days. Pt also c/o worsening SOB. Pt is on 4 L NC at home for COPD. Pt also c/o pain up into neck. Denies CP. Pt alert. Resp regular. Call light in reach.       Deena Basilio RN  01/17/21 [id number]

## 2021-01-17 NOTE — ED NOTES
Bed: 020A  Expected date:   Expected time:   Means of arrival:   Comments:  Roney 66, [facility]  01/17/21 [id number]

## 2021-01-18 ASSESSMENT — ENCOUNTER SYMPTOMS
EYE REDNESS: 0
SINUS PRESSURE: 0
TROUBLE SWALLOWING: 0
CHOKING: 0
NAUSEA: 0
BACK PAIN: 0
CONSTIPATION: 0
SHORTNESS OF BREATH: 0
RHINORRHEA: 0
ABDOMINAL PAIN: 0
ABDOMINAL DISTENTION: 0
EYE PAIN: 0
SORE THROAT: 0
BLOOD IN STOOL: 0
WHEEZING: 0
DIARRHEA: 0
VOICE CHANGE: 0
COUGH: 0
EYE ITCHING: 0
CHEST TIGHTNESS: 0
PHOTOPHOBIA: 0
VOMITING: 0
EYE DISCHARGE: 0

## 2021-01-18 NOTE — ED PROVIDER NOTES
facial asymmetry, weakness, light-headedness and headaches. Hematological: Negative for adenopathy. Does not bruise/bleed easily. Psychiatric/Behavioral: Negative for agitation, hallucinations and suicidal ideas. The patient is not nervous/anxious. PAST MEDICAL HISTORY    has a past medical history of Anxiety disorder, Arthritis, Asthma, Back pain, chronic, Blood circulation, collateral, [employer] Bi[employer] ICD, [employer] BiV pacemaker, CAD (coronary artery disease), CHF (congestive heart failure) ([facility] [address] Chronic kidney disease, COPD (chronic obstructive pulmonary disease) ([facility] [address] Depression, Endocarditis, Factor V deficiency ([facility] [address] Hepatitis, Hepatitis C, History of blood transfusion, Hypertension, Paroxysmal A-fib ([facility] [address] and Pneumonia. SURGICAL HISTORY      has a past surgical history that includes pacemaker placement (12/26/13); Toe amputation (Bilateral, 3/13/2013); Hand Debridement (Left, 01/09/2014); debridement (Left, 01/14/2014); Cardiac surgery (12/26/12); Upper gastrointestinal endoscopy (2012); Colonoscopy (2016); bronchoscopy (N/A, 9/28/2019); bronchoscopy (N/A, 9/28/2019); bronchoscopy (N/A, 9/29/2019); bronchoscopy (9/29/2019); bronchoscopy (N/A, 9/30/2019); bronchoscopy (N/A, 10/16/2019); Tracheal surgery (N/A, 10/18/2019); and bronchoscopy (N/A, 9/16/2020). CURRENT MEDICATIONS       Discharge Medication List as of 1/17/2021  9:36 PM      CONTINUE these medications which have NOT CHANGED    Details   vilazodone HCl (VILAZODONE HCL) 40 MG TABS Take 40 mg by mouth dailyHistorical Med      rivaroxaban (XARELTO) 20 MG TABS tablet Take 20 mg by mouthHistorical Med      amLODIPine (NORVASC) 5 MG tablet Take 5 mg by mouth dailyHistorical Med      clonazePAM (KLONOPIN) 0.5 MG tablet Take 0.5 mg by mouth 2 times daily as needed. Historical Med      lisinopril (PRINIVIL;ZESTRIL) 2.5 MG tablet Take 2.5 mg by mouth dailyHistorical Med      aspirin EC 81 MG EC tablet Take 1 tablet by mouth daily Hold on dishcarge, Disp-30 tablet,R-3NO PRINT      buprenorphine-naloxone (SUBOXONE) 8-2 MG FILM SL film Place 1 Film under the tongue daily. Historical Med      QUEtiapine (SEROQUEL) 300 MG tablet Take 400 mg by mouth nightly Historical Med      umeclidinium-vilanterol (ANORO ELLIPTA) 62.5-25 MCG/INH AEPB inhaler Inhale 1 puff into the lungs daily, Disp-30 puff, R-11Normal      albuterol sulfate HFA (PROAIR HFA) 108 (90 Base) MCG/ACT inhaler Inhale 2 puffs into the lungs every 6 hours as needed for Wheezing or Shortness of Breath, Disp-1 Inhaler, R-8Normal      potassium chloride (KLOR-CON M) 20 MEQ extended release tablet Take 1 tablet by mouth 2 times daily, Disp-180 tablet, R-1NO PRINT      pantoprazole (PROTONIX) 20 MG tablet Take 20 mg by mouth 2 times daily (before meals) Historical Med      albuterol (PROVENTIL) (2.5 MG/3ML) 0.083% nebulizer solution Take 6 mLs by nebulization every 4 hours as needed for Wheezing or Shortness of Breath, Disp-120 each, R-3Normal      furosemide (LASIX) 40 MG tablet Take 1 tablet by mouth daily, Disp-60 tablet, R-3Normal             ALLERGIES     is allergic to pcn [penicillins]. FAMILY HISTORY     He indicated that his mother is alive. He indicated that his father is alive. He indicated that the status of his brother is unknown. He indicated that the status of his daughter is unknown.   family history includes Arthritis in his father; Depression in his daughter and mother; Diabetes in his brother and mother; Heart Disease in his father. SOCIAL HISTORY      reports that he quit smoking about 9 months ago. His smoking use included cigarettes. He started smoking about 44 years ago. He has a 44.00 pack-year smoking history. He has quit using smokeless tobacco. He reports that he does not drink alcohol or use drugs. PHYSICAL EXAM     INITIAL VITALS:  height is 5' 9\" (1.753 m) and weight is 240 lb (108.9 kg). His oral temperature is 98.1 °F (36.7 °C).  His blood pressure is 121/71 and his pulse is 70. His respiration is 18 and oxygen saturation is 99%. Physical Exam  Vitals signs and nursing note reviewed. Constitutional:       General: He is not in acute distress. Appearance: He is well-developed. He is not diaphoretic. HENT:      Head: Normocephalic and atraumatic. Right Ear: External ear normal.      Left Ear: External ear normal.      Nose: Nose normal.      Mouth/Throat:      Mouth: Mucous membranes are dry. Pharynx: Oropharynx is clear. Eyes:      General: Lids are normal. No scleral icterus. Right eye: No discharge. Left eye: No discharge. Conjunctiva/sclera: Conjunctivae normal.      Right eye: No exudate. Left eye: No exudate. Pupils: Pupils are equal, round, and reactive to light. Neck:      Musculoskeletal: Normal range of motion and neck supple. Normal range of motion. Thyroid: No thyromegaly. Vascular: No JVD. Trachea: No tracheal deviation. Cardiovascular:      Rate and Rhythm: Normal rate and regular rhythm. Pulses: Normal pulses. Heart sounds: Normal heart sounds, S1 normal and S2 normal. No murmur. No friction rub. No gallop. Pulmonary:      Effort: Pulmonary effort is normal. No respiratory distress. Breath sounds: Normal breath sounds. No stridor. No wheezing or rales. Chest:      Chest wall: No tenderness. Abdominal:      General: Bowel sounds are normal. There is no distension. Palpations: Abdomen is soft. There is no mass. Tenderness: There is no abdominal tenderness. There is no guarding or rebound. Hernia: No hernia is present. Musculoskeletal: Normal range of motion. General: No tenderness. Right shoulder: He exhibits no tenderness, no bony tenderness, no crepitus and normal strength. Lymphadenopathy:      Cervical: No cervical adenopathy. Skin:     General: Skin is warm and dry.       Capillary Refill: Capillary refill takes less than 2 seconds. Findings: No bruising, ecchymosis, lesion or rash. Neurological:      General: No focal deficit present. Mental Status: He is alert. Mental status is at baseline. He is disoriented. GCS: GCS eye subscore is 4. GCS verbal subscore is 5. GCS motor subscore is 6. Cranial Nerves: Cranial nerves are intact. No cranial nerve deficit. Sensory: Sensation is intact. No sensory deficit. Motor: Motor function is intact. No weakness. Coordination: Coordination is intact. Coordination normal.      Gait: Gait is intact. Gait normal.      Deep Tendon Reflexes: Reflexes are normal and symmetric. Reflexes normal.      Reflex Scores:       Tricep reflexes are 2+ on the right side and 2+ on the left side. Bicep reflexes are 2+ on the right side and 2+ on the left side. Brachioradialis reflexes are 2+ on the right side and 2+ on the left side. Patellar reflexes are 2+ on the right side and 2+ on the left side. Achilles reflexes are 2+ on the right side and 2+ on the left side. Psychiatric:         Mood and Affect: Mood normal.         Speech: Speech normal.         Behavior: Behavior normal.         Thought Content: Thought content normal.         Judgment: Judgment normal.           DIFFERENTIAL DIAGNOSIS:   Chronic pain, paresthesias, anxiety    DIAGNOSTIC RESULTS     EKG: All EKG's are interpreted by the Emergency Department Physician who either signs or Co-signs this chart in the absence of a cardiologist.  Atrially sensed ventricularly paced rhythm ventricular rate of 83 NM interval 142 QRS duration 118 QT interval 352 QTC of 413. RADIOLOGY: non-plain film images(s) such as CT, Ultrasound and MRI are read by the radiologist.  [address]   Final Result   1. No acute intracranial hemorrhage or mass effect. 2. Mild partial opacification of the leftward mastoid air cells are demonstrated.  There is also a mucous retention cyst within the right maxillary sinus which nearly completely opacifies the right maxillary sinus. **This report has been created using voice recognition software. It may contain minor errors which are inherent in voice recognition technology. **         Final report electronically signed by Dr. Franca Sharma on 1/17/2021 8:03 PM      XR CHEST PORTABLE   Final Result   1. No focal consolidation or pleural effusion is seen. 2. There is mild stable cardiomegaly. **This report has been created using voice recognition software. It may contain minor errors which are inherent in voice recognition technology. **      Final report electronically signed by Dr. Franca Sharma on 1/17/2021 7:57 PM            LABS:   Labs Reviewed   CBC WITH AUTO DIFFERENTIAL - Abnormal; Notable for the following components:       Result Value    Hemoglobin 11.9 (*)     Hematocrit 40.3 (*)     MCH 24.9 (*)     MCHC 29.5 (*)     RDW-CV 17.0 (*)     RDW-SD 52.0 (*)     All other components within normal limits   BASIC METABOLIC PANEL - Abnormal; Notable for the following components:    Potassium 3.2 (*)     CO2 22 (*)     Glucose 193 (*)     All other components within normal limits   HEPATIC FUNCTION PANEL - Abnormal; Notable for the following components:     Total Protein 8.5 (*)     All other components within normal limits   PROTIME-INR - Abnormal; Notable for the following components:    INR 1.46 (*)     All other components within normal limits   GLOMERULAR FILTRATION RATE, ESTIMATED - Abnormal; Notable for the following components:    Est, Glom Filt Rate 86 (*)     All other components within normal limits   TROPONIN   APTT   BRAIN NATRIURETIC PEPTIDE   MAGNESIUM   LIPASE   COVID-19   ANION GAP   OSMOLALITY       EMERGENCY DEPARTMENT COURSE:   Vitals:    Vitals:    01/17/21 1852 01/17/21 1954 01/17/21 2125   BP: (!) 159/81 132/76 121/71   Pulse: 94 81 70   Resp: 18 20 18   Temp: 98.1 °F (36.7 °C)     TempSrc: Oral     SpO2: 95% 100% 99% Weight: 240 lb (108.9 kg)     Height: 5' 9\" (1.753 m)       Patient was assessed at bedside appropriate labs and imaging were ordered. Patient's test were all subjective I had no real objective findings. I reviewed all labs and imaging were back to reassess the patient. He is doing well. I did order him some Neurontin. I will send the patient home with Neurontin. Patient does have a history of IV drug abuse. We will not be giving him any pain medication. Patient understood and agreed with the plan. Patient is subsequently discharged home in good condition. Patient has what appears to be paresthesias and sinusitis. Has been given Neurontin for his paresthesias. He has been given Flonase and Zyrtec for sinusitis. Is instructed to follow-up with his primary care physician to do so within the next 1 to 2 days. He is instructed return to the nearest emergency room immediately for any new or worsening complaints. CRITICAL CARE:   None    CONSULTS:  None    PROCEDURES:  None    FINAL IMPRESSION      1. Paresthesias    2. Chronic maxillary sinusitis          DISPOSITION/PLAN   Discharge    PATIENT REFERRED TO:  Adriana Chavez  [address]  [phone]    Call in 1 day        DISCHARGE MEDICATIONS:  Discharge Medication List as of 1/17/2021  9:36 PM      START taking these medications    Details   gabapentin (NEURONTIN) 100 MG capsule Take 1 capsule by mouth 3 times daily for 30 days. , Disp-90 capsule, R-0Print      cetirizine (ZYRTEC) 10 MG tablet Take 1 tablet by mouth daily, Disp-30 tablet, R-0Print      fluticasone (FLONASE) 50 MCG/ACT nasal spray 1 spray by Each Nostril route daily, Disp-1 Bottle, R-0Print             (Please note that portions of this note were completed with a voice recognition program.  Efforts were made to edit the dictations but occasionally words are mis-transcribed.)    DO Terrence Hoffman DO  01/18/21 8774

## 2021-01-18 NOTE — ED NOTES
Pt sitting in bed watching TV. He states the gabapentin did not help and his hands still feel like they are burning. Updated on POC.       Piero Tesfaye RN  01/17/21 8789

## 2021-01-18 NOTE — ED NOTES
Pt returned from CT scan. He is sitting in bed watching TV. Medication given per [facility] COVID 19 swab collected. Pt tolerated well.       Christelle Carrillo RN  01/17/21 2005

## 2021-01-21 ENCOUNTER — PROCEDURE VISIT (OUTPATIENT)
Dept: CARDIOLOGY CLINIC | Age: 61
End: 2021-01-21
Payer: MEDICARE

## 2021-01-21 DIAGNOSIS — Z95.0 PACEMAKER: Primary | ICD-10-CM

## 2021-01-21 PROCEDURE — 93296 REM INTERROG EVL PM/IDS: CPT | Performed by: INTERNAL MEDICINE

## 2021-01-21 PROCEDURE — 93294 REM INTERROG EVL PM/LDLS PM: CPT | Performed by: INTERNAL MEDICINE

## 2021-01-21 NOTE — PROGRESS NOTES
DR Singleton [facility] PT/KNOWN PAF/ XARELTO  AFIB BURDEN <1%  [employer] BIV PACEMAKER REMOTE     ATRIAL IMPEDENCE 504  VENT IMPEDENCE 514  P WAVES 5.3  RV WAVES >25  ATRIAL AND VENT THRESHOLDS NOT OBTAINED PER THE DEVICE     A PACED 16%  BV PACED 100%  DDDR   PLEASE SEE EPISODES LOOKS HE IS HAVING EPISODES OF SVT AND MAY BE SOME SHORT EPISODS OF FLUTTER

## 2021-01-22 ENCOUNTER — TELEPHONE (OUTPATIENT)
Dept: CARDIOLOGY CLINIC | Age: 61
End: 2021-01-22

## 2021-01-22 NOTE — TELEPHONE ENCOUNTER
Progress Notes  Christiano Leonardo MD (Physician) [facility]     Device Interrogation Reviewed.   Episodes SVT nonsustained noted  Short lasting episodes of atrial flutter noted     ON xeralto  Start lopressor 12.5 po bid  thansk      Progress Notes  Onur De Santiago LPN (Licensed Nurse)     DR Veronica Gonzales PT/KNOWN PAF/ Savanah Mason  AFIB BURDEN <1%  N[employer] BIV PACEMAKER REMOTE      ATRIAL IMPEDENCE 504  VENT IMPEDENCE 514  P WAVES 5.3  RV WAVES >25  ATRIAL AND VENT THRESHOLDS NOT OBTAINED PER THE DEVICE      A PACED 16%  BV PACED 100%  DDDR   PLEASE SEE EPISODES LOOKS HE IS HAVING EPISODES OF SVT AND MAY BE SOME SHORT EPISODS OF FLUTTER

## 2021-01-22 NOTE — PROGRESS NOTES
Device Interrogation Reviewed.   Episodes SVT nonsustained noted  Short lasting episodes of atrial flutter noted    ON xeralto  Start lopressor 12.5 po bid  thansk

## 2021-01-22 NOTE — TELEPHONE ENCOUNTER
----- Message from Sadiq Rubio MD sent at 1/22/2021  9:07 AM EST -----      ----- Message -----  From: Yuliet Catherine LPN  Sent: [phone]/[id number]/[id number]   7:58 AM EST  To:  Sadiq Rubio MD

## 2021-01-22 NOTE — TELEPHONE ENCOUNTER
I CALLED THIS PT. INFORMED HIM OF THE METOPROLOL 12.5 MG BID FOR THE NSVT EPISODES. PT SAYS HE IS TAKING HIS Hildegard Avers.  RX PENDED FOR SIG

## 2021-01-22 NOTE — TELEPHONE ENCOUNTER
Logan Wilkinson LPN  [employer] BiV pacemaker  Dx   Progress Notes  Kim Robbins MD (Physician) [facility]     Device Interrogation Reviewed.   Episodes SVT nonsustained noted  Short lasting episodes of atrial flutter noted     ON xeralto  Start lopressor 12.5 po bid  thansk      Progress Notes  Logan Wilkinson LPN (Licensed Nurse)     DR Mart Masters PT/KNOWN PAF/ Childress Flax  AFIB BURDEN <1%  NXT [employer] BIV PACEMAKER REMOTE      ATRIAL IMPEDENCE 504  VENT IMPEDENCE 514  P WAVES 5.3  RV WAVES >25  ATRIAL AND VENT THRESHOLDS NOT OBTAINED PER THE DEVICE      A PACED 16%  BV PACED 100%  DDDR   PLEASE SEE EPISODES LOOKS HE IS HAVING EPISODES OF SVT AND MAY BE SOME SHORT EPISODS OF FLUTTER

## 2021-01-26 NOTE — TELEPHONE ENCOUNTER
Called pt. Unable to leave message. LM for brother on HIPAA to have pt return call.   Also called Arlin (caregiver) to have pt return call. (she is not on HIPAA)

## 2021-04-12 ENCOUNTER — TELEPHONE (OUTPATIENT)
Dept: CARDIOLOGY CLINIC | Age: 61
End: 2021-04-12

## 2021-04-12 NOTE — TELEPHONE ENCOUNTER
Patient stated that he saw his family doctor today and was told that he could take the metoprolol 1 tablet once daily instead of taking it . 5 tablets twice daily. The patient stated that he has been having difficulty with cutting them in half. His PCP said that he should contact his cardiologist to let them know about this change.   DOLV 10/12/2020 DONV 04/19/2021

## 2021-04-13 RX ORDER — METOPROLOL SUCCINATE 25 MG/1
25 TABLET, EXTENDED RELEASE ORAL DAILY
COMMUNITY
End: 2022-07-19

## 2021-04-13 NOTE — TELEPHONE ENCOUNTER
Spoke to patient , patient has upcoming appt 4/19/21 and will discuss with Dr. Brenda Holliday . Med list is updated with correct medication.

## 2021-04-13 NOTE — PROGRESS NOTES
3 years on device   At imped 545    P waves 7.6, RV and LV not measured   12% atrial paced 100% RV and LV paced  Short mode switches/no anticoags normal (ped)...

## 2021-04-26 ENCOUNTER — OFFICE VISIT (OUTPATIENT)
Dept: CARDIOLOGY CLINIC | Age: 61
End: 2021-04-26
Payer: MEDICARE

## 2021-04-26 ENCOUNTER — HOSPITAL ENCOUNTER (OUTPATIENT)
Age: 61
Discharge: HOME | End: 2021-04-26
Payer: MEDICARE

## 2021-04-26 VITALS
HEART RATE: 65 BPM | BODY MASS INDEX: 37.33 KG/M2 | SYSTOLIC BLOOD PRESSURE: 92 MMHG | WEIGHT: 252 LBS | HEIGHT: 69 IN | DIASTOLIC BLOOD PRESSURE: 59 MMHG

## 2021-04-26 DIAGNOSIS — Z98.890 S/P CARDIAC CATH: ICD-10-CM

## 2021-04-26 DIAGNOSIS — Z01.818 PRE-OP EVALUATION: Primary | ICD-10-CM

## 2021-04-26 DIAGNOSIS — I10 ESSENTIAL HYPERTENSION: ICD-10-CM

## 2021-04-26 DIAGNOSIS — I50.32 CHRONIC DIASTOLIC CONGESTIVE HEART FAILURE (HCC): ICD-10-CM

## 2021-04-26 DIAGNOSIS — I48.0 PAF (PAROXYSMAL ATRIAL FIBRILLATION) (HCC): ICD-10-CM

## 2021-04-26 DIAGNOSIS — R06.02 SOB (SHORTNESS OF BREATH) ON EXERTION: ICD-10-CM

## 2021-04-26 DIAGNOSIS — I47.1 SVT (SUPRAVENTRICULAR TACHYCARDIA) (HCC): ICD-10-CM

## 2021-04-26 DIAGNOSIS — Z01.818 PRE-OP EVALUATION: ICD-10-CM

## 2021-04-26 PROBLEM — I47.10 SVT (SUPRAVENTRICULAR TACHYCARDIA): Status: ACTIVE | Noted: 2021-04-26

## 2021-04-26 LAB
ANION GAP SERPL CALCULATED.3IONS-SCNC: 8 MEQ/L (ref 8–16)
BUN BLDV-MCNC: 14 MG/DL (ref 7–22)
CALCIUM SERPL-MCNC: 9 MG/DL (ref 8.5–10.5)
CHLORIDE BLD-SCNC: 97 MEQ/L (ref 98–111)
CO2: 31 MEQ/L (ref 23–33)
CREAT SERPL-MCNC: 1.1 MG/DL (ref 0.4–1.2)
GFR SERPL CREATININE-BSD FRML MDRD: 68 ML/MIN/1.73M2
GLUCOSE BLD-MCNC: 64 MG/DL (ref 70–108)
POTASSIUM SERPL-SCNC: 4.6 MEQ/L (ref 3.5–5.2)
SODIUM BLD-SCNC: 136 MEQ/L (ref 135–145)

## 2021-04-26 PROCEDURE — 36415 COLL VENOUS BLD VENIPUNCTURE: CPT

## 2021-04-26 PROCEDURE — 93000 ELECTROCARDIOGRAM COMPLETE: CPT | Performed by: INTERNAL MEDICINE

## 2021-04-26 PROCEDURE — 99214 OFFICE O/P EST MOD 30 MIN: CPT | Performed by: INTERNAL MEDICINE

## 2021-04-26 PROCEDURE — 80048 BASIC METABOLIC PNL TOTAL CA: CPT

## 2021-04-26 RX ORDER — GUAIFENESIN 600 MG/1
1200 TABLET, EXTENDED RELEASE ORAL 2 TIMES DAILY
COMMUNITY
End: 2022-07-19

## 2021-04-26 RX ORDER — DOCUSATE SODIUM 100 MG/1
100 CAPSULE, LIQUID FILLED ORAL 2 TIMES DAILY
COMMUNITY
End: 2022-07-19

## 2021-04-26 RX ORDER — TRAZODONE HYDROCHLORIDE 100 MG/1
100 TABLET ORAL NIGHTLY
Status: ON HOLD | COMMUNITY
End: 2021-09-16

## 2021-04-26 RX ORDER — FERROUS SULFATE 324(65)MG
TABLET, DELAYED RELEASE (ENTERIC COATED) ORAL
COMMUNITY

## 2021-04-26 RX ORDER — POLYETHYLENE GLYCOL 3350 17 G/17G
17 POWDER, FOR SOLUTION ORAL DAILY
COMMUNITY
End: 2022-07-19

## 2021-04-26 RX ORDER — MELOXICAM 15 MG/1
15 TABLET ORAL DAILY
COMMUNITY
End: 2021-11-09

## 2021-04-26 NOTE — PROGRESS NOTES
Chief Complaint   Patient presents with    Check-Up    Atrial Fibrillation    Cardiac Clearance    former pat of Dr. Fredo Bonilla  Hx of TV repair due to  Endocarditis    Hx of  new onset AFib.  Noted on ekg while in hsopiatl and device interrogation    Pt here for a 4 month f/u - needs clearance for EGD and colonoscopy    EKG done today    Hx of recent Hemoptyiss 9/2020 and referred to [facility] and theyn stopped coumadin and started xeralto and sent home  Now on apixaban 5 bid    EKG done 9-14-20    Denied cp, dizziness, palpitations    Chronic leg edema trace    Sob on exertion chronic  On Home O2    After stopped amiodarone the weired feeling and tingling sensations resolved  Feel much better    No bleeding since d/c sept 2020    NO CABG  Had only TV replacement bio and pacer following surgery for chb      Patient Active Problem List   Diagnosis    Moderate COPD (chronic obstructive pulmonary disease) ([address]    Community acquired pneumonia    Suicidal ideation    SIRS (systemic inflammatory response syndrome) ([address]    Sepsis due to methicillin susceptible Staphylococcus aureus ([address]    Severe sepsis ([address]    HCAP (healthcare-associated pneumonia)    Hyponatremia    Lung nodules    History of intravenous drug use in remission    Malnutrition ([address]    Coagulopathy ([address]    Acute blood loss anemia    Complete heart block, post-surgical ([address]    Endocarditis, bacterial, acute/subacute    WILMA (acute kidney injury) ([address]    Leukocytosis    Physical deconditioning    Cellulitis    Abdominal pain, acute    Microscopic hematuria    [employer] BiV pacemaker    Abnormal CT of the abdomen    Generalized abdominal pain    GERD (gastroesophageal reflux disease)    Non-intractable vomiting with nausea    Hx of tricuspid valve repair    History of colonic polyps    Second degree hemorrhoids    History of tricuspid valve replacement with bioprosthetic valve 2012    Chronic diastolic congestive heart failure (HCC)    SOB (shortness of breath) on exertion    Folliculitis of perineum    S/P cardiac cath 12/5/1860-PY-[id number], LAD -PATENT, LCX-PATENT , RCA PATENT, EDP 15 MMHG, NO GRADIENT- MED RX    Essential hypertension    COPD exacerbation (HCC)    Acute respiratory failure with hypoxia and hypercapnia (HCC)    Encephalopathy acute    PAF (paroxysmal atrial fibrillation) ([facility]) nioted on device check with 4% burden    Pneumonia    Acute respiratory failure with hypoxemia (HCC)    Elevated hemoglobin A1c    Hyperglycemia    Metabolic acidosis    Factor V Leiden ([address]    Hx of deep venous thrombosis    Opioid use disorder (HCC)    Chronic obstructive pulmonary disease with acute lower respiratory infection ([facility])    Hemoptysis    Hyperkalemia    Supratherapeutic INR    Hypokalemia    Nonsustained SVT (supraventricular tachycardia) ([facility]) of device check    Pre-op evaluation for  EGD and colonoscopy       Past Surgical History:   Procedure Laterality Date    BRONCHOSCOPY N/A 9/28/2019    BRONCHOSCOPY performed by Carlos Rogers MD at [address] [facility]    BRONCHOSCOPY N/A 9/28/2019    BRONCHOSCOPY performed by Carlos Rogers MD at [address] [facility]    BRONCHOSCOPY N/A 9/29/2019    BRONCHOSCOPY BIOPSY BRONCHUS performed by Carlos Rogers MD at [address] End[facility]    BRONCHOSCOPY  9/29/2019    BRONCHOSCOPY performed by Carlos Rogers MD at [address] [facility]    BRONCHOSCOPY N/A 9/30/2019    BRONCHOSCOPY ALVEOLAR LAVAGE performed by Robert Montes MD at [address] [facility]    BRONCHOSCOPY N/A 10/16/2019    BRONCHOSCOPY ALVEOLAR LAVAGE performed by Robert Montes MD at [address] [facility]    BRONCHOSCOPY N/A 9/16/2020    BRONCHOSCOPY performed by Homero Land MD at [address] [place] [address]  12/26/12    Valve replacement    COLONOSCOPY  2016    DEBRIDEMENT Left 01/14/2014    lt hand     HAND DEBRIDEMENT Left 01/09/2014    INCISION AND DRAINAGE    PACEMAKER PLACEMENT  12/26/13    TOE AMPUTATION Bilateral 3/13/2013    1,2,3,4 on left and 2nd on right    TRACHEAL SURGERY N/A 10/18/2019    TRACHEOTOMY PERCUTANEOUS BRONCHOSCOPY performed by Janessa Menon MD at [facility] END[facility]  2012       Allergies   Allergen Reactions    Pcn [Penicillins] Nausea And Vomiting        Family History   Problem Relation Age of Onset    Diabetes Mother     Depression Mother     Arthritis Father     Heart Disease Father     Diabetes Brother     Depression Daughter         Social History     Socioeconomic History    Marital status:      Spouse name: Not on file    Number of children: 3    Years of education: 5    Highest education level: Not on file   Occupational History    Occupation: on disability   Social Needs    Financial resource strain: Not on file    Food insecurity     Worry: Not on file     Inability: Not on file   [employer] needs     Medical: Not on file     Non-medical: Not on file   Tobacco Use    Smoking status: Former Smoker     Packs/day: 1.00     Years: 44.00     Pack years: 44.00     Types: Cigarettes     Start date: 10/22/1976     Quit date: 2020     Years since quittin.0    Smokeless tobacco: Former User    Tobacco comment: Weaned to 0.5 PPD last 7 years   Substance and Sexual Activity    Alcohol use: No     Comment: quit 9 years ago    Drug use: No    Sexual activity: Yes     Partners: Female   Lifestyle    Physical activity     Days per week: Not on file     Minutes per session: Not on file    Stress: Not on file   Relationships    Social connections     Talks on phone: Not on file     Gets together: Not on file     Attends [employer] service: Not on file     Active member of club or organization: Not on file     Attends meetings of clubs or organizations: Not on file     Relationship status: Not on file    Intimate partner violence     Fear of current or ex partner: Not on file     Emotionally abused: Not on file Physically abused: Not on file     Forced sexual activity: Not on file   Other Topics Concern    Not on file   Social History Narrative    Not on file       Current Outpatient Medications   Medication Sig Dispense Refill    polyethylene glycol (GLYCOLAX) 17 GM/SCOOP powder Take 17 g by mouth daily      guaiFENesin (MUCINEX) 600 MG extended release tablet Take 1,200 mg by mouth 2 times daily      traZODone (DESYREL) 100 MG tablet Take 100 mg by mouth nightly      Ferrous Sulfate 324 MG TBEC Take by mouth      apixaban (ELIQUIS) 5 MG TABS tablet Take by mouth 2 times daily      meloxicam (MOBIC) 15 MG tablet Take 15 mg by mouth daily      docusate sodium (COLACE) 100 MG capsule Take 100 mg by mouth 2 times daily      bisacodyl (DULCOLAX) 5 MG EC tablet Take 10 mg by mouth daily as needed for Constipation      metoprolol succinate (TOPROL XL) 25 MG extended release tablet Take 25 mg by mouth daily      vilazodone HCl (VILAZODONE HCL) 40 MG TABS Take 40 mg by mouth daily      clonazePAM (KLONOPIN) 0.5 MG tablet Take 0.5 mg by mouth 2 times daily as needed.  buprenorphine-naloxone (SUBOXONE) 8-2 MG FILM SL film Place 1 Film under the tongue daily.  QUEtiapine (SEROQUEL) 300 MG tablet Take 400 mg by mouth nightly       umeclidinium-vilanterol (ANORO ELLIPTA) 62.5-25 MCG/INH AEPB inhaler Inhale 1 puff into the lungs daily 30 puff 11    potassium chloride (KLOR-CON M) 20 MEQ extended release tablet Take 1 tablet by mouth 2 times daily 180 tablet 1    pantoprazole (PROTONIX) 20 MG tablet Take 20 mg by mouth 2 times daily (before meals)       furosemide (LASIX) 40 MG tablet Take 1 tablet by mouth daily 60 tablet 3     No current facility-administered medications for this visit. Review of Systems -     General ROS: negative  Psychological ROS: negative  Hematological and Lymphatic ROS: No history of blood clots or bleeding disorder.    Respiratory ROS: no cough, shortness of breath, or 17 01/17/2021    PROT 8.5 01/17/2021    BILITOT 0.3 01/17/2021    BILIDIR <0.2 01/17/2021    LABALBU 3.9 01/17/2021     Magnesium:    Lab Results   Component Value Date    MG 2.1 01/17/2021     Warfarin PT/INR:  No components found for: PTPATWAR, PTINRWAR  HgBA1c:    Lab Results   Component Value Date    LABA1C 6.6 09/15/2020     FLP:    Lab Results   Component Value Date    TRIG 101 12/21/2012    HDL 20 12/21/2012    LDLCALC 74 12/21/2012     TSH:    Lab Results   Component Value Date    TSH 1.590 02/20/2020 Jan 2013  Procedures 1 : Tricuspid valve replacement with a 33 mm [place]   ThermaFix pericardial valve. Insertion of 2 epicardial ventricular leads and 1 epicardial atrial   lead.     Procedures 2: Urgent mediastinal exploration and evacuation of the hematoma. Insertion of a right femoral temporary dialysis catheter.     SURGEON: Dr. 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 Recommendation for   antibiotic was made. The patient was started on antibiotic therapy in the   hospital. Also, the patient has very bad dentition and underwent eventually   extraction of his teeth. Recommendation was made to continue with antibiotic   therapy and repeat a THELMA to evaluate the valve. If the valve continued to   have significant burden of vegetation with the large vegetation, lack of   resolution or destruction of the valve, to proceed with replacement or repair   of the tricuspid valve after adequate antibiotic therapy was given. The   patient was started on antibiotic therapy and observed.  Repeat THELMA recently   revealed that the patient had persistent large vegetation with some of the   vegetation removed. Also, the valve was starting to get destructed with   tricuspid regurgitation. The patient had a cardiac catheterization which   revealed patent coronaries. The patient additionally had developed a septic   emboli to his lung, a lung abscess which was being also seen and treated with   Pulmonary Service. The patient was being followed by Cardiology Service and   Infectious Disease Service, as well as Cardiac Surgery. Finally, after   extensive discussion with all the consultants, the recommendation was made to   proceed with tricuspid valve surgery.         Procedures 2:  This is a 60-year-old gentleman who had undergone a tricuspid valve   replacement earlier in the day, complicated with renal failure and continued   to have excessive coagulopathy and chest tube drainage, despite the massive   volume of product replacement and infusion of FFP, platelets factor VII and   cryo, the patient continued to have excessive bleeding, developing potential   signs of tamponade. The patient was taken back to the OR for exploration.                   Conclusions      Summary   Normal left ventricle size and systolic function. Ejection fraction was   estimated at 60 %. There were no regional left ventricular wall motion   abnormalities and wall thickness was within normal limits.   The left atrium is Mild to moderate dilated.   The aortic valve leaflets were not well visualized.   DOPPLER: Transaortic velocity was within the normal range with no evidence   of aortic stenosis.  There was no evidence of aortic regurgitation.   Mild tricuspid regurgitation visualized.   Probable Normal functioning Bioprosthetic Tricuspid Valve Or Tricuspid   Valve repair   Mild tricuspid regurgitation visualized.   Right ventricular systolic pressure measures 45 mmhg.      Signature      ----------------------------------------------------------------   Electronically signed by Keegan Coronel MD (Interpreting   [id number]) on 02/23/2018 at 06:52 PM    CONCLUSION:       1. Essentially there is no obstructions lesion noted in the large             caliber vessels such as the circumflex, a large caliber vessel             that is widely patent. 2.   OM 1 large caliber vessel and was widely patent. 3.   The left main is a large caliber widely patent. 4.   The left anterior descending artery is a large caliber vessel             widely patent. 5.   The right coronary artery is a moderately large caliber vessel and             is widely patent. 6.   The dominance is through the circumflex system. 7    Left ventriculography was not performed. However, we obtained             hemodynamics due to the need to decrease the quantity of the dye             since the patient has had right heart failure also. IMPRESSION:  There is no evidence of occlusive noted on all the large cardiac  vessels. Luis Angel Bolton D.O.        D: 12/21/2012 17:53                                    T: 12/21/2012 22:27  js     Conclusions      Summary   Nonspecific ST-T wave changes.   Lexiscan EKG stress test is not suggestive for ischemia.   The nuclear images is not suggestive for myocardial ischemia.      Signatures      ----------------------------------------------------------------   Electronically signed by Keegan Croonel MD (Interpreting   Cardiologist) on 10/15/2018 at 20:25   ----------------------------------------------------------------      Conclusions      Summary   Normal left ventricle size and systolic function. Ejection fraction was   estimated at 60 %.  There were no regional left ventricular wall motion   abnormalities and wall thickness was within normal limits.   The left atrium is Mild to moderate dilated.   The aortic valve leaflets were not well cardiac cath 12/5/1841-HE-RIHPFOT, LAD -PATENT, LCX-PATENT , RCA PATENT, EDP 15 MMHG, NO GRADIENT- MED RX  Basic Metabolic Panel        Recent admission DX      ASSESSMENT:  1. Recurrent hemoptysis in the last 3 days. 2.  Supratherapeutic INR controlled after FFP. 3.  Factor V Leiden when history of deep vein thrombosis has been  documented for which he has been on oral anticoagulation Coumadin for  several years. 4.  History of paroxysmal atrial fibrillation. 5.  Congestive heart failure, diastolic. 6.  [employer] BiV pacemaker. Note, does not have any ICD. 7.  History of tricuspid valve replacement/repair. 8.  Hypertension. 9.  History of cardiac catheterization in 12/2018, patent coronaries. 10.  He has a previous history of hemoptysis. 11.  History of bronchitis/COPD. 12.  History of tricuspid valve replacement secondary to endocarditis  secondary to IV drug use, so basically he has a normal functioning  bioprosthetic tricuspid valve.       Previous  admission DX  Narrative of Hospital Course: Patient was admitted with massive hemoptysis secondary to anticoagulation associated with aspiration pneumonia on top of a chronic obstructive pulmonary disease, did have a stormy course intubated for the hemoptysis that was stopped after stopping the anticoagulation and we give antidote K Centra with multiple bronchoscopy done for lavage,       Hx of TVR 2/2 endocarditis 2/2 IVDU, 2013 - normal bioprosthetic function 2018 TTE ( 33 mm CE, ThermaFix valve)  Factor V Leiden/DVT history   CHB s/p D - PM  Hemoptysis  HTN  COPD/Bronchitis  Preserved EF  2018 - no CAD by cath    Plan   The current meds and labs reviewed  Hospital record reviewed    Pre op eval for EGD and colonscopy  May proceed with low risk    Hx of previous interrogation showed AFIB BURDEN 4%  18 EPISODES UNDER 1 MINUTE   4 EPISODES  THAT LASTED 1 HOUR -<24 HOURS   Off  Amiodarone      Pacer interrogation reviewed and look good Syed 2021  Device Interrogation Reviewed. Episodes SVT nonsustained noted  Short lasting episodes of atrial flutter noted  ON xeralto  Started on lopressor 12.5 po bid  Cont lopressor 12.5 po bid          Congestive heart failure: no evidence of fluid overload today, no recent hospitalization for CHF  On lasix 40 qd  And Kcl  20 po bid  BMP and mg asap for hx of K 3.2      Cath cardiac 2018  Normal coronaries    Hypertension, on medical treatment. Seems to be under good control. Patient is compliant with medical treatment. Low normal BP  Stop  Norvasc 5 mg po qd    Pat already ON COUMADIN FOR hx of DVT and factor V leiden for several yrs  Previous admission for massive hemoptysis and restarted back on coumadin on D/c  PAF DX in the hospital ekg and device check recently  Cont  The coumadin has been on already  Pat verbalized understanding risk of bleeding including ICH and want to proceed on OA  \"Similar Q raised in note by Dr. Denise Lagos why pat is on coumadin-anc documented may due to pre-TV surgery pat had mutiple ischemic toes and seem to related to embolic process/PAD. \"  SINCE tv REPLACMENT IN dEC 2012    Pat advised to watch for any sign of bleeding    Hx of copd on Home O2    I spent 30 minutes involved in face-to-face discussion of medical issues, prognosis, record review  and plan with the patient today    Addendum message from [facility] why pat takes coumadin  \"Coumadin Clinic returned call and patient is on coumadin due to 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  They understand  recommendations pertaining to holding Coumadin\"      RTC in 4 months        Shanae Mg [facility]

## 2021-05-05 ENCOUNTER — PROCEDURE VISIT (OUTPATIENT)
Dept: CARDIOLOGY CLINIC | Age: 61
End: 2021-05-05
Payer: MEDICARE

## 2021-05-05 DIAGNOSIS — Z95.0 PACEMAKER: Primary | ICD-10-CM

## 2021-05-05 PROCEDURE — 93296 REM INTERROG EVL PM/IDS: CPT | Performed by: INTERNAL MEDICINE

## 2021-05-05 PROCEDURE — 93294 REM INTERROG EVL PM/LDLS PM: CPT | Performed by: INTERNAL MEDICINE

## 2021-05-05 NOTE — PROGRESS NOTES
[employer] sarah sci biv pacer     . Martín Neighbours Battery longevity:  3 months, will recheck in [address]  Presenting rhythm  AS BiV paced     Atrial impedance 515  RV impedance 467  LV imped 333    P wave sensing 4.9  R wave sensing not measured     18 % atrial paced  99 % RV and LV  paced     Mode switches   SVT/at tach

## 2021-05-26 PROBLEM — Z01.818 PRE-OP EVALUATION: Status: RESOLVED | Noted: 2021-04-26 | Resolved: 2021-05-26

## 2021-06-07 ENCOUNTER — TELEPHONE (OUTPATIENT)
Dept: CARDIOLOGY CLINIC | Age: 61
End: 2021-06-07

## 2021-06-07 NOTE — TELEPHONE ENCOUNTER
Patient called in stating his PCP has advised patient to get COVID vaccine. He wanted Dr. Elizabet Morin advice. I advised patient to follow PCP's advice. Patient states \"Dr. Freeman Soto has always told me not to get the flu shot so I would like his advice. \"  Dr. Shoshana Mackay it [facility] for patient to get COVID vaccine.

## 2021-06-08 NOTE — TELEPHONE ENCOUNTER
I get flu shot myself with no reservation ; and I never gave advised to anyone not to get flu shot,  and any shot for that matter. There must be a mix up of where he got the advised    With regards to covid -shot I strongly advised to get it asap.

## 2021-06-22 ENCOUNTER — APPOINTMENT (OUTPATIENT)
Dept: CT IMAGING | Age: 61
End: 2021-06-22
Payer: MEDICARE

## 2021-06-22 ENCOUNTER — HOSPITAL ENCOUNTER (EMERGENCY)
Age: 61
Discharge: HOME | End: 2021-06-23
Attending: FAMILY MEDICINE
Payer: MEDICARE

## 2021-06-22 ENCOUNTER — APPOINTMENT (OUTPATIENT)
Dept: GENERAL RADIOLOGY | Age: 61
End: 2021-06-22
Payer: MEDICARE

## 2021-06-22 DIAGNOSIS — J44.1 OBSTRUCTIVE CHRONIC BRONCHITIS WITH EXACERBATION (HCC): Primary | ICD-10-CM

## 2021-06-22 DIAGNOSIS — J44.0 CHRONIC OBSTRUCTIVE PULMONARY DISEASE WITH ACUTE LOWER RESPIRATORY INFECTION (HCC): ICD-10-CM

## 2021-06-22 DIAGNOSIS — R04.2 HEMOPTYSIS: ICD-10-CM

## 2021-06-22 PROCEDURE — 71045 X-RAY EXAM CHEST 1 VIEW: CPT

## 2021-06-22 PROCEDURE — 71275 CT ANGIOGRAPHY CHEST: CPT

## 2021-06-22 PROCEDURE — 85610 PROTHROMBIN TIME: CPT

## 2021-06-22 PROCEDURE — 93005 ELECTROCARDIOGRAM TRACING: CPT | Performed by: FAMILY MEDICINE

## 2021-06-22 PROCEDURE — 36415 COLL VENOUS BLD VENIPUNCTURE: CPT

## 2021-06-22 PROCEDURE — 99285 EMERGENCY DEPT VISIT HI MDM: CPT

## 2021-06-22 PROCEDURE — 85730 THROMBOPLASTIN TIME PARTIAL: CPT

## 2021-06-22 PROCEDURE — 80053 COMPREHEN METABOLIC PANEL: CPT

## 2021-06-23 VITALS
TEMPERATURE: 97.7 F | BODY MASS INDEX: 37.33 KG/M2 | WEIGHT: 252 LBS | RESPIRATION RATE: 11 BRPM | HEIGHT: 69 IN | SYSTOLIC BLOOD PRESSURE: 101 MMHG | OXYGEN SATURATION: 97 % | DIASTOLIC BLOOD PRESSURE: 70 MMHG | HEART RATE: 64 BPM

## 2021-06-23 LAB
ALBUMIN SERPL-MCNC: 4.3 G/DL (ref 3.5–5.1)
ALP BLD-CCNC: 111 U/L (ref 38–126)
ALT SERPL-CCNC: 96 U/L (ref 11–66)
ANION GAP SERPL CALCULATED.3IONS-SCNC: 8 MEQ/L (ref 8–16)
APTT: 40.4 SECONDS (ref 22–38)
AST SERPL-CCNC: 46 U/L (ref 5–40)
BILIRUB SERPL-MCNC: 0.4 MG/DL (ref 0.3–1.2)
BUN BLDV-MCNC: 15 MG/DL (ref 7–22)
CALCIUM SERPL-MCNC: 9.4 MG/DL (ref 8.5–10.5)
CHLORIDE BLD-SCNC: 97 MEQ/L (ref 98–111)
CO2: 30 MEQ/L (ref 23–33)
CREAT SERPL-MCNC: 1 MG/DL (ref 0.4–1.2)
EKG ATRIAL RATE: 72 BPM
EKG P AXIS: 9 DEGREES
EKG P-R INTERVAL: 142 MS
EKG Q-T INTERVAL: 438 MS
EKG QRS DURATION: 128 MS
EKG QTC CALCULATION (BAZETT): 479 MS
EKG R AXIS: 132 DEGREES
EKG T AXIS: -5 DEGREES
EKG VENTRICULAR RATE: 72 BPM
GFR SERPL CREATININE-BSD FRML MDRD: 76 ML/MIN/1.73M2
GLUCOSE BLD-MCNC: 102 MG/DL (ref 70–108)
INR BLD: 1.56 (ref 0.85–1.13)
OSMOLALITY CALCULATION: 271.1 MOSMOL/KG (ref 275–300)
POTASSIUM REFLEX MAGNESIUM: 4.3 MEQ/L (ref 3.5–5.2)
SODIUM BLD-SCNC: 135 MEQ/L (ref 135–145)
TOTAL PROTEIN: 8.1 G/DL (ref 6.1–8)

## 2021-06-23 PROCEDURE — 71275 CT ANGIOGRAPHY CHEST: CPT

## 2021-06-23 PROCEDURE — 93010 ELECTROCARDIOGRAM REPORT: CPT | Performed by: INTERNAL MEDICINE

## 2021-06-23 PROCEDURE — 6360000004 HC RX CONTRAST MEDICATION: Performed by: FAMILY MEDICINE

## 2021-06-23 RX ORDER — SULFAMETHOXAZOLE AND TRIMETHOPRIM 800; 160 MG/1; MG/1
1 TABLET ORAL 2 TIMES DAILY
Qty: 20 TABLET | Refills: 0 | Status: SHIPPED | OUTPATIENT
Start: 2021-06-23 | End: 2021-07-03

## 2021-06-23 RX ORDER — AZITHROMYCIN 250 MG/1
TABLET, FILM COATED ORAL
Qty: 1 PACKET | Refills: 0 | Status: SHIPPED | OUTPATIENT
Start: 2021-06-23 | End: 2021-07-03

## 2021-06-23 RX ORDER — PREDNISONE 20 MG/1
20 TABLET ORAL 2 TIMES DAILY
Qty: 10 TABLET | Refills: 0 | Status: SHIPPED | OUTPATIENT
Start: 2021-06-23 | End: 2021-06-28

## 2021-06-23 RX ADMIN — IOPAMIDOL 80 ML: 755 INJECTION, SOLUTION INTRAVENOUS at 00:53

## 2021-06-23 ASSESSMENT — ENCOUNTER SYMPTOMS
EYE PAIN: 0
BLOOD IN STOOL: 0
SINUS PAIN: 0
COUGH: 1
EYE ITCHING: 0
NAUSEA: 0
EYE DISCHARGE: 0
RECTAL PAIN: 0
STRIDOR: 0
VOMITING: 0
SINUS PRESSURE: 0
FACIAL SWELLING: 0
BACK PAIN: 0
WHEEZING: 0
SHORTNESS OF BREATH: 0
EYE REDNESS: 0

## 2021-06-23 NOTE — ED NOTES
Pt. Resting in bed with even and unlabored respirations. Pt. Denies any pain at this time. Pt watching TV. Pt. Updated about plan of care and treatment. Pt. Has no further concerns, questions or needs at this time. Call light within reach.      Nidia Desai RN  06/23/21 [id number]

## 2021-06-23 NOTE — ED NOTES
Discharge instructions and new medications discussed and explained with Pt. Pt. Verbalized understanding and has no further questions or needs at this time.           [facility], RN  06/23/21 [id number]

## 2021-06-23 NOTE — ED PROVIDER NOTES
[facility] [address] [address] EMERGENCY DEPT  EMERGENCY DEPARTMENT ENCOUNTER      Pt Name: Juan Pichardo  MRN: [id number]  Aliyahgfblanca 1960  Date of evaluation: 6/22/2021  Provider: Scott Maher MD    [address] [place] [address]       Chief Complaint   Patient presents with    Hemoptysis         HISTORY OF PRESENT ILLNESS   (Location/Symptom, Timing/Onset, Context/Setting, Quality, Duration, Modifying Factors, Severity)  Note limiting factors. Juan Pichardo is a 62 y.o. male who presents to the emergency department complaining of some blood in his sputum when he is coughing. Patient notes that he has not had any fevers, chills, or chest trauma, he complains of blood-tinged sputum with prolonged coughing. HPI    Nursing Notes were reviewed. REVIEW OF SYSTEMS    (2-9 systems for level 4, 10 or more for level 5)     Review of Systems   Constitutional: Negative for activity change, appetite change, chills, fatigue and fever. HENT: Negative for congestion, ear pain, facial swelling, postnasal drip, sinus pressure and sinus pain. Eyes: Negative for pain, discharge, redness and itching. Respiratory: Positive for cough. Negative for shortness of breath, wheezing and stridor. Cardiovascular: Negative for chest pain, palpitations and leg swelling. Gastrointestinal: Negative for blood in stool, nausea, rectal pain and vomiting. Endocrine: Negative for cold intolerance, polydipsia, polyphagia and polyuria. Genitourinary: Negative for enuresis, flank pain, frequency, genital sores and hematuria. Musculoskeletal: Negative for arthralgias, back pain, gait problem, joint swelling and myalgias. Neurological: Negative for dizziness, syncope, speech difficulty, weakness, light-headedness and headaches. Psychiatric/Behavioral: Negative for hallucinations and self-injury. The patient is not nervous/anxious and is not hyperactive. Except as noted above the remainder of the review of systems was reviewed and negative. PAST MEDICAL HISTORY     Past Medical History:   Diagnosis Date    Anxiety disorder     Arthritis     Asthma     Back pain, chronic     Blood circulation, collateral     4 toes partial amputee on L foot    [employer] [place] [employer] Bi[employer] ICD 7/1/2014   [facility] [employer] BiV pacemaker 7/1/2014    CAD (coronary artery disease)     CHF (congestive heart failure) (HCC)     Chronic kidney disease     COPD (chronic obstructive pulmonary disease) (HCC)     Depression     Endocarditis     Factor V deficiency ([address]     Hepatitis     HEPATITIS C    Hepatitis C     History of blood transfusion     Hypertension     Paroxysmal A-fib ([facility]     Pneumonia          SURGICAL HISTORY       Past Surgical History:   Procedure Laterality Date    BRONCHOSCOPY N/A 9/28/2019    BRONCHOSCOPY performed by Cody Holland MD at [address] [place] [address] N/A 9/28/2019    [facility]    BRONCHOSCOPY N/A 9/29/2019    BRONCHOSCOPY BIOPSY BRONCHUS [facility]    BRONCHOSCOPY  9/29/2019    BRONCHOSCOPY [facility] MD [facility] [place] [facility]    BRONCHOSCOPY N/A 9/30/2019    BRONCHOSCOPY ALVEOLAR LAVAGE performed by Nilda Pearson MD at [address] [place] [address] N/A 10/16/2019    BRONCHOSCOPY ALVEOLAR LAVAGE performed by Nilda Pearson MD at [address] [place] [address] N/A 9/16/2020    BRONCHOSCOPY performed by Warden Willis MD at [address] [place] [address]  12/26/12    Valve replacement    COLONOSCOPY  2016    DEBRIDEMENT Left 01/14/2014    lt hand     HAND DEBRIDEMENT Left 01/09/2014    INCISION AND DRAINAGE    PACEMAKER PLACEMENT  12/26/13    TOE AMPUTATION Bilateral 3/13/2013    1,2,3,4 on left and 2nd on right    TRACHEAL SURGERY N/A 10/18/2019    TRACHEOTOMY PERCUTANEOUS BRONCHOSCOPY performed by Nilda Pearson MD at [address] [place] [address]  2012 CURRENT MEDICATIONS       Previous Medications    APIXABAN (ELIQUIS) 5 MG TABS TABLET    Take by mouth 2 times daily    BISACODYL (DULCOLAX) 5 MG EC TABLET    Take 10 mg by mouth daily as needed for Constipation    BUPRENORPHINE-NALOXONE (SUBOXONE) 8-2 MG FILM SL FILM    Place 1 Film under the tongue daily. CLONAZEPAM (KLONOPIN) 0.5 MG TABLET    Take 0.5 mg by mouth 2 times daily as needed.     DOCUSATE SODIUM (COLACE) 100 MG CAPSULE    Take 100 mg by mouth 2 times daily    FERROUS SULFATE 324 MG TBEC    Take by mouth    FUROSEMIDE (LASIX) 40 MG TABLET    Take 1 tablet by mouth daily    GUAIFENESIN (MUCINEX) 600 MG EXTENDED RELEASE TABLET    Take 1,200 mg by mouth 2 times daily    MELOXICAM (MOBIC) 15 MG TABLET    Take 15 mg by mouth daily    METOPROLOL SUCCINATE (TOPROL XL) 25 MG EXTENDED RELEASE TABLET    Take 25 mg by mouth daily    PANTOPRAZOLE (PROTONIX) 20 MG TABLET    Take 20 mg by mouth 2 times daily (before meals)     POLYETHYLENE GLYCOL (GLYCOLAX) 17 GM/SCOOP POWDER    Take 17 g by mouth daily    POTASSIUM CHLORIDE (KLOR-CON M) 20 MEQ EXTENDED RELEASE TABLET    Take 1 tablet by mouth 2 times daily    QUETIAPINE (SEROQUEL) 300 MG TABLET    Take 400 mg by mouth nightly     TRAZODONE (DESYREL) 100 MG TABLET    Take 100 mg by mouth nightly    UMECLIDINIUM-VILANTEROL (ANORO ELLIPTA) 62.5-25 MCG/INH AEPB INHALER    Inhale 1 puff into the lungs daily    VILAZODONE HCL (VILAZODONE HCL) 40 MG TABS    Take 40 mg by mouth daily       ALLERGIES     Pcn [penicillins]    FAMILY HISTORY       Family History   Problem Relation Age of Onset    Diabetes Mother     Depression Mother     Arthritis Father     Heart Disease Father     Diabetes Brother     Depression Daughter           SOCIAL HISTORY       Social History     Socioeconomic History    Marital status:      Spouse name: None    Number of children: 3    Years of education: 9    Highest education level: None   Occupational History    Occupation: on disability   Tobacco Use    Smoking status: Former Smoker     Packs/day: 1.00     Years: 44.00     Pack years: 44.00     Types: Cigarettes     Start date: 10/22/1976     Quit date: 2020     Years since quittin.2    Smokeless tobacco: Former User    Tobacco comment: Weaned to 0.5 PPD last 7 years   Vaping Use    Vaping Use: Some days   Substance and Sexual Activity    Alcohol use: No     Comment: quit 9 years ago    Drug use: No    Sexual activity: Yes     Partners: Female   Other Topics Concern    None   Social History Narrative    None     Social Determinants of Health     Financial Resource Strain:     Difficulty of Paying Living Expenses:    Food Insecurity:     Worried About Running Out of Food in the Last Year:     Ran Out of Food in the Last Year:    Transportation Needs:     Lack of Transportation (Medical):      Lack of Transportation (Non-Medical):    Physical Activity:     Days of Exercise per Week:     Minutes of Exercise per Session:    Stress:     Feeling of Stress :    Social Connections:     Frequency of Communication with Friends and Family:     Frequency of Social Gatherings with Friends and Family:     Attends Voodoo Services:     Active Member of Clubs or Organizations:     Attends Club or Organization Meetings:     Marital Status:    Intimate Partner Violence:     Fear of Current or Ex-Partner:     Emotionally Abused:     Physically Abused:     Sexually Abused:        SCREENINGS        Rebeca Coma Scale  Eye Opening: Spontaneous  Best Verbal Response: Oriented  Best Motor Response: Obeys commands  Rebeca Coma Scale Score: 15               PHYSICAL EXAM    (up to 7 for level 4, 8 or more for level 5)     ED Triage Vitals   BP Temp Temp Source Pulse Resp SpO2 Height Weight   21 2333 21 2333 21 2333 21 2333 21 2333 21 2333 21 0000 21 0000   132/85 97.7 °F (36.5 °C) Oral 84 18 93 % 5' 9\" (1.753 m) 252 Impression:   No PE is identified. Nonspecific bronchial wall thickening with mucous plugging. Emphysema. This document has been electronically signed by: Anthony Dia MD on    06/23/2021 01:34 AM      All CTs at this facility use dose modulation techniques and iterative    reconstructions, and/or weight-based dosing   when appropriate to reduce radiation to a low as reasonably achievable. XR CHEST PORTABLE   Final Result   Impression:   No focal consolidation or definite CHF. This document has been electronically signed by: Anthony Dia MD on    06/23/2021 12:24 AM            ED BEDSIDE ULTRASOUND:   Performed by ED Physician - none    LABS:  Labs Reviewed   COMPREHENSIVE METABOLIC PANEL W/ REFLEX TO MG FOR LOW K - Abnormal; Notable for the following components:       Result Value    Chloride 97 (*)     AST 46 (*)     Total Protein 8.1 (*)     ALT 96 (*)     All other components within normal limits   PROTIME-INR - Abnormal; Notable for the following components:    INR 1.56 (*)     All other components within normal limits   APTT - Abnormal; Notable for the following components:    aPTT 40.4 (*)     All other components within normal limits   GLOMERULAR FILTRATION RATE, ESTIMATED - Abnormal; Notable for the following components:    Est, Glom Filt Rate 76 (*)     All other components within normal limits   OSMOLALITY - Abnormal; Notable for the following components:    Osmolality Calc 271.1 (*)     All other components within normal limits   ANION GAP   URINALYSIS       All other labs were within normal range or not returned as of this dictation.     EMERGENCY DEPARTMENT COURSE and DIFFERENTIAL DIAGNOSIS/MDM:   Vitals:    Vitals:    06/22/21 2333 06/23/21 0000 06/23/21 0110   BP: 132/85 135/79    Pulse: 84 71 66   Resp: 18 12 15   Temp: 97.7 °F (36.5 °C)     TempSrc: Oral     SpO2: 93% 97% 98%   Weight:  252 lb (114.3 kg)    Height:  5' 9\" (1.753 m)            MDM      REASSESSMENT

## 2021-07-15 ENCOUNTER — PROCEDURE VISIT (OUTPATIENT)
Dept: CARDIOLOGY CLINIC | Age: 61
End: 2021-07-15

## 2021-07-15 DIAGNOSIS — Z95.0 PACEMAKER: Primary | ICD-10-CM

## 2021-08-23 ENCOUNTER — TELEPHONE (OUTPATIENT)
Dept: CARDIOLOGY CLINIC | Age: 61
End: 2021-08-23

## 2021-08-23 DIAGNOSIS — Z45.010 PACEMAKER BATTERY DEPLETION: Primary | ICD-10-CM

## 2021-08-23 NOTE — TELEPHONE ENCOUNTER
RECEIVED DOWNLOAD AND PT HIT PAUL ON 8/17/21          WAS UNABLE TO LEAVE A MESSAGE / VOICEMAILBOX IS FULL  WE NEED TO CALL THIS PT AND LET HIM KNOW HE HIT PAUL       N/C

## 2021-08-23 NOTE — LETTER
[address] [place] [address] [address]  [place] [address]  Phone: [phone]  Fax: [phone]    Tamia Gutierrez MD        August 31, 2021    [address] [facility] [address] [facility]      Dear Umesh Gallegos: We have not been able to reach you by phone. Could you please contact the [address] [place] [address] Drive at [address] at [phone]. If you have any questions or concerns, please don't hesitate to call.     Sincerely,      Tamia Gutierrez MD/ ZOFIA

## 2021-08-26 ENCOUNTER — TELEPHONE (OUTPATIENT)
Dept: CARDIOLOGY CLINIC | Age: 61
End: 2021-08-26

## 2021-08-31 ENCOUNTER — TELEPHONE (OUTPATIENT)
Dept: CARDIOLOGY CLINIC | Age: 61
End: 2021-08-31

## 2021-08-31 NOTE — TELEPHONE ENCOUNTER
08.26.2021-  No answer-  Full voicemail   [phone]  This is the second attempt to contact the patient ,    Voicemail is full     [facility] - the alternate number Paddy Davis-  [phone]- NOT on HIPAA  Mailbox full-   Alternate number Rola Jacobs [phone]- NOT on HIPAA     HIPAA form shows Dash Max-  [phone]- wrong number    Letter Sent

## 2021-08-31 NOTE — LETTER
[address] [facility]  [facility].  [address]  [facility] [address]  Phone: [phone]  Fax: [phone]      August 31, 2021     [address] [place] [address] [address] [place] [address]      Dear Helga Thakkar: We care about you and the management of your healthcare and medication needs. We want to make sure you follow up as recommended. In order to properly treat you, it is important that your doctor sees you as scheduled. Per our office policy, because of your no show rate, we will not reschedule your appointment until we hear from you. Please call [phone] to make an appointment.          Sincerely,        Feli Ochoa MD

## 2021-09-05 ENCOUNTER — APPOINTMENT (OUTPATIENT)
Dept: GENERAL RADIOLOGY | Age: 61
End: 2021-09-05
Payer: MEDICARE

## 2021-09-05 ENCOUNTER — HOSPITAL ENCOUNTER (EMERGENCY)
Age: 61
Discharge: HOME | End: 2021-09-05
Attending: EMERGENCY MEDICINE
Payer: MEDICARE

## 2021-09-05 ENCOUNTER — APPOINTMENT (OUTPATIENT)
Dept: CT IMAGING | Age: 61
End: 2021-09-05
Payer: MEDICARE

## 2021-09-05 VITALS
OXYGEN SATURATION: 97 % | DIASTOLIC BLOOD PRESSURE: 80 MMHG | WEIGHT: 235 LBS | SYSTOLIC BLOOD PRESSURE: 115 MMHG | HEIGHT: 69 IN | RESPIRATION RATE: 13 BRPM | HEART RATE: 80 BPM | TEMPERATURE: 98.4 F | BODY MASS INDEX: 34.8 KG/M2

## 2021-09-05 DIAGNOSIS — J44.9 MODERATE COPD (CHRONIC OBSTRUCTIVE PULMONARY DISEASE) (HCC): ICD-10-CM

## 2021-09-05 DIAGNOSIS — J44.1 COPD EXACERBATION (HCC): Primary | ICD-10-CM

## 2021-09-05 DIAGNOSIS — R10.32 LEFT LOWER QUADRANT ABDOMINAL PAIN: ICD-10-CM

## 2021-09-05 LAB
ALBUMIN SERPL-MCNC: 4.3 G/DL (ref 3.5–5.1)
ALP BLD-CCNC: 76 U/L (ref 38–126)
ALT SERPL-CCNC: 9 U/L (ref 11–66)
ANION GAP SERPL CALCULATED.3IONS-SCNC: 10 MEQ/L (ref 8–16)
AST SERPL-CCNC: 14 U/L (ref 5–40)
BASE EXCESS MIXED: 2.4 MMOL/L (ref -2–3)
BASOPHILS # BLD: 0.4 %
BASOPHILS ABSOLUTE: 0 THOU/MM3 (ref 0–0.1)
BILIRUB SERPL-MCNC: 0.6 MG/DL (ref 0.3–1.2)
BUN BLDV-MCNC: 11 MG/DL (ref 7–22)
CALCIUM SERPL-MCNC: 9.7 MG/DL (ref 8.5–10.5)
CHLORIDE BLD-SCNC: 98 MEQ/L (ref 98–111)
CO2: 28 MEQ/L (ref 23–33)
COLLECTED BY:: ABNORMAL
CREAT SERPL-MCNC: 0.9 MG/DL (ref 0.4–1.2)
DEVICE: ABNORMAL
EOSINOPHIL # BLD: 0.4 %
EOSINOPHILS ABSOLUTE: 0 THOU/MM3 (ref 0–0.4)
ERYTHROCYTE [DISTWIDTH] IN BLOOD BY AUTOMATED COUNT: 15.5 % (ref 11.5–14.5)
ERYTHROCYTE [DISTWIDTH] IN BLOOD BY AUTOMATED COUNT: 51.3 FL (ref 35–45)
FIO2, MIXED VENOUS: 5
GFR SERPL CREATININE-BSD FRML MDRD: 86 ML/MIN/1.73M2
GLUCOSE BLD-MCNC: 154 MG/DL (ref 70–108)
HCO3, MIXED: 29 MMOL/L (ref 23–28)
HCT VFR BLD CALC: 43.9 % (ref 42–52)
HEMOGLOBIN: 14.5 GM/DL (ref 14–18)
IMMATURE GRANS (ABS): 0.04 THOU/MM3 (ref 0–0.07)
IMMATURE GRANULOCYTES: 0.7 %
LYMPHOCYTES # BLD: 17.9 %
LYMPHOCYTES ABSOLUTE: 1 THOU/MM3 (ref 1–4.8)
MCH RBC QN AUTO: 30 PG (ref 26–33)
MCHC RBC AUTO-ENTMCNC: 33 GM/DL (ref 32.2–35.5)
MCV RBC AUTO: 90.9 FL (ref 80–94)
MONOCYTES # BLD: 6.4 %
MONOCYTES ABSOLUTE: 0.4 THOU/MM3 (ref 0.4–1.3)
NUCLEATED RED BLOOD CELLS: 0 /100 WBC
O2 SAT, MIXED: 94 %
OSMOLALITY CALCULATION: 274.4 MOSMOL/KG (ref 275–300)
PCO2, MIXED VENOUS: 51 MMHG (ref 41–51)
PH, MIXED: 7.37 (ref 7.31–7.41)
PLATELET # BLD: 112 THOU/MM3 (ref 130–400)
PMV BLD AUTO: 10 FL (ref 9.4–12.4)
PO2 MIXED: 73 MMHG (ref 25–40)
POTASSIUM REFLEX MAGNESIUM: 3.7 MEQ/L (ref 3.5–5.2)
PRO-BNP: 883.9 PG/ML (ref 0–900)
RBC # BLD: 4.83 MILL/MM3 (ref 4.7–6.1)
SARS-COV-2, NAAT: NOT DETECTED
SEG NEUTROPHILS: 74.2 %
SEGMENTED NEUTROPHILS ABSOLUTE COUNT: 4.2 THOU/MM3 (ref 1.8–7.7)
SODIUM BLD-SCNC: 136 MEQ/L (ref 135–145)
TOTAL PROTEIN: 8.5 G/DL (ref 6.1–8)
TROPONIN T: < 0.01 NG/ML
WBC # BLD: 5.6 THOU/MM3 (ref 4.8–10.8)

## 2021-09-05 PROCEDURE — 82803 BLOOD GASES ANY COMBINATION: CPT

## 2021-09-05 PROCEDURE — 36415 COLL VENOUS BLD VENIPUNCTURE: CPT

## 2021-09-05 PROCEDURE — 80053 COMPREHEN METABOLIC PANEL: CPT

## 2021-09-05 PROCEDURE — 6370000000 HC RX 637 (ALT 250 FOR IP): Performed by: NURSE PRACTITIONER

## 2021-09-05 PROCEDURE — 85025 COMPLETE CBC W/AUTO DIFF WBC: CPT

## 2021-09-05 PROCEDURE — 99284 EMERGENCY DEPT VISIT MOD MDM: CPT

## 2021-09-05 PROCEDURE — 2700000000 HC OXYGEN THERAPY PER DAY

## 2021-09-05 PROCEDURE — 96374 THER/PROPH/DIAG INJ IV PUSH: CPT

## 2021-09-05 PROCEDURE — 87635 SARS-COV-2 COVID-19 AMP PRB: CPT

## 2021-09-05 PROCEDURE — 93005 ELECTROCARDIOGRAM TRACING: CPT | Performed by: EMERGENCY MEDICINE

## 2021-09-05 PROCEDURE — 71045 X-RAY EXAM CHEST 1 VIEW: CPT

## 2021-09-05 PROCEDURE — 6370000000 HC RX 637 (ALT 250 FOR IP): Performed by: EMERGENCY MEDICINE

## 2021-09-05 PROCEDURE — 84484 ASSAY OF TROPONIN QUANT: CPT

## 2021-09-05 PROCEDURE — 6360000004 HC RX CONTRAST MEDICATION: Performed by: EMERGENCY MEDICINE

## 2021-09-05 PROCEDURE — 6360000002 HC RX W HCPCS: Performed by: STUDENT IN AN ORGANIZED HEALTH CARE EDUCATION/TRAINING PROGRAM

## 2021-09-05 PROCEDURE — 94761 N-INVAS EAR/PLS OXIMETRY MLT: CPT

## 2021-09-05 PROCEDURE — 94640 AIRWAY INHALATION TREATMENT: CPT

## 2021-09-05 PROCEDURE — 83880 ASSAY OF NATRIURETIC PEPTIDE: CPT

## 2021-09-05 PROCEDURE — 6370000000 HC RX 637 (ALT 250 FOR IP): Performed by: STUDENT IN AN ORGANIZED HEALTH CARE EDUCATION/TRAINING PROGRAM

## 2021-09-05 PROCEDURE — 74177 CT ABD & PELVIS W/CONTRAST: CPT

## 2021-09-05 RX ORDER — AZITHROMYCIN 250 MG/1
TABLET, FILM COATED ORAL
Qty: 1 PACKET | Refills: 0 | Status: SHIPPED | OUTPATIENT
Start: 2021-09-05 | End: 2021-09-09

## 2021-09-05 RX ORDER — IPRATROPIUM BROMIDE AND ALBUTEROL SULFATE 2.5; .5 MG/3ML; MG/3ML
2 SOLUTION RESPIRATORY (INHALATION) ONCE
Status: COMPLETED | OUTPATIENT
Start: 2021-09-05 | End: 2021-09-05

## 2021-09-05 RX ORDER — IPRATROPIUM BROMIDE AND ALBUTEROL SULFATE 2.5; .5 MG/3ML; MG/3ML
1 SOLUTION RESPIRATORY (INHALATION) ONCE
Status: COMPLETED | OUTPATIENT
Start: 2021-09-05 | End: 2021-09-05

## 2021-09-05 RX ORDER — METHYLPREDNISOLONE SODIUM SUCCINATE 125 MG/2ML
60 INJECTION, POWDER, LYOPHILIZED, FOR SOLUTION INTRAMUSCULAR; INTRAVENOUS ONCE
Status: COMPLETED | OUTPATIENT
Start: 2021-09-05 | End: 2021-09-05

## 2021-09-05 RX ADMIN — IPRATROPIUM BROMIDE AND ALBUTEROL SULFATE 1 AMPULE: .5; 3 SOLUTION RESPIRATORY (INHALATION) at 16:04

## 2021-09-05 RX ADMIN — IPRATROPIUM BROMIDE AND ALBUTEROL SULFATE 1 AMPULE: .5; 3 SOLUTION RESPIRATORY (INHALATION) at 15:53

## 2021-09-05 RX ADMIN — METHYLPREDNISOLONE SODIUM SUCCINATE 60 MG: 125 INJECTION, POWDER, FOR SOLUTION INTRAMUSCULAR; INTRAVENOUS at 18:35

## 2021-09-05 RX ADMIN — IPRATROPIUM BROMIDE AND ALBUTEROL SULFATE 1 AMPULE: .5; 3 SOLUTION RESPIRATORY (INHALATION) at 21:06

## 2021-09-05 RX ADMIN — IOPAMIDOL 80 ML: 755 INJECTION, SOLUTION INTRAVENOUS at 18:23

## 2021-09-05 RX ADMIN — IPRATROPIUM BROMIDE AND ALBUTEROL SULFATE 2 AMPULE: .5; 3 SOLUTION RESPIRATORY (INHALATION) at 18:53

## 2021-09-05 ASSESSMENT — ENCOUNTER SYMPTOMS
COUGH: 1
SHORTNESS OF BREATH: 1
DIARRHEA: 0
ABDOMINAL PAIN: 1
CHEST TIGHTNESS: 0
NAUSEA: 1
VOMITING: 0
WHEEZING: 1
RHINORRHEA: 0

## 2021-09-05 ASSESSMENT — PAIN SCALES - GENERAL: PAINLEVEL_OUTOF10: 10

## 2021-09-05 ASSESSMENT — PAIN DESCRIPTION - DESCRIPTORS: DESCRIPTORS: ACHING

## 2021-09-05 ASSESSMENT — PAIN DESCRIPTION - FREQUENCY: FREQUENCY: CONTINUOUS

## 2021-09-05 ASSESSMENT — PAIN DESCRIPTION - PROGRESSION: CLINICAL_PROGRESSION: GRADUALLY WORSENING

## 2021-09-05 ASSESSMENT — PAIN DESCRIPTION - PAIN TYPE: TYPE: ACUTE PAIN

## 2021-09-05 ASSESSMENT — PAIN DESCRIPTION - ONSET: ONSET: GRADUAL

## 2021-09-05 ASSESSMENT — PAIN DESCRIPTION - LOCATION: LOCATION: HEAD

## 2021-09-05 NOTE — ED PROVIDER NOTES
[facility] [address] [address] EMERGENCY DEPT  ED Attending Physician Attestation     I performed a history and physical examination of the patient and discussed management with the Resident. I reviewed the Resident's note and agree with the documented findings and plan of care. Any areas of disagreement are noted on the chart. I was personally present for the key portions of any procedures and have documented in the chart those procedures where I was not present during the key portions. I have reviewed the emergency nurses triage note and agree with the chief complaint, past medical history, past surgical history, allergies, medications, social and family history as documented unless otherwise noted below. For Advanced Practice Clinician cases, I have personally evaluated this patient and have completed at least one key elements of the E/M. Additional findings and any areas of disagreement are as noted. History     62 y.o. male with shortness of breath. Patient has significant COPD. Also has left lower quadrant and suprapubic abdominal pain. No fever, positive cough.   Has increased oxygen demand at home from 3 L to 6 L/min    Vitals:    Vitals:    09/05/21 1425   BP: 129/81   Pulse: 77   Resp: 18   Temp: 98.4 °F (36.9 °C)   TempSrc: Oral   SpO2: 93%   Weight: 235 lb (106.6 kg)   Height: 5' 9\" (1.753 m)       Known Problems:    Patient Active Problem List   Diagnosis    Moderate COPD (chronic obstructive pulmonary disease) ([facility]    Community acquired pneumonia    Suicidal ideation    SIRS (systemic inflammatory response syndrome) (HCC)    Sepsis due to methicillin susceptible Staphylococcus aureus (HCC)    Severe sepsis ([facility]    HCAP (healthcare-associated pneumonia)    Hyponatremia    Lung nodules    History of intravenous drug use in remission    Malnutrition (HCC)    Coagulopathy (HCC)    Acute blood loss anemia    Complete heart block, post-surgical ([facility])    Endocarditis, bacterial, acute/subacute    WILMA (acute kidney injury) ([facility]    Leukocytosis    Physical deconditioning    Cellulitis    Abdominal pain, acute    Microscopic hematuria    [employer] pacemaker    Abnormal CT of the abdomen    Generalized abdominal pain    GERD (gastroesophageal reflux disease)    Non-intractable vomiting with nausea    Hx of tricuspid valve repair    History of colonic polyps    Second degree hemorrhoids    History of tricuspid valve replacement with bioprosthetic valve 2012    Chronic diastolic congestive heart failure (HCC)    SOB (shortness of breath) on exertion    Folliculitis of perineum    S/P cardiac cath 12/5/1858-[id number], LAD -PATENT, LCX-PATENT , RCA PATENT, EDP 15 MMHG, NO GRADIENT- MED RX    Essential hypertension    COPD exacerbation (HCC)    Acute respiratory failure with hypoxia and hypercapnia (HCC)    Encephalopathy acute    PAF (paroxysmal atrial fibrillation) ([facility]) nioted on device check with 4% burden    Pneumonia    Acute respiratory failure with hypoxemia ([facility])    Elevated hemoglobin A1c    Hyperglycemia    Metabolic acidosis    Factor V Leiden ([facility]    Hx of deep venous thrombosis    Opioid use disorder (HCC)    Chronic obstructive pulmonary disease with acute lower respiratory infection ([facility])    Hemoptysis    Hyperkalemia    Supratherapeutic INR    Hypokalemia    Nonsustained SVT (supraventricular tachycardia) ([facility]) of device check     Physical Exam   Gen:     Non-toxic, well appearing  Head:   AT/NC               EOMI, SHMUEL               Oropharynx Clear, mucous membranes moist  Neck:    Supple, no meningismus; No LAD  Chest: Coarse rhonchi and wheezing throughout, prolonged expiratory phase    HRRR no mcg  Abd:      Nondistended, tender to palpation suprapubic and left lower quadrant no rebound/guarding/rigidity. Neg McBurneys  Extrem: No edema, neg homans.   Neuro:   Alert, Awake, no lateralizing deficts               CN's grossly intact bilaterally    DIAGNOSTIC RESULTS   RADIOLOGY:   XR CHEST PORTABLE    (Results Pending)       LABS:  Labs Reviewed   COMPREHENSIVE METABOLIC PANEL W/ REFLEX TO MG FOR LOW K   BRAIN NATRIURETIC PEPTIDE   CBC WITH AUTO DIFFERENTIAL   TROPONIN       EMERGENCY DEPARTMENT COURSE:     ED Course as of Sep 05 2148   Suzy Burton Sep 05, 2021   1523 IMPRESSION:  Stable radiographic appearance of the chest. No evidence of an acute process. XR CHEST PORTABLE [KL]   1523 No acute findings. WBC 5.6. Patient has a history of chronic thrombocytopenia. CBC Auto Differential(!):    WBC 5.6   RBC 4.83   Hemoglobin Quant 14.5   Hematocrit 43.9   MCV 90.9   MCH 30.0   MCHC 33.0   RDW-CV 15.5(!)   RDW-SD 51.3(!)   Platelet Count 680(!)   MPV 10.0   Seg Neutrophils 74.2   Lymphocytes 17.9   Monocytes 6.4   Eosinophils 0.4   Basophils 0.4   Immature Granulocytes 0.7   Segs Absolute 4.2   Lymphocytes Absolute 1.0   Monocytes Absolute 0.4   Eosinophils Absolute 0.0   Basophils Absolute 0.0   Immature Grans (Abs) 0.04   Nucleated Red Blood Cells 0 [KL]   1530 Mildly elevated blood glucose, but overall normal and without acute findings. Comprehensive Metabolic Panel w/ Reflex to MG(!):    Glucose 154(!)   Creatinine 0.9   BUN 11   Sodium 136   Potassium 3.7   Chloride 98   CO2 28   Calcium 9.7   AST 14   Alk Phos 76   Total Protein 8.5(!)   Albumin 4.3   Bilirubin 0.6   ALT 9(!) [KL]   1538 Within normal limits. Troponin:    Troponin T < 0.010 [KL]   1542 Within normal limits. Brain Natriuretic Peptide:    Pro-.9 [KL]   1610 Not detected.    COVID-19, Rapid:    SARS-CoV-2, NAAT NOT DETECTED [KL]   1700 Case signed out to Dr Diana Leonardo at 1700hr shift change    [AB]      ED Course User Index  [AB] Bunny Arreola DO  [KL] Edward Hooks DO       Medical Decision-Making:   DuoNeb  Covid  Oral steroids   Reassess for pulmonary improvement  CT abdomen pelvis    Kayleigh Bulmaro, DO, [facility]  Attending Emergency Physician       Bladimir Garner Adeline, DO  09/05/21 7601 [facility],   09/05/21 9717

## 2021-09-05 NOTE — ED NOTES
Pt presents to the ER for shortness of Breath and a headache. Pt states this all started 2 days ago. Pt is on home O2 and is on 6L.      Vandana Mike  09/05/21 5642

## 2021-09-05 NOTE — ED PROVIDER NOTES
[facility] ENCOUNTER          Pt Name: Herb Prajapati  MRN: [id number]  [place] 1960  Date of evaluation: 9/5/2021  Treating Resident Physician: Yonatan Anaya DO  Supervising Physician: Dr. Emma Baires       Chief Complaint   Patient presents with    Shortness of Breath    Headache     History obtained from the patient. HISTORY OF PRESENT ILLNESS    HPI  Herb Prajapati is a 62 y.o. male who presents to the emergency department for evaluation of shortness of breath. The patient has no other acute complaints at this time. Mr. Doyle Burnham presents for evaluation of shortness of breath. He says that it has been acutely worsening for the last 2 days. He does have a history of moderate COPD and requires 3 L supplemental O2 at home. For the last 2 days he has required 6 L. He also endorses subjective fevers for the last 2 days with worsening of chronic cough. Has white, frothy sputum production. He has also been needing his rescue inhalers more frequently. He also complains of superficial lower left quadrant abdominal pain. He describes it as burning in nature. He cannot see the area, so does not know if it has been red. He has never had this problem previously. He denies any past history of problems with his bowels. He denies any changes in his bowel movements. Says that he normally has a bowel movement 1 time daily. On further questioning, he thinks he has had very dark, black stools for the last month. He does take iron supplements, but feels that this looks differently. He denies any chest pain, lightheadedness/dizziness, epigastric pain, or lower extremity edema. REVIEW OF SYSTEMS   Review of Systems   Constitutional: Positive for chills and fever. HENT: Negative for congestion and rhinorrhea. Eyes: Negative for visual disturbance.    Respiratory: Positive for cough, shortness of breath and wheezing. Negative for chest tightness. Cardiovascular: Negative for chest pain, palpitations and leg swelling. Gastrointestinal: Positive for abdominal pain and nausea. Negative for diarrhea and vomiting. Positive for very dark, black stools. Genitourinary: Negative for difficulty urinating. Musculoskeletal: Negative for neck stiffness. Neurological: Negative for dizziness and light-headedness. Psychiatric/Behavioral: The patient is not hyperactive.         PAST MEDICAL AND SURGICAL HISTORY     Past Medical History:   Diagnosis Date    Anxiety disorder     Arthritis     Asthma     Back pain, chronic     Blood circulation, collateral     4 toes partial amputee on L foot    [employer] Bi[employer] ICD 7/1/2014   [facility] [employer] BiV pacemaker 7/1/2014    CAD (coronary artery disease)     CHF (congestive heart failure) (HCC)     Chronic kidney disease     COPD (chronic obstructive pulmonary disease) (HCC)     Depression     Endocarditis     Factor V deficiency (HCC)     Hepatitis     HEPATITIS C    Hepatitis C     History of blood transfusion     Hypertension     Paroxysmal A-fib ([address]     Pneumonia      Past Surgical History:   Procedure Laterality Date    BRONCHOSCOPY N/A 9/28/2019    BRONCHOSCOPY performed by Kimo Perez MD at [facility]    BRONCHOSCOPY N/A 9/28/2019    BRONCHOSCOPY performed by Kimo Perez MD at [facility]    BRONCHOSCOPY N/A 9/29/2019    BRONCHOSCOPY BIOPSY BRONCHUS performed by Kimo Perez MD at [facility]    BRONCHOSCOPY  9/29/2019    BRONCHOSCOPY performed by Kimo Perez MD at [place] [address] N/A 9/30/2019    BRONCHOSCOPY ALVEOLAR LAVAGE performed by Laurent Yeager MD at [place] [address] N/A 10/16/2019    BRONCHOSCOPY ALVEOLAR LAVAGE performed by Laurent Yeager MD at [place] [address] N/A 9/16/2020    BRONCHOSCOPY performed by Nisreen Mello MD at [address] [place] [address] CARDIAC SURGERY  12/26/12    Valve replacement    COLONOSCOPY  2016    DEBRIDEMENT Left 01/14/2014    lt hand     HAND DEBRIDEMENT Left 01/09/2014    INCISION AND DRAINAGE    PACEMAKER PLACEMENT  12/26/13    TOE AMPUTATION Bilateral 3/13/2013    1,2,3,4 on left and 2nd on right    TRACHEAL SURGERY N/A 10/18/2019    TRACHEOTOMY PERCUTANEOUS BRONCHOSCOPY performed by Vivien Kaur MD at [facility] ENDOSCOPY  2012         MEDICATIONS   No current facility-administered medications for this encounter. Current Outpatient Medications:     polyethylene glycol (GLYCOLAX) 17 GM/SCOOP powder, Take 17 g by mouth daily, Disp: , Rfl:     guaiFENesin (MUCINEX) 600 MG extended release tablet, Take 1,200 mg by mouth 2 times daily, Disp: , Rfl:     traZODone (DESYREL) 100 MG tablet, Take 100 mg by mouth nightly, Disp: , Rfl:     Ferrous Sulfate 324 MG TBEC, Take by mouth, Disp: , Rfl:     apixaban (ELIQUIS) 5 MG TABS tablet, Take by mouth 2 times daily, Disp: , Rfl:     meloxicam (MOBIC) 15 MG tablet, Take 15 mg by mouth daily, Disp: , Rfl:     docusate sodium (COLACE) 100 MG capsule, Take 100 mg by mouth 2 times daily, Disp: , Rfl:     bisacodyl (DULCOLAX) 5 MG EC tablet, Take 10 mg by mouth daily as needed for Constipation, Disp: , Rfl:     metoprolol succinate (TOPROL XL) 25 MG extended release tablet, Take 25 mg by mouth daily, Disp: , Rfl:     vilazodone HCl (VILAZODONE HCL) 40 MG TABS, Take 40 mg by mouth daily, Disp: , Rfl:     clonazePAM (KLONOPIN) 0.5 MG tablet, Take 0.5 mg by mouth 2 times daily as needed. , Disp: , Rfl:     buprenorphine-naloxone (SUBOXONE) 8-2 MG FILM SL film, Place 1 Film under the tongue daily. , Disp: , Rfl:     QUEtiapine (SEROQUEL) 300 MG tablet, Take 400 mg by mouth nightly , Disp: , Rfl:     potassium chloride (KLOR-CON M) 20 MEQ extended release tablet, Take 1 tablet by mouth 2 times daily, Disp: 180 tablet, Rfl: 1    pantoprazole (PROTONIX) 20 Effort: Pulmonary effort is normal.      Breath sounds: Examination of the right-upper field reveals wheezing. Examination of the left-upper field reveals wheezing. Examination of the right-middle field reveals rhonchi. Examination of the left-middle field reveals rhonchi. Examination of the right-lower field reveals decreased breath sounds and rhonchi. Examination of the left-lower field reveals decreased breath sounds and rhonchi. Decreased breath sounds, wheezing and rhonchi present. Abdominal:      Palpations: Abdomen is soft. Comments: Tenderness in left periumbilical area. Need patient to be lying flat to do full abdominal exam, which not possible in current ED room. Musculoskeletal:      Cervical back: Normal range of motion. MEDICAL DECISION MAKING   Initial Assessment:   1. Mr. Chao Hummel is a 73-year-old male with a past medical history of moderate COPD, complete heart block, A. fib, CAD, HTN. Who presents for evaluation of worsening shortness of breath and increased O2 demand at home (baseline 3 L, now 6 L). Differential diagnosis includes COPD exacerbation, pneumonia, COVID-19, acute blood loss anemia secondary to GI losses. Differential diagnosis for abdominal pain includes hernia, AAA, abscess, constipation, colitis, diverticulitis    Plan:    CMP, CBC, BMP, troponins, COVID-19 testing   CXR   EKG   DuoNebs x2    Gold COPD criteria stage D, taking Umeclidinium bromide/vilanterol with albuterol as rescue inhaler needed at home. Solu-Medrol ordered. VBG pending. CXR shows no acute findings. CT abdomen/pelvis pending. Signout given to Dr. Fortino Kinney.     ED RESULTS   Laboratory results:  Labs Reviewed   COMPREHENSIVE METABOLIC PANEL W/ REFLEX TO MG FOR LOW K - Abnormal; Notable for the following components:       Result Value    Glucose 154 (*)     Total Protein 8.5 (*)     ALT 9 (*)     All other components within normal limits   CBC WITH AUTO DIFFERENTIAL - Abnormal; Notable for the following components:    RDW-CV 15.5 (*)     RDW-SD 51.3 (*)     Platelets 617 (*)     All other components within normal limits   OSMOLALITY - Abnormal; Notable for the following components:    Osmolality Calc 274.4 (*)     All other components within normal limits   GLOMERULAR FILTRATION RATE, ESTIMATED - Abnormal; Notable for the following components:    Est, Glom Filt Rate 86 (*)     All other components within normal limits   COVID-19, RAPID   BRAIN NATRIURETIC PEPTIDE   TROPONIN   ANION GAP   BLOOD GAS, VENOUS       Radiologic studies results:  XR CHEST PORTABLE   Final Result   Stable radiographic appearance of the chest. No evidence of an acute process. **This report has been created using voice recognition software. It may contain minor errors which are inherent in voice recognition technology. **      Final report electronically signed by Dr. Anjelica Regalado MD on 9/5/2021 3:09 PM      CT ABDOMEN PELVIS W IV CONTRAST Additional Contrast? None    (Results Pending)       ED Medications administered this visit:   Medications   ipratropium-albuterol (DUONEB) nebulizer solution 1 ampule (1 ampule Inhalation Given 9/5/21 1553)   ipratropium-albuterol (DUONEB) nebulizer solution 1 ampule (1 ampule Inhalation Given 9/5/21 1604)         ED COURSE     ED Course as of Sep 05 1704   Sun Sep 05, 2021   1523 IMPRESSION:  Stable radiographic appearance of the chest. No evidence of an acute process. XR CHEST PORTABLE [KL]   1523 No acute findings. WBC 5.6. Patient has a history of chronic thrombocytopenia.    CBC Auto Differential(!):    WBC 5.6   RBC 4.83   Hemoglobin Quant 14.5   Hematocrit 43.9   MCV 90.9   MCH 30.0   MCHC 33.0   RDW-CV 15.5(!)   RDW-SD 51.3(!)   Platelet Count 934(!)   MPV 10.0   Seg Neutrophils 74.2   Lymphocytes 17.9   Monocytes 6.4   Eosinophils 0.4   Basophils 0.4   Immature Granulocytes 0.7   Segs Absolute 4.2   Lymphocytes Absolute 1.0   Monocytes Absolute 0.4   Eosinophils Absolute 0.0   Basophils Absolute 0.0   Immature Grans (Abs) 0.04   Nucleated Red Blood Cells 0 [KL]   1530 Mildly elevated blood glucose, but overall normal and without acute findings. Comprehensive Metabolic Panel w/ Reflex to MG(!):    Glucose 154(!)   Creatinine 0.9   BUN 11   Sodium 136   Potassium 3.7   Chloride 98   CO2 28   Calcium 9.7   AST 14   Alk Phos 76   Total Protein 8.5(!)   Albumin 4.3   Bilirubin 0.6   ALT 9(!) [KL]   1538 Within normal limits. Troponin:    Troponin T < 0.010 [KL]   1542 Within normal limits. Brain Natriuretic Peptide:    Pro-.9 [KL]   1610 Not detected. COVID-19, Rapid:    SARS-CoV-2, NAAT NOT DETECTED [KL]      ED Course User Index  [KL] Víctor April, DO       Please see MDM above for other ED course. Signout given to Dr. Steph Lopez. MEDICATION CHANGES     New Prescriptions    No medications on file         FINAL DISPOSITION     Final diagnoses:   Hypoxia   COPD exacerbation ([facility] [address]   Left lower quadrant abdominal pain     Condition: condition: good  Dispo: ongoing evaluation, sign-out given to Dr. Steph Lopez      This transcription was electronically signed. Parts of this transcriptions may have been dictated by use of voice recognition software and electronically transcribed, and parts may have been transcribed with the assistance of an ED scribe. The transcription may contain errors not detected in proofreading. Please refer to my supervising physician's documentation if my documentation differs.     Electronically Signed: Víctor April, DO, 09/05/21, 5:04 PM         Víctor April DO  Resident  09/05/21 2951 Angelic Redd DO  Resident  09/05/21 1710

## 2021-09-06 LAB
EKG ATRIAL RATE: 74 BPM
EKG P AXIS: 59 DEGREES
EKG P-R INTERVAL: 118 MS
EKG Q-T INTERVAL: 416 MS
EKG QRS DURATION: 122 MS
EKG QTC CALCULATION (BAZETT): 461 MS
EKG R AXIS: -91 DEGREES
EKG T AXIS: -85 DEGREES
EKG VENTRICULAR RATE: 74 BPM

## 2021-09-06 PROCEDURE — 93010 ELECTROCARDIOGRAM REPORT: CPT | Performed by: NUCLEAR MEDICINE

## 2021-09-06 NOTE — ED PROVIDER NOTES
[facility] care of patient from Dr. Jana Lemos. Patient received breathing treatments and oxygen requirements have decreased back to baseline which is 3 L. Patient also states that he is feeling better, shortness of breath. Patient is denying any abdominal pain at this time. Patient will be discharged home with precautions instructed to follow-up with PCP next business day.      Josh Pro MD  Resident  09/06/21 8910

## 2021-09-07 ENCOUNTER — APPOINTMENT (OUTPATIENT)
Dept: GENERAL RADIOLOGY | Age: 61
End: 2021-09-07
Payer: MEDICARE

## 2021-09-07 ENCOUNTER — APPOINTMENT (OUTPATIENT)
Dept: CT IMAGING | Age: 61
End: 2021-09-07
Payer: MEDICARE

## 2021-09-07 ENCOUNTER — HOSPITAL ENCOUNTER (EMERGENCY)
Age: 61
Discharge: HOME | End: 2021-09-07
Attending: EMERGENCY MEDICINE
Payer: MEDICARE

## 2021-09-07 VITALS
DIASTOLIC BLOOD PRESSURE: 65 MMHG | RESPIRATION RATE: 16 BRPM | SYSTOLIC BLOOD PRESSURE: 107 MMHG | HEART RATE: 63 BPM | TEMPERATURE: 98.6 F | OXYGEN SATURATION: 94 %

## 2021-09-07 DIAGNOSIS — J18.9 PNEUMONIA DUE TO INFECTIOUS ORGANISM, UNSPECIFIED LATERALITY, UNSPECIFIED PART OF LUNG: ICD-10-CM

## 2021-09-07 DIAGNOSIS — J40 BRONCHITIS: ICD-10-CM

## 2021-09-07 DIAGNOSIS — J44.1 COPD EXACERBATION (HCC): Primary | ICD-10-CM

## 2021-09-07 LAB
ANION GAP SERPL CALCULATED.3IONS-SCNC: 9 MEQ/L (ref 8–16)
BASOPHILS # BLD: 0.1 %
BASOPHILS ABSOLUTE: 0 THOU/MM3 (ref 0–0.1)
BUN BLDV-MCNC: 17 MG/DL (ref 7–22)
CALCIUM SERPL-MCNC: 9.5 MG/DL (ref 8.5–10.5)
CHLORIDE BLD-SCNC: 100 MEQ/L (ref 98–111)
CO2: 30 MEQ/L (ref 23–33)
CREAT SERPL-MCNC: 0.9 MG/DL (ref 0.4–1.2)
EKG ATRIAL RATE: 79 BPM
EKG P AXIS: 77 DEGREES
EKG P-R INTERVAL: 120 MS
EKG Q-T INTERVAL: 386 MS
EKG QRS DURATION: 112 MS
EKG QTC CALCULATION (BAZETT): 442 MS
EKG R AXIS: -87 DEGREES
EKG T AXIS: -97 DEGREES
EKG VENTRICULAR RATE: 79 BPM
EOSINOPHIL # BLD: 0.1 %
EOSINOPHILS ABSOLUTE: 0 THOU/MM3 (ref 0–0.4)
ERYTHROCYTE [DISTWIDTH] IN BLOOD BY AUTOMATED COUNT: 15.8 % (ref 11.5–14.5)
ERYTHROCYTE [DISTWIDTH] IN BLOOD BY AUTOMATED COUNT: 52.5 FL (ref 35–45)
FLU A ANTIGEN: NEGATIVE
FLU B ANTIGEN: NEGATIVE
GFR SERPL CREATININE-BSD FRML MDRD: 86 ML/MIN/1.73M2
GLUCOSE BLD-MCNC: 133 MG/DL (ref 70–108)
HCT VFR BLD CALC: 45.2 % (ref 42–52)
HEMOGLOBIN: 14.7 GM/DL (ref 14–18)
IMMATURE GRANS (ABS): 0.05 THOU/MM3 (ref 0–0.07)
IMMATURE GRANULOCYTES: 0.6 %
LYMPHOCYTES # BLD: 18.3 %
LYMPHOCYTES ABSOLUTE: 1.4 THOU/MM3 (ref 1–4.8)
MCH RBC QN AUTO: 29.9 PG (ref 26–33)
MCHC RBC AUTO-ENTMCNC: 32.5 GM/DL (ref 32.2–35.5)
MCV RBC AUTO: 91.9 FL (ref 80–94)
MONOCYTES # BLD: 7.8 %
MONOCYTES ABSOLUTE: 0.6 THOU/MM3 (ref 0.4–1.3)
NUCLEATED RED BLOOD CELLS: 0 /100 WBC
OSMOLALITY CALCULATION: 281 MOSMOL/KG (ref 275–300)
PLATELET # BLD: 122 THOU/MM3 (ref 130–400)
PMV BLD AUTO: 10 FL (ref 9.4–12.4)
POTASSIUM SERPL-SCNC: 3.6 MEQ/L (ref 3.5–5.2)
PRO-BNP: 1125 PG/ML (ref 0–900)
RBC # BLD: 4.92 MILL/MM3 (ref 4.7–6.1)
SARS-COV-2, NAAT: NOT DETECTED
SEG NEUTROPHILS: 73.1 %
SEGMENTED NEUTROPHILS ABSOLUTE COUNT: 5.7 THOU/MM3 (ref 1.8–7.7)
SODIUM BLD-SCNC: 139 MEQ/L (ref 135–145)
TROPONIN T: < 0.01 NG/ML
WBC # BLD: 7.8 THOU/MM3 (ref 4.8–10.8)

## 2021-09-07 PROCEDURE — 99285 EMERGENCY DEPT VISIT HI MDM: CPT

## 2021-09-07 PROCEDURE — 87635 SARS-COV-2 COVID-19 AMP PRB: CPT

## 2021-09-07 PROCEDURE — 96374 THER/PROPH/DIAG INJ IV PUSH: CPT

## 2021-09-07 PROCEDURE — 6370000000 HC RX 637 (ALT 250 FOR IP)

## 2021-09-07 PROCEDURE — 84484 ASSAY OF TROPONIN QUANT: CPT

## 2021-09-07 PROCEDURE — 71046 X-RAY EXAM CHEST 2 VIEWS: CPT

## 2021-09-07 PROCEDURE — 94640 AIRWAY INHALATION TREATMENT: CPT

## 2021-09-07 PROCEDURE — 83880 ASSAY OF NATRIURETIC PEPTIDE: CPT

## 2021-09-07 PROCEDURE — 85025 COMPLETE CBC W/AUTO DIFF WBC: CPT

## 2021-09-07 PROCEDURE — 93005 ELECTROCARDIOGRAM TRACING: CPT | Performed by: EMERGENCY MEDICINE

## 2021-09-07 PROCEDURE — 71250 CT THORAX DX C-: CPT

## 2021-09-07 PROCEDURE — 6360000002 HC RX W HCPCS

## 2021-09-07 PROCEDURE — 36415 COLL VENOUS BLD VENIPUNCTURE: CPT

## 2021-09-07 PROCEDURE — 87804 INFLUENZA ASSAY W/OPTIC: CPT

## 2021-09-07 PROCEDURE — 80048 BASIC METABOLIC PNL TOTAL CA: CPT

## 2021-09-07 RX ORDER — IPRATROPIUM BROMIDE AND ALBUTEROL SULFATE 2.5; .5 MG/3ML; MG/3ML
1 SOLUTION RESPIRATORY (INHALATION) ONCE
Status: COMPLETED | OUTPATIENT
Start: 2021-09-07 | End: 2021-09-07

## 2021-09-07 RX ORDER — CEFUROXIME AXETIL 500 MG/1
500 TABLET ORAL 2 TIMES DAILY
Qty: 20 TABLET | Refills: 0 | Status: ON HOLD | OUTPATIENT
Start: 2021-09-07 | End: 2021-09-16 | Stop reason: SDUPTHER

## 2021-09-07 RX ORDER — CEFUROXIME AXETIL 250 MG/1
TABLET ORAL
Status: COMPLETED
Start: 2021-09-07 | End: 2021-09-07

## 2021-09-07 RX ORDER — METHYLPREDNISOLONE SODIUM SUCCINATE 125 MG/2ML
125 INJECTION, POWDER, LYOPHILIZED, FOR SOLUTION INTRAMUSCULAR; INTRAVENOUS ONCE
Status: COMPLETED | OUTPATIENT
Start: 2021-09-07 | End: 2021-09-07

## 2021-09-07 RX ORDER — CEFUROXIME AXETIL 250 MG/1
500 TABLET ORAL EVERY 12 HOURS SCHEDULED
Status: DISCONTINUED | OUTPATIENT
Start: 2021-09-07 | End: 2021-09-07 | Stop reason: HOSPADM

## 2021-09-07 RX ADMIN — CEFUROXIME AXETIL 500 MG: 250 TABLET ORAL at 16:48

## 2021-09-07 RX ADMIN — METHYLPREDNISOLONE SODIUM SUCCINATE 125 MG: 125 INJECTION, POWDER, FOR SOLUTION INTRAMUSCULAR; INTRAVENOUS at 15:04

## 2021-09-07 RX ADMIN — IPRATROPIUM BROMIDE AND ALBUTEROL SULFATE 1 AMPULE: .5; 3 SOLUTION RESPIRATORY (INHALATION) at 15:08

## 2021-09-07 ASSESSMENT — ENCOUNTER SYMPTOMS
NAUSEA: 1
SHORTNESS OF BREATH: 1
EYE ITCHING: 0
COUGH: 1
EYE DISCHARGE: 0
RHINORRHEA: 0
VOMITING: 0
DIARRHEA: 0
ABDOMINAL PAIN: 0
SINUS PAIN: 0
CONSTIPATION: 0

## 2021-09-07 ASSESSMENT — PAIN DESCRIPTION - PAIN TYPE: TYPE: ACUTE PAIN

## 2021-09-07 ASSESSMENT — PAIN DESCRIPTION - LOCATION: LOCATION: CHEST

## 2021-09-07 ASSESSMENT — PAIN SCALES - GENERAL: PAINLEVEL_OUTOF10: 10

## 2021-09-07 NOTE — ED PROVIDER NOTES
[place] ENCOUNTER          Pt Name: Shaina Lee  MRN: [id number]  [place] 1960  Date of evaluation: 9/7/2021  Treating Resident Physician: Thea Thakkar MD  Supervising Physician: Dr. Geneva Maravilla       Chief Complaint   Patient presents with    Shortness of Breath     History obtained from the patient. HISTORY OF PRESENT ILLNESS    HPI  Shaina Lee is a 62 y.o. male who presents to the emergency department for evaluation of shortness of breath    Seen in the ED 2 days ago and worked up for shortness of breath. Patient was found to be in acute COPD exacerbation requiring additional oxygen. Patient received steroids and duo nebs while in the ED. Patient was discharged home without any antibiotics. States that he has even worse shortness of breath since discharge able to even walk to the bathroom. Patient says that he has tried his home oxygen up to max and was not getting any relief. Patient says he does have a cough that is worsening production of yellow-green sputum. Patient also endorses left-sided rib pain because of all of the coughing. States she is also been occasionally nauseous without vomiting. Patient stated that he did feel better after receiving his albuterol treatment while in the hospital the other day. Patient was vaccinated against Covid. He did have prior negative Covid swab during last visit. Patient was not swabbed for flu during last visit. Patient is still currently smoking says about 5 cigarettes/day. Patient denies any alcohol or recreational drug use. Patient says that he needs to be admitted and his oxygen requirement is not sufficient of amiodarone at home. Patient says that he is frustrated because nobody knows what is like to not be able to breathe. The patient has no other acute complaints at this time.         REVIEW OF SYSTEMS   Review of Systems Constitutional: Positive for chills and fever. HENT: Negative for rhinorrhea, sinus pain and sneezing. Eyes: Negative for discharge and itching. Respiratory: Positive for cough and shortness of breath. Cardiovascular: Positive for chest pain. Gastrointestinal: Positive for nausea. Negative for abdominal pain, constipation, diarrhea and vomiting. Genitourinary: Negative for dysuria and hematuria. Musculoskeletal: Positive for myalgias. Negative for gait problem. Skin: Negative for rash and wound. Neurological: Negative for dizziness, weakness, light-headedness, numbness and headaches.          PAST MEDICAL AND SURGICAL HISTORY     Past Medical History:   Diagnosis Date    Anxiety disorder     Arthritis     Asthma     Back pain, chronic     Blood circulation, collateral     4 toes partial amputee on L foot    [place] [employer] Bi[employer] ICD 7/1/2014   [facility] [employer] BiV pacemaker 7/1/2014    CAD (coronary artery disease)     CHF (congestive heart failure) (HCC)     Chronic kidney disease     COPD (chronic obstructive pulmonary disease) (HCC)     Depression     Endocarditis     Factor V deficiency (HCC)     Hepatitis     HEPATITIS C    Hepatitis C     History of blood transfusion     Hypertension     Paroxysmal A-fib ([address]     Pneumonia      Past Surgical History:   Procedure Laterality Date    BRONCHOSCOPY N/A 9/28/2019    BRONCHOSCOPY performed by Ksenia Arenas MD at [facility]    BRONCHOSCOPY N/A 9/28/2019    BRONCHOSCOPY performed by Ksenia Arenas MD at [facility]    BRONCHOSCOPY N/A 9/29/2019    BRONCHOSCOPY BIOPSY BRONCHUS performed by Ksenia Arenas MD at [address] [facility]    BRONCHOSCOPY  9/29/2019    BRONCHOSCOPY performed by Ksenia Arenas MD at [facility] N/A 9/30/2019    BRONCHOSCOPY ALVEOLAR LAVAGE performed by Nathen Bell MD at [facility] N/A 10/16/2019    BRONCHOSCOPY ALVEOLAR LAVAGE performed by Madison Cottrell Marylen Perish, MD at [facility] N/A 9/16/2020    BRONCHOSCOPY performed by Petrona Maguire MD at [facility]  12/26/12    Valve replacement    COLONOSCOPY  2016    DEBRIDEMENT Left 01/14/2014    lt hand     HAND DEBRIDEMENT Left 01/09/2014    INCISION AND DRAINAGE    PACEMAKER PLACEMENT  12/26/13    TOE AMPUTATION Bilateral 3/13/2013    1,2,3,4 on left and 2nd on right    TRACHEAL SURGERY N/A 10/18/2019    TRACHEOTOMY PERCUTANEOUS BRONCHOSCOPY performed by Marina James MD at [facility]  2012         MEDICATIONS   No current facility-administered medications for this encounter.     Current Outpatient Medications:     cefUROXime (CEFTIN) 500 MG tablet, Take 1 tablet by mouth 2 times daily for 10 days, Disp: 20 tablet, Rfl: 0    azithromycin (ZITHROMAX Z-LENO) 250 MG tablet, Take 2 tablets (500 mg) on Day 1, and then take 1 tablet (250 mg) on days 2 through 5., Disp: 1 packet, Rfl: 0    polyethylene glycol (GLYCOLAX) 17 GM/SCOOP powder, Take 17 g by mouth daily, Disp: , Rfl:     guaiFENesin (MUCINEX) 600 MG extended release tablet, Take 1,200 mg by mouth 2 times daily, Disp: , Rfl:     traZODone (DESYREL) 100 MG tablet, Take 100 mg by mouth nightly, Disp: , Rfl:     Ferrous Sulfate 324 MG TBEC, Take by mouth, Disp: , Rfl:     apixaban (ELIQUIS) 5 MG TABS tablet, Take by mouth 2 times daily, Disp: , Rfl:     meloxicam (MOBIC) 15 MG tablet, Take 15 mg by mouth daily, Disp: , Rfl:     docusate sodium (COLACE) 100 MG capsule, Take 100 mg by mouth 2 times daily, Disp: , Rfl:     bisacodyl (DULCOLAX) 5 MG EC tablet, Take 10 mg by mouth daily as needed for Constipation, Disp: , Rfl:     metoprolol succinate (TOPROL XL) 25 MG extended release tablet, Take 25 mg by mouth daily, Disp: , Rfl:     vilazodone HCl (VILAZODONE HCL) 40 MG TABS, Take 40 mg by mouth daily, Disp: , Rfl:     clonazePAM (KLONOPIN) 0.5 MG tablet, Take 0.5 assessment reviewed and normal. There is no height or weight on file to calculate BMI. Pulsoximetry is normal per my interpretation. Additional Vital Signs:  Vitals:    09/07/21 1558   BP: 107/65   Pulse: 63   Resp: 16   Temp:    SpO2: 94%       Physical Exam  Constitutional:       General: He is not in acute distress. Appearance: He is obese. HENT:      Head: Normocephalic and atraumatic. Eyes:      Extraocular Movements: Extraocular movements intact. Pupils: Pupils are equal, round, and reactive to light. Cardiovascular:      Rate and Rhythm: Normal rate and regular rhythm. Pulses: Normal pulses. Heart sounds: Normal heart sounds. Pulmonary:      Effort: Pulmonary effort is normal. No tachypnea, accessory muscle usage or respiratory distress. Breath sounds: Wheezing present. Chest:      Chest wall: Tenderness present. Abdominal:      General: Bowel sounds are normal.      Palpations: Abdomen is soft. Musculoskeletal:         General: Normal range of motion. Cervical back: Normal range of motion and neck supple. Skin:     General: Skin is warm and dry. Capillary Refill: Capillary refill takes less than 2 seconds. Coloration: Skin is not cyanotic. Neurological:      General: No focal deficit present. Mental Status: He is alert and oriented to person, place, and time. MEDICAL DECISION MAKING   Initial Assessment:   1. Bronchitis secondary to COPD exacerbation  2.  Shortness of breath: Differential for shortness of breath includes Covid, flu, pneumonia, PE, tension pneumo, acute anemia, ACS, CHF  Plan:    CBC   BMP   Troponin   Flu   Rapid Covid   BNP   EKG   Chest x-ray   CT chest without contrast   Methylprednisolone   DuoNebs   Ambulatory pulse ox for discharge   Ceferoxime for home medication        ED RESULTS   Laboratory results:  Labs Reviewed   CBC WITH AUTO DIFFERENTIAL - Abnormal; Notable for the following components: Result Value    RDW-CV 15.8 (*)     RDW-SD 52.5 (*)     Platelets 199 (*)     All other components within normal limits   BASIC METABOLIC PANEL - Abnormal; Notable for the following components:    Glucose 133 (*)     All other components within normal limits   GLOMERULAR FILTRATION RATE, ESTIMATED - Abnormal; Notable for the following components:    Est, Glom Filt Rate 86 (*)     All other components within normal limits   BRAIN NATRIURETIC PEPTIDE - Abnormal; Notable for the following components:    Pro-BNP 1125.0 (*)     All other components within normal limits   COVID-19, RAPID   RAPID INFLUENZA A/B ANTIGENS   TROPONIN   ANION GAP   OSMOLALITY       Radiologic studies results:  CT CHEST WO CONTRAST   Final Result       1. Multiple subsolid and groundglass nodules at the bilateral lung bases, right greater than left as evidence for active infectious/inflammatory process. 2. Stable bronchial wall thickening. 3. Stable moderate emphysematous changes and lung scarring. **This report has been created using voice recognition software. It may contain minor errors which are inherent in voice recognition technology. **      Final report electronically signed by Dr. Shanika Yanes MD on 9/7/2021 3:05 PM      XR CHEST (2 VW)   Final Result   Emphysematous changes with no acute intrathoracic process. **This report has been created using voice recognition software. It may contain minor errors which are inherent in voice recognition technology. **      Final report electronically signed by Dr Latasha Mckeon on 9/7/2021 1:16 PM          ED Medications administered this visit:   Medications   ipratropium-albuterol (DUONEB) nebulizer solution 1 ampule (1 ampule Inhalation Given 9/7/21 1508)   methylPREDNISolone sodium (SOLU-MEDROL) injection 125 mg (125 mg IntraVENous Given 9/7/21 1504)         ED COURSE     ED Course as of Sep 07 1908   Tue Sep 07, 2021   7855 Basic Metabolic Panel(!): Sodium 139   Potassium 3.6   Chloride 100   CO2 30   Glucose 133(!)   BUN 17   Creatinine 0.9   Calcium 9.5 [LIANNA]   1405 CBC auto differential(!):    WBC 7.8   RBC 4.92   Hemoglobin Quant 14.7   Hematocrit 45.2   MCV 91.9   MCH 29.9   MCHC 32.5   RDW-CV 15.8(!)   RDW-SD 52.5(!)   Platelet Count 558(!)   MPV 10.0   Seg Neutrophils 73.1   Lymphocytes 18.3   Monocytes 7.8   Eosinophils 0.1   Basophils 0.1   Immature Granulocytes 0.6   Segs Absolute 5.7   Lymphocytes Absolute 1.4   Monocytes Absolute 0.6   Eosinophils Absolute 0.0   Basophils Absolute 0.0   Immature Grans (Abs) 0.05   Nucleated Red Blood Cells 0 [LIANNA]   1405 Troponin:    Troponin T < 0.010 [LIANNA]   1405 Chest x-ray is negative for any acute intrathoracic process   XR CHEST (2 VW) [LIANNA]   1555 CT shows mild infectious process in the base of the lung lobes. She was MRSA positive in thousand 19. Patient was discharged yesterday on azithromycin. Cefuroxime will be added. [LIANNA]   1636 Ambulatory pulse ox performed in the room showed O2 sat of 96 to 97%. Patient was dissatisfied with results and requests a longer walk. I personally walked the patient from the room down the length of the hallway and back and the patient's oxygen saturation remained at 94%. [LIANNA]      ED Course User Index  [LIANNA] Diana Sharma MD         Patient's work-up was discussed with him including all findings. He was made available for all questions. Patient still insists that he should be admitted however he does not meet any admission criteria. Patient instructed to return if he experiences any loss of consciousness, lightheadedness, dizziness, worsening shortness of breath and maybe signs of worsening infection. Patient instructed on the importance of following through those antibiotic regimen. Surely should follow up with his primary care physician within the next 3 days.  Strict return precautions and follow up instructions were discussed with the patient prior to discharge, with which the patient agrees. MEDICATION CHANGES     Discharge Medication List as of 9/7/2021  4:21 PM      START taking these medications    Details   cefUROXime (CEFTIN) 500 MG tablet Take 1 tablet by mouth 2 times daily for 10 days, Disp-20 tablet, R-0Normal               FINAL DISPOSITION     Final diagnoses:   COPD exacerbation (HCC)   Bronchitis   Pneumonia due to infectious organism, unspecified laterality, unspecified part of lung     Condition: condition: stable  Dispo: Discharge to home      This transcription was electronically signed. Parts of this transcriptions may have been dictated by use of voice recognition software and electronically transcribed, and parts may have been transcribed with the assistance of an ED scribe. The transcription may contain errors not detected in proofreading. Please refer to my supervising physician's documentation if my documentation differs.     Electronically Signed: Darcy Baeza MD, 09/07/21, 7:08 PM         Anand Dickinson MD  Resident  09/07/21 0261

## 2021-09-07 NOTE — TELEPHONE ENCOUNTER
Spoke with Neli Navarro today-    He is currently heading to [address] due to SOB, Chest pain, pain in the left arm and around the device.    LEAVE THIS ENCOUNTER OPEN  Will follow and schedule accordingly      BIV Pace [facility]- at [facility] need to schedule a battery change,

## 2021-09-09 NOTE — TELEPHONE ENCOUNTER
Patient discharged from the ED on 09.07.2021 with COPD exac. Spoke with the patient today he is currently at the doctors office and would like to be called later.

## 2021-09-10 NOTE — TELEPHONE ENCOUNTER
Procedure:  Battery change-  BIV Pacer  Date: 09.16.2021  Arrival Time: 7am  Meds to Hold: Eliquis evening prior    Incision/ Device check - 09.28.2021 at 830am     Instructions verbalized to the patient. Instructions mailed to the patient.

## 2021-10-05 ENCOUNTER — TELEPHONE (OUTPATIENT)
Dept: CARDIOLOGY CLINIC | Age: 61
End: 2021-10-05

## 2021-10-05 NOTE — LETTER
[address] [place] [address]  Phone: [phone]  Fax: [phone]        October 6, 2021    [address] [place] [address] [address] [address]      Dear Britta Phillips: Our office has not been able to contact you by phone. Please call us at [phone] regarding your pacemaker appointment     If you have any questions or concerns, please don't hesitate to call.     Sincerely,      Cori Wilcox

## 2021-10-05 NOTE — TELEPHONE ENCOUNTER
Voice mailbox is full. Unable to leave a message. Pt was a no show to his incision check appt today for the second time.  He needs to be r/s     He had a biv gen change on 9/16/21 with dr Justino Cardoza

## 2021-10-11 ENCOUNTER — NURSE ONLY (OUTPATIENT)
Dept: CARDIOLOGY CLINIC | Age: 61
End: 2021-10-11
Payer: MEDICARE

## 2021-10-11 DIAGNOSIS — Z95.0 PACEMAKER: Primary | ICD-10-CM

## 2021-10-11 PROCEDURE — 93281 PM DEVICE PROGR EVAL MULTI: CPT | Performed by: INTERNAL MEDICINE

## 2021-10-11 NOTE — PROGRESS NOTES
DR Deric Moctezuma PT / KNOWN PAF/ ELIQUIS   AFIB BURDEN <1%  [facility] BIV PACEMAKER INITIAL CHECK IN OFFICE AFTER GEN CHANGE ON 9/16/21  PRESENTS IN AS RV LV SENSED AND PACED  UNDERLYING AS RV LV SENSED    10/11/21  WHEN  PT PRESENTED , I NOTICED ON THE SCREEN THAT THE PT WAS NOT BIV PACING MUCH AT ALL   RV WAS 39%  LV WAS 3%  AV PACED <1%  SO I CALLED WINIFRED WITH [employer] [employer] AND HE TOLD ME THAT WHEN THEY DID THIS PTS GEN CHANGE, THEY NOTICED THAT HIS QRS WAS MUCH BETTER WITH HIS OWN INTRINSIC CONDUCTION AND SAID IT IS OK THAT HE DOES NOT BIV PACE MUCH      P WAVES 4.8  RV WAVES 17.9  LV WAVES 7.3    ATRIAL IMPEDENCE 511   RV IMPEDENCE 443  LV IMPEDENCE 307    ATRIAL THRESHOLD 0.7 @ 0.4  RV THRESHOLD 1 @ 0.4  LV THRESHOLD 1.7 @ 0.8    ATRIAL AMPLITUDE 2 @ 0.4  RV AMPLITUDE 2 @ 0.4  LV AMPLITUDE 3 @ 0.8        10/1/21 LOOKS LIKE AN EPISODE OF SVT   10/1/21 LOOKS LIKE AN EPISODE OF SVT

## 2021-11-09 ENCOUNTER — OFFICE VISIT (OUTPATIENT)
Dept: PHYSICAL MEDICINE AND REHAB | Age: 61
End: 2021-11-09
Payer: MEDICARE

## 2021-11-09 VITALS
DIASTOLIC BLOOD PRESSURE: 62 MMHG | WEIGHT: 222 LBS | SYSTOLIC BLOOD PRESSURE: 112 MMHG | HEIGHT: 69 IN | BODY MASS INDEX: 32.88 KG/M2 | HEART RATE: 88 BPM

## 2021-11-09 DIAGNOSIS — M87.052 AVASCULAR NECROSIS OF BONES OF BOTH HIPS (HCC): ICD-10-CM

## 2021-11-09 DIAGNOSIS — M53.3 SACROILIAC JOINT DYSFUNCTION: ICD-10-CM

## 2021-11-09 DIAGNOSIS — M87.051 AVASCULAR NECROSIS OF BONES OF BOTH HIPS (HCC): ICD-10-CM

## 2021-11-09 DIAGNOSIS — I73.9 PERIPHERAL ARTERY DISEASE (HCC): ICD-10-CM

## 2021-11-09 DIAGNOSIS — M54.50 LUMBAR PAIN: ICD-10-CM

## 2021-11-09 DIAGNOSIS — M47.816 SPONDYLOSIS OF LUMBAR REGION WITHOUT MYELOPATHY OR RADICULOPATHY: ICD-10-CM

## 2021-11-09 DIAGNOSIS — M87.00 AVN (AVASCULAR NECROSIS OF BONE) (HCC): Primary | ICD-10-CM

## 2021-11-09 DIAGNOSIS — M48.061 SPINAL STENOSIS OF LUMBAR REGION, UNSPECIFIED WHETHER NEUROGENIC CLAUDICATION PRESENT: ICD-10-CM

## 2021-11-09 PROCEDURE — 99204 OFFICE O/P NEW MOD 45 MIN: CPT | Performed by: NURSE PRACTITIONER

## 2021-11-09 ASSESSMENT — ENCOUNTER SYMPTOMS
SORE THROAT: 0
EYE PAIN: 0
COUGH: 0
ABDOMINAL PAIN: 0
NAUSEA: 0
SINUS PRESSURE: 0
WHEEZING: 0
CHEST TIGHTNESS: 0
CONSTIPATION: 0
BACK PAIN: 1
RHINORRHEA: 0
PHOTOPHOBIA: 0
DIARRHEA: 0
SHORTNESS OF BREATH: 0
VOMITING: 0
COLOR CHANGE: 0

## 2021-11-09 NOTE — PROGRESS NOTES
ChiefComplaint: Low back pain, Right leg pain, Left leg pain and Hip pain    HPI   New pt here for c/o bilateral hip pain. He has had hip pain for the past 1-2 years greater in the past 3-6 months. He has COPD  On 3 liters NC. He continues to be a smoker 1 pack per day . Ex drinker of ETOH. He is on Suboxone per PCP. He was referred to Dr Niraj Leonard never went. Review of notes states pt is noncomplaint. He has seen [facility] Dr Hitesh Guzmán in past for  Lumbar pain was scheduled for Lumbar Laminectomy  Fusion in 2011 but was denied by insurance then had loss of insurance. Never went back to surgeon after that until this year. He has not had any prior  lumbar surgery. He had right hip steroid injection without relief 7/2021. He had 3 rounds of PT no relief or progress had increased pain. He uses Rolator walker. Has antalgic gait shuffles also. He has constant  Bilateral hip pain  radiates down into BLE stops at the knees. He has increased pain when he goes to stand to bear any weight into hips legs or when applies  pressure to feet. He has poor pedal pulses. foot drop. Gait is affected by hip pain and BLE weakness. He denies low back pain at this time. He has BLE weakness loss of balance at times. He has 4 toes amputated on left foot    Patient has  BLE swelling discoloration, very calloused skin. Poor hygiene. He states he has never seen vascular surgeon now had any dopplers of BLE. He has poor pedal pulses. Patient pain increases with bending, lifting, twisting , turning torso, reaching, pushing, pulling, walking, standing, stairs, sitting, getting up and down and laying. Treatments tried PT, ice, heat, NSAIDS and injections  Pain description sharp, shooting, stabbing, burning, aching, numbness, tingling and shocking  Pain rating  scale 1-10 highest 8/10   lowest  5/10  average   6-7/10    The patient is allergic to pcn [penicillins].       Subjective:      Review of Systems   Constitutional: Positive for activity change. Negative for appetite change, chills, diaphoresis, fatigue, fever and unexpected weight change. HENT: Negative for congestion, ear pain, hearing loss, mouth sores, nosebleeds, rhinorrhea, sinus pressure and sore throat. Eyes: Negative for photophobia, pain and visual disturbance. Respiratory: Negative for cough, chest tightness, shortness of breath and wheezing. COPD  3 liters NC   Cardiovascular: Positive for leg swelling. Negative for chest pain and palpitations. CAD HTN   A FIB  Has PACER, HAD RECENT BATTERY CHANGE VALVE REPLACEMENT   PVD  AICD   Gastrointestinal: Negative for abdominal pain, constipation, diarrhea, nausea and vomiting. Endocrine: Negative for cold intolerance, heat intolerance, polydipsia, polyphagia and polyuria. Genitourinary: Negative for decreased urine volume, difficulty urinating, frequency and hematuria. Musculoskeletal: Positive for arthralgias, back pain, gait problem and myalgias. Negative for joint swelling, neck pain and neck stiffness. Skin: Negative for color change and rash. BLE severe dry skin appears  Vascular siisues   Allergic/Immunologic: Negative for food allergies and immunocompromised state. Neurological: Negative for dizziness, tremors, seizures, syncope, facial asymmetry, speech difficulty, weakness, light-headedness, numbness and headaches. Hematological: Does not bruise/bleed easily. Psychiatric/Behavioral: Negative for agitation, behavioral problems, confusion, decreased concentration, dysphoric mood, hallucinations, self-injury, sleep disturbance and suicidal ideas. The patient is nervous/anxious. The patient is not hyperactive. Anxiety depression        Objective:     Vitals:    11/09/21 1405   BP: 112/62   Site: Left Upper Arm   Position: Sitting   Cuff Size: Large Adult   Pulse: 88   Weight: 222 lb (100.7 kg)   Height: 5' 9\" (1.753 m)       Physical Exam  Vitals and nursing note reviewed. Constitutional:       General: He is not in acute distress. Appearance: He is well-developed. He is not diaphoretic. HENT:      Head: Normocephalic and atraumatic. Right Ear: External ear normal.      Left Ear: External ear normal.      Nose: Nose normal.      Mouth/Throat:      Pharynx: No oropharyngeal exudate. Eyes:      General: No scleral icterus. Right eye: No discharge. Left eye: No discharge. Conjunctiva/sclera: Conjunctivae normal.      Pupils: Pupils are equal, round, and reactive to light. Neck:      Thyroid: No thyromegaly. Cardiovascular:      Rate and Rhythm: Normal rate and regular rhythm. Heart sounds: Normal heart sounds. No murmur heard. No friction rub. No gallop. Pulmonary:      Effort: Pulmonary effort is normal. No respiratory distress. Breath sounds: Normal breath sounds. No wheezing or rales. Chest:      Chest wall: No tenderness. Abdominal:      General: Bowel sounds are normal. There is no distension. Palpations: Abdomen is soft. Tenderness: There is no abdominal tenderness. There is no guarding or rebound. Musculoskeletal:         General: Swelling and tenderness present. Cervical back: Full passive range of motion without pain, normal range of motion and neck supple. No edema, erythema or rigidity. No muscular tenderness. Normal range of motion. Lumbar back: Tenderness and bony tenderness present. Right hip: Tenderness and bony tenderness present. Decreased range of motion. Decreased strength. Left hip: Tenderness and bony tenderness present. Decreased range of motion. Decreased strength. Right upper leg: Tenderness present. Left upper leg: Tenderness present. Right knee: Normal.      Left knee: Normal.      Right lower leg: Swelling and deformity present. Left lower leg: Swelling and deformity present. Right ankle: Swelling present. Left ankle: Swelling present. Right foot: Swelling present. Left foot: Swelling and deformity present. Feet:      Right foot:      Skin integrity: Callus and dry skin present. Left foot:      Skin integrity: Callus and dry skin present. Comments: Left first 4 toes amputated from gangrene after  Heart Valve  Surgery    Skin:     General: Skin is warm. Coloration: Skin is not pale. Findings: No erythema or rash. Neurological:      Mental Status: He is alert and oriented to person, place, and time. He is not disoriented. Cranial Nerves: No cranial nerve deficit. Sensory: No sensory deficit. Motor: No atrophy or abnormal muscle tone. Coordination: Coordination normal.      Gait: Gait normal.      Deep Tendon Reflexes: Reflexes are normal and symmetric. Babinski sign absent on the right side. Psychiatric:         Attention and Perception: He is attentive. Mood and Affect: Mood normal. Mood is not anxious or depressed. Affect is not labile, blunt, angry or inappropriate. Speech: He is communicative. Speech is not rapid and pressured, delayed, slurred or tangential.         Behavior: Behavior normal. Behavior is not agitated, slowed, aggressive, withdrawn, hyperactive or combative. Thought Content: Thought content normal. Thought content is not paranoid or delusional. Thought content does not include homicidal or suicidal ideation. Thought content does not include homicidal or suicidal plan. Cognition and Memory: Memory is not impaired. He does not exhibit impaired recent memory or impaired remote memory. Judgment: Judgment normal. Judgment is not impulsive or inappropriate. NIKOLAI  Patricks test:+  [facility]+       Assessment:     1. AVN (avascular necrosis of bone) ([address]    2. Avascular necrosis of bones of both hips ([address]    3. Lumbar pain    4. Spinal stenosis of lumbar region, unspecified whether neurogenic claudication present    5.  Sacroiliac joint dysfunction    6. Spondylosis of lumbar region without myelopathy or radiculopathy    7. Peripheral artery disease ([facility]            Plan:      · Patient read and signed orientation and opioid agreement. · OARRS reviewed. Current MED:8  · Patient was not offered naloxone for home. · Discussed long term side effects of medications, tolerance, dependency and addiction. · UDS preformed today. · Patient told can not receive any pain medications from any other source. · No evidence of abuse, diversion or aberrant behavior. · Prescription Needs: No prescription pain medications at this time   Medications and/or procedures to improve function and quality of life- patient understanding with this and that may not be pain free   Discussed possible weaning of medication dosing dependent on treatment/procedure results.  Discussed with patient about safe storage of medications at home   Testing: Unable to retrieve or view any prior imaging done recently   MRI bilateral hip Lumbar XR ordered Dopplers BLE    Procedures: Discussed  SI injections hip  intraarticular injections really nothing else to offer for AVN   Discussed with patient about risks with procedure including infection, reaction to medication, increased pain, or bleeding.  Medications:on Suboxone    If patient is on blood thinners will need approval to hold:Eliquis   He needs to keep referral for Dr Farida Martínez for  [address] eval for AVN  He was referred July 2021 and never went   Pt is on Suboxone    Meds. Prescribed:   No orders of the defined types were placed in this encounter. Return in about 10 weeks (around 1/18/2022). Time spent with patient was 60 minutes more than 50% was spent  Counseling/coordinated the patient'scare.     Electronically signed by TAMMI Elias CNP on 11/10/2021 at 7:56 AM

## 2021-11-10 ENCOUNTER — TELEPHONE (OUTPATIENT)
Dept: PHYSICAL MEDICINE AND REHAB | Age: 61
End: 2021-11-10

## 2022-01-18 ENCOUNTER — TELEPHONE (OUTPATIENT)
Dept: CARDIOLOGY CLINIC | Age: 62
End: 2022-01-18

## 2022-03-16 ENCOUNTER — TELEPHONE (OUTPATIENT)
Dept: CARDIOLOGY CLINIC | Age: 62
End: 2022-03-16

## 2022-03-16 NOTE — TELEPHONE ENCOUNTER
Tried to contact this pt . No answer and unable to leave a message. Mailbox is full. I called him on 3/9/22, he said he did receive his new latitude monitor and he would send. Have not received any downloads on this pt yet. Letter sent to pt asking him to contact [facility], number given.

## 2022-03-16 NOTE — LETTER
[address] [place] [address] [address] [place] [address]  Phone: [phone]  Fax: [phone]    Gavino Lemus MD        March 16, 2022    [address] [place] [address] [address] [facility] [address] [address]      Dear Giorgi Garcia: Our office has reached out to you on several occasions via telephone. Last spoke to you on 3/9/22 and you said you did receive your new latitude monitor. We have not received any downloads on you yet from the [facility]. We need for you to call [employer] at [phone]. We need to get your monitor working. You were due to send on 12/30/21. If you do not get your monitor up and working then we will have to bring you into the pacer clinic to have your device checked. If you have any questions or concerns, please don't hesitate to call.     Sincerely,        Gavino Lemus MD

## 2022-03-29 ENCOUNTER — TELEPHONE (OUTPATIENT)
Dept: CARDIOLOGY CLINIC | Age: 62
End: 2022-03-29

## 2022-03-29 NOTE — TELEPHONE ENCOUNTER
Tried to contact this pt /no answer and not able to leave a message. Letter was sent to this pt in the mail on 3/16/22 telling him to send a download. Also gave him the phone number to call Rebecca Abdalla called Annalee Richter and she said she has not spoke to this pt for over one month. She said she knows her son spoke to him and he was having issues falling and cracked his face open.  She said she will try to get up here to [facility] or have her son get in touch with him and have him call the pacer clinic regarding his monitor

## 2022-03-29 NOTE — LETTER
[address] [place] [id number]  Phone: [phone]  Fax: [phone]    Baldev Rios MD        April 1, 2022    [address] [place] [address] [address] [facility] [facility]      Dear Myriam Wilson: Our office has not been able to contact you by phone. Please call the office at [phone] regarding your pacemaker. If you have any questions or concerns, please don't hesitate to call.     Sincerely,        Baldev Rios MD

## 2022-04-01 NOTE — TELEPHONE ENCOUNTER
Monitor not connecting, no follow up in office. Certified letter mailed to call office.   Consider in office visits only

## 2022-04-08 ENCOUNTER — TELEPHONE (OUTPATIENT)
Dept: CARDIOLOGY CLINIC | Age: 62
End: 2022-04-08

## 2022-04-08 NOTE — TELEPHONE ENCOUNTER
LETTER SENT TO PT IN THE MAIL ASKING HIM TO CALL THE PACEMAKER CLINIC. WE NEED TO GET HIM SCHEDULED FOR AN IN OFFICE DEVICE CHECK AND HE NEEDS TO BRING HIS LATITUDE MONITOR ALONG SO WE CAN PAIR HIM UP TO THE MONITOR. WE HAVE MADE NUMEROUS ATTEMPTS TO CONTACT THIS PT AND WE HAVE NOT BEEN ABLE TO CONTACT HIM.

## 2022-04-13 NOTE — TELEPHONE ENCOUNTER
Pt called the office back and I told him we need to get him in the office for a device check and he needs to bring everything that [facility] sent him in the mail so we can get his monitor paired up to him. Told him we have to get the monitor working for him . the patient verbalizes understanding.  Had him repeat this back to me

## 2022-04-22 ENCOUNTER — NURSE ONLY (OUTPATIENT)
Dept: CARDIOLOGY CLINIC | Age: 62
End: 2022-04-22
Payer: MEDICARE

## 2022-04-22 DIAGNOSIS — Z95.0 PACEMAKER: Primary | ICD-10-CM

## 2022-04-22 PROCEDURE — 93288 INTERROG EVL PM/LDLS PM IP: CPT | Performed by: INTERNAL MEDICINE

## 2022-04-22 NOTE — PROGRESS NOTES
DR Fontana [place] PT / PAF/SARAI   AFIB BURDEN <1%  3/22/22 LOOKS LIKE AN EPISODE OF SVT   [facility] PACEMAKER CHECK IN OFFICE PER LUDA NOGUEIRA      A PACED 3%  RV PACED 40 %  LV PACED 8%  PRESENTS IN AS RV LV PACED   UNDERLYING AS RV LV SENSED    P WAVES 5.7  RV WAVES 20.2  LV WAVES 10.8    ATRIAL THRESHOLD 0.8 @ 0.4  RV THRESHOLD 1 @ 0.4  LV THRESHOLD 1.8 @ 0.8    ATRIAL IMPEDENCE 533  RV IMPEDENCE 450  LV IMPEDENCE 307    ATRIAL AMPLITUDE 2 @ 0.4  RV AMPLITUDE 2 @ 0.4  LV AMPLITUDE 1.8 @ 0.8  DDDR     PACED AV DELAY AND SENSED AV DELAY PROGRAMMED FROM 120 MS  MS    PT BROUGHT HIS MONITOR WITH HIM TODAY TO THE OFFICE BECAUSE PT HAS NOT BEEN ABLE TO PAIR HIMSELF UP ON HIS OWN. HE WAS ORDERED A NEW MONITOR AND HE BROUGHT IT WTIH HIM TODAY .  PT PAIRED UP TO MONITOR

## 2022-05-13 ENCOUNTER — TELEPHONE (OUTPATIENT)
Dept: CARDIOLOGY CLINIC | Age: 62
End: 2022-05-13

## 2022-06-17 NOTE — PROGRESS NOTES
Clinical Pharmacy Note    Cameron Spencer is a 63 y.o. male for whom pharmacy has been asked to manage warfarin therapy. Reason for Admission: pneumonia    Consulting Physician: Dr. Nas Stout  Warfarin dose prior to admission: 2.5 mg MWF; 5 mg all other days  Warfarin indication: Factor V; history of DVT  Target INR range: 2-3   Outpatient warfarin provider: [facility] coumadin clinic    Past Medical History:   Diagnosis Date    Anxiety disorder     Arthritis     Asthma     Back pain, chronic     Blood circulation, collateral     4 toes partial amputee on L foot    [employer] Bi[employer] ICD 7/1/2014    [employer] pacemaker 7/1/2014    CAD (coronary artery disease)     CHF (congestive heart failure) (HCC)     Chronic kidney disease     COPD (chronic obstructive pulmonary disease) ([facility]     Depression     Endocarditis     Hepatitis     HEPATITIS C    Hepatitis C     Hypertension     Pneumonia                 Recent Labs     02/19/20  2345   INR 2.52*     Recent Labs     02/19/20  2345   HGB 14.1   HCT 45.3          Current warfarin drug-drug interactions: aspirin, azithromycin, methylprednisolone      Date INR Warfarin Dose   2/20/2020 2.52 (from 2/19/20) 5 mg                                   Daily PT/INR until stable within therapeutic range. Thank you for the consult.    Alexander Braxton, PharmD, BCPS  2/20/2020  9:10 AM The patient is Stable - Low risk of patient condition declining or worsening    Shift Goals  Clinical Goals: CIWA protocol, monitor for seizures  Patient Goals: Eat pizza    Progress made toward(s) clinical / shift goals: Seizure precautions in place (i.e. padded side rails, suction and supplemental oxygen available at bedside).       Problem: Optimal Care for Alcohol Withdrawal  Goal: Optimal Care for the alcohol withdrawal patient  Outcome: Progressing   CIWA protocol in use. Pt's signs and symptoms assessed per protocol. Appropriate PRNs available.      Patient is not progressing towards the following goals:

## 2022-07-19 ENCOUNTER — OFFICE VISIT (OUTPATIENT)
Dept: CARDIOLOGY CLINIC | Age: 62
End: 2022-07-19
Payer: MEDICARE

## 2022-07-19 VITALS
DIASTOLIC BLOOD PRESSURE: 72 MMHG | WEIGHT: 230 LBS | BODY MASS INDEX: 34.07 KG/M2 | HEART RATE: 86 BPM | HEIGHT: 69 IN | SYSTOLIC BLOOD PRESSURE: 114 MMHG

## 2022-07-19 DIAGNOSIS — Z95.0 PACEMAKER: ICD-10-CM

## 2022-07-19 DIAGNOSIS — R06.02 SOB (SHORTNESS OF BREATH) ON EXERTION: ICD-10-CM

## 2022-07-19 DIAGNOSIS — Z98.890 S/P CARDIAC CATH: ICD-10-CM

## 2022-07-19 DIAGNOSIS — I48.0 PAF (PAROXYSMAL ATRIAL FIBRILLATION) (HCC): ICD-10-CM

## 2022-07-19 DIAGNOSIS — I10 ESSENTIAL HYPERTENSION: ICD-10-CM

## 2022-07-19 DIAGNOSIS — I50.32 CHRONIC DIASTOLIC CONGESTIVE HEART FAILURE (HCC): ICD-10-CM

## 2022-07-19 DIAGNOSIS — Z01.818 PRE-OP EVALUATION: Primary | ICD-10-CM

## 2022-07-19 PROCEDURE — 93000 ELECTROCARDIOGRAM COMPLETE: CPT | Performed by: INTERNAL MEDICINE

## 2022-07-19 PROCEDURE — 99215 OFFICE O/P EST HI 40 MIN: CPT | Performed by: INTERNAL MEDICINE

## 2022-07-19 RX ORDER — ETODOLAC 400 MG/1
400 TABLET, FILM COATED ORAL 2 TIMES DAILY
COMMUNITY

## 2022-07-19 RX ORDER — GABAPENTIN 100 MG/1
CAPSULE ORAL
COMMUNITY
Start: 2022-07-13

## 2022-07-19 RX ORDER — LISINOPRIL 10 MG/1
10 TABLET ORAL DAILY
COMMUNITY

## 2022-07-19 RX ORDER — UMECLIDINIUM BROMIDE AND VILANTEROL TRIFENATATE 62.5; 25 UG/1; UG/1
POWDER RESPIRATORY (INHALATION)
COMMUNITY
Start: 2022-05-21

## 2022-07-19 NOTE — PROGRESS NOTES
Chief Complaint   Patient presents with    Follow-up    former pat of Dr. Kristine Hayden  Hx of TV repair due to  Endocarditis    Hx of  new onset AFib.  Noted on ekg while in hsopiatl and device interrogation    Hx of recent Hemoptyiss 9/2020 and referred to [facility] and theyn stopped coumadin and started xeralto and sent home      Pt here for follow up he also needs cardiac clearance for hip surgery @ [facility] by Dr Rose Mary Maher    Last seen 4/2021    EKG done today    Denied cp, dizziness, palpitations    Chronic leg edema trace    Sob on exertion chronic  On Home O2    After stopped amiodarone the weired feeling and tingling sensations resolved  Feel much better    No bleeding since d/c sept 2020    NO CABG  Had only TV replacement bio and pacer following surgery for chb      Patient Active Problem List   Diagnosis    Moderate COPD (chronic obstructive pulmonary disease) ([address]    Community acquired pneumonia    Suicidal ideation    SIRS (systemic inflammatory response syndrome) ([address]    Sepsis due to methicillin susceptible Staphylococcus aureus ([address]    Severe sepsis ([address]    HCAP (healthcare-associated pneumonia)    Hyponatremia    Lung nodules    History of intravenous drug use in remission    Malnutrition ([address]    Coagulopathy ([address]    Acute blood loss anemia    Complete heart block, post-surgical ([address]    Endocarditis, bacterial, acute/subacute    WILMA (acute kidney injury) ([address]    Leukocytosis    Physical deconditioning    Cellulitis    Abdominal pain, acute    Microscopic hematuria    [employer] BiV pacemaker    Abnormal CT of the abdomen    Generalized abdominal pain    GERD (gastroesophageal reflux disease)    Non-intractable vomiting with nausea    Hx of tricuspid valve repair    History of colonic polyps    Second degree hemorrhoids    History of tricuspid valve replacement with bioprosthetic valve 2012    Chronic diastolic congestive heart failure (HCC)    SOB (shortness of breath) on exertion    Folliculitis of PERCUTANEOUS BRONCHOSCOPY [facility] [address] [facility] [place]  [facility]       Allergies   Allergen Reactions    Pcn [Penicillins] Nausea And Vomiting        Family History   Problem Relation Age of Onset    Diabetes Mother     Depression Mother     Arthritis Father     Heart Disease Father     Diabetes Brother     Depression Daughter         Social History     Socioeconomic History    Marital status:      Spouse name: Not on file    Number of children: 3    Years of education: 9    Highest education level: Not on file   Occupational History    Occupation: on disability   Tobacco Use    Smoking status: Every Day     Packs/day: 0.50     Years: 44.00     Pack years: 22.00     Types: Cigarettes     Start date: 10/22/1976    Smokeless tobacco: Former    Tobacco comments:     Weaned to 0.5 PPD last 7 years   Vaping Use    Vaping Use: Never used   Substance and Sexual Activity    Alcohol use: No     Comment: quit 9 years ago    Drug use: No    Sexual activity: Yes     Partners: Female   Other Topics Concern    Not on file   Social History Narrative    Not on file     Social Determinants of Health     Financial Resource Strain: Not on file   Food Insecurity: Not on file   Transportation Needs: Not on file   Physical Activity: Not on file   Stress: Not on file   Social Connections: Not on file   Intimate Partner Violence: Not on file   Housing Stability: Not on file       Current Outpatient Medications   Medication Sig Dispense Refill    etodolac (LODINE) 400 MG tablet Take 400 mg by mouth in the morning and 400 mg before bedtime. lisinopril (PRINIVIL;ZESTRIL) 10 MG tablet Take 10 mg by mouth in the morning.       ANORO ELLIPTA 62.5-25 MCG/INH AEPB inhaler inhale 1 puff by mouth and INTO THE LUNGS once daily      gabapentin (NEURONTIN) 100 MG capsule take 1 capsule by mouth three times a day      Ferrous Sulfate 324 MG TBEC Take by mouth      apixaban (ELIQUIS) 5 MG TABS tablet Take by mouth 2 times daily      clonazePAM (KLONOPIN) 0.5 MG tablet Take 0.5 mg by mouth 2 times daily as needed. buprenorphine-naloxone (SUBOXONE) 8-2 MG FILM SL film Place 1 Film under the tongue daily. QUEtiapine (SEROQUEL) 300 MG tablet Take 400 mg by mouth nightly       potassium chloride (KLOR-CON M) 20 MEQ extended release tablet Take 1 tablet by mouth 2 times daily 180 tablet 1    pantoprazole (PROTONIX) 20 MG tablet Take 20 mg by mouth 2 times daily (before meals)       furosemide (LASIX) 40 MG tablet Take 1 tablet by mouth daily 60 tablet 3     No current facility-administered medications for this visit. Review of Systems -     General ROS: negative  Psychological ROS: negative  Hematological and Lymphatic ROS: No history of blood clots or bleeding disorder. Respiratory ROS: no cough, shortness of breath, or wheezing  Cardiovascular ROS: no chest pain or dyspnea on exertion  Gastrointestinal ROS: negative  Genito-Urinary ROS: no dysuria, trouble voiding, or hematuria  Musculoskeletal ROS: negative  Neurological ROS: no TIA or stroke symptoms  Dermatological ROS: negative      Blood pressure 114/72, pulse 86, height 5' 9\" (1.753 m), weight 230 lb (104.3 kg).         Physical Examination:    General appearance - alert, well appearing, and in no distress  Mental status - alert, oriented to person, place, and time  Neck - supple, no significant adenopathy, no JVD, or carotid bruits  Chest - clear to auscultation, no wheezes, rales or rhonchi, symmetric air entry  Heart - normal rate, regular rhythm, normal S1, S2, no murmurs, rubs, clicks or gallops  Abdomen - soft, nontender, nondistended, no masses or organomegaly  Neurological - alert, oriented, normal speech, no focal findings or movement disorder noted  Musculoskeletal - no joint tenderness, deformity or swelling  Extremities - peripheral pulses normal, no pedal edema, no clubbing or cyanosis  Skin - normal coloration and turgor, no rashes, no suspicious skin lesions noted    Lab  No results for input(s): CKTOTAL, CKMB, CKMBINDEX, TROPONINI in the last 72 hours. CBC:   Lab Results   Component Value Date/Time    WBC 6.3 09/16/2021 07:52 AM    RBC 4.73 09/16/2021 07:52 AM    HGB 14.5 09/16/2021 07:52 AM    HCT 44.1 09/16/2021 07:52 AM    MCV 93.2 09/16/2021 07:52 AM    MCH 30.7 09/16/2021 07:52 AM    MCHC 32.9 09/16/2021 07:52 AM    RDW 16.3 06/21/2021 02:30 PM     09/16/2021 07:52 AM    MPV 10.6 09/16/2021 07:52 AM     BMP:    Lab Results   Component Value Date/Time     09/16/2021 07:52 AM    K 3.8 09/16/2021 07:52 AM     09/16/2021 07:52 AM    CO2 22 09/16/2021 07:52 AM    BUN 15 09/16/2021 07:52 AM    LABALBU 4.3 09/05/2021 02:43 PM    CREATININE 0.9 09/16/2021 07:52 AM    CALCIUM 9.3 09/16/2021 07:52 AM    LABGLOM 86 09/16/2021 07:52 AM    GLUCOSE 190 09/16/2021 07:52 AM    GLUCOSE 116 06/21/2021 02:30 PM     Hepatic Function Panel:    Lab Results   Component Value Date/Time    ALKPHOS 76 09/05/2021 02:43 PM    ALT 9 09/05/2021 02:43 PM    AST 14 09/05/2021 02:43 PM    PROT 8.5 09/05/2021 02:43 PM    BILITOT 0.6 09/05/2021 02:43 PM    BILIDIR <0.2 01/17/2021 07:16 PM    LABALBU 4.3 09/05/2021 02:43 PM     Magnesium:    Lab Results   Component Value Date/Time    MG 2.1 01/17/2021 07:16 PM     Warfarin PT/INR:  No components found for: PTPATWAR, PTINRWAR  HgBA1c:    Lab Results   Component Value Date/Time    LABA1C 6.6 09/15/2020 04:30 AM     FLP:    Lab Results   Component Value Date/Time    TRIG 101 12/21/2012 05:20 AM    HDL 20 12/21/2012 05:20 AM    LDLCALC 74 12/21/2012 05:20 AM     TSH:    Lab Results   Component Value Date/Time    TSH 1.328 06/21/2021 02:30 PM       Jan 2013  Procedures 1 : Tricuspid valve replacement with a 33 mm Dequan-Miles   ThermaFix pericardial valve. Insertion of 2 epicardial ventricular leads and 1 epicardial atrial   lead.      Procedures 2: Urgent mediastinal exploration and evacuation of the hematoma. Insertion of a right femoral temporary dialysis catheter. SURGEON: Dr. 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 Recommendation for   antibiotic was made. The patient was started on antibiotic therapy in the   hospital. Also, the patient has very bad dentition and underwent eventually   extraction of his teeth. Recommendation was made to continue with antibiotic   therapy and repeat a THELMA to evaluate the valve. If the valve continued to   have significant burden of vegetation with the large vegetation, lack of   resolution or destruction of the valve, to proceed with replacement or repair   of the tricuspid valve after adequate antibiotic therapy was given. The   patient was started on antibiotic therapy and observed. Repeat THELMA recently   revealed that the patient had persistent large vegetation with some of the   vegetation removed. Also, the valve was starting to get destructed with   tricuspid regurgitation. The patient had a cardiac catheterization which   revealed patent coronaries. The patient additionally had developed a septic   emboli to his lung, a lung abscess which was being also seen and treated with   Pulmonary Service. The patient was being followed by Cardiology Service and   Infectious Disease Service, as well as Cardiac Surgery. Finally, after   extensive discussion with all the consultants, the recommendation was made to   proceed with tricuspid valve surgery.          Procedures 2:  This is a 60-year-old gentleman who had undergone a tricuspid valve replacement earlier in the day, complicated with renal failure and continued   to have excessive coagulopathy and chest tube drainage, despite the massive   volume of product replacement and infusion of FFP, platelets factor VII and   cryo, the patient continued to have excessive bleeding, developing potential   signs of tamponade. The patient was taken back to the OR for exploration. Conclusions      Summary   Normal left ventricle size and systolic function. Ejection fraction was   estimated at 60 %. There were no regional left ventricular wall motion   abnormalities and wall thickness was within normal limits. The left atrium is Mild to moderate dilated. The aortic valve leaflets were not well visualized. DOPPLER: Transaortic velocity was within the normal range with no evidence   of aortic stenosis. There was no evidence of aortic regurgitation. Mild tricuspid regurgitation visualized. Probable Normal functioning Bioprosthetic Tricuspid Valve Or Tricuspid   Valve repair   Mild tricuspid regurgitation visualized. Right ventricular systolic pressure measures 45 mmhg. Signature      ----------------------------------------------------------------   Electronically signed by Kassidy Castañeda MD (Interpreting   physician) on 02/23/2018 at 06:52 PM    CONCLUSION:       1. Essentially there is no obstructions lesion noted in the large             caliber vessels such as the circumflex, a large caliber vessel             that is widely patent. 2.   OM 1 large caliber vessel and was widely patent. 3.   The left main is a large caliber widely patent. 4.   The left anterior descending artery is a large caliber vessel             widely patent. 5.   The right coronary artery is a moderately large caliber vessel and             is widely patent. 6.   The dominance is through the circumflex system. 7    Left ventriculography was not performed.   However, we obtained             hemodynamics due to the need to decrease the quantity of the dye             since the patient has had right heart failure also. IMPRESSION:  There is no evidence of occlusive noted on all the large cardiac  vessels. Dayna Torres D.O.        D: 12/21/2012 17:53                                    T: 12/21/2012 22:27  js     Conclusions      Summary   Nonspecific ST-T wave changes. Lexiscan EKG stress test is not suggestive for ischemia. The nuclear images is not suggestive for myocardial ischemia. Signatures      ----------------------------------------------------------------   Electronically signed by Jaun Mario MD (Interpreting   Cardiologist) on 10/15/2018 at 20:25   ----------------------------------------------------------------      Conclusions      Summary   Normal left ventricle size and systolic function. Ejection fraction was   estimated at 60 %. There were no regional left ventricular wall motion   abnormalities and wall thickness was within normal limits. The left atrium is Mild to moderate dilated. The aortic valve leaflets were not well visualized. DOPPLER: Transaortic velocity was within the normal range with no evidence   of aortic stenosis. There was no evidence of aortic regurgitation. Mild tricuspid regurgitation visualized. Probable Normal functioning Bioprosthetic Tricuspid Valve Or Tricuspid   Valve repair   Mild tricuspid regurgitation visualized. Right ventricular systolic pressure measures 45 mmhg. Signature      ----------------------------------------------------------------   Electronically signed by Jaun Mario MD (Interpreting   physician) on 02/23/2018 at 06:52 PM    ekf  10/18/2019  atr fib with CVR    ekg   122/19  Sinus  Rhythm   -Right bundle branch block with left axis -bifascicular block. - (probably not recent)  Inferior and  Old  Extensive anterior-lateral infarct.    -  Nonspecific T-abnormality.      ABNORMAL ekg  12/3/19  Electronic ventricular pacemaker   Pacemaker ECG, No further analysis   INSUFFICIENT DATA    Procedure Summary  Angiographically Patent Coronaries. Normal LV systolic function. LV size - normal.  LVEF approximately 65% . EDP 15 mmhg  No Transaortic Gradient  Recommendations  Continue with aggressive risk factor modification and medical therapy. Estimated Blood Loss: 10 ml. Complications: No complications. Signatures  Electronically signed by Arminda Pollard MD (Performing Physician) on 12/05/2018 at 14:48    Ekg 7/19/22  Sinus  Rhythm    -biv paced rhythm        Assessment    No leg edema     Diagnosis Orders   1. Pre-op evaluation for hip replacement  ECHO Complete 2D W Doppler W Color    Stress test, lexiscan      2. Chronic diastolic congestive heart failure (HCC)  Stress test, lexiscan      3. PAF (paroxysmal atrial fibrillation) ([address] nioted on device check with 4% burden  ECHO Complete 2D W Doppler W Color    Stress test, lexiscan      4. Essential hypertension  ECHO Complete 2D W Doppler W Color    Stress test, lexiscan      5. SOB (shortness of breath) on exertion  Stress test, lexiscan      6. [employer] BiV pacemaker  Stress test, lexiscan      7. S/P cardiac cath 12/5/1838-[id number], LAD -PATENT, LCX-PATENT , RCA PATENT, EDP 15 MMHG, NO GRADIENT- MED RX  Stress test, lexiscan           Recent admission DX      ASSESSMENT:  1. Recurrent hemoptysis in the last 3 days. 2.  Supratherapeutic INR controlled after FFP. 3.  Factor V Leiden when history of deep vein thrombosis has been  documented for which he has been on oral anticoagulation Coumadin for  several years. 4.  History of paroxysmal atrial fibrillation. 5.  Congestive heart failure, diastolic. 6.  [employer] BiV pacemaker. Note, does not have any ICD. 7.  History of tricuspid valve replacement/repair. 8.  Hypertension. 9.  History of cardiac catheterization in 12/2018, patent coronaries.   10.  He has a previous history of hemoptysis. 11.  History of bronchitis/COPD. 12.  History of tricuspid valve replacement secondary to endocarditis  secondary to IV drug use, so basically he has a normal functioning  bioprosthetic tricuspid valve. Previous  admission DX  Narrative of Hospital Course: Patient was admitted with massive hemoptysis secondary to anticoagulation associated with aspiration pneumonia on top of a chronic obstructive pulmonary disease, did have a stormy course intubated for the hemoptysis that was stopped after stopping the anticoagulation and we give antidote K Centra with multiple bronchoscopy done for lavage,       Hx of TVR 2/2 endocarditis 2/2 IVDU, 2013 - normal bioprosthetic function 2018 TTE ( 33 mm CE, ThermaFix valve)  Factor V Leiden/DVT history   CHB s/p D - PM  Hemoptysis  HTN  COPD/Bronchitis  Preserved EF  2018 - no CAD by cath    Plan       The current meds and labs reviewed  Hospital record reviewed    Pre op eval for  RT hip replacement  METS< 4  Sob on exertion  Echo  Liborio nuc  If the test okay may proceed with mod risk    Hx of previous interrogation showed AFIB BURDEN 4%  18 EPISODES UNDER 1 MINUTE   4 EPISODES  THAT LASTED 1 HOUR -<24 HOURS   Off  Amiodarone      Pacer interrogation reviewed and look good  04/2022  Device Interrogation Reviewed. Episodes SVT nonsustained noted  Short lasting episodes of atrial flutter noted  ON apixaban      Congestive heart failure: no evidence of fluid overload today, no recent hospitalization for CHF  On lasix 40 qd  And Kcl  20 po bid      Cath cardiac 2018  Normal coronaries    Hypertension, on medical treatment. Seems to be under good control. Patient is compliant with medical treatment.        Pat already ON COUMADIN FOR hx of DVT and factor V leiden for several yrs  Previous admission for massive hemoptysis and restarted back on coumadin on D/c  PAF DX in the hospital ekg and device check recently  Pat verbalized understanding risk of bleeding including ICH and want to proceed on OA  \"Similar Q raised in note by Dr. Hanane Wheat why pat is on coumadin-anc documented may due to pre-TV surgery pat had mutiple ischemic toes and seem to related to embolic process/PAD. \"  SINCE tv REPLACMENT IN dEC 2012  Now pat on apixaban off coumadin    Pat advised to watch for any sign of bleeding    Hx of copd on Home O2    Noncompliance with f/u advised    D/w the pat the plan of care    Discussed use, benefit, and side effects of prescribed medications. All patient questions answered. Pt voiced understanding. Instructed to continue current medications, diet and exercise. Continue risk factor modification and medical management. Patient agreed with treatment plan. Follow up as directed.        I spent 40 minutes involved in face-to-face discussion of medical issues, prognosis, record review  and plan with the patient today and more than 50% of the time was spent on counseling and coordination of care        RTC in 6 months        Dionne Chatman, [facility]

## 2022-07-21 ENCOUNTER — PROCEDURE VISIT (OUTPATIENT)
Dept: CARDIOLOGY CLINIC | Age: 62
End: 2022-07-21
Payer: MEDICARE

## 2022-07-21 DIAGNOSIS — Z95.0 PACEMAKER: Primary | ICD-10-CM

## 2022-07-21 PROCEDURE — 93296 REM INTERROG EVL PM/IDS: CPT | Performed by: INTERNAL MEDICINE

## 2022-07-21 PROCEDURE — 93294 REM INTERROG EVL PM/LDLS PM: CPT | Performed by: INTERNAL MEDICINE

## 2022-07-21 NOTE — PROGRESS NOTES
[facility] sci biv pacer     SVT     . Arabella Rizo Battery longevity:  10.5 years   Presenting rhythm  AS biv paced     Atrial impedance 515  RV impedance 437      P wave sensing 5.2  R wave sensing 19.9  LV 6.1    0 % atrial paced  75 % RV paced   23% LV paced     Mode switches   <1

## 2022-08-04 ENCOUNTER — HOSPITAL ENCOUNTER (OUTPATIENT)
Dept: NON INVASIVE DIAGNOSTICS | Age: 62
Discharge: HOME | End: 2022-08-04
Payer: MEDICARE

## 2022-08-04 ENCOUNTER — HOSPITAL ENCOUNTER (OUTPATIENT)
Dept: NON INVASIVE DIAGNOSTICS | Age: 62
End: 2022-08-04
Payer: MEDICARE

## 2022-08-04 DIAGNOSIS — I48.0 PAF (PAROXYSMAL ATRIAL FIBRILLATION) (HCC): ICD-10-CM

## 2022-08-04 DIAGNOSIS — I10 ESSENTIAL HYPERTENSION: ICD-10-CM

## 2022-08-04 DIAGNOSIS — I50.32 CHRONIC DIASTOLIC CONGESTIVE HEART FAILURE (HCC): ICD-10-CM

## 2022-08-04 DIAGNOSIS — R06.02 SOB (SHORTNESS OF BREATH) ON EXERTION: ICD-10-CM

## 2022-08-04 DIAGNOSIS — Z01.818 PRE-OP EVALUATION: ICD-10-CM

## 2022-08-04 DIAGNOSIS — Z98.890 S/P CARDIAC CATH: ICD-10-CM

## 2022-08-04 DIAGNOSIS — Z95.0 PACEMAKER: ICD-10-CM

## 2022-08-04 LAB
LV EF: 53 %
LVEF MODALITY: NORMAL

## 2022-08-04 PROCEDURE — 93306 TTE W/DOPPLER COMPLETE: CPT

## 2022-08-09 ENCOUNTER — HOSPITAL ENCOUNTER (OUTPATIENT)
Dept: NON INVASIVE DIAGNOSTICS | Age: 62
Discharge: HOME | End: 2022-08-09
Payer: MEDICARE

## 2022-08-09 VITALS — WEIGHT: 230 LBS | HEIGHT: 69 IN | BODY MASS INDEX: 34.07 KG/M2

## 2022-08-09 PROCEDURE — 93017 CV STRESS TEST TRACING ONLY: CPT | Performed by: INTERNAL MEDICINE

## 2022-08-09 PROCEDURE — 3430000000 HC RX DIAGNOSTIC RADIOPHARMACEUTICAL: Performed by: INTERNAL MEDICINE

## 2022-08-09 PROCEDURE — A9500 TC99M SESTAMIBI: HCPCS | Performed by: INTERNAL MEDICINE

## 2022-08-09 PROCEDURE — 6360000002 HC RX W HCPCS

## 2022-08-09 PROCEDURE — 78452 HT MUSCLE IMAGE SPECT MULT: CPT

## 2022-08-09 RX ADMIN — Medication 34.4 MILLICURIE: at 14:45

## 2022-08-09 RX ADMIN — Medication 9.9 MILLICURIE: at 14:00

## 2022-08-18 PROBLEM — Z01.818 PRE-OP EVALUATION: Status: RESOLVED | Noted: 2022-07-19 | Resolved: 2022-08-18

## 2022-10-27 ENCOUNTER — NURSE ONLY (OUTPATIENT)
Dept: CARDIOLOGY CLINIC | Age: 62
End: 2022-10-27
Payer: COMMERCIAL

## 2022-10-27 DIAGNOSIS — Z95.0 PACEMAKER: Primary | ICD-10-CM

## 2022-10-27 PROCEDURE — 93288 INTERROG EVL PM/LDLS PM IP: CPT | Performed by: INTERNAL MEDICINE

## 2023-01-11 ENCOUNTER — PROCEDURE VISIT (OUTPATIENT)
Dept: CARDIOLOGY CLINIC | Age: 63
End: 2023-01-11
Payer: COMMERCIAL

## 2023-01-11 DIAGNOSIS — Z95.0 PACEMAKER: Primary | ICD-10-CM

## 2023-01-11 PROCEDURE — 93294 REM INTERROG EVL PM/LDLS PM: CPT | Performed by: INTERNAL MEDICINE

## 2023-01-11 PROCEDURE — 93296 REM INTERROG EVL PM/IDS: CPT | Performed by: INTERNAL MEDICINE

## 2023-01-11 NOTE — PROGRESS NOTES
[employer] sci biv pacer       . Pink Robby Battery longevity:  11 years   Presenting rhythm  AS biv paced     Atrial impedance 517  RV impedance 423        P wave sensing 4.7  R wave sensing 18.1  LV 4.6    0 % atrial paced  67 % RV paced   19% LV paced       Mode switches   PAF/eliquis

## 2023-04-19 ENCOUNTER — PROCEDURE VISIT (OUTPATIENT)
Dept: CARDIOLOGY CLINIC | Age: 63
End: 2023-04-19
Payer: COMMERCIAL

## 2023-04-19 DIAGNOSIS — Z95.0 PACEMAKER: Primary | ICD-10-CM

## 2023-04-19 PROCEDURE — 93296 REM INTERROG EVL PM/IDS: CPT | Performed by: INTERNAL MEDICINE

## 2023-04-19 PROCEDURE — 93294 REM INTERROG EVL PM/LDLS PM: CPT | Performed by: INTERNAL MEDICINE

## 2023-04-19 NOTE — PROGRESS NOTES
[employer] sci biv pacer     Known low biv paced %  No cardiology f/u     . Terrea Skates Battery longevity:  10.5 years   Presenting rhythm  AS VS/RVP    Atrial impedance 517  RV impedance 417      P wave sensing 6.9  R wave sensing 20.2  LV 12.2    0 % atrial paced  63 % RV paced   17% LV paced     Mode switches   SVT

## 2023-07-26 ENCOUNTER — PROCEDURE VISIT (OUTPATIENT)
Dept: CARDIOLOGY CLINIC | Age: 63
End: 2023-07-26

## 2023-07-26 DIAGNOSIS — Z95.0 PACEMAKER: Primary | ICD-10-CM

## 2023-07-26 NOTE — PROGRESS NOTES
[employer] sci biv pacer     Verna pt, aware of and ok with low biv pacing %  . Diamond Hamman Battery longevity:  10.5 years   Presenting rhythm  AS   SVT     Atrial impedance 525  RV impedance 421      P wave sensing 5.1  R wave sensing 19.4  LV 5.9    0 % atrial paced  99 % RV paced   19% LV paced     Mode switches   <1 known elquis (4) walks frequently

## 2023-09-21 ENCOUNTER — NURSE ONLY (OUTPATIENT)
Dept: LAB | Age: 63
End: 2023-09-21

## 2023-09-21 DIAGNOSIS — D61.818 PANCYTOPENIA (HCC): ICD-10-CM

## 2023-09-21 DIAGNOSIS — B18.2 CHRONIC HEPATITIS C WITHOUT HEPATIC COMA (HCC): ICD-10-CM

## 2023-09-21 LAB
ALBUMIN SERPL BCG-MCNC: 3.8 G/DL (ref 3.5–5.1)
ALP SERPL-CCNC: 89 U/L (ref 38–126)
ALT SERPL W/O P-5'-P-CCNC: 10 U/L (ref 11–66)
ANION GAP SERPL CALC-SCNC: 9 MEQ/L (ref 8–16)
AST SERPL-CCNC: 14 U/L (ref 5–40)
BILIRUB SERPL-MCNC: 0.7 MG/DL (ref 0.3–1.2)
BUN SERPL-MCNC: 19 MG/DL (ref 7–22)
CALCIUM SERPL-MCNC: 9 MG/DL (ref 8.5–10.5)
CHLORIDE SERPL-SCNC: 98 MEQ/L (ref 98–111)
CO2 SERPL-SCNC: 28 MEQ/L (ref 23–33)
CREAT SERPL-MCNC: 1 MG/DL (ref 0.4–1.2)
DEPRECATED RDW RBC AUTO: 49.2 FL (ref 35–45)
ERYTHROCYTE [DISTWIDTH] IN BLOOD BY AUTOMATED COUNT: 14.2 % (ref 11.5–14.5)
GFR SERPL CREATININE-BSD FRML MDRD: > 60 ML/MIN/1.73M2
GLUCOSE SERPL-MCNC: 256 MG/DL (ref 70–108)
HCT VFR BLD AUTO: 43.8 % (ref 42–52)
HGB BLD-MCNC: 14 GM/DL (ref 14–18)
MCH RBC QN AUTO: 30.6 PG (ref 26–33)
MCHC RBC AUTO-ENTMCNC: 32 GM/DL (ref 32.2–35.5)
MCV RBC AUTO: 95.6 FL (ref 80–94)
PLATELET # BLD AUTO: 122 THOU/MM3 (ref 130–400)
PMV BLD AUTO: 10.3 FL (ref 9.4–12.4)
POTASSIUM SERPL-SCNC: 3.9 MEQ/L (ref 3.5–5.2)
PROT SERPL-MCNC: 7.7 G/DL (ref 6.1–8)
RBC # BLD AUTO: 4.58 MILL/MM3 (ref 4.7–6.1)
SODIUM SERPL-SCNC: 135 MEQ/L (ref 135–145)
WBC # BLD AUTO: 4.7 THOU/MM3 (ref 4.8–10.8)

## 2023-09-23 LAB
HCV RNA SERPL NAA+PROBE-ACNC: NOT DETECTED IU/ML
HCV RNA SERPL NAA+PROBE-LOG IU: NOT DETECTED LOG IU/ML
HCV RNA SERPL QL NAA+PROBE: NOT DETECTED

## 2023-10-06 ENCOUNTER — OFFICE VISIT (OUTPATIENT)
Dept: CARDIOLOGY CLINIC | Age: 63
End: 2023-10-06
Payer: MEDICARE

## 2023-10-06 VITALS
HEART RATE: 77 BPM | BODY MASS INDEX: 38.25 KG/M2 | DIASTOLIC BLOOD PRESSURE: 75 MMHG | HEIGHT: 69 IN | SYSTOLIC BLOOD PRESSURE: 109 MMHG

## 2023-10-06 DIAGNOSIS — I48.0 PAF (PAROXYSMAL ATRIAL FIBRILLATION) (HCC): ICD-10-CM

## 2023-10-06 DIAGNOSIS — Z95.0 PACEMAKER: ICD-10-CM

## 2023-10-06 DIAGNOSIS — Z91.199 NONCOMPLIANCE: ICD-10-CM

## 2023-10-06 DIAGNOSIS — Z98.890 S/P CARDIAC CATH: ICD-10-CM

## 2023-10-06 DIAGNOSIS — I10 ESSENTIAL HYPERTENSION: ICD-10-CM

## 2023-10-06 DIAGNOSIS — I47.10 SVT (SUPRAVENTRICULAR TACHYCARDIA): ICD-10-CM

## 2023-10-06 DIAGNOSIS — I50.32 CHRONIC DIASTOLIC CONGESTIVE HEART FAILURE (HCC): Primary | ICD-10-CM

## 2023-10-06 PROCEDURE — 93000 ELECTROCARDIOGRAM COMPLETE: CPT | Performed by: INTERNAL MEDICINE

## 2023-10-06 PROCEDURE — 3074F SYST BP LT 130 MM HG: CPT | Performed by: INTERNAL MEDICINE

## 2023-10-06 PROCEDURE — 99214 OFFICE O/P EST MOD 30 MIN: CPT | Performed by: INTERNAL MEDICINE

## 2023-10-06 PROCEDURE — 4004F PT TOBACCO SCREEN RCVD TLK: CPT | Performed by: INTERNAL MEDICINE

## 2023-10-06 PROCEDURE — G8484 FLU IMMUNIZE NO ADMIN: HCPCS | Performed by: INTERNAL MEDICINE

## 2023-10-06 PROCEDURE — G8427 DOCREV CUR MEDS BY ELIG CLIN: HCPCS | Performed by: INTERNAL MEDICINE

## 2023-10-06 PROCEDURE — G8417 CALC BMI ABV UP PARAM F/U: HCPCS | Performed by: INTERNAL MEDICINE

## 2023-10-06 PROCEDURE — 3017F COLORECTAL CA SCREEN DOC REV: CPT | Performed by: INTERNAL MEDICINE

## 2023-10-06 PROCEDURE — 3078F DIAST BP <80 MM HG: CPT | Performed by: INTERNAL MEDICINE

## 2023-10-06 NOTE — PROGRESS NOTES
the consultants, the recommendation was made to   proceed with tricuspid valve surgery. Procedures 2:  This is a 52-year-old gentleman who had undergone a tricuspid valve   replacement earlier in the day, complicated with renal failure and continued   to have excessive coagulopathy and chest tube drainage, despite the massive   volume of product replacement and infusion of FFP, platelets factor VII and   cryo, the patient continued to have excessive bleeding, developing potential   signs of tamponade. The patient was taken back to the OR for exploration. Conclusions      Summary   Normal left ventricle size and systolic function. Ejection fraction was   estimated at 60 %. There were no regional left ventricular wall motion   abnormalities and wall thickness was within normal limits. The left atrium is Mild to moderate dilated. The aortic valve leaflets were not well visualized. DOPPLER: Transaortic velocity was within the normal range with no evidence   of aortic stenosis. There was no evidence of aortic regurgitation. Mild tricuspid regurgitation visualized. Probable Normal functioning Bioprosthetic Tricuspid Valve Or Tricuspid   Valve repair   Mild tricuspid regurgitation visualized. Right ventricular systolic pressure measures 45 mmhg. Signature      ----------------------------------------------------------------   Electronically signed by Ardia Curling MD (Interpreting   physician) on 02/23/2018 at 06:52 PM    CONCLUSION:       1. Essentially there is no obstructions lesion noted in the large             caliber vessels such as the circumflex, a large caliber vessel             that is widely patent. 2.   OM 1 large caliber vessel and was widely patent. 3.   The left main is a large caliber widely patent. 4.   The left anterior descending artery is a large caliber vessel             widely patent.        5.   The right coronary artery is a moderately

## 2023-10-12 ENCOUNTER — TELEPHONE (OUTPATIENT)
Dept: CARDIOLOGY CLINIC | Age: 63
End: 2023-10-12

## 2023-10-19 ENCOUNTER — NURSE ONLY (OUTPATIENT)
Dept: LAB | Age: 63
End: 2023-10-19

## 2023-10-19 DIAGNOSIS — D72.819 LEUKOPENIA, UNSPECIFIED TYPE: ICD-10-CM

## 2023-10-19 DIAGNOSIS — D69.6 THROMBOCYTOPENIA (HCC): ICD-10-CM

## 2023-10-19 DIAGNOSIS — B18.2 CHRONIC HEPATITIS C WITHOUT HEPATIC COMA (HCC): ICD-10-CM

## 2023-10-19 LAB
ALBUMIN SERPL BCG-MCNC: 3.7 G/DL (ref 3.5–5.1)
ALP SERPL-CCNC: 83 U/L (ref 38–126)
ALT SERPL W/O P-5'-P-CCNC: 8 U/L (ref 11–66)
ANION GAP SERPL CALC-SCNC: 9 MEQ/L (ref 8–16)
AST SERPL-CCNC: 14 U/L (ref 5–40)
BILIRUB SERPL-MCNC: 0.3 MG/DL (ref 0.3–1.2)
BUN SERPL-MCNC: 18 MG/DL (ref 7–22)
CALCIUM SERPL-MCNC: 8.9 MG/DL (ref 8.5–10.5)
CHLORIDE SERPL-SCNC: 98 MEQ/L (ref 98–111)
CO2 SERPL-SCNC: 28 MEQ/L (ref 23–33)
CREAT SERPL-MCNC: 1 MG/DL (ref 0.4–1.2)
DEPRECATED RDW RBC AUTO: 50.4 FL (ref 35–45)
ERYTHROCYTE [DISTWIDTH] IN BLOOD BY AUTOMATED COUNT: 13.9 % (ref 11.5–14.5)
GFR SERPL CREATININE-BSD FRML MDRD: > 60 ML/MIN/1.73M2
GLUCOSE SERPL-MCNC: 175 MG/DL (ref 70–108)
HCT VFR BLD AUTO: 43.1 % (ref 42–52)
HGB BLD-MCNC: 13.1 GM/DL (ref 14–18)
MCH RBC QN AUTO: 30.2 PG (ref 26–33)
MCHC RBC AUTO-ENTMCNC: 30.4 GM/DL (ref 32.2–35.5)
MCV RBC AUTO: 99.3 FL (ref 80–94)
PLATELET # BLD AUTO: 104 THOU/MM3 (ref 130–400)
PMV BLD AUTO: 10.7 FL (ref 9.4–12.4)
POTASSIUM SERPL-SCNC: 4.7 MEQ/L (ref 3.5–5.2)
PROT SERPL-MCNC: 7.9 G/DL (ref 6.1–8)
RBC # BLD AUTO: 4.34 MILL/MM3 (ref 4.7–6.1)
SODIUM SERPL-SCNC: 135 MEQ/L (ref 135–145)
WBC # BLD AUTO: 4 THOU/MM3 (ref 4.8–10.8)

## 2023-11-07 NOTE — TELEPHONE ENCOUNTER
Mailbox is full  did not call us back/unable to contact.    Scheduled in spring with Verna appt  will download 11/28/23

## 2023-11-28 ENCOUNTER — PROCEDURE VISIT (OUTPATIENT)
Dept: CARDIOLOGY CLINIC | Age: 63
End: 2023-11-28

## 2023-11-28 DIAGNOSIS — Z95.0 PACEMAKER: Primary | ICD-10-CM

## 2023-11-28 NOTE — PROGRESS NOTES
Dr Olga Padron pt   Nxt [employer] biv pacer remote   Battery 8.5 yrs remaining      Dddr     Av paced 0%  Rv paced 71%  Lv paced 22%    There is a note in his chart that he looks better with his own underlying conduction    P waves 6.1  Rv waves not obtained per the device   Lv paced 11.2    Atrial impedence 547  Rv impedence 467  Lv impedence 318    No thresholds obtained per the device     Known paf/eliquis  Afib burden <1%

## 2024-01-23 ENCOUNTER — TELEPHONE (OUTPATIENT)
Dept: CARDIOLOGY CLINIC | Age: 64
End: 2024-01-23

## 2024-01-23 NOTE — TELEPHONE ENCOUNTER
Viviane from Dr Hightower LM on VM stating pt needs cleared for left total hip at [facility] 2/28/24.  Pt has not been seen since 10/06/23.     Appt will need moved to get patient cleared.    Called pt.  No answer.  VM full.

## 2024-02-05 NOTE — PATIENT INSTRUCTIONS
Your Medical Assistant Today: Ward  Your Nurse Today: Wandy  Your Provider for Today: Dr. Gillespie         You may receive a survey regarding the care you received during your visit.  Your input is valuable to us.  We encourage you to complete and return your survey.  We hope you will choose us in the future for your healthcare needs.

## 2024-02-12 ENCOUNTER — OFFICE VISIT (OUTPATIENT)
Dept: CARDIOLOGY CLINIC | Age: 64
End: 2024-02-12
Payer: MEDICARE

## 2024-02-12 VITALS
HEIGHT: 69 IN | WEIGHT: 245.9 LBS | DIASTOLIC BLOOD PRESSURE: 77 MMHG | BODY MASS INDEX: 36.42 KG/M2 | SYSTOLIC BLOOD PRESSURE: 122 MMHG | HEART RATE: 84 BPM

## 2024-02-12 DIAGNOSIS — Z98.890 S/P CARDIAC CATH: ICD-10-CM

## 2024-02-12 DIAGNOSIS — I47.10 SVT (SUPRAVENTRICULAR TACHYCARDIA): ICD-10-CM

## 2024-02-12 DIAGNOSIS — Z95.3 HISTORY OF TRICUSPID VALVE REPLACEMENT WITH BIOPROSTHETIC VALVE: ICD-10-CM

## 2024-02-12 DIAGNOSIS — I50.32 CHRONIC DIASTOLIC CONGESTIVE HEART FAILURE (HCC): ICD-10-CM

## 2024-02-12 DIAGNOSIS — R06.02 SOB (SHORTNESS OF BREATH) ON EXERTION: ICD-10-CM

## 2024-02-12 DIAGNOSIS — Z95.0 PACEMAKER: ICD-10-CM

## 2024-02-12 DIAGNOSIS — Z01.818 PRE-OP EVALUATION: Primary | ICD-10-CM

## 2024-02-12 DIAGNOSIS — I48.0 PAF (PAROXYSMAL ATRIAL FIBRILLATION) (HCC): ICD-10-CM

## 2024-02-12 PROBLEM — J44.0 CHRONIC OBSTRUCTIVE PULMONARY DISEASE WITH ACUTE LOWER RESPIRATORY INFECTION (HCC): Status: RESOLVED | Noted: 2020-04-20 | Resolved: 2024-02-12

## 2024-02-12 PROCEDURE — 3017F COLORECTAL CA SCREEN DOC REV: CPT | Performed by: INTERNAL MEDICINE

## 2024-02-12 PROCEDURE — G8417 CALC BMI ABV UP PARAM F/U: HCPCS | Performed by: INTERNAL MEDICINE

## 2024-02-12 PROCEDURE — 99214 OFFICE O/P EST MOD 30 MIN: CPT | Performed by: INTERNAL MEDICINE

## 2024-02-12 PROCEDURE — 3078F DIAST BP <80 MM HG: CPT | Performed by: INTERNAL MEDICINE

## 2024-02-12 PROCEDURE — 93000 ELECTROCARDIOGRAM COMPLETE: CPT | Performed by: INTERNAL MEDICINE

## 2024-02-12 PROCEDURE — 3074F SYST BP LT 130 MM HG: CPT | Performed by: INTERNAL MEDICINE

## 2024-02-12 PROCEDURE — 4004F PT TOBACCO SCREEN RCVD TLK: CPT | Performed by: INTERNAL MEDICINE

## 2024-02-12 PROCEDURE — G8427 DOCREV CUR MEDS BY ELIG CLIN: HCPCS | Performed by: INTERNAL MEDICINE

## 2024-02-12 PROCEDURE — G8484 FLU IMMUNIZE NO ADMIN: HCPCS | Performed by: INTERNAL MEDICINE

## 2024-02-12 NOTE — PROGRESS NOTES
Chief Complaint   Patient presents with    Cardiac Clearance    Congestive Heart Failure    Check-Up    former pat of Dr. Bautista  Hx of TV repair due to  Endocarditis    Hx of  new onset AFib. Noted on ekg while in hsopiatl and device interrogation    Hx of recent Hemoptyiss 9/2020 and referred to [facility] and theyn stopped coumadin and started xeralto and sent home        Pt here for cardiac clearance. For HIP surgery- left  Left total hip with Dr. Hightower on 2-28-24    EKG done today    Wheel chair bound for over 1 yr after back surgery    Denied cp, dizziness, palpitations    Chronic leg edema trace    Sob on exertion chronic  Used to be On Home O2 and now off   Wheelchair bound    After stopped amiodarone the weired feeling and tingling sensations resolved  Feel much better    No bleeding since d/c sept 2020    NO CABG  Had only TV replacement bio and pacer following surgery for chb      Patient Active Problem List   Diagnosis    Moderate COPD (chronic obstructive pulmonary disease) (HCC)    Community acquired pneumonia    Suicidal ideation    SIRS (systemic inflammatory response syndrome) ([facility])    Sepsis due to methicillin susceptible Staphylococcus aureus (HCC)    Severe sepsis (HCC)    HCAP (healthcare-associated pneumonia)    Hyponatremia    Lung nodules    History of intravenous drug use in remission    Malnutrition (HCC)    Coagulopathy (HCC)    Acute blood loss anemia    Complete heart block, post-surgical (HCC)    Endocarditis, bacterial, acute/subacute    Leukocytosis    Physical deconditioning    Abdominal pain, acute    Microscopic hematuria    [employer] BiV pacemaker    Abnormal CT of the abdomen    Generalized abdominal pain    GERD (gastroesophageal reflux disease)    Non-intractable vomiting with nausea    Hx of tricuspid valve repair    History of colonic polyps    Second degree hemorrhoids    History of tricuspid valve replacement with bioprosthetic valve 2012    Chronic diastolic congestive

## 2024-02-16 ENCOUNTER — HOSPITAL ENCOUNTER (OUTPATIENT)
Age: 64
End: 2024-02-16
Attending: INTERNAL MEDICINE
Payer: MEDICARE

## 2024-02-16 VITALS
HEIGHT: 69 IN | WEIGHT: 245 LBS | SYSTOLIC BLOOD PRESSURE: 122 MMHG | DIASTOLIC BLOOD PRESSURE: 77 MMHG | BODY MASS INDEX: 36.29 KG/M2

## 2024-02-16 DIAGNOSIS — I47.10 SVT (SUPRAVENTRICULAR TACHYCARDIA): ICD-10-CM

## 2024-02-16 DIAGNOSIS — I50.32 CHRONIC DIASTOLIC CONGESTIVE HEART FAILURE (HCC): ICD-10-CM

## 2024-02-16 DIAGNOSIS — Z95.0 PACEMAKER: ICD-10-CM

## 2024-02-16 DIAGNOSIS — Z98.890 S/P CARDIAC CATH: ICD-10-CM

## 2024-02-16 DIAGNOSIS — Z95.3 HISTORY OF TRICUSPID VALVE REPLACEMENT WITH BIOPROSTHETIC VALVE: ICD-10-CM

## 2024-02-16 DIAGNOSIS — Z01.818 PRE-OP EVALUATION: ICD-10-CM

## 2024-02-16 DIAGNOSIS — I48.0 PAF (PAROXYSMAL ATRIAL FIBRILLATION) (HCC): ICD-10-CM

## 2024-02-16 DIAGNOSIS — R06.02 SOB (SHORTNESS OF BREATH) ON EXERTION: ICD-10-CM

## 2024-02-16 LAB
ECHO AO ASC DIAM: 3.5 CM
ECHO AO ASCENDING AORTA INDEX: 1.56 CM/M2
ECHO AV CUSP MM: 2.5 CM
ECHO AV PEAK GRADIENT: 10 MMHG
ECHO AV PEAK VELOCITY: 1.6 M/S
ECHO AV VELOCITY RATIO: 0.94
ECHO BSA: 2.33 M2
ECHO EST RA PRESSURE: 15 MMHG
ECHO IVC PROX: 2.4 CM
ECHO LA AREA 2C: 19.2 CM2
ECHO LA AREA 4C: 15.6 CM2
ECHO LA DIAMETER INDEX: 1.87 CM/M2
ECHO LA DIAMETER: 4.2 CM
ECHO LA MAJOR AXIS: 5.1 CM
ECHO LA MINOR AXIS: 5.3 CM
ECHO LA VOL BP: 47 ML (ref 18–58)
ECHO LA VOL MOD A2C: 56 ML (ref 18–58)
ECHO LA VOL MOD A4C: 39 ML (ref 18–58)
ECHO LA VOL/BSA BIPLANE: 21 ML/M2 (ref 16–34)
ECHO LA VOLUME INDEX MOD A2C: 25 ML/M2 (ref 16–34)
ECHO LA VOLUME INDEX MOD A4C: 17 ML/M2 (ref 16–34)
ECHO LV E' LATERAL VELOCITY: 12 CM/S
ECHO LV E' SEPTAL VELOCITY: 6 CM/S
ECHO LV FRACTIONAL SHORTENING: 29 % (ref 28–44)
ECHO LV INTERNAL DIMENSION DIASTOLE INDEX: 2.44 CM/M2
ECHO LV INTERNAL DIMENSION DIASTOLIC: 5.5 CM (ref 4.2–5.9)
ECHO LV INTERNAL DIMENSION SYSTOLIC INDEX: 1.73 CM/M2
ECHO LV INTERNAL DIMENSION SYSTOLIC: 3.9 CM
ECHO LV ISOVOLUMETRIC RELAXATION TIME (IVRT): 85 MS
ECHO LV IVSD: 1 CM (ref 0.6–1)
ECHO LV MASS 2D: 227.4 G (ref 88–224)
ECHO LV MASS INDEX 2D: 101.1 G/M2 (ref 49–115)
ECHO LV POSTERIOR WALL DIASTOLIC: 1.1 CM (ref 0.6–1)
ECHO LV RELATIVE WALL THICKNESS RATIO: 0.4
ECHO LVOT PEAK GRADIENT: 9 MMHG
ECHO LVOT PEAK VELOCITY: 1.5 M/S
ECHO MV A VELOCITY: 0.59 M/S
ECHO MV E DECELERATION TIME (DT): 289 MS
ECHO MV E VELOCITY: 0.71 M/S
ECHO MV E/A RATIO: 1.2
ECHO MV E/E' LATERAL: 5.92
ECHO MV E/E' RATIO (AVERAGED): 8.88
ECHO PV MAX VELOCITY: 1 M/S
ECHO PV PEAK GRADIENT: 4 MMHG
ECHO RIGHT VENTRICULAR SYSTOLIC PRESSURE (RVSP): 32 MMHG
ECHO RV INTERNAL DIMENSION: 2.8 CM
ECHO RV TAPSE: 0.6 CM (ref 1.7–?)
ECHO TV E WAVE: 2 M/S
ECHO TV MEAN GRADIENT: 12 MMHG
ECHO TV MEAN VELOCITY: 1.7 M/S
ECHO TV PEAK GRADIENT: 16 MMHG
ECHO TV REGURGITANT MAX VELOCITY: 2.09 M/S
ECHO TV REGURGITANT PEAK GRADIENT: 34 MMHG
ECHO TV VTI: 72.9 CM

## 2024-02-16 PROCEDURE — 93306 TTE W/DOPPLER COMPLETE: CPT | Performed by: INTERNAL MEDICINE

## 2024-02-16 PROCEDURE — 93306 TTE W/DOPPLER COMPLETE: CPT

## 2024-02-19 ENCOUNTER — TELEPHONE (OUTPATIENT)
Dept: CARDIOLOGY CLINIC | Age: 64
End: 2024-02-19

## 2024-02-19 DIAGNOSIS — R06.02 SOB (SHORTNESS OF BREATH) ON EXERTION: ICD-10-CM

## 2024-02-19 DIAGNOSIS — I48.0 PAF (PAROXYSMAL ATRIAL FIBRILLATION) (HCC): ICD-10-CM

## 2024-02-19 DIAGNOSIS — I50.32 CHRONIC DIASTOLIC CONGESTIVE HEART FAILURE (HCC): Primary | ICD-10-CM

## 2024-02-19 DIAGNOSIS — Z01.818 PRE-OP EVALUATION: ICD-10-CM

## 2024-02-19 NOTE — TELEPHONE ENCOUNTER
Dr. Gillespie reviewed echo that pt had prior to clearance for surgery.  Dr. Gillespie would like pt to have THELMA prior.  Please agree to VO.    Spoke with pt.  Pt voiced understanding.  Order given to scheduling along with clearance.

## 2024-02-20 NOTE — TELEPHONE ENCOUNTER
Call to shy (\"caregiver\") will call pt to have him call our office and inform him his voice mail is full

## 2024-02-21 NOTE — TELEPHONE ENCOUNTER
Pt returned call, pt needs afternoon appt  Rescheduled 02-28-24, arrive 1:00pm  Date ,time and instructions reviewed and mailed to pt

## 2024-02-28 ENCOUNTER — ANESTHESIA EVENT (OUTPATIENT)
Dept: ENDOSCOPY | Age: 64
End: 2024-02-28
Payer: MEDICARE

## 2024-02-28 ENCOUNTER — ANESTHESIA (OUTPATIENT)
Dept: ENDOSCOPY | Age: 64
End: 2024-02-28
Payer: MEDICARE

## 2024-02-28 ENCOUNTER — APPOINTMENT (OUTPATIENT)
Age: 64
End: 2024-02-28
Attending: INTERNAL MEDICINE
Payer: MEDICARE

## 2024-02-28 ENCOUNTER — HOSPITAL ENCOUNTER (OUTPATIENT)
Age: 64
Setting detail: OUTPATIENT SURGERY
Discharge: HOME | End: 2024-02-28
Attending: INTERNAL MEDICINE | Admitting: INTERNAL MEDICINE
Payer: MEDICARE

## 2024-02-28 VITALS
BODY MASS INDEX: 36.29 KG/M2 | DIASTOLIC BLOOD PRESSURE: 71 MMHG | RESPIRATION RATE: 17 BRPM | HEART RATE: 71 BPM | OXYGEN SATURATION: 95 % | TEMPERATURE: 96.7 F | WEIGHT: 245 LBS | SYSTOLIC BLOOD PRESSURE: 121 MMHG | HEIGHT: 69 IN

## 2024-02-28 DIAGNOSIS — I50.32 CHRONIC DIASTOLIC CONGESTIVE HEART FAILURE (HCC): ICD-10-CM

## 2024-02-28 DIAGNOSIS — Z01.818 PRE-OP EVALUATION: ICD-10-CM

## 2024-02-28 DIAGNOSIS — I48.0 PAF (PAROXYSMAL ATRIAL FIBRILLATION) (HCC): ICD-10-CM

## 2024-02-28 DIAGNOSIS — R06.02 SOB (SHORTNESS OF BREATH) ON EXERTION: ICD-10-CM

## 2024-02-28 LAB
ECHO BSA: 2.33 M2
ECHO TV MEAN GRADIENT: 6 MMHG
ECHO TV MEAN VELOCITY: 1.2 M/S
ECHO TV PEAK GRADIENT: 9 MMHG
ECHO TV PRESSURE HALF TIME (PHT): 177 MS
ECHO TV REGURGITANT MAX VELOCITY: 2.75 M/S
ECHO TV REGURGITANT PEAK GRADIENT: 30 MMHG
ECHO TV VALVE AREA P 1/2 METHOD: 1.1 CM2
ECHO TV VTI: 62 CM

## 2024-02-28 PROCEDURE — 93325 DOPPLER ECHO COLOR FLOW MAPG: CPT

## 2024-02-28 PROCEDURE — 7100000010 HC PHASE II RECOVERY - FIRST 15 MIN: Performed by: INTERNAL MEDICINE

## 2024-02-28 PROCEDURE — 3700000001 HC ADD 15 MINUTES (ANESTHESIA): Performed by: INTERNAL MEDICINE

## 2024-02-28 PROCEDURE — 2580000003 HC RX 258

## 2024-02-28 PROCEDURE — 6370000000 HC RX 637 (ALT 250 FOR IP)

## 2024-02-28 PROCEDURE — 93325 DOPPLER ECHO COLOR FLOW MAPG: CPT | Performed by: INTERNAL MEDICINE

## 2024-02-28 PROCEDURE — 6360000002 HC RX W HCPCS

## 2024-02-28 PROCEDURE — 3700000000 HC ANESTHESIA ATTENDED CARE: Performed by: INTERNAL MEDICINE

## 2024-02-28 PROCEDURE — 93312 ECHO TRANSESOPHAGEAL: CPT | Performed by: INTERNAL MEDICINE

## 2024-02-28 PROCEDURE — 93320 DOPPLER ECHO COMPLETE: CPT | Performed by: INTERNAL MEDICINE

## 2024-02-28 PROCEDURE — 2580000003 HC RX 258: Performed by: INTERNAL MEDICINE

## 2024-02-28 PROCEDURE — 7100000011 HC PHASE II RECOVERY - ADDTL 15 MIN: Performed by: INTERNAL MEDICINE

## 2024-02-28 RX ORDER — SODIUM CHLORIDE 9 MG/ML
INJECTION, SOLUTION INTRAVENOUS CONTINUOUS
Status: DISCONTINUED | OUTPATIENT
Start: 2024-02-28 | End: 2024-02-28 | Stop reason: HOSPADM

## 2024-02-28 RX ORDER — SODIUM CHLORIDE 0.9 % (FLUSH) 0.9 %
5-40 SYRINGE (ML) INJECTION PRN
Status: DISCONTINUED | OUTPATIENT
Start: 2024-02-28 | End: 2024-02-28 | Stop reason: HOSPADM

## 2024-02-28 RX ORDER — SODIUM CHLORIDE 9 MG/ML
INJECTION, SOLUTION INTRAVENOUS CONTINUOUS PRN
Status: DISCONTINUED | OUTPATIENT
Start: 2024-02-28 | End: 2024-02-28 | Stop reason: SDUPTHER

## 2024-02-28 RX ORDER — SODIUM CHLORIDE 0.9 % (FLUSH) 0.9 %
5-40 SYRINGE (ML) INJECTION EVERY 12 HOURS SCHEDULED
Status: DISCONTINUED | OUTPATIENT
Start: 2024-02-28 | End: 2024-02-28 | Stop reason: HOSPADM

## 2024-02-28 RX ORDER — SODIUM CHLORIDE 9 MG/ML
25 INJECTION, SOLUTION INTRAVENOUS PRN
Status: DISCONTINUED | OUTPATIENT
Start: 2024-02-28 | End: 2024-02-28 | Stop reason: HOSPADM

## 2024-02-28 RX ADMIN — SODIUM CHLORIDE: 9 INJECTION, SOLUTION INTRAVENOUS at 13:13

## 2024-02-28 RX ADMIN — PROPOFOL 20 MG: 10 INJECTION, EMULSION INTRAVENOUS at 14:50

## 2024-02-28 RX ADMIN — PROPOFOL 60 MG: 10 INJECTION, EMULSION INTRAVENOUS at 14:47

## 2024-02-28 RX ADMIN — SODIUM CHLORIDE: 9 INJECTION, SOLUTION INTRAVENOUS at 14:43

## 2024-02-28 RX ADMIN — PROPOFOL 30 MG: 10 INJECTION, EMULSION INTRAVENOUS at 15:04

## 2024-02-28 RX ADMIN — PROPOFOL 30 MG: 10 INJECTION, EMULSION INTRAVENOUS at 14:58

## 2024-02-28 RX ADMIN — PROPOFOL 20 MG: 10 INJECTION, EMULSION INTRAVENOUS at 14:54

## 2024-02-28 ASSESSMENT — ENCOUNTER SYMPTOMS
SHORTNESS OF BREATH: 1
DYSPNEA ACTIVITY LEVEL: AFTER AMBULATING 1 FLIGHT OF STAIRS

## 2024-02-28 ASSESSMENT — COPD QUESTIONNAIRES: CAT_SEVERITY: MODERATE

## 2024-02-28 ASSESSMENT — LIFESTYLE VARIABLES: SMOKING_STATUS: 1

## 2024-02-28 ASSESSMENT — PAIN - FUNCTIONAL ASSESSMENT: PAIN_FUNCTIONAL_ASSESSMENT: 0-10

## 2024-02-28 ASSESSMENT — PAIN SCALES - GENERAL: PAINLEVEL_OUTOF10: 0

## 2024-02-28 NOTE — PROGRESS NOTES
Recovery mode. Patient denies discomfort.  discussed findings with patient and . Discharge instructions provided and understanding verbalized.

## 2024-02-28 NOTE — ANESTHESIA POSTPROCEDURE EVALUATION
Department of Anesthesiology  Postprocedure Note    Patient: Avery Jordan  MRN: [id number]  YOB: 1960  Date of evaluation: 2/28/2024    Procedure Summary       Date: 02/28/24 Room / Location: [place] [address] / [facility]    Anesthesia Start: 1443 Anesthesia Stop: 1515    Procedure: TRANSESOPHAGEAL ECHOCARDIOGRAM Diagnosis:       Chronic diastolic (congestive) heart failure (HCC)      (Chronic diastolic (congestive) heart failure (HCC) [I50.32])    Surgeons: Ilda Gillespie MD Responsible Provider: Minh Nj MD    Anesthesia Type: MAC ASA Status: 3            Anesthesia Type: No value filed.    Lynn Phase I: Lynn Score: 10    Lynn Phase II:      Anesthesia Post Evaluation    Patient location during evaluation: bedside  Patient participation: complete - patient participated  Level of consciousness: awake  Pain score: 0  Airway patency: patent  Nausea & Vomiting: no nausea and no vomiting  Cardiovascular status: hemodynamically stable and blood pressure returned to baseline  Respiratory status: room air, nonlabored ventilation and spontaneous ventilation  Hydration status: stable        No notable events documented.

## 2024-02-28 NOTE — ANESTHESIA PRE PROCEDURE
Department of Anesthesiology  Preprocedure Note       Name:  Avery Jordan   Age:  63 y.o.  :  1960                                          MRN:  [id number]         Date:  2024      Surgeon: Surgeon(s):  Ilda Gillespie MD    Procedure: Procedure(s):  TRANSESOPHAGEAL ECHOCARDIOGRAM    Medications prior to admission:   Prior to Admission medications    Medication Sig Start Date End Date Taking? Authorizing Provider   glecaprevir-pibrentasvir (MAVYRET) 100-40 MG TABS tablet Take 3 each by mouth daily 23   Isela Ernst, APRN - CNP   etodolac (LODINE) 400 MG tablet Take 1 tablet by mouth 2 times daily    Domonique Lopez MD   lisinopril (PRINIVIL;ZESTRIL) 10 MG tablet Take 1 tablet by mouth daily    Domonique Lopez MD   ANORO ELLIPTA 62.5-25 MCG/INH AEPB inhaler inhale 1 puff by mouth and INTO THE LUNGS once daily 22   Domonique Lopez MD   gabapentin (NEURONTIN) 100 MG capsule take 1 capsule by mouth three times a day 22   Domonique Lopez MD   Ferrous Sulfate 324 MG TBEC Take by mouth    Domonique Lopez MD   apixaban (ELIQUIS) 5 MG TABS tablet Take by mouth 2 times daily    Domonique Lopez MD   clonazePAM (KLONOPIN) 0.5 MG tablet Take 1 tablet by mouth 2 times daily as needed.    Domonique Lopez MD   buprenorphine-naloxone (SUBOXONE) 8-2 MG FILM SL film Place 1 Film under the tongue daily.  Patient not taking: Reported on 2024    Domonique Lopez MD   QUEtiapine (SEROQUEL) 300 MG tablet Take 400 mg by mouth nightly     Domonique Lopez MD   potassium chloride (KLOR-CON M) 20 MEQ extended release tablet Take 1 tablet by mouth 2 times daily 20   Ilda Gillespie MD   pantoprazole (PROTONIX) 20 MG tablet Take 1 tablet by mouth 2 times daily (before meals)    Doomnique Lopez MD   furosemide (LASIX) 40 MG tablet Take 1 tablet by mouth daily 10/20/19   Mark Sharma MD       Current medications:    Current

## 2024-02-29 ENCOUNTER — PROCEDURE VISIT (OUTPATIENT)
Dept: CARDIOLOGY CLINIC | Age: 64
End: 2024-02-29

## 2024-02-29 DIAGNOSIS — Z95.0 PACEMAKER: Primary | ICD-10-CM

## 2024-02-29 NOTE — PROGRESS NOTES
[facility] sci biv pacer     ..Battery longevity:  8.5 years on device   Presenting rhythm  AS RVP    Atrial impedance 541  RV impedance 447      P wave sensing 5.1  R wave sensing 17.8  LV 6.7    0 % atrial paced  73 % RV paced   22% LV paced     Mode switches   <1  SVT

## 2024-03-04 ENCOUNTER — TELEPHONE (OUTPATIENT)
Dept: CARDIOLOGY CLINIC | Age: 64
End: 2024-03-04

## 2024-03-13 PROBLEM — Z01.818 PRE-OP EVALUATION: Status: RESOLVED | Noted: 2024-02-12 | Resolved: 2024-03-13

## 2024-04-12 ENCOUNTER — HOSPITAL ENCOUNTER (OUTPATIENT)
Age: 64
Discharge: HOME | End: 2024-04-12

## 2024-04-13 ENCOUNTER — HOSPITAL ENCOUNTER (OUTPATIENT)
Age: 64
Discharge: HOME | End: 2024-04-13
Payer: MEDICARE

## 2024-04-13 LAB
BACTERIA URNS QL MICRO: ABNORMAL /HPF
BILIRUB UR QL STRIP.AUTO: NEGATIVE
CASTS #/AREA URNS LPF: ABNORMAL /LPF
CASTS 2: ABNORMAL /LPF
CHARACTER UR: ABNORMAL
COLOR: YELLOW
CRYSTALS URNS MICRO: ABNORMAL
EPITHELIAL CELLS, UA: ABNORMAL /HPF
GLUCOSE UR QL STRIP.AUTO: NEGATIVE MG/DL
HGB UR QL STRIP.AUTO: ABNORMAL
KETONES UR QL STRIP.AUTO: NEGATIVE
MISCELLANEOUS 2: ABNORMAL
MRSA DNA SPEC QL NAA+PROBE: NEGATIVE
NITRITE UR QL STRIP: NEGATIVE
PH UR STRIP.AUTO: 5.5 [PH] (ref 5–9)
PROT UR STRIP.AUTO-MCNC: NEGATIVE MG/DL
RBC URINE: > 100 /HPF
RENAL EPI CELLS #/AREA URNS HPF: ABNORMAL /[HPF]
SP GR UR REFRACT.AUTO: 1.02 (ref 1–1.03)
UROBILINOGEN, URINE: 1 EU/DL (ref 0–1)
WBC #/AREA URNS HPF: ABNORMAL /HPF
WBC #/AREA URNS HPF: ABNORMAL /[HPF]
YEAST LIKE FUNGI URNS QL MICRO: ABNORMAL

## 2024-04-13 PROCEDURE — 81001 URINALYSIS AUTO W/SCOPE: CPT

## 2024-04-13 PROCEDURE — 87086 URINE CULTURE/COLONY COUNT: CPT

## 2024-04-13 PROCEDURE — 87641 MR-STAPH DNA AMP PROBE: CPT

## 2024-04-16 PROBLEM — M16.12 PRIMARY OSTEOARTHRITIS OF LEFT HIP: Status: ACTIVE | Noted: 2024-04-16

## 2024-04-16 LAB
BACTERIA UR CULT: ABNORMAL
ORGANISM: ABNORMAL

## 2024-04-17 NOTE — PROGRESS NOTES
PAT call attempted, patient unavailable, unable to leave a message.  The mailbox is full and cannot accept any new messages.

## 2024-04-18 ENCOUNTER — TELEPHONE (OUTPATIENT)
Dept: CARDIOLOGY CLINIC | Age: 64
End: 2024-04-18

## 2024-04-18 NOTE — TELEPHONE ENCOUNTER
No show to appt 4/18/24.  Last OV for device check 10/27/22.  Does use latitude.     Call to patient. No answer. No VM.   Need to r/s.

## 2024-04-22 ENCOUNTER — ANESTHESIA EVENT (OUTPATIENT)
Dept: OPERATING ROOM | Age: 64
End: 2024-04-22
Payer: MEDICARE

## 2024-04-23 ENCOUNTER — HOSPITAL ENCOUNTER (OUTPATIENT)
Age: 64
Discharge: HOME HEALTH | End: 2024-04-28
Attending: ORTHOPAEDIC SURGERY | Admitting: ORTHOPAEDIC SURGERY
Payer: MEDICARE

## 2024-04-23 ENCOUNTER — ANESTHESIA (OUTPATIENT)
Dept: OPERATING ROOM | Age: 64
End: 2024-04-23
Payer: MEDICARE

## 2024-04-23 ENCOUNTER — APPOINTMENT (OUTPATIENT)
Dept: GENERAL RADIOLOGY | Age: 64
End: 2024-04-23
Attending: ORTHOPAEDIC SURGERY
Payer: MEDICARE

## 2024-04-23 DIAGNOSIS — M16.12 OSTEOARTHRITIS OF LEFT HIP, UNSPECIFIED OSTEOARTHRITIS TYPE: Primary | ICD-10-CM

## 2024-04-23 PROBLEM — M17.12 ARTHRITIS OF LEFT KNEE: Status: ACTIVE | Noted: 2024-04-23

## 2024-04-23 PROCEDURE — 73502 X-RAY EXAM HIP UNI 2-3 VIEWS: CPT

## 2024-04-23 PROCEDURE — 6360000002 HC RX W HCPCS: Performed by: ORTHOPAEDIC SURGERY

## 2024-04-23 PROCEDURE — 3700000000 HC ANESTHESIA ATTENDED CARE: Performed by: ORTHOPAEDIC SURGERY

## 2024-04-23 PROCEDURE — 7100000001 HC PACU RECOVERY - ADDTL 15 MIN: Performed by: ORTHOPAEDIC SURGERY

## 2024-04-23 PROCEDURE — 6370000000 HC RX 637 (ALT 250 FOR IP): Performed by: ORTHOPAEDIC SURGERY

## 2024-04-23 PROCEDURE — 3700000001 HC ADD 15 MINUTES (ANESTHESIA): Performed by: ORTHOPAEDIC SURGERY

## 2024-04-23 PROCEDURE — 7100000000 HC PACU RECOVERY - FIRST 15 MIN: Performed by: ORTHOPAEDIC SURGERY

## 2024-04-23 PROCEDURE — 2580000003 HC RX 258: Performed by: ORTHOPAEDIC SURGERY

## 2024-04-23 PROCEDURE — 2500000003 HC RX 250 WO HCPCS: Performed by: NURSE ANESTHETIST, CERTIFIED REGISTERED

## 2024-04-23 PROCEDURE — 2500000003 HC RX 250 WO HCPCS: Performed by: ORTHOPAEDIC SURGERY

## 2024-04-23 PROCEDURE — 3600000004 HC SURGERY LEVEL 4 BASE: Performed by: ORTHOPAEDIC SURGERY

## 2024-04-23 PROCEDURE — 6360000002 HC RX W HCPCS: Performed by: ANESTHESIOLOGY

## 2024-04-23 PROCEDURE — 7100000010 HC PHASE II RECOVERY - FIRST 15 MIN: Performed by: ORTHOPAEDIC SURGERY

## 2024-04-23 PROCEDURE — C1776 JOINT DEVICE (IMPLANTABLE): HCPCS | Performed by: ORTHOPAEDIC SURGERY

## 2024-04-23 PROCEDURE — 7100000011 HC PHASE II RECOVERY - ADDTL 15 MIN: Performed by: ORTHOPAEDIC SURGERY

## 2024-04-23 PROCEDURE — 3600000014 HC SURGERY LEVEL 4 ADDTL 15MIN: Performed by: ORTHOPAEDIC SURGERY

## 2024-04-23 PROCEDURE — 2720000010 HC SURG SUPPLY STERILE: Performed by: ORTHOPAEDIC SURGERY

## 2024-04-23 PROCEDURE — 6360000002 HC RX W HCPCS: Performed by: NURSE ANESTHETIST, CERTIFIED REGISTERED

## 2024-04-23 PROCEDURE — 64447 NJX AA&/STRD FEMORAL NRV IMG: CPT | Performed by: ANESTHESIOLOGY

## 2024-04-23 PROCEDURE — 2709999900 HC NON-CHARGEABLE SUPPLY: Performed by: ORTHOPAEDIC SURGERY

## 2024-04-23 DEVICE — REFLECTION THREADED HOLE COVER
Type: IMPLANTABLE DEVICE | Site: HIP | Status: FUNCTIONAL
Brand: REFLECTION

## 2024-04-23 DEVICE — R3 3 HOLE ACETABULAR SHELL 54MM
Type: IMPLANTABLE DEVICE | Site: HIP | Status: FUNCTIONAL
Brand: R3 ACETABULAR

## 2024-04-23 DEVICE — SYNERGY POROUS HIGH OFFSET FEMORAL                                    COMPONENT SZ 16
Type: IMPLANTABLE DEVICE | Site: HIP | Status: FUNCTIONAL
Brand: SYNERGY

## 2024-04-23 DEVICE — REFLECTION SPHERICAL HEAD SCREW 25MM
Type: IMPLANTABLE DEVICE | Site: HIP | Status: FUNCTIONAL
Brand: REFLECTION

## 2024-04-23 DEVICE — R3 20 DEGREE XLPE ACETABULAR LINER                                    36MM INNER DIAMETER X OUTER DIAMETER 54MM
Type: IMPLANTABLE DEVICE | Site: HIP | Status: FUNCTIONAL
Brand: R3

## 2024-04-23 DEVICE — REFLECTION SPHERICAL HEAD SCREW 35MM
Type: IMPLANTABLE DEVICE | Site: HIP | Status: FUNCTIONAL
Brand: REFLECTION

## 2024-04-23 DEVICE — HIP H2 TOT ADV OTHER HD IMPL CAPPED H2 SN: Type: IMPLANTABLE DEVICE | Site: HIP | Status: FUNCTIONAL

## 2024-04-23 DEVICE — OXINIUM FEMORAL HEAD 12/14 TAPER                                    36 MM +0
Type: IMPLANTABLE DEVICE | Site: HIP | Status: FUNCTIONAL
Brand: OXINIUM

## 2024-04-23 RX ORDER — SODIUM CHLORIDE 0.9 % (FLUSH) 0.9 %
5-40 SYRINGE (ML) INJECTION EVERY 12 HOURS SCHEDULED
Status: DISCONTINUED | OUTPATIENT
Start: 2024-04-23 | End: 2024-04-28 | Stop reason: HOSPADM

## 2024-04-23 RX ORDER — ONDANSETRON 2 MG/ML
4 INJECTION INTRAMUSCULAR; INTRAVENOUS EVERY 6 HOURS PRN
Status: DISCONTINUED | OUTPATIENT
Start: 2024-04-23 | End: 2024-04-28 | Stop reason: HOSPADM

## 2024-04-23 RX ORDER — BUPIVACAINE HYDROCHLORIDE 7.5 MG/ML
INJECTION, SOLUTION INTRASPINAL
Status: COMPLETED | OUTPATIENT
Start: 2024-04-23 | End: 2024-04-23

## 2024-04-23 RX ORDER — FENTANYL CITRATE 50 UG/ML
50 INJECTION, SOLUTION INTRAMUSCULAR; INTRAVENOUS EVERY 5 MIN PRN
Status: DISCONTINUED | OUTPATIENT
Start: 2024-04-23 | End: 2024-04-23 | Stop reason: HOSPADM

## 2024-04-23 RX ORDER — SODIUM CHLORIDE 0.9 % (FLUSH) 0.9 %
5-40 SYRINGE (ML) INJECTION PRN
Status: DISCONTINUED | OUTPATIENT
Start: 2024-04-23 | End: 2024-04-28 | Stop reason: HOSPADM

## 2024-04-23 RX ORDER — SODIUM CHLORIDE 0.9 % (FLUSH) 0.9 %
5-40 SYRINGE (ML) INJECTION PRN
Status: DISCONTINUED | OUTPATIENT
Start: 2024-04-23 | End: 2024-04-23 | Stop reason: HOSPADM

## 2024-04-23 RX ORDER — SODIUM CHLORIDE 9 MG/ML
INJECTION, SOLUTION INTRAVENOUS PRN
Status: DISCONTINUED | OUTPATIENT
Start: 2024-04-23 | End: 2024-04-23 | Stop reason: HOSPADM

## 2024-04-23 RX ORDER — GABAPENTIN 100 MG/1
100 CAPSULE ORAL 3 TIMES DAILY
Status: DISCONTINUED | OUTPATIENT
Start: 2024-04-23 | End: 2024-04-28 | Stop reason: HOSPADM

## 2024-04-23 RX ORDER — PROPOFOL 10 MG/ML
INJECTION, EMULSION INTRAVENOUS CONTINUOUS PRN
Status: DISCONTINUED | OUTPATIENT
Start: 2024-04-23 | End: 2024-04-23 | Stop reason: SDUPTHER

## 2024-04-23 RX ORDER — MIDAZOLAM HYDROCHLORIDE 1 MG/ML
INJECTION INTRAMUSCULAR; INTRAVENOUS PRN
Status: DISCONTINUED | OUTPATIENT
Start: 2024-04-23 | End: 2024-04-23 | Stop reason: SDUPTHER

## 2024-04-23 RX ORDER — MORPHINE SULFATE 2 MG/ML
2 INJECTION, SOLUTION INTRAMUSCULAR; INTRAVENOUS
Status: DISPENSED | OUTPATIENT
Start: 2024-04-23 | End: 2024-04-24

## 2024-04-23 RX ORDER — ACETAMINOPHEN 325 MG/1
650 TABLET ORAL EVERY 6 HOURS
Status: DISCONTINUED | OUTPATIENT
Start: 2024-04-23 | End: 2024-04-28 | Stop reason: HOSPADM

## 2024-04-23 RX ORDER — SODIUM CHLORIDE 9 MG/ML
INJECTION, SOLUTION INTRAVENOUS CONTINUOUS
Status: DISCONTINUED | OUTPATIENT
Start: 2024-04-23 | End: 2024-04-28 | Stop reason: HOSPADM

## 2024-04-23 RX ORDER — ACETAMINOPHEN 500 MG
1000 TABLET ORAL ONCE
Status: COMPLETED | OUTPATIENT
Start: 2024-04-23 | End: 2024-04-23

## 2024-04-23 RX ORDER — SODIUM CHLORIDE 9 MG/ML
INJECTION, SOLUTION INTRAVENOUS PRN
Status: DISCONTINUED | OUTPATIENT
Start: 2024-04-23 | End: 2024-04-28 | Stop reason: HOSPADM

## 2024-04-23 RX ORDER — BUPIVACAINE HYDROCHLORIDE 2.5 MG/ML
INJECTION, SOLUTION INFILTRATION; PERINEURAL
Status: COMPLETED | OUTPATIENT
Start: 2024-04-23 | End: 2024-04-23

## 2024-04-23 RX ORDER — HYDROXYZINE PAMOATE 25 MG/1
25 CAPSULE ORAL 3 TIMES DAILY PRN
Status: DISCONTINUED | OUTPATIENT
Start: 2024-04-23 | End: 2024-04-28 | Stop reason: HOSPADM

## 2024-04-23 RX ORDER — CLONAZEPAM 0.5 MG/1
0.5 TABLET ORAL 2 TIMES DAILY PRN
Status: DISCONTINUED | OUTPATIENT
Start: 2024-04-23 | End: 2024-04-28 | Stop reason: HOSPADM

## 2024-04-23 RX ORDER — CYCLOBENZAPRINE HCL 10 MG
10 TABLET ORAL EVERY 12 HOURS PRN
Status: DISCONTINUED | OUTPATIENT
Start: 2024-04-23 | End: 2024-04-28 | Stop reason: HOSPADM

## 2024-04-23 RX ORDER — CELECOXIB 100 MG/1
100 CAPSULE ORAL 2 TIMES DAILY
Status: COMPLETED | OUTPATIENT
Start: 2024-04-23 | End: 2024-04-23

## 2024-04-23 RX ORDER — FUROSEMIDE 40 MG/1
40 TABLET ORAL DAILY
Status: DISCONTINUED | OUTPATIENT
Start: 2024-04-23 | End: 2024-04-28 | Stop reason: HOSPADM

## 2024-04-23 RX ORDER — POLYETHYLENE GLYCOL 3350 17 G/17G
17 POWDER, FOR SOLUTION ORAL DAILY
Status: DISCONTINUED | OUTPATIENT
Start: 2024-04-23 | End: 2024-04-28 | Stop reason: HOSPADM

## 2024-04-23 RX ORDER — VASOPRESSIN 20 U/ML
INJECTION PARENTERAL PRN
Status: DISCONTINUED | OUTPATIENT
Start: 2024-04-23 | End: 2024-04-23 | Stop reason: SDUPTHER

## 2024-04-23 RX ORDER — LIDOCAINE HCL/PF 100 MG/5ML
SYRINGE (ML) INJECTION PRN
Status: DISCONTINUED | OUTPATIENT
Start: 2024-04-23 | End: 2024-04-23 | Stop reason: SDUPTHER

## 2024-04-23 RX ORDER — SODIUM CHLORIDE 0.9 % (FLUSH) 0.9 %
5-40 SYRINGE (ML) INJECTION EVERY 12 HOURS SCHEDULED
Status: DISCONTINUED | OUTPATIENT
Start: 2024-04-23 | End: 2024-04-23 | Stop reason: HOSPADM

## 2024-04-23 RX ORDER — LISINOPRIL 10 MG/1
10 TABLET ORAL DAILY
Status: DISCONTINUED | OUTPATIENT
Start: 2024-04-23 | End: 2024-04-28 | Stop reason: HOSPADM

## 2024-04-23 RX ORDER — ENEMA 19; 7 G/133ML; G/133ML
1 ENEMA RECTAL DAILY PRN
Status: DISCONTINUED | OUTPATIENT
Start: 2024-04-23 | End: 2024-04-28 | Stop reason: HOSPADM

## 2024-04-23 RX ORDER — SODIUM CHLORIDE 9 MG/ML
INJECTION, SOLUTION INTRAVENOUS CONTINUOUS
Status: DISCONTINUED | OUTPATIENT
Start: 2024-04-23 | End: 2024-04-23 | Stop reason: HOSPADM

## 2024-04-23 RX ORDER — TRANEXAMIC ACID 100 MG/ML
INJECTION, SOLUTION INTRAVENOUS PRN
Status: DISCONTINUED | OUTPATIENT
Start: 2024-04-23 | End: 2024-04-23 | Stop reason: ALTCHOICE

## 2024-04-23 RX ORDER — TRAMADOL HYDROCHLORIDE 50 MG/1
50 TABLET ORAL EVERY 4 HOURS PRN
Status: DISCONTINUED | OUTPATIENT
Start: 2024-04-23 | End: 2024-04-28 | Stop reason: HOSPADM

## 2024-04-23 RX ORDER — QUETIAPINE FUMARATE 400 MG/1
400 TABLET, FILM COATED ORAL NIGHTLY
Status: DISCONTINUED | OUTPATIENT
Start: 2024-04-23 | End: 2024-04-28 | Stop reason: HOSPADM

## 2024-04-23 RX ORDER — PANTOPRAZOLE SODIUM 40 MG/1
40 TABLET, DELAYED RELEASE ORAL
Status: DISCONTINUED | OUTPATIENT
Start: 2024-04-23 | End: 2024-04-28 | Stop reason: HOSPADM

## 2024-04-23 RX ORDER — HYDROCODONE BITARTRATE AND ACETAMINOPHEN 5; 325 MG/1; MG/1
2 TABLET ORAL EVERY 4 HOURS PRN
Status: DISCONTINUED | OUTPATIENT
Start: 2024-04-23 | End: 2024-04-28 | Stop reason: HOSPADM

## 2024-04-23 RX ORDER — NALOXONE HYDROCHLORIDE 0.4 MG/ML
INJECTION, SOLUTION INTRAMUSCULAR; INTRAVENOUS; SUBCUTANEOUS PRN
Status: DISCONTINUED | OUTPATIENT
Start: 2024-04-23 | End: 2024-04-23 | Stop reason: HOSPADM

## 2024-04-23 RX ORDER — EPHEDRINE SULFATE/0.9% NACL/PF 25 MG/5 ML
SYRINGE (ML) INTRAVENOUS PRN
Status: DISCONTINUED | OUTPATIENT
Start: 2024-04-23 | End: 2024-04-23 | Stop reason: SDUPTHER

## 2024-04-23 RX ORDER — POTASSIUM CHLORIDE 20 MEQ/1
20 TABLET, EXTENDED RELEASE ORAL 2 TIMES DAILY
Status: DISCONTINUED | OUTPATIENT
Start: 2024-04-23 | End: 2024-04-28 | Stop reason: HOSPADM

## 2024-04-23 RX ORDER — FENTANYL CITRATE 50 UG/ML
INJECTION, SOLUTION INTRAMUSCULAR; INTRAVENOUS PRN
Status: DISCONTINUED | OUTPATIENT
Start: 2024-04-23 | End: 2024-04-23 | Stop reason: SDUPTHER

## 2024-04-23 RX ORDER — DEXAMETHASONE SODIUM PHOSPHATE 4 MG/ML
INJECTION, SOLUTION INTRA-ARTICULAR; INTRALESIONAL; INTRAMUSCULAR; INTRAVENOUS; SOFT TISSUE
Status: COMPLETED | OUTPATIENT
Start: 2024-04-23 | End: 2024-04-23

## 2024-04-23 RX ORDER — PHENYLEPHRINE HCL IN 0.9% NACL 1 MG/10 ML
SYRINGE (ML) INTRAVENOUS PRN
Status: DISCONTINUED | OUTPATIENT
Start: 2024-04-23 | End: 2024-04-23 | Stop reason: SDUPTHER

## 2024-04-23 RX ORDER — VANCOMYCIN HYDROCHLORIDE 500 MG/10ML
INJECTION, POWDER, LYOPHILIZED, FOR SOLUTION INTRAVENOUS PRN
Status: DISCONTINUED | OUTPATIENT
Start: 2024-04-23 | End: 2024-04-23 | Stop reason: ALTCHOICE

## 2024-04-23 RX ADMIN — QUETIAPINE FUMARATE 400 MG: 400 TABLET ORAL at 20:25

## 2024-04-23 RX ADMIN — ACETAMINOPHEN 1000 MG: 500 TABLET ORAL at 10:28

## 2024-04-23 RX ADMIN — Medication 100 MCG: at 11:49

## 2024-04-23 RX ADMIN — GABAPENTIN 100 MG: 100 CAPSULE ORAL at 17:45

## 2024-04-23 RX ADMIN — SODIUM CHLORIDE: 9 INJECTION, SOLUTION INTRAVENOUS at 17:44

## 2024-04-23 RX ADMIN — POTASSIUM CHLORIDE 20 MEQ: 1500 TABLET, EXTENDED RELEASE ORAL at 20:25

## 2024-04-23 RX ADMIN — HYDROCODONE BITARTRATE AND ACETAMINOPHEN 2 TABLET: 5; 325 TABLET ORAL at 20:07

## 2024-04-23 RX ADMIN — PROPOFOL 50 MCG/KG/MIN: 10 INJECTION, EMULSION INTRAVENOUS at 11:27

## 2024-04-23 RX ADMIN — EPHEDRINE SULFATE 5 MG: 5 INJECTION INTRAVENOUS at 12:15

## 2024-04-23 RX ADMIN — EPHEDRINE SULFATE 10 MG: 5 INJECTION INTRAVENOUS at 12:01

## 2024-04-23 RX ADMIN — WATER 2000 MG: 1 INJECTION INTRAMUSCULAR; INTRAVENOUS; SUBCUTANEOUS at 20:27

## 2024-04-23 RX ADMIN — FENTANYL CITRATE 25 MCG: 50 INJECTION, SOLUTION INTRAMUSCULAR; INTRAVENOUS at 10:58

## 2024-04-23 RX ADMIN — MIDAZOLAM 2 MG: 1 INJECTION INTRAMUSCULAR; INTRAVENOUS at 10:53

## 2024-04-23 RX ADMIN — PANTOPRAZOLE SODIUM 40 MG: 40 TABLET, DELAYED RELEASE ORAL at 17:44

## 2024-04-23 RX ADMIN — FUROSEMIDE 40 MG: 40 TABLET ORAL at 17:45

## 2024-04-23 RX ADMIN — HYDROCODONE BITARTRATE AND ACETAMINOPHEN 2 TABLET: 5; 325 TABLET ORAL at 15:11

## 2024-04-23 RX ADMIN — LISINOPRIL 10 MG: 10 TABLET ORAL at 17:45

## 2024-04-23 RX ADMIN — CYCLOBENZAPRINE 10 MG: 10 TABLET, FILM COATED ORAL at 20:07

## 2024-04-23 RX ADMIN — VASOPRESSIN 2 UNITS: 20 INJECTION INTRAVENOUS at 12:06

## 2024-04-23 RX ADMIN — VASOPRESSIN 1 UNITS: 20 INJECTION INTRAVENOUS at 12:20

## 2024-04-23 RX ADMIN — SODIUM CHLORIDE: 9 INJECTION, SOLUTION INTRAVENOUS at 20:13

## 2024-04-23 RX ADMIN — BUPIVACAINE HYDROCHLORIDE 30 ML: 2.5 INJECTION, SOLUTION INFILTRATION; PERINEURAL at 11:00

## 2024-04-23 RX ADMIN — BUPIVACAINE HYDROCHLORIDE 14.5 MG: 7.5 INJECTION, SOLUTION SUBARACHNOID at 11:18

## 2024-04-23 RX ADMIN — MORPHINE SULFATE 2 MG: 2 INJECTION, SOLUTION INTRAMUSCULAR; INTRAVENOUS at 17:46

## 2024-04-23 RX ADMIN — EPHEDRINE SULFATE 10 MG: 5 INJECTION INTRAVENOUS at 11:55

## 2024-04-23 RX ADMIN — Medication 60 MG: at 11:28

## 2024-04-23 RX ADMIN — SODIUM CHLORIDE: 9 INJECTION, SOLUTION INTRAVENOUS at 10:22

## 2024-04-23 RX ADMIN — CELECOXIB 100 MG: 100 CAPSULE ORAL at 10:35

## 2024-04-23 RX ADMIN — GABAPENTIN 100 MG: 100 CAPSULE ORAL at 20:25

## 2024-04-23 RX ADMIN — WATER 2000 MG: 1 INJECTION INTRAMUSCULAR; INTRAVENOUS; SUBCUTANEOUS at 11:00

## 2024-04-23 RX ADMIN — ACETAMINOPHEN 650 MG: 325 TABLET ORAL at 21:43

## 2024-04-23 RX ADMIN — SODIUM CHLORIDE: 9 INJECTION, SOLUTION INTRAVENOUS at 12:16

## 2024-04-23 RX ADMIN — Medication 200 MCG: at 12:00

## 2024-04-23 RX ADMIN — Medication 100 MCG: at 11:56

## 2024-04-23 RX ADMIN — APIXABAN 2.5 MG: 2.5 TABLET, FILM COATED ORAL at 20:25

## 2024-04-23 RX ADMIN — POLYETHYLENE GLYCOL 3350 17 G: 17 POWDER, FOR SOLUTION ORAL at 17:44

## 2024-04-23 RX ADMIN — DEXAMETHASONE SODIUM PHOSPHATE 4 MG: 4 INJECTION, SOLUTION INTRA-ARTICULAR; INTRALESIONAL; INTRAMUSCULAR; INTRAVENOUS; SOFT TISSUE at 11:00

## 2024-04-23 RX ADMIN — FENTANYL CITRATE 25 MCG: 50 INJECTION, SOLUTION INTRAMUSCULAR; INTRAVENOUS at 11:01

## 2024-04-23 RX ADMIN — Medication 200 MCG: at 11:52

## 2024-04-23 RX ADMIN — MORPHINE SULFATE 2 MG: 2 INJECTION, SOLUTION INTRAMUSCULAR; INTRAVENOUS at 21:38

## 2024-04-23 ASSESSMENT — PAIN SCALES - GENERAL
PAINLEVEL_OUTOF10: 6
PAINLEVEL_OUTOF10: 6
PAINLEVEL_OUTOF10: 8
PAINLEVEL_OUTOF10: 6
PAINLEVEL_OUTOF10: 8
PAINLEVEL_OUTOF10: 8

## 2024-04-23 ASSESSMENT — PAIN DESCRIPTION - DESCRIPTORS
DESCRIPTORS: ACHING;BURNING;DISCOMFORT
DESCRIPTORS: ACHING;THROBBING
DESCRIPTORS: ACHING;THROBBING
DESCRIPTORS: SHARP
DESCRIPTORS: ACHING;DISCOMFORT
DESCRIPTORS: SHARP

## 2024-04-23 ASSESSMENT — PAIN DESCRIPTION - ORIENTATION
ORIENTATION: LEFT

## 2024-04-23 ASSESSMENT — LIFESTYLE VARIABLES: SMOKING_STATUS: 1

## 2024-04-23 ASSESSMENT — PAIN - FUNCTIONAL ASSESSMENT
PAIN_FUNCTIONAL_ASSESSMENT: 0-10
PAIN_FUNCTIONAL_ASSESSMENT: PREVENTS OR INTERFERES SOME ACTIVE ACTIVITIES AND ADLS
PAIN_FUNCTIONAL_ASSESSMENT: 0-10
PAIN_FUNCTIONAL_ASSESSMENT: NONE - DENIES PAIN
PAIN_FUNCTIONAL_ASSESSMENT: 0-10
PAIN_FUNCTIONAL_ASSESSMENT: 0-10
PAIN_FUNCTIONAL_ASSESSMENT: PREVENTS OR INTERFERES WITH ALL ACTIVE AND SOME PASSIVE ACTIVITIES
PAIN_FUNCTIONAL_ASSESSMENT: 0-10

## 2024-04-23 ASSESSMENT — PAIN DESCRIPTION - LOCATION
LOCATION: HIP
LOCATION: LEG;HIP
LOCATION: HIP

## 2024-04-23 ASSESSMENT — PAIN DESCRIPTION - PAIN TYPE
TYPE: CHRONIC PAIN
TYPE: SURGICAL PAIN
TYPE: SURGICAL PAIN

## 2024-04-23 NOTE — BRIEF OP NOTE
Brief Postoperative Note      Patient: Avery Jordan  YOB: 1960  MRN: [id number]    Date of Procedure: 4/23/2024    Pre-Op Diagnosis Codes:     * Osteoarthritis of left hip, unspecified osteoarthritis type [M16.12]    Post-Op Diagnosis: Same       Procedure(s):  Left Total Hip Lateral Approach    Surgeon(s):  Felton Hightower MD    Assistant:  Physician Assistant: Dmitriy Wheat PA    Anesthesia: Spinal    Estimated Blood Loss (mL): 300     Complications: None    Specimens:   * No specimens in log *    Implants:  Implant Name Type Inv. Item Serial No.  Lot No. LRB No. Used Action   COVER H ACET HIP THRD FOR REFLCT SYS R3 - [id number]  COVER H ACET HIP THRD FOR REFLCT SYS R3  [facility] [id number] Left 1 Implanted   SHELL ACET [id number] 3 H STD HIP POLY POR PRESSFIT R3 - [id number]  SHELL ACET [id number] 3 H STD HIP POLY POR PRESSFIT R3  [facility] [id number] Left 1 Implanted   LINER ACET 20 DEG 36X54 MM HIP XLPE R3 - [id number]  LINER ACET 20 DEG 36X54 MM HIP XLPE R3  [facility] [id number] Left 1 Implanted   SCREW BNE L35MM DIA6.5MM CANC HIP SPHR HD FOR ACET SYS - [id number]  SCREW BNE L35MM DIA6.5MM CANC HIP SPHR HD FOR ACET SYS  [facility] [id number] Left 1 Implanted   SCREW BNE L35MM DIA6.5MM CANC HIP SPHR HD FOR ACET SYS - [id number]  SCREW BNE L35MM DIA6.5MM CANC HIP SPHR HD FOR ACET SYS  [facility] AND [facility] [id number] Left 1 Implanted   SCREW BNE L25MM DIA6.5MM CANC HIP SPHR HD FOR ACET SYS - [id number]  SCREW BNE L25MM DIA6.5MM CANC HIP SPHR HD FOR ACET SYS  [facility] [id number] Left 1 Implanted   STEM FEM SZ 16 L170MM HIP TI HI OFFSET POR CEMENTLESS - [id number]  STEM FEM SZ 16 L170MM HIP TI HI OFFSET POR CEMENTLESS  [employer] [id number] Left 1 Implanted   HEAD FEM [id number] +0MM OFFSET 12/14 TAPR OXINIUM MAREK REV - [id number]  HEAD FEM

## 2024-04-23 NOTE — PROGRESS NOTES
Pt admitted to  77 Via bed from [facility].     Complaints: had a left total hip done.      IV normal saline infusing into the hand right, condition patent and no redness at a rate of 125 mls/ hour with about 300 mls in the bag still. IV site free of s/s of infection or infiltration. Vital signs obtained. Assessment and data collection initiated.     Two nurse skin assessment performed by Lexi LARES and Bronwyn LARES. Oriented to room.     Explained patients right to have family, representative or physician notified of their admission.  Patient has Declined for physician to be notified.  Patient has Declined for family/representative to be notified.    The patient is interested in [facility]’s meds to beds program?:  Yes    Policies and procedures for  explained. All questions answered with no further questions at this time. Fall prevention and safety brochure discussed with patient.  Bed alarm on. Call light in reach.

## 2024-04-23 NOTE — ANESTHESIA POSTPROCEDURE EVALUATION
Department of Anesthesiology  Postprocedure Note    Patient: Avery Jordan  MRN: [id number]  YOB: 1960  Date of evaluation: 4/23/2024    Procedure Summary       Date: 04/23/24 [facility]    Anesthesia Start: 1113 Anesthesia Stop: 1239    Procedure: Left Total Hip Lateral Approach (Hip) Diagnosis:       Osteoarthritis of left hip, unspecified osteoarthritis type      (Osteoarthritis of left hip, unspecified osteoarthritis type [M16.12])    Surgeons: Felton Hightower MD Responsible Provider: Juan C Kern DO    Anesthesia Type: regional, spinal ASA Status: 3            Anesthesia Type: No value filed.    Lynn Phase I: Lynn Score: 9    Lynn Phase II: Lynn Score: 8    Anesthesia Post Evaluation    Comments: [facility]  POST-ANESTHESIA NOTE       Name:  Avery Jordan                                         Age:  63 y.o.  MRN:  [id number]      Last Vitals:  BP (!) 118/56   Pulse 76   Temp 97.3 °F (36.3 °C) (Temporal)   Resp 16   Ht 1.753 m (5' 9\")   Wt 116.5 kg (256 lb 12.8 oz)   SpO2 98%   BMI 37.92 kg/m²   Patient Vitals in the past 4 hrs:  04/23/24 1506, BP:(!) 118/56, Pulse:76, Resp:16, SpO2:98 %  04/23/24 1444, BP:(!) 96/51, Pulse:67, Resp:16, SpO2:96 %  04/23/24 1409, BP:(!) 93/52, Pulse:70, Resp:16, SpO2:96 %  04/23/24 1343, BP:(!) 98/56, Pulse:73, Resp:16, SpO2:97 %  04/23/24 1313, BP:100/62, Temp:97.3 °F (36.3 °C), Temp src:Temporal, Pulse:78, Resp:16, SpO2:95 %  04/23/24 1305, BP:(!) 108/56, Pulse:72, Resp:15, SpO2:99 %  04/23/24 1300, BP:(!) 108/59, Pulse:73, Resp:14, SpO2:93 %  04/23/24 1255, BP:108/66, Pulse:69, Resp:13, SpO2:96 %  04/23/24 1250, BP:(!) 110/58, Pulse:69, Resp:16, SpO2:95 %  04/23/24 1245, BP:(!) 112/55, Pulse:64, Resp:14, SpO2:95 %  04/23/24 1237, BP:(!) 107/55, Temp:97.4 °F (36.3 °C), Temp src:Temporal, Pulse:68, Resp:16, SpO2:97 %    Level of Consciousness:  Awake    Respiratory:  Stable    Oxygen Saturation:

## 2024-04-23 NOTE — H&P
[facility]  History and Physical Update    Pt Name: Avery Jordan  MRN: [id number]  YOB: 1960  Date of evaluation: 4/23/2024    I have examined the patient and reviewed the H&P/Consult and there are no changes to the patient or plans.      Felton Hightower MD  Electronically signed 4/23/2024 at 7:17 AM

## 2024-04-23 NOTE — ANESTHESIA PROCEDURE NOTES
Spinal Block    End time: 4/23/2024 11:18 AM  Reason for block: primary anesthetic  Staffing  Performed: resident/CRNA   Resident/CRNA: Marie Echevarria APRN - CRNA  Performed by: Marie Echevarria APRN - CRNA  Authorized by: Juan C Kern DO    Spinal Block  Patient position: sitting  Prep: ChloraPrep  Patient monitoring: cardiac monitor, continuous pulse ox, continuous capnometry, frequent blood pressure checks and oxygen  Approach: midline  Location: L3/L4  Guidance: paresthesia technique  Provider prep: mask and sterile gloves  Local infiltration: lidocaine  Needle  Needle type: Quincke   Needle gauge: 22 G  Needle length: 3.5 in  Assessment  Sensory level: T10  Swirl obtained: Yes  CSF: clear  Attempts: 2  Hemodynamics: stable  Additional Notes  Negative paresthesia or heme throughout procedure  Preanesthetic Checklist  Completed: patient identified, IV checked, site marked, risks and benefits discussed, surgical/procedural consents, equipment checked, pre-op evaluation, timeout performed, anesthesia consent given, oxygen available, monitors applied/VS acknowledged, fire risk safety assessment completed and verbalized and blood product R/B/A discussed and consented

## 2024-04-23 NOTE — ANESTHESIA PRE PROCEDURE
Department of Anesthesiology  Preprocedure Note       Name:  Avery Jordan   Age:  63 y.o.  :  1960                                          MRN:  [id number]         Date:  2024      Surgeon: Surgeon(s):  Felton Hightower MD    Procedure: Procedure(s):  Left Total Hip Lateral Approach    Medications prior to admission:   Prior to Admission medications    Medication Sig Start Date End Date Taking? Authorizing Provider   glecaprevir-pibrentasvir (MAVYRET) 100-40 MG TABS tablet Take 3 each by mouth daily 23   Isela Ernst, APRN - CNP   etodolac (LODINE) 400 MG tablet Take 1 tablet by mouth 2 times daily    Domonique Lopez MD   lisinopril (PRINIVIL;ZESTRIL) 10 MG tablet Take 1 tablet by mouth daily    Domonique Lopez MD   ANORO ELLIPTA 62.5-25 MCG/INH AEPB inhaler inhale 1 puff by mouth and INTO THE LUNGS once daily 22   Domonique Lopez MD   gabapentin (NEURONTIN) 100 MG capsule take 1 capsule by mouth three times a day 22   Domonique Lopez MD   Ferrous Sulfate 324 MG TBEC Take by mouth    Domonique Lopez MD   apixaban (ELIQUIS) 5 MG TABS tablet Take by mouth 2 times daily    Domonique Lopez MD   clonazePAM (KLONOPIN) 0.5 MG tablet Take 1 tablet by mouth 2 times daily as needed.    Domonique Lopez MD   QUEtiapine (SEROQUEL) 300 MG tablet Take 400 mg by mouth nightly     Domonique Lopez MD   potassium chloride (KLOR-CON M) 20 MEQ extended release tablet Take 1 tablet by mouth 2 times daily 20   Ilda Gillespie MD   pantoprazole (PROTONIX) 20 MG tablet Take 1 tablet by mouth 2 times daily (before meals)    Domonique Lopez MD   furosemide (LASIX) 40 MG tablet Take 1 tablet by mouth daily 10/20/19   Mark Sharma MD       Current medications:    Current Facility-Administered Medications   Medication Dose Route Frequency Provider Last Rate Last Admin   • ceFAZolin (ANCEF) 2,000 mg in sterile water 20 mL IV syringe  2,000 mg

## 2024-04-23 NOTE — PROGRESS NOTES
1237  - pt arrives to pacu, respirations unlabored on 4 L NC, pt denies pain, VSS     1240 -xray at bedside    1300 - pt denies pain    1307 - pt meets criteria for discharge from pacu at this time, pt transported to hospitals in stable condition

## 2024-04-23 NOTE — PROGRESS NOTES
Patient admitted to [facility] room 20 with no family at bedside. Bed in low position side rails up call light in reach. Patient denies questions at this time.

## 2024-04-23 NOTE — PROGRESS NOTES
Pt returned to [facility] room 6. Vitals and assessment as charted. 0.9 infusing, @950ml to count from PACU. Pt has soda and applesauce. Family at the bedside. Pt and family verbalized understanding of discharge criteria and call light use. Call light in reach.

## 2024-04-23 NOTE — OP NOTE
dislocation, neurovascular injury, MI, DVT/PE, flare-up of medical problems, limb length inequality, stiffness, recovery times, etc.  He is scheduled for above procedure.    DETAILS OF OPERATION:  The patient was brought to the operating room and placed on the operating room table in supine position.  After spinal block was obtained, placed into the right lateral decubitus position.  All bony problems were well padded.  His left lower extremity was prepped and draped in a sterile fashion.  I used a standard direct lateral approach, incision was made, carried down through the subcutaneous tissue to the IT band.  This was divided distally and spread proximally revealing the gluteus medius.  A split was made in the anterior one-third of the gluteus medius.  The medius minimus and vastus lateralis were all brought up to the superior medial aspect of the hip.  The hip was then dislocated revealing severe arthritis, severe flattening of the femoral head.  An osteotomy was performed a fingerbreadth above the level of the lesser trochanter.  Next, anterior, posterior, and inferior retractors were placed around the acetabulum.  Excess labrum and pulvinar were removed.  The acetabulum was then inferior and medialized with a 44 reamer, reamed up to a 54, 53 trial was placed in, that fit quite nicely.  A 54 3-hole R3 acetabular shell was introduced, impacted at about 45 degrees of abduction and about 20 degrees of anteversion.  Next, 3 screws were placed in the posterior and superior quadrant for good purchase.  [place] hole eliminator was placed followed by the real polyethylene liner with the lip in the 11 o'clock and 5 o'clock posterior superior hemisphere.  Next, femoral neck was then delivered into the wound.  A box osteotome was used, followed by Kerri canal finder, followed by reaming to a size 16, broached to 16, which had good fit and fill, went to a high offset 0 ball, reduced the hip.  The head was actually quite

## 2024-04-23 NOTE — ANESTHESIA PROCEDURE NOTES
Peripheral Block    Patient location during procedure: OR  Reason for block: at surgeon's request  Start time: 4/23/2024 11:00 AM  Staffing  Performed: anesthesiologist   Anesthesiologist: Juan C Kern DO  Performed by: Juan C Kern DO  Authorized by: Juan C Kern DO    Preanesthetic Checklist  Completed: patient identified, IV checked, site marked, risks and benefits discussed, surgical/procedural consents, equipment checked, pre-op evaluation, timeout performed, anesthesia consent given, oxygen available, monitors applied/VS acknowledged, fire risk safety assessment completed and verbalized and blood product R/B/A discussed and consented  Peripheral Block   Patient position: supine  Prep: ChloraPrep  Provider prep: mask and sterile gloves  Patient monitoring: cardiac monitor, continuous pulse ox, frequent blood pressure checks, IV access, oxygen and responsive to questions  Block type: PENG  Laterality: left  Injection technique: single-shot  Guidance: ultrasound guided    Needle   Needle type: short-bevel   Needle gauge: 20 G  Needle localization: ultrasound guidance  Needle length: 10 cm  Assessment   Paresthesia pain: none  Slow fractionated injection: yes  Hemodynamics: stable  Outcomes: patient tolerated procedure well    Medications Administered  BUPivacaine (MARCAINE) injection 0.25% - Perineural   30 mL - 4/23/2024 11:00:00 AM  dexAMETHasone (DECADRON) injection 4 mg/mL - Perineural   4 mg - 4/23/2024 11:00:00 AM

## 2024-04-24 LAB
BASOPHILS ABSOLUTE: 0 THOU/MM3 (ref 0–0.1)
BASOPHILS NFR BLD AUTO: 0 %
DEPRECATED RDW RBC AUTO: 47.8 FL (ref 35–45)
EOSINOPHIL NFR BLD AUTO: 0 %
EOSINOPHILS ABSOLUTE: 0 THOU/MM3 (ref 0–0.4)
ERYTHROCYTE [DISTWIDTH] IN BLOOD BY AUTOMATED COUNT: 13.8 % (ref 11.5–14.5)
HCT VFR BLD AUTO: 39.6 % (ref 42–52)
HGB BLD-MCNC: 12.2 GM/DL (ref 14–18)
IMM GRANULOCYTES # BLD AUTO: 0.02 THOU/MM3 (ref 0–0.07)
IMM GRANULOCYTES NFR BLD AUTO: 0.3 %
LYMPHOCYTES ABSOLUTE: 0.7 THOU/MM3 (ref 1–4.8)
LYMPHOCYTES NFR BLD AUTO: 9.7 %
MCH RBC QN AUTO: 29.1 PG (ref 26–33)
MCHC RBC AUTO-ENTMCNC: 30.8 GM/DL (ref 32.2–35.5)
MCV RBC AUTO: 94.5 FL (ref 80–94)
MONOCYTES ABSOLUTE: 0.6 THOU/MM3 (ref 0.4–1.3)
MONOCYTES NFR BLD AUTO: 8.6 %
NEUTROPHILS NFR BLD AUTO: 81.4 %
NRBC BLD AUTO-RTO: 0 /100 WBC
PLATELET # BLD AUTO: 117 THOU/MM3 (ref 130–400)
PMV BLD AUTO: 10.6 FL (ref 9.4–12.4)
RBC # BLD AUTO: 4.19 MILL/MM3 (ref 4.7–6.1)
SEGMENTED NEUTROPHILS ABSOLUTE COUNT: 5.7 THOU/MM3 (ref 1.8–7.7)
WBC # BLD AUTO: 7 THOU/MM3 (ref 4.8–10.8)

## 2024-04-24 PROCEDURE — 97530 THERAPEUTIC ACTIVITIES: CPT

## 2024-04-24 PROCEDURE — 2580000003 HC RX 258: Performed by: ORTHOPAEDIC SURGERY

## 2024-04-24 PROCEDURE — 97166 OT EVAL MOD COMPLEX 45 MIN: CPT

## 2024-04-24 PROCEDURE — 2700000000 HC OXYGEN THERAPY PER DAY

## 2024-04-24 PROCEDURE — 6370000000 HC RX 637 (ALT 250 FOR IP): Performed by: ORTHOPAEDIC SURGERY

## 2024-04-24 PROCEDURE — 94640 AIRWAY INHALATION TREATMENT: CPT

## 2024-04-24 PROCEDURE — 36415 COLL VENOUS BLD VENIPUNCTURE: CPT

## 2024-04-24 PROCEDURE — 97535 SELF CARE MNGMENT TRAINING: CPT

## 2024-04-24 PROCEDURE — 97162 PT EVAL MOD COMPLEX 30 MIN: CPT

## 2024-04-24 PROCEDURE — 85025 COMPLETE CBC W/AUTO DIFF WBC: CPT

## 2024-04-24 PROCEDURE — 6360000002 HC RX W HCPCS: Performed by: ORTHOPAEDIC SURGERY

## 2024-04-24 RX ADMIN — SODIUM CHLORIDE, PRESERVATIVE FREE 10 ML: 5 INJECTION INTRAVENOUS at 20:07

## 2024-04-24 RX ADMIN — WATER 2000 MG: 1 INJECTION INTRAMUSCULAR; INTRAVENOUS; SUBCUTANEOUS at 11:44

## 2024-04-24 RX ADMIN — POTASSIUM CHLORIDE 20 MEQ: 1500 TABLET, EXTENDED RELEASE ORAL at 08:26

## 2024-04-24 RX ADMIN — WATER 2000 MG: 1 INJECTION INTRAMUSCULAR; INTRAVENOUS; SUBCUTANEOUS at 03:07

## 2024-04-24 RX ADMIN — GABAPENTIN 100 MG: 100 CAPSULE ORAL at 20:06

## 2024-04-24 RX ADMIN — HYDROCODONE BITARTRATE AND ACETAMINOPHEN 2 TABLET: 5; 325 TABLET ORAL at 15:53

## 2024-04-24 RX ADMIN — WATER 2000 MG: 1 INJECTION INTRAMUSCULAR; INTRAVENOUS; SUBCUTANEOUS at 18:37

## 2024-04-24 RX ADMIN — TRAMADOL HYDROCHLORIDE 50 MG: 50 TABLET ORAL at 18:36

## 2024-04-24 RX ADMIN — MORPHINE SULFATE 2 MG: 2 INJECTION, SOLUTION INTRAMUSCULAR; INTRAVENOUS at 04:01

## 2024-04-24 RX ADMIN — TRAMADOL HYDROCHLORIDE 50 MG: 50 TABLET ORAL at 22:49

## 2024-04-24 RX ADMIN — APIXABAN 2.5 MG: 2.5 TABLET, FILM COATED ORAL at 08:27

## 2024-04-24 RX ADMIN — MORPHINE SULFATE 2 MG: 2 INJECTION, SOLUTION INTRAMUSCULAR; INTRAVENOUS at 06:27

## 2024-04-24 RX ADMIN — TIOTROPIUM BROMIDE AND OLODATEROL 2 PUFF: 3.124; 2.736 SPRAY, METERED RESPIRATORY (INHALATION) at 08:15

## 2024-04-24 RX ADMIN — HYDROCODONE BITARTRATE AND ACETAMINOPHEN 2 TABLET: 5; 325 TABLET ORAL at 20:06

## 2024-04-24 RX ADMIN — HYDROCODONE BITARTRATE AND ACETAMINOPHEN 2 TABLET: 5; 325 TABLET ORAL at 03:06

## 2024-04-24 RX ADMIN — HYDROCODONE BITARTRATE AND ACETAMINOPHEN 2 TABLET: 5; 325 TABLET ORAL at 11:43

## 2024-04-24 RX ADMIN — SODIUM CHLORIDE: 9 INJECTION, SOLUTION INTRAVENOUS at 04:05

## 2024-04-24 RX ADMIN — CYCLOBENZAPRINE 10 MG: 10 TABLET, FILM COATED ORAL at 08:26

## 2024-04-24 RX ADMIN — APIXABAN 2.5 MG: 2.5 TABLET, FILM COATED ORAL at 20:06

## 2024-04-24 RX ADMIN — LISINOPRIL 10 MG: 10 TABLET ORAL at 08:27

## 2024-04-24 RX ADMIN — PANTOPRAZOLE SODIUM 40 MG: 40 TABLET, DELAYED RELEASE ORAL at 06:49

## 2024-04-24 RX ADMIN — QUETIAPINE FUMARATE 400 MG: 400 TABLET ORAL at 20:06

## 2024-04-24 RX ADMIN — GABAPENTIN 100 MG: 100 CAPSULE ORAL at 15:53

## 2024-04-24 RX ADMIN — POTASSIUM CHLORIDE 20 MEQ: 1500 TABLET, EXTENDED RELEASE ORAL at 20:08

## 2024-04-24 RX ADMIN — FUROSEMIDE 40 MG: 40 TABLET ORAL at 08:27

## 2024-04-24 RX ADMIN — MORPHINE SULFATE 2 MG: 2 INJECTION, SOLUTION INTRAMUSCULAR; INTRAVENOUS at 00:20

## 2024-04-24 RX ADMIN — CYCLOBENZAPRINE 10 MG: 10 TABLET, FILM COATED ORAL at 20:06

## 2024-04-24 RX ADMIN — GABAPENTIN 100 MG: 100 CAPSULE ORAL at 08:27

## 2024-04-24 RX ADMIN — PANTOPRAZOLE SODIUM 40 MG: 40 TABLET, DELAYED RELEASE ORAL at 15:53

## 2024-04-24 RX ADMIN — MORPHINE SULFATE 2 MG: 2 INJECTION, SOLUTION INTRAMUSCULAR; INTRAVENOUS at 10:12

## 2024-04-24 ASSESSMENT — PAIN DESCRIPTION - ORIENTATION
ORIENTATION: LEFT
ORIENTATION: LEFT;OUTER
ORIENTATION: LEFT

## 2024-04-24 ASSESSMENT — PAIN SCALES - GENERAL
PAINLEVEL_OUTOF10: 7
PAINLEVEL_OUTOF10: 6
PAINLEVEL_OUTOF10: 5
PAINLEVEL_OUTOF10: 8
PAINLEVEL_OUTOF10: 8
PAINLEVEL_OUTOF10: 3
PAINLEVEL_OUTOF10: 8
PAINLEVEL_OUTOF10: 8
PAINLEVEL_OUTOF10: 5
PAINLEVEL_OUTOF10: 5
PAINLEVEL_OUTOF10: 6

## 2024-04-24 ASSESSMENT — PAIN SCALES - WONG BAKER
WONGBAKER_NUMERICALRESPONSE: HURTS A LITTLE BIT
WONGBAKER_NUMERICALRESPONSE: HURTS A LITTLE BIT

## 2024-04-24 ASSESSMENT — PAIN DESCRIPTION - LOCATION
LOCATION: HIP

## 2024-04-24 ASSESSMENT — PAIN DESCRIPTION - DESCRIPTORS
DESCRIPTORS: ACHING;THROBBING
DESCRIPTORS: SHARP;ACHING
DESCRIPTORS: ACHING;THROBBING
DESCRIPTORS: ACHING
DESCRIPTORS: ACHING;THROBBING
DESCRIPTORS: ACHING;SHARP

## 2024-04-24 ASSESSMENT — PAIN DESCRIPTION - PAIN TYPE: TYPE: SURGICAL PAIN

## 2024-04-24 NOTE — PROGRESS NOTES
[facility]  INPATIENT PHYSICAL THERAPY  EVALUATION  [facility] ORTHOPEDICS 7K - 7K-27/027-A    Time In: 1055  Time Out: 1120  Timed Code Treatment Minutes: 12 Minutes  Minutes: 25          Date: 2024  Patient Name: Avery Jordan,  Gender:  male        MRN: [id number]  : 1960  (63 y.o.)      Referring Practitioner: Felton Hightower MD  Diagnosis: primary OA of L hip  Additional Pertinent Hx: This 63 year old male is s/p Left Total Hip Lateral Approach by Dr. Hightower on 24.     Restrictions/Precautions:  Restrictions/Precautions: Weight Bearing, Fall Risk, General Precautions  Left Lower Extremity Weight Bearing: Weight Bearing As Tolerated  Position Activity Restriction  Hip Precautions: Posterior hip precautions    Subjective:  Chart Reviewed: Yes  Patient assessed for rehabilitation services?: Yes  Family / Caregiver Present: No  Subjective: RN tawnya'd PT eval. Pt sitting up in chair upon arrival to room. Agreeable to eval with encouragement.    General:  Overall Orientation Status: Within Functional Limits  Orientation Level: Oriented X4  Overall Cognitive Status: WFL  Vision: Within Functional Limits  Hearing: Within functional limits       Pain: 8/10: hip pain    Vitals: Vitals not assessed per clinical judgement, see nursing flowsheet    Social/Functional History:    Lives With: Son  Type of Home: House  Home Layout: Two level, Performs ADL's on one level, Able to Live on Main level with bedroom/bathroom  Home Access: Stairs to enter without rails  Entrance Stairs - Number of Steps: 2 BRANDON Grab bar by front door.  Home Equipment: Walker, 4 wheeled, Cane     Bathroom Shower/Tub: Tub/Shower unit  Bathroom Toilet: Handicap height  Bathroom Accessibility: Walker accessible       ADL Assistance: Needs assistance (Son assists bathing, dressing. Pt reports indep with toileting.)  Homemaking Assistance: Needs assistance (Son completes all cooking, cleaning, and laundry.)  Ambulation

## 2024-04-24 NOTE — DISCHARGE INSTRUCTIONS
Follow up in 2 weeks  Wbat with walker  Andrew camarillo both legs  May shower when no drainage  Daily dressing change until no drainage  Weed sent to pharmacy from [facility]  Follow hip precautions

## 2024-04-25 PROCEDURE — 97535 SELF CARE MNGMENT TRAINING: CPT

## 2024-04-25 PROCEDURE — 6360000002 HC RX W HCPCS: Performed by: ORTHOPAEDIC SURGERY

## 2024-04-25 PROCEDURE — 94761 N-INVAS EAR/PLS OXIMETRY MLT: CPT

## 2024-04-25 PROCEDURE — 2700000000 HC OXYGEN THERAPY PER DAY

## 2024-04-25 PROCEDURE — 94640 AIRWAY INHALATION TREATMENT: CPT

## 2024-04-25 PROCEDURE — 6370000000 HC RX 637 (ALT 250 FOR IP): Performed by: ORTHOPAEDIC SURGERY

## 2024-04-25 PROCEDURE — 2580000003 HC RX 258: Performed by: ORTHOPAEDIC SURGERY

## 2024-04-25 RX ORDER — HYDROCODONE BITARTRATE AND ACETAMINOPHEN 5; 325 MG/1; MG/1
1 TABLET ORAL EVERY 4 HOURS PRN
Qty: 42 TABLET | Refills: 0 | Status: SHIPPED | OUTPATIENT
Start: 2024-04-25 | End: 2024-05-02

## 2024-04-25 RX ADMIN — WATER 2000 MG: 1 INJECTION INTRAMUSCULAR; INTRAVENOUS; SUBCUTANEOUS at 19:24

## 2024-04-25 RX ADMIN — TRAMADOL HYDROCHLORIDE 50 MG: 50 TABLET ORAL at 03:03

## 2024-04-25 RX ADMIN — TRAMADOL HYDROCHLORIDE 50 MG: 50 TABLET ORAL at 16:30

## 2024-04-25 RX ADMIN — POTASSIUM CHLORIDE 20 MEQ: 1500 TABLET, EXTENDED RELEASE ORAL at 08:05

## 2024-04-25 RX ADMIN — PANTOPRAZOLE SODIUM 40 MG: 40 TABLET, DELAYED RELEASE ORAL at 16:30

## 2024-04-25 RX ADMIN — PANTOPRAZOLE SODIUM 40 MG: 40 TABLET, DELAYED RELEASE ORAL at 05:00

## 2024-04-25 RX ADMIN — TIOTROPIUM BROMIDE AND OLODATEROL 2 PUFF: 3.124; 2.736 SPRAY, METERED RESPIRATORY (INHALATION) at 08:32

## 2024-04-25 RX ADMIN — APIXABAN 2.5 MG: 2.5 TABLET, FILM COATED ORAL at 20:54

## 2024-04-25 RX ADMIN — GABAPENTIN 100 MG: 100 CAPSULE ORAL at 13:16

## 2024-04-25 RX ADMIN — HYDROCODONE BITARTRATE AND ACETAMINOPHEN 2 TABLET: 5; 325 TABLET ORAL at 20:54

## 2024-04-25 RX ADMIN — WATER 2000 MG: 1 INJECTION INTRAMUSCULAR; INTRAVENOUS; SUBCUTANEOUS at 13:17

## 2024-04-25 RX ADMIN — FUROSEMIDE 40 MG: 40 TABLET ORAL at 08:05

## 2024-04-25 RX ADMIN — POTASSIUM CHLORIDE 20 MEQ: 1500 TABLET, EXTENDED RELEASE ORAL at 20:54

## 2024-04-25 RX ADMIN — HYDROCODONE BITARTRATE AND ACETAMINOPHEN 2 TABLET: 5; 325 TABLET ORAL at 13:16

## 2024-04-25 RX ADMIN — HYDROCODONE BITARTRATE AND ACETAMINOPHEN 2 TABLET: 5; 325 TABLET ORAL at 08:05

## 2024-04-25 RX ADMIN — HYDROCODONE BITARTRATE AND ACETAMINOPHEN 2 TABLET: 5; 325 TABLET ORAL at 00:34

## 2024-04-25 RX ADMIN — SODIUM CHLORIDE, PRESERVATIVE FREE 10 ML: 5 INJECTION INTRAVENOUS at 08:05

## 2024-04-25 RX ADMIN — APIXABAN 2.5 MG: 2.5 TABLET, FILM COATED ORAL at 08:05

## 2024-04-25 RX ADMIN — QUETIAPINE FUMARATE 400 MG: 400 TABLET ORAL at 20:54

## 2024-04-25 RX ADMIN — SODIUM CHLORIDE, PRESERVATIVE FREE 10 ML: 5 INJECTION INTRAVENOUS at 20:54

## 2024-04-25 RX ADMIN — WATER 2000 MG: 1 INJECTION INTRAMUSCULAR; INTRAVENOUS; SUBCUTANEOUS at 03:04

## 2024-04-25 RX ADMIN — LISINOPRIL 10 MG: 10 TABLET ORAL at 08:05

## 2024-04-25 RX ADMIN — GABAPENTIN 100 MG: 100 CAPSULE ORAL at 20:54

## 2024-04-25 RX ADMIN — GABAPENTIN 100 MG: 100 CAPSULE ORAL at 08:05

## 2024-04-25 ASSESSMENT — PAIN SCALES - GENERAL
PAINLEVEL_OUTOF10: 5
PAINLEVEL_OUTOF10: 5
PAINLEVEL_OUTOF10: 6
PAINLEVEL_OUTOF10: 5
PAINLEVEL_OUTOF10: 3
PAINLEVEL_OUTOF10: 8
PAINLEVEL_OUTOF10: 7
PAINLEVEL_OUTOF10: 5
PAINLEVEL_OUTOF10: 8

## 2024-04-25 ASSESSMENT — PAIN DESCRIPTION - DESCRIPTORS
DESCRIPTORS: THROBBING;ACHING
DESCRIPTORS: ACHING

## 2024-04-25 ASSESSMENT — PAIN DESCRIPTION - LOCATION
LOCATION: HIP
LOCATION: HIP

## 2024-04-25 ASSESSMENT — PAIN - FUNCTIONAL ASSESSMENT
PAIN_FUNCTIONAL_ASSESSMENT: 0-10
PAIN_FUNCTIONAL_ASSESSMENT: PREVENTS OR INTERFERES SOME ACTIVE ACTIVITIES AND ADLS

## 2024-04-25 ASSESSMENT — PAIN DESCRIPTION - ORIENTATION
ORIENTATION: LEFT
ORIENTATION: LEFT

## 2024-04-25 NOTE — CARE COORDINATION
CM Note    Met with Avery. He is from home with his sons and has a walker and oxygen through [employer] at night only. Avery has planned to discharge with home health but is not moving as well today. He would like to work with therapy this afternoon and see if he would be better suited for rehab at a SNF.     Updated SW on POC. Awaiting therapy notes and team will follow up with patient for dispo planning.        04/25/24 7507   Condition of Participation: Discharge Planning   The Patient and/or Patient Representative was provided with a Choice of Provider? Patient   The Patient and/Or Patient Representative agree with the Discharge Plan? Yes  (plan TBD)   Freedom of Choice list was provided with basic dialogue that supports the patient's individualized plan of care/goals, treatment preferences, and shares the quality data associated with the providers?  Yes  (HH list and SNF list)

## 2024-04-25 NOTE — PROGRESS NOTES
Progress Note    Subjective:     Post-Operative Day: 2 Status Post left Total Hip Arthroplasty  Systemic or Specific Complaints:No Complaints    Objective:   VITALS:  /76   Pulse 74   Temp 98.1 °F (36.7 °C) (Oral)   Resp 14   Ht 1.753 m (5' 9\")   Wt 116.5 kg (256 lb 12.8 oz)   SpO2 98%   BMI 37.92 kg/m²   24HR INTAKE/OUTPUT:    Intake/Output Summary (Last 24 hours) at 2024 08  Last data filed at 2024  Gross per 24 hour   Intake 1120 ml   Output 1850 ml   Net -730 ml     TEMPERATURE:  Current - Temp: 98.1 °F (36.7 °C); Max - Temp  Av.1 °F (36.7 °C)  Min: 97.7 °F (36.5 °C)  Max: 98.4 °F (36.9 °C)    General: alert, appears stated age, and cooperative   Wound: Wound clean and dry no evidence of infection., No Erythema, and No Drainage   DVT Exam: No evidence of DVT seen on physical exam.  Negative Kelley's sign.  No cords or calf tenderness.     NVI in lower extremity. Thigh swollen but soft. Moving foot and ankle.      Data Review  CBC:   Lab Results   Component Value Date/Time    WBC 7.0 2024 05:46 AM    RBC 4.19 2024 05:46 AM    HGB 12.2 2024 05:46 AM    HCT 39.6 2024 05:46 AM     2024 05:46 AM    INR 1.52 2022 01:42 PM     BMP:   Lab Results   Component Value Date/Time     2024 02:39 PM    K 4.1 2024 02:39 PM    K 3.8 2021 07:52 AM    CL 97 2024 02:39 PM    CO2 34 2024 02:39 PM    BUN 17 2024 02:39 PM    CREATININE 1.02 2024 02:39 PM    GLUCOSE 121 2024 02:39 PM       Assessment:     Status Post left Total Hip Arthroplasty. Doing well postoperatively.     Plan:      1:  Continue Total Hip Replacement protocol   2:  Continue Deep venous thrombosis prophylaxis  3:  Continue physical therapy with Total Hip precautions  4:  Continue Pain Control  5:  Possible home with home health   Olumiant Pregnancy And Lactation Text: Based on animal studies, Olumiant may cause embryo-fetal harm when administered to pregnant women.  The medication should not be used in pregnancy.  Breastfeeding is not recommended during treatment.

## 2024-04-25 NOTE — PROGRESS NOTES
[facility]  STR ORTHOPEDICS 7K  Occupational Therapy  Daily Note  Time:   Time In: 1430  Time Out: 1457  Timed Code Treatment Minutes: 27 Minutes  Minutes: 27          Date: 2024  Patient Name: Avery Jordan,   Gender: male      Room: [facility]27/027-A  MRN: [id number]  : 1960  (63 y.o.)  Referring Practitioner: Felton Hightower MD  Diagnosis: Primary Osteoarthritis of Left Hip  Additional Pertinent Hx: This 63 year old male is s/p Left Total Hip Lateral Approach by Dr. Hightower on 24.    Restrictions/Precautions:  Restrictions/Precautions: Weight Bearing, Fall Risk, General Precautions  Left Lower Extremity Weight Bearing: Weight Bearing As Tolerated  Position Activity Restriction  Hip Precautions: Posterior hip precautions     Social/Functional History:  Lives With: Son  Type of Home: House  Home Layout: Two level, Performs ADL's on one level, Able to Live on Main level with bedroom/bathroom  Home Access: Stairs to enter without rails  Entrance Stairs - Number of Steps: 2 BRANDON Grab bar by front door.  Home Equipment: Walker, 4 wheeled, Cane   Bathroom Shower/Tub: Tub/Shower unit  Bathroom Toilet: Handicap height  Bathroom Accessibility: Walker accessible       ADL Assistance: Needs assistance (Son assists bathing, dressing. Pt reports indep with toileting.)  Homemaking Assistance: Needs assistance (Son completes all cooking, cleaning, and laundry.)  Ambulation Assistance: Independent  Transfer Assistance: Independent    Active : Yes  Occupation: Retired, On disability  Additional Comments: Son works full time and pt is home alone. Pt wears 3 L O2 at baseline.    SUBJECTIVE: RN okayed OT session.  Pt. In room and agreeable to participate on second attempt with encouragement.  First attempt Pt. Declined too fatigued.     PAIN: 8/10: LLE    Vitals: Oxygen: 3L O2 NC >90%     COGNITION: Slow Processing, Decreased Insight, Decreased Problem Solving, and Decreased Safety

## 2024-04-25 NOTE — PROGRESS NOTES
[facility]  PHYSICAL THERAPY MISSED TREATMENT NOTE  [facility] ORTHOPEDICS 7K    Date: 2024  Patient Name: Avery Jordan        MRN: [id number]   : 1960  (63 y.o.)  Gender: male   Referring Practitioner: Felton Hightower MD  Diagnosis: primary OA of L hip         REASON FOR MISSED TREATMENT:  Patient refused treatment.  Patient refused twice at this date. On first attempt patient voices he is very tired and \"has not slept in 4 days\". Ppt voices he would attempt in the afternoon. On second attempt patient voices that \"I just got to the chair and Im having a lot of pain \". Case management voices that patient now may be considering SNF vs HH. PT to attempt to see at next available date as time allows and patient is willing.

## 2024-04-26 PROCEDURE — 97530 THERAPEUTIC ACTIVITIES: CPT

## 2024-04-26 PROCEDURE — 97535 SELF CARE MNGMENT TRAINING: CPT

## 2024-04-26 PROCEDURE — 6370000000 HC RX 637 (ALT 250 FOR IP): Performed by: ORTHOPAEDIC SURGERY

## 2024-04-26 PROCEDURE — 2580000003 HC RX 258: Performed by: ORTHOPAEDIC SURGERY

## 2024-04-26 PROCEDURE — 94640 AIRWAY INHALATION TREATMENT: CPT

## 2024-04-26 PROCEDURE — 2700000000 HC OXYGEN THERAPY PER DAY

## 2024-04-26 PROCEDURE — 94761 N-INVAS EAR/PLS OXIMETRY MLT: CPT

## 2024-04-26 PROCEDURE — 6360000002 HC RX W HCPCS: Performed by: ORTHOPAEDIC SURGERY

## 2024-04-26 PROCEDURE — 97110 THERAPEUTIC EXERCISES: CPT

## 2024-04-26 RX ADMIN — APIXABAN 2.5 MG: 2.5 TABLET, FILM COATED ORAL at 08:27

## 2024-04-26 RX ADMIN — ACETAMINOPHEN 650 MG: 325 TABLET ORAL at 08:34

## 2024-04-26 RX ADMIN — HYDROCODONE BITARTRATE AND ACETAMINOPHEN 2 TABLET: 5; 325 TABLET ORAL at 12:15

## 2024-04-26 RX ADMIN — PANTOPRAZOLE SODIUM 40 MG: 40 TABLET, DELAYED RELEASE ORAL at 15:14

## 2024-04-26 RX ADMIN — TIOTROPIUM BROMIDE AND OLODATEROL 2 PUFF: 3.124; 2.736 SPRAY, METERED RESPIRATORY (INHALATION) at 08:31

## 2024-04-26 RX ADMIN — HYDROCODONE BITARTRATE AND ACETAMINOPHEN 2 TABLET: 5; 325 TABLET ORAL at 23:23

## 2024-04-26 RX ADMIN — TRAMADOL HYDROCHLORIDE 50 MG: 50 TABLET ORAL at 11:03

## 2024-04-26 RX ADMIN — WATER 2000 MG: 1 INJECTION INTRAMUSCULAR; INTRAVENOUS; SUBCUTANEOUS at 10:50

## 2024-04-26 RX ADMIN — POTASSIUM CHLORIDE 20 MEQ: 1500 TABLET, EXTENDED RELEASE ORAL at 20:07

## 2024-04-26 RX ADMIN — SODIUM CHLORIDE, PRESERVATIVE FREE 10 ML: 5 INJECTION INTRAVENOUS at 21:00

## 2024-04-26 RX ADMIN — WATER 2000 MG: 1 INJECTION INTRAMUSCULAR; INTRAVENOUS; SUBCUTANEOUS at 05:45

## 2024-04-26 RX ADMIN — SODIUM CHLORIDE, PRESERVATIVE FREE 10 ML: 5 INJECTION INTRAVENOUS at 08:28

## 2024-04-26 RX ADMIN — GABAPENTIN 100 MG: 100 CAPSULE ORAL at 20:07

## 2024-04-26 RX ADMIN — POTASSIUM CHLORIDE 20 MEQ: 1500 TABLET, EXTENDED RELEASE ORAL at 08:27

## 2024-04-26 RX ADMIN — PANTOPRAZOLE SODIUM 40 MG: 40 TABLET, DELAYED RELEASE ORAL at 05:40

## 2024-04-26 RX ADMIN — APIXABAN 2.5 MG: 2.5 TABLET, FILM COATED ORAL at 20:07

## 2024-04-26 RX ADMIN — FUROSEMIDE 40 MG: 40 TABLET ORAL at 08:28

## 2024-04-26 RX ADMIN — ACETAMINOPHEN 650 MG: 325 TABLET ORAL at 21:42

## 2024-04-26 RX ADMIN — GABAPENTIN 100 MG: 100 CAPSULE ORAL at 08:28

## 2024-04-26 RX ADMIN — QUETIAPINE FUMARATE 400 MG: 400 TABLET ORAL at 20:07

## 2024-04-26 RX ADMIN — GABAPENTIN 100 MG: 100 CAPSULE ORAL at 15:14

## 2024-04-26 RX ADMIN — CYCLOBENZAPRINE 10 MG: 10 TABLET, FILM COATED ORAL at 11:03

## 2024-04-26 ASSESSMENT — PAIN SCALES - GENERAL
PAINLEVEL_OUTOF10: 6
PAINLEVEL_OUTOF10: 6
PAINLEVEL_OUTOF10: 5
PAINLEVEL_OUTOF10: 10
PAINLEVEL_OUTOF10: 7
PAINLEVEL_OUTOF10: 6
PAINLEVEL_OUTOF10: 8
PAINLEVEL_OUTOF10: 9

## 2024-04-26 ASSESSMENT — PAIN DESCRIPTION - ORIENTATION
ORIENTATION: RIGHT;LEFT
ORIENTATION: RIGHT;LEFT

## 2024-04-26 ASSESSMENT — PAIN DESCRIPTION - LOCATION
LOCATION: LEG

## 2024-04-26 ASSESSMENT — PAIN DESCRIPTION - DESCRIPTORS
DESCRIPTORS: ACHING
DESCRIPTORS: ACHING

## 2024-04-26 NOTE — PROGRESS NOTES
[facility]  STR ORTHOPEDICS 7  Occupational Therapy  Daily Note  Time:   Time In: 920  Time Out: 50  Timed Code Treatment Minutes: 30 Minutes  Minutes: 30          Date: 2024  Patient Name: Avery Jordan,   Gender: male      Room: [facility]27/027-A  MRN: [id number]  : 1960  (63 y.o.)  Referring Practitioner: Felton Hightower MD  Diagnosis: Primary Osteoarthritis of Left Hip  Additional Pertinent Hx: This 63 year old male is s/p Left Total Hip Lateral Approach by Dr. Hightower on 24.    Restrictions/Precautions:  Restrictions/Precautions: Weight Bearing, Fall Risk, General Precautions  Left Lower Extremity Weight Bearing: Weight Bearing As Tolerated  Position Activity Restriction  Hip Precautions: Posterior hip precautions     Social/Functional History:  Lives With: Son  Type of Home: House  Home Layout: Two level, Performs ADL's on one level, Able to Live on Main level with bedroom/bathroom  Home Access: Stairs to enter without rails  Entrance Stairs - Number of Steps: 2 BRANDON Grab bar by front door.  Home Equipment: Walker, 4 wheeled, Cane   Bathroom Shower/Tub: Tub/Shower unit  Bathroom Toilet: Handicap height  Bathroom Accessibility: Walker accessible       ADL Assistance: Needs assistance (Son assists bathing, dressing. Pt reports indep with toileting.)  Homemaking Assistance: Needs assistance (Son completes all cooking, cleaning, and laundry.)  Ambulation Assistance: Independent  Transfer Assistance: Independent    Active : Yes  Occupation: Retired, On disability  Additional Comments: Son works full time and pt is home alone. Pt wears 3 L O2 at baseline.    SUBJECTIVE: RN okayed OT session.  Pt. In room and agreeable to participate with encouragement.  Pt. Reports slept in chair last evening and has increased stiffness.  Educated Pt. On positioning, rotating from bed and recliner.    PAIN: 10: LLE already medicated     Vitals: 3L NC with O2 sats at 96%    COGNITION:

## 2024-04-26 NOTE — PROGRESS NOTES
[facility]  INPATIENT PHYSICAL THERAPY  DAILY NOTE  [facility] ORTHOPEDICS 7K - 7K-27/027-A    Time In: 1438  Time Out: 1501  Timed Code Treatment Minutes: 23 Minutes  Minutes: 23          Date: 2024  Patient Name: Avery Jordan,  Gender:  male        MRN: [id number]  : 1960  (63 y.o.)     Referring Practitioner: Felton Hightower MD  Diagnosis: primary OA of L hip  Additional Pertinent Hx: This 63 year old male is s/p Left Total Hip Lateral Approach by Dr. Hightower on 24.     Prior Level of Function:  Lives With: Son  Type of Home: House  Home Layout: Two level, Performs ADL's on one level, Able to Live on Main level with bedroom/bathroom  Home Access: Stairs to enter without rails  Entrance Stairs - Number of Steps: 2 BRANDON Grab bar by front door.  Home Equipment: Walker, 4 wheeled, Cane   Bathroom Shower/Tub: Tub/Shower unit  Bathroom Toilet: Handicap height  Bathroom Accessibility: Walker accessible    ADL Assistance: Needs assistance (Son assists bathing, dressing. Pt reports indep with toileting.)  Homemaking Assistance: Needs assistance (Son completes all cooking, cleaning, and laundry.)  Ambulation Assistance: Independent  Transfer Assistance: Independent  Active : Yes  Additional Comments: Son works full time and pt is home alone. Pt wears 3 L O2 at baseline.    Restrictions/Precautions:  Restrictions/Precautions: Weight Bearing, Fall Risk, General Precautions  Left Lower Extremity Weight Bearing: Weight Bearing As Tolerated  Position Activity Restriction  Hip Precautions: Posterior hip precautions     SUBJECTIVE: LUDA Michaud approved therapy session. Patient seated in BS chair and agreeable to therapy session. Patient stating he did not want to take a walk nor transfer d/t patient had just returned to his room from activity with his son. Patient reporting he just got back into his chair.    PAIN: 6/10: L hip    Vitals: Vitals not assessed per clinical judgement, see

## 2024-04-26 NOTE — CARE COORDINATION
4/26/24, 11:38 AM EDT    DISCHARGE PLANNING EVALUATION    Spoke with patient again about discharge plan.  He is not interested in snf, although he is having difficulty with mobility.  He plans to go home with his son, is agreeable to home health.  He is slow to respond in conversation and asked to discuss plans at a later time.

## 2024-04-26 NOTE — DISCHARGE INSTR - COC
Nutrition Therapy:   { JEFF Diet List:729276050}    Routes of Feeding: {[facility] DME Other Feedings:786527463}  Liquids: {Slp liquid thickness:59457}  Daily Fluid Restriction: {P DME Yes amt example:409972387}  Last Modified Barium Swallow with Video (Video Swallowing Test): {Done Not Done Date:}    Treatments at the Time of Hospital Discharge:   Respiratory Treatments: ***  Oxygen Therapy:  {Therapy; copd oxygen:05657}  Ventilator:    { CC Vent List:011194961}    Rehab Therapies: {THERAPEUTIC INTERVENTION:2000148033}  Weight Bearing Status/Restrictions: { CC Weight Bearin}  Other Medical Equipment (for information only, NOT a DME order):  {EQUIPMENT:274932235}  Other Treatments: ***    Patient's personal belongings (please select all that are sent with patient):  {[facility] DME Belongings:719312551}    RN SIGNATURE:  {Esignature:975760002}    CASE MANAGEMENT/SOCIAL WORK SECTION    Inpatient Status Date: ***    Readmission Risk Assessment Score:  Readmission Risk              Risk of Unplanned Readmission:  0           Discharging to Facility/ Agency   Name:   Address:  Phone:  Fax:    Dialysis Facility (if applicable)   Name:  Address:  Dialysis Schedule:  Phone:  Fax:    / signature: {Esignature:393482494}    PHYSICIAN SECTION    Prognosis: {Prognosis:4863995783}    Condition at Discharge: { Patient Condition:185318014}    Rehab Potential (if transferring to Rehab): {Prognosis:9785631971}    Recommended Labs or Other Treatments After Discharge: ***    Physician Certification: I certify the above information and transfer of Avery Jordan  is necessary for the continuing treatment of the diagnosis listed and that he requires Skilled Nursing Facility for less 30 days.     Update Admission H&P: No change in H&P    PHYSICIAN SIGNATURE:  Electronically signed by LADARIUS Huertas on 24 at 4:33 PM EDT

## 2024-04-26 NOTE — PROGRESS NOTES
Progress Note    Subjective:     Post-Operative Day: 3 Status Post left Total Hip Arthroplasty  Systemic or Specific Complaints:No Complaints    Objective:   VITALS:  BP 91/67   Pulse 77   Temp 98.1 °F (36.7 °C) (Oral)   Resp 16   Ht 1.753 m (5' 9\")   Wt 116.5 kg (256 lb 12.8 oz)   SpO2 100%   BMI 37.92 kg/m²   24HR INTAKE/OUTPUT:    Intake/Output Summary (Last 24 hours) at 2024 0857  Last data filed at 2024 0542  Gross per 24 hour   Intake 710 ml   Output 2325 ml   Net -1615 ml       TEMPERATURE:  Current - Temp: 98.1 °F (36.7 °C); Max - Temp  Av °F (36.7 °C)  Min: 97.5 °F (36.4 °C)  Max: 98.2 °F (36.8 °C)        Data Review  CBC:   Lab Results   Component Value Date/Time    WBC 7.0 2024 05:46 AM    RBC 4.19 2024 05:46 AM    HGB 12.2 2024 05:46 AM    HCT 39.6 2024 05:46 AM     2024 05:46 AM    INR 1.52 2022 01:42 PM     BMP:   Lab Results   Component Value Date/Time     2024 02:39 PM    K 4.1 2024 02:39 PM    K 3.8 2021 07:52 AM    CL 97 2024 02:39 PM    CO2 34 2024 02:39 PM    BUN 17 2024 02:39 PM    CREATININE 1.02 2024 02:39 PM    GLUCOSE 121 2024 02:39 PM       Assessment:     Status Post left Total Hip Arthroplasty. Doing well postoperatively.     Plan:      1:  Continue Total Hip Replacement protocol   2:  Continue Deep venous thrombosis prophylaxis  3:  Continue physical therapy with Total Hip precautions  4:  Continue Pain Control  5:  Possible home with home health vs SNF

## 2024-04-27 PROCEDURE — 6370000000 HC RX 637 (ALT 250 FOR IP): Performed by: ORTHOPAEDIC SURGERY

## 2024-04-27 PROCEDURE — 2580000003 HC RX 258: Performed by: ORTHOPAEDIC SURGERY

## 2024-04-27 PROCEDURE — 94640 AIRWAY INHALATION TREATMENT: CPT

## 2024-04-27 PROCEDURE — 2700000000 HC OXYGEN THERAPY PER DAY

## 2024-04-27 PROCEDURE — 97530 THERAPEUTIC ACTIVITIES: CPT

## 2024-04-27 PROCEDURE — 97116 GAIT TRAINING THERAPY: CPT

## 2024-04-27 PROCEDURE — 97110 THERAPEUTIC EXERCISES: CPT

## 2024-04-27 RX ADMIN — HYDROCODONE BITARTRATE AND ACETAMINOPHEN 2 TABLET: 5; 325 TABLET ORAL at 19:11

## 2024-04-27 RX ADMIN — SODIUM CHLORIDE, PRESERVATIVE FREE 10 ML: 5 INJECTION INTRAVENOUS at 08:09

## 2024-04-27 RX ADMIN — LISINOPRIL 10 MG: 10 TABLET ORAL at 08:12

## 2024-04-27 RX ADMIN — TIOTROPIUM BROMIDE AND OLODATEROL 2 PUFF: 3.124; 2.736 SPRAY, METERED RESPIRATORY (INHALATION) at 10:24

## 2024-04-27 RX ADMIN — PANTOPRAZOLE SODIUM 40 MG: 40 TABLET, DELAYED RELEASE ORAL at 06:19

## 2024-04-27 RX ADMIN — QUETIAPINE FUMARATE 400 MG: 400 TABLET ORAL at 20:12

## 2024-04-27 RX ADMIN — FUROSEMIDE 40 MG: 40 TABLET ORAL at 08:13

## 2024-04-27 RX ADMIN — APIXABAN 2.5 MG: 2.5 TABLET, FILM COATED ORAL at 20:12

## 2024-04-27 RX ADMIN — ACETAMINOPHEN 650 MG: 325 TABLET ORAL at 04:44

## 2024-04-27 RX ADMIN — APIXABAN 2.5 MG: 2.5 TABLET, FILM COATED ORAL at 08:13

## 2024-04-27 RX ADMIN — PANTOPRAZOLE SODIUM 40 MG: 40 TABLET, DELAYED RELEASE ORAL at 16:48

## 2024-04-27 RX ADMIN — GABAPENTIN 100 MG: 100 CAPSULE ORAL at 13:37

## 2024-04-27 RX ADMIN — POTASSIUM CHLORIDE 20 MEQ: 1500 TABLET, EXTENDED RELEASE ORAL at 20:12

## 2024-04-27 RX ADMIN — POTASSIUM CHLORIDE 20 MEQ: 1500 TABLET, EXTENDED RELEASE ORAL at 08:12

## 2024-04-27 RX ADMIN — GABAPENTIN 100 MG: 100 CAPSULE ORAL at 20:12

## 2024-04-27 RX ADMIN — HYDROCODONE BITARTRATE AND ACETAMINOPHEN 2 TABLET: 5; 325 TABLET ORAL at 08:12

## 2024-04-27 RX ADMIN — SODIUM CHLORIDE, PRESERVATIVE FREE 10 ML: 5 INJECTION INTRAVENOUS at 20:12

## 2024-04-27 RX ADMIN — GABAPENTIN 100 MG: 100 CAPSULE ORAL at 08:12

## 2024-04-27 ASSESSMENT — PAIN DESCRIPTION - LOCATION: LOCATION: LEG

## 2024-04-27 ASSESSMENT — PAIN - FUNCTIONAL ASSESSMENT
PAIN_FUNCTIONAL_ASSESSMENT: 0-10
PAIN_FUNCTIONAL_ASSESSMENT: 0-10
PAIN_FUNCTIONAL_ASSESSMENT: ACTIVITIES ARE NOT PREVENTED

## 2024-04-27 ASSESSMENT — PAIN DESCRIPTION - DESCRIPTORS
DESCRIPTORS: ACHING;SHARP
DESCRIPTORS: ACHING

## 2024-04-27 ASSESSMENT — PAIN SCALES - GENERAL
PAINLEVEL_OUTOF10: 8
PAINLEVEL_OUTOF10: 5
PAINLEVEL_OUTOF10: 6

## 2024-04-27 ASSESSMENT — PAIN DESCRIPTION - ORIENTATION: ORIENTATION: RIGHT;LEFT

## 2024-04-27 NOTE — PROGRESS NOTES
[facility]  PHYSICAL THERAPY MISSED TREATMENT NOTE  [facility] ORTHOPEDICS 7K    Date: 2024  Patient Name: Avery Jordan        MRN: [id number]   : 1960  (63 y.o.)  Gender: male   Referring Practitioner: Felton Hightower MD  Diagnosis: primary OA of L hip         REASON FOR MISSED TREATMENT:  Attempting to see pt for PT tx, however pt off floor with family. Will f/u as time allows and pt is available.

## 2024-04-27 NOTE — PROGRESS NOTES
[facility]  INPATIENT PHYSICAL THERAPY  DAILY NOTE  [facility] ORTHOPEDICS 7K - 7K-27/027-A      Time In: 1328  Time Out: 1410  Timed Code Treatment Minutes: 42 Minutes  Minutes: 42          Date: 2024  Patient Name: Avery Jordan,  Gender:  male        MRN: [id number]  : 1960  (63 y.o.)     Referring Practitioner: Felton Hightower MD  Diagnosis: primary OA of L hip  Additional Pertinent Hx: This 63 year old male is s/p Left Total Hip Lateral Approach by Dr. Hightower on 24.     Prior Level of Function:  Lives With: Son  Type of Home: House  Home Layout: Two level, Performs ADL's on one level, Able to Live on Main level with bedroom/bathroom  Home Access: Stairs to enter without rails  Entrance Stairs - Number of Steps: 2 BRANDON Grab bar by front door.  Home Equipment: Walker, 4 wheeled, Cane   Bathroom Shower/Tub: Tub/Shower unit  Bathroom Toilet: Handicap height  Bathroom Accessibility: Walker accessible    ADL Assistance: Needs assistance (Son assists bathing, dressing. Pt reports indep with toileting.)  Homemaking Assistance: Needs assistance (Son completes all cooking, cleaning, and laundry.)  Ambulation Assistance: Independent  Transfer Assistance: Independent  Active : Yes  Additional Comments: Son works full time and pt is home alone. Pt wears 3 L O2 at baseline.    Restrictions/Precautions:  Restrictions/Precautions: Weight Bearing, Fall Risk, General Precautions  Left Lower Extremity Weight Bearing: Weight Bearing As Tolerated  Position Activity Restriction  Hip Precautions: Posterior hip precautions     SUBJECTIVE: pt up in recliner on arrival he reported that he just did his own therapy as his son just brought him back to his room and he walked to the recliner- discussed discharge planning as he is refusing a SNF stay he reported that he will have help 24 hours a day and is open to home health, pt unable to recall his hip precautions and we reviewed them and provided

## 2024-04-28 VITALS
DIASTOLIC BLOOD PRESSURE: 71 MMHG | BODY MASS INDEX: 38.03 KG/M2 | HEART RATE: 74 BPM | WEIGHT: 256.8 LBS | RESPIRATION RATE: 16 BRPM | OXYGEN SATURATION: 99 % | TEMPERATURE: 97.5 F | SYSTOLIC BLOOD PRESSURE: 117 MMHG | HEIGHT: 69 IN

## 2024-04-28 PROCEDURE — 6370000000 HC RX 637 (ALT 250 FOR IP): Performed by: ORTHOPAEDIC SURGERY

## 2024-04-28 PROCEDURE — 94640 AIRWAY INHALATION TREATMENT: CPT

## 2024-04-28 RX ADMIN — PANTOPRAZOLE SODIUM 40 MG: 40 TABLET, DELAYED RELEASE ORAL at 05:46

## 2024-04-28 RX ADMIN — HYDROCODONE BITARTRATE AND ACETAMINOPHEN 2 TABLET: 5; 325 TABLET ORAL at 05:46

## 2024-04-28 RX ADMIN — TIOTROPIUM BROMIDE AND OLODATEROL 2 PUFF: 3.124; 2.736 SPRAY, METERED RESPIRATORY (INHALATION) at 08:18

## 2024-04-28 RX ADMIN — LISINOPRIL 10 MG: 10 TABLET ORAL at 08:25

## 2024-04-28 RX ADMIN — FUROSEMIDE 40 MG: 40 TABLET ORAL at 08:25

## 2024-04-28 RX ADMIN — GABAPENTIN 100 MG: 100 CAPSULE ORAL at 08:25

## 2024-04-28 RX ADMIN — POTASSIUM CHLORIDE 20 MEQ: 1500 TABLET, EXTENDED RELEASE ORAL at 08:25

## 2024-04-28 RX ADMIN — APIXABAN 2.5 MG: 2.5 TABLET, FILM COATED ORAL at 08:25

## 2024-04-28 RX ADMIN — HYDROCODONE BITARTRATE AND ACETAMINOPHEN 2 TABLET: 5; 325 TABLET ORAL at 01:40

## 2024-04-28 ASSESSMENT — PAIN SCALES - GENERAL
PAINLEVEL_OUTOF10: 6
PAINLEVEL_OUTOF10: 7
PAINLEVEL_OUTOF10: 6

## 2024-04-28 ASSESSMENT — PAIN DESCRIPTION - LOCATION: LOCATION: LEG

## 2024-04-28 ASSESSMENT — PAIN - FUNCTIONAL ASSESSMENT: PAIN_FUNCTIONAL_ASSESSMENT: PREVENTS OR INTERFERES SOME ACTIVE ACTIVITIES AND ADLS

## 2024-04-28 ASSESSMENT — PAIN DESCRIPTION - ORIENTATION: ORIENTATION: LEFT

## 2024-04-28 ASSESSMENT — PAIN DESCRIPTION - DESCRIPTORS: DESCRIPTORS: SORE;SHOOTING

## 2024-04-28 NOTE — PROGRESS NOTES
Progress Note    Subjective:     Post-Operative Day: 4 Status Post left Total Hip Arthroplasty  Systemic or Specific Complaints:No Complaints    Objective:   VITALS:  /71   Pulse 74   Temp 97.5 °F (36.4 °C)   Resp 16   Ht 1.753 m (5' 9\")   Wt 116.5 kg (256 lb 12.8 oz)   SpO2 99%   BMI 37.92 kg/m²   24HR INTAKE/OUTPUT:    Intake/Output Summary (Last 24 hours) at 2024 0816  Last data filed at 2024 0548  Gross per 24 hour   Intake 980 ml   Output 975 ml   Net 5 ml       TEMPERATURE:  Current - Temp: 97.5 °F (36.4 °C); Max - Temp  Av °F (36.7 °C)  Min: 97.5 °F (36.4 °C)  Max: 98.6 °F (37 °C)        Data Review  CBC:   Lab Results   Component Value Date/Time    WBC 7.0 2024 05:46 AM    RBC 4.19 2024 05:46 AM    HGB 12.2 2024 05:46 AM    HCT 39.6 2024 05:46 AM     2024 05:46 AM    INR 1.52 2022 01:42 PM     BMP:   Lab Results   Component Value Date/Time     2024 02:39 PM    K 4.1 2024 02:39 PM    K 3.8 2021 07:52 AM    CL 97 2024 02:39 PM    CO2 34 2024 02:39 PM    BUN 17 2024 02:39 PM    CREATININE 1.02 2024 02:39 PM    GLUCOSE 121 2024 02:39 PM       Assessment:     Status Post left Total Hip Arthroplasty. Doing well postoperatively.     Plan:      1:  Continue Total Hip Replacement protocol   2:  Continue Deep venous thrombosis prophylaxis  3:  Continue physical therapy with Total Hip precautions  4:  Continue Pain Control  5:  home today

## 2024-04-28 NOTE — PLAN OF CARE
Problem: Discharge Planning  Goal: Discharge to home or other facility with appropriate resources  4/24/2024 1057 by Aida Toscano, RN  Outcome: Progressing  Flowsheets (Taken 4/24/2024 1057)  Discharge to home or other facility with appropriate resources:   Identify barriers to discharge with patient and caregiver   Identify discharge learning needs (meds, wound care, etc)   Refer to discharge planning if patient needs post-hospital services based on physician order or complex needs related to functional status, cognitive ability or social support system   Arrange for needed discharge resources and transportation as appropriate  4/23/2024 2123 by Dinora Moore, RN  Outcome: Progressing     Problem: Chronic Conditions and Co-morbidities  Goal: Patient's chronic conditions and co-morbidity symptoms are monitored and maintained or improved  4/24/2024 1057 by Aida Toscano, RN  Outcome: Progressing  Flowsheets (Taken 4/24/2024 1057)  Care Plan - Patient's Chronic Conditions and Co-Morbidity Symptoms are Monitored and Maintained or Improved:   Monitor and assess patient's chronic conditions and comorbid symptoms for stability, deterioration, or improvement   Collaborate with multidisciplinary team to address chronic and comorbid conditions and prevent exacerbation or deterioration   Update acute care plan with appropriate goals if chronic or comorbid symptoms are exacerbated and prevent overall improvement and discharge  4/23/2024 2123 by Dinora Moore RN  Outcome: Progressing     Problem: Pain  Goal: Verbalizes/displays adequate comfort level or baseline comfort level  4/24/2024 1057 by Aida Toscano, RN  Outcome: Progressing  Flowsheets (Taken 4/24/2024 1057)  Verbalizes/displays adequate comfort level or baseline comfort level:   Encourage patient to monitor pain and request assistance   Assess pain using appropriate pain scale   Implement non-pharmacological measures as appropriate and evaluate 
  Problem: Discharge Planning  Goal: Discharge to home or other facility with appropriate resources  4/24/2024 2159 by Dinora Moore RN  Outcome: Progressing  4/24/2024 1057 by Aida Toscano RN  Outcome: Progressing  Flowsheets (Taken 4/24/2024 1057)  Discharge to home or other facility with appropriate resources:   Identify barriers to discharge with patient and caregiver   Identify discharge learning needs (meds, wound care, etc)   Refer to discharge planning if patient needs post-hospital services based on physician order or complex needs related to functional status, cognitive ability or social support system   Arrange for needed discharge resources and transportation as appropriate     Problem: Chronic Conditions and Co-morbidities  Goal: Patient's chronic conditions and co-morbidity symptoms are monitored and maintained or improved  4/24/2024 2159 by Dinora Moore RN  Outcome: Progressing  4/24/2024 1057 by Aida Toscano RN  Outcome: Progressing  Flowsheets (Taken 4/24/2024 1057)  Care Plan - Patient's Chronic Conditions and Co-Morbidity Symptoms are Monitored and Maintained or Improved:   Monitor and assess patient's chronic conditions and comorbid symptoms for stability, deterioration, or improvement   Collaborate with multidisciplinary team to address chronic and comorbid conditions and prevent exacerbation or deterioration   Update acute care plan with appropriate goals if chronic or comorbid symptoms are exacerbated and prevent overall improvement and discharge     Problem: Pain  Goal: Verbalizes/displays adequate comfort level or baseline comfort level  4/24/2024 2159 by Dinora Moore RN  Outcome: Progressing  4/24/2024 1057 by Aida Toscano RN  Outcome: Progressing  Flowsheets (Taken 4/24/2024 1057)  Verbalizes/displays adequate comfort level or baseline comfort level:   Encourage patient to monitor pain and request assistance   Assess pain using appropriate pain scale   Implement 
  Problem: Discharge Planning  Goal: Discharge to home or other facility with appropriate resources  4/25/2024 1210 by Elizabeth Lawson RN  Outcome: Adequate for Discharge     Problem: Pain  Goal: Verbalizes/displays adequate comfort level or baseline comfort level  Recent Flowsheet Documentation  Taken 4/25/2024 1945 by Andria Garcia RN  Verbalizes/displays adequate comfort level or baseline comfort level: Encourage patient to monitor pain and request assistance  4/25/2024 1210 by Elizabeth Lawson RN  Outcome: Adequate for Discharge     Problem: Safety - Adult  Goal: Free from fall injury  4/25/2024 1210 by Elizabeth Lawson RN  Outcome: Adequate for Discharge     
  Problem: Discharge Planning  Goal: Discharge to home or other facility with appropriate resources  Outcome: Progressing     Problem: Chronic Conditions and Co-morbidities  Goal: Patient's chronic conditions and co-morbidity symptoms are monitored and maintained or improved  Outcome: Progressing     Problem: Pain  Goal: Verbalizes/displays adequate comfort level or baseline comfort level  Outcome: Progressing     Problem: Safety - Adult  Goal: Free from fall injury  Outcome: Progressing     Problem: Skin/Tissue Integrity  Goal: Absence of new skin breakdown  Description: 1.  Monitor for areas of redness and/or skin breakdown  2.  Assess vascular access sites hourly  3.  Every 4-6 hours minimum:  Change oxygen saturation probe site  4.  Every 4-6 hours:  If on nasal continuous positive airway pressure, respiratory therapy assess nares and determine need for appliance change or resting period.  Outcome: Progressing     
  Problem: Respiratory - Adult  Goal: Clear lung sounds  Outcome: Progressing  Note: Maintenance       
  Problem: Respiratory - Adult  Goal: Clear lung sounds  Outcome: Progressing  Note: Patient agrees to goals     
  Problem: Safety - Adult  Goal: Free from fall injury  4/27/2024 1331 by Salina Cruz LPN  Outcome: Progressing  Flowsheets (Taken 4/27/2024 1331)  Free From Fall Injury: Instruct family/caregiver on patient safety     Problem: Skin/Tissue Integrity  Goal: Absence of new skin breakdown  Description: 1.  Monitor for areas of redness and/or skin breakdown  2.  Assess vascular access sites hourly  3.  Every 4-6 hours minimum:  Change oxygen saturation probe site  4.  Every 4-6 hours:  If on nasal continuous positive airway pressure, respiratory therapy assess nares and determine need for appliance change or resting period.  4/27/2024 1331 by Salina Cruz LPN  Outcome: Progressing     Problem: Pain  Goal: Verbalizes/displays adequate comfort level or baseline comfort level  4/27/2024 1331 by Salina Cruz LPN  Outcome: Progressing  Flowsheets (Taken 4/27/2024 1331)  Verbalizes/displays adequate comfort level or baseline comfort level:   Assess pain using appropriate pain scale   Encourage patient to monitor pain and request assistance     Problem: Pain  Goal: Verbalizes/displays adequate comfort level or baseline comfort level  Recent Flowsheet Documentation  Taken 4/27/2024 1331 by Salina Cruz LPN  Verbalizes/displays adequate comfort level or baseline comfort level:   Assess pain using appropriate pain scale   Encourage patient to monitor pain and request assistance  Taken 4/27/2024 1330 by Salina Cruz LPN  Verbalizes/displays adequate comfort level or baseline comfort level: Encourage patient to monitor pain and request assistance     Problem: Safety - Adult  Goal: Free from fall injury  Recent Flowsheet Documentation  Taken 4/27/2024 1331 by Salina Cruz LPN  Free From Fall Injury: Instruct family/caregiver on patient safety  Taken 4/27/2024 1330 by Salina Cruz LPN  Free From Fall Injury: Instruct family/caregiver on patient safety   Care plan 
  Problem: Safety - Adult  Goal: Free from fall injury  Outcome: Progressing     Problem: Skin/Tissue Integrity  Goal: Absence of new skin breakdown  Description: 1.  Monitor for areas of redness and/or skin breakdown  2.  Assess vascular access sites hourly  3.  Every 4-6 hours minimum:  Change oxygen saturation probe site  4.  Every 4-6 hours:  If on nasal continuous positive airway pressure, respiratory therapy assess nares and determine need for appliance change or resting period.  Outcome: Progressing     Problem: Pain  Goal: Verbalizes/displays adequate comfort level or baseline comfort level  Outcome: Progressing     
(Taken 4/27/2024 1331)  Verbalizes/displays adequate comfort level or baseline comfort level:   Assess pain using appropriate pain scale   Encourage patient to monitor pain and request assistance  4/27/2024 1330 by Salina Cruz LPN  Outcome: Progressing  Flowsheets (Taken 4/27/2024 1330)  Verbalizes/displays adequate comfort level or baseline comfort level: Encourage patient to monitor pain and request assistance     Care plan reviewed with patient. Patient verbalizes understanding of plan of care and contributes to goal setting.

## 2024-04-28 NOTE — PROGRESS NOTES
Progress Note    Subjective:     Post-Operative Day: 5 Status Post left Total Hip Arthroplasty  Systemic or Specific Complaints:No Complaints    Objective:   VITALS:  /71   Pulse 74   Temp 97.5 °F (36.4 °C)   Resp 16   Ht 1.753 m (5' 9\")   Wt 116.5 kg (256 lb 12.8 oz)   SpO2 99%   BMI 37.92 kg/m²   24HR INTAKE/OUTPUT:    Intake/Output Summary (Last 24 hours) at 2024 1057  Last data filed at 2024 0548  Gross per 24 hour   Intake 740 ml   Output 975 ml   Net -235 ml       TEMPERATURE:  Current - Temp: 97.5 °F (36.4 °C); Max - Temp  Av °F (36.7 °C)  Min: 97.5 °F (36.4 °C)  Max: 98.6 °F (37 °C)        Data Review  CBC:   Lab Results   Component Value Date/Time    WBC 7.0 2024 05:46 AM    RBC 4.19 2024 05:46 AM    HGB 12.2 2024 05:46 AM    HCT 39.6 2024 05:46 AM     2024 05:46 AM    INR 1.52 2022 01:42 PM     BMP:   Lab Results   Component Value Date/Time     2024 02:39 PM    K 4.1 2024 02:39 PM    K 3.8 2021 07:52 AM    CL 97 2024 02:39 PM    CO2 34 2024 02:39 PM    BUN 17 2024 02:39 PM    CREATININE 1.02 2024 02:39 PM    GLUCOSE 121 2024 02:39 PM       Assessment:     Status Post left Total Hip Arthroplasty. Doing well postoperatively.     Plan:      1:  Continue Total Hip Replacement protocol   2:  Continue Deep venous thrombosis prophylaxis  3:  Continue physical therapy with Total Hip precautions  4:  Continue Pain Control  5:  home today

## 2024-04-28 NOTE — CARE COORDINATION
4/28/24, 8:53 AM EDT    Patient goals/plan/ treatment preferences discussed by  and .  Patient goals/plan/ treatment preferences reviewed with patient/ family.  Patient/ family verbalize understanding of discharge plan and are in agreement with goal/plan/treatment preferences.  Understanding was demonstrated using the teach back method.  AVS provided by RN at time of discharge, which includes all necessary medical information pertaining to the patients current course of illness, treatment, post-discharge goals of care, and treatment preferences.     Services At/After Discharge: Home Health and PT      Home today with son; pt agrees to [facility] for HH. Order completed. Notified the on-call RN with [facility] of new referral. Pt accepted.

## 2024-05-20 ENCOUNTER — APPOINTMENT (OUTPATIENT)
Dept: GENERAL RADIOLOGY | Age: 64
End: 2024-05-20
Payer: MEDICARE

## 2024-05-20 ENCOUNTER — APPOINTMENT (OUTPATIENT)
Dept: CT IMAGING | Age: 64
End: 2024-05-20
Payer: MEDICARE

## 2024-05-20 ENCOUNTER — HOSPITAL ENCOUNTER (EMERGENCY)
Age: 64
Discharge: HOME | End: 2024-05-20
Payer: MEDICARE

## 2024-05-20 VITALS
RESPIRATION RATE: 18 BRPM | OXYGEN SATURATION: 98 % | TEMPERATURE: 98 F | SYSTOLIC BLOOD PRESSURE: 138 MMHG | DIASTOLIC BLOOD PRESSURE: 82 MMHG | HEART RATE: 78 BPM | HEIGHT: 69 IN | WEIGHT: 256 LBS | BODY MASS INDEX: 37.92 KG/M2

## 2024-05-20 DIAGNOSIS — M25.551 RIGHT HIP PAIN: Primary | ICD-10-CM

## 2024-05-20 DIAGNOSIS — S72.001A CLOSED FRACTURE OF RIGHT HIP, INITIAL ENCOUNTER (HCC): ICD-10-CM

## 2024-05-20 PROCEDURE — 99284 EMERGENCY DEPT VISIT MOD MDM: CPT

## 2024-05-20 PROCEDURE — 6370000000 HC RX 637 (ALT 250 FOR IP): Performed by: PHYSICIAN ASSISTANT

## 2024-05-20 PROCEDURE — 6370000000 HC RX 637 (ALT 250 FOR IP)

## 2024-05-20 PROCEDURE — 73700 CT LOWER EXTREMITY W/O DYE: CPT

## 2024-05-20 PROCEDURE — 73502 X-RAY EXAM HIP UNI 2-3 VIEWS: CPT

## 2024-05-20 RX ORDER — HYDROCODONE BITARTRATE AND ACETAMINOPHEN 5; 325 MG/1; MG/1
1 TABLET ORAL ONCE
Status: COMPLETED | OUTPATIENT
Start: 2024-05-20 | End: 2024-05-20

## 2024-05-20 RX ORDER — OXYCODONE HYDROCHLORIDE AND ACETAMINOPHEN 5; 325 MG/1; MG/1
1 TABLET ORAL EVERY 6 HOURS PRN
Qty: 15 TABLET | Refills: 0 | Status: SHIPPED | OUTPATIENT
Start: 2024-05-20 | End: 2024-05-25

## 2024-05-20 RX ORDER — OXYCODONE HYDROCHLORIDE AND ACETAMINOPHEN 5; 325 MG/1; MG/1
2 TABLET ORAL ONCE
Status: COMPLETED | OUTPATIENT
Start: 2024-05-20 | End: 2024-05-20

## 2024-05-20 RX ADMIN — OXYCODONE HYDROCHLORIDE AND ACETAMINOPHEN 2 TABLET: 5; 325 TABLET ORAL at 22:22

## 2024-05-20 RX ADMIN — HYDROCODONE BITARTRATE AND ACETAMINOPHEN 1 TABLET: 5; 325 TABLET ORAL at 20:01

## 2024-05-20 ASSESSMENT — PAIN - FUNCTIONAL ASSESSMENT
PAIN_FUNCTIONAL_ASSESSMENT: 0-10
PAIN_FUNCTIONAL_ASSESSMENT: 0-10

## 2024-05-20 ASSESSMENT — PAIN SCALES - GENERAL
PAINLEVEL_OUTOF10: 7
PAINLEVEL_OUTOF10: 8

## 2024-05-20 ASSESSMENT — PAIN DESCRIPTION - LOCATION: LOCATION: HIP

## 2024-05-20 NOTE — ED PROVIDER NOTES
[facility] EMERGENCY DEPT      EMERGENCY MEDICINE     Pt Name: Avery Jordan  MRN: [id number]  Birthdate 1960  Date of evaluation: 5/20/2024  Provider: Radha Stewart PA-C    CHIEF COMPLAINT       Chief Complaint   Patient presents with    Hip Pain            HISTORY OF PRESENT ILLNESS   Avery Jordan is a pleasant 63 y.o. male who presents to the emergency department from from home, as a walk in to the ED lobby for evaluation of hip pain. He states that he fell about 4-5 weeks ago. He had a left hip replacement done with Dr. Hightower and states that he fell on his right hip 2 days after being discharged from the hospital. Since the fall he has had constant right hip pain worsened by walking. He reports a pins and needles sensation when he tries to walk. He states that he is using a walker and is able to walk but is limited by severe pain. He denies fevers, chills, loss of bowel/bladder function, leg swelling, redness, loss of sensation, or warmth.    PASTMEDICAL HISTORY     Past Medical History:   Diagnosis Date    Anxiety disorder     Arthritis     Asthma     Back pain, chronic     Blood circulation, collateral     4 toes partial amputee on L foot    [employer] Bi[employer] ICD 7/1/2014    [employer] pacemaker 7/1/2014    CAD (coronary artery disease)     CHF (congestive heart failure) ([facility])     Chronic kidney disease     COPD (chronic obstructive pulmonary disease) ([facility])     Depression     Endocarditis     Factor V deficiency ([facility])     Hepatitis     HEPATITIS C    Hepatitis C     History of blood transfusion     Hypertension     Paroxysmal A-fib ([facility])     Pneumonia        Patient Active Problem List   Diagnosis Code    Moderate COPD (chronic obstructive pulmonary disease) ([facility]) J44.9    Community acquired pneumonia J18.9    Suicidal ideation R45.851    SIRS (systemic inflammatory response syndrome) ([facility]) R65.10    Sepsis due to methicillin susceptible Staphylococcus aureus ([facility]) A41.01

## 2024-05-20 NOTE — ED TRIAGE NOTES
Pt presents to the ed with right hip pain after a mechanical fall two days ago. Pt states that he tripped and fell. Pt rating pain 8/10 that is shooting in nature at this time.

## 2024-05-20 NOTE — ED PROVIDER NOTES
[facility]  EMERGENCY MEDICINE ATTENDING ATTESTATION      Evaluation of Avery Jordan.   Case discussed and care plan developed with physician assistant.   I agree with the physician assistant documentation and plan as documented by her, except if my documentation differs.   I performed a substantive part of the MDM during the patient's E/M visit. I personally made or approved the documented management plan.  I reviewed the medical, surgical, family and social history, medications and allergies.   I have reviewed and interpreted all available lab, radiology and ekg results available at the moment.  I have reviewed the nursing documentation.     Please see the resident physician completed note for final disposition except as documented on this attestation.   I have reviewed and interpreted all available lab, radiology and ekg results available at the moment.  Diagnosis, treatment and disposition plans were discussed and agreed upon by patient.   This transcription was electronically signed. It was dictated by use of voice recognition software and electronically transcribed. The transcription may contain errors not detected in proofreading.     I performed direct supervision and was present for the critical portion following procedures: None  Critical care time on this case: None    Electronically signed by Antonio Reed MD on 5/20/24 at 6:43 PM EDT       Antonio Reed MD  05/20/24 3526

## 2024-05-28 ENCOUNTER — TELEPHONE (OUTPATIENT)
Dept: CARDIOLOGY CLINIC | Age: 64
End: 2024-05-28

## 2024-05-30 ENCOUNTER — TELEPHONE (OUTPATIENT)
Dept: PHARMACY | Age: 64
End: 2024-05-30

## 2024-05-30 ENCOUNTER — PROCEDURE VISIT (OUTPATIENT)
Dept: CARDIOLOGY CLINIC | Age: 64
End: 2024-05-30

## 2024-05-30 DIAGNOSIS — Z95.0 PACEMAKER: Primary | ICD-10-CM

## 2024-05-30 RX ORDER — DULOXETIN HYDROCHLORIDE 60 MG/1
60 CAPSULE, DELAYED RELEASE ORAL DAILY
COMMUNITY

## 2024-05-30 RX ORDER — WARFARIN SODIUM 5 MG/1
TABLET ORAL
Qty: 30 TABLET | Refills: 1 | Status: SHIPPED | OUTPATIENT
Start: 2024-05-30

## 2024-05-30 RX ORDER — ETODOLAC 400 MG/1
400 TABLET, FILM COATED ORAL 2 TIMES DAILY PRN
COMMUNITY

## 2024-05-30 NOTE — PROGRESS NOTES
Nick biv pacer  Verna pt    ..Battery longevity:  8.5 years on device   Presenting rhythm  As VS,   SVT   AF     Atrial impedance 548  RV impedance 443      P wave sensing 7.6  R wave sensing 21.3  LV 5.1    0 % atrial paced  72 % RV paced   22% LV paced     Mode switches   <1 known PAF eliquis

## 2024-05-30 NOTE — TELEPHONE ENCOUNTER
Received referral from Dr. Christopher Cobb to manage patient on warfarin therapy.  Patient is changing from Eliquis to warfarin due to cost.  [facility] and will do the lab draws for now.  Reached patient to review. He states he took his last Eliquis pill last night.  He also states he was on Coumadin for about 10 years managed by [facility] then switched to Eliquis a few years ago. He states he took about 5 mg daily when was on Coumadin years ago.    Medication list reviewed and updated with patient.  He is taking etodolac 400 mg bid prn pain due to recent hip surgeries.  Discussed NSAIDs can increase risk of bleeding and bruising on warfarin. Tylenol preferred, although he states Tylenol did not help.  He might contact provider for other medication.  \"[facility]\" booklet mailed to patient.    Patient to start taking warfarin 5 mg daily. Prescription sent to [facility] on [address].  [facility] will draw INR lab Monday 6/3/24.     Oanh Rosas, PharmD, BCPS    For Pharmacy Admin Tracking Only  Intervention Detail: New Rx: 1, reason: Needs Additional Therapy  Total # of Interventions Recommended: 1  Total # of Interventions Accepted: 1  Time Spent (min): 15

## 2024-05-31 ENCOUNTER — TELEPHONE (OUTPATIENT)
Dept: CARDIOLOGY CLINIC | Age: 64
End: 2024-05-31

## 2024-05-31 NOTE — TELEPHONE ENCOUNTER
LOV: 2/12/24 NOV:8/29/24 Patient says he needs a refill for Eliquis 40 MG(?) to   [facility]. Please follow up with patient. Patient says he PCP Dr. Nadege Cobb has prescribed this however he is out of the office until Monday. Patient says he has been without this medication since 5-29-24. Please follow up with patient.

## 2024-05-31 NOTE — TELEPHONE ENCOUNTER
Pt calling asking for a refill on Eliquis because his PCP is OOT.  He states Coumadin was discontinued and Elqiuis was started.  We still have Coumadin on his med list.  I am unable to Verify the mg.  Pt thinks it's 2.5 but both have been prescribed.     Spoke to Viviane at [facility] with Dr Hightower.  She said he told them he was taking Eliquis prior to surgery and they typically restart what they had been on.      Attempted to call PCP as pt states his PCP fills Eliquis.  They are closed.      Ultimately, I was trying to help this patient out with samples but I am unable to verify the dose.    Please check with PCP on Monday and see if they can help.

## 2024-05-31 NOTE — TELEPHONE ENCOUNTER
Riley CLAYTON on  stating he called his pharmacy and they said they said it was 5 mg.  Called him back.  No answer.  VM full.

## 2024-06-03 ENCOUNTER — TELEPHONE (OUTPATIENT)
Dept: PHARMACY | Age: 64
End: 2024-06-03

## 2024-06-03 NOTE — TELEPHONE ENCOUNTER
Spoke with Dr. Cobb's office (PCP). Patient has been approved for Eliquis and will no longer take coumadin. Will discharge from [facility]

## 2024-06-03 NOTE — TELEPHONE ENCOUNTER
Spoke to nurse at PCP office, actually Dr Cobb was in the background of the call.  They are trying to see if pt's insurance will cover Eliquis. However, at this point in time patient needs to be taking the Coumadin until they hear back from insurance. They have submitted and PA and Dr Cobb has talked to the insurance plan. No determination at this point.  Called back down to [facility] and spoke to Fredo. Update given.  They will reach out to the patient and discuss plan for Coumadin therapy.

## 2024-06-03 NOTE — TELEPHONE ENCOUNTER
Fredo from [facility] had LM on nurse line. They having patient getting an INR drawn today. From what they know, patient is suppose to be taking the Coumadin.  Called back and spoke to pharmacy intern in the [facility] [phone]. He states the same, from their end, patient is suppose to be taking Coumadin. They are unaware of it being changed back to Eliquis.  Will need to call PCP once open to see if they changed him to Eliquis?

## 2024-06-04 NOTE — TELEPHONE ENCOUNTER
Spoke to Stefany in [facility].   They also received notification from PCP office that patient was starting the Eliquis.  Medlist updated.

## 2024-08-06 NOTE — H&P
Normocephalic and atraumatic.  HEART:  Regular rate and rhythm.  ABDOMEN:  Soft, nontender.  EXTREMITIES:  Right hip shows very poor range of motion, maybe 100 degrees of flexion.  No internal rotation.  External rotation 10 degrees with contracture.  External rotation may be a few more degrees plus eliciting quite a bit of inguinal pain.  He has woody edema distally with no skin breakdown.    X-RAYS:  Of right hip show severe degenerative joint disease.    ASSESSMENT:  Right hip severe degenerative joint disease.    PLAN:  At this point, discussed with moving forth with a right total hip replacement, lateral approach, secondary to his venous disease distally.  Risks, benefits, and possible complications including, but not limited to, DVT, PE, nerve, artery, soft tissue, bleeding, infection, as well as flare-up of medical problems.  Postoperative recovery times as well as hip precautions were discussed as well.  He understands and would like to proceed.          LADARIUS DUVALL      D:  08/06/2024 10:24:24     T:  08/06/2024 13:31:33     DAVI/SHIRA  Job #:  [id number]     Doc#:  [id number]

## 2024-08-19 RX ORDER — HYDROCODONE BITARTRATE AND ACETAMINOPHEN 5; 325 MG/1; MG/1
2 TABLET ORAL EVERY 8 HOURS PRN
COMMUNITY
Start: 2024-05-13 | End: 2024-08-19

## 2024-08-19 RX ORDER — BUPRENORPHINE AND NALOXONE 8; 2 MG/1; MG/1
1 FILM, SOLUBLE BUCCAL; SUBLINGUAL DAILY PRN
COMMUNITY
Start: 2024-05-26

## 2024-08-19 NOTE — PROGRESS NOTES
Preliminary Discharge Planning Questionnaire  Date of Surgery 8-27-24   Surgeon Makayla      Having the proper help and care after surgery is very important to your recovery. Who will be able to help you at home when you are discharged from the hospital?    Amrita kumar      How many steps to enter your home?  1    Bathroom on first floor?  Yes    Bedroom on the first floor?  No    Do you have an elevated toilet seat to use at home?  Yes    Do you have a walker to use at home?    Total Joints - with wheels Yes   Spine - with wheels  N/A     Have you been doing home exercises?no    *You will go home with some outpatient physical therapy, where do you prefer to go? Doesn't want to go home wants to go to nursing home    This typically will not start until after your post visit to your Dr. (3-4 weeks after surgery)    * What home health agency would you like to use? wants Nursing home        List of all Home Health Agencies Available given to patient, with website ( per Social Work Team)      Please have 2 preferences when you come for surgery- 1st and 2nd choice

## 2024-08-19 NOTE — PROGRESS NOTES
Joint Replacement Pre-op Instruction Sheet    Nothing to eat or drink after midnight the night before surgery, except sips of water to take medications.  Bring photo ID and any health insurance cards.  Wear comfortable clean loose-fitting clothing.  Do not wear any jewelry, do not glue in dentures  Bring your medication in the original bottles.  Bring your walker with 2 wheels if you have one.  Wear clean clothes to bed and place fresh clean sheets and pillowcases on your bed the night before surgery  Your expected length of stay in the hospital after your surgery is 1-2 days  You will be discharged with outpatient physical therapy and possibly home health care  Shower:  Shower 2 days before, day before and morning of surgery using the Start Clean® kit provided (follow the instructions provided with the kit)  Do not shave the surgical area! If needed, your nurse will use clippers to remove any excess hair at the surgical site when you come in for surgery. Do not use a razor on the site of your surgery for at least 2 days prior to surgery because shaving can leave tiny nicks in the skin which may allow germs to enter and cause infection.  Physical Therapy Video; https://vimeo.com/628361602    If you have any questions about your preoperative preparations, please call the [facility] Pre-Admission Testing department at [phone] M-F 7:30-4  Updated 1/10/23        START CLEAN® KIT SHOWER INSTRUCITONS SURGERY:     ___4-55-99_____ (date)  FIRST SHOWER: Two days before surgery: Take a shower and wash your entire body, including your hair and scalp in the following manner:  Wash your hair using normal shampoo. Make sure you rinse the shampoo from your hair and body. Wash your face with your regular soap or cleanser.  Use one of the sponges in the Start Clean® kit and apply 1/3 of the Chlorhexidine soap to the sponge, wash from your neck down.  This is very important. Do not use the soap on your face or hair and avoid

## 2024-08-19 NOTE — PROGRESS NOTES
Pt is having pain right hip .  It is 5 /10 today.  Described as \"sharp pain when walking on it.just the area of right hip  \"     Pain scale and pain management review with patient.

## 2024-08-19 NOTE — PROGRESS NOTES
Dr Hightower is using previous clearance for left hip on 3-1-24 see note    Laurel Cordova Rn contacted rep   I notified [facility] Pacemaker Rep of Left total hip surgery coming up 3/12/24. Bi Ventricular Pacemaker- patient gets checks done here at [facility].     Reji stated that it does not look like patient is dependent but if worried about interference- the Magnet may be placed over pacemaker and when removed pacemaker will return to original settings. Thank you.            Electronically signed by Laurel Braun RN at 3/6/2024  9:01 AM

## 2024-08-19 NOTE — PROGRESS NOTES
Called Arlin Jordan that was listed a caregiver. Informed her that when pt comes please make sure he has medication so we can go over them with him. She said she will have someone lined up to bring him tomorrow.  And he will have medication bottles with him.    Informed her that he needed to come in and have labs done.      Instructed on medication.  Hold 5 days Plavix  Hold 7 days Lodine. Ibuprofen, aleve, motrin.   Hold all vitamins and herbals for 14 days before surgery.    On day of surgery no pills to take am but needs to take Stiolto inhaler.

## 2024-08-19 NOTE — DISCHARGE INSTRUCTIONS
[phone] M-F 7:30-4  Updated 1/10/23        START CLEAN® KIT SHOWER INSTRUCITONS SURGERY:     __4-04-81______ (date)  FIRST SHOWER: Two days before surgery: Take a shower and wash your entire body, including your hair and scalp in the following manner:  Wash your hair using normal shampoo. Make sure you rinse the shampoo from your hair and body. Wash your face with your regular soap or cleanser.  Use one of the sponges in the Start Clean® kit and apply 1/3 of the Chlorhexidine soap to the sponge, wash from your neck down.  This is very important. Do not use the soap on your face or hair and avoid private areas.  Rinse your body thoroughly. This is very important.  Using a fresh, clean towel, dry your body.  Dress in freshly washed clothes.  Do not use lotions, powders, or creams after this shower.  ___1-03-16_____ (date)  SECOND SHOWER: The day before surgery: Take a shower and wash your entire body, including your hair and scalp in the following manner:  Wash your hair using normal shampoo. Make sure you rinse the shampoo from your hair and body. Wash your face with your regular soap or cleanser.  Use one of the sponges in the Start Clean® kit and apply 1/3 of the Chlorhexidine soap to the sponge, wash from your neck down.  This is very important. Do not use the soap on your face or hair and avoid private areas.  Rinse your body thoroughly. This is very important.  Using a fresh, clean towel, dry your body.  Dress in freshly washed clothes.  Fresh clean sheets and pillowcases should be used after this shower.  Do not use lotions, powders, or creams after this shower.  ____7-03-17____ (date)   THE FINAL SHOWER: The morning of surgery: Take a shower and wash your entire body, including your hair and scalp in the following manner:  Wash your hair using normal shampoo. Make sure you rinse the shampoo from your hair and body. Wash your face with your regular soap or cleanser.  Use one of the sponges in the Start

## 2024-08-20 ENCOUNTER — HOSPITAL ENCOUNTER (OUTPATIENT)
Dept: PREADMISSION TESTING | Age: 64
Discharge: HOME | End: 2024-08-20

## 2024-08-20 NOTE — PROGRESS NOTES
Joint Replacement Pre-op Instruction Sheet    Nothing to eat or drink after midnight the night before surgery, except sips of water to take medications.  Bring photo ID and any health insurance cards.  Wear comfortable clean loose-fitting clothing.  Do not wear any jewelry, do not glue in dentures  Bring your medication in the original bottles.  Bring your walker with 2 wheels if you have one.  Bring your CPAP machine if you have one.  Wear clean clothes to bed and place fresh clean sheets and pillowcases on your bed the night before surgery  Your expected length of stay in the hospital after your surgery is 1-2 days  You will be discharged with outpatient physical therapy and possibly home health care  Shower:  Shower 2 days before, day before and morning of surgery using the Start Clean® kit provided (follow the instructions provided with the kit)  Do not shave the surgical area! If needed, your nurse will use clippers to remove any excess hair at the surgical site when you come in for surgery. Do not use a razor on the site of your surgery for at least 2 days prior to surgery because shaving can leave tiny nicks in the skin which may allow germs to enter and cause infection.  Educational handouts on Preventing Surgical Site infections, General Anesthesia, Coughing and Deep Breathing/ Incentive Spirometer as needed, Fall Prevention, MRSA/MSSA, Hand Hygiene and Jewelry Precautions Given  Joint Handbook and Exercise Booklet given and reviewed with patient  Perform the exercises given in your booklet 3 times daily starting today  Physical Therapy Video; [email or web address]    If you have any questions about your preoperative preparations, please call the [facility] Pre-Admission Testing department at [phone] M-F 7:30-4  Updated 1/10/23        START CLEAN® KIT SHOWER INSTRUCITONS SURGERY:     ___2-91-99_____ (date)  FIRST SHOWER: Two days before surgery: Take a shower and wash your entire body,

## 2024-08-20 NOTE — PROGRESS NOTES
Tried calling pt to see if he can come in early.  His voice mail was full  Tried calling shy to see when pt was coming in for testing.  Left message on her voice mail  Tried calling son Vinny to see if pt could come in earlier for testing.

## 2024-08-21 ENCOUNTER — HOSPITAL ENCOUNTER (OUTPATIENT)
Dept: PREADMISSION TESTING | Age: 64
Discharge: HOME | End: 2024-08-21
Payer: MEDICARE

## 2024-08-21 ENCOUNTER — TELEPHONE (OUTPATIENT)
Dept: CARDIOLOGY CLINIC | Age: 64
End: 2024-08-21

## 2024-08-21 VITALS
OXYGEN SATURATION: 97 % | TEMPERATURE: 97.7 F | HEIGHT: 69 IN | RESPIRATION RATE: 20 BRPM | DIASTOLIC BLOOD PRESSURE: 75 MMHG | SYSTOLIC BLOOD PRESSURE: 140 MMHG | BODY MASS INDEX: 38.8 KG/M2 | HEART RATE: 79 BPM | WEIGHT: 262 LBS

## 2024-08-21 LAB
ANION GAP SERPL CALC-SCNC: 9 MEQ/L (ref 8–16)
BASOPHILS ABSOLUTE: 0 THOU/MM3 (ref 0–0.1)
BASOPHILS NFR BLD AUTO: 0.5 %
BILIRUB UR QL STRIP.AUTO: NEGATIVE
BUN SERPL-MCNC: 18 MG/DL (ref 7–22)
CALCIUM SERPL-MCNC: 9.1 MG/DL (ref 8.5–10.5)
CHARACTER UR: CLEAR
CHLORIDE SERPL-SCNC: 95 MEQ/L (ref 98–111)
CO2 SERPL-SCNC: 33 MEQ/L (ref 23–33)
COLOR, UA: YELLOW
CREAT SERPL-MCNC: 1 MG/DL (ref 0.4–1.2)
DEPRECATED MEAN GLUCOSE BLD GHB EST-ACNC: 159 MG/DL (ref 70–126)
DEPRECATED RDW RBC AUTO: 50.1 FL (ref 35–45)
EOSINOPHIL NFR BLD AUTO: 1.3 %
EOSINOPHILS ABSOLUTE: 0 THOU/MM3 (ref 0–0.4)
ERYTHROCYTE [DISTWIDTH] IN BLOOD BY AUTOMATED COUNT: 14 % (ref 11.5–14.5)
GFR SERPL CREATININE-BSD FRML MDRD: 85 ML/MIN/1.73M2
GLUCOSE SERPL-MCNC: 214 MG/DL (ref 70–108)
GLUCOSE UR QL STRIP.AUTO: 500 MG/DL
HBA1C MFR BLD HPLC: 7.3 % (ref 4.4–6.4)
HCT VFR BLD AUTO: 41.9 % (ref 42–52)
HGB BLD-MCNC: 12.5 GM/DL (ref 14–18)
HGB UR QL STRIP.AUTO: NEGATIVE
IMM GRANULOCYTES # BLD AUTO: 0.01 THOU/MM3 (ref 0–0.07)
IMM GRANULOCYTES NFR BLD AUTO: 0.3 %
KETONES UR QL STRIP.AUTO: NEGATIVE
LYMPHOCYTES ABSOLUTE: 1.6 THOU/MM3 (ref 1–4.8)
LYMPHOCYTES NFR BLD AUTO: 43.1 %
MCH RBC QN AUTO: 28.9 PG (ref 26–33)
MCHC RBC AUTO-ENTMCNC: 29.8 GM/DL (ref 32.2–35.5)
MCV RBC AUTO: 97 FL (ref 80–94)
MONOCYTES ABSOLUTE: 0.3 THOU/MM3 (ref 0.4–1.3)
MONOCYTES NFR BLD AUTO: 8.7 %
NEUTROPHILS ABSOLUTE: 1.8 THOU/MM3 (ref 1.8–7.7)
NEUTROPHILS NFR BLD AUTO: 46.1 %
NITRITE UR QL STRIP: NEGATIVE
NRBC BLD AUTO-RTO: 0 /100 WBC
PH UR STRIP.AUTO: 5.5 [PH] (ref 5–9)
PLATELET # BLD AUTO: 130 THOU/MM3 (ref 130–400)
PMV BLD AUTO: 10.4 FL (ref 9.4–12.4)
POTASSIUM SERPL-SCNC: 4.5 MEQ/L (ref 3.5–5.2)
PROT UR STRIP.AUTO-MCNC: NEGATIVE MG/DL
RBC # BLD AUTO: 4.32 MILL/MM3 (ref 4.7–6.1)
SODIUM SERPL-SCNC: 137 MEQ/L (ref 135–145)
SP GR UR REFRACT.AUTO: 1.02 (ref 1–1.03)
UROBILINOGEN, URINE: 1 EU/DL (ref 0–1)
WBC # BLD AUTO: 3.8 THOU/MM3 (ref 4.8–10.8)
WBC #/AREA URNS HPF: NEGATIVE /[HPF]

## 2024-08-21 PROCEDURE — 83036 HEMOGLOBIN GLYCOSYLATED A1C: CPT

## 2024-08-21 PROCEDURE — 80048 BASIC METABOLIC PNL TOTAL CA: CPT

## 2024-08-21 PROCEDURE — 36415 COLL VENOUS BLD VENIPUNCTURE: CPT

## 2024-08-21 PROCEDURE — 85025 COMPLETE CBC W/AUTO DIFF WBC: CPT

## 2024-08-21 PROCEDURE — 87641 MR-STAPH DNA AMP PROBE: CPT

## 2024-08-21 PROCEDURE — 81003 URINALYSIS AUTO W/O SCOPE: CPT

## 2024-08-21 RX ORDER — LISINOPRIL 20 MG/1
20 TABLET ORAL DAILY
COMMUNITY
Start: 2024-07-19 | End: 2024-08-26

## 2024-08-21 RX ORDER — TRIAMCINOLONE ACETONIDE 1 MG/G
1 OINTMENT TOPICAL
COMMUNITY

## 2024-08-21 ASSESSMENT — PAIN DESCRIPTION - ORIENTATION: ORIENTATION: RIGHT

## 2024-08-21 ASSESSMENT — PAIN DESCRIPTION - LOCATION: LOCATION: HIP

## 2024-08-21 ASSESSMENT — HOOS JR
RISING FROM SITTING: SEVERE
BENDING TO THE FLOOR TO PICK UP OBJECT: EXTREME

## 2024-08-21 ASSESSMENT — PAIN SCALES - GENERAL: PAINLEVEL_OUTOF10: 5

## 2024-08-21 ASSESSMENT — PAIN DESCRIPTION - PAIN TYPE: TYPE: CHRONIC PAIN

## 2024-08-21 NOTE — PROGRESS NOTES
Start Clean Shower Instructions          ___8/25___ (date)   FIRST SHOWER: Two days before surgery: Take a shower and wash your entire body, including your hair and scalp in the following manner:  Wash your hair using normal shampoo. Make sure you rinse the shampoo from your hair and body. Wash your face with your regular soap or cleanser.  Use one of the sponges in the StartClean® kit and apply 1/3 of the Chlorhexidine soap to the sponge, wash from your neck down.  This is very important. Do not use the soap on your face or hair and avoid private areas.  Rinse your body thoroughly. This is very important.  Using a fresh, clean towel, dry your body.  Dress in freshly washed clothes.  Do not use lotions, powders, or creams after this shower.      ___8/26_____ (date)   SECOND SHOWER: The day before surgery: Take a shower and wash your entire body, including your hair and scalp in the following manner:  Wash your hair using normal shampoo. Make sure you rinse the shampoo from your hair and body. Wash your face with your regular soap or cleanser.  Use one of the sponges in the StartClean® kit and apply 1/3 of the Chlorhexidine soap to the sponge, wash from your neck down.  This is very important. Do not use the soap on your face or hair and avoid private areas.  Rinse your body thoroughly. This is very important.  Using a fresh, clean towel, dry your body.  Dress in freshly washed clothes.  Fresh clean sheets and pillow cases should be used after this shower.  Do not use lotions, powders, or creams after this shower.    ___8/27_____ (date)   THE FINAL SHOWER: The morning of surgery: Take a shower and wash your entire body, including your hair and scalp in the following manner:  Wash your hair using normal shampoo. Make sure you rinse the shampoo from your hair and body. Wash your face with your regular soap or cleanser.  Use one of the sponges in the StartClean® kit and apply 1/3  of the Chlorhexidine soap to the sponge, wash from your neck down.  This is very important. Do not use the soap on your face or hair and avoid private areas.  Rinse your body thoroughly. This is very important.  Using a fresh, clean towel, dry your body.  Dress warmly with freshly washed clean clothes. Keeping warm before surgery decreases your risk of developing and infection.

## 2024-08-21 NOTE — PROGRESS NOTES
Pain- Riley states pain is 5/10 and describes as intense in right hip/thigh. Pain scale and management reviewed and verbalizes understanding.

## 2024-08-21 NOTE — PROGRESS NOTES
Keeping You Safe for Surgery    Staphylococcus aureus or “Staph” is a germ that lives on the skin and in the nose of some healthy people. Your skin protects you from those germs. However, when you have surgery, we will be cutting your skin. Sometimes germs can get into those cuts and cause infection.    How do we screen for Staph?  We will swab your nose to see if you are a carrier of Staph. The results will be available about 2 hours after we obtain the nasal specimen.      A positive test does not mean you have an infection; it just means you are a carrier of Staph. Your surgery most likely will not be canceled or delayed.    If my test is positive, what happens?  If your test is positive, a nurse will call you and tell you to get Mupirocin Ointment (Bactroban) at your pharmacy. The medicine may come in one large tube or may come in many small packets. When the medication is administered into your nose, it works by killing some of the germs that could cause you to get a surgical site infection.   If you get a big tube of Mupirocin, place enough medicine to cover the tip of a cotton swab (Q-tip). Place the cotton swab inside one nostril and rub the medicine onto the inside of the nose. Then repeat this same procedure in your other nostril.  If you get small individual tubes, put half the tube on a cotton swab and put the medicine in one nostril. Then the other half in the other nostril.  After the medication has been inserted into each nostril, gently press your nose together and release for about a minute to get the medicine all over the inside of your nose.   Do this once in the morning and once at night for 5 days prior to your scheduled surgery date.        If I have Staph, will I be treated differently in the hospital?  If you have a certain type of Staph called “MRSA” (Methicillin-Resistant Staph aureus), you will be in a single room on “Contact Precautions.” This means your doctors and nurses will wear  gloves and gowns when taking care of you. We do this (along with good hand hygiene) to make sure we do not spread MRSA to any another patients in our care.        SURGERY PREPARATION CHECKLIST     NAME: ________________________________________     DATE OF SURGERY: ____________________________    Enter Dates, Check (?) circles to indicate task is completed.    Date MUPIROCIN NASAL OINTMENT BODY CLEANSING     DAY 1 _______8/22_______________   Morning    Evening          Day 2 _________8/23_____________   Morning     Evening        Day 3 ______8/24________________   Morning  Evening        Day 4 _________8/25_____________   Morning    Evening        Day 5 ______8/26________________   Morning  Evening        Day 6 ________8/27______________  (Day of Surgery)               PLEASE COMPLETE and BRING THIS CHECKLIST WITH YOU TO THE HOSPITAL to give to your nurse on the day of surgery.   You will be notified if you need to use Mupirocin Nasal Ointment.

## 2024-08-21 NOTE — PROGRESS NOTES
Spoke with Cassandra at Dr Hightower's office regarding how long patient to hold eliquis, Dr Gillespie clearance in March (using for this surgery also) did not specify. States she will call Dr Gillespie's office to ask.

## 2024-08-21 NOTE — PROGRESS NOTES
Notified Viviane at Dr Hightower's office regarding urine glucose 500, wbc 3.8, hgb 12.5. She states she will update the physician. Viviane states they have not heard back from Dr Gillespie's office yet regarding holding eliquis.

## 2024-08-21 NOTE — TELEPHONE ENCOUNTER
Pre op Risk Assessment    Procedure Hip Surgery  Physician Dr. Hightower   Date of surgery/procedure 8/27/2029    Last OV 2/12/2024  Last Stress 8/9/2022  Last Echo [facility] 2/28/2024  Last Cath 11/15/2018  Last Stent Nothing in EPIC  Is patient on blood thinners Eliquis  Hold Meds/how many days     Notify Viviane at O at [phone] ext [phone]

## 2024-08-21 NOTE — PROGRESS NOTES
Joint Replacement Pre-op Instruction Sheet    Nothing to eat or drink after midnight the night before surgery, except sips of water to take medications.  Bring photo ID and any health insurance cards.  Wear comfortable clean loose-fitting clothing.  Do not wear any jewelry, do not glue in dentures  Bring your medication in the original bottles.  Bring your walker with 2 wheels if you have one.  Bring your CPAP machine if you have one.  Wear clean clothes to bed and place fresh clean sheets and pillowcases on your bed the night before surgery  Incentive spirometer with good return demonstration 1750ml  Your expected length of stay in the hospital after your surgery is 1-2 days  You will be discharged with outpatient physical therapy and possibly home health care  Shower:  Shower 2 days before, day before and morning of surgery using the Start Clean® kit provided (follow the instructions provided with the kit)  Do not shave the surgical area! If needed, your nurse will use clippers to remove any excess hair at the surgical site when you come in for surgery. Do not use a razor on the site of your surgery for at least 2 days prior to surgery because shaving can leave tiny nicks in the skin which may allow germs to enter and cause infection.  Educational handouts on Preventing Surgical Site infections, General Anesthesia, Coughing and Deep Breathing/ Incentive Spirometer as needed, Fall Prevention, MRSA/MSSA, Hand Hygiene and Jewelry Precautions Given  Joint Handbook and Exercise Booklet given and reviewed with patient  Perform the exercises given in your booklet 3 times daily starting today  Physical Therapy Video; [email or web address]    Patient did not bring in medication bottles or surgery instructions from Dr Hightower's office.  Medication instructions:  Hold etodolac and voltaren now prior to surgery  Use stiolto inhaler morning of surgery  Hold lisinopril and lasix morning of surgery  Hold eliquis per

## 2024-08-22 LAB — MRSA DNA SPEC QL NAA+PROBE: NEGATIVE

## 2024-08-23 NOTE — PROGRESS NOTES
Spoke with Viviane at Dr Hightower's office regarding note from Dr Gillespie's office regarding patient needs seen and appointment scheduled for Monday 8/26/24. States she will update Dr Hightower.

## 2024-08-23 NOTE — PROGRESS NOTES
Cassandra from Dr Hightower's office called and states physician reviewed labs and is ok. States they reached out to Dr Gillespie's office again today regarding Eliquis.

## 2024-08-26 ENCOUNTER — OFFICE VISIT (OUTPATIENT)
Dept: CARDIOLOGY CLINIC | Age: 64
End: 2024-08-26
Payer: MEDICARE

## 2024-08-26 ENCOUNTER — ANESTHESIA EVENT (OUTPATIENT)
Dept: OPERATING ROOM | Age: 64
End: 2024-08-26
Payer: MEDICARE

## 2024-08-26 VITALS
SYSTOLIC BLOOD PRESSURE: 114 MMHG | HEART RATE: 83 BPM | BODY MASS INDEX: 38.8 KG/M2 | DIASTOLIC BLOOD PRESSURE: 70 MMHG | WEIGHT: 262 LBS | HEIGHT: 69 IN

## 2024-08-26 DIAGNOSIS — Z95.0 PACEMAKER: ICD-10-CM

## 2024-08-26 DIAGNOSIS — I10 ESSENTIAL HYPERTENSION: ICD-10-CM

## 2024-08-26 DIAGNOSIS — Z98.890 S/P CARDIAC CATH: ICD-10-CM

## 2024-08-26 DIAGNOSIS — I48.0 PAF (PAROXYSMAL ATRIAL FIBRILLATION) (HCC): ICD-10-CM

## 2024-08-26 DIAGNOSIS — Z95.3 HISTORY OF TRICUSPID VALVE REPLACEMENT WITH BIOPROSTHETIC VALVE: ICD-10-CM

## 2024-08-26 DIAGNOSIS — I07.0 TRICUSPID STENOSIS, ACQUIRED: ICD-10-CM

## 2024-08-26 DIAGNOSIS — I47.10 SVT (SUPRAVENTRICULAR TACHYCARDIA) (HCC): ICD-10-CM

## 2024-08-26 DIAGNOSIS — I50.32 CHRONIC DIASTOLIC CONGESTIVE HEART FAILURE (HCC): Primary | ICD-10-CM

## 2024-08-26 PROCEDURE — 3078F DIAST BP <80 MM HG: CPT | Performed by: INTERNAL MEDICINE

## 2024-08-26 PROCEDURE — 3017F COLORECTAL CA SCREEN DOC REV: CPT | Performed by: INTERNAL MEDICINE

## 2024-08-26 PROCEDURE — G8417 CALC BMI ABV UP PARAM F/U: HCPCS | Performed by: INTERNAL MEDICINE

## 2024-08-26 PROCEDURE — G8427 DOCREV CUR MEDS BY ELIG CLIN: HCPCS | Performed by: INTERNAL MEDICINE

## 2024-08-26 PROCEDURE — 4004F PT TOBACCO SCREEN RCVD TLK: CPT | Performed by: INTERNAL MEDICINE

## 2024-08-26 PROCEDURE — 93000 ELECTROCARDIOGRAM COMPLETE: CPT | Performed by: INTERNAL MEDICINE

## 2024-08-26 PROCEDURE — 3074F SYST BP LT 130 MM HG: CPT | Performed by: INTERNAL MEDICINE

## 2024-08-26 PROCEDURE — 99214 OFFICE O/P EST MOD 30 MIN: CPT | Performed by: INTERNAL MEDICINE

## 2024-08-26 RX ORDER — LISINOPRIL 10 MG/1
10 TABLET ORAL DAILY
Qty: 90 TABLET | Refills: 3 | Status: SHIPPED | OUTPATIENT
Start: 2024-08-26

## 2024-08-26 NOTE — PROGRESS NOTES
Chief Complaint   Patient presents with    Cardiac Clearance     Cardiac clearance for hip surgery done by Dr. Hightower on 08/27/2024.    Follow-up     6 month follow up.    former pat of Dr. Bautista  Hx of TV repair due to  Endocarditis    Hx of  new onset AFib. Noted on ekg while in hsopiatl and device interrogation    Hx of recent Hemoptyiss 9/2020 and referred to [facility] and theyn stopped coumadin and started xeralto and sent home       HIP surgery- left  Left total hip with Dr. Hightower on 2-28-24          6 month follow up.    Cardiac clearance for hip surgery done by Dr. Hightower on 08/27/2024.    Wheel chair bound for over 1 yr after back surgery    Denied cp, dizziness, palpitations    Chronic leg edema trace with chronic changes    Sob on exertion chronic  Used to be On Home O2 and now off   Wheelchair bound    After stopped amiodarone the weired feeling and tingling sensations resolved  Feel much better    No bleeding since d/c sept 2020    NO CABG  Had only TV replacement bio and pacer following surgery for chb      Patient Active Problem List   Diagnosis    Moderate COPD (chronic obstructive pulmonary disease) ([facility])    Community acquired pneumonia    Suicidal ideation    SIRS (systemic inflammatory response syndrome) ([facility])    Sepsis due to methicillin susceptible Staphylococcus aureus (HCC)    Severe sepsis (HCC)    HCAP (healthcare-associated pneumonia)    Hyponatremia    Lung nodules    History of intravenous drug use in remission    Malnutrition (HCC)    Coagulopathy ([facility])    Acute blood loss anemia    Complete heart block, post-surgical (HCC)    Endocarditis, bacterial, acute/subacute    Leukocytosis    Physical deconditioning    Abdominal pain, acute    Microscopic hematuria    [employer] BiV pacemaker    Abnormal CT of the abdomen    Generalized abdominal pain    GERD (gastroesophageal reflux disease)    Non-intractable vomiting with nausea    Hx of tricuspid valve repair    History of colonic  and systolic function. Ejection fraction was   estimated at 60 %. There were no regional left ventricular wall motion   abnormalities and wall thickness was within normal limits.   The left atrium is Mild to moderate dilated.   The aortic valve leaflets were not well visualized.   DOPPLER: Transaortic velocity was within the normal range with no evidence   of aortic stenosis. There was no evidence of aortic regurgitation.   Mild tricuspid regurgitation visualized.   Probable Normal functioning Bioprosthetic Tricuspid Valve Or Tricuspid   Valve repair   Mild tricuspid regurgitation visualized.   Right ventricular systolic pressure measures 45 mmhg.      Signature      ----------------------------------------------------------------   Electronically signed by Ilda Gillespie MD (Interpreting   physician) on 02/23/2018 at 06:52 PM    ekf  10/18/2019  atr fib with CVR    ekg   122/19  Sinus  Rhythm   -Right bundle branch block with left axis -bifascicular block.    - (probably not recent)  Inferior and  Old  Extensive anterior-lateral infarct.    -  Nonspecific T-abnormality.     ABNORMAL     ekg  12/3/19  Electronic ventricular pacemaker   Pacemaker ECG, No further analysis   INSUFFICIENT DATA    Procedure Summary  Angiographically Patent Coronaries.  Normal LV systolic function.  LV size - normal.  LVEF approximately 65% .  EDP 15 mmhg  No Transaortic Gradient  Recommendations  Continue with aggressive risk factor modification and medical therapy.  Estimated Blood Loss: 10 ml.  Complications: No complications.  Signatures  Electronically signed by Ilda Gillespie MD (Performing Physician) on 12/05/2018 at 14:48    Conclusions      Summary   Technically difficult examination.   Normal left ventricle size and systolic function. Ejection fraction was   estimated at 50 to 55 %. There were no regional left ventricular wall   motion abnormalities and wall thickness was within normal limits.   The left atrium is Mildly

## 2024-08-27 ENCOUNTER — ANESTHESIA (OUTPATIENT)
Dept: OPERATING ROOM | Age: 64
End: 2024-08-27
Payer: MEDICARE

## 2024-08-27 ENCOUNTER — HOSPITAL ENCOUNTER (OUTPATIENT)
Age: 64
Setting detail: OUTPATIENT SURGERY
Discharge: HOME | End: 2024-08-27
Attending: ORTHOPAEDIC SURGERY | Admitting: ORTHOPAEDIC SURGERY
Payer: MEDICARE

## 2024-08-27 VITALS
DIASTOLIC BLOOD PRESSURE: 70 MMHG | OXYGEN SATURATION: 88 % | TEMPERATURE: 96.8 F | WEIGHT: 262.4 LBS | BODY MASS INDEX: 38.86 KG/M2 | SYSTOLIC BLOOD PRESSURE: 143 MMHG | HEART RATE: 85 BPM | HEIGHT: 69 IN | RESPIRATION RATE: 16 BRPM

## 2024-08-27 LAB
ABO: NORMAL
ANTIBODY SCREEN: NORMAL
RH FACTOR: NORMAL

## 2024-08-27 PROCEDURE — 86900 BLOOD TYPING SEROLOGIC ABO: CPT

## 2024-08-27 PROCEDURE — 36415 COLL VENOUS BLD VENIPUNCTURE: CPT

## 2024-08-27 PROCEDURE — 86850 RBC ANTIBODY SCREEN: CPT

## 2024-08-27 PROCEDURE — 86901 BLOOD TYPING SEROLOGIC RH(D): CPT

## 2024-08-27 RX ORDER — ACETAMINOPHEN 500 MG
1000 TABLET ORAL ONCE
Status: DISCONTINUED | OUTPATIENT
Start: 2024-08-27 | End: 2024-08-27 | Stop reason: HOSPADM

## 2024-08-27 RX ORDER — SODIUM CHLORIDE 0.9 % (FLUSH) 0.9 %
5-40 SYRINGE (ML) INJECTION PRN
Status: DISCONTINUED | OUTPATIENT
Start: 2024-08-27 | End: 2024-08-27 | Stop reason: HOSPADM

## 2024-08-27 RX ORDER — SODIUM CHLORIDE 9 MG/ML
INJECTION, SOLUTION INTRAVENOUS PRN
Status: DISCONTINUED | OUTPATIENT
Start: 2024-08-27 | End: 2024-08-27 | Stop reason: HOSPADM

## 2024-08-27 RX ORDER — NALOXONE HYDROCHLORIDE 0.4 MG/ML
INJECTION, SOLUTION INTRAMUSCULAR; INTRAVENOUS; SUBCUTANEOUS PRN
Status: CANCELLED | OUTPATIENT
Start: 2024-08-27

## 2024-08-27 RX ORDER — SODIUM CHLORIDE 9 MG/ML
INJECTION, SOLUTION INTRAVENOUS PRN
Status: CANCELLED | OUTPATIENT
Start: 2024-08-27

## 2024-08-27 RX ORDER — SODIUM CHLORIDE 0.9 % (FLUSH) 0.9 %
5-40 SYRINGE (ML) INJECTION EVERY 12 HOURS SCHEDULED
Status: CANCELLED | OUTPATIENT
Start: 2024-08-27

## 2024-08-27 RX ORDER — SODIUM CHLORIDE 0.9 % (FLUSH) 0.9 %
5-40 SYRINGE (ML) INJECTION EVERY 12 HOURS SCHEDULED
Status: DISCONTINUED | OUTPATIENT
Start: 2024-08-27 | End: 2024-08-27 | Stop reason: HOSPADM

## 2024-08-27 RX ORDER — SODIUM CHLORIDE 0.9 % (FLUSH) 0.9 %
5-40 SYRINGE (ML) INJECTION PRN
Status: CANCELLED | OUTPATIENT
Start: 2024-08-27

## 2024-08-27 RX ORDER — CELECOXIB 200 MG/1
200 CAPSULE ORAL ONCE
Status: DISCONTINUED | OUTPATIENT
Start: 2024-08-27 | End: 2024-08-27 | Stop reason: HOSPADM

## 2024-08-27 RX ORDER — SODIUM CHLORIDE 9 MG/ML
INJECTION, SOLUTION INTRAVENOUS CONTINUOUS
Status: DISCONTINUED | OUTPATIENT
Start: 2024-08-27 | End: 2024-08-27 | Stop reason: HOSPADM

## 2024-08-27 ASSESSMENT — PAIN SCALES - GENERAL: PAINLEVEL_OUTOF10: 6

## 2024-08-27 NOTE — ANESTHESIA PRE PROCEDURE
Department of Anesthesiology  Preprocedure Note       Name:  Avery Jordan   Age:  63 y.o.  :  1960                                          MRN:  [id number]         Date:  2024      Surgeon: Surgeon(s):  Felton Hightower MD    Procedure: Procedure(s):  Right Total Hip Lateral Approach    Medications prior to admission:   Prior to Admission medications    Medication Sig Start Date End Date Taking? Authorizing Provider   lisinopril (PRINIVIL;ZESTRIL) 10 MG tablet Take 1 tablet by mouth daily 24  Yes Ilda Gillespie MD   diclofenac sodium (VOLTAREN) 1 % GEL 4 times daily as needed for Pain 24  Yes ProviderDomonique MD   triamcinolone (KENALOG) 0.1 % ointment Apply 1 Application topically   Yes Provider, MD Domonique   OXYGEN Inhale 3 L into the lungs as needed 3 liters as need   Yes Provider, MD Domonique   buprenorphine-naloxone (SUBOXONE) 8-2 MG FILM SL film 1 Film daily as needed (overdose). 24  Yes Provider, MD Domonique   apixaban (ELIQUIS) 5 MG TABS tablet Take 1 tablet by mouth 2 times daily   Yes Provider, MD Domonique   etodolac (LODINE) 400 MG tablet Take 1 tablet by mouth 2 times daily as needed (pain)   Yes Provider, MD Domonique   tiotropium-olodaterol (STIOLTO RESPIMAT) 2.5-2.5 MCG/ACT AERS Inhale 2 puffs into the lungs daily   Yes Provider, MD Domonique   DULoxetine (CYMBALTA) 60 MG extended release capsule Take 1 capsule by mouth daily   Yes Provider, MD Domonique   gabapentin (NEURONTIN) 300 MG capsule Take 1 capsule by mouth 3 times daily. 22  Yes Provider, MD Domonique   Ferrous Sulfate 324 MG TBEC Take by mouth daily   Yes ProviderDomonique MD   clonazePAM (KLONOPIN) 2 MG tablet Take 1 tablet by mouth 3 times daily as needed for Anxiety.   Yes Provider, MD Domonique   QUEtiapine (SEROQUEL) 400 MG tablet Take 1 tablet by mouth nightly   Yes Provider, MD Domonique   potassium chloride (KLOR-CON M) 20 MEQ extended release tablet Take

## 2024-08-27 NOTE — PROGRESS NOTES
Admitted to Same Day Surgery and oriented to the unit. Patient verbalized approval for first name, last initial and physician name on the unit white board. Fall and allergy band on patient.

## 2024-08-29 ENCOUNTER — TELEPHONE (OUTPATIENT)
Dept: CARDIOLOGY CLINIC | Age: 64
End: 2024-08-29

## 2024-08-29 NOTE — TELEPHONE ENCOUNTER
Pt did not show up for his biv pacer check in office today    Voicemail box is full and unable to leave a message

## 2024-09-05 NOTE — PROGRESS NOTES
PAT   Pt called back and medications reviewed and instructed to hold Eliquis 3 days and Lisinopril 24 hours.Also instructed pt to use soap provided at PAT visit and to shower with it 2 days prior to surgery and the day of surgery. Reinforced pt to be NPO morning of surgery including no smoking. Pt voiced understanding.

## 2024-09-05 NOTE — PROGRESS NOTES
PAT:   attempted to reach pt to re-instruct on medications to be held for upcoming surgery - son stated pt is \" still sleeping\" and requested we call back after 1000 this morning.

## 2024-09-06 ENCOUNTER — ANESTHESIA EVENT (OUTPATIENT)
Dept: OPERATING ROOM | Age: 64
End: 2024-09-06
Payer: MEDICARE

## 2024-09-06 NOTE — TELEPHONE ENCOUNTER
Letter sent; device check added to appt desk for same day as upcoming Dr. Veran escalante in March. Pt due to send remote transmission 12-5-24

## 2024-09-10 ENCOUNTER — APPOINTMENT (OUTPATIENT)
Dept: GENERAL RADIOLOGY | Age: 64
End: 2024-09-10
Attending: ORTHOPAEDIC SURGERY
Payer: MEDICARE

## 2024-09-10 ENCOUNTER — ANESTHESIA (OUTPATIENT)
Dept: OPERATING ROOM | Age: 64
End: 2024-09-10
Payer: MEDICARE

## 2024-09-10 ENCOUNTER — HOSPITAL ENCOUNTER (OUTPATIENT)
Age: 64
Discharge: NURSING, REHAB OR LONG-TERM CARE | End: 2024-09-13
Attending: ORTHOPAEDIC SURGERY | Admitting: ORTHOPAEDIC SURGERY
Payer: MEDICARE

## 2024-09-10 DIAGNOSIS — M16.11 PRIMARY OSTEOARTHRITIS OF RIGHT HIP: Primary | ICD-10-CM

## 2024-09-10 LAB
ABO: NORMAL
ANTIBODY SCREEN: NORMAL
GLUCOSE BLD STRIP.AUTO-MCNC: 160 MG/DL (ref 70–108)
POTASSIUM SERPL-SCNC: 4.2 MEQ/L (ref 3.5–5.2)
RH FACTOR: NORMAL

## 2024-09-10 PROCEDURE — 6360000002 HC RX W HCPCS: Performed by: REGISTERED NURSE

## 2024-09-10 PROCEDURE — 2580000003 HC RX 258: Performed by: PHYSICIAN ASSISTANT

## 2024-09-10 PROCEDURE — 2580000003 HC RX 258: Performed by: ORTHOPAEDIC SURGERY

## 2024-09-10 PROCEDURE — 86901 BLOOD TYPING SEROLOGIC RH(D): CPT

## 2024-09-10 PROCEDURE — 82948 REAGENT STRIP/BLOOD GLUCOSE: CPT

## 2024-09-10 PROCEDURE — 2700000000 HC OXYGEN THERAPY PER DAY

## 2024-09-10 PROCEDURE — 86850 RBC ANTIBODY SCREEN: CPT

## 2024-09-10 PROCEDURE — 2709999900 HC NON-CHARGEABLE SUPPLY: Performed by: ORTHOPAEDIC SURGERY

## 2024-09-10 PROCEDURE — 64447 NJX AA&/STRD FEMORAL NRV IMG: CPT | Performed by: ANESTHESIOLOGY

## 2024-09-10 PROCEDURE — 7100000000 HC PACU RECOVERY - FIRST 15 MIN: Performed by: ORTHOPAEDIC SURGERY

## 2024-09-10 PROCEDURE — 94761 N-INVAS EAR/PLS OXIMETRY MLT: CPT

## 2024-09-10 PROCEDURE — 6360000002 HC RX W HCPCS: Performed by: ORTHOPAEDIC SURGERY

## 2024-09-10 PROCEDURE — 6370000000 HC RX 637 (ALT 250 FOR IP): Performed by: ORTHOPAEDIC SURGERY

## 2024-09-10 PROCEDURE — 6370000000 HC RX 637 (ALT 250 FOR IP): Performed by: ANESTHESIOLOGY

## 2024-09-10 PROCEDURE — 86900 BLOOD TYPING SEROLOGIC ABO: CPT

## 2024-09-10 PROCEDURE — 36415 COLL VENOUS BLD VENIPUNCTURE: CPT

## 2024-09-10 PROCEDURE — 73502 X-RAY EXAM HIP UNI 2-3 VIEWS: CPT

## 2024-09-10 PROCEDURE — 3600000004 HC SURGERY LEVEL 4 BASE: Performed by: ORTHOPAEDIC SURGERY

## 2024-09-10 PROCEDURE — 6360000002 HC RX W HCPCS: Performed by: ANESTHESIOLOGY

## 2024-09-10 PROCEDURE — 84132 ASSAY OF SERUM POTASSIUM: CPT

## 2024-09-10 PROCEDURE — 7100000001 HC PACU RECOVERY - ADDTL 15 MIN: Performed by: ORTHOPAEDIC SURGERY

## 2024-09-10 PROCEDURE — 2500000003 HC RX 250 WO HCPCS: Performed by: ANESTHESIOLOGY

## 2024-09-10 PROCEDURE — 2500000003 HC RX 250 WO HCPCS: Performed by: PHYSICIAN ASSISTANT

## 2024-09-10 PROCEDURE — 3600000014 HC SURGERY LEVEL 4 ADDTL 15MIN: Performed by: ORTHOPAEDIC SURGERY

## 2024-09-10 PROCEDURE — 3700000000 HC ANESTHESIA ATTENDED CARE: Performed by: ORTHOPAEDIC SURGERY

## 2024-09-10 PROCEDURE — 6360000002 HC RX W HCPCS: Performed by: PHYSICIAN ASSISTANT

## 2024-09-10 PROCEDURE — 94640 AIRWAY INHALATION TREATMENT: CPT

## 2024-09-10 PROCEDURE — 2720000010 HC SURG SUPPLY STERILE: Performed by: ORTHOPAEDIC SURGERY

## 2024-09-10 PROCEDURE — 6370000000 HC RX 637 (ALT 250 FOR IP): Performed by: PHYSICIAN ASSISTANT

## 2024-09-10 PROCEDURE — 3700000001 HC ADD 15 MINUTES (ANESTHESIA): Performed by: ORTHOPAEDIC SURGERY

## 2024-09-10 PROCEDURE — C1776 JOINT DEVICE (IMPLANTABLE): HCPCS | Performed by: ORTHOPAEDIC SURGERY

## 2024-09-10 DEVICE — SYNERGY POROUS HIGH OFFSET FEMORAL                                    COMPONENT SZ 16
Type: IMPLANTABLE DEVICE | Site: HIP | Status: FUNCTIONAL
Brand: SYNERGY

## 2024-09-10 DEVICE — HIP H2 TOT ADV OTHER HD IMPL CAPPED H2 SN: Type: IMPLANTABLE DEVICE | Site: HIP | Status: FUNCTIONAL

## 2024-09-10 DEVICE — R3 20 DEGREE XLPE ACETABULAR LINER                                    36MM INNER DIAMETER X OUTER DIAMETER 54MM
Type: IMPLANTABLE DEVICE | Site: HIP | Status: FUNCTIONAL
Brand: R3

## 2024-09-10 DEVICE — UNIVERSAL CANCELLOUS ACETABULAR                                    SCREW 25MM: Type: IMPLANTABLE DEVICE | Site: HIP | Status: FUNCTIONAL

## 2024-09-10 DEVICE — REFLECTION SPHERICAL HEAD SCREW 25MM
Type: IMPLANTABLE DEVICE | Site: HIP | Status: FUNCTIONAL
Brand: REFLECTION

## 2024-09-10 DEVICE — R3 3 HOLE ACETABULAR SHELL 54MM
Type: IMPLANTABLE DEVICE | Site: HIP | Status: FUNCTIONAL
Brand: R3 ACETABULAR

## 2024-09-10 DEVICE — REFLECTION THREADED HOLE COVER
Type: IMPLANTABLE DEVICE | Site: HIP | Status: FUNCTIONAL
Brand: REFLECTION

## 2024-09-10 DEVICE — REFLECTION SPHERICAL HEAD SCREW 35MM
Type: IMPLANTABLE DEVICE | Site: HIP | Status: FUNCTIONAL
Brand: REFLECTION

## 2024-09-10 DEVICE — OXINIUM FEMORAL HEAD 12/14 TAPER                                    36 MM +0
Type: IMPLANTABLE DEVICE | Site: HIP | Status: FUNCTIONAL
Brand: OXINIUM

## 2024-09-10 RX ORDER — SODIUM CHLORIDE 9 MG/ML
INJECTION, SOLUTION INTRAVENOUS PRN
Status: DISCONTINUED | OUTPATIENT
Start: 2024-09-10 | End: 2024-09-10 | Stop reason: HOSPADM

## 2024-09-10 RX ORDER — POTASSIUM CHLORIDE 1500 MG/1
20 TABLET, EXTENDED RELEASE ORAL 2 TIMES DAILY
Status: DISCONTINUED | OUTPATIENT
Start: 2024-09-10 | End: 2024-09-13 | Stop reason: HOSPADM

## 2024-09-10 RX ORDER — QUETIAPINE FUMARATE 400 MG/1
400 TABLET, FILM COATED ORAL NIGHTLY
Status: DISCONTINUED | OUTPATIENT
Start: 2024-09-10 | End: 2024-09-13 | Stop reason: HOSPADM

## 2024-09-10 RX ORDER — SODIUM CHLORIDE 0.9 % (FLUSH) 0.9 %
5-40 SYRINGE (ML) INJECTION PRN
Status: DISCONTINUED | OUTPATIENT
Start: 2024-09-10 | End: 2024-09-13 | Stop reason: HOSPADM

## 2024-09-10 RX ORDER — ONDANSETRON 2 MG/ML
4 INJECTION INTRAMUSCULAR; INTRAVENOUS EVERY 6 HOURS PRN
Status: DISCONTINUED | OUTPATIENT
Start: 2024-09-10 | End: 2024-09-13 | Stop reason: HOSPADM

## 2024-09-10 RX ORDER — FUROSEMIDE 40 MG
40 TABLET ORAL DAILY
Status: DISCONTINUED | OUTPATIENT
Start: 2024-09-11 | End: 2024-09-13 | Stop reason: HOSPADM

## 2024-09-10 RX ORDER — CELECOXIB 200 MG/1
200 CAPSULE ORAL ONCE
Status: COMPLETED | OUTPATIENT
Start: 2024-09-10 | End: 2024-09-10

## 2024-09-10 RX ORDER — NALOXONE HYDROCHLORIDE 0.4 MG/ML
INJECTION, SOLUTION INTRAMUSCULAR; INTRAVENOUS; SUBCUTANEOUS PRN
Status: DISCONTINUED | OUTPATIENT
Start: 2024-09-10 | End: 2024-09-10 | Stop reason: HOSPADM

## 2024-09-10 RX ORDER — PHENYLEPHRINE HCL IN 0.9% NACL 1 MG/10 ML
SYRINGE (ML) INTRAVENOUS PRN
Status: DISCONTINUED | OUTPATIENT
Start: 2024-09-10 | End: 2024-09-10 | Stop reason: SDUPTHER

## 2024-09-10 RX ORDER — SODIUM CHLORIDE 9 MG/ML
INJECTION, SOLUTION INTRAVENOUS CONTINUOUS
Status: DISCONTINUED | OUTPATIENT
Start: 2024-09-10 | End: 2024-09-13 | Stop reason: HOSPADM

## 2024-09-10 RX ORDER — PANTOPRAZOLE SODIUM 40 MG/1
40 TABLET, DELAYED RELEASE ORAL
Status: DISCONTINUED | OUTPATIENT
Start: 2024-09-10 | End: 2024-09-13 | Stop reason: HOSPADM

## 2024-09-10 RX ORDER — IPRATROPIUM BROMIDE AND ALBUTEROL SULFATE 2.5; .5 MG/3ML; MG/3ML
1 SOLUTION RESPIRATORY (INHALATION) ONCE
Status: COMPLETED | OUTPATIENT
Start: 2024-09-10 | End: 2024-09-10

## 2024-09-10 RX ORDER — VANCOMYCIN HYDROCHLORIDE 1 G/20ML
INJECTION, POWDER, LYOPHILIZED, FOR SOLUTION INTRAVENOUS PRN
Status: DISCONTINUED | OUTPATIENT
Start: 2024-09-10 | End: 2024-09-10 | Stop reason: ALTCHOICE

## 2024-09-10 RX ORDER — SODIUM CHLORIDE 0.9 % (FLUSH) 0.9 %
5-40 SYRINGE (ML) INJECTION EVERY 12 HOURS SCHEDULED
Status: DISCONTINUED | OUTPATIENT
Start: 2024-09-10 | End: 2024-09-13 | Stop reason: HOSPADM

## 2024-09-10 RX ORDER — MORPHINE SULFATE 2 MG/ML
2 INJECTION, SOLUTION INTRAMUSCULAR; INTRAVENOUS
Status: DISPENSED | OUTPATIENT
Start: 2024-09-10 | End: 2024-09-11

## 2024-09-10 RX ORDER — CLONAZEPAM 0.5 MG/1
2 TABLET ORAL 3 TIMES DAILY PRN
Status: DISCONTINUED | OUTPATIENT
Start: 2024-09-10 | End: 2024-09-13 | Stop reason: HOSPADM

## 2024-09-10 RX ORDER — ACETAMINOPHEN 500 MG
1000 TABLET ORAL ONCE
Status: COMPLETED | OUTPATIENT
Start: 2024-09-10 | End: 2024-09-10

## 2024-09-10 RX ORDER — TRAMADOL HYDROCHLORIDE 50 MG/1
50 TABLET ORAL EVERY 4 HOURS PRN
Status: DISCONTINUED | OUTPATIENT
Start: 2024-09-10 | End: 2024-09-13 | Stop reason: HOSPADM

## 2024-09-10 RX ORDER — FENTANYL CITRATE 50 UG/ML
INJECTION, SOLUTION INTRAMUSCULAR; INTRAVENOUS PRN
Status: DISCONTINUED | OUTPATIENT
Start: 2024-09-10 | End: 2024-09-10 | Stop reason: SDUPTHER

## 2024-09-10 RX ORDER — ACETAMINOPHEN 325 MG/1
650 TABLET ORAL EVERY 6 HOURS
Status: DISCONTINUED | OUTPATIENT
Start: 2024-09-10 | End: 2024-09-13 | Stop reason: HOSPADM

## 2024-09-10 RX ORDER — DEXMEDETOMIDINE HYDROCHLORIDE 100 UG/ML
INJECTION, SOLUTION INTRAVENOUS
Status: COMPLETED | OUTPATIENT
Start: 2024-09-10 | End: 2024-09-10

## 2024-09-10 RX ORDER — SODIUM CHLORIDE 0.9 % (FLUSH) 0.9 %
5-40 SYRINGE (ML) INJECTION EVERY 12 HOURS SCHEDULED
Status: DISCONTINUED | OUTPATIENT
Start: 2024-09-10 | End: 2024-09-10 | Stop reason: HOSPADM

## 2024-09-10 RX ORDER — BUPIVACAINE HYDROCHLORIDE 5 MG/ML
INJECTION, SOLUTION EPIDURAL; INTRACAUDAL
Status: COMPLETED | OUTPATIENT
Start: 2024-09-10 | End: 2024-09-10

## 2024-09-10 RX ORDER — CYCLOBENZAPRINE HCL 10 MG
10 TABLET ORAL EVERY 12 HOURS PRN
Status: DISCONTINUED | OUTPATIENT
Start: 2024-09-10 | End: 2024-09-13 | Stop reason: HOSPADM

## 2024-09-10 RX ORDER — SODIUM CHLORIDE 0.9 % (FLUSH) 0.9 %
5-40 SYRINGE (ML) INJECTION PRN
Status: DISCONTINUED | OUTPATIENT
Start: 2024-09-10 | End: 2024-09-10 | Stop reason: HOSPADM

## 2024-09-10 RX ORDER — NICOTINE 21 MG/24HR
1 PATCH, TRANSDERMAL 24 HOURS TRANSDERMAL DAILY
Status: DISCONTINUED | OUTPATIENT
Start: 2024-09-11 | End: 2024-09-13 | Stop reason: HOSPADM

## 2024-09-10 RX ORDER — DULOXETIN HYDROCHLORIDE 60 MG/1
60 CAPSULE, DELAYED RELEASE ORAL DAILY
Status: DISCONTINUED | OUTPATIENT
Start: 2024-09-11 | End: 2024-09-13 | Stop reason: HOSPADM

## 2024-09-10 RX ORDER — DEXAMETHASONE SODIUM PHOSPHATE 4 MG/ML
INJECTION, SOLUTION INTRA-ARTICULAR; INTRALESIONAL; INTRAMUSCULAR; INTRAVENOUS; SOFT TISSUE
Status: COMPLETED | OUTPATIENT
Start: 2024-09-10 | End: 2024-09-10

## 2024-09-10 RX ORDER — HYDROCODONE BITARTRATE AND ACETAMINOPHEN 5; 325 MG/1; MG/1
2 TABLET ORAL EVERY 4 HOURS PRN
Status: DISCONTINUED | OUTPATIENT
Start: 2024-09-10 | End: 2024-09-13 | Stop reason: HOSPADM

## 2024-09-10 RX ORDER — SODIUM CHLORIDE 9 MG/ML
INJECTION, SOLUTION INTRAVENOUS PRN
Status: DISCONTINUED | OUTPATIENT
Start: 2024-09-10 | End: 2024-09-13 | Stop reason: HOSPADM

## 2024-09-10 RX ORDER — SODIUM CHLORIDE 9 MG/ML
INJECTION, SOLUTION INTRAVENOUS CONTINUOUS
Status: DISCONTINUED | OUTPATIENT
Start: 2024-09-10 | End: 2024-09-10 | Stop reason: HOSPADM

## 2024-09-10 RX ORDER — LISINOPRIL 10 MG/1
10 TABLET ORAL DAILY
Status: DISCONTINUED | OUTPATIENT
Start: 2024-09-10 | End: 2024-09-13 | Stop reason: HOSPADM

## 2024-09-10 RX ORDER — POLYETHYLENE GLYCOL 3350 17 G/17G
17 POWDER, FOR SOLUTION ORAL DAILY
Status: DISCONTINUED | OUTPATIENT
Start: 2024-09-10 | End: 2024-09-13 | Stop reason: HOSPADM

## 2024-09-10 RX ORDER — HYDROXYZINE PAMOATE 25 MG/1
25 CAPSULE ORAL 3 TIMES DAILY PRN
Status: DISCONTINUED | OUTPATIENT
Start: 2024-09-10 | End: 2024-09-13 | Stop reason: HOSPADM

## 2024-09-10 RX ORDER — GABAPENTIN 300 MG/1
300 CAPSULE ORAL 3 TIMES DAILY
Status: DISCONTINUED | OUTPATIENT
Start: 2024-09-10 | End: 2024-09-13 | Stop reason: HOSPADM

## 2024-09-10 RX ORDER — PROPOFOL 10 MG/ML
INJECTION, EMULSION INTRAVENOUS CONTINUOUS PRN
Status: DISCONTINUED | OUTPATIENT
Start: 2024-09-10 | End: 2024-09-10 | Stop reason: SDUPTHER

## 2024-09-10 RX ORDER — MIDAZOLAM HYDROCHLORIDE 1 MG/ML
INJECTION INTRAMUSCULAR; INTRAVENOUS PRN
Status: DISCONTINUED | OUTPATIENT
Start: 2024-09-10 | End: 2024-09-10 | Stop reason: SDUPTHER

## 2024-09-10 RX ORDER — BUPIVACAINE HYDROCHLORIDE 7.5 MG/ML
INJECTION, SOLUTION INTRASPINAL PRN
Status: DISCONTINUED | OUTPATIENT
Start: 2024-09-10 | End: 2024-09-10 | Stop reason: SDUPTHER

## 2024-09-10 RX ADMIN — CLONAZEPAM 2 MG: 0.5 TABLET ORAL at 20:22

## 2024-09-10 RX ADMIN — WATER 2000 MG: 1 INJECTION INTRAMUSCULAR; INTRAVENOUS; SUBCUTANEOUS at 16:56

## 2024-09-10 RX ADMIN — POLYETHYLENE GLYCOL 3350 17 G: 17 POWDER, FOR SOLUTION ORAL at 13:45

## 2024-09-10 RX ADMIN — PROPOFOL 30 MCG/KG/MIN: 10 INJECTION, EMULSION INTRAVENOUS at 09:15

## 2024-09-10 RX ADMIN — WATER 2000 MG: 1 INJECTION INTRAMUSCULAR; INTRAVENOUS; SUBCUTANEOUS at 09:13

## 2024-09-10 RX ADMIN — CYCLOBENZAPRINE 10 MG: 10 TABLET, FILM COATED ORAL at 13:19

## 2024-09-10 RX ADMIN — MIDAZOLAM 2 MG: 1 INJECTION INTRAMUSCULAR; INTRAVENOUS at 08:30

## 2024-09-10 RX ADMIN — PANTOPRAZOLE SODIUM 40 MG: 40 TABLET, DELAYED RELEASE ORAL at 16:56

## 2024-09-10 RX ADMIN — ACETAMINOPHEN 1000 MG: 500 TABLET ORAL at 08:08

## 2024-09-10 RX ADMIN — BUPIVACAINE HYDROCHLORIDE 2 ML: 7.5 INJECTION, SOLUTION SUBARACHNOID at 09:12

## 2024-09-10 RX ADMIN — Medication 100 MCG: at 09:45

## 2024-09-10 RX ADMIN — Medication 100 MCG: at 09:55

## 2024-09-10 RX ADMIN — Medication 100 MCG: at 10:04

## 2024-09-10 RX ADMIN — Medication 100 MCG: at 10:02

## 2024-09-10 RX ADMIN — Medication 200 MCG: at 10:18

## 2024-09-10 RX ADMIN — SODIUM CHLORIDE: 9 INJECTION, SOLUTION INTRAVENOUS at 07:57

## 2024-09-10 RX ADMIN — CELECOXIB 200 MG: 200 CAPSULE ORAL at 08:08

## 2024-09-10 RX ADMIN — ACETAMINOPHEN 650 MG: 325 TABLET ORAL at 13:45

## 2024-09-10 RX ADMIN — LISINOPRIL 10 MG: 10 TABLET ORAL at 13:45

## 2024-09-10 RX ADMIN — MORPHINE SULFATE 2 MG: 2 INJECTION, SOLUTION INTRAMUSCULAR; INTRAVENOUS at 20:18

## 2024-09-10 RX ADMIN — TRANEXAMIC ACID 1000 MG: 100 INJECTION, SOLUTION INTRAVENOUS at 09:26

## 2024-09-10 RX ADMIN — GABAPENTIN 300 MG: 300 CAPSULE ORAL at 20:15

## 2024-09-10 RX ADMIN — QUETIAPINE FUMARATE 400 MG: 400 TABLET ORAL at 20:14

## 2024-09-10 RX ADMIN — DEXAMETHASONE SODIUM PHOSPHATE 8 MG: 4 INJECTION, SOLUTION INTRAMUSCULAR; INTRAVENOUS at 08:33

## 2024-09-10 RX ADMIN — POTASSIUM CHLORIDE 20 MEQ: 1500 TABLET, EXTENDED RELEASE ORAL at 20:14

## 2024-09-10 RX ADMIN — Medication 200 MCG: at 10:11

## 2024-09-10 RX ADMIN — HYDROCODONE BITARTRATE AND ACETAMINOPHEN 2 TABLET: 5; 325 TABLET ORAL at 13:20

## 2024-09-10 RX ADMIN — MORPHINE SULFATE 2 MG: 2 INJECTION, SOLUTION INTRAMUSCULAR; INTRAVENOUS at 16:30

## 2024-09-10 RX ADMIN — GABAPENTIN 300 MG: 300 CAPSULE ORAL at 13:45

## 2024-09-10 RX ADMIN — SODIUM CHLORIDE: 9 INJECTION, SOLUTION INTRAVENOUS at 10:12

## 2024-09-10 RX ADMIN — FENTANYL CITRATE 50 MCG: 50 INJECTION, SOLUTION INTRAMUSCULAR; INTRAVENOUS at 08:32

## 2024-09-10 RX ADMIN — Medication 200 MCG: at 10:07

## 2024-09-10 RX ADMIN — BUPIVACAINE HYDROCHLORIDE 30 ML: 5 INJECTION, SOLUTION EPIDURAL; INTRACAUDAL; PERINEURAL at 08:33

## 2024-09-10 RX ADMIN — SODIUM CHLORIDE: 9 INJECTION, SOLUTION INTRAVENOUS at 12:09

## 2024-09-10 RX ADMIN — ACETAMINOPHEN 650 MG: 325 TABLET ORAL at 20:14

## 2024-09-10 RX ADMIN — DEXMEDETOMIDINE HCL 0.2 ML: 100 INJECTION INTRAVENOUS at 08:33

## 2024-09-10 RX ADMIN — APIXABAN 2.5 MG: 2.5 TABLET, FILM COATED ORAL at 20:44

## 2024-09-10 RX ADMIN — SODIUM CHLORIDE: 9 INJECTION, SOLUTION INTRAVENOUS at 16:13

## 2024-09-10 RX ADMIN — IPRATROPIUM BROMIDE AND ALBUTEROL SULFATE 1 DOSE: .5; 3 SOLUTION RESPIRATORY (INHALATION) at 07:57

## 2024-09-10 ASSESSMENT — PAIN DESCRIPTION - DESCRIPTORS
DESCRIPTORS: ACHING
DESCRIPTORS: ACHING
DESCRIPTORS: ACHING;THROBBING
DESCRIPTORS: ACHING;THROBBING

## 2024-09-10 ASSESSMENT — PAIN - FUNCTIONAL ASSESSMENT
PAIN_FUNCTIONAL_ASSESSMENT: 0-10
PAIN_FUNCTIONAL_ASSESSMENT: NONE - DENIES PAIN
PAIN_FUNCTIONAL_ASSESSMENT: PREVENTS OR INTERFERES SOME ACTIVE ACTIVITIES AND ADLS
PAIN_FUNCTIONAL_ASSESSMENT: PREVENTS OR INTERFERES SOME ACTIVE ACTIVITIES AND ADLS
PAIN_FUNCTIONAL_ASSESSMENT: PREVENTS OR INTERFERES WITH ALL ACTIVE AND SOME PASSIVE ACTIVITIES
PAIN_FUNCTIONAL_ASSESSMENT: PREVENTS OR INTERFERES SOME ACTIVE ACTIVITIES AND ADLS

## 2024-09-10 ASSESSMENT — PAIN DESCRIPTION - LOCATION
LOCATION: HIP

## 2024-09-10 ASSESSMENT — PAIN DESCRIPTION - ORIENTATION
ORIENTATION: RIGHT

## 2024-09-10 ASSESSMENT — PAIN SCALES - GENERAL
PAINLEVEL_OUTOF10: 10
PAINLEVEL_OUTOF10: 6
PAINLEVEL_OUTOF10: 4
PAINLEVEL_OUTOF10: 9
PAINLEVEL_OUTOF10: 6
PAINLEVEL_OUTOF10: 10
PAINLEVEL_OUTOF10: 8

## 2024-09-10 ASSESSMENT — PAIN DESCRIPTION - PAIN TYPE: TYPE: SURGICAL PAIN

## 2024-09-10 ASSESSMENT — COPD QUESTIONNAIRES: CAT_SEVERITY: SEVERE

## 2024-09-10 NOTE — H&P
[address] [address]                            PREOPERATIVE H&P      PATIENT NAME: ERIKA HOU            : 1960  MED REC NO: [id number]                       ROOM:   ACCOUNT NO: [id number]                       ADMIT DATE: 09/10/2024  PROVIDER: LADARIUS Rizzo      DIAGNOSES:    1. Right hip degenerative joint disease.  2. Status post left total hip replacement.    3. Chronic obstructive pulmonary disease.    4. Coronary artery disease.    5. Depression.  6. Anxiety.  7. Sleep apnea.    8. Peripheral vascular disease.    9. Healing left leg wound.    CURRENT MEDICATIONS:  Clonazepam, Colace, Eliquis, ferrous sulfate, furosemide, guaifenesin, Mobic, pantoprazole, MiraLAX, potassium chloride, Suboxone, Toprol-XL, trazodone and vilazodone.    ALLERGIES:  PENICILLIN CAUSING NAUSEA; TRAMADOL, UNKNOWN.      SURGICAL HISTORY:  I and D of dorsal hand, left; pacemaker; valve replacement; and left total hip replacement.    FAMILY HISTORY:  Father with diabetes.    SOCIAL HISTORY:  Current smoker, nondrinker.    REVIEW OF SYSTEMS:  Positive for right hip pain, catching, locking, popping, and poor mobility.    HISTORY OF PRESENT ILLNESS:  The patient is a pleasant gentleman who we have been following for quite some time for bilateral hip complaints.  He had pretty extensive peripheral vascular disease, underwent a left total hip replacement back in April of this past year, did quite well.  Unfortunately, having more problems with his right hip now.  He was actually scheduled for hip replacement last month.  He had a laceration of his left lower leg that we have been healing up; especially secondary to his peripheral vascular disease, poor healing properties, he has healed that up nicely, though continued pain and problems with his right hip, having difficulty getting in and out of chair, up and down stairs.  Walking tolerance has  decreased secondary to his hip pain.  X-rays and examination are consistent with severe degenerative joint disease.  After he has tried and failed exhaustive conservative measures including activity modification, cryotherapy, attempted nonuse as well as use of ambulatory aids, he was advised to move forth with more definitive treatment.    PHYSICAL EXAMINATION:  GENERAL:  Well-developed, well-nourished gentleman, in no acute distress.  Walks with an antalgic gait favoring right hip with ambulatory aids.  HEENT:  Normocephalic and atraumatic.  HEART:  Regular rate and rhythm.  LUNGS:  Clear to auscultation bilaterally.    ABDOMEN:  Soft and nontender.    EXTREMITIES:  Right hip shows very poor range of motion, maybe 100 degrees of flexion.  No internal rotation.  External rotation 10 degrees with contracture.  External rotation may be a few more degrees plus eliciting quite a bit of pain inguinally.  Woody edema distally with no open wounds of the right lower extremity.    DIAGNOSTIC DATA:  X-ray of the right hip shows severe degenerative joint disease.    ASSESSMENT:  Right hip degenerative joint disease.    PLAN:  At this point, discussed moving forth with more definitive treatment in the form of hip replacement with lateral approach secondary to his venous disease distally.  Risks, benefits, and possible complications including, but not limited to, DVT; PE; flare-up of medical problems; nerve, artery, soft tissue bleeding or infection; leg length inequality and dislocation were discussed.  Postoperative recovery times as well as hip precautions were discussed as well.  He understands procedure at best and would like to proceed.          LADARIUS DUVALL      D:  09/09/2024 22:26:39     T:  09/10/2024 02:31:38     DAVI/SHIRA  Job #:  [id number]     Doc#:  [id number]

## 2024-09-10 NOTE — BRIEF OP NOTE
Brief Postoperative Note      Patient: Avery Jordan  YOB: 1960  MRN: [id number]    Date of Procedure: 9/10/2024    Pre-Op Diagnosis Codes:      * Primary osteoarthritis of right hip [M16.11]    Post-Op Diagnosis: Same       Procedure(s):  RIGHT TOTAL HIP LATERAL APPROACH    Surgeon(s):  Felton Hightower MD    Assistant:  Physician Assistant: Dmitriy Wheat PA    Anesthesia: Spinal    Estimated Blood Loss (mL): 200     Complications: None    Specimens:   * No specimens in log *    Implants:  Implant Name Type Inv. Item Serial No.  Lot No. LRB No. Used Action   COVER H ACET HIP THRD FOR REFLCT SYS R3 - [id number]  COVER H ACET HIP THRD FOR REFLCT SYS R3  CASH AND [employer] [facility] [id number] Right 1 Implanted   SHELL ACET [id number] 3 H STD HIP POLY POR PRESSFIT R3 - [id number]  SHELL ACET [id number] 3 H STD HIP POLY POR PRESSFIT R3  CASH AND [employer] [facility] [id number] Right 1 Implanted   LINER ACET 20 DEG 36X54 MM HIP XLPE R3 - [id number]  LINER ACET 20 DEG 36X54 MM HIP XLPE R3  SMITH AND [employer] [facility] [id number] Right 1 Implanted   SCREW BNE L25MM DIA6.5MM CANC HIP SPHR HD FOR ACET SYS - [id number]  SCREW BNE L25MM DIA6.5MM CANC HIP SPHR HD FOR ACET SYS  SMITH AND [facility] [id number] Right 1 Implanted   SCREW BNE L25MM DIA6.5MM UNIV CANC HIP FOR CERAMIC ACET SYS - [id number]  SCREW BNE L25MM DIA6.5MM UNIV CANC HIP FOR CERAMIC ACET SYS  SMITH AND [employer] [facility] [id number] Right 1 Implanted   SCREW BNE L35MM DIA6.5MM CANC HIP SPHR HD FOR ACET SYS - [id number]  SCREW BNE L35MM DIA6.5MM CANC HIP SPHR HD FOR ACET SYS  SMITH AND [facility] [id number] Right 1 Implanted   STEM FEM SZ 16 L170MM HIP TI HI OFFSET POR CEMENTLESS - [id number]  STEM FEM SZ 16 L170MM HIP TI HI OFFSET POR CEMENTLESS  CASH AND NEPH ORTHOPAEDICS-WD [id number] Right 1 Implanted   HEAD FEM [id number] +0MM OFFSET 12/14 TAPR OXINIUM MAREK REV - [id number]  HEAD FEM  [id number] +0MM OFFSET 12/14 TAPR OXINIUM MAREK REV  CASH AND NEPHEW ORTHOPAEDICS-[facility] [id number] Right 1 Implanted         Drains: * No LDAs found *    Findings:  Infection Present At Time Of Surgery (PATOS) (choose all levels that have infection present):  No infection present  Other Findings: See operative dictation    Electronically signed by Felton Hightower MD on 9/10/2024 at 10:12 AM

## 2024-09-10 NOTE — CARE COORDINATION
DISCHARGE PLANNING EVALUATION  9/10/24, 2:44 PM EDT    Reason for Referral: snf  Decision Maker: makes own decision, former wife Arlin is POA  Current Services: none   New Services Requested: snf for rehab  Family/ Social/ Home environment: patient lives at home with family, family is not able to provide consistent assistance at home, prefers snf for rehab   Payment Source:[employer] medicare   Transportation at Discharge: undetermined  Post-acute (PAC) provider list was provided to patient. Patient was informed of their freedom to choose PAC provider. Discussed and offered to show the patient the relevant PAC Providers quality and resource use measures on Medicare Compare web site via computer based on patient's goals of care and treatment preferences. Questions regarding selection process were answered.      Teach Back Method used with patient regarding care plan and discharge plan  Patient  verbalized understanding of the plan of care and contribute to goal setting.       Patient preferences and discharge plan:  spoke with patient about discharge plan.  He is requesting [facility].  Referral made to [facility], facility will review, will need precert if accepting.      Electronically signed by SHELBI Sunshine on 9/10/2024 at 2:44 PM

## 2024-09-10 NOTE — PROGRESS NOTES
Patient arrived from surgery. Vitals taken. No order noted for telemetry.  Patient on 3L NC. External roche  in place. Patient assessed. Patient resting comfortably at this time.

## 2024-09-10 NOTE — PROGRESS NOTES
1032 awake and oriented on arrival to PACU with NC 3L NC HOB elevated , denies any pain or nausea   1045 resting resp easy   1100 resting resp easy   1115 resting resp easy   1130 resting resp easy   1137 pt awakens easily to name denies any pain or nausea   1139 Meets criteria for discharge , transported to

## 2024-09-10 NOTE — H&P
[facility]  History and Physical Update    Pt Name: Avery Jordan  MRN: [id number]  YOB: 1960  Date of evaluation: 9/10/2024    I have examined the patient and reviewed the H&P/Consult and there are no changes to the patient or plans.      Felton Hightower MD  Electronically signed 9/10/2024 at 7:25 AM

## 2024-09-11 LAB
BASOPHILS ABSOLUTE: 0 THOU/MM3 (ref 0–0.1)
BASOPHILS NFR BLD AUTO: 0 %
DEPRECATED RDW RBC AUTO: 49 FL (ref 35–45)
EOSINOPHIL NFR BLD AUTO: 0 %
EOSINOPHILS ABSOLUTE: 0 THOU/MM3 (ref 0–0.4)
ERYTHROCYTE [DISTWIDTH] IN BLOOD BY AUTOMATED COUNT: 13.9 % (ref 11.5–14.5)
HCT VFR BLD AUTO: 39.1 % (ref 42–52)
HGB BLD-MCNC: 11.8 GM/DL (ref 14–18)
IMM GRANULOCYTES # BLD AUTO: 0.02 THOU/MM3 (ref 0–0.07)
IMM GRANULOCYTES NFR BLD AUTO: 0.3 %
LYMPHOCYTES ABSOLUTE: 0.9 THOU/MM3 (ref 1–4.8)
LYMPHOCYTES NFR BLD AUTO: 13.5 %
MCH RBC QN AUTO: 29 PG (ref 26–33)
MCHC RBC AUTO-ENTMCNC: 30.2 GM/DL (ref 32.2–35.5)
MCV RBC AUTO: 96.1 FL (ref 80–94)
MONOCYTES ABSOLUTE: 0.7 THOU/MM3 (ref 0.4–1.3)
MONOCYTES NFR BLD AUTO: 10.7 %
NEUTROPHILS ABSOLUTE: 5 THOU/MM3 (ref 1.8–7.7)
NEUTROPHILS NFR BLD AUTO: 75.5 %
NRBC BLD AUTO-RTO: 0 /100 WBC
PLATELET # BLD AUTO: 125 THOU/MM3 (ref 130–400)
PMV BLD AUTO: 10.5 FL (ref 9.4–12.4)
RBC # BLD AUTO: 4.07 MILL/MM3 (ref 4.7–6.1)
WBC # BLD AUTO: 6.6 THOU/MM3 (ref 4.8–10.8)

## 2024-09-11 PROCEDURE — 97162 PT EVAL MOD COMPLEX 30 MIN: CPT

## 2024-09-11 PROCEDURE — 6360000002 HC RX W HCPCS: Performed by: ORTHOPAEDIC SURGERY

## 2024-09-11 PROCEDURE — 97116 GAIT TRAINING THERAPY: CPT

## 2024-09-11 PROCEDURE — 2580000003 HC RX 258: Performed by: ORTHOPAEDIC SURGERY

## 2024-09-11 PROCEDURE — 6370000000 HC RX 637 (ALT 250 FOR IP): Performed by: ORTHOPAEDIC SURGERY

## 2024-09-11 PROCEDURE — 85025 COMPLETE CBC W/AUTO DIFF WBC: CPT

## 2024-09-11 PROCEDURE — 36415 COLL VENOUS BLD VENIPUNCTURE: CPT

## 2024-09-11 RX ADMIN — FUROSEMIDE 40 MG: 40 TABLET ORAL at 11:11

## 2024-09-11 RX ADMIN — PANTOPRAZOLE SODIUM 40 MG: 40 TABLET, DELAYED RELEASE ORAL at 08:17

## 2024-09-11 RX ADMIN — QUETIAPINE FUMARATE 400 MG: 400 TABLET ORAL at 20:10

## 2024-09-11 RX ADMIN — ACETAMINOPHEN 650 MG: 325 TABLET ORAL at 14:32

## 2024-09-11 RX ADMIN — SODIUM CHLORIDE, PRESERVATIVE FREE 10 ML: 5 INJECTION INTRAVENOUS at 20:11

## 2024-09-11 RX ADMIN — CYCLOBENZAPRINE 10 MG: 10 TABLET, FILM COATED ORAL at 08:18

## 2024-09-11 RX ADMIN — PANTOPRAZOLE SODIUM 40 MG: 40 TABLET, DELAYED RELEASE ORAL at 14:32

## 2024-09-11 RX ADMIN — APIXABAN 2.5 MG: 2.5 TABLET, FILM COATED ORAL at 08:18

## 2024-09-11 RX ADMIN — HYDROCODONE BITARTRATE AND ACETAMINOPHEN 2 TABLET: 5; 325 TABLET ORAL at 20:10

## 2024-09-11 RX ADMIN — CYCLOBENZAPRINE 10 MG: 10 TABLET, FILM COATED ORAL at 20:10

## 2024-09-11 RX ADMIN — WATER 2000 MG: 1 INJECTION INTRAMUSCULAR; INTRAVENOUS; SUBCUTANEOUS at 08:19

## 2024-09-11 RX ADMIN — POTASSIUM CHLORIDE 20 MEQ: 1500 TABLET, EXTENDED RELEASE ORAL at 20:10

## 2024-09-11 RX ADMIN — SODIUM CHLORIDE: 9 INJECTION, SOLUTION INTRAVENOUS at 01:04

## 2024-09-11 RX ADMIN — WATER 2000 MG: 1 INJECTION INTRAMUSCULAR; INTRAVENOUS; SUBCUTANEOUS at 17:36

## 2024-09-11 RX ADMIN — HYDROCODONE BITARTRATE AND ACETAMINOPHEN 2 TABLET: 5; 325 TABLET ORAL at 08:18

## 2024-09-11 RX ADMIN — LISINOPRIL 10 MG: 10 TABLET ORAL at 08:17

## 2024-09-11 RX ADMIN — ACETAMINOPHEN 650 MG: 325 TABLET ORAL at 08:16

## 2024-09-11 RX ADMIN — GABAPENTIN 300 MG: 300 CAPSULE ORAL at 20:10

## 2024-09-11 RX ADMIN — GABAPENTIN 300 MG: 300 CAPSULE ORAL at 08:16

## 2024-09-11 RX ADMIN — POLYETHYLENE GLYCOL 3350 17 G: 17 POWDER, FOR SOLUTION ORAL at 08:14

## 2024-09-11 RX ADMIN — DULOXETINE HYDROCHLORIDE 60 MG: 60 CAPSULE, DELAYED RELEASE ORAL at 08:18

## 2024-09-11 RX ADMIN — POTASSIUM CHLORIDE 20 MEQ: 1500 TABLET, EXTENDED RELEASE ORAL at 08:17

## 2024-09-11 RX ADMIN — GABAPENTIN 300 MG: 300 CAPSULE ORAL at 14:32

## 2024-09-11 RX ADMIN — WATER 2000 MG: 1 INJECTION INTRAMUSCULAR; INTRAVENOUS; SUBCUTANEOUS at 00:55

## 2024-09-11 RX ADMIN — APIXABAN 2.5 MG: 2.5 TABLET, FILM COATED ORAL at 20:11

## 2024-09-11 ASSESSMENT — PAIN DESCRIPTION - PAIN TYPE: TYPE: SURGICAL PAIN

## 2024-09-11 ASSESSMENT — PAIN DESCRIPTION - DESCRIPTORS
DESCRIPTORS: ACHING
DESCRIPTORS: ACHING

## 2024-09-11 ASSESSMENT — PAIN DESCRIPTION - LOCATION
LOCATION: HIP

## 2024-09-11 ASSESSMENT — PAIN SCALES - GENERAL
PAINLEVEL_OUTOF10: 8
PAINLEVEL_OUTOF10: 7
PAINLEVEL_OUTOF10: 8
PAINLEVEL_OUTOF10: 6

## 2024-09-11 ASSESSMENT — PAIN - FUNCTIONAL ASSESSMENT: PAIN_FUNCTIONAL_ASSESSMENT: PREVENTS OR INTERFERES SOME ACTIVE ACTIVITIES AND ADLS

## 2024-09-11 ASSESSMENT — PAIN DESCRIPTION - ORIENTATION
ORIENTATION: RIGHT

## 2024-09-11 ASSESSMENT — PAIN DESCRIPTION - FREQUENCY: FREQUENCY: CONTINUOUS

## 2024-09-11 ASSESSMENT — PAIN DESCRIPTION - ONSET: ONSET: ON-GOING

## 2024-09-11 NOTE — PROGRESS NOTES
[facility]  INPATIENT PHYSICAL THERAPY  EVALUATION  [facility] ORTHOPEDICS 7K - 7K-18/018-A    Discharge Recommendations: Subacute/Skilled Nursing Facility  Equipment Needed: No      Time In: 0802  Time Out: 08  Timed Code Treatment Minutes: 8 Minutes  Minutes: 16          Date: 2024  Patient Name: Avery Jordan,  Gender:  male        MRN: [id number]  : 1960  (63 y.o.)      Referring Practitioner: Felton Hightower MD  Diagnosis: Primary osteoarthritis of right hip  Additional Pertinent Hx: RIGHT TOTAL HIP LATERAL APPROACH 9/10 Dr Villar     Restrictions/Precautions:  Restrictions/Precautions: Fall Risk, Surgical Protocols, Weight Bearing  Right Lower Extremity Weight Bearing: Weight Bearing As Tolerated  Position Activity Restriction  Hip Precautions: No ABduction, No ADduction, No hip internal rotation, No hip flexion > 90 degrees  Other position/activity restrictions: Lateral    Subjective:  Chart Reviewed: Yes  Patient assessed for rehabilitation services?: Yes  Subjective: RN approved session. Pt pleasant and agreeable to therapy. Pt up on side of bed with RN upon arrival.    General:  Overall Orientation Status: Within Functional Limits  Vision: Within Functional Limits  Hearing: Within functional limits       Pain: 510: RLE     Vitals: Nurse checked vitals prior to session    Social/Functional History:    Lives With: Family (2 sons and friend)  Type of Home: House  Home Layout: One level  Home Access: Level entry  Home Equipment: Walker - Rolling, Walker - 4-Wheeled             ADL Assistance: Independent  Homemaking Assistance: Independent  Ambulation Assistance: Independent  Transfer Assistance: Independent          Additional Comments: ind no AD at baseline - has been using 4ww more recently d/t pain    OBJECTIVE:  Range of Motion:  Right Lower Extremity: WFL within allowed ROM  Left Lower Extremity: WFL    Strength:  Right Lower Extremity: Impaired - deconditioned d/t sx  presents with R JAVIER. Pt demonstrates a decrease in baseline by way of bed mobility, transfers and ambulation secondary to decreased activity tolerance, strength, fatigue, and balance deficits. Pt will benefit from skilled PT services throughout admission and beyond hospital discharge for improvements in functional mobility and in order to decrease fall risk and return pt to PLOF.   Therapy Prognosis: Good    Requires PT Follow-Up: Yes    Patient Education:      .    Patient Education  Education Given To: Patient  Education Provided: Role of Therapy, Plan of Care  Education Method: Verbal  Barriers to Learning: None  Education Outcome: Verbalized understanding       Plan:  Current Treatment Recommendations: Strengthening, Balance training, Functional mobility training, Transfer training, Endurance training, Gait training, Neuromuscular re-education, Home exercise program, Safety education & training, Patient/Caregiver education & training, Therapeutic activities  General Plan:  (5-6x O)    Goals:  Patient Goals : None stated  Short Term Goals  Time Frame for Short Term Goals: by discharge  Short Term Goal 1: bed mobility with HOB Flat, no rails, mod I for increased functional ind  Short Term Goal 2: sit<>stand from various surfaces with LRD mod I for safe transfers  Short Term Goal 3: ambulate 30' with LRD mod I for safe household distances  Long Term Goals  Time Frame for Long Term Goals : NA d/t short ELOS    Following session, patient left in safe position with all fall risk precautions in place.

## 2024-09-11 NOTE — CARE COORDINATION
Case Management Assessment Initial Evaluation    Date/Time of Evaluation: 9/11/2024 3:05 PM  Assessment Completed by: Flavia Stoll RN    If patient is discharged prior to next notation, then this note serves as note for discharge by case management.    Patient Name: Avery Jordan                   YOB: 1960  Diagnosis: Primary osteoarthritis of right hip [M16.11]                   Date / Time: 9/10/2024  6:18 AM  Location: [address] [place] [address]     Patient Admission Status: Outpatient in a bed   Readmission Risk Low 0-14, Mod 15-19), High > 20: No data recorded  Current PCP: Christopher Cobb MD  Health Care Decision Makers:   Primary Decision Maker: Riley Jordan Jr. - Child - [phone]    Additional Case Management Notes: planned surgery with Dr Hightower    Procedures: 9/10 Right total hip replacement.     Imaging: na    Patient Goals/Plan/Treatment Preferences: Avery is from home with his son; he plans SNF for rehab then onto home. He has CPAP and home oxygen (see list below), can afford meds, and SW consulted to see.         09/11/24 0570   Service Assessment   Patient Orientation Alert and Oriented   Cognition Alert   History Provided By Patient   Primary Caregiver Self   Accompanied By/Relationship unaccomp.   Support Systems Children   Patient's Healthcare Decision Maker is: Legal Next of Kin   PCP Verified by CM Yes   Last Visit to PCP Within last 3 months   Prior Functional Level Independent in ADLs/IADLs   Current Functional Level Assistance with the following:;Bathing;Dressing;Shopping;Mobility   Can patient return to prior living arrangement No   Ability to make needs known: Fair   Family able to assist with home care needs: No   Would you like for me to discuss the discharge plan with any other family members/significant others, and if so, who? No   Financial Resources Medicare   Community Resources None   CM/SW Referral DME;ADLs/IADLs   Social/Functional History   Lives With  Family;Son   Type of Home House   Home Layout One level   Home Equipment Oxygen  (home oxygen at 3L/min prn plus CPAP thru [facility])   Active  No   Patient's  Info Vinny son drives   Discharge Planning   Type of Residence House   Living Arrangements Children   Current Services Prior To Admission Durable Medical Equipment;C-pap   Current DME Prior to Arrival Cpap;Shower Chair;Walker   Potential Assistance Needed Skilled Nursing Facility   DME Ordered? No   Potential Assistance Purchasing Medications No   Type of Home Care Services None   Patient expects to be discharged to: Skilled nursing facility   Follow Up Appointment: Best Day/Time  Thursday AM   One/Two Story Residence One story   Lift Chair Available No   History of falls? 1   Services At/After Discharge   Transition of Care Consult (CM Consult) Discharge Planning   Services At/After Discharge PT;OT;Nursing services;Skilled Nursing Facility (SNF)   Confirm Follow Up Transport Other (see comment)  (may  need help with ride)   Condition of Participation: Discharge Planning   The Plan for Transition of Care is related to the following treatment goals: go home today but going to nursing home then home

## 2024-09-11 NOTE — OP NOTE
[address] [address]                            OPERATIVE REPORT      PATIENT NAME: ERIKA HOU            : 1960  MED REC NO: [id number]                       ROOM: [facility]  ACCOUNT NO: [id number]                       ADMIT DATE: 09/10/2024  PROVIDER: Felton Hightower MD      DATE OF PROCEDURE:  09/10/2024    SURGEON:  Felton Hightower MD    ASSISTANT:  LADARIUS Dickens    PREOPERATIVE DIAGNOSIS:  Right hip degenerative joint disease.    POSTOPERATIVE DIAGNOSIS:  Right hip degenerative joint disease.    OPERATION PERFORMED:  Right total hip replacement.    MODIFIER 22:  The surgery took 50% longer than normal secondary to body mass index of 36.9.    ANESTHESIA:  Spinal plus block.    SPECIMENS TO PATHOLOGY:  None.    IMPLANTS USED:  From [employer].  1. Size 54 mm outer diameter 3 hole R3 acetabular shell with 3 screws and 1 [place] hole eliminator.  2. Size 36 mm inner diameter 20-degree lipped polyethylene liner.  3. Size 16 high-offset Synergy cementless femoral stem.  4. Size 36 mm +0 Oxinium femoral head.    COMPLICATIONS:  None.    TOURNIQUET TIME:  None.    ESTIMATED BLOOD LOSS:  300 mL.    ANTIBIOTICS:  Preoperative Ancef, intraoperative vancomycin powder and IrriSept.    INDICATIONS FOR PROCEDURE:  The patient is a 63-year-old gentleman who was referred to me for bilateral hip complaints and had severe arthritis of both hips.  However, he has significant peripheral vascular disease with woody edema changes and at times had stasis ulcers, so when we finally got everything cleaned up distally, he underwent a left total hip replacement in the past, did very well and presented back for increased pain, problems with increasing stiffness, increasing difficulties with activities of daily living including in and out of chairs, up and down stairs, in and out of cars with his right hip.  He has tried and failed  conservative measures including anti-inflammatory medications, activity modifications, external assistive devices as well as home exercise program, injections, etc., and now he is opting to move on with surgical intervention with hip replacement on the right side.  Risks and benefits including possible complications such as infection, dislocation, neurovascular injury, MI, DVT/PE, flare-up of medical problems, limb length inequality, stiffness, recovery times, etc., and he is scheduled for the above procedure.    DESCRIPTION OF PROCEDURE:  The patient was brought to the operating room and placed on the operating table in supine position. After a spinal block had been obtained, he was placed into the left lateral decubitus position, all bony prominences were well padded, and his right lower extremity was then prepped and draped in a sterile fashion.    Using the standard direct lateral approach, incision was made and carried down to subcutaneous tissues to the IT band. This was divided distally and spread proximally revealing the gluteus medius.  A split was made in the anterior one third of the gluteus medius, the gluteus minimus and vastus lateralis were all brought up to the superomedial aspect of the femur.  Hip was then dislocated revealing severe arthritis and osteotomy was performed a fingerbreadth above the level of the lesser trochanter.    Next, anterior, posterior and inferior retractors were placed on the acetabulum and excess labrum and pulvinar were removed. Acetabulum was then medialized with a 44 reamer and comfortably reamed up to a 54. 53 trial was placed in and it fit quite nicely, and a 54 three hole R3 acetabular shell was introduced and impacted at about 45 degrees of abduction and about 20 degrees of anteversion.  [place] hole eliminator was placed followed by the real polyethylene liner with the lip in the 1 o'clock, 7 o'clock posterior-superior hemisphere.    Next, femoral neck was then

## 2024-09-11 NOTE — DISCHARGE INSTR - COC
Continuity of Care Form    Patient Name: Avery Jordan   :  1960  MRN:  [id number]    Admit date:  9/10/2024  Discharge date:  2024    Code Status Order: Full Code   Advance Directives:   Advance Care Flowsheet Documentation        Date/Time Healthcare Directive Type of Healthcare Directive Copy in Chart Healthcare Agent Appointed Healthcare Agent's Name Healthcare Agent's Phone Number    09/10/24 0739 Yes, patient has an advance directive for healthcare treatment  Durable power of  for health care  Yes, copy in chart  Healthcare power of   Arlin-ex wife  --                     Admitting Physician:  Felton Hightower MD  PCP: Christopher Cobb MD    Discharging Nurse: Meaghan LARES  Discharging Hospital Unit/Room#: 7K-18/018-A  Discharging Unit Phone Number: [phone]    Emergency Contact:   Extended Emergency Contact Information  Primary Emergency Contact: JulianArlin  Home Phone: [phone]  Work Phone: [phone]  Mobile Phone: [phone]  Relation: Lay Caregiver  Secondary Emergency Contact: Riley Jordan Jr.  Home Phone: [phone]  Mobile Phone: [phone]  Relation: Child   needed? No    Past Surgical History:  Past Surgical History:   Procedure Laterality Date    BRONCHOSCOPY N/A 2019    BRONCHOSCOPY performed by Mark Sharma MD at [facility]    BRONCHOSCOPY N/A 2019    BRONCHOSCOPY performed by Mark Sharma MD at [facility]    BRONCHOSCOPY N/A 2019    BRONCHOSCOPY BIOPSY BRONCHUS performed by Mark Sharma MD at [facility]    BRONCHOSCOPY  2019    BRONCHOSCOPY performed by Mark Sharma MD at [facility]    BRONCHOSCOPY N/A 2019    BRONCHOSCOPY ALVEOLAR LAVAGE performed by Silvio Gutierrez MD at [facility]    BRONCHOSCOPY N/A 10/16/2019    BRONCHOSCOPY ALVEOLAR LAVAGE performed by Silvio Gutierrez MD at [facility]    BRONCHOSCOPY N/A 2020    BRONCHOSCOPY performed by Julio Mistry,

## 2024-09-11 NOTE — PLAN OF CARE
Problem: Discharge Planning  Goal: Discharge to home or other facility with appropriate resources  Outcome: Progressing  Flowsheets (Taken 9/11/2024 0800)  Discharge to home or other facility with appropriate resources:   Identify barriers to discharge with patient and caregiver   Arrange for needed discharge resources and transportation as appropriate   Identify discharge learning needs (meds, wound care, etc)   Refer to discharge planning if patient needs post-hospital services based on physician order or complex needs related to functional status, cognitive ability or social support system     Problem: Chronic Conditions and Co-morbidities  Goal: Patient's chronic conditions and co-morbidity symptoms are monitored and maintained or improved  Outcome: Progressing  Flowsheets (Taken 9/11/2024 0800)  Care Plan - Patient's Chronic Conditions and Co-Morbidity Symptoms are Monitored and Maintained or Improved: Monitor and assess patient's chronic conditions and comorbid symptoms for stability, deterioration, or improvement     Problem: Safety - Adult  Goal: Free from fall injury  Outcome: Progressing  Flowsheets (Taken 9/11/2024 0800)  Free From Fall Injury: Instruct family/caregiver on patient safety     Problem: Skin/Tissue Integrity  Goal: Absence of new skin breakdown  Description: 1.  Monitor for areas of redness and/or skin breakdown  2.  Assess vascular access sites hourly  3.  Every 4-6 hours minimum:  Change oxygen saturation probe site  4.  Every 4-6 hours:  If on nasal continuous positive airway pressure, respiratory therapy assess nares and determine need for appliance change or resting period.  Outcome: Progressing     Problem: Pain  Goal: Verbalizes/displays adequate comfort level or baseline comfort level  Outcome: Progressing     Problem: ABCDS Injury Assessment  Goal: Absence of physical injury  Outcome: Progressing  Flowsheets (Taken 9/11/2024 0800)  Absence of Physical Injury: Implement safety  measures based on patient assessment   Care plan reviewed with patient.  Patient verbalizes understanding of the plan of care and contribute to goal setting.

## 2024-09-11 NOTE — PROGRESS NOTES
[facility]  OCCUPATIONAL THERAPY MISSED TREATMENT NOTE  [facility] ORTHOPEDICS 7K  7K-18/018-A      Date: 2024  Patient Name: Avery Jordan        CSN: [id number]   : 1960  (63 y.o.)  Gender: male                REASON FOR MISSED TREATMENT: Patient Refused.  OT attempted to see patient 2x this date. In AM pt reporting he needs to rest d/t just finishing with physical therapy. 2nd attempt complete at 1530. Pt continues to refusing OT and get OOB. Pt educated his insurance requires precert to be accepted into NH and importance of therapy post-op. Pt continued to decline. OT will check back as time allows. Pt appears to be self limiting.

## 2024-09-11 NOTE — DISCHARGE INSTRUCTIONS
WBAT with walker  Follow hip precautions  Daily dressing changes until no drainage  May shower when no drainage  Andrew hose both legs  Ruby sent to pharmacy from O

## 2024-09-11 NOTE — CARE COORDINATION
9/11/24, 11:18 AM EDT    DISCHARGE PLANNING EVALUATION    [facility] plans to accept, will start precert.

## 2024-09-12 PROCEDURE — 97116 GAIT TRAINING THERAPY: CPT

## 2024-09-12 PROCEDURE — 6360000002 HC RX W HCPCS: Performed by: ORTHOPAEDIC SURGERY

## 2024-09-12 PROCEDURE — 97110 THERAPEUTIC EXERCISES: CPT

## 2024-09-12 PROCEDURE — 97166 OT EVAL MOD COMPLEX 45 MIN: CPT

## 2024-09-12 PROCEDURE — 97530 THERAPEUTIC ACTIVITIES: CPT

## 2024-09-12 PROCEDURE — 2700000000 HC OXYGEN THERAPY PER DAY

## 2024-09-12 PROCEDURE — 2580000003 HC RX 258: Performed by: ORTHOPAEDIC SURGERY

## 2024-09-12 PROCEDURE — 6370000000 HC RX 637 (ALT 250 FOR IP): Performed by: ORTHOPAEDIC SURGERY

## 2024-09-12 PROCEDURE — 94640 AIRWAY INHALATION TREATMENT: CPT

## 2024-09-12 RX ORDER — HYDROCODONE BITARTRATE AND ACETAMINOPHEN 5; 325 MG/1; MG/1
2 TABLET ORAL EVERY 4 HOURS PRN
Qty: 42 TABLET | Refills: 0 | Status: SHIPPED | OUTPATIENT
Start: 2024-09-12 | End: 2024-09-18 | Stop reason: SDUPTHER

## 2024-09-12 RX ADMIN — WATER 2000 MG: 1 INJECTION INTRAMUSCULAR; INTRAVENOUS; SUBCUTANEOUS at 11:11

## 2024-09-12 RX ADMIN — HYDROCODONE BITARTRATE AND ACETAMINOPHEN 2 TABLET: 5; 325 TABLET ORAL at 18:02

## 2024-09-12 RX ADMIN — PANTOPRAZOLE SODIUM 40 MG: 40 TABLET, DELAYED RELEASE ORAL at 18:01

## 2024-09-12 RX ADMIN — POLYETHYLENE GLYCOL 3350 17 G: 17 POWDER, FOR SOLUTION ORAL at 08:51

## 2024-09-12 RX ADMIN — GABAPENTIN 300 MG: 300 CAPSULE ORAL at 20:33

## 2024-09-12 RX ADMIN — GABAPENTIN 300 MG: 300 CAPSULE ORAL at 13:25

## 2024-09-12 RX ADMIN — APIXABAN 2.5 MG: 2.5 TABLET, FILM COATED ORAL at 08:51

## 2024-09-12 RX ADMIN — PANTOPRAZOLE SODIUM 40 MG: 40 TABLET, DELAYED RELEASE ORAL at 07:05

## 2024-09-12 RX ADMIN — POTASSIUM CHLORIDE 20 MEQ: 1500 TABLET, EXTENDED RELEASE ORAL at 08:51

## 2024-09-12 RX ADMIN — WATER 2000 MG: 1 INJECTION INTRAMUSCULAR; INTRAVENOUS; SUBCUTANEOUS at 02:25

## 2024-09-12 RX ADMIN — HYDROCODONE BITARTRATE AND ACETAMINOPHEN 2 TABLET: 5; 325 TABLET ORAL at 08:54

## 2024-09-12 RX ADMIN — TRAMADOL HYDROCHLORIDE 50 MG: 50 TABLET ORAL at 07:05

## 2024-09-12 RX ADMIN — TIOTROPIUM BROMIDE AND OLODATEROL 2 PUFF: 3.124; 2.736 SPRAY, METERED RESPIRATORY (INHALATION) at 08:56

## 2024-09-12 RX ADMIN — WATER 2000 MG: 1 INJECTION INTRAMUSCULAR; INTRAVENOUS; SUBCUTANEOUS at 20:33

## 2024-09-12 RX ADMIN — SODIUM CHLORIDE, PRESERVATIVE FREE 10 ML: 5 INJECTION INTRAVENOUS at 20:34

## 2024-09-12 RX ADMIN — POTASSIUM CHLORIDE 20 MEQ: 1500 TABLET, EXTENDED RELEASE ORAL at 20:33

## 2024-09-12 RX ADMIN — GABAPENTIN 300 MG: 300 CAPSULE ORAL at 08:51

## 2024-09-12 RX ADMIN — ACETAMINOPHEN 650 MG: 325 TABLET ORAL at 02:25

## 2024-09-12 RX ADMIN — SODIUM CHLORIDE, PRESERVATIVE FREE 10 ML: 5 INJECTION INTRAVENOUS at 08:52

## 2024-09-12 RX ADMIN — QUETIAPINE FUMARATE 400 MG: 400 TABLET ORAL at 20:33

## 2024-09-12 RX ADMIN — APIXABAN 2.5 MG: 2.5 TABLET, FILM COATED ORAL at 20:33

## 2024-09-12 RX ADMIN — DULOXETINE HYDROCHLORIDE 60 MG: 60 CAPSULE, DELAYED RELEASE ORAL at 08:51

## 2024-09-12 RX ADMIN — HYDROCODONE BITARTRATE AND ACETAMINOPHEN 2 TABLET: 5; 325 TABLET ORAL at 13:23

## 2024-09-12 ASSESSMENT — PAIN - FUNCTIONAL ASSESSMENT
PAIN_FUNCTIONAL_ASSESSMENT: PREVENTS OR INTERFERES SOME ACTIVE ACTIVITIES AND ADLS
PAIN_FUNCTIONAL_ASSESSMENT: ACTIVITIES ARE NOT PREVENTED
PAIN_FUNCTIONAL_ASSESSMENT: PREVENTS OR INTERFERES SOME ACTIVE ACTIVITIES AND ADLS

## 2024-09-12 ASSESSMENT — PAIN DESCRIPTION - LOCATION
LOCATION: HIP
LOCATION: HIP
LOCATION: HIP;LEG

## 2024-09-12 ASSESSMENT — PAIN DESCRIPTION - ORIENTATION
ORIENTATION: RIGHT

## 2024-09-12 ASSESSMENT — PAIN DESCRIPTION - DESCRIPTORS
DESCRIPTORS: ACHING

## 2024-09-12 ASSESSMENT — PAIN SCALES - GENERAL
PAINLEVEL_OUTOF10: 6
PAINLEVEL_OUTOF10: 5
PAINLEVEL_OUTOF10: 8
PAINLEVEL_OUTOF10: 3
PAINLEVEL_OUTOF10: 8
PAINLEVEL_OUTOF10: 3

## 2024-09-12 ASSESSMENT — PAIN DESCRIPTION - ONSET
ONSET: ON-GOING
ONSET: ON-GOING

## 2024-09-12 ASSESSMENT — PAIN DESCRIPTION - FREQUENCY
FREQUENCY: CONTINUOUS
FREQUENCY: CONTINUOUS

## 2024-09-12 ASSESSMENT — PAIN DESCRIPTION - PAIN TYPE
TYPE: SURGICAL PAIN
TYPE: SURGICAL PAIN

## 2024-09-12 NOTE — CARE COORDINATION
9/12/24, 2:43 PM EDT    DISCHARGE PLANNING EVALUATION    Precert approved for [facility], can accept 9/13

## 2024-09-12 NOTE — PROGRESS NOTES
Patient alert and oriented x4, reports fatigue. Speech is clear and appropriate.  Patient is able to follow commands.  Respiratory rhythm is regular.  Depth is normal and effort is unlabored.  Breath sounds are diminished with pleural rub bilaterally.  Hand grasp was moderate and equal bilaterally with no arm drift noted.  Patient has slight tremor in upper extremities.  Capillary refill is less than three seconds bilaterally.  IV in right hand disconnected with no signs of infiltration.  Right pedal push and pull weak bilaterally.  Mucous membranes are moist and intact.  Skin is appropriate color for ethnicity with some redness.  Patient has red excoriated wound on lower abdomen as well as a surgical incision on the the lateral right hip and an open wound between the coccyx folds.  Patient was up in chair eating breakfast at time of assessment, will assess wounds when patient ambulates.  No numbness or tingling reported in the extremities.  Patient also reported pain at 8 out of 10 in his right hip at the surgical site and the right leg.  Pedal pulses are weak and fleeting bilaterally.  +2 pitting edema in lower extremities.  Patient has glasses and pupils are round, equal, and respond to light.  Abdomen is rounded and taut with bowel sounds active in all four quadrants.  Patient reports last bowel movement was 9/10/2024.  Patient refused a bath at this time.  Patient is up in chair with call light in reach and expressed no further needs at this time.     Samantha Weber [facility] SN

## 2024-09-12 NOTE — PROGRESS NOTES
[facility]  INPATIENT PHYSICAL THERAPY  DAILY NOTE  [facility] ORTHOPEDICS 7K - 7K-18/018-A      Discharge Recommendations: Continue to assess pending progress, Subacte/Skilled Nursing Facility, and Patient would benefit from continued PT at discharge  Equipment Recommendations:Equipment Needed: No      Time In: 0940  Time Out: 1008  Timed Code Treatment Minutes: 28 Minutes  Minutes: 28          Date: 2024  Patient Name: Avery Jordan,  Gender:  male        MRN: [id number]  : 1960  (63 y.o.)     Referring Practitioner: Felton Hightower MD  Diagnosis: Primary osteoarthritis of right hip  Additional Pertinent Hx: RIGHT TOTAL HIP LATERAL APPROACH 9/10 Dr Villar     Prior Level of Function:  Lives With: Family, Son  Type of Home: House  Home Layout: One level  Home Access: Level entry  Home Equipment: Oxygen (home oxygen at 3L/min prn plus CPAP thru [employer])        ADL Assistance: Independent  Homemaking Assistance: Independent  Ambulation Assistance: Independent  Transfer Assistance: Independent  Active : No  Additional Comments: ind no AD at baseline - has been using 4ww more recently d/t pain    Restrictions/Precautions:  Restrictions/Precautions: Fall Risk, Surgical Protocols, Weight Bearing  Right Lower Extremity Weight Bearing: Weight Bearing As Tolerated  Position Activity Restriction  Hip Precautions: No ABduction, No ADduction, No hip internal rotation, No hip flexion > 90 degrees  Other position/activity restrictions: Lateral     SUBJECTIVE: Pt in recliner upon arrival, and agrees to therapy, RN approved session, Pt A&O X 4/, Pt pleasant and cooperative, pt stated he just received his pain medication little bit ago but is very \"sore\" this morning. Pt very lethargic during session after receiving pain medication and required multiple attempts throughout session for arousal. Reviewed hip precautions with pt unable to recall any precautions. Educated provided on hip

## 2024-09-12 NOTE — PROGRESS NOTES
Progress Note    Subjective:     Post-Operative Day: 2 Status Post right Total Hip Arthroplasty  Systemic or Specific Complaints:No Complaints    Objective:   VITALS:  /70   Pulse 84   Temp 97.6 °F (36.4 °C) (Oral)   Resp 18   Ht 1.753 m (5' 9\")   Wt 113.4 kg (250 lb)   SpO2 93%   BMI 36.92 kg/m²   24HR INTAKE/OUTPUT:    Intake/Output Summary (Last 24 hours) at 2024 0817  Last data filed at 2024 0408  Gross per 24 hour   Intake 1780 ml   Output 800 ml   Net 980 ml     TEMPERATURE:  Current - Temp: 97.6 °F (36.4 °C); Max - Temp  Av °F (36.7 °C)  Min: 97.6 °F (36.4 °C)  Max: 98.8 °F (37.1 °C)    General: alert, appears stated age, and cooperative   Wound: Wound clean and dry no evidence of infection., No Erythema, and No Drainage   DVT Exam: No evidence of DVT seen on physical exam.  Negative Kelley's sign.     NVI in lower extremity. Thigh swollen but soft. Moving foot and ankle.      Data Review  CBC:   Lab Results   Component Value Date/Time    WBC 6.6 2024 06:52 AM    RBC 4.07 2024 06:52 AM    HGB 11.8 2024 06:52 AM    HCT 39.1 2024 06:52 AM     2024 06:52 AM    INR 1.52 2022 01:42 PM     BMP:   Lab Results   Component Value Date/Time     2024 11:36 AM    K 4.2 09/10/2024 07:09 AM    K 3.8 2021 07:52 AM    CL 95 2024 11:36 AM    CO2 33 2024 11:36 AM    BUN 18 2024 11:36 AM    CREATININE 1.0 2024 11:36 AM    GLUCOSE 214 2024 11:36 AM    GLUCOSE 121 2024 02:39 PM       Assessment:     Status Post right Total Hip Arthroplasty. Doing well postoperatively.     Plan:      1:  Continue Total Hip Replacement protocol   2:  Continue Deep venous thrombosis prophylaxis  3:  Continue physical therapy with Total Hip precautions  4:  Continue Pain Control  5:  ECF

## 2024-09-12 NOTE — PROGRESS NOTES
[facility]  INPATIENT OCCUPATIONAL THERAPY  [facility] 7K  EVALUATION      Discharge Recommendations: Subacute/Skilled Nursing Facility  Equipment Recommendations: Equipment Needed: No      Time In: 0752  Time Out: 0812  Timed Code Treatment Minutes: 10 Minutes  Minutes: 20          Date: 2024  Patient Name: Avery Jordan,   Gender: male      MRN: [id number]  : 1960  (63 y.o.)  Referring Practitioner: Felton Hightower MD  Diagnosis: primary osteoarthritis  Additional Pertinent Hx: RIGHT TOTAL HIP LATERAL APPROACH 9/10 Dr Villar    Restrictions/Precautions:  Restrictions/Precautions: Fall Risk, Surgical Protocols, Weight Bearing  Right Lower Extremity Weight Bearing: Weight Bearing As Tolerated  Position Activity Restriction  Hip Precautions: No ABduction, No ADduction, No hip internal rotation, No hip flexion > 90 degrees  Other position/activity restrictions: Lateral    Subjective  Chart Reviewed: Yes, Orders, Progress Notes, History and Physical  Patient assessed for rehabilitation services?: Yes    Subjective: Nurse approved OT eval. Pt in bed on OT arrival. Pleasant and agreeable to OOB activity. Drowsy throughout session requiring increased cues.    Pain: does not rate but reports right hip    Vitals: Vitals not assessed per clinical judgement, see nursing flowsheet    Social/Functional History:  Lives With: Family, Son  Type of Home: House  Home Layout: One level  Home Access: Level entry  Home Equipment: Oxygen (home oxygen at 3L/min prn plus CPAP thru [employer])           ADL Assistance: Independent  Homemaking Assistance: Independent  Ambulation Assistance: Independent  Transfer Assistance: Independent    Active : No  Patient's  Info: Vinny ramirez drives     Additional Comments: ind no AD at baseline - has been using 4ww more recently d/t pain    VISION:WFL    HEARING:  WFL    COGNITION: Slow Processing, Decreased Recall, Decreased Problem Solving, Decreased  y.o.male that presents with above new performance deficits secondary to OA of right hip requiring JAVIER. Pt is requiring increased assistance for ADLs, functional mobility, ADL transfers compared to baseline level of function. Skilled OT services is warranted to improve above performance deficits and progress pt towards PLOF. Without OT pt is at risk for falls, further decline in functional abilities, increased caregiver burden, increased risk for medical complication as a result of reduce mobility and inability to return to prior level of living.      Performance deficits / Impairments: Decreased functional mobility , Decreased ADL status, Decreased endurance, Decreased strength, Decreased safe awareness, Decreased balance  Prognosis: Good  REQUIRES OT FOLLOW-UP: Yes  Decision Making: Medium Complexity    Treatment Initiated: Treatment and education initiated within context of evaluation.  Evaluation time included review of current medical information, gathering information related to past medical, social and functional history, completion of standardized testing, formal and informal observation of tasks, assessment of data and development of plan of care and goals.  Treatment time included skilled education and facilitation of tasks to increase safety and independence with ADL's for improved functional independence and quality of life.    Patient Education:          Patient Education  Education Given To: Patient  Education Provided: Role of Therapy;Plan of Care  Education Method: Demonstration;Verbal  Barriers to Learning: Cognition  Education Outcome: Verbalized understanding;Unable to demonstrate understanding;Continued education needed    Plan:  Times Per Week: 6x  Times Per Day: Once a day  Current Treatment Recommendations: Strengthening, Balance training, Functional mobility training, Patient/Caregiver education & training, Endurance training, Neuromuscular re-education, Equipment evaluation, education, &  procurement, Safety education & training, Self-Care / ADL, Home management training.  See long-term goal time frame for expected duration of plan of care.  If no long-term goals established, a short length of stay is anticipated.    Goals:  Patient goals : To return home  Short Term Goals  Time Frame for Short Term Goals: until discharge  Short Term Goal 1: Pt will navigate to/from bathroom and community distances utilizing LRAD with SBA and good awareness for safety to improve indep access with ADLs.  Short Term Goal 2: Pt will complete UB/LB dressing/bathing with Minimal A using LHAE as needed and maintaining total hip precautions without cues to improve indep with ADL routines.  Short Term Goal 3: Pt will tolerate x5 minutes standing with SBA to improve standing balance and activity tolerance required for sinkside I/ADLs.  Short Term Goal 4: Pt will perform toileting routine with SBA and Minimal cues for safety awareness to improve indep with ADL routines.    AM-PAC Inpatient Daily Activity Raw Score: 17  AM-PAC Inpatient ADL T-Scale Score : 37.26    Following session, patient left in safe position with all fall risk precautions in place.               1.59

## 2024-09-12 NOTE — PROGRESS NOTES
No changes from previous assessment.  Pt did not express any further needs at this time.    Samantha Weber [facility]

## 2024-09-13 VITALS
BODY MASS INDEX: 37.03 KG/M2 | RESPIRATION RATE: 18 BRPM | SYSTOLIC BLOOD PRESSURE: 108 MMHG | WEIGHT: 250 LBS | TEMPERATURE: 97.7 F | HEIGHT: 69 IN | OXYGEN SATURATION: 97 % | HEART RATE: 78 BPM | DIASTOLIC BLOOD PRESSURE: 59 MMHG

## 2024-09-13 PROCEDURE — 94640 AIRWAY INHALATION TREATMENT: CPT

## 2024-09-13 PROCEDURE — 97535 SELF CARE MNGMENT TRAINING: CPT

## 2024-09-13 PROCEDURE — 6370000000 HC RX 637 (ALT 250 FOR IP): Performed by: ORTHOPAEDIC SURGERY

## 2024-09-13 PROCEDURE — 2580000003 HC RX 258: Performed by: ORTHOPAEDIC SURGERY

## 2024-09-13 PROCEDURE — 6360000002 HC RX W HCPCS: Performed by: ORTHOPAEDIC SURGERY

## 2024-09-13 RX ADMIN — CYCLOBENZAPRINE 10 MG: 10 TABLET, FILM COATED ORAL at 03:44

## 2024-09-13 RX ADMIN — GABAPENTIN 300 MG: 300 CAPSULE ORAL at 09:30

## 2024-09-13 RX ADMIN — MAGNESIUM HYDROXIDE 30 ML: 1200 LIQUID ORAL at 03:44

## 2024-09-13 RX ADMIN — APIXABAN 2.5 MG: 2.5 TABLET, FILM COATED ORAL at 09:30

## 2024-09-13 RX ADMIN — WATER 2000 MG: 1 INJECTION INTRAMUSCULAR; INTRAVENOUS; SUBCUTANEOUS at 03:48

## 2024-09-13 RX ADMIN — PANTOPRAZOLE SODIUM 40 MG: 40 TABLET, DELAYED RELEASE ORAL at 05:26

## 2024-09-13 RX ADMIN — DULOXETINE HYDROCHLORIDE 60 MG: 60 CAPSULE, DELAYED RELEASE ORAL at 09:30

## 2024-09-13 RX ADMIN — HYDROCODONE BITARTRATE AND ACETAMINOPHEN 2 TABLET: 5; 325 TABLET ORAL at 13:46

## 2024-09-13 RX ADMIN — HYDROCODONE BITARTRATE AND ACETAMINOPHEN 2 TABLET: 5; 325 TABLET ORAL at 09:31

## 2024-09-13 RX ADMIN — POLYETHYLENE GLYCOL 3350 17 G: 17 POWDER, FOR SOLUTION ORAL at 09:30

## 2024-09-13 RX ADMIN — WATER 2000 MG: 1 INJECTION INTRAMUSCULAR; INTRAVENOUS; SUBCUTANEOUS at 11:25

## 2024-09-13 RX ADMIN — HYDROCODONE BITARTRATE AND ACETAMINOPHEN 2 TABLET: 5; 325 TABLET ORAL at 03:44

## 2024-09-13 RX ADMIN — SODIUM CHLORIDE, PRESERVATIVE FREE 10 ML: 5 INJECTION INTRAVENOUS at 09:30

## 2024-09-13 RX ADMIN — TIOTROPIUM BROMIDE AND OLODATEROL 2 PUFF: 3.124; 2.736 SPRAY, METERED RESPIRATORY (INHALATION) at 08:57

## 2024-09-13 ASSESSMENT — PAIN - FUNCTIONAL ASSESSMENT
PAIN_FUNCTIONAL_ASSESSMENT: PREVENTS OR INTERFERES SOME ACTIVE ACTIVITIES AND ADLS

## 2024-09-13 ASSESSMENT — PAIN DESCRIPTION - LOCATION
LOCATION: HIP

## 2024-09-13 ASSESSMENT — PAIN SCALES - GENERAL
PAINLEVEL_OUTOF10: 7
PAINLEVEL_OUTOF10: 8
PAINLEVEL_OUTOF10: 7
PAINLEVEL_OUTOF10: 7

## 2024-09-13 ASSESSMENT — PAIN DESCRIPTION - ORIENTATION
ORIENTATION: RIGHT

## 2024-09-13 ASSESSMENT — PAIN DESCRIPTION - DESCRIPTORS
DESCRIPTORS: ACHING;SHARP
DESCRIPTORS: ACHING;SHARP;SPASM
DESCRIPTORS: ACHING;SHARP;SPASM

## 2024-09-13 NOTE — CARE COORDINATION
9/13/24, 11:35 AM EDT    DISCHARGE PLANNING EVALUATION    Plan for [facility] today, transport at 1:30 pm, confirmed with [facility]     9/13/24, 11:36 AM EDT    Patient goals/plan/ treatment preferences discussed by  and .  Patient goals/plan/ treatment preferences reviewed with patient/ family.  Patient/ family verbalize understanding of discharge plan and are in agreement with goal/plan/treatment preferences.  Understanding was demonstrated using the teach back method.  AVS provided by RN at time of discharge, which includes all necessary medical information pertaining to the patients current course of illness, treatment, post-discharge goals of care, and treatment preferences.     Services At/After Discharge: Skilled Nursing Facility (SNF) and In ambulance

## 2024-09-13 NOTE — PROGRESS NOTES
[facility] Nurse  Progress Note       Avery Jordan  AGE: 63 y.o.   GENDER: male  : 1960  UNIT: 7K-18/018-A  TODAY'S DATE:  2024  ADMISSION DATE: 9/10/2024  6:18 AM    Subjective   Reason for WCC Evaluation and Assessment: left inner thigh non blanching discoloration      Avery Jordan is a 63 y.o. male referred by:   [] Physician/ Resident/ PA/ TAMMI-CNP  [x] Nursing  [] Other:     Wound Identification:  Wound Type:pressure  Wound Location: left inner thigh  Modifying factors:chronic pressure    Objective     Rogerio Risk Score: Rogerio Scale Score: 17      Assessment     Encounter: Present to pt room for new non blanching area to left inner thigh. Pt noted to have linear line to top of left thigh to inner left thigh. Area is non blanching at this time and reddish/purple in color. Pt states it was from an external catheter tubing that is no longer in use. Will continue to monitor this area. Pt resting in chair. Call light in reach.      Left inner thigh linear non blanching area.       Plan     Treatment Recommendations:   Monitor left inner thigh area. Make sure patient is not laying on tubing.     Specialty Bed Required :   [x] Low Air Loss   [x] Pressure Redistribution  [] Fluid Immersion- Dolphin  [] Bariatric  [] RotoProne   [] Other:     Discharge Plan:  Placement for patient upon discharge:   [] Home  [] Inpatient Rehab  [] SNF  [] ECF  [] [facility]/ LTAC  Patient appropriate for Outpatient Wound Care Center: not at this time

## 2024-09-13 NOTE — PLAN OF CARE
Problem: Respiratory - Adult  Goal: Clear lung sounds  Description: Clear lung sounds  Outcome: Progressing

## 2024-09-13 NOTE — PROGRESS NOTES
[facility]  PHYSICAL THERAPY MISSED TREATMENT NOTE  [facility] 7K    Date: 2024  Patient Name: Avery Jordan        MRN: [id number]   : 1960  (63 y.o.)  Gender: male   Referring Practitioner: Felton Hightower MD  Diagnosis: Primary osteoarthritis of right hip         REASON FOR MISSED TREATMENT:  Missed Treat.  Pt eating breakfast at this time and requested physical therapy come back later. Will attempt to see pt later as time allows and if pt is willing.

## 2024-09-13 NOTE — PROGRESS NOTES
[facility]  [facility] ORTHOPEDICS 7K  Occupational Therapy  Daily Note    Discharge Recommendations: Subacute/skilled nursing facility  Equipment Recommendations: Equipment Needed: No      Time In: 0745  Time Out: 0825  Timed Code Treatment Minutes: 40 Minutes  Minutes: 40          Date: 2024  Patient Name: Avery Jordan,   Gender: male      Room: [facility]18/018-A  MRN: [id number]  : 1960  (63 y.o.)  Referring Practitioner: Felton Hightower MD  Diagnosis: primary osteoarthritis  Additional Pertinent Hx: RIGHT TOTAL HIP LATERAL APPROACH 9/10 Dr Villar    Restrictions/Precautions:  Restrictions/Precautions: Fall Risk, Surgical Protocols, Weight Bearing  Right Lower Extremity Weight Bearing: Weight Bearing As Tolerated  Position Activity Restriction  Hip Precautions: No ABduction, No ADduction, No hip internal rotation, No hip flexion > 90 degrees  Other position/activity restrictions: Lateral     Social/Functional History:  Lives With: Family, Son  Type of Home: House  Home Layout: One level  Home Access: Level entry  Home Equipment: Oxygen (home oxygen at 3L/min prn plus CPAP thru [employer])           ADL Assistance: Independent  Homemaking Assistance: Independent  Ambulation Assistance: Independent  Transfer Assistance: Independent    Active : No  Patient's  Info: Vinny ramirez drives     Additional Comments: ind no AD at baseline - has been using 4ww more recently d/t pain    SUBJECTIVE: RN okayed OT session.  Pt. In room and agreeable to participate with encouragement.  Increased time to arouse.     PAIN: 8/10: RLE    Vitals: Vitals not assessed per clinical judgement, see nursing flowsheet    COGNITION: Slow Processing, Decreased Recall, Decreased Problem Solving, Decreased Safety Awareness, Impaired Attention, and Decreased Arousal    ADL:   Grooming: Stand By Assistance and with set-up.  To wash face and comb hair with increased time to complete  Bathing: Stand By Assistance,

## 2024-09-13 NOTE — PROGRESS NOTES
[facility]  PHYSICAL THERAPY MISSED TREATMENT NOTE  [facility] ORTHOPEDICS 7K    Date: 2024  Patient Name: Avery Jordan        MRN: [id number]   : 1960  (63 y.o.)  Gender: male   Referring Practitioner: Felton Hightower MD  Diagnosis: Primary osteoarthritis of right hip         REASON FOR MISSED TREATMENT:  Missed Treat.  Pt declined physical therapy this afternoon and stated he was going to be leaving soon to be transported to [facility]. Pt denied any needs or concerns.

## 2024-09-13 NOTE — PROGRESS NOTES
Tried to call report to [facility] with no answer, [facility]

## 2024-09-13 NOTE — PLAN OF CARE
Problem: Discharge Planning  Goal: Discharge to home or other facility with appropriate resources  9/13/2024 1010 by Meaghan Escoto RN  Outcome: Completed     Problem: Chronic Conditions and Co-morbidities  Goal: Patient's chronic conditions and co-morbidity symptoms are monitored and maintained or improved  9/13/2024 1010 by Meaghan Escoto RN  Outcome: Completed     Problem: Safety - Adult  Goal: Free from fall injury  9/13/2024 1010 by Meaghan Escoto RN  Outcome: Completed     Problem: Skin/Tissue Integrity  Goal: Absence of new skin breakdown  Description: 1.  Monitor for areas of redness and/or skin breakdown  2.  Assess vascular access sites hourly  3.  Every 4-6 hours minimum:  Change oxygen saturation probe site  4.  Every 4-6 hours:  If on nasal continuous positive airway pressure, respiratory therapy assess nares and determine need for appliance change or resting period.  9/13/2024 1010 by Meaghan Escoto RN  Outcome: Completed     Problem: Pain  Goal: Verbalizes/displays adequate comfort level or baseline comfort level  9/13/2024 1010 by Meaghan Escoto RN  Outcome: Completed     Problem: ABCDS Injury Assessment  Goal: Absence of physical injury  9/13/2024 1010 by Meaghan Escoto RN  Outcome: Completed     Problem: Respiratory - Adult  Goal: Clear lung sounds  Description: Clear lung sounds  9/13/2024 0901 by Malissa Enriquez RCP  Outcome: Progressing   Care plan reviewed with patient.  Patient verbalize understanding of the plan of care and contribute to goal setting.

## 2024-09-13 NOTE — PLAN OF CARE
Problem: Discharge Planning  Goal: Discharge to home or other facility with appropriate resources  Outcome: Progressing  Flowsheets (Taken 9/12/2024 2310)  Discharge to home or other facility with appropriate resources:   Identify barriers to discharge with patient and caregiver   Arrange for needed discharge resources and transportation as appropriate   Identify discharge learning needs (meds, wound care, etc)     Problem: Chronic Conditions and Co-morbidities  Goal: Patient's chronic conditions and co-morbidity symptoms are monitored and maintained or improved  Outcome: Progressing  Flowsheets (Taken 9/12/2024 2310)  Care Plan - Patient's Chronic Conditions and Co-Morbidity Symptoms are Monitored and Maintained or Improved:   Monitor and assess patient's chronic conditions and comorbid symptoms for stability, deterioration, or improvement   Collaborate with multidisciplinary team to address chronic and comorbid conditions and prevent exacerbation or deterioration   Update acute care plan with appropriate goals if chronic or comorbid symptoms are exacerbated and prevent overall improvement and discharge     Problem: Safety - Adult  Goal: Free from fall injury  Outcome: Progressing  Flowsheets (Taken 9/12/2024 2310)  Free From Fall Injury: Instruct family/caregiver on patient safety     Problem: Skin/Tissue Integrity  Goal: Absence of new skin breakdown  Description: 1.  Monitor for areas of redness and/or skin breakdown  2.  Assess vascular access sites hourly  3.  Every 4-6 hours minimum:  Change oxygen saturation probe site  4.  Every 4-6 hours:  If on nasal continuous positive airway pressure, respiratory therapy assess nares and determine need for appliance change or resting period.  Outcome: Progressing  Note: Full skin assessment completed.      Problem: Pain  Goal: Verbalizes/displays adequate comfort level or baseline comfort level  Outcome: Progressing  Flowsheets (Taken 9/12/2024  5100)  Verbalizes/displays adequate comfort level or baseline comfort level:   Encourage patient to monitor pain and request assistance   Assess pain using appropriate pain scale   Administer analgesics based on type and severity of pain and evaluate response   Implement non-pharmacological measures as appropriate and evaluate response     Problem: ABCDS Injury Assessment  Goal: Absence of physical injury  Outcome: Progressing  Flowsheets (Taken 9/12/2024 7540)  Absence of Physical Injury: Implement safety measures based on patient assessment   Care plan reviewed with patient .  Patient  verbalize understanding of the plan of care and contribute to goal setting.

## 2024-11-07 ENCOUNTER — TELEPHONE (OUTPATIENT)
Dept: CARDIOLOGY CLINIC | Age: 64
End: 2024-11-07

## 2024-11-11 NOTE — TELEPHONE ENCOUNTER
UNABLE TO REACH THIS PT TODAY    CALLED DANIEL CAREGIVER , AND HAVE HER HAVE THIS PT CALL OUR OFFICE

## 2024-11-13 NOTE — TELEPHONE ENCOUNTER
Letter mailed to pt asking him to call this office to r/s his [facility] pacer appt in the office

## 2024-12-09 ENCOUNTER — NURSE ONLY (OUTPATIENT)
Dept: CARDIOLOGY CLINIC | Age: 64
End: 2024-12-09

## 2024-12-09 ENCOUNTER — TELEPHONE (OUTPATIENT)
Dept: CARDIOLOGY CLINIC | Age: 64
End: 2024-12-09

## 2024-12-09 DIAGNOSIS — Z95.0 PACEMAKER: Primary | ICD-10-CM

## 2024-12-09 NOTE — TELEPHONE ENCOUNTER
DR MUÑOZ PT     [facility] [employer] [facility] PACEMAKER CHECK IN OFFICE    BATTERY 8 YRS REMAINING    KNOWN AFIB/ ELIQUIS       PT CAME TO THE OFFICE TODAY FOR A [facility] [employer] [facility] PACER CHECK    PT TOLD ME HE HAS BEEN HAVING EPISODES OF PASSING OUT. HE SAID HE THINKS IT STARTED ABOUT ONE MONTH AGO BUT HE SAID HE PASSES OUT ABOUT ONCE A WEEK.HE COULD NOT GIVE ME ANY DATES OR TIMES OF PASSING OUT EPISODES.  HE HAS NOT TOLD ANY PROVIDERS ABOUT IT. HE SAID HE THINKS IT STARTED WHEN ONE OF HIS DRS STARTED CHANGING HIS DIABETES MEDICATIONS AROUND BUT HE IS NOT SURE.   HE SAID HE IS USUALLY TRYING TO STAND UP AND THEN HE PASSES OUT AND THEN HIS BOYS PICK HIM UP OFF THE FLOOR AND THEN HE IS OK.  I ASKED HIM IF ANYONE EVER CHECKS HIS BLOOD SUGARS FOR HIS BLOOD PRESSURES. HE SAID HE HAS TO BUY A NEW BLOOD GLUCOSE MACHINE.   HE WAS POOR HISTORIAN. HE DOES NOT KNOW WHAT MEDICATIONS HE IS TAKING       HE HAS A F/U WITH DR MUÑOZ 3/3/25 .     KNOWN OMERIB/ SARAI     DID HAVE SOME AFIB WITH RVR 10/28/24  IS ALSO HAVING SOME EPISODES OF SVT       EPISODES SCANNED IN UNDER THE MEDIA TAB

## 2024-12-13 ENCOUNTER — OFFICE VISIT (OUTPATIENT)
Dept: CARDIOLOGY CLINIC | Age: 64
End: 2024-12-13
Payer: MEDICARE

## 2024-12-13 VITALS
WEIGHT: 253 LBS | BODY MASS INDEX: 37.47 KG/M2 | HEART RATE: 82 BPM | SYSTOLIC BLOOD PRESSURE: 101 MMHG | HEIGHT: 69 IN | DIASTOLIC BLOOD PRESSURE: 64 MMHG

## 2024-12-13 DIAGNOSIS — Z95.3 HISTORY OF TRICUSPID VALVE REPLACEMENT WITH BIOPROSTHETIC VALVE: ICD-10-CM

## 2024-12-13 DIAGNOSIS — I10 ESSENTIAL HYPERTENSION: ICD-10-CM

## 2024-12-13 DIAGNOSIS — Z95.0 PACEMAKER: ICD-10-CM

## 2024-12-13 DIAGNOSIS — I48.0 PAF (PAROXYSMAL ATRIAL FIBRILLATION) (HCC): ICD-10-CM

## 2024-12-13 DIAGNOSIS — Z98.890 HX OF TRICUSPID VALVE REPAIR: ICD-10-CM

## 2024-12-13 DIAGNOSIS — J44.9 MODERATE COPD (CHRONIC OBSTRUCTIVE PULMONARY DISEASE) (HCC): ICD-10-CM

## 2024-12-13 DIAGNOSIS — I07.0 TRICUSPID STENOSIS, ACQUIRED: ICD-10-CM

## 2024-12-13 DIAGNOSIS — Z98.890 S/P CARDIAC CATH: ICD-10-CM

## 2024-12-13 DIAGNOSIS — I50.32 CHRONIC DIASTOLIC CONGESTIVE HEART FAILURE (HCC): Primary | ICD-10-CM

## 2024-12-13 PROCEDURE — G8427 DOCREV CUR MEDS BY ELIG CLIN: HCPCS | Performed by: INTERNAL MEDICINE

## 2024-12-13 PROCEDURE — 3078F DIAST BP <80 MM HG: CPT | Performed by: INTERNAL MEDICINE

## 2024-12-13 PROCEDURE — G8417 CALC BMI ABV UP PARAM F/U: HCPCS | Performed by: INTERNAL MEDICINE

## 2024-12-13 PROCEDURE — 4004F PT TOBACCO SCREEN RCVD TLK: CPT | Performed by: INTERNAL MEDICINE

## 2024-12-13 PROCEDURE — 3017F COLORECTAL CA SCREEN DOC REV: CPT | Performed by: INTERNAL MEDICINE

## 2024-12-13 PROCEDURE — 3023F SPIROM DOC REV: CPT | Performed by: INTERNAL MEDICINE

## 2024-12-13 PROCEDURE — 99214 OFFICE O/P EST MOD 30 MIN: CPT | Performed by: INTERNAL MEDICINE

## 2024-12-13 PROCEDURE — 3074F SYST BP LT 130 MM HG: CPT | Performed by: INTERNAL MEDICINE

## 2024-12-13 PROCEDURE — G8484 FLU IMMUNIZE NO ADMIN: HCPCS | Performed by: INTERNAL MEDICINE

## 2025-03-19 ENCOUNTER — HOSPITAL ENCOUNTER (EMERGENCY)
Age: 65
Discharge: LEFT BEFORE DISCHARGE | End: 2025-03-19
Attending: EMERGENCY MEDICINE
Payer: MEDICARE

## 2025-03-19 ENCOUNTER — APPOINTMENT (OUTPATIENT)
Dept: GENERAL RADIOLOGY | Age: 65
End: 2025-03-19
Payer: MEDICARE

## 2025-03-19 ENCOUNTER — TELEPHONE (OUTPATIENT)
Dept: CARDIOLOGY CLINIC | Age: 65
End: 2025-03-19

## 2025-03-19 VITALS
RESPIRATION RATE: 20 BRPM | SYSTOLIC BLOOD PRESSURE: 145 MMHG | HEART RATE: 84 BPM | DIASTOLIC BLOOD PRESSURE: 81 MMHG | OXYGEN SATURATION: 89 % | TEMPERATURE: 98.1 F

## 2025-03-19 DIAGNOSIS — J44.9 CHRONIC OBSTRUCTIVE PULMONARY DISEASE, UNSPECIFIED COPD TYPE (HCC): ICD-10-CM

## 2025-03-19 DIAGNOSIS — Z95.0 NORMALLY FUNCTIONING CARDIAC PACEMAKER PRESENT: ICD-10-CM

## 2025-03-19 DIAGNOSIS — Z53.20 PATIENT LEFT BEFORE TREATMENT COMPLETED: Primary | ICD-10-CM

## 2025-03-19 LAB
ANION GAP SERPL CALC-SCNC: 10 MEQ/L (ref 8–16)
BASOPHILS ABSOLUTE: 0 THOU/MM3 (ref 0–0.1)
BASOPHILS NFR BLD AUTO: 0.4 %
BUN SERPL-MCNC: 13 MG/DL (ref 8–23)
CALCIUM SERPL-MCNC: 9.8 MG/DL (ref 8.8–10.2)
CHLORIDE SERPL-SCNC: 95 MEQ/L (ref 98–111)
CO2 SERPL-SCNC: 36 MEQ/L (ref 22–29)
CREAT SERPL-MCNC: 0.8 MG/DL (ref 0.7–1.2)
DEPRECATED RDW RBC AUTO: 52.9 FL (ref 35–45)
EOSINOPHIL NFR BLD AUTO: 1.1 %
EOSINOPHILS ABSOLUTE: 0.1 THOU/MM3 (ref 0–0.4)
ERYTHROCYTE [DISTWIDTH] IN BLOOD BY AUTOMATED COUNT: 16.2 % (ref 11.5–14.5)
GFR SERPL CREATININE-BSD FRML MDRD: > 90 ML/MIN/1.73M2
GLUCOSE SERPL-MCNC: 143 MG/DL (ref 74–109)
HCT VFR BLD AUTO: 44.4 % (ref 42–52)
HGB BLD-MCNC: 13.5 GM/DL (ref 14–18)
IMM GRANULOCYTES # BLD AUTO: 0.02 THOU/MM3 (ref 0–0.07)
IMM GRANULOCYTES NFR BLD AUTO: 0.4 %
LYMPHOCYTES ABSOLUTE: 1.5 THOU/MM3 (ref 1–4.8)
LYMPHOCYTES NFR BLD AUTO: 32.6 %
MAGNESIUM SERPL-MCNC: 2.1 MG/DL (ref 1.6–2.6)
MCH RBC QN AUTO: 27.3 PG (ref 26–33)
MCHC RBC AUTO-ENTMCNC: 30.4 GM/DL (ref 32.2–35.5)
MCV RBC AUTO: 89.9 FL (ref 80–94)
MONOCYTES ABSOLUTE: 0.4 THOU/MM3 (ref 0.4–1.3)
MONOCYTES NFR BLD AUTO: 8.9 %
NEUTROPHILS ABSOLUTE: 2.7 THOU/MM3 (ref 1.8–7.7)
NEUTROPHILS NFR BLD AUTO: 56.6 %
NRBC BLD AUTO-RTO: 0 /100 WBC
NT-PROBNP SERPL IA-MCNC: 415 PG/ML (ref 0–124)
OSMOLALITY SERPL CALC.SUM OF ELEC: 283.8 MOSMOL/KG (ref 275–300)
PLATELET # BLD AUTO: 176 THOU/MM3 (ref 130–400)
PMV BLD AUTO: 10.6 FL (ref 9.4–12.4)
POTASSIUM SERPL-SCNC: 3.1 MEQ/L (ref 3.5–5.2)
RBC # BLD AUTO: 4.94 MILL/MM3 (ref 4.7–6.1)
SODIUM SERPL-SCNC: 141 MEQ/L (ref 135–145)
TROPONIN, HIGH SENSITIVITY: 20 NG/L (ref 0–12)
WBC # BLD AUTO: 4.7 THOU/MM3 (ref 4.8–10.8)

## 2025-03-19 PROCEDURE — 71046 X-RAY EXAM CHEST 2 VIEWS: CPT

## 2025-03-19 PROCEDURE — 83735 ASSAY OF MAGNESIUM: CPT

## 2025-03-19 PROCEDURE — 83880 ASSAY OF NATRIURETIC PEPTIDE: CPT

## 2025-03-19 PROCEDURE — 85025 COMPLETE CBC W/AUTO DIFF WBC: CPT

## 2025-03-19 PROCEDURE — 80048 BASIC METABOLIC PNL TOTAL CA: CPT

## 2025-03-19 PROCEDURE — 99285 EMERGENCY DEPT VISIT HI MDM: CPT

## 2025-03-19 PROCEDURE — 36415 COLL VENOUS BLD VENIPUNCTURE: CPT

## 2025-03-19 PROCEDURE — 93005 ELECTROCARDIOGRAM TRACING: CPT | Performed by: EMERGENCY MEDICINE

## 2025-03-19 PROCEDURE — 84484 ASSAY OF TROPONIN QUANT: CPT

## 2025-03-19 ASSESSMENT — PAIN - FUNCTIONAL ASSESSMENT
PAIN_FUNCTIONAL_ASSESSMENT: NONE - DENIES PAIN
PAIN_FUNCTIONAL_ASSESSMENT: NONE - DENIES PAIN

## 2025-03-19 NOTE — TELEPHONE ENCOUNTER
Received a download that this pt had episodes of afib with rvr yesterday .  I called and spoke to the wife. Could not get a hold of pt. His voicemail is full  Wife will get a hold of her son and have pt call this office.     Will ask pt is he is not taking amiodarone or a beta blocker anymore. According to epic ,he is not taking either any longer . Want to know if he is symptomatic with this

## 2025-03-19 NOTE — ED TRIAGE NOTES
Pt presents to the ED after his cardiologist recommended he come to the ED. Pt denies chest pain or increased SOB. Pt is 95% on room air. Pt states he has oxygen but only wears it when he needs to.

## 2025-03-19 NOTE — ED PROVIDER NOTES
[facility] EMERGENCY DEPARTMENT      EMERGENCY MEDICINE     Room # 32/032A    Pt Name: Avery Jordan  MRN: [id number]  Birthdate 1960  Date of evaluation: 3/19/2025  Provider: Zander Paulino MD    CHIEF COMPLAINT       Chief Complaint   Patient presents with    Shortness of Breath     HISTORY OF PRESENT ILLNESS   Avery Jordan is a pleasant 64 y.o. male who presents to the emergency department from from home, by private vehicle for evaluation of shortness of breath.  Patient called Dr. Gillespie he today that his home oxygen saturation was low at 89 and he feels congested.  Patient normally used 3 L nasal cannula but he uses only as needed.  When I am seeing the patient the patient is not on any oxygen.  Patient has been using oxygen as needed for the past 4 years due to COPD and coronary artery disease.  Patient still smoke.  Patient called his cardiologist Dr. Gillespie's office and was advised to come here to be checked.  Patient had known history of pacemaker and chronic respiratory failure using oxygen 3 L nasal cannula as needed.  The patient denies any fever no chills no nausea no vomiting no palpitation orthopnea PND..    PASTMEDICAL HISTORY     Past Medical History:   Diagnosis Date    Anxiety disorder     Arthritis     Asthma     Back pain, chronic     Blood circulation, collateral     4 toes partial amputee on L foot    [employer] BiV ICD 07/01/2014    [employer] BiV pacemaker 07/01/2014    CAD (coronary artery disease)     CHF (congestive heart failure) (HCC)     Chronic kidney disease     COPD (chronic obstructive pulmonary disease) (HCC)     Depression     DM2 (diabetes mellitus, type 2) (HCC)     Endocarditis     Factor V deficiency (HCC)     Hepatitis     HEPATITIS C    Hepatitis C     History of blood transfusion     Hypertension     Paroxysmal A-fib (HCC)     Pneumonia        Patient Active Problem List   Diagnosis Code    Moderate COPD (chronic obstructive pulmonary

## 2025-03-19 NOTE — TELEPHONE ENCOUNTER
Pt called office and stated he has been feeling very SOB-feels like someone is choking him. Asked patient if he has a way to check O2 at home--stated his sat was 89% and he is currently wearing continuous O2 at 3.5 liters. Advised patient he should present to the ED due to his symptoms and Afib with RVR. Patient stated he was going to call his friend to take him to [facility]--informed he has the option to call 911 also. Patient voiced understanding and will go to [facility] ED.    Pt is not taking Amiodarone or Beta Blocker - verified he is taking Eliquis

## 2025-03-20 ENCOUNTER — TELEPHONE (OUTPATIENT)
Dept: CARDIOLOGY CLINIC | Age: 65
End: 2025-03-20

## 2025-03-20 LAB
EKG ATRIAL RATE: 82 BPM
EKG P AXIS: 13 DEGREES
EKG P-R INTERVAL: 106 MS
EKG Q-T INTERVAL: 400 MS
EKG QRS DURATION: 96 MS
EKG QTC CALCULATION (BAZETT): 467 MS
EKG R AXIS: -62 DEGREES
EKG T AXIS: -31 DEGREES
EKG VENTRICULAR RATE: 82 BPM

## 2025-03-20 PROCEDURE — 93010 ELECTROCARDIOGRAM REPORT: CPT | Performed by: INTERNAL MEDICINE

## 2025-03-20 NOTE — TELEPHONE ENCOUNTER
Call from pt   Went to hospital yesterday, did some testing he felt they were done so he left AMA   Aware he needs to continue to take his meds and we will keep an eye on his heartrate

## 2025-04-30 DIAGNOSIS — I47.10 SVT (SUPRAVENTRICULAR TACHYCARDIA): ICD-10-CM

## 2025-04-30 DIAGNOSIS — I48.0 PAF (PAROXYSMAL ATRIAL FIBRILLATION) (HCC): Primary | ICD-10-CM

## 2025-04-30 NOTE — TELEPHONE ENCOUNTER
Call to pt and [facility] due to MB full - need to see if pt was symptomatic    BiV Pacer with recorded episodes of High Ventricular Rates/VT recorded on device; pt also has known AF takes Eliquis - episodes are all from 4-29-25 - full interrogation scanned and attached

## 2025-05-01 NOTE — TELEPHONE ENCOUNTER
Record reviewed  K 3.1 on 3/19.2025 and was seen in er and not repeated    Device showed svt/atr fib   Few nsvt   All seconds to min  Plan   Decrease lisinopril to 5 mg ( from 10 now)  Start toprol xl 25 mg po qd  Get BMP and mG asap  F/u in office in 2 weeks

## 2025-05-01 NOTE — TELEPHONE ENCOUNTER
Pts voicemail is full    Lmom for Arlin, who is on [employer] to call this office or have pt call this office

## 2025-05-05 NOTE — TELEPHONE ENCOUNTER
Attempted to call patient but no VM.   Letter has been sent to patient, will await call back from patient.

## 2025-05-06 RX ORDER — LISINOPRIL 5 MG/1
5 TABLET ORAL DAILY
Qty: 90 TABLET | Refills: 0 | Status: SHIPPED | OUTPATIENT
Start: 2025-05-06

## 2025-05-06 RX ORDER — METOPROLOL SUCCINATE 25 MG/1
25 TABLET, EXTENDED RELEASE ORAL DAILY
Qty: 90 TABLET | Refills: 0 | Status: SHIPPED | OUTPATIENT
Start: 2025-05-06

## 2025-05-06 NOTE — TELEPHONE ENCOUNTER
Spoke to patient, notified.   Labs ordered. Pt will have drawn at [facility].  Already has appt scheduled with Gigi 5/14.  Med changes pended.

## 2025-05-13 ENCOUNTER — TRANSCRIBE ORDERS (OUTPATIENT)
Dept: ADMINISTRATIVE | Age: 65
End: 2025-05-13

## 2025-05-13 DIAGNOSIS — Z12.2 ENCOUNTER FOR SCREENING FOR MALIGNANT NEOPLASM OF RESPIRATORY ORGANS: Primary | ICD-10-CM

## 2025-05-20 NOTE — PROGRESS NOTES
[facility] [place] [facility]  [facility] CARDIOLOGY  [address]  [address]  [place] [address]  Dept: [phone]  Dept Fax: [phone]  Loc: [phone]    Visit Date: 5/21/2025    Avery Jordan is a 64 y.o. male who presents todayfor:  Chief Complaint   Patient presents with    Follow-up     Cardiologist: Verna     Hx of chronic diastolic CHF, PAF, HTN, CHB s/p BiV pacemaker, s/p TVR, moderate COPD, Factor V leiden, DVT history     HPI: 6 month f/u   More tired lately. No cp. Sob is the same. No swelling or weight changes. No orthopnea. He does not notice any palpitations.   Past Surgical History:   Procedure Laterality Date    BRONCHOSCOPY N/A 9/28/2019    BRONCHOSCOPY performed by Mark Sharma MD at [facility]    BRONCHOSCOPY N/A 9/28/2019    BRONCHOSCOPY performed by Mark Sharma MD at [facility]    BRONCHOSCOPY N/A 9/29/2019    BRONCHOSCOPY BIOPSY BRONCHUS performed by Mark Sharma MD at [facility]    BRONCHOSCOPY  9/29/2019    BRONCHOSCOPY performed by Mark Sharma MD at [facility]    BRONCHOSCOPY N/A 9/30/2019    BRONCHOSCOPY ALVEOLAR LAVAGE performed by Silvio Gutierrez MD at [facility]    BRONCHOSCOPY N/A 10/16/2019    BRONCHOSCOPY ALVEOLAR LAVAGE performed by Silvio Gutierrez MD at [facility]    BRONCHOSCOPY N/A 9/16/2020    BRONCHOSCOPY performed by Julio Mistry MD at [facility]    CARDIAC SURGERY  12/26/12    Valve replacement    COLONOSCOPY  2016    DEBRIDEMENT Left 01/14/2014    lt hand     HAND DEBRIDEMENT Left 01/09/2014    INCISION AND DRAINAGE    PACEMAKER PLACEMENT  12/26/13    TOE AMPUTATION Bilateral 3/13/2013    1,2,3,4 on left and 2nd on right    TOTAL HIP ARTHROPLASTY N/A 4/23/2024    Left Total Hip Lateral Approach performed by Felton Hightower MD at [facility] OR    TOTAL HIP ARTHROPLASTY Right 9/10/2024    RIGHT TOTAL HIP LATERAL APPROACH performed by Felton Hightower MD at [facility] OR    TRACHEAL SURGERY N/A

## 2025-05-21 ENCOUNTER — OFFICE VISIT (OUTPATIENT)
Dept: CARDIOLOGY CLINIC | Age: 65
End: 2025-05-21
Payer: MEDICARE

## 2025-05-21 VITALS
HEART RATE: 72 BPM | HEIGHT: 69 IN | DIASTOLIC BLOOD PRESSURE: 60 MMHG | WEIGHT: 255 LBS | SYSTOLIC BLOOD PRESSURE: 118 MMHG | BODY MASS INDEX: 37.77 KG/M2

## 2025-05-21 DIAGNOSIS — I10 ESSENTIAL HYPERTENSION: ICD-10-CM

## 2025-05-21 DIAGNOSIS — I48.0 PAF (PAROXYSMAL ATRIAL FIBRILLATION) (HCC): ICD-10-CM

## 2025-05-21 DIAGNOSIS — Z95.0 PACEMAKER: ICD-10-CM

## 2025-05-21 DIAGNOSIS — I50.32 CHRONIC DIASTOLIC CONGESTIVE HEART FAILURE (HCC): Primary | ICD-10-CM

## 2025-05-21 PROCEDURE — 93000 ELECTROCARDIOGRAM COMPLETE: CPT | Performed by: STUDENT IN AN ORGANIZED HEALTH CARE EDUCATION/TRAINING PROGRAM

## 2025-05-21 PROCEDURE — 3078F DIAST BP <80 MM HG: CPT | Performed by: STUDENT IN AN ORGANIZED HEALTH CARE EDUCATION/TRAINING PROGRAM

## 2025-05-21 PROCEDURE — 3074F SYST BP LT 130 MM HG: CPT | Performed by: STUDENT IN AN ORGANIZED HEALTH CARE EDUCATION/TRAINING PROGRAM

## 2025-05-21 PROCEDURE — G8427 DOCREV CUR MEDS BY ELIG CLIN: HCPCS | Performed by: STUDENT IN AN ORGANIZED HEALTH CARE EDUCATION/TRAINING PROGRAM

## 2025-05-21 PROCEDURE — G8417 CALC BMI ABV UP PARAM F/U: HCPCS | Performed by: STUDENT IN AN ORGANIZED HEALTH CARE EDUCATION/TRAINING PROGRAM

## 2025-05-21 PROCEDURE — 3017F COLORECTAL CA SCREEN DOC REV: CPT | Performed by: STUDENT IN AN ORGANIZED HEALTH CARE EDUCATION/TRAINING PROGRAM

## 2025-05-21 PROCEDURE — 4004F PT TOBACCO SCREEN RCVD TLK: CPT | Performed by: STUDENT IN AN ORGANIZED HEALTH CARE EDUCATION/TRAINING PROGRAM

## 2025-05-21 PROCEDURE — 99214 OFFICE O/P EST MOD 30 MIN: CPT | Performed by: STUDENT IN AN ORGANIZED HEALTH CARE EDUCATION/TRAINING PROGRAM

## 2025-05-21 RX ORDER — TIZANIDINE 2 MG/1
2 TABLET ORAL EVERY 6 HOURS PRN
COMMUNITY

## 2025-05-21 RX ORDER — CALCIUM CARBONATE 500(1250)
1 TABLET ORAL 2 TIMES DAILY WITH MEALS
COMMUNITY
Start: 2025-04-23 | End: 2025-05-21 | Stop reason: SDUPTHER

## 2025-05-21 RX ORDER — QUETIAPINE FUMARATE 200 MG/1
200 TABLET, FILM COATED ORAL NIGHTLY
COMMUNITY
Start: 2025-03-17 | End: 2025-05-21 | Stop reason: SDUPTHER

## 2025-05-21 RX ORDER — METFORMIN HYDROCHLORIDE 750 MG/1
750 TABLET, EXTENDED RELEASE ORAL
COMMUNITY
Start: 2025-02-14

## 2025-05-21 RX ORDER — GABAPENTIN 600 MG/1
600 TABLET ORAL 3 TIMES DAILY
COMMUNITY
Start: 2025-05-11

## 2025-05-21 RX ORDER — METOPROLOL SUCCINATE 50 MG/1
50 TABLET, EXTENDED RELEASE ORAL DAILY
Qty: 30 TABLET | Refills: 6 | Status: SHIPPED | OUTPATIENT
Start: 2025-05-21

## 2025-05-21 NOTE — PATIENT INSTRUCTIONS
Increase metoprolol to 2 tablets daily until needing refills. Your new script will be for metoprolol 50 mg 1 tablet daily.

## 2025-05-21 NOTE — PROGRESS NOTES
Patient here for follow up.  Patient complains of headaches.  SOB with walking long distance.  Had some chest pain last night.

## 2025-05-30 ENCOUNTER — TELEPHONE (OUTPATIENT)
Dept: CARDIOLOGY CLINIC | Age: 65
End: 2025-05-30

## 2025-06-19 ENCOUNTER — APPOINTMENT (OUTPATIENT)
Dept: GENERAL RADIOLOGY | Age: 65
DRG: 194 | End: 2025-06-19
Payer: MEDICARE

## 2025-06-19 ENCOUNTER — APPOINTMENT (OUTPATIENT)
Dept: CT IMAGING | Age: 65
DRG: 194 | End: 2025-06-19
Payer: MEDICARE

## 2025-06-19 ENCOUNTER — HOSPITAL ENCOUNTER (INPATIENT)
Age: 65
LOS: 4 days | Discharge: HOME | DRG: 194 | End: 2025-06-24
Attending: NURSE PRACTITIONER
Payer: MEDICARE

## 2025-06-19 DIAGNOSIS — J96.01 ACUTE RESPIRATORY FAILURE WITH HYPOXIA AND HYPERCAPNIA (HCC): ICD-10-CM

## 2025-06-19 DIAGNOSIS — I50.31 ACUTE DIASTOLIC CONGESTIVE HEART FAILURE (HCC): ICD-10-CM

## 2025-06-19 DIAGNOSIS — R79.89 ELEVATED TROPONIN: ICD-10-CM

## 2025-06-19 DIAGNOSIS — N17.9 AKI (ACUTE KIDNEY INJURY): Primary | ICD-10-CM

## 2025-06-19 DIAGNOSIS — J96.02 ACUTE RESPIRATORY FAILURE WITH HYPOXIA AND HYPERCAPNIA (HCC): ICD-10-CM

## 2025-06-19 DIAGNOSIS — J44.9 CHRONIC OBSTRUCTIVE PULMONARY DISEASE, UNSPECIFIED COPD TYPE (HCC): ICD-10-CM

## 2025-06-19 DIAGNOSIS — B37.2 INTERTRIGINOUS CANDIDIASIS: ICD-10-CM

## 2025-06-19 LAB
ALBUMIN SERPL BCG-MCNC: 3.9 G/DL (ref 3.4–4.9)
ALP SERPL-CCNC: 69 U/L (ref 40–129)
ALT SERPL W/O P-5'-P-CCNC: < 5 U/L (ref 10–50)
ANION GAP SERPL CALC-SCNC: 14 MEQ/L (ref 8–16)
ARTERIAL PATENCY WRIST A: POSITIVE
AST SERPL-CCNC: 20 U/L (ref 10–50)
BACTERIA URNS QL MICRO: ABNORMAL /HPF
BASE EXCESS BLDA CALC-SCNC: 10.6 MMOL/L (ref -2.5–2.5)
BASE EXCESS BLDA CALC-SCNC: 12.1 MMOL/L (ref -2–3)
BASOPHILS ABSOLUTE: 0 THOU/MM3 (ref 0–0.1)
BASOPHILS NFR BLD AUTO: 0.5 %
BDY SITE: ABNORMAL
BILIRUB CONJ SERPL-MCNC: < 0.1 MG/DL (ref 0–0.2)
BILIRUB SERPL-MCNC: 0.5 MG/DL (ref 0.3–1.2)
BILIRUB UR QL STRIP.AUTO: NEGATIVE
BUN SERPL-MCNC: 21 MG/DL (ref 8–23)
CALCIUM SERPL-MCNC: 9.3 MG/DL (ref 8.8–10.2)
CASTS #/AREA URNS LPF: ABNORMAL /LPF
CASTS 2: ABNORMAL /LPF
CHARACTER UR: CLEAR
CHLORIDE SERPL-SCNC: 94 MEQ/L (ref 98–111)
CO2 SERPL-SCNC: 34 MEQ/L (ref 22–29)
COLLECTED BY:: ABNORMAL
COLLECTED BY:: ABNORMAL
COLOR, UA: YELLOW
CREAT SERPL-MCNC: 1.3 MG/DL (ref 0.7–1.2)
CRYSTALS URNS MICRO: ABNORMAL
DEPRECATED RDW RBC AUTO: 53.5 FL (ref 35–45)
DEVICE: ABNORMAL
DEVICE: ABNORMAL
EOSINOPHIL NFR BLD AUTO: 1.2 %
EOSINOPHILS ABSOLUTE: 0.1 THOU/MM3 (ref 0–0.4)
EPITHELIAL CELLS, UA: ABNORMAL /HPF
ERYTHROCYTE [DISTWIDTH] IN BLOOD BY AUTOMATED COUNT: 15.9 % (ref 11.5–14.5)
FIO2 ON VENT O2 ANALYZER: 3 %
GFR SERPL CREATININE-BSD FRML MDRD: 62 ML/MIN/1.73M2
GLUCOSE SERPL-MCNC: 170 MG/DL (ref 74–109)
GLUCOSE UR QL STRIP.AUTO: NEGATIVE MG/DL
HCO3 BLDA-SCNC: 38 MMOL/L (ref 23–28)
HCO3 BLDA-SCNC: 40 MMOL/L (ref 23–28)
HCT VFR BLD AUTO: 39.4 % (ref 42–52)
HGB BLD-MCNC: 11.7 GM/DL (ref 14–18)
HGB UR QL STRIP.AUTO: NEGATIVE
IMM GRANULOCYTES # BLD AUTO: 0.02 THOU/MM3 (ref 0–0.07)
IMM GRANULOCYTES NFR BLD AUTO: 0.5 %
KETONES UR QL STRIP.AUTO: NEGATIVE
LACTATE SERPL-SCNC: 2 MMOL/L (ref 0.5–2.2)
LACTATE SERPL-SCNC: 2.8 MMOL/L (ref 0.5–2.2)
LIPASE SERPL-CCNC: 14 U/L (ref 13–60)
LYMPHOCYTES ABSOLUTE: 1.6 THOU/MM3 (ref 1–4.8)
LYMPHOCYTES NFR BLD AUTO: 36.3 %
MCH RBC QN AUTO: 27.5 PG (ref 26–33)
MCHC RBC AUTO-ENTMCNC: 29.7 GM/DL (ref 32.2–35.5)
MCV RBC AUTO: 92.7 FL (ref 80–94)
MISCELLANEOUS 2: ABNORMAL
MONOCYTES ABSOLUTE: 0.4 THOU/MM3 (ref 0.4–1.3)
MONOCYTES NFR BLD AUTO: 9.8 %
NEUTROPHILS ABSOLUTE: 2.2 THOU/MM3 (ref 1.8–7.7)
NEUTROPHILS NFR BLD AUTO: 51.7 %
NITRITE UR QL STRIP: NEGATIVE
NRBC BLD AUTO-RTO: 0 /100 WBC
NT-PROBNP SERPL IA-MCNC: 1248 PG/ML (ref 0–124)
OSMOLALITY SERPL CALC.SUM OF ELEC: 290.1 MOSMOL/KG (ref 275–300)
PCO2 BLDA: 64 MMHG (ref 35–45)
PCO2 TEMP ADJ BLDMV: 65 MMHG (ref 41–51)
PH BLDA: 7.39 [PH] (ref 7.35–7.45)
PH BLDMV: 7.39 [PH] (ref 7.31–7.41)
PH UR STRIP.AUTO: 8 [PH] (ref 5–9)
PLATELET # BLD AUTO: 138 THOU/MM3 (ref 130–400)
PLATELET BLD QL SMEAR: ADEQUATE
PMV BLD AUTO: 11.1 FL (ref 9.4–12.4)
PO2 BLDA: 77 MMHG (ref 71–104)
PO2 BLDMV: 31 MMHG (ref 25–40)
POTASSIUM SERPL-SCNC: 3.6 MEQ/L (ref 3.5–5.2)
PROCALCITONIN SERPL IA-MCNC: 0.04 NG/ML (ref 0.01–0.09)
PROT SERPL-MCNC: 7.9 G/DL (ref 6.4–8.3)
PROT UR STRIP.AUTO-MCNC: ABNORMAL MG/DL
RBC # BLD AUTO: 4.25 MILL/MM3 (ref 4.7–6.1)
RBC URINE: ABNORMAL /HPF
RENAL EPI CELLS #/AREA URNS HPF: ABNORMAL /[HPF]
SAO2 % BLDA: 94 %
SAO2 % BLDMV: 55 %
SCAN OF BLOOD SMEAR: NORMAL
SITE: ABNORMAL
SODIUM SERPL-SCNC: 142 MEQ/L (ref 135–145)
SP GR UR REFRACT.AUTO: 1.02 (ref 1–1.03)
TROPONIN, HIGH SENSITIVITY: 27 NG/L (ref 0–12)
UROBILINOGEN, URINE: 1 EU/DL (ref 0–1)
VENTILATION MODE VENT: ABNORMAL
VENTILATION MODE VENT: ABNORMAL
WBC # BLD AUTO: 4.3 THOU/MM3 (ref 4.8–10.8)
WBC #/AREA URNS HPF: ABNORMAL /HPF
WBC #/AREA URNS HPF: NEGATIVE /[HPF]
YEAST LIKE FUNGI URNS QL MICRO: ABNORMAL

## 2025-06-19 PROCEDURE — 84300 ASSAY OF URINE SODIUM: CPT

## 2025-06-19 PROCEDURE — 82248 BILIRUBIN DIRECT: CPT

## 2025-06-19 PROCEDURE — 6360000004 HC RX CONTRAST MEDICATION: Performed by: NURSE PRACTITIONER

## 2025-06-19 PROCEDURE — 93005 ELECTROCARDIOGRAM TRACING: CPT | Performed by: NURSE PRACTITIONER

## 2025-06-19 PROCEDURE — 2580000003 HC RX 258: Performed by: NURSE PRACTITIONER

## 2025-06-19 PROCEDURE — 36415 COLL VENOUS BLD VENIPUNCTURE: CPT

## 2025-06-19 PROCEDURE — 80053 COMPREHEN METABOLIC PANEL: CPT

## 2025-06-19 PROCEDURE — 71045 X-RAY EXAM CHEST 1 VIEW: CPT

## 2025-06-19 PROCEDURE — 84145 PROCALCITONIN (PCT): CPT

## 2025-06-19 PROCEDURE — 81001 URINALYSIS AUTO W/SCOPE: CPT

## 2025-06-19 PROCEDURE — 74177 CT ABD & PELVIS W/CONTRAST: CPT

## 2025-06-19 PROCEDURE — 83605 ASSAY OF LACTIC ACID: CPT

## 2025-06-19 PROCEDURE — 80307 DRUG TEST PRSMV CHEM ANLYZR: CPT

## 2025-06-19 PROCEDURE — 84484 ASSAY OF TROPONIN QUANT: CPT

## 2025-06-19 PROCEDURE — 82803 BLOOD GASES ANY COMBINATION: CPT

## 2025-06-19 PROCEDURE — 83880 ASSAY OF NATRIURETIC PEPTIDE: CPT

## 2025-06-19 PROCEDURE — 83690 ASSAY OF LIPASE: CPT

## 2025-06-19 PROCEDURE — 99285 EMERGENCY DEPT VISIT HI MDM: CPT

## 2025-06-19 PROCEDURE — 2700000000 HC OXYGEN THERAPY PER DAY

## 2025-06-19 PROCEDURE — 36600 WITHDRAWAL OF ARTERIAL BLOOD: CPT

## 2025-06-19 PROCEDURE — 6370000000 HC RX 637 (ALT 250 FOR IP): Performed by: NURSE PRACTITIONER

## 2025-06-19 PROCEDURE — 87040 BLOOD CULTURE FOR BACTERIA: CPT

## 2025-06-19 PROCEDURE — 85025 COMPLETE CBC W/AUTO DIFF WBC: CPT

## 2025-06-19 RX ORDER — ACETAMINOPHEN 325 MG/1
650 TABLET ORAL ONCE
Status: COMPLETED | OUTPATIENT
Start: 2025-06-19 | End: 2025-06-19

## 2025-06-19 RX ORDER — FUROSEMIDE 10 MG/ML
40 INJECTION INTRAMUSCULAR; INTRAVENOUS ONCE
Status: COMPLETED | OUTPATIENT
Start: 2025-06-19 | End: 2025-06-20

## 2025-06-19 RX ORDER — 0.9 % SODIUM CHLORIDE 0.9 %
1000 INTRAVENOUS SOLUTION INTRAVENOUS ONCE
Status: COMPLETED | OUTPATIENT
Start: 2025-06-19 | End: 2025-06-20

## 2025-06-19 RX ORDER — IOPAMIDOL 755 MG/ML
80 INJECTION, SOLUTION INTRAVASCULAR
Status: COMPLETED | OUTPATIENT
Start: 2025-06-19 | End: 2025-06-19

## 2025-06-19 RX ADMIN — ACETAMINOPHEN 650 MG: 325 TABLET ORAL at 20:42

## 2025-06-19 RX ADMIN — SODIUM CHLORIDE 1000 ML: 0.9 INJECTION, SOLUTION INTRAVENOUS at 19:49

## 2025-06-19 RX ADMIN — IOPAMIDOL 80 ML: 755 INJECTION, SOLUTION INTRAVENOUS at 23:34

## 2025-06-19 NOTE — ED TRIAGE NOTES
Pt presents to the ED with complaints of dysuria as well as concern about a yeast infection under his abdominal fold. Pt family also states that pt has periodically been hallucinating. Pt family states that pt urine smells bad. Pt lives at home by himself.

## 2025-06-20 ENCOUNTER — APPOINTMENT (OUTPATIENT)
Age: 65
DRG: 194 | End: 2025-06-20
Attending: NURSE PRACTITIONER
Payer: MEDICARE

## 2025-06-20 ENCOUNTER — APPOINTMENT (OUTPATIENT)
Dept: INTERVENTIONAL RADIOLOGY/VASCULAR | Age: 65
DRG: 194 | End: 2025-06-20
Attending: NURSE PRACTITIONER
Payer: MEDICARE

## 2025-06-20 PROBLEM — J96.11 CHRONIC RESPIRATORY FAILURE WITH HYPOXIA AND HYPERCAPNIA (HCC): Status: ACTIVE | Noted: 2025-06-20

## 2025-06-20 PROBLEM — J96.12 CHRONIC RESPIRATORY FAILURE WITH HYPOXIA AND HYPERCAPNIA (HCC): Status: ACTIVE | Noted: 2025-06-20

## 2025-06-20 PROBLEM — E03.9 HYPOTHYROIDISM: Status: ACTIVE | Noted: 2025-06-20

## 2025-06-20 PROBLEM — N17.9 ACUTE KIDNEY INJURY: Status: ACTIVE | Noted: 2025-06-20

## 2025-06-20 LAB
AMMONIA PLAS-MCNC: 58 UMOL/L (ref 16–60)
AMPHETAMINES UR QL SCN: NEGATIVE
ANION GAP SERPL CALC-SCNC: 12 MEQ/L (ref 8–16)
ANION GAP SERPL CALC-SCNC: 12 MEQ/L (ref 8–16)
ANION GAP SERPL CALC-SCNC: 13 MEQ/L (ref 8–16)
BARBITURATES UR QL SCN: NEGATIVE
BASE EXCESS BLDA CALC-SCNC: 13.3 MMOL/L (ref -2.5–2.5)
BDY SITE: ABNORMAL
BENZODIAZ UR QL SCN: POSITIVE
BUN SERPL-MCNC: 15 MG/DL (ref 8–23)
BUN SERPL-MCNC: 16 MG/DL (ref 8–23)
BUN SERPL-MCNC: 19 MG/DL (ref 8–23)
BUPRENORPHINE URINE: POSITIVE
BZE UR QL SCN: NEGATIVE
CA-I BLD ISE-SCNC: 1.07 MMOL/L (ref 1.12–1.32)
CALCIUM SERPL-MCNC: 8.9 MG/DL (ref 8.8–10.2)
CALCIUM SERPL-MCNC: 9 MG/DL (ref 8.8–10.2)
CALCIUM SERPL-MCNC: 9 MG/DL (ref 8.8–10.2)
CANNABINOIDS UR QL SCN: POSITIVE
CHLORIDE SERPL-SCNC: 95 MEQ/L (ref 98–111)
CHLORIDE SERPL-SCNC: 96 MEQ/L (ref 98–111)
CHLORIDE SERPL-SCNC: 96 MEQ/L (ref 98–111)
CO2 SERPL-SCNC: 32 MEQ/L (ref 22–29)
CO2 SERPL-SCNC: 33 MEQ/L (ref 22–29)
CO2 SERPL-SCNC: 34 MEQ/L (ref 22–29)
COLLECTED BY:: ABNORMAL
CREAT SERPL-MCNC: 1 MG/DL (ref 0.7–1.2)
CREAT SERPL-MCNC: 1 MG/DL (ref 0.7–1.2)
CREAT SERPL-MCNC: 1.1 MG/DL (ref 0.7–1.2)
CRP SERPL-MCNC: 1.4 MG/DL (ref 0–0.5)
DEPRECATED MEAN GLUCOSE BLD GHB EST-ACNC: 141 MG/DL (ref 70–126)
DEVICE: ABNORMAL
ECHO AO ASC DIAM: 3.5 CM
ECHO AO ASCENDING AORTA INDEX: 1.5 CM/M2
ECHO AO SINUS VALSALVA DIAM: 3.2 CM
ECHO AO SINUS VALSALVA INDEX: 1.37 CM/M2
ECHO AO ST JNCT DIAM: 2.9 CM
ECHO AV CUSP MM: 1.9 CM
ECHO AV MEAN GRADIENT: 3 MMHG
ECHO AV MEAN VELOCITY: 0.8 M/S
ECHO AV PEAK GRADIENT: 6 MMHG
ECHO AV PEAK VELOCITY: 1.2 M/S
ECHO AV VELOCITY RATIO: 0.92
ECHO AV VTI: 27.1 CM
ECHO BSA: 2.42 M2
ECHO BSA: 2.42 M2
ECHO EST RA PRESSURE: 15 MMHG
ECHO IVC PROX: 2.9 CM
ECHO LA AREA 2C: 21.7 CM2
ECHO LA AREA 4C: 21.4 CM2
ECHO LA MAJOR AXIS: 6.5 CM
ECHO LA MINOR AXIS: 5.8 CM
ECHO LA VOL BP: 63 ML (ref 18–58)
ECHO LA VOL MOD A2C: 62 ML (ref 18–58)
ECHO LA VOL MOD A4C: 56 ML (ref 18–58)
ECHO LA VOL/BSA BIPLANE: 27 ML/M2 (ref 16–34)
ECHO LA VOLUME INDEX MOD A2C: 27 ML/M2 (ref 16–34)
ECHO LA VOLUME INDEX MOD A4C: 24 ML/M2 (ref 16–34)
ECHO LV E' LATERAL VELOCITY: 8.3 CM/S
ECHO LV E' SEPTAL VELOCITY: 9.5 CM/S
ECHO LV EF PHYSICIAN: 55 %
ECHO LV FRACTIONAL SHORTENING: 29 % (ref 28–44)
ECHO LV INTERNAL DIMENSION DIASTOLE INDEX: 2.49 CM/M2
ECHO LV INTERNAL DIMENSION DIASTOLIC: 5.8 CM (ref 4.2–5.9)
ECHO LV INTERNAL DIMENSION SYSTOLIC INDEX: 1.76 CM/M2
ECHO LV INTERNAL DIMENSION SYSTOLIC: 4.1 CM
ECHO LV ISOVOLUMETRIC RELAXATION TIME (IVRT): 88 MS
ECHO LV IVSD: 1.1 CM (ref 0.6–1)
ECHO LV MASS 2D: 264.3 G (ref 88–224)
ECHO LV MASS INDEX 2D: 113.4 G/M2 (ref 49–115)
ECHO LV POSTERIOR WALL DIASTOLIC: 1.1 CM (ref 0.6–1)
ECHO LV RELATIVE WALL THICKNESS RATIO: 0.38
ECHO LVOT AV VTI INDEX: 0.9
ECHO LVOT MEAN GRADIENT: 2 MMHG
ECHO LVOT PEAK GRADIENT: 5 MMHG
ECHO LVOT PEAK VELOCITY: 1.1 M/S
ECHO LVOT VTI: 24.5 CM
ECHO MV A VELOCITY: 0.56 M/S
ECHO MV E DECELERATION TIME (DT): 205 MS
ECHO MV E VELOCITY: 0.98 M/S
ECHO MV E/A RATIO: 1.75
ECHO MV E/E' LATERAL: 11.81
ECHO MV E/E' RATIO (AVERAGED): 11.06
ECHO MV E/E' SEPTAL: 10.32
ECHO PV MAX VELOCITY: 0.7 M/S
ECHO PV PEAK GRADIENT: 2 MMHG
ECHO RIGHT VENTRICULAR SYSTOLIC PRESSURE (RVSP): 38 MMHG
ECHO RV FREE WALL PEAK S': 6.2 CM/S
ECHO RV INTERNAL DIMENSION: 3.8 CM
ECHO RV TAPSE: 1 CM (ref 1.7–?)
ECHO TV MEAN GRADIENT: 8 MMHG
ECHO TV MEAN VELOCITY: 1.3 M/S
ECHO TV PEAK GRADIENT: 13 MMHG
ECHO TV PRESSURE HALF TIME (PHT): 326 MS
ECHO TV REGURGITANT MAX VELOCITY: 2.4 M/S
ECHO TV REGURGITANT PEAK GRADIENT: 23 MMHG
ECHO TV VALVE AREA P 1/2 METHOD: 0.6 CM2
ECHO TV VTI: 91.8 CM
EKG ATRIAL RATE: 129 BPM
EKG ATRIAL RATE: 58 BPM
EKG P AXIS: 41 DEGREES
EKG P-R INTERVAL: 188 MS
EKG Q-T INTERVAL: 304 MS
EKG Q-T INTERVAL: 468 MS
EKG QRS DURATION: 110 MS
EKG QRS DURATION: 112 MS
EKG QTC CALCULATION (BAZETT): 414 MS
EKG QTC CALCULATION (BAZETT): 459 MS
EKG R AXIS: -149 DEGREES
EKG R AXIS: -97 DEGREES
EKG T AXIS: -88 DEGREES
EKG T AXIS: 22 DEGREES
EKG VENTRICULAR RATE: 112 BPM
EKG VENTRICULAR RATE: 58 BPM
FENTANYL: NEGATIVE
FIO2 ON VENT O2 ANALYZER: 3 %
FOLATE SERPL-MCNC: 6.5 NG/ML (ref 4.6–34.8)
GFR SERPL CREATININE-BSD FRML MDRD: 75 ML/MIN/1.73M2
GFR SERPL CREATININE-BSD FRML MDRD: 84 ML/MIN/1.73M2
GFR SERPL CREATININE-BSD FRML MDRD: 84 ML/MIN/1.73M2
GLUCOSE BLD STRIP.AUTO-MCNC: 112 MG/DL (ref 70–108)
GLUCOSE BLD STRIP.AUTO-MCNC: 125 MG/DL (ref 70–108)
GLUCOSE BLD STRIP.AUTO-MCNC: 127 MG/DL (ref 70–108)
GLUCOSE BLD STRIP.AUTO-MCNC: 134 MG/DL (ref 70–108)
GLUCOSE BLD STRIP.AUTO-MCNC: 139 MG/DL (ref 70–108)
GLUCOSE SERPL-MCNC: 101 MG/DL (ref 74–109)
GLUCOSE SERPL-MCNC: 123 MG/DL (ref 74–109)
GLUCOSE SERPL-MCNC: 127 MG/DL (ref 74–109)
HBA1C MFR BLD HPLC: 6.7 % (ref 4–6)
HCO3 BLDA-SCNC: 41 MMOL/L (ref 23–28)
IRON SATN MFR SERPL: 19 % (ref 20–50)
IRON SERPL-MCNC: 64 UG/DL (ref 61–157)
MAGNESIUM SERPL-MCNC: 2.5 MG/DL (ref 1.6–2.6)
OPIATES UR QL SCN: NEGATIVE
OSMOLALITY SERPL CALC.SUM OF ELEC: 283.8 MOSMOL/KG (ref 275–300)
OSMOLALITY SERPL CALC.SUM OF ELEC: 285.5 MOSMOL/KG (ref 275–300)
OSMOLALITY SERPL: 310 MOSMOL/KG (ref 275–295)
OXYCODONE: NEGATIVE
PCO2 BLDA: 63 MMHG (ref 35–45)
PCP UR QL SCN: NEGATIVE
PH BLDA: 7.42 [PH] (ref 7.35–7.45)
PO2 BLDA: 82 MMHG (ref 71–104)
POTASSIUM SERPL-SCNC: 3.1 MEQ/L (ref 3.5–5.2)
POTASSIUM SERPL-SCNC: 3.3 MEQ/L (ref 3.5–5.2)
POTASSIUM SERPL-SCNC: 3.4 MEQ/L (ref 3.5–5.2)
PROCALCITONIN SERPL IA-MCNC: 0.04 NG/ML (ref 0.01–0.09)
SAO2 % BLDA: 96 %
SODIUM SERPL-SCNC: 140 MEQ/L (ref 135–145)
SODIUM SERPL-SCNC: 141 MEQ/L (ref 135–145)
SODIUM SERPL-SCNC: 142 MEQ/L (ref 135–145)
SODIUM UR-SCNC: 91 MEQ/L
T3FREE SERPL-MCNC: 3.18 PG/ML (ref 2–4.4)
T4 FREE SERPL-MCNC: 0.9 NG/DL (ref 0.92–1.68)
TIBC SERPL-MCNC: 341 UG/DL (ref 171–450)
TROPONIN, HIGH SENSITIVITY: 26 NG/L (ref 0–12)
TROPONIN, HIGH SENSITIVITY: 34 NG/L (ref 0–12)
TSH SERPL DL<=0.05 MIU/L-ACNC: 7.66 UIU/ML (ref 0.27–4.2)
VENTILATION MODE VENT: ABNORMAL
VIT B12 SERPL-MCNC: 459 PG/ML (ref 232–1245)

## 2025-06-20 PROCEDURE — 36415 COLL VENOUS BLD VENIPUNCTURE: CPT

## 2025-06-20 PROCEDURE — 2500000003 HC RX 250 WO HCPCS: Performed by: NURSE PRACTITIONER

## 2025-06-20 PROCEDURE — 6370000000 HC RX 637 (ALT 250 FOR IP): Performed by: INTERNAL MEDICINE

## 2025-06-20 PROCEDURE — 6370000000 HC RX 637 (ALT 250 FOR IP): Performed by: NURSE PRACTITIONER

## 2025-06-20 PROCEDURE — 83550 IRON BINDING TEST: CPT

## 2025-06-20 PROCEDURE — 83540 ASSAY OF IRON: CPT

## 2025-06-20 PROCEDURE — 82607 VITAMIN B-12: CPT

## 2025-06-20 PROCEDURE — 1200000000 HC SEMI PRIVATE

## 2025-06-20 PROCEDURE — 83930 ASSAY OF BLOOD OSMOLALITY: CPT

## 2025-06-20 PROCEDURE — 83036 HEMOGLOBIN GLYCOSYLATED A1C: CPT

## 2025-06-20 PROCEDURE — 84145 PROCALCITONIN (PCT): CPT

## 2025-06-20 PROCEDURE — 6360000002 HC RX W HCPCS: Performed by: NURSE PRACTITIONER

## 2025-06-20 PROCEDURE — 82803 BLOOD GASES ANY COMBINATION: CPT

## 2025-06-20 PROCEDURE — 82948 REAGENT STRIP/BLOOD GLUCOSE: CPT

## 2025-06-20 PROCEDURE — 1200000003 HC TELEMETRY R&B

## 2025-06-20 PROCEDURE — 84439 ASSAY OF FREE THYROXINE: CPT

## 2025-06-20 PROCEDURE — 84481 FREE ASSAY (FT-3): CPT

## 2025-06-20 PROCEDURE — 93010 ELECTROCARDIOGRAM REPORT: CPT | Performed by: INTERNAL MEDICINE

## 2025-06-20 PROCEDURE — 2700000000 HC OXYGEN THERAPY PER DAY

## 2025-06-20 PROCEDURE — 84484 ASSAY OF TROPONIN QUANT: CPT

## 2025-06-20 PROCEDURE — 84443 ASSAY THYROID STIM HORMONE: CPT

## 2025-06-20 PROCEDURE — 93306 TTE W/DOPPLER COMPLETE: CPT | Performed by: INTERNAL MEDICINE

## 2025-06-20 PROCEDURE — C8929 TTE W OR WO FOL WCON,DOPPLER: HCPCS

## 2025-06-20 PROCEDURE — 82140 ASSAY OF AMMONIA: CPT

## 2025-06-20 PROCEDURE — 82330 ASSAY OF CALCIUM: CPT

## 2025-06-20 PROCEDURE — 99223 1ST HOSP IP/OBS HIGH 75: CPT | Performed by: NURSE PRACTITIONER

## 2025-06-20 PROCEDURE — 2T015 HOSPITALIST 2ND TOUCH: CPT | Performed by: INTERNAL MEDICINE

## 2025-06-20 PROCEDURE — 86140 C-REACTIVE PROTEIN: CPT

## 2025-06-20 PROCEDURE — 82746 ASSAY OF FOLIC ACID SERUM: CPT

## 2025-06-20 PROCEDURE — 94640 AIRWAY INHALATION TREATMENT: CPT

## 2025-06-20 PROCEDURE — 93970 EXTREMITY STUDY: CPT

## 2025-06-20 PROCEDURE — 80048 BASIC METABOLIC PNL TOTAL CA: CPT

## 2025-06-20 PROCEDURE — 83735 ASSAY OF MAGNESIUM: CPT

## 2025-06-20 PROCEDURE — 36600 WITHDRAWAL OF ARTERIAL BLOOD: CPT

## 2025-06-20 PROCEDURE — 93005 ELECTROCARDIOGRAM TRACING: CPT | Performed by: NURSE PRACTITIONER

## 2025-06-20 PROCEDURE — 6360000004 HC RX CONTRAST MEDICATION: Performed by: NURSE PRACTITIONER

## 2025-06-20 RX ORDER — ONDANSETRON 2 MG/ML
4 INJECTION INTRAMUSCULAR; INTRAVENOUS EVERY 6 HOURS PRN
Status: DISCONTINUED | OUTPATIENT
Start: 2025-06-20 | End: 2025-06-24 | Stop reason: HOSPADM

## 2025-06-20 RX ORDER — ENOXAPARIN SODIUM 100 MG/ML
30 INJECTION SUBCUTANEOUS 2 TIMES DAILY
Status: DISCONTINUED | OUTPATIENT
Start: 2025-06-20 | End: 2025-06-20

## 2025-06-20 RX ORDER — SODIUM CHLORIDE 9 MG/ML
INJECTION, SOLUTION INTRAVENOUS PRN
Status: DISCONTINUED | OUTPATIENT
Start: 2025-06-20 | End: 2025-06-24 | Stop reason: HOSPADM

## 2025-06-20 RX ORDER — LEVOTHYROXINE SODIUM 50 UG/1
50 TABLET ORAL DAILY
Status: DISCONTINUED | OUTPATIENT
Start: 2025-06-20 | End: 2025-06-24 | Stop reason: HOSPADM

## 2025-06-20 RX ORDER — GABAPENTIN 600 MG/1
600 TABLET ORAL 3 TIMES DAILY
Status: DISCONTINUED | OUTPATIENT
Start: 2025-06-20 | End: 2025-06-24 | Stop reason: HOSPADM

## 2025-06-20 RX ORDER — DEXTROSE MONOHYDRATE 100 MG/ML
INJECTION, SOLUTION INTRAVENOUS CONTINUOUS PRN
Status: DISCONTINUED | OUTPATIENT
Start: 2025-06-20 | End: 2025-06-24 | Stop reason: HOSPADM

## 2025-06-20 RX ORDER — METOPROLOL SUCCINATE 50 MG/1
50 TABLET, EXTENDED RELEASE ORAL DAILY
Status: DISCONTINUED | OUTPATIENT
Start: 2025-06-20 | End: 2025-06-24 | Stop reason: HOSPADM

## 2025-06-20 RX ORDER — ACETAMINOPHEN 325 MG/1
650 TABLET ORAL EVERY 6 HOURS PRN
Status: DISCONTINUED | OUTPATIENT
Start: 2025-06-20 | End: 2025-06-24 | Stop reason: HOSPADM

## 2025-06-20 RX ORDER — CALCIUM CARBONATE 500 MG/1
500 TABLET, CHEWABLE ORAL 3 TIMES DAILY
Status: DISPENSED | OUTPATIENT
Start: 2025-06-20 | End: 2025-06-23

## 2025-06-20 RX ORDER — SODIUM CHLORIDE 0.9 % (FLUSH) 0.9 %
5-40 SYRINGE (ML) INJECTION EVERY 12 HOURS SCHEDULED
Status: DISCONTINUED | OUTPATIENT
Start: 2025-06-20 | End: 2025-06-24 | Stop reason: HOSPADM

## 2025-06-20 RX ORDER — PANTOPRAZOLE SODIUM 40 MG/1
40 TABLET, DELAYED RELEASE ORAL
Status: DISCONTINUED | OUTPATIENT
Start: 2025-06-20 | End: 2025-06-24 | Stop reason: HOSPADM

## 2025-06-20 RX ORDER — POLYETHYLENE GLYCOL 3350 17 G/17G
17 POWDER, FOR SOLUTION ORAL DAILY PRN
Status: DISCONTINUED | OUTPATIENT
Start: 2025-06-20 | End: 2025-06-24 | Stop reason: HOSPADM

## 2025-06-20 RX ORDER — POTASSIUM CHLORIDE 1500 MG/1
40 TABLET, EXTENDED RELEASE ORAL 2 TIMES DAILY WITH MEALS
Status: COMPLETED | OUTPATIENT
Start: 2025-06-20 | End: 2025-06-21

## 2025-06-20 RX ORDER — METFORMIN HYDROCHLORIDE 750 MG/1
750 TABLET, EXTENDED RELEASE ORAL
Status: DISCONTINUED | OUTPATIENT
Start: 2025-06-20 | End: 2025-06-24 | Stop reason: HOSPADM

## 2025-06-20 RX ORDER — FUROSEMIDE 10 MG/ML
40 INJECTION INTRAMUSCULAR; INTRAVENOUS ONCE
Status: COMPLETED | OUTPATIENT
Start: 2025-06-20 | End: 2025-06-20

## 2025-06-20 RX ORDER — ONDANSETRON 4 MG/1
4 TABLET, ORALLY DISINTEGRATING ORAL EVERY 8 HOURS PRN
Status: DISCONTINUED | OUTPATIENT
Start: 2025-06-20 | End: 2025-06-24 | Stop reason: HOSPADM

## 2025-06-20 RX ORDER — BUPRENORPHINE AND NALOXONE 8; 2 MG/1; MG/1
1 FILM, SOLUBLE BUCCAL; SUBLINGUAL 2 TIMES DAILY
Status: DISCONTINUED | OUTPATIENT
Start: 2025-06-20 | End: 2025-06-24 | Stop reason: HOSPADM

## 2025-06-20 RX ORDER — DULOXETIN HYDROCHLORIDE 60 MG/1
60 CAPSULE, DELAYED RELEASE ORAL DAILY
Status: DISCONTINUED | OUTPATIENT
Start: 2025-06-20 | End: 2025-06-24 | Stop reason: HOSPADM

## 2025-06-20 RX ORDER — INSULIN LISPRO 100 [IU]/ML
0-4 INJECTION, SOLUTION INTRAVENOUS; SUBCUTANEOUS
Status: DISCONTINUED | OUTPATIENT
Start: 2025-06-20 | End: 2025-06-24 | Stop reason: HOSPADM

## 2025-06-20 RX ORDER — FERROUS SULFATE 325(65) MG
325 TABLET ORAL DAILY
Status: DISCONTINUED | OUTPATIENT
Start: 2025-06-20 | End: 2025-06-24 | Stop reason: HOSPADM

## 2025-06-20 RX ORDER — POTASSIUM CHLORIDE 7.45 MG/ML
10 INJECTION INTRAVENOUS PRN
Status: ACTIVE | OUTPATIENT
Start: 2025-06-20 | End: 2025-06-22

## 2025-06-20 RX ORDER — TRIAMCINOLONE ACETONIDE 1 MG/G
CREAM TOPICAL 2 TIMES DAILY
Status: DISCONTINUED | OUTPATIENT
Start: 2025-06-20 | End: 2025-06-24 | Stop reason: HOSPADM

## 2025-06-20 RX ORDER — MUSCLE RUB CREAM 100; 150 MG/G; MG/G
CREAM TOPICAL 4 TIMES DAILY PRN
Status: DISCONTINUED | OUTPATIENT
Start: 2025-06-20 | End: 2025-06-24 | Stop reason: HOSPADM

## 2025-06-20 RX ORDER — BUMETANIDE 0.25 MG/ML
1 INJECTION, SOLUTION INTRAMUSCULAR; INTRAVENOUS 2 TIMES DAILY
Status: DISCONTINUED | OUTPATIENT
Start: 2025-06-20 | End: 2025-06-23

## 2025-06-20 RX ORDER — LISINOPRIL 5 MG/1
5 TABLET ORAL DAILY
Status: DISCONTINUED | OUTPATIENT
Start: 2025-06-20 | End: 2025-06-24 | Stop reason: HOSPADM

## 2025-06-20 RX ORDER — SODIUM CHLORIDE 0.9 % (FLUSH) 0.9 %
5-40 SYRINGE (ML) INJECTION PRN
Status: DISCONTINUED | OUTPATIENT
Start: 2025-06-20 | End: 2025-06-24 | Stop reason: HOSPADM

## 2025-06-20 RX ORDER — POTASSIUM CHLORIDE 1500 MG/1
40 TABLET, EXTENDED RELEASE ORAL PRN
Status: DISPENSED | OUTPATIENT
Start: 2025-06-20 | End: 2025-06-22

## 2025-06-20 RX ORDER — MAGNESIUM SULFATE IN WATER 40 MG/ML
2000 INJECTION, SOLUTION INTRAVENOUS PRN
Status: DISCONTINUED | OUTPATIENT
Start: 2025-06-20 | End: 2025-06-24 | Stop reason: HOSPADM

## 2025-06-20 RX ORDER — ACETAMINOPHEN 650 MG/1
650 SUPPOSITORY RECTAL EVERY 6 HOURS PRN
Status: DISCONTINUED | OUTPATIENT
Start: 2025-06-20 | End: 2025-06-24 | Stop reason: HOSPADM

## 2025-06-20 RX ADMIN — SODIUM CHLORIDE, PRESERVATIVE FREE 10 ML: 5 INJECTION INTRAVENOUS at 21:10

## 2025-06-20 RX ADMIN — FUROSEMIDE 40 MG: 10 INJECTION, SOLUTION INTRAMUSCULAR; INTRAVENOUS at 00:58

## 2025-06-20 RX ADMIN — GABAPENTIN 600 MG: 600 TABLET, FILM COATED ORAL at 15:19

## 2025-06-20 RX ADMIN — TRIAMCINOLONE ACETONIDE: 1 CREAM TOPICAL at 21:14

## 2025-06-20 RX ADMIN — DULOXETINE 60 MG: 60 CAPSULE, DELAYED RELEASE ORAL at 10:01

## 2025-06-20 RX ADMIN — TIOTROPIUM BROMIDE AND OLODATEROL 2 PUFF: 3.124; 2.736 SPRAY, METERED RESPIRATORY (INHALATION) at 10:27

## 2025-06-20 RX ADMIN — METOPROLOL SUCCINATE 50 MG: 50 TABLET, EXTENDED RELEASE ORAL at 10:01

## 2025-06-20 RX ADMIN — TRIAMCINOLONE ACETONIDE: 1 CREAM TOPICAL at 10:02

## 2025-06-20 RX ADMIN — FERROUS SULFATE TAB 325 MG (65 MG ELEMENTAL FE) 325 MG: 325 (65 FE) TAB at 10:01

## 2025-06-20 RX ADMIN — BUPRENORPHINE AND NALOXONE 1 FILM: 8; 2 FILM, SOLUBLE BUCCAL; SUBLINGUAL at 09:59

## 2025-06-20 RX ADMIN — PANTOPRAZOLE SODIUM 40 MG: 40 TABLET, DELAYED RELEASE ORAL at 06:00

## 2025-06-20 RX ADMIN — POTASSIUM CHLORIDE 40 MEQ: 1500 TABLET, EXTENDED RELEASE ORAL at 10:06

## 2025-06-20 RX ADMIN — BUPRENORPHINE AND NALOXONE 1 FILM: 8; 2 FILM, SOLUBLE BUCCAL; SUBLINGUAL at 21:09

## 2025-06-20 RX ADMIN — APIXABAN 5 MG: 5 TABLET, FILM COATED ORAL at 03:03

## 2025-06-20 RX ADMIN — GABAPENTIN 600 MG: 600 TABLET, FILM COATED ORAL at 21:12

## 2025-06-20 RX ADMIN — ACETAMINOPHEN 650 MG: 325 TABLET ORAL at 21:08

## 2025-06-20 RX ADMIN — Medication 3 MG: at 21:08

## 2025-06-20 RX ADMIN — BUMETANIDE 1 MG: 0.25 INJECTION INTRAMUSCULAR; INTRAVENOUS at 18:12

## 2025-06-20 RX ADMIN — SULFUR HEXAFLUORIDE 2 ML: KIT at 16:41

## 2025-06-20 RX ADMIN — GABAPENTIN 600 MG: 600 TABLET, FILM COATED ORAL at 10:00

## 2025-06-20 RX ADMIN — MUSCLE RUB CREAM: 100; 150 CREAM TOPICAL at 05:24

## 2025-06-20 RX ADMIN — ANTACID TABLETS 500 MG: 500 TABLET, CHEWABLE ORAL at 21:08

## 2025-06-20 RX ADMIN — ANTACID TABLETS 500 MG: 500 TABLET, CHEWABLE ORAL at 15:19

## 2025-06-20 RX ADMIN — LISINOPRIL 5 MG: 5 TABLET ORAL at 10:01

## 2025-06-20 RX ADMIN — POTASSIUM CHLORIDE 40 MEQ: 1500 TABLET, EXTENDED RELEASE ORAL at 18:12

## 2025-06-20 RX ADMIN — FUROSEMIDE 40 MG: 10 INJECTION, SOLUTION INTRAMUSCULAR; INTRAVENOUS at 03:01

## 2025-06-20 RX ADMIN — PANTOPRAZOLE SODIUM 40 MG: 40 TABLET, DELAYED RELEASE ORAL at 15:19

## 2025-06-20 RX ADMIN — BUMETANIDE 1 MG: 0.25 INJECTION INTRAMUSCULAR; INTRAVENOUS at 09:59

## 2025-06-20 RX ADMIN — APIXABAN 5 MG: 5 TABLET, FILM COATED ORAL at 10:01

## 2025-06-20 RX ADMIN — APIXABAN 5 MG: 5 TABLET, FILM COATED ORAL at 21:12

## 2025-06-20 RX ADMIN — ACETAMINOPHEN 650 MG: 325 TABLET ORAL at 03:21

## 2025-06-20 ASSESSMENT — PAIN SCALES - GENERAL
PAINLEVEL_OUTOF10: 9
PAINLEVEL_OUTOF10: 0
PAINLEVEL_OUTOF10: 9
PAINLEVEL_OUTOF10: 10
PAINLEVEL_OUTOF10: 9
PAINLEVEL_OUTOF10: 0

## 2025-06-20 ASSESSMENT — PAIN DESCRIPTION - FREQUENCY
FREQUENCY: CONTINUOUS
FREQUENCY: CONTINUOUS

## 2025-06-20 ASSESSMENT — PAIN DESCRIPTION - LOCATION
LOCATION: BACK
LOCATION: BACK
LOCATION: BACK;BUTTOCKS

## 2025-06-20 ASSESSMENT — PAIN DESCRIPTION - ORIENTATION
ORIENTATION: RIGHT;LEFT;MID
ORIENTATION: INNER;LOWER

## 2025-06-20 ASSESSMENT — PAIN DESCRIPTION - PAIN TYPE: TYPE: CHRONIC PAIN

## 2025-06-20 ASSESSMENT — PAIN DESCRIPTION - DESCRIPTORS
DESCRIPTORS: STABBING
DESCRIPTORS: DISCOMFORT

## 2025-06-20 ASSESSMENT — PAIN DESCRIPTION - ONSET: ONSET: ON-GOING

## 2025-06-20 ASSESSMENT — PAIN SCALES - WONG BAKER
WONGBAKER_NUMERICALRESPONSE: NO HURT
WONGBAKER_NUMERICALRESPONSE: NO HURT

## 2025-06-20 NOTE — PLAN OF CARE
Problem: Respiratory - Adult  Goal: Achieves optimal ventilation and oxygenation  Outcome: Progressing  Flowsheets (Taken 6/20/2025 1031)  Achieves optimal ventilation and oxygenation:   Assess for changes in respiratory status   Assess for changes in mentation and behavior   Assess and instruct to report shortness of breath or any respiratory difficulty   Respiratory therapy support as indicated

## 2025-06-20 NOTE — CARE COORDINATION
Case Management Assessment Initial Evaluation    Date/Time of Evaluation: 2025 11:11 AM  Assessment Completed by: Sylvia Wellington    If patient is discharged prior to next notation, then this note serves as note for discharge by case management.    Patient Name: Avery Jordan                   YOB: 1960  Diagnosis: Elevated troponin [R79.89]  WILMA (acute kidney injury) [N17.9]  Intertriginous candidiasis [B37.2]  Acute kidney injury [N17.9]  Chronic obstructive pulmonary disease, unspecified COPD type (HCC) [J44.9]                   Date / Time: 2025  7:04 PM  Location: [facility]     Patient Admission Status: Inpatient   Readmission Risk Low 0-14, Mod 15-19), High > 20: Readmission Risk Score: 14.9    Current PCP: Christopher Cobb MD  Health Care Decision Makers:   Primary Decision Maker: Riley Jordan Jr. - Child - [phone]    Additional Case Management Notes: Patient presents to ED with complaints of dysuria. Hospitalist following. Bumex IV. Metformin on hold. Suboxone for HX: drug abuse. Potassium 3.1 (3.3)    Procedures:  ECHO: pending    Imagin/20 Venous Doppler BLE : 1. Study somewhat limited by patient's body habitus.  2. Normal venous ultrasound. No evidence for deep venous thrombosis.    Patient Goals/Plan/Treatment Preferences: Met with Avery. Patient lives in a home with sons. Patient has oxygen therapy with [employer]. Uses 3LNC and CPAP machine at night. Denies further needs.         25 [phone]   Service Assessment   Patient Orientation Alert and Oriented   Cognition Alert   History Provided By Patient   Primary Caregiver Self   Accompanied By/Relationship n/a   Support Systems Children   Patient's Healthcare Decision Maker is: Legal Next of Kin   PCP Verified by CM Yes   Last Visit to PCP Within last 3 months   Prior Functional Level Independent in ADLs/IADLs   Current Functional Level Independent in ADLs/IADLs   Can patient return to prior living

## 2025-06-20 NOTE — ED NOTES
Pt is resting in bed. Family at bedside. Pt complains of pain in right shoulder. Vitals taken and restarted and no other questions at this time.

## 2025-06-20 NOTE — PROCEDURES
PROCEDURE NOTE  Date: 6/20/2025   Name: Avery Jordan  YOB: 1960    Procedures  EKG Completed. Handed to RN.

## 2025-06-20 NOTE — PROGRESS NOTES
[facility]--HOSPITALIST GROUP    Hospitalist PROGRESS NOTE dictated by Carlin Celeste MD on 2025    Patient ID: Avery Jordan is 64 y.o. and presently in room [facility]  : 1960 MRN: [id number]    Admit date: 2025  Primary Care Physician: Christopher Cobb MD   Patient Care Team:  Christopher Cobb MD as PCP - General (Family Medicine)  Ilda Gillespie MD as Cardiologist (Cardiology)  Tel: [phone]  Admitting Physician: No admitting provider for patient encounter.   Code Status:  Full Code    Avery Jordan is a 64 y.o.  male who presented with Dysuria    Room: -A  Admit date: 2025    CHIEF COMPLAINT: Dysuria     HISTORY OF PRESENT ILLNESS:   Avery Jordan is a 64-year-old male presented to [facility] 2025 with chief complaint of dysuria as well as concern about a yeast infection under his abdominal fold.  Family present at the bedside identify periodically he has been hallucinating.  Family states \"patient smelled like urine \" Patient admits to not taking his home medications for the past 3-5 days.  Patient wears continuous oxygen at 3 L per nasal cannula.  Patient denies any chest pain pressure heaviness tightness cough or shortness of breath.  CT A/P completed.  While in the ER patient did receive Tylenol, Lasix, 1L 0.9NS      2025: Patient with moderate COPD on 3 L of O2 nasal cannula at home admitted WILMA.  Denies chest pain, shortness of breath or cough.  O2 sat 97% on 2 L O2 nasal cannula.    ABG revealed pH of 7.39, MSM969, , HCO3 38 and a O2 sat of 94% yesterday on 2025.    GFR 84, BUN 16 and creatinine 1.0.    Potassium 3.3 to be replenished.  Magnesium 2.5.    Decreased calcium 1.07 for which to start calcium supplementation.    Disposition:   [] Home    [] Home with HHC (Home Health Care)  [] SNF (Skilled Nursing Facility)   [] Assisted living facility      Assessment:    Principal Problem:     Value Date    CRP 1.40 (H) 06/20/2025     ESR:   Lab Results   Component Value Date    SEDRATE 48 (H) 02/13/2024       Folate and B12:   Lab Results   Component Value Date    SKPMLHTT26 459 06/20/2025   ,   Lab Results   Component Value Date    FOLATE 6.5 06/20/2025       Blood Culture: No results for input(s): \"BC\", \"BLOODCULT2\", \"ORG\" in the last 72 hours.    GRAM STAIN  No results for input(s): \"LABGRAM\", \"LABANAE\", \"ORG\", \"WNDABS\" in the last 72 hours.    Resp Culture Brief : No results found for: \"CULTRESP\"    Body Fluid : No results found for: \"BFCX\"    MRSA : No results found for: \"MRSAC\"    Urine Culture Brief :   Lab Results   Component Value Date/Time    LABURIN [place] count: 10,000-50,000 CFU/mL 10/10/2019 06:30 PM    LABURIN [place] count: 10,000-50,000 CFU/mL 10/10/2019 06:30 PM        Organism Brief :   Lab Results   Component Value Date/Time    ORG Alcaligenes species (faecalis) 04/13/2024 10:36 AM       Vascular duplex lower extremity venous bilateral  Result Date: 6/20/2025  PROCEDURE: VAS DUP LOWER EXTREMITY VENOUS BILATERAL CLINICAL INFORMATION: Edema left entire leg; Edema right entire leg COMPARISON: 9/16/2020 TECHNIQUE: Multiple grayscale and color flow images of the major veins of both lower extremities were obtained from the level of the groin to the level of the ankle. Multiple compression and augmentation maneuvers were performed and spectral Doppler waveforms were generated. .. This study is somewhat limited by patient's body habitus.  .Right mid FV, distal FV, PTV and peroneal vein color flow could not be not obtained, but these veins compress normally.  Left PTV and peroneal veins color flow could not be not obtained, but the veins also compress normally.. FINDINGS: RIGHT LOWER EXTREMITY: With the exception as listed above, all of the deep veins of the right lower extremity are widely patent with normal phasic flow and normal compressibility. LEFT LOWER EXTREMITY: With the exception as

## 2025-06-20 NOTE — ED NOTES
Pt is resting in bed. RR are even and unlabored. No concerns at this time. Vitals taken and timer restarted

## 2025-06-20 NOTE — DISCHARGE INSTRUCTIONS
Follow-up with your primary care provider (PCP) in 1 to 2 weeks.   Recommend PCP check thyroid function in 6 weeks.

## 2025-06-20 NOTE — H&P
Hospitalist  History and Physical    Patient:  Avery Jordan  MRN: [id number]    CHIEF COMPLAINT: Dysuria    History Obtained From:  patient, electronic medical record  PCP: Christopher Cobb MD    HISTORY OF PRESENT ILLNESS:   Avery Jordan is a 64-year-old male presented to [facility] 6/19/2025 with chief complaint of dysuria as well as concern about a yeast infection under his abdominal fold.  Family present at the bedside identify periodically he has been hallucinating.  Family states \"patient smelled like urine \" Patient admits to not taking his home medications for the past 3-5 days.  Patient wears continuous oxygen at 3 L per nasal cannula.  Patient denies any chest pain pressure heaviness tightness cough or shortness of breath.  CT A/P completed.  While in the ER patient did receive Tylenol, Lasix, 1L 0.9NS    Past Medical History:        Diagnosis Date    Anxiety disorder     Arthritis     Asthma     Back pain, chronic     Blood circulation, collateral     4 toes partial amputee on L foot    [employer] BiV ICD 07/01/2014    [employer] BiV pacemaker 07/01/2014    CAD (coronary artery disease)     CHF (congestive heart failure) (HCC)     Chronic kidney disease     COPD (chronic obstructive pulmonary disease) (HCC)     Depression     DM2 (diabetes mellitus, type 2) (HCC)     Endocarditis     Factor V deficiency (HCC)     Hepatitis     HEPATITIS C    Hepatitis C     History of blood transfusion     Hypertension     Paroxysmal A-fib (HCC)     Pneumonia    Current everyday smoker  TTE 2/2024 LVEF 60-65% the right ventricle is mildly dilated.  Normal systolic function.  Moderate TR.  Mild to moderate stenosis noted.  Mildly elevated prosthetic gradient.  Left atrium is mild to moderately dilated.  No left atrial appendage thrombus noted.  No interatrial shunt visualized with color Doppler.    [facility] 2012-no obstructive lesion noted      Past Surgical History:        Procedure Laterality Date     hemoglobin A1c R73.09    Hyperglycemia R73.9    Metabolic acidosis E87.20    Factor V Leiden D68.51    Hx of deep venous thrombosis Z86.718    Opioid use disorder F11.90    Hemoptysis R04.2    Hyperkalemia E87.5    Supratherapeutic INR R79.1    Hypokalemia E87.6    Nonsustained SVT (supraventricular tachycardia) (HCC) of device check I47.10    Noncompliance with F/u advised Z91.199    Primary osteoarthritis of left hip M16.12    Osteoarthritis of left hip, unspecified osteoarthritis type M16.12    Arthritis of left knee M17.12    Tricuspid stenosis, acquired I07.0    Primary osteoarthritis of right hip M16.11       Nessa Willard, APRN - CNP, CNP

## 2025-06-21 LAB
ANION GAP SERPL CALC-SCNC: 12 MEQ/L (ref 8–16)
BASOPHILS ABSOLUTE: 0 THOU/MM3 (ref 0–0.1)
BASOPHILS NFR BLD AUTO: 0.5 %
BUN SERPL-MCNC: 13 MG/DL (ref 8–23)
CA-I BLD ISE-SCNC: 1.08 MMOL/L (ref 1.12–1.32)
CALCIUM SERPL-MCNC: 9.1 MG/DL (ref 8.8–10.2)
CHLORIDE SERPL-SCNC: 99 MEQ/L (ref 98–111)
CO2 SERPL-SCNC: 33 MEQ/L (ref 22–29)
CREAT SERPL-MCNC: 0.9 MG/DL (ref 0.7–1.2)
DEPRECATED RDW RBC AUTO: 53.6 FL (ref 35–45)
EOSINOPHIL NFR BLD AUTO: 1.8 %
EOSINOPHILS ABSOLUTE: 0.1 THOU/MM3 (ref 0–0.4)
ERYTHROCYTE [DISTWIDTH] IN BLOOD BY AUTOMATED COUNT: 15.9 % (ref 11.5–14.5)
GFR SERPL CREATININE-BSD FRML MDRD: > 90 ML/MIN/1.73M2
GLUCOSE BLD STRIP.AUTO-MCNC: 107 MG/DL (ref 70–108)
GLUCOSE BLD STRIP.AUTO-MCNC: 134 MG/DL (ref 70–108)
GLUCOSE BLD STRIP.AUTO-MCNC: 139 MG/DL (ref 70–108)
GLUCOSE BLD STRIP.AUTO-MCNC: 150 MG/DL (ref 70–108)
GLUCOSE SERPL-MCNC: 109 MG/DL (ref 74–109)
HCT VFR BLD AUTO: 37.9 % (ref 42–52)
HGB BLD-MCNC: 11.4 GM/DL (ref 14–18)
IMM GRANULOCYTES # BLD AUTO: 0.01 THOU/MM3 (ref 0–0.07)
IMM GRANULOCYTES NFR BLD AUTO: 0.3 %
LYMPHOCYTES ABSOLUTE: 1.6 THOU/MM3 (ref 1–4.8)
LYMPHOCYTES NFR BLD AUTO: 39.1 %
MCH RBC QN AUTO: 27.8 PG (ref 26–33)
MCHC RBC AUTO-ENTMCNC: 30.1 GM/DL (ref 32.2–35.5)
MCV RBC AUTO: 92.4 FL (ref 80–94)
MONOCYTES ABSOLUTE: 0.5 THOU/MM3 (ref 0.4–1.3)
MONOCYTES NFR BLD AUTO: 11.6 %
NEUTROPHILS ABSOLUTE: 1.9 THOU/MM3 (ref 1.8–7.7)
NEUTROPHILS NFR BLD AUTO: 46.7 %
NRBC BLD AUTO-RTO: 0 /100 WBC
PLATELET # BLD AUTO: 108 THOU/MM3 (ref 130–400)
PMV BLD AUTO: 10.9 FL (ref 9.4–12.4)
POTASSIUM SERPL-SCNC: 3.5 MEQ/L (ref 3.5–5.2)
RBC # BLD AUTO: 4.1 MILL/MM3 (ref 4.7–6.1)
SODIUM SERPL-SCNC: 144 MEQ/L (ref 135–145)
WBC # BLD AUTO: 4 THOU/MM3 (ref 4.8–10.8)

## 2025-06-21 PROCEDURE — 36415 COLL VENOUS BLD VENIPUNCTURE: CPT

## 2025-06-21 PROCEDURE — 94640 AIRWAY INHALATION TREATMENT: CPT

## 2025-06-21 PROCEDURE — 80048 BASIC METABOLIC PNL TOTAL CA: CPT

## 2025-06-21 PROCEDURE — 85025 COMPLETE CBC W/AUTO DIFF WBC: CPT

## 2025-06-21 PROCEDURE — 6370000000 HC RX 637 (ALT 250 FOR IP): Performed by: NURSE PRACTITIONER

## 2025-06-21 PROCEDURE — 6370000000 HC RX 637 (ALT 250 FOR IP): Performed by: INTERNAL MEDICINE

## 2025-06-21 PROCEDURE — 2500000003 HC RX 250 WO HCPCS: Performed by: NURSE PRACTITIONER

## 2025-06-21 PROCEDURE — 82330 ASSAY OF CALCIUM: CPT

## 2025-06-21 PROCEDURE — 99232 SBSQ HOSP IP/OBS MODERATE 35: CPT | Performed by: INTERNAL MEDICINE

## 2025-06-21 PROCEDURE — 1200000003 HC TELEMETRY R&B

## 2025-06-21 PROCEDURE — 6360000002 HC RX W HCPCS: Performed by: NURSE PRACTITIONER

## 2025-06-21 PROCEDURE — 82948 REAGENT STRIP/BLOOD GLUCOSE: CPT

## 2025-06-21 PROCEDURE — 2700000000 HC OXYGEN THERAPY PER DAY

## 2025-06-21 PROCEDURE — 1200000000 HC SEMI PRIVATE

## 2025-06-21 RX ORDER — POTASSIUM CHLORIDE 1500 MG/1
40 TABLET, EXTENDED RELEASE ORAL ONCE
Status: DISCONTINUED | OUTPATIENT
Start: 2025-06-21 | End: 2025-06-21 | Stop reason: SDUPTHER

## 2025-06-21 RX ADMIN — PANTOPRAZOLE SODIUM 40 MG: 40 TABLET, DELAYED RELEASE ORAL at 17:52

## 2025-06-21 RX ADMIN — ACETAMINOPHEN 650 MG: 325 TABLET ORAL at 05:33

## 2025-06-21 RX ADMIN — ACETAMINOPHEN 650 MG: 325 TABLET ORAL at 17:55

## 2025-06-21 RX ADMIN — ANTACID TABLETS 500 MG: 500 TABLET, CHEWABLE ORAL at 14:17

## 2025-06-21 RX ADMIN — TRIAMCINOLONE ACETONIDE: 1 CREAM TOPICAL at 08:55

## 2025-06-21 RX ADMIN — BUPRENORPHINE AND NALOXONE 1 FILM: 8; 2 FILM, SOLUBLE BUCCAL; SUBLINGUAL at 20:30

## 2025-06-21 RX ADMIN — BUPRENORPHINE AND NALOXONE 1 FILM: 8; 2 FILM, SOLUBLE BUCCAL; SUBLINGUAL at 08:56

## 2025-06-21 RX ADMIN — GABAPENTIN 600 MG: 600 TABLET, FILM COATED ORAL at 20:31

## 2025-06-21 RX ADMIN — ANTACID TABLETS 500 MG: 500 TABLET, CHEWABLE ORAL at 08:55

## 2025-06-21 RX ADMIN — DULOXETINE 60 MG: 60 CAPSULE, DELAYED RELEASE ORAL at 08:56

## 2025-06-21 RX ADMIN — GABAPENTIN 600 MG: 600 TABLET, FILM COATED ORAL at 14:17

## 2025-06-21 RX ADMIN — LEVOTHYROXINE SODIUM 50 MCG: 0.05 TABLET ORAL at 05:35

## 2025-06-21 RX ADMIN — POTASSIUM CHLORIDE 40 MEQ: 1500 TABLET, EXTENDED RELEASE ORAL at 08:55

## 2025-06-21 RX ADMIN — PANTOPRAZOLE SODIUM 40 MG: 40 TABLET, DELAYED RELEASE ORAL at 05:33

## 2025-06-21 RX ADMIN — TIOTROPIUM BROMIDE AND OLODATEROL 2 PUFF: 3.124; 2.736 SPRAY, METERED RESPIRATORY (INHALATION) at 08:31

## 2025-06-21 RX ADMIN — APIXABAN 5 MG: 5 TABLET, FILM COATED ORAL at 20:30

## 2025-06-21 RX ADMIN — ANTACID TABLETS 500 MG: 500 TABLET, CHEWABLE ORAL at 20:29

## 2025-06-21 RX ADMIN — APIXABAN 5 MG: 5 TABLET, FILM COATED ORAL at 08:55

## 2025-06-21 RX ADMIN — BUMETANIDE 1 MG: 0.25 INJECTION INTRAMUSCULAR; INTRAVENOUS at 17:53

## 2025-06-21 RX ADMIN — TRIAMCINOLONE ACETONIDE: 1 CREAM TOPICAL at 20:32

## 2025-06-21 RX ADMIN — GABAPENTIN 600 MG: 600 TABLET, FILM COATED ORAL at 08:55

## 2025-06-21 RX ADMIN — FERROUS SULFATE TAB 325 MG (65 MG ELEMENTAL FE) 325 MG: 325 (65 FE) TAB at 08:55

## 2025-06-21 RX ADMIN — SODIUM CHLORIDE, PRESERVATIVE FREE 10 ML: 5 INJECTION INTRAVENOUS at 20:32

## 2025-06-21 ASSESSMENT — PAIN DESCRIPTION - LOCATION: LOCATION: BACK

## 2025-06-21 ASSESSMENT — PAIN DESCRIPTION - PAIN TYPE: TYPE: CHRONIC PAIN

## 2025-06-21 ASSESSMENT — PAIN DESCRIPTION - ONSET: ONSET: GRADUAL

## 2025-06-21 ASSESSMENT — PAIN DESCRIPTION - FREQUENCY: FREQUENCY: CONTINUOUS

## 2025-06-21 ASSESSMENT — PAIN SCALES - GENERAL: PAINLEVEL_OUTOF10: 7

## 2025-06-21 ASSESSMENT — PAIN - FUNCTIONAL ASSESSMENT: PAIN_FUNCTIONAL_ASSESSMENT: ACTIVITIES ARE NOT PREVENTED

## 2025-06-21 NOTE — PROGRESS NOTES
[facility]--HOSPITALIST GROUP    Hospitalist PROGRESS NOTE dictated by Carlin Celeste MD on 2025    Patient ID: Avery Jordan is 64 y.o. and presently in room [facility]  : 1960 MRN: [id number]    Admit date: 2025  Primary Care Physician: Christopher Cobb MD   Patient Care Team:  Christopher Cobb MD as PCP - General (Family Medicine)  Ilda Gillespie MD as Cardiologist (Cardiology)  Tel: [phone]  Admitting Physician: No admitting provider for patient encounter.   Code Status:  Full Code    Avery Jordan is a 64 y.o.  male who presented with Dysuria    Room: -A  Admit date: 2025    CHIEF COMPLAINT: Dysuria     HISTORY OF PRESENT ILLNESS:   Avery Jordan is a 64-year-old male presented to [facility] 2025 with chief complaint of dysuria as well as concern about a yeast infection under his abdominal fold.  Family present at the bedside identify periodically he has been hallucinating.  Family states \"patient smelled like urine \" Patient admits to not taking his home medications for the past 3-5 days.  Patient wears continuous oxygen at 3 L per nasal cannula.  Patient denies any chest pain pressure heaviness tightness cough or shortness of breath.  CT A/P completed.  While in the ER patient did receive Tylenol, Lasix, 1L 0.9NS      2025: Patient with moderate COPD on 3 L of O2 nasal cannula at home admitted WILMA.  Denies chest pain, shortness of breath or cough.  O2 sat 97% on 2 L O2 nasal cannula.    ABG revealed pH of 7.39, [id number], , HCO3 38 and a O2 sat of 94% yesterday on 2025.    GFR 84, BUN 16 and creatinine 1.0.    Potassium 3.3 to be replenished.  Magnesium 2.5.    Decreased calcium 1.07 for which to start calcium supplementation.  --------    2025: Patient with moderate COPD on 3 L of O2 nasal cannula at home admitted with WILMA.  Patient denies chest pain shortness of breath or cough.  O2 sat 96%  endoscopy (2012); Colonoscopy (2016); bronchoscopy (N/A, 9/28/2019); bronchoscopy (N/A, 9/28/2019); bronchoscopy (N/A, 9/29/2019); bronchoscopy (9/29/2019); bronchoscopy (N/A, 9/30/2019); bronchoscopy (N/A, 10/16/2019); Tracheal surgery (N/A, 10/18/2019); bronchoscopy (N/A, 9/16/2020); transesophageal echocardiogram (N/A, 2/28/2024); Total hip arthroplasty (N/A, 4/23/2024); and Total hip arthroplasty (Right, 9/10/2024).    CURRENT MEDICATIONS     sodium chloride flush, 5-40 mL, 2 times per day  insulin lispro, 0-4 Units, 4x Daily AC & HS  apixaban, 5 mg, BID  buprenorphine-naloxone, 1 Film, BID  DULoxetine, 60 mg, Daily  ferrous sulfate, 325 mg, Daily  gabapentin, 600 mg, TID  lisinopril, 5 mg, Daily  [Held by provider] metFORMIN, 750 mg, Daily with breakfast  metoprolol succinate, 50 mg, Daily  pantoprazole, 40 mg, BID AC  tiotropium-olodaterol, 2 puff, Daily  triamcinolone, , BID  bumetanide, 1 mg, BID  levothyroxine, 50 mcg, Daily  calcium carbonate, 500 mg, TID  potassium chloride, 40 mEq, BID WC        Continuous Infusions:    sodium chloride      dextrose         PRN:  sodium chloride flush, 5-40 mL, PRN  sodium chloride, , PRN  potassium chloride, 40 mEq, PRN   Or  potassium alternative oral replacement, 40 mEq, PRN   Or  potassium chloride, 10 mEq, PRN  magnesium sulfate, 2,000 mg, PRN  ondansetron, 4 mg, Q8H PRN   Or  ondansetron, 4 mg, Q6H PRN  polyethylene glycol, 17 g, Daily PRN  acetaminophen, 650 mg, Q6H PRN   Or  acetaminophen, 650 mg, Q6H PRN  melatonin, 3 mg, Nightly PRN  glucose, 4 tablet, PRN  dextrose bolus, 125 mL, PRN   Or  dextrose bolus, 250 mL, PRN  dextrose, , Continuous PRN  muscle rub, , 4x Daily PRN          HOME MEDICATIONS     Medications Prior to Admission: metFORMIN (GLUCOPHAGE-XR) 750 MG extended release tablet, Take 1 tablet by mouth daily (with breakfast)  gabapentin (NEURONTIN) 600 MG tablet, Take 1 tablet by mouth 3 times daily.  Oyster Shell (OYSTER CALCIUM PO), Take 500 mg by  --   --    ALT <5*  --   --   --       Recent Labs     06/19/25 1943   LIPASE 14.0       Phosphorus:  No results for input(s): \"PHOS\" in the last 72 hours.  Albumin: No results for input(s): \"LABALBU\" in the last 72 hours.    UA:  Recent Labs     06/19/25 2035   PHUR 8.0   COLORU YELLOW   PROTEINU TRACE*   BLOODU NEGATIVE   RBCUA 0-2   WBCUA 0-2   BACTERIA NONE SEEN   NITRU NEGATIVE   GLUCOSEU NEGATIVE   BILIRUBINUR NEGATIVE   UROBILINOGEN 1.0   KETUA NEGATIVE     Micro:   Lab Results   Component Value Date/Time    BC No growth 24 hours. 06/19/2025 07:41 PM         No results for input(s): \"INR\" in the last 72 hours.No results found for: \"DDIMER\"  Lab Results   Component Value Date    .8 (H) 09/08/2013     troponins  Lab Results   Component Value Date    TROPONINI 0.006 12/02/2012         D Dimer: No results for input(s): \"DDIMER\" in the last 72 hours.  Troponin T No results for input(s): \"TROPONINT\" in the last 72 hours.  ProBNP   No results found for requested labs within last 30 days.     ProBNP Invalid input(s): \"PRO-BNP\"  Lactic acid:Invalid input(s): \"LACTIC ACID\"      PT/INR: No results for input(s): \"PROTIME\", \"INR\" in the last 72 hours.  APTT: No results for input(s): \"APTT\" in the last 72 hours.      ESR:   Lab Results   Component Value Date    SEDRATE 48 (H) 02/13/2024     CRP:   Lab Results   Component Value Date    CRP 1.40 (H) 06/20/2025     Folate and B12:   Lab Results   Component Value Date    RAGLCESL89 459 06/20/2025   ,   Lab Results   Component Value Date    FOLATE 6.5 06/20/2025     Thyroid Studies:   Lab Results   Component Value Date    TSH 7.66 (H) 06/20/2025     D-Dimer: No results found for: \"DDIMER\"    Lactic acid: No components found for: \"LACT\"     Troponin T   Recent Labs     06/19/25  1943 06/20/25  0222 06/20/25  0726   TROPHS 27* 34* 26*       Troponins:     No components found for: \"TROP\"    Lab Results   Component Value Date    TROPONINI 0.006 12/02/2012       No results

## 2025-06-22 ENCOUNTER — APPOINTMENT (OUTPATIENT)
Dept: CT IMAGING | Age: 65
DRG: 194 | End: 2025-06-22
Payer: MEDICARE

## 2025-06-22 LAB
ANION GAP SERPL CALC-SCNC: 11 MEQ/L (ref 8–16)
ANTI-COMPLEMENT: NORMAL
APTT PPP: 35.5 SECONDS (ref 22–38)
BUN SERPL-MCNC: 13 MG/DL (ref 8–23)
CA-I BLD ISE-SCNC: 1.11 MMOL/L (ref 1.12–1.32)
CALCIUM SERPL-MCNC: 9.4 MG/DL (ref 8.8–10.2)
CHLORIDE SERPL-SCNC: 100 MEQ/L (ref 98–111)
CO2 SERPL-SCNC: 31 MEQ/L (ref 22–29)
CREAT SERPL-MCNC: 0.9 MG/DL (ref 0.7–1.2)
DAT IGG: NORMAL
DAT POLY-SP REAG RBC QL: NORMAL
GFR SERPL CREATININE-BSD FRML MDRD: > 90 ML/MIN/1.73M2
GLUCOSE BLD STRIP.AUTO-MCNC: 124 MG/DL (ref 70–108)
GLUCOSE BLD STRIP.AUTO-MCNC: 136 MG/DL (ref 70–108)
GLUCOSE BLD STRIP.AUTO-MCNC: 145 MG/DL (ref 70–108)
GLUCOSE BLD STRIP.AUTO-MCNC: 153 MG/DL (ref 70–108)
GLUCOSE BLD STRIP.AUTO-MCNC: 157 MG/DL (ref 70–108)
GLUCOSE SERPL-MCNC: 125 MG/DL (ref 74–109)
HGB RETIC QN AUTO: 32.4 PG (ref 28.2–35.7)
IMM RETICS NFR: 21.2 % (ref 2.3–13.4)
POTASSIUM SERPL-SCNC: 3.5 MEQ/L (ref 3.5–5.2)
RETICS # AUTO: 66 THOU/MM3 (ref 20–115)
RETICS/RBC NFR AUTO: 1.6 % (ref 0.5–2)
SODIUM SERPL-SCNC: 142 MEQ/L (ref 135–145)

## 2025-06-22 PROCEDURE — 6370000000 HC RX 637 (ALT 250 FOR IP): Performed by: NURSE PRACTITIONER

## 2025-06-22 PROCEDURE — 99232 SBSQ HOSP IP/OBS MODERATE 35: CPT | Performed by: INTERNAL MEDICINE

## 2025-06-22 PROCEDURE — 2500000003 HC RX 250 WO HCPCS: Performed by: NURSE PRACTITIONER

## 2025-06-22 PROCEDURE — 1200000003 HC TELEMETRY R&B

## 2025-06-22 PROCEDURE — 2700000000 HC OXYGEN THERAPY PER DAY

## 2025-06-22 PROCEDURE — 6370000000 HC RX 637 (ALT 250 FOR IP): Performed by: INTERNAL MEDICINE

## 2025-06-22 PROCEDURE — 6360000002 HC RX W HCPCS: Performed by: NURSE PRACTITIONER

## 2025-06-22 PROCEDURE — 94761 N-INVAS EAR/PLS OXIMETRY MLT: CPT

## 2025-06-22 PROCEDURE — 82948 REAGENT STRIP/BLOOD GLUCOSE: CPT

## 2025-06-22 PROCEDURE — 70450 CT HEAD/BRAIN W/O DYE: CPT

## 2025-06-22 PROCEDURE — 86880 COOMBS TEST DIRECT: CPT

## 2025-06-22 PROCEDURE — 1200000000 HC SEMI PRIVATE

## 2025-06-22 PROCEDURE — 86038 ANTINUCLEAR ANTIBODIES: CPT

## 2025-06-22 PROCEDURE — 85046 RETICYTE/HGB CONCENTRATE: CPT

## 2025-06-22 PROCEDURE — 83010 ASSAY OF HAPTOGLOBIN QUANT: CPT

## 2025-06-22 PROCEDURE — 85730 THROMBOPLASTIN TIME PARTIAL: CPT

## 2025-06-22 PROCEDURE — 94640 AIRWAY INHALATION TREATMENT: CPT

## 2025-06-22 PROCEDURE — 36415 COLL VENOUS BLD VENIPUNCTURE: CPT

## 2025-06-22 PROCEDURE — 80048 BASIC METABOLIC PNL TOTAL CA: CPT

## 2025-06-22 PROCEDURE — 82330 ASSAY OF CALCIUM: CPT

## 2025-06-22 RX ORDER — QUETIAPINE FUMARATE 100 MG/1
200 TABLET, FILM COATED ORAL NIGHTLY
Status: DISCONTINUED | OUTPATIENT
Start: 2025-06-22 | End: 2025-06-24 | Stop reason: HOSPADM

## 2025-06-22 RX ORDER — NICOTINE 21 MG/24HR
1 PATCH, TRANSDERMAL 24 HOURS TRANSDERMAL DAILY
Status: DISCONTINUED | OUTPATIENT
Start: 2025-06-22 | End: 2025-06-24 | Stop reason: HOSPADM

## 2025-06-22 RX ORDER — CLONAZEPAM 1 MG/1
2 TABLET ORAL EVERY 8 HOURS
Status: DISCONTINUED | OUTPATIENT
Start: 2025-06-22 | End: 2025-06-24 | Stop reason: HOSPADM

## 2025-06-22 RX ORDER — QUETIAPINE FUMARATE 200 MG/1
200 TABLET, FILM COATED ORAL NIGHTLY
COMMUNITY

## 2025-06-22 RX ORDER — QUETIAPINE FUMARATE 400 MG/1
400 TABLET, FILM COATED ORAL 2 TIMES DAILY
Status: DISCONTINUED | OUTPATIENT
Start: 2025-06-22 | End: 2025-06-22 | Stop reason: ALTCHOICE

## 2025-06-22 RX ADMIN — QUETIAPINE FUMARATE 200 MG: 100 TABLET ORAL at 20:11

## 2025-06-22 RX ADMIN — TIOTROPIUM BROMIDE AND OLODATEROL 2 PUFF: 3.124; 2.736 SPRAY, METERED RESPIRATORY (INHALATION) at 08:28

## 2025-06-22 RX ADMIN — ANTACID TABLETS 500 MG: 500 TABLET, CHEWABLE ORAL at 09:15

## 2025-06-22 RX ADMIN — LEVOTHYROXINE SODIUM 50 MCG: 0.05 TABLET ORAL at 06:24

## 2025-06-22 RX ADMIN — GABAPENTIN 600 MG: 600 TABLET, FILM COATED ORAL at 13:25

## 2025-06-22 RX ADMIN — TRIAMCINOLONE ACETONIDE: 1 CREAM TOPICAL at 20:07

## 2025-06-22 RX ADMIN — GABAPENTIN 600 MG: 600 TABLET, FILM COATED ORAL at 20:10

## 2025-06-22 RX ADMIN — BUMETANIDE 1 MG: 0.25 INJECTION INTRAMUSCULAR; INTRAVENOUS at 18:52

## 2025-06-22 RX ADMIN — TRIAMCINOLONE ACETONIDE: 1 CREAM TOPICAL at 09:17

## 2025-06-22 RX ADMIN — CLONAZEPAM 2 MG: 1 TABLET ORAL at 18:53

## 2025-06-22 RX ADMIN — FERROUS SULFATE TAB 325 MG (65 MG ELEMENTAL FE) 325 MG: 325 (65 FE) TAB at 09:16

## 2025-06-22 RX ADMIN — CLONAZEPAM 2 MG: 1 TABLET ORAL at 11:01

## 2025-06-22 RX ADMIN — SODIUM CHLORIDE, PRESERVATIVE FREE 10 ML: 5 INJECTION INTRAVENOUS at 20:10

## 2025-06-22 RX ADMIN — ACETAMINOPHEN 650 MG: 325 TABLET ORAL at 20:06

## 2025-06-22 RX ADMIN — PANTOPRAZOLE SODIUM 40 MG: 40 TABLET, DELAYED RELEASE ORAL at 06:23

## 2025-06-22 RX ADMIN — LISINOPRIL 5 MG: 5 TABLET ORAL at 09:15

## 2025-06-22 RX ADMIN — APIXABAN 5 MG: 5 TABLET, FILM COATED ORAL at 20:10

## 2025-06-22 RX ADMIN — DULOXETINE 60 MG: 60 CAPSULE, DELAYED RELEASE ORAL at 09:16

## 2025-06-22 RX ADMIN — BUMETANIDE 1 MG: 0.25 INJECTION INTRAMUSCULAR; INTRAVENOUS at 09:15

## 2025-06-22 RX ADMIN — ANTACID TABLETS 500 MG: 500 TABLET, CHEWABLE ORAL at 20:06

## 2025-06-22 RX ADMIN — BUPRENORPHINE AND NALOXONE 1 FILM: 8; 2 FILM, SOLUBLE BUCCAL; SUBLINGUAL at 09:15

## 2025-06-22 RX ADMIN — GABAPENTIN 600 MG: 600 TABLET, FILM COATED ORAL at 09:16

## 2025-06-22 RX ADMIN — BUPRENORPHINE AND NALOXONE 1 FILM: 8; 2 FILM, SOLUBLE BUCCAL; SUBLINGUAL at 20:06

## 2025-06-22 RX ADMIN — APIXABAN 5 MG: 5 TABLET, FILM COATED ORAL at 09:16

## 2025-06-22 RX ADMIN — Medication 3 MG: at 20:06

## 2025-06-22 ASSESSMENT — PAIN DESCRIPTION - LOCATION: LOCATION: BACK

## 2025-06-22 ASSESSMENT — PAIN SCALES - GENERAL
PAINLEVEL_OUTOF10: 5
PAINLEVEL_OUTOF10: 4

## 2025-06-22 ASSESSMENT — PAIN DESCRIPTION - DESCRIPTORS: DESCRIPTORS: ACHING;DISCOMFORT

## 2025-06-22 ASSESSMENT — PAIN DESCRIPTION - ORIENTATION: ORIENTATION: LOWER

## 2025-06-22 NOTE — PLAN OF CARE
Problem: Chronic Conditions and Co-morbidities  Goal: Patient's chronic conditions and co-morbidity symptoms are monitored and maintained or improved  Outcome: Progressing  Flowsheets (Taken 6/21/2025 2110)  Care Plan - Patient's Chronic Conditions and Co-Morbidity Symptoms are Monitored and Maintained or Improved: Monitor and assess patient's chronic conditions and comorbid symptoms for stability, deterioration, or improvement     Problem: Discharge Planning  Goal: Discharge to home or other facility with appropriate resources  Outcome: Progressing  Flowsheets (Taken 6/21/2025 2110)  Discharge to home or other facility with appropriate resources: Identify barriers to discharge with patient and caregiver     Problem: Safety - Adult  Goal: Free from fall injury  6/22/2025 0544 by Salina Schwarz RN  Outcome: Progressing  6/21/2025 2001 by Salina Schwarz RN  Outcome: Progressing     Problem: ABCDS Injury Assessment  Goal: Absence of physical injury  Outcome: Progressing     Problem: Skin/Tissue Integrity  Goal: Skin integrity remains intact  Description: 1.  Monitor for areas of redness and/or skin breakdown  2.  Assess vascular access sites hourly  3.  Every 4-6 hours minimum:  Change oxygen saturation probe site  4.  Every 4-6 hours:  If on nasal continuous positive airway pressure, respiratory therapy assess nares and determine need for appliance change or resting period  Outcome: Progressing  Flowsheets (Taken 6/21/2025 2000)  Skin Integrity Remains Intact: Monitor for areas of redness and/or skin breakdown     Problem: Pain  Goal: Verbalizes/displays adequate comfort level or baseline comfort level  Outcome: Progressing     Problem: Respiratory - Adult  Goal: Achieves optimal ventilation and oxygenation  Outcome: Progressing

## 2025-06-22 NOTE — PROGRESS NOTES
Contacted patient's ex-wife per request patient request. Arlin stated that she would contact the patient's son to bring in his phone.

## 2025-06-22 NOTE — PROGRESS NOTES
Attempted to call patient's son, Vinny, per patient request.  No one answered phone. Message was left asking if Vinny could bring his father's cell phone in to the hospital if he comes to visit.

## 2025-06-22 NOTE — PLAN OF CARE
Problem: Respiratory - Adult  Goal: Clear lung sounds  Outcome: Progressing    Continue breathing medication to improve aeration of lungs. Patient mutually agreed on goals.

## 2025-06-22 NOTE — PLAN OF CARE
Problem: Safety - Adult  Goal: Free from fall injury  Outcome: Progressing     Problem: Skin/Tissue Integrity  Goal: Skin integrity remains intact  Description: 1.  Monitor for areas of redness and/or skin breakdown  2.  Assess vascular access sites hourly  3.  Every 4-6 hours minimum:  Change oxygen saturation probe site  4.  Every 4-6 hours:  If on nasal continuous positive airway pressure, respiratory therapy assess nares and determine need for appliance change or resting period  Recent Flowsheet Documentation  Taken 6/21/2025 2000 by Salina Schwarz RN  Skin Integrity Remains Intact: Monitor for areas of redness and/or skin breakdown     Problem: Respiratory - Adult  Goal: Achieves optimal ventilation and oxygenation  Recent Flowsheet Documentation  Taken 6/21/2025 0831 by Laure June RCP  Achieves optimal ventilation and oxygenation:   Assess for changes in respiratory status   Respiratory therapy support as indicated      left

## 2025-06-22 NOTE — PROGRESS NOTES
[facility]--HOSPITALIST GROUP    Hospitalist PROGRESS NOTE dictated by Carlin Celeste MD on 2025    Patient ID: Avery Jordan is 64 y.o. and presently in room [facility]  : 1960 MRN: [id number]    Admit date: 2025  Primary Care Physician: Christopher Cobb MD   Patient Care Team:  Christopher Cobb MD as PCP - General (Family Medicine)  Ilda Gillespie MD as Cardiologist (Cardiology)  Tel: [phone]  Admitting Physician: No admitting provider for patient encounter.   Code Status:  Full Code    Avery Jordan is a 64 y.o.  male who presented with Dysuria    Room: -A  Admit date: 2025    CHIEF COMPLAINT: Dysuria     HISTORY OF PRESENT ILLNESS:   Avery Jordan is a 64-year-old male presented to [facility] 2025 with chief complaint of dysuria as well as concern about a yeast infection under his abdominal fold.  Family present at the bedside identify periodically he has been hallucinating.  Family states \"patient smelled like urine \" Patient admits to not taking his home medications for the past 3-5 days.  Patient wears continuous oxygen at 3 L per nasal cannula.  Patient denies any chest pain pressure heaviness tightness cough or shortness of breath.  CT A/P completed.  While in the ER patient did receive Tylenol, Lasix, 1L 0.9NS      2025: Patient with moderate COPD on 3 L of O2 nasal cannula at home admitted WILMA.  Denies chest pain, shortness of breath or cough.  O2 sat 97% on 2 L O2 nasal cannula.    ABG revealed pH of 7.39, [id number], , HCO3 38 and a O2 sat of 94% yesterday on 2025.    GFR 84, BUN 16 and creatinine 1.0.    Potassium 3.3 to be replenished.  Magnesium 2.5.    Decreased calcium 1.07 for which to start calcium supplementation.  --------    2025: Patient with moderate COPD on 3 L of O2 nasal cannula at home admitted with WILMA.  Patient denies chest pain shortness of breath or cough.  O2 sat 96%  hypoxia and hypercapnia (HCC)    Hypothyroidism       PAST MEDICAL HISTORY    has a past medical history of Anxiety disorder, Arthritis, Asthma, Back pain, chronic, Blood circulation, collateral, [place] [employer] BiV ICD, [place] [employer] BiV pacemaker, CAD (coronary artery disease), CHF (congestive heart failure) (HCC), Chronic kidney disease, COPD (chronic obstructive pulmonary disease) (HCC), Depression, DM2 (diabetes mellitus, type 2) (HCC), Endocarditis, Factor V deficiency (HCC), Hepatitis, Hepatitis C, History of blood transfusion, Hypertension, Paroxysmal A-fib (HCC), and Pneumonia.    SURGICAL HISTORY      has a past surgical history that includes pacemaker placement (12/26/13); Toe amputation (Bilateral, 3/13/2013); Hand Debridement (Left, 01/09/2014); Wound debridement (Left, 01/14/2014); Cardiac surgery (12/26/12); Upper gastrointestinal endoscopy (2012); Colonoscopy (2016); bronchoscopy (N/A, 9/28/2019); bronchoscopy (N/A, 9/28/2019); bronchoscopy (N/A, 9/29/2019); bronchoscopy (9/29/2019); bronchoscopy (N/A, 9/30/2019); bronchoscopy (N/A, 10/16/2019); Tracheal surgery (N/A, 10/18/2019); bronchoscopy (N/A, 9/16/2020); transesophageal echocardiogram (N/A, 2/28/2024); Total hip arthroplasty (N/A, 4/23/2024); and Total hip arthroplasty (Right, 9/10/2024).    CURRENT MEDICATIONS     sodium chloride flush, 5-40 mL, 2 times per day  insulin lispro, 0-4 Units, 4x Daily AC & HS  apixaban, 5 mg, BID  buprenorphine-naloxone, 1 Film, BID  DULoxetine, 60 mg, Daily  ferrous sulfate, 325 mg, Daily  gabapentin, 600 mg, TID  lisinopril, 5 mg, Daily  [Held by provider] metFORMIN, 750 mg, Daily with breakfast  metoprolol succinate, 50 mg, Daily  pantoprazole, 40 mg, BID AC  tiotropium-olodaterol, 2 puff, Daily  triamcinolone, , BID  bumetanide, 1 mg, BID  levothyroxine, 50 mcg, Daily  calcium carbonate, 500 mg, TID        Continuous Infusions:    sodium chloride      dextrose         PRN:  sodium chloride flush, 5-40

## 2025-06-23 ENCOUNTER — APPOINTMENT (OUTPATIENT)
Dept: GENERAL RADIOLOGY | Age: 65
DRG: 194 | End: 2025-06-23
Payer: MEDICARE

## 2025-06-23 LAB
ANION GAP SERPL CALC-SCNC: 11 MEQ/L (ref 8–16)
BASOPHILS ABSOLUTE: 0 THOU/MM3 (ref 0–0.1)
BASOPHILS NFR BLD AUTO: 0.4 %
BUN SERPL-MCNC: 15 MG/DL (ref 8–23)
CA-I BLD ISE-SCNC: 1.12 MMOL/L (ref 1.12–1.32)
CALCIUM SERPL-MCNC: 9.2 MG/DL (ref 8.8–10.2)
CHLORIDE SERPL-SCNC: 102 MEQ/L (ref 98–111)
CO2 SERPL-SCNC: 32 MEQ/L (ref 22–29)
CREAT SERPL-MCNC: 0.9 MG/DL (ref 0.7–1.2)
DEPRECATED RDW RBC AUTO: 54.3 FL (ref 35–45)
EOSINOPHIL NFR BLD AUTO: 1.3 %
EOSINOPHILS ABSOLUTE: 0.1 THOU/MM3 (ref 0–0.4)
ERYTHROCYTE [DISTWIDTH] IN BLOOD BY AUTOMATED COUNT: 16.1 % (ref 11.5–14.5)
GFR SERPL CREATININE-BSD FRML MDRD: > 90 ML/MIN/1.73M2
GLUCOSE BLD STRIP.AUTO-MCNC: 104 MG/DL (ref 70–108)
GLUCOSE BLD STRIP.AUTO-MCNC: 109 MG/DL (ref 70–108)
GLUCOSE BLD STRIP.AUTO-MCNC: 109 MG/DL (ref 70–108)
GLUCOSE BLD STRIP.AUTO-MCNC: 131 MG/DL (ref 70–108)
GLUCOSE BLD STRIP.AUTO-MCNC: 137 MG/DL (ref 70–108)
GLUCOSE SERPL-MCNC: 115 MG/DL (ref 74–109)
HAPTOGLOB SERPL-MCNC: 163 MG/DL (ref 30–200)
HCT VFR BLD AUTO: 36.4 % (ref 42–52)
HGB BLD-MCNC: 10.7 GM/DL (ref 14–18)
IMM GRANULOCYTES # BLD AUTO: 0.02 THOU/MM3 (ref 0–0.07)
IMM GRANULOCYTES NFR BLD AUTO: 0.4 %
L PNEUMO1 AG UR QL IA.RAPID: NEGATIVE
LYMPHOCYTES ABSOLUTE: 2 THOU/MM3 (ref 1–4.8)
LYMPHOCYTES NFR BLD AUTO: 36.7 %
MAGNESIUM SERPL-MCNC: 2.3 MG/DL (ref 1.6–2.6)
MCH RBC QN AUTO: 27.5 PG (ref 26–33)
MCHC RBC AUTO-ENTMCNC: 29.4 GM/DL (ref 32.2–35.5)
MCV RBC AUTO: 93.6 FL (ref 80–94)
MONOCYTES ABSOLUTE: 0.8 THOU/MM3 (ref 0.4–1.3)
MONOCYTES NFR BLD AUTO: 13.9 %
NEUTROPHILS ABSOLUTE: 2.6 THOU/MM3 (ref 1.8–7.7)
NEUTROPHILS NFR BLD AUTO: 47.3 %
NRBC BLD AUTO-RTO: 0 /100 WBC
PLATELET # BLD AUTO: 116 THOU/MM3 (ref 130–400)
PMV BLD AUTO: 11.5 FL (ref 9.4–12.4)
POTASSIUM SERPL-SCNC: 3.1 MEQ/L (ref 3.5–5.2)
PROCALCITONIN SERPL IA-MCNC: 0.04 NG/ML (ref 0.01–0.09)
RBC # BLD AUTO: 3.89 MILL/MM3 (ref 4.7–6.1)
REVIEWED BY: NORMAL
SMEAR REVIEW: NORMAL
SODIUM SERPL-SCNC: 145 MEQ/L (ref 135–145)
WBC # BLD AUTO: 5.4 THOU/MM3 (ref 4.8–10.8)

## 2025-06-23 PROCEDURE — 82330 ASSAY OF CALCIUM: CPT

## 2025-06-23 PROCEDURE — 6370000000 HC RX 637 (ALT 250 FOR IP): Performed by: NURSE PRACTITIONER

## 2025-06-23 PROCEDURE — 6370000000 HC RX 637 (ALT 250 FOR IP): Performed by: INTERNAL MEDICINE

## 2025-06-23 PROCEDURE — 87449 NOS EACH ORGANISM AG IA: CPT

## 2025-06-23 PROCEDURE — 6360000002 HC RX W HCPCS: Performed by: INTERNAL MEDICINE

## 2025-06-23 PROCEDURE — 97116 GAIT TRAINING THERAPY: CPT

## 2025-06-23 PROCEDURE — 2580000003 HC RX 258: Performed by: INTERNAL MEDICINE

## 2025-06-23 PROCEDURE — 84145 PROCALCITONIN (PCT): CPT

## 2025-06-23 PROCEDURE — 71045 X-RAY EXAM CHEST 1 VIEW: CPT

## 2025-06-23 PROCEDURE — 99232 SBSQ HOSP IP/OBS MODERATE 35: CPT | Performed by: INTERNAL MEDICINE

## 2025-06-23 PROCEDURE — 85025 COMPLETE CBC W/AUTO DIFF WBC: CPT

## 2025-06-23 PROCEDURE — 2700000000 HC OXYGEN THERAPY PER DAY

## 2025-06-23 PROCEDURE — 2500000003 HC RX 250 WO HCPCS: Performed by: NURSE PRACTITIONER

## 2025-06-23 PROCEDURE — 83735 ASSAY OF MAGNESIUM: CPT

## 2025-06-23 PROCEDURE — 97162 PT EVAL MOD COMPLEX 30 MIN: CPT

## 2025-06-23 PROCEDURE — 94640 AIRWAY INHALATION TREATMENT: CPT

## 2025-06-23 PROCEDURE — 97530 THERAPEUTIC ACTIVITIES: CPT

## 2025-06-23 PROCEDURE — 80048 BASIC METABOLIC PNL TOTAL CA: CPT

## 2025-06-23 PROCEDURE — 82948 REAGENT STRIP/BLOOD GLUCOSE: CPT

## 2025-06-23 PROCEDURE — 36415 COLL VENOUS BLD VENIPUNCTURE: CPT

## 2025-06-23 PROCEDURE — 87040 BLOOD CULTURE FOR BACTERIA: CPT

## 2025-06-23 PROCEDURE — 1200000000 HC SEMI PRIVATE

## 2025-06-23 RX ORDER — POTASSIUM CHLORIDE 1500 MG/1
40 TABLET, EXTENDED RELEASE ORAL 2 TIMES DAILY WITH MEALS
Status: COMPLETED | OUTPATIENT
Start: 2025-06-23 | End: 2025-06-23

## 2025-06-23 RX ORDER — BUMETANIDE 1 MG/1
1 TABLET ORAL DAILY
Status: DISCONTINUED | OUTPATIENT
Start: 2025-06-23 | End: 2025-06-24 | Stop reason: HOSPADM

## 2025-06-23 RX ORDER — POTASSIUM CHLORIDE 1500 MG/1
40 TABLET, EXTENDED RELEASE ORAL 2 TIMES DAILY WITH MEALS
Status: DISCONTINUED | OUTPATIENT
Start: 2025-06-23 | End: 2025-06-23

## 2025-06-23 RX ADMIN — CLONAZEPAM 2 MG: 1 TABLET ORAL at 17:26

## 2025-06-23 RX ADMIN — GABAPENTIN 600 MG: 600 TABLET, FILM COATED ORAL at 14:17

## 2025-06-23 RX ADMIN — APIXABAN 5 MG: 5 TABLET, FILM COATED ORAL at 09:05

## 2025-06-23 RX ADMIN — CLONAZEPAM 2 MG: 1 TABLET ORAL at 09:05

## 2025-06-23 RX ADMIN — LEVOTHYROXINE SODIUM 50 MCG: 0.05 TABLET ORAL at 06:13

## 2025-06-23 RX ADMIN — PANTOPRAZOLE SODIUM 40 MG: 40 TABLET, DELAYED RELEASE ORAL at 06:13

## 2025-06-23 RX ADMIN — PIPERACILLIN AND TAZOBACTAM 3375 MG: 3; .375 INJECTION, POWDER, FOR SOLUTION INTRAVENOUS at 16:21

## 2025-06-23 RX ADMIN — ANTACID TABLETS 500 MG: 500 TABLET, CHEWABLE ORAL at 09:05

## 2025-06-23 RX ADMIN — PANTOPRAZOLE SODIUM 40 MG: 40 TABLET, DELAYED RELEASE ORAL at 16:17

## 2025-06-23 RX ADMIN — QUETIAPINE FUMARATE 200 MG: 100 TABLET ORAL at 20:37

## 2025-06-23 RX ADMIN — POTASSIUM CHLORIDE 40 MEQ: 1500 TABLET, EXTENDED RELEASE ORAL at 17:26

## 2025-06-23 RX ADMIN — PIPERACILLIN AND TAZOBACTAM 4500 MG: 4; .5 INJECTION, POWDER, LYOPHILIZED, FOR SOLUTION INTRAVENOUS at 11:07

## 2025-06-23 RX ADMIN — TRIAMCINOLONE ACETONIDE: 1 CREAM TOPICAL at 20:38

## 2025-06-23 RX ADMIN — SODIUM CHLORIDE, PRESERVATIVE FREE 10 ML: 5 INJECTION INTRAVENOUS at 20:38

## 2025-06-23 RX ADMIN — GABAPENTIN 600 MG: 600 TABLET, FILM COATED ORAL at 09:05

## 2025-06-23 RX ADMIN — Medication 3 MG: at 20:37

## 2025-06-23 RX ADMIN — PIPERACILLIN AND TAZOBACTAM 3375 MG: 3; .375 INJECTION, POWDER, FOR SOLUTION INTRAVENOUS at 23:55

## 2025-06-23 RX ADMIN — DULOXETINE 60 MG: 60 CAPSULE, DELAYED RELEASE ORAL at 09:05

## 2025-06-23 RX ADMIN — BUPRENORPHINE AND NALOXONE 1 FILM: 8; 2 FILM, SOLUBLE BUCCAL; SUBLINGUAL at 20:37

## 2025-06-23 RX ADMIN — FERROUS SULFATE TAB 325 MG (65 MG ELEMENTAL FE) 325 MG: 325 (65 FE) TAB at 09:05

## 2025-06-23 RX ADMIN — BUPRENORPHINE AND NALOXONE 1 FILM: 8; 2 FILM, SOLUBLE BUCCAL; SUBLINGUAL at 09:05

## 2025-06-23 RX ADMIN — APIXABAN 5 MG: 5 TABLET, FILM COATED ORAL at 20:37

## 2025-06-23 RX ADMIN — POTASSIUM CHLORIDE 40 MEQ: 1500 TABLET, EXTENDED RELEASE ORAL at 11:04

## 2025-06-23 RX ADMIN — CLONAZEPAM 2 MG: 1 TABLET ORAL at 02:31

## 2025-06-23 RX ADMIN — MUSCLE RUB CREAM: 100; 150 CREAM TOPICAL at 20:37

## 2025-06-23 RX ADMIN — SODIUM CHLORIDE, PRESERVATIVE FREE 10 ML: 5 INJECTION INTRAVENOUS at 09:05

## 2025-06-23 RX ADMIN — TIOTROPIUM BROMIDE AND OLODATEROL 2 PUFF: 3.124; 2.736 SPRAY, METERED RESPIRATORY (INHALATION) at 07:53

## 2025-06-23 RX ADMIN — ACETAMINOPHEN 650 MG: 325 TABLET ORAL at 02:31

## 2025-06-23 RX ADMIN — GABAPENTIN 600 MG: 600 TABLET, FILM COATED ORAL at 20:37

## 2025-06-23 ASSESSMENT — PAIN DESCRIPTION - ORIENTATION
ORIENTATION: MID
ORIENTATION: MID

## 2025-06-23 ASSESSMENT — PAIN DESCRIPTION - DESCRIPTORS
DESCRIPTORS: DISCOMFORT
DESCRIPTORS: DISCOMFORT

## 2025-06-23 ASSESSMENT — PAIN - FUNCTIONAL ASSESSMENT: PAIN_FUNCTIONAL_ASSESSMENT: ACTIVITIES ARE NOT PREVENTED

## 2025-06-23 ASSESSMENT — PAIN SCALES - GENERAL
PAINLEVEL_OUTOF10: 4
PAINLEVEL_OUTOF10: 6

## 2025-06-23 ASSESSMENT — PAIN DESCRIPTION - LOCATION
LOCATION: BACK
LOCATION: BACK

## 2025-06-23 ASSESSMENT — PAIN DESCRIPTION - FREQUENCY: FREQUENCY: CONTINUOUS

## 2025-06-23 ASSESSMENT — PAIN DESCRIPTION - PAIN TYPE: TYPE: CHRONIC PAIN

## 2025-06-23 NOTE — PROGRESS NOTES
[facility]  INPATIENT PHYSICAL THERAPY  EVALUATION  STRZ MED SURG 8AB - 8A-19/019-A    Discharge Recommendations: Home with Home health PT, Home with assist PRN  Equipment Recommendations: No             Time In: 1440  Time Out: 1513  Timed Code Treatment Minutes: 25 Minutes  Minutes: 33     Date: 2025  Patient Name: Avery Jordan,  Gender:  male        MRN: [id number]  : 1960  (64 y.o.)      Referring Practitioner: Allison Willard APRN - CNP  Diagnosis: Acute kidney injury  Additional Pertinent Hx: Avery Jordan is a 64-year-old male presented to [facility] 2025 with chief complaint of dysuria as well as concern about a yeast infection under his abdominal fold.  Family present at the bedside identify periodically he has been hallucinating.  Family states \"patient smelled like urine \" Patient admits to not taking his home medications for the past 3-5 days.  Patient wears continuous oxygen at 3 L per nasal cannula.  Patient denies any chest pain pressure heaviness tightness cough or shortness of breath.  CT A/P completed.  While in the ER patient did receive Tylenol, Lasix, 1L 0.9NS     Restrictions/Precautions:  Restrictions/Precautions: Fall Risk, General Precautions     Required Braces or Orthoses?: No    Subjective:  Chart Reviewed: Yes  Patient assessed for rehabilitation services?: Yes  Subjective: Nurse approved visit.  Patient sitting up on side of bed upon arrival.  A/Ox4 and able to follow one step commands but questionable social history.  Patient states he did not utilize supplemental O2 at home and does not need it.  Nurse stated she was informed he utilized O2 as baseline at home and encouraged it's use.  Patient then stated he left wallet on tray.  Nurse asked dietary who stated it was not on tray.    General:  Orientation Level: Oriented X4  Vision: Impaired  Vision Exceptions: Wears glasses for reading  Hearing: Within functional limits       Pain:  w/o rail.  Short Term Goal 2: Patient to transfer sit <> stand with (I) from variety of surface heights.  Short Term Goal 3: Patient to ambulate with least restrictive AD household distances with obstacle negotiation and dual task activities with SBA.  Short Term Goal 4: Patient to be (I) with HEP for (B) LE strengthening/stability and to increase activity tolerance.  Long Term Goals  Time Frame for Long Term Goals : n/a d/t short length of stay    Following session, patient left in safe position in chair, with alarm, and call light within reach

## 2025-06-23 NOTE — PLAN OF CARE
Problem: Chronic Conditions and Co-morbidities  Goal: Patient's chronic conditions and co-morbidity symptoms are monitored and maintained or improved  Outcome: Progressing  Flowsheets (Taken 6/22/2025 2100)  Care Plan - Patient's Chronic Conditions and Co-Morbidity Symptoms are Monitored and Maintained or Improved: Monitor and assess patient's chronic conditions and comorbid symptoms for stability, deterioration, or improvement     Problem: Discharge Planning  Goal: Discharge to home or other facility with appropriate resources  Outcome: Progressing  Flowsheets (Taken 6/22/2025 2100)  Discharge to home or other facility with appropriate resources:   Identify barriers to discharge with patient and caregiver   Arrange for needed discharge resources and transportation as appropriate     Problem: Safety - Adult  Goal: Free from fall injury  Outcome: Progressing     Problem: ABCDS Injury Assessment  Goal: Absence of physical injury  Outcome: Progressing     Problem: Skin/Tissue Integrity  Goal: Skin integrity remains intact  Description: 1.  Monitor for areas of redness and/or skin breakdown  2.  Assess vascular access sites hourly  3.  Every 4-6 hours minimum:  Change oxygen saturation probe site  4.  Every 4-6 hours:  If on nasal continuous positive airway pressure, respiratory therapy assess nares and determine need for appliance change or resting period  Outcome: Progressing  Flowsheets  Taken 6/23/2025 0031  Skin Integrity Remains Intact:   Monitor for areas of redness and/or skin breakdown   Assess vascular access sites hourly   Positioning devices   Turn and reposition as indicated  Taken 6/22/2025 2100  Skin Integrity Remains Intact:   Monitor for areas of redness and/or skin breakdown   Assess vascular access sites hourly   Check visual cues for pain   Positioning devices     Problem: Pain  Goal: Verbalizes/displays adequate comfort level or baseline comfort level  Outcome: Progressing     Problem: Respiratory

## 2025-06-23 NOTE — PLAN OF CARE
Problem: Respiratory - Adult  Goal: Achieves optimal ventilation and oxygenation  6/23/2025 0757 by Millie Caruso RCP  Outcome: Progressing

## 2025-06-23 NOTE — PROGRESS NOTES
[facility]--HOSPITALIST GROUP    Hospitalist PROGRESS NOTE dictated by Carlin Celeste MD on 2025    Patient ID: Avery Jordan is 64 y.o. and presently in room [facility]-A  : 1960 MRN: [id number]    Admit date: 2025  Primary Care Physician: Christopher Cobb MD   Patient Care Team:  Christopher Cobb MD as PCP - General (Family Medicine)  Ilda Gillespie MD as Cardiologist (Cardiology)  Tel: [phone]  Admitting Physician: No admitting provider for patient encounter.   Code Status:  Full Code    Avery Jordan is a 64 y.o.  male who presented with Dysuria    Room: -A  Admit date: 2025    CHIEF COMPLAINT: Dysuria     HISTORY OF PRESENT ILLNESS:   Avery Jordan is a 64-year-old male presented to [facility] 2025 with chief complaint of dysuria as well as concern about a yeast infection under his abdominal fold.  Family present at the bedside identify periodically he has been hallucinating.  Family states \"patient smelled like urine \" Patient admits to not taking his home medications for the past 3-5 days.  Patient wears continuous oxygen at 3 L per nasal cannula.  Patient denies any chest pain pressure heaviness tightness cough or shortness of breath.  CT A/P completed.  While in the ER patient did receive Tylenol, Lasix, 1L 0.9NS      2025: Patient with moderate COPD on 3 L of O2 nasal cannula at home admitted WILMA.  Denies chest pain, shortness of breath or cough.  O2 sat 97% on 2 L O2 nasal cannula.    ABG revealed pH of 7.39, [id number], , HCO3 38 and a O2 sat of 94% yesterday on 2025.    GFR 84, BUN 16 and creatinine 1.0.    Potassium 3.3 to be replenished.  Magnesium 2.5.    Decreased calcium 1.07 for which to start calcium supplementation.  --------    2025: Patient with moderate COPD on 3 L of O2 nasal cannula at home admitted with WILMA.  Patient denies chest pain shortness of breath or cough.  O2 sat 96%  on 2 L O2 nasal cannula.    EKG yesterday revealed atrial paced rhythm 58/min with some PVCs.  QTc 459 in the presence of an incomplete right bundle branch block.    GFR improved greater than 90.  BUN 13 and creatinine 0.9.    Ionized calcium slightly improved at 1.08 on calcium replacement.  Potassium today is 3.5 and replenishing.    Home antihypertensives withheld given low blood pressure. Bumex, lisinopril metoprolol withheld per communication with Sheyla Olson RN at 8:51 this morning as confirmatory manual blood pressure was 102/52.  Will check CBC.  ------------    2025: Patient with moderate COPD on 3 L of O2 nasal cannula at home admitted with WILMA.    Patient apparently tripped and fell at a  home back in March striking his head and has been having occipital headaches intermittently.  Yesterday had a headache but today is headache free.  No chest pain shortness of breath or cough.    Patient anxious and on Klonopin and Seroquel at home which will be resumed.  Discussed with RN at bedside. Per pharmacy the Seroquel dose has recently been lowered to 200 mg nightly.    Patient has pancytopenia with a WBC of 4, hemoglobin 11.4 and platelets 108 K.    Ionized calcium improved to 1.11 on calcium supplement today.  -------------    2025: Patient with moderate COPD on 3 L of O2 nasal cannula at home admitted with WILMA.  Today patient states having cough productive of yellow sputum.  No chest pain or shortness of breath.  O2 sat is 94% on 2 L O2.    CXR report today: Mild patchy airspace opacities at the dependent left lung base may present mild atelectasis or pneumonia.    Will commence antibiotics, check procalcitonin.    Ionized calcium normal at 1.12 today.  Potassium this morning 3.1 so replenishing and checking magnesium level.    Uses [facility] [address].    Disposition:   [x] Home with son      Assessment:    Principal Problem:    Acute kidney injury  Active Problems:    Moderate COPD     Opioid use disorder    Hemoptysis    Hyperkalemia    Supratherapeutic INR    Hypokalemia    Nonsustained SVT (supraventricular tachycardia) (HCC) of device check    Noncompliance with F/u advised    Primary osteoarthritis of left hip    Osteoarthritis of left hip, unspecified osteoarthritis type    Arthritis of left knee    Tricuspid stenosis, acquired    Primary osteoarthritis of right hip    Acute kidney injury    CAD (coronary artery disease)    DM2 (diabetes mellitus, type 2) (HCC)    Factor V deficiency (HCC)    Chronic respiratory failure with hypoxia and hypercapnia (HCC)    Hypothyroidism       PAST MEDICAL HISTORY    has a past medical history of Anxiety disorder, Arthritis, Asthma, Back pain, chronic, Blood circulation, collateral, [place] [employer] BiV ICD, [employer] [employer] BiV pacemaker, CAD (coronary artery disease), CHF (congestive heart failure) (HCC), Chronic kidney disease, COPD (chronic obstructive pulmonary disease) (HCC), Depression, DM2 (diabetes mellitus, type 2) (HCC), Endocarditis, Factor V deficiency (HCC), Hepatitis, Hepatitis C, History of blood transfusion, Hypertension, Paroxysmal A-fib (HCC), and Pneumonia.    SURGICAL HISTORY      has a past surgical history that includes pacemaker placement (12/26/13); Toe amputation (Bilateral, 3/13/2013); Hand Debridement (Left, 01/09/2014); Wound debridement (Left, 01/14/2014); Cardiac surgery (12/26/12); Upper gastrointestinal endoscopy (2012); Colonoscopy (2016); bronchoscopy (N/A, 9/28/2019); bronchoscopy (N/A, 9/28/2019); bronchoscopy (N/A, 9/29/2019); bronchoscopy (9/29/2019); bronchoscopy (N/A, 9/30/2019); bronchoscopy (N/A, 10/16/2019); Tracheal surgery (N/A, 10/18/2019); bronchoscopy (N/A, 9/16/2020); transesophageal echocardiogram (N/A, 2/28/2024); Total hip arthroplasty (N/A, 4/23/2024); and Total hip arthroplasty (Right, 9/10/2024).    CURRENT MEDICATIONS     nicotine, 1 patch, Daily  clonazePAM, 2 mg, Q8H  QUEtiapine, 200 mg,  ventricles. There is no hemorrhage. There are no intra-or extra-axial collections.  There is no  midline shift or mass effect. There is diminished attenuation in the white matter most likely representing ischemic changes.. There are inflammatory changes in the left mastoid air cells and left middle ear cavity. The remaining paranasal sinuses and right mastoid air cells are normally aerated.  There is no suspicious calvarial abnormality.      1. Stable CT scan of the brain, no interval change since previous study dated 1/17/2021. 2. Inflammatory changes in the left mastoid air cells and left middle ear cavity. 3. MRI scan of the brain would be helpful for more complete evaluation in this patient. **This report has been created using voice recognition software. It may contain minor errors which are inherent in voice recognition technology.** Electronically signed by Dr. Mukund Raygoza    Echo (TTE) complete (PRN contrast/bubble/strain/3D)  Result Date: 6/20/2025    Left Ventricle: Normal left ventricular systolic function with a visually estimated EF of 55 - 60%. Left ventricle is moderately dilated. Normal wall thickness. Normal wall motion.   Right Ventricle: Right ventricle size is normal. Normal systolic function.   Tricuspid Valve: Bioprosthetic valve that is well-seated. No regurgitation. RVSP is 38 mmHg. TV PG is 13 mmHg. TV MG is 8 mmHg.   Left Atrium: Left atrium is moderately dilated.   Right Atrium: Right atrium is moderately dilated.   Aorta: Ascending Aorta is 3.5 cm.   IVC/SVC: IVC diameter is dilated and decreases less than 50% during inspiration; therefore the estimated right atrial pressure is elevated (~15 mmHg).   Image quality is technically difficult. Contrast used: Lumason. Technically difficult study due to patient's body habitus.     Vascular duplex lower extremity venous bilateral  Result Date: 6/20/2025  PROCEDURE: VAS DUP LOWER EXTREMITY VENOUS BILATERAL CLINICAL INFORMATION: Edema left entire

## 2025-06-23 NOTE — PLAN OF CARE
Problem: Chronic Conditions and Co-morbidities  Goal: Patient's chronic conditions and co-morbidity symptoms are monitored and maintained or improved  6/23/2025 0919 by Cherie Dai RN  Outcome: Progressing  Flowsheets (Taken 6/23/2025 0818)  Care Plan - Patient's Chronic Conditions and Co-Morbidity Symptoms are Monitored and Maintained or Improved: Monitor and assess patient's chronic conditions and comorbid symptoms for stability, deterioration, or improvement  6/23/2025 0032 by Salina Schwarz RN  Outcome: Progressing  Flowsheets (Taken 6/22/2025 2100)  Care Plan - Patient's Chronic Conditions and Co-Morbidity Symptoms are Monitored and Maintained or Improved: Monitor and assess patient's chronic conditions and comorbid symptoms for stability, deterioration, or improvement     Problem: Discharge Planning  Goal: Discharge to home or other facility with appropriate resources  6/23/2025 0919 by Cherie Dai RN  Outcome: Progressing  Flowsheets (Taken 6/23/2025 0818)  Discharge to home or other facility with appropriate resources: Identify barriers to discharge with patient and caregiver  6/23/2025 0032 by Salina Schwarz RN  Outcome: Progressing  Flowsheets (Taken 6/22/2025 2100)  Discharge to home or other facility with appropriate resources:   Identify barriers to discharge with patient and caregiver   Arrange for needed discharge resources and transportation as appropriate     Problem: Safety - Adult  Goal: Free from fall injury  6/23/2025 0919 by Cherie Dai RN  Outcome: Progressing  6/23/2025 0032 by Salina Schwarz RN  Outcome: Progressing     Problem: ABCDS Injury Assessment  Goal: Absence of physical injury  6/23/2025 0919 by Cherie Dai RN  Outcome: Progressing  6/23/2025 0032 by Salina Schwarz RN  Outcome: Progressing     Problem: Skin/Tissue Integrity  Goal: Skin integrity remains intact  Description: 1.  Monitor for areas of redness and/or skin breakdown  2.  Assess  vascular access sites hourly  3.  Every 4-6 hours minimum:  Change oxygen saturation probe site  4.  Every 4-6 hours:  If on nasal continuous positive airway pressure, respiratory therapy assess nares and determine need for appliance change or resting period  6/23/2025 0919 by Cherie Dai RN  Outcome: Progressing  Flowsheets (Taken 6/23/2025 0818)  Skin Integrity Remains Intact: Monitor for areas of redness and/or skin breakdown  6/23/2025 0032 by Salina Schwarz RN  Outcome: Progressing  Flowsheets  Taken 6/23/2025 0031  Skin Integrity Remains Intact:   Monitor for areas of redness and/or skin breakdown   Assess vascular access sites hourly   Positioning devices   Turn and reposition as indicated  Taken 6/22/2025 2100  Skin Integrity Remains Intact:   Monitor for areas of redness and/or skin breakdown   Assess vascular access sites hourly   Check visual cues for pain   Positioning devices     Problem: Pain  Goal: Verbalizes/displays adequate comfort level or baseline comfort level  6/23/2025 0919 by Cherie Dai RN  Outcome: Progressing  6/23/2025 0032 by Salina Schwarz RN  Outcome: Progressing     Problem: Respiratory - Adult  Goal: Achieves optimal ventilation and oxygenation  6/23/2025 0919 by Cherie Dai RN  Outcome: Progressing  Flowsheets (Taken 6/23/2025 0818)  Achieves optimal ventilation and oxygenation: Assess for changes in respiratory status  6/23/2025 0757 by Millie Caruso RCP  Outcome: Progressing  6/23/2025 0757 by Millie Caruso RCP  Outcome: Progressing  6/23/2025 0032 by Salina Schwarz RN  Outcome: Progressing  Goal: Clear lung sounds  6/23/2025 0919 by Cherie Dai RN  Outcome: Progressing  6/23/2025 0757 by Millie Caruso RCP  Outcome: Progressing  6/23/2025 0757 by Millie Caruso RCP  Outcome: Progressing  6/23/2025 0032 by Salina Schwarz RN  Outcome: Progressing

## 2025-06-24 VITALS
DIASTOLIC BLOOD PRESSURE: 72 MMHG | HEART RATE: 72 BPM | WEIGHT: 248.24 LBS | RESPIRATION RATE: 18 BRPM | SYSTOLIC BLOOD PRESSURE: 118 MMHG | TEMPERATURE: 98.1 F | OXYGEN SATURATION: 96 % | BODY MASS INDEX: 36.77 KG/M2 | HEIGHT: 69 IN

## 2025-06-24 LAB
ANION GAP SERPL CALC-SCNC: 11 MEQ/L (ref 8–16)
BACTERIA BLD AEROBE CULT: NORMAL
BACTERIA BLD AEROBE CULT: NORMAL
BUN SERPL-MCNC: 17 MG/DL (ref 8–23)
CALCIUM SERPL-MCNC: 9.1 MG/DL (ref 8.8–10.2)
CHLORIDE SERPL-SCNC: 101 MEQ/L (ref 98–111)
CO2 SERPL-SCNC: 29 MEQ/L (ref 22–29)
CREAT SERPL-MCNC: 1 MG/DL (ref 0.7–1.2)
GFR SERPL CREATININE-BSD FRML MDRD: 84 ML/MIN/1.73M2
GLUCOSE BLD STRIP.AUTO-MCNC: 115 MG/DL (ref 70–108)
GLUCOSE BLD STRIP.AUTO-MCNC: 164 MG/DL (ref 70–108)
GLUCOSE SERPL-MCNC: 111 MG/DL (ref 74–109)
POTASSIUM SERPL-SCNC: 3.5 MEQ/L (ref 3.5–5.2)
SODIUM SERPL-SCNC: 141 MEQ/L (ref 135–145)

## 2025-06-24 PROCEDURE — 2700000000 HC OXYGEN THERAPY PER DAY

## 2025-06-24 PROCEDURE — 36415 COLL VENOUS BLD VENIPUNCTURE: CPT

## 2025-06-24 PROCEDURE — 82948 REAGENT STRIP/BLOOD GLUCOSE: CPT

## 2025-06-24 PROCEDURE — 6360000002 HC RX W HCPCS: Performed by: INTERNAL MEDICINE

## 2025-06-24 PROCEDURE — 99239 HOSP IP/OBS DSCHRG MGMT >30: CPT | Performed by: PHYSICIAN ASSISTANT

## 2025-06-24 PROCEDURE — 2500000003 HC RX 250 WO HCPCS: Performed by: NURSE PRACTITIONER

## 2025-06-24 PROCEDURE — 2580000003 HC RX 258: Performed by: INTERNAL MEDICINE

## 2025-06-24 PROCEDURE — 6370000000 HC RX 637 (ALT 250 FOR IP)

## 2025-06-24 PROCEDURE — 6370000000 HC RX 637 (ALT 250 FOR IP): Performed by: NURSE PRACTITIONER

## 2025-06-24 PROCEDURE — 80048 BASIC METABOLIC PNL TOTAL CA: CPT

## 2025-06-24 PROCEDURE — 6370000000 HC RX 637 (ALT 250 FOR IP): Performed by: INTERNAL MEDICINE

## 2025-06-24 PROCEDURE — 94640 AIRWAY INHALATION TREATMENT: CPT

## 2025-06-24 PROCEDURE — 97110 THERAPEUTIC EXERCISES: CPT

## 2025-06-24 PROCEDURE — 97530 THERAPEUTIC ACTIVITIES: CPT

## 2025-06-24 RX ORDER — LIDOCAINE HYDROCHLORIDE 20 MG/ML
JELLY TOPICAL ONCE
Status: COMPLETED | OUTPATIENT
Start: 2025-06-24 | End: 2025-06-24

## 2025-06-24 RX ADMIN — GABAPENTIN 600 MG: 600 TABLET, FILM COATED ORAL at 09:17

## 2025-06-24 RX ADMIN — CLONAZEPAM 2 MG: 1 TABLET ORAL at 04:24

## 2025-06-24 RX ADMIN — TIOTROPIUM BROMIDE AND OLODATEROL 2 PUFF: 3.124; 2.736 SPRAY, METERED RESPIRATORY (INHALATION) at 09:26

## 2025-06-24 RX ADMIN — LIDOCAINE HYDROCHLORIDE: 20 JELLY TOPICAL at 06:50

## 2025-06-24 RX ADMIN — BUPRENORPHINE AND NALOXONE 1 FILM: 8; 2 FILM, SOLUBLE BUCCAL; SUBLINGUAL at 09:17

## 2025-06-24 RX ADMIN — LISINOPRIL 5 MG: 5 TABLET ORAL at 09:17

## 2025-06-24 RX ADMIN — FERROUS SULFATE TAB 325 MG (65 MG ELEMENTAL FE) 325 MG: 325 (65 FE) TAB at 09:17

## 2025-06-24 RX ADMIN — DULOXETINE 60 MG: 60 CAPSULE, DELAYED RELEASE ORAL at 09:17

## 2025-06-24 RX ADMIN — PIPERACILLIN AND TAZOBACTAM 3375 MG: 3; .375 INJECTION, POWDER, FOR SOLUTION INTRAVENOUS at 09:22

## 2025-06-24 RX ADMIN — APIXABAN 5 MG: 5 TABLET, FILM COATED ORAL at 09:17

## 2025-06-24 RX ADMIN — GABAPENTIN 600 MG: 600 TABLET, FILM COATED ORAL at 13:30

## 2025-06-24 RX ADMIN — PANTOPRAZOLE SODIUM 40 MG: 40 TABLET, DELAYED RELEASE ORAL at 06:04

## 2025-06-24 RX ADMIN — CLONAZEPAM 2 MG: 1 TABLET ORAL at 09:17

## 2025-06-24 RX ADMIN — SODIUM CHLORIDE, PRESERVATIVE FREE 10 ML: 5 INJECTION INTRAVENOUS at 09:17

## 2025-06-24 RX ADMIN — LEVOTHYROXINE SODIUM 50 MCG: 0.05 TABLET ORAL at 06:04

## 2025-06-24 RX ADMIN — BUMETANIDE 1 MG: 1 TABLET ORAL at 09:17

## 2025-06-24 ASSESSMENT — PAIN SCALES - GENERAL: PAINLEVEL_OUTOF10: 6

## 2025-06-24 ASSESSMENT — PAIN DESCRIPTION - LOCATION: LOCATION: BACK

## 2025-06-24 ASSESSMENT — PAIN DESCRIPTION - FREQUENCY: FREQUENCY: CONTINUOUS

## 2025-06-24 NOTE — PLAN OF CARE
Problem: Chronic Conditions and Co-morbidities  Goal: Patient's chronic conditions and co-morbidity symptoms are monitored and maintained or improved  Outcome: Progressing  Flowsheets (Taken 6/23/2025 2037)  Care Plan - Patient's Chronic Conditions and Co-Morbidity Symptoms are Monitored and Maintained or Improved:   Monitor and assess patient's chronic conditions and comorbid symptoms for stability, deterioration, or improvement   Update acute care plan with appropriate goals if chronic or comorbid symptoms are exacerbated and prevent overall improvement and discharge     Problem: Discharge Planning  Goal: Discharge to home or other facility with appropriate resources  Outcome: Progressing  Flowsheets (Taken 6/23/2025 2037)  Discharge to home or other facility with appropriate resources: Identify barriers to discharge with patient and caregiver     Problem: Safety - Adult  Goal: Free from fall injury  Outcome: Progressing     Problem: ABCDS Injury Assessment  Goal: Absence of physical injury  Outcome: Progressing     Problem: Skin/Tissue Integrity  Goal: Skin integrity remains intact  Description: 1.  Monitor for areas of redness and/or skin breakdown  2.  Assess vascular access sites hourly  3.  Every 4-6 hours minimum:  Change oxygen saturation probe site  4.  Every 4-6 hours:  If on nasal continuous positive airway pressure, respiratory therapy assess nares and determine need for appliance change or resting period  Outcome: Progressing     Problem: Pain  Goal: Verbalizes/displays adequate comfort level or baseline comfort level  Outcome: Progressing     Problem: Respiratory - Adult  Goal: Achieves optimal ventilation and oxygenation  Outcome: Progressing  Goal: Clear lung sounds  Outcome: Progressing

## 2025-06-24 NOTE — PLAN OF CARE
Problem: Chronic Conditions and Co-morbidities  Goal: Patient's chronic conditions and co-morbidity symptoms are monitored and maintained or improved  6/24/2025 1143 by Cherie Dai RN  Outcome: Progressing  Flowsheets (Taken 6/24/2025 0826)  Care Plan - Patient's Chronic Conditions and Co-Morbidity Symptoms are Monitored and Maintained or Improved: Monitor and assess patient's chronic conditions and comorbid symptoms for stability, deterioration, or improvement  6/24/2025 0507 by Laquita Garcia, RN  Outcome: Progressing  Flowsheets (Taken 6/23/2025 2037)  Care Plan - Patient's Chronic Conditions and Co-Morbidity Symptoms are Monitored and Maintained or Improved:   Monitor and assess patient's chronic conditions and comorbid symptoms for stability, deterioration, or improvement   Update acute care plan with appropriate goals if chronic or comorbid symptoms are exacerbated and prevent overall improvement and discharge     Problem: Discharge Planning  Goal: Discharge to home or other facility with appropriate resources  6/24/2025 1143 by Cherie Dai RN  Outcome: Progressing  Flowsheets (Taken 6/24/2025 0826)  Discharge to home or other facility with appropriate resources: Identify barriers to discharge with patient and caregiver  6/24/2025 0507 by Laquita Garcia RN  Outcome: Progressing  Flowsheets (Taken 6/23/2025 2037)  Discharge to home or other facility with appropriate resources: Identify barriers to discharge with patient and caregiver     Problem: Safety - Adult  Goal: Free from fall injury  6/24/2025 1143 by Cherie Dai RN  Outcome: Progressing  6/24/2025 0507 by Laquita Garcia RN  Outcome: Progressing     Problem: ABCDS Injury Assessment  Goal: Absence of physical injury  6/24/2025 1143 by Cherie Dai RN  Outcome: Progressing  6/24/2025 0507 by Laquita Garcia RN  Outcome: Progressing     Problem: Skin/Tissue Integrity  Goal: Skin integrity remains intact  Description:

## 2025-06-24 NOTE — CARE COORDINATION
6/24/25, 11:14 AM EDT    Patient goals/plan/ treatment preferences discussed by  and .  Patient goals/plan/ treatment preferences reviewed with patient/ family.  Patient/ family verbalize understanding of discharge plan and are in agreement with goal/plan/treatment preferences.  Understanding was demonstrated using the teach back method.  AVS provided by RN at time of discharge, which includes all necessary medical information pertaining to the patients current course of illness, treatment, post-discharge goals of care, and treatment preferences.     Services At/After Discharge: None Plans to return home with son. Current  with [employer] for o2.  Denies any other needs.

## 2025-06-24 NOTE — PROGRESS NOTES
[facility]  INPATIENT PHYSICAL THERAPY  DAILY NOTE  STRZ MED SURG 8AB - 8A-19/019-A      Discharge Recommendations: Home with Home Health PT  Equipment Recommendations: No               Time In: 1111  Time Out: 1138  Timed Code Treatment Minutes: 27 Minutes  Minutes: 27          Date: 2025  Patient Name: Avery Jordan,  Gender:  male        MRN: [id number]  : 1960  (64 y.o.)     Referring Practitioner: Allison Willard APRN - CNP  Diagnosis: Acute kidney injury  Additional Pertinent Hx: Avery Jordan is a 64-year-old male presented to [facility] 2025 with chief complaint of dysuria as well as concern about a yeast infection under his abdominal fold.  Family present at the bedside identify periodically he has been hallucinating.  Family states \"patient smelled like urine \" Patient admits to not taking his home medications for the past 3-5 days.  Patient wears continuous oxygen at 3 L per nasal cannula.  Patient denies any chest pain pressure heaviness tightness cough or shortness of breath.  CT A/P completed.  While in the ER patient did receive Tylenol, Lasix, 1L 0.9NS     Prior Level of Function:  Lives With: Son (Patient states he has 2 boys that stays with him)  Type of Home: House  Home Layout: Two level, Able to Live on Main level with bedroom/bathroom  Home Access: Level entry  Home Equipment: Walker - 4-Wheeled, Walker - Rolling, Cane, Oxygen, Lift chair (states has home O2 \"only if I need it\")   Bathroom Shower/Tub: Walk-in shower  Bathroom Toilet: Standard  Bathroom Equipment: Grab bars in shower, Built-in shower seat, Grab bars around toilet    Prior Level of Assist for ADLs: Independent  Prior Level of Assist for Homemaking: Independent  Prior Level of Assist for Transfers: Independent  Active : Yes  Additional Comments: Patient states he utilized a rollator \"here and there\" but does not use it no more.  Sleeps in recliner  Prior Level of Assist for

## 2025-06-25 LAB — NUCLEAR IGG SER QL IA: NORMAL

## 2025-06-25 NOTE — ED PROVIDER NOTES
impacting treatment or disposition:     4) MIPS  N/A                    Medical Decision Making  Patient was seen evaluated the emergency room for confusion, altered mental status, hallucinations, foul-smelling urine, intertriginous candidiasis.  Appropriate labs and imaging are ordered and reviewed.  Patient is found to have an acute kidney injury.  Likely has a COPD exacerbation as well.  Troponin is elevated.  He also has an intertriginous candidiasis.  Given the patient's WILMA and his decline in mental status, patient is appropriate for admission.  Consulted with the hospitalist who is agreeable.  Vital signs remained stable and the patient was admitted to the hospitalist service      Vitals Reviewed:    Vitals:    06/24/25 0328 06/24/25 0645 06/24/25 0825 06/24/25 1142   BP: 103/66  106/70 118/72   Pulse: 55  55 72   Resp: 18  20 18   Temp: 97 °F (36.1 °C)  98.1 °F (36.7 °C) 98.1 °F (36.7 °C)   TempSrc: Axillary  Oral Oral   SpO2: 96%  94% 96%   Weight:  112.6 kg (248 lb 3.8 oz)     Height:           The patient was seen and examined. Appropriate diagnostic testing was performed and results reviewed with the patient.         The results of pertinent diagnostic studies and exam findings were discussed. The patient’s provisional diagnosis and plan of care were discussed with the patient and present family who expressed understanding and agreement with the POC. Any medications were reviewed and indications and risks of medications were discussed with the patient /family present.  No notes of EC Admission Criteria type on file.        Patient was seen independently by myself. The patient's final impression and disposition and plan was determined by myself.     Strict return precautions and follow up instructions were discussed with the patient prior to discharge, with which the patient agrees.    Physical assessment findings, diagnostic testing(s) if applicable, and vital signs reviewed with patient/patient  representative.  Questions answered.   Medications asdirected, including OTC medications for supportive care.   Education provided on medications.  Differential diagnosis(s) discussed with patient/patient representative.  Home care/self care instructions reviewed withpatient/patient representative.  Patient is to follow-up with family care provider in 2-3 days if no improvement.  Patient is to go to the emergency department if symptoms worsen.  Patient/patient representative isaware of care plan, questions answered, verbalizes understanding and is in agreement.     ED Medications administered this visit:  (None if blank)  Medications   potassium chloride (KLOR-CON M) extended release tablet 40 mEq (40 mEq Oral Given 6/20/25 1006)     Or   potassium bicarb-citric acid (EFFER-K) effervescent tablet 40 mEq ( Oral See Alternative 6/20/25 1006)     Or   potassium chloride 10 mEq/100 mL IVPB (Peripheral Line) ( IntraVENous See Alternative 6/20/25 1006)   calcium carbonate (TUMS) chewable tablet 500 mg (500 mg Oral Given 6/23/25 0905)   sodium chloride 0.9 % bolus 1,000 mL (0 mLs IntraVENous Stopped 6/20/25 0101)   iopamidol (ISOVUE-370) 76 % injection 80 mL (80 mLs IntraVENous Given 6/19/25 2334)   acetaminophen (TYLENOL) tablet 650 mg (650 mg Oral Given 6/19/25 2042)   furosemide (LASIX) injection 40 mg (40 mg IntraVENous Given 6/20/25 0058)   furosemide (LASIX) injection 40 mg (40 mg IntraVENous Given 6/20/25 0301)   potassium chloride (KLOR-CON M) extended release tablet 40 mEq (40 mEq Oral Given 6/21/25 0855)   sulfur hexafluoride microspheres (LUMASON) 60.7-25 MG injection 2 mL (2 mLs IntraVENous Given 6/20/25 1641)   piperacillin-tazobactam (ZOSYN) 4,500 mg in sodium chloride 0.9 % 100 mL IVPB (addEASE) (0 mg IntraVENous Stopped 6/23/25 1140)   potassium chloride (KLOR-CON M) extended release tablet 40 mEq (40 mEq Oral Given 6/23/25 1726)   lidocaine (XYLOCAINE) 2 % uro-jet ( Topical Given 6/24/25 0650)

## 2025-06-28 LAB
BACTERIA BLD AEROBE CULT: NORMAL
BACTERIA BLD AEROBE CULT: NORMAL

## 2025-06-29 NOTE — PROGRESS NOTES
Physician Progress Note      PATIENT:               ERIKA HOU  [facility] #:                  [id number]  :                       1960  ADMIT DATE:       2025 7:04 PM  DISCH DATE:        2025 2:31 PM  RESPONDING  PROVIDER #:        Carlin Celeste MD          QUERY TEXT:    Pancytopenia  is documented in the medical record IM Progress Notes by Carlin Celeste MD at 2025  . Please provide additional clinical indicators   supportive of your documentation. Or please document if the diagnosis of    pancytopenia has been ruled out after study.    The clinical indicators include:  64 years old male with pneumonia , DM , CRF , HTN , CHF    -\"Patient has pancytopenia with a WBC of 4, hemoglobin 11.4 and platelets 108   K.\"-IM Progress Notes by Carlin Celeste MD at 2025 WBC 4.3 , RBC 4.25, platelet -138, HB-11.7    2025 WBC 4.0 , RBC 4.10, platelet 108, HB-11.4    2025 WBC 5.3, RBC 3.89, platelet -116, HB -10.7    -serial lab    Thank you    Cait Sullivan [facility]  ,  CD  Options provided:  -- pancytopenia present as evidenced by, Please document evidence.  -- pancytopenia was ruled out after study  -- Other - I will add my own diagnosis  -- Disagree - Not applicable / Not valid  -- Disagree - Clinically unable to determine / Unknown  -- Refer to Clinical Documentation Reviewer    PROVIDER RESPONSE TEXT:    pancytopenia is present as evidenced by Patient has pancytopenia with a WBC of   4, hemoglobin 11.4 and platelets 108 K.    Query created by: Cait Sullivan on 2025 2:17 AM      Electronically signed by:  Carlin Celeste MD 2025 1:55 PM

## 2025-07-01 NOTE — PROGRESS NOTES
Physician Progress Note      PATIENT:               ERIKA HOU  CSN #:                  [id number]  :                       1960  ADMIT DATE:       2025 7:04 PM  DISCH DATE:        2025 2:31 PM  RESPONDING  PROVIDER #:        Carlin Celeste MD          QUERY TEXT:    Based on your medical judgment, please clarify these findings and document if   any of the following are being evaluated and/or treated:    The clinical indicators include:  64 years old male with h/o PAF, HTN , DM ,    - \"PAF on Eliquis.\"-IM Progress Notes by Carlin Celeste MD at 2025    \"Patient has been noncompliant with his medications for the past 3 to 5 days.    Eliquis was restarted.\"-H&P by Allison Willard APRN - CNP at   2025    -Inj.enoxaparin ,tab. apixaban    Thank you    Cait Sullivan [facility]  ,  CDS  Options provided:  -- Secondary hypercoagulable state in a patient with atrial fibrillation  -- Other - I will add my own diagnosis  -- Disagree - Not applicable / Not valid  -- Disagree - Clinically unable to determine / Unknown  -- Refer to Clinical Documentation Reviewer    PROVIDER RESPONSE TEXT:    This patient has secondary hypercoagulable state in a patient with atrial   fibrillation.    Query created by: Cait Sullivan on 2025 1:08 AM      Electronically signed by:  Carlin Celeste MD 2025 1:55 PM

## 2025-07-20 NOTE — PROGRESS NOTES
Physician Progress Note      PATIENT:               ERIKA HOU  CSN #:                  [id number]  :                       1960  ADMIT DATE:       2025 7:04 PM  DISCH DATE:        2025 2:31 PM  RESPONDING  PROVIDER #:        Nessa Willard CNP          QUERY TEXT:    Acute on chronic CHF is documented in the medical record H&P by Allison Willard APRN - CNP at 2025 .  Please provide additional clinical   indicators supportive of your documentation. Or please document if the   diagnosis of acute on chronic CHF has been ruled out after study.    The clinical indicators include:  age 64 years old male with HTN , DM , chronic respiratory failure , pneumonia    -\"Acute on chronic HFpEF. cobblestone appearance (appearance bilateral lower   legs with edema).  Patient denies any chest pain pressure heaviness tightness   cough or shortness of breath. No dyspnea, orthopnea or paroxysmal nocturnal   dyspnea, cough, stridor or wheezing. No chest pain, pressure, heaviness or   tightness. Regular rate and rhythm, S1, S2 normal, no murmur, click, rub or   gallop\"-H&P by Allison Willard APRN - CNP at 2025    chest X ray 2025 -Borderline cardiomegaly without vascular congestion    BNP-1248.0    weight 240 ib on     weight 264 ib , 265 -    weight 253 ib on     weight 251 in -    CT-120    -Start Bumex 1 mg p.o. daily and DC Bumex 1 mg IV twice daily , serial lab ,   image studies , telemetry monitoring , daily weight checking    Thank you    Cait Sullivan [facility]  ,  CDS  Options provided:  -- Acute on chronic diastolic  CHF currently as evidenced by, Please document   evidence.  -- Acute on chronic diastolic   CHF ruled out after study and chronic   diastolic CHF confirmed  -- Other - I will add my own diagnosis  -- Disagree - Not applicable / Not valid  -- Disagree - Clinically unable to determine / Unknown  -- Refer to

## 2025-07-22 NOTE — DISCHARGE SUMMARY
Hospital Medicine Discharge Summary      Patient Identification:   Avery Jordan   : 1960  MRN: [id number]   Account: [id number]      Patient's PCP: Christopher Cobb MD    Admit Date: 2025     Discharge Date: 2025  2:31 PM    Admitting Physician: Az Rooney MD     Discharge Physician: LADARIUS Guzman     Discharge Diagnoses:    Active Hospital Problems    Diagnosis Date Noted    Acute kidney injury [N17.9] 2025    Chronic respiratory failure with hypoxia and hypercapnia (HCC) [J96.11, J96.12] 2025    Hypothyroidism [E03.9] 2025    CAD (coronary artery disease) [I25.10]     DM2 (diabetes mellitus, type 2) ([facility]) [E11.9]     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  Chest x-ray without acute findings.    Acute on chronic HFpEF, patient has been noncompliant with taking his medications. History of PAF, BiV PPM, s/p TVR-, IVP loop was given while in the ER.  BNP 1248.  Troponin 27.  Recently seen in cardiology clinic weight (25) 115.7 kg/+5 kg. TTE shows normal EF. GDMT Lisinopril, Toprol XL continued.  DMT2, uncontrolled with complications of peripheral neuropathy  Essential hypertension  Chronic respiratory failure, hypoxia, history of moderate COPD, Stiolto restarted.   Factor V Leiden, history, history of DVT, at time of admission patient has been noncompliant with his medications for the past 3 to 5 days.  Eliquis

## 2025-07-30 ENCOUNTER — OFFICE VISIT (OUTPATIENT)
Dept: CARDIOLOGY CLINIC | Age: 65
End: 2025-07-30
Payer: MEDICARE

## 2025-07-30 VITALS
DIASTOLIC BLOOD PRESSURE: 68 MMHG | SYSTOLIC BLOOD PRESSURE: 117 MMHG | BODY MASS INDEX: 36.66 KG/M2 | HEIGHT: 69 IN | HEART RATE: 66 BPM

## 2025-07-30 DIAGNOSIS — I48.0 PAROXYSMAL A-FIB (HCC): ICD-10-CM

## 2025-07-30 DIAGNOSIS — I10 ESSENTIAL HYPERTENSION: ICD-10-CM

## 2025-07-30 DIAGNOSIS — Z98.890 S/P CARDIAC CATH: ICD-10-CM

## 2025-07-30 DIAGNOSIS — Z95.0 PACEMAKER: ICD-10-CM

## 2025-07-30 DIAGNOSIS — Z95.3 HISTORY OF TRICUSPID VALVE REPLACEMENT WITH BIOPROSTHETIC VALVE: ICD-10-CM

## 2025-07-30 DIAGNOSIS — J44.9 MODERATE COPD (CHRONIC OBSTRUCTIVE PULMONARY DISEASE) (HCC): ICD-10-CM

## 2025-07-30 DIAGNOSIS — I07.0: ICD-10-CM

## 2025-07-30 DIAGNOSIS — I50.32 CHRONIC DIASTOLIC CONGESTIVE HEART FAILURE (HCC): Primary | ICD-10-CM

## 2025-07-30 DIAGNOSIS — I47.10 SVT (SUPRAVENTRICULAR TACHYCARDIA): ICD-10-CM

## 2025-07-30 PROCEDURE — 3023F SPIROM DOC REV: CPT | Performed by: INTERNAL MEDICINE

## 2025-07-30 PROCEDURE — G8427 DOCREV CUR MEDS BY ELIG CLIN: HCPCS | Performed by: INTERNAL MEDICINE

## 2025-07-30 PROCEDURE — G8417 CALC BMI ABV UP PARAM F/U: HCPCS | Performed by: INTERNAL MEDICINE

## 2025-07-30 PROCEDURE — 4004F PT TOBACCO SCREEN RCVD TLK: CPT | Performed by: INTERNAL MEDICINE

## 2025-07-30 PROCEDURE — 3078F DIAST BP <80 MM HG: CPT | Performed by: INTERNAL MEDICINE

## 2025-07-30 PROCEDURE — 99215 OFFICE O/P EST HI 40 MIN: CPT | Performed by: INTERNAL MEDICINE

## 2025-07-30 PROCEDURE — 3017F COLORECTAL CA SCREEN DOC REV: CPT | Performed by: INTERNAL MEDICINE

## 2025-07-30 PROCEDURE — 3074F SYST BP LT 130 MM HG: CPT | Performed by: INTERNAL MEDICINE

## 2025-07-30 RX ORDER — POTASSIUM CHLORIDE 1500 MG/1
20 TABLET, EXTENDED RELEASE ORAL 3 TIMES DAILY
Qty: 270 TABLET | Refills: 2 | Status: SHIPPED | OUTPATIENT
Start: 2025-07-30

## 2025-07-30 RX ORDER — METOPROLOL SUCCINATE 50 MG/1
50 TABLET, EXTENDED RELEASE ORAL EVERY EVENING
Qty: 90 TABLET | Refills: 3 | Status: SHIPPED | OUTPATIENT
Start: 2025-07-30

## 2025-07-30 NOTE — PROGRESS NOTES
dizziness, orthopnea, PND or pedal edema.   All medication side effects were discussed in details.       I spent 40 minutes involved in face-to-face discussion of medical issues, prognosis, record review  and plan with the patient today and more than 50% of the time was spent on counseling and coordination of care        RTC in 4 months with [facility] and mg Ilda Gillespie MD,MD,FACC

## 2025-08-04 RX ORDER — LISINOPRIL 5 MG/1
5 TABLET ORAL DAILY
Qty: 90 TABLET | Refills: 1 | Status: SHIPPED | OUTPATIENT
Start: 2025-08-04

## (undated) DEVICE — DRESSING TRNSPAR W8XL12IN FLM SURESITE 123

## (undated) DEVICE — TRAP SPEC COLL 40CC MUCOUS CALIB UNIV CONN FOR OPN SUCT

## (undated) DEVICE — SINGLE USE BIOPSY VALVE MAJ-210: Brand: SINGLE USE BIOPSY VALVE (STERILE)

## (undated) DEVICE — PATTERSON TOTAL JOINT: Brand: MEDLINE INDUSTRIES, INC.

## (undated) DEVICE — MEDI-VAC NON-CONDUCTIVE SUCTION TUBING 6MM X 1.8M (6FT.) L: Brand: CARDINAL HEALTH

## (undated) DEVICE — SET LNR RED GRN W/ BASE CLEANASCOPE

## (undated) DEVICE — SINGLE USE SUCTION VALVE MAJ-209: Brand: SINGLE USE SUCTION VALVE (STERILE)

## (undated) DEVICE — SUTURE ABSORBABLE MONOFILAMENT 1 CTX 36 CM 48 MM VIO PDS +

## (undated) DEVICE — SPONGE GZ W4XL4IN COT 12 PLY TYP VII WVN C FLD DSGN STERILE

## (undated) DEVICE — SUTURE VICRYL SZ 1 L36IN ABSRB UD CTX L48MM 1/2 CIR J977H

## (undated) DEVICE — PACK-MAJOR

## (undated) DEVICE — Device

## (undated) DEVICE — DRESSING PETRO W3XL3IN OIL EMUL N ADH GZ KNIT IMPREG CELOS

## (undated) DEVICE — DRAPE,HIP,W/POUCHES,STERILE: Brand: MEDLINE

## (undated) DEVICE — SUTURE VICRYL + SZ 2-0 L27IN ABSRB UD CP-1 1/2 CIR REV CUT VCP266H

## (undated) DEVICE — ELECTRODE BLDE L4IN NONINSULATED EDGE

## (undated) DEVICE — GLOVE SURG SZ 7.5 L11.73IN FNGR THK9.8MIL STRW LTX POLYMER

## (undated) DEVICE — BASIC SINGLE BASIN BTC-LF: Brand: MEDLINE INDUSTRIES, INC.

## (undated) DEVICE — REFLECTION FLEXIBLE DRILL 25MM: Brand: REFLECTION

## (undated) DEVICE — ENDO KIT: Brand: MEDLINE INDUSTRIES, INC.

## (undated) DEVICE — PAD,NON-ADHERENT,3X8,STERILE,LF,1/PK: Brand: MEDLINE

## (undated) DEVICE — SUTURE PDS II SZ 1 L36IN ABSRB VLT L48MM CTX 1/2 CIR Z371T

## (undated) DEVICE — CONTAINER,SPECIMEN,PNEU TUBE,4OZ,OR STRL: Brand: MEDLINE

## (undated) DEVICE — TRAP,MUCUS SPECIMEN, 80CC: Brand: MEDLINE

## (undated) DEVICE — CLOSURE SKIN FLX NONINVASIVE PRELOC TECHNOLOGY FOR 24IN

## (undated) DEVICE — TUBING, SUCTION, 1/4" X 6', STRAIGHT: Brand: MEDLINE

## (undated) DEVICE — PAD HIP IMMOB

## (undated) DEVICE — FORCEPS BX L100CM DIA1.8MM WRK CHN 2MM PULM S STL RAD JAW 4

## (undated) DEVICE — SOLUTION IV 100ML 0.9% SOD CHL PLAS CONT USP VIAFLX 1 PER

## (undated) DEVICE — SOLUTION IV IRRIG POUR BRL 0.9% SODIUM CHL 2F7124

## (undated) DEVICE — KIT INF CTRL 2OZ LUB TBNG L12FT DBL END BRSH SYR OP4

## (undated) DEVICE — SOLUTION IRRIG 3000ML 0.9% SOD CHL USP UROMATIC PLAS CONT

## (undated) DEVICE — DUAL CUT SAGITTAL BLADE

## (undated) DEVICE — IMPREGNATED GAUZE DRESSING: Brand: CUTICERIN 7.5X7.5CM CTN 50